# Patient Record
Sex: MALE | Race: WHITE | Employment: OTHER | URBAN - METROPOLITAN AREA
[De-identification: names, ages, dates, MRNs, and addresses within clinical notes are randomized per-mention and may not be internally consistent; named-entity substitution may affect disease eponyms.]

---

## 2017-01-10 ENCOUNTER — OFFICE VISIT (OUTPATIENT)
Dept: ENDOCRINOLOGY | Age: 42
End: 2017-01-10

## 2017-01-10 VITALS
DIASTOLIC BLOOD PRESSURE: 78 MMHG | RESPIRATION RATE: 14 BRPM | WEIGHT: 279 LBS | SYSTOLIC BLOOD PRESSURE: 127 MMHG | OXYGEN SATURATION: 100 % | BODY MASS INDEX: 35.81 KG/M2 | HEIGHT: 74 IN | HEART RATE: 72 BPM

## 2017-01-10 DIAGNOSIS — E11.9 DIABETES MELLITUS WITHOUT COMPLICATION (HCC): Primary | ICD-10-CM

## 2017-01-10 DIAGNOSIS — I10 ESSENTIAL HYPERTENSION: ICD-10-CM

## 2017-01-10 LAB — HBA1C MFR BLD: 7.5 %

## 2017-01-10 PROCEDURE — 99214 OFFICE O/P EST MOD 30 MIN: CPT | Performed by: INTERNAL MEDICINE

## 2017-01-10 PROCEDURE — 83036 HEMOGLOBIN GLYCOSYLATED A1C: CPT | Performed by: INTERNAL MEDICINE

## 2017-01-10 RX ORDER — INSULIN GLARGINE 100 [IU]/ML
INJECTION, SOLUTION SUBCUTANEOUS
Qty: 2 VIAL | Refills: 5 | Status: SHIPPED | OUTPATIENT
Start: 2017-01-10 | End: 2017-07-27 | Stop reason: SDUPTHER

## 2017-01-10 RX ORDER — BLOOD SUGAR DIAGNOSTIC
STRIP MISCELLANEOUS
Qty: 30 EACH | Refills: 10 | Status: SHIPPED | OUTPATIENT
Start: 2017-01-10 | End: 2017-10-02 | Stop reason: SDUPTHER

## 2017-01-10 RX ORDER — NAPROXEN SODIUM 220 MG
1 TABLET ORAL
Qty: 150 SYRINGE | Refills: 11 | Status: SHIPPED | OUTPATIENT
Start: 2017-01-10 | End: 2018-01-29 | Stop reason: SDUPTHER

## 2017-01-11 ENCOUNTER — TELEPHONE (OUTPATIENT)
Dept: ENDOCRINOLOGY | Age: 42
End: 2017-01-11

## 2017-01-11 DIAGNOSIS — M79.2 NEUROPATHIC PAIN: ICD-10-CM

## 2017-01-11 DIAGNOSIS — M86.669: ICD-10-CM

## 2017-01-11 DIAGNOSIS — G63 POLYNEUROPATHY ASSOCIATED WITH UNDERLYING DISEASE (HCC): ICD-10-CM

## 2017-01-11 DIAGNOSIS — G89.4 CHRONIC PAIN SYNDROME: ICD-10-CM

## 2017-01-11 DIAGNOSIS — M86.9 OSTEOMYELITIS, OSTEOMYELITIS OF UNSPECIFIED SITE, UNSPECIFIED CHRONICITY: ICD-10-CM

## 2017-01-11 RX ORDER — TRAMADOL HYDROCHLORIDE 50 MG/1
TABLET ORAL
Qty: 50 TABLET | Refills: 0 | OUTPATIENT
Start: 2017-01-11

## 2017-01-12 ENCOUNTER — OFFICE VISIT (OUTPATIENT)
Dept: PAIN MANAGEMENT | Age: 42
End: 2017-01-12

## 2017-01-12 VITALS
SYSTOLIC BLOOD PRESSURE: 105 MMHG | DIASTOLIC BLOOD PRESSURE: 60 MMHG | WEIGHT: 277 LBS | BODY MASS INDEX: 35.56 KG/M2 | HEART RATE: 70 BPM

## 2017-01-12 DIAGNOSIS — G63 POLYNEUROPATHY ASSOCIATED WITH UNDERLYING DISEASE (HCC): ICD-10-CM

## 2017-01-12 DIAGNOSIS — K21.9 GASTROESOPHAGEAL REFLUX DISEASE WITHOUT ESOPHAGITIS: ICD-10-CM

## 2017-01-12 DIAGNOSIS — G43.711 CHRONIC MIGRAINE WITHOUT AURA, WITH INTRACTABLE MIGRAINE, SO STATED, WITH STATUS MIGRAINOSUS: ICD-10-CM

## 2017-01-12 DIAGNOSIS — M79.2 NEUROPATHIC PAIN: ICD-10-CM

## 2017-01-12 DIAGNOSIS — M86.669: ICD-10-CM

## 2017-01-12 DIAGNOSIS — G89.29 CHRONIC NONINTRACTABLE HEADACHE, UNSPECIFIED HEADACHE TYPE: ICD-10-CM

## 2017-01-12 DIAGNOSIS — F51.01 PRIMARY INSOMNIA: ICD-10-CM

## 2017-01-12 DIAGNOSIS — G89.4 CHRONIC PAIN SYNDROME: ICD-10-CM

## 2017-01-12 DIAGNOSIS — M86.9 OSTEOMYELITIS, OSTEOMYELITIS OF UNSPECIFIED SITE, UNSPECIFIED CHRONICITY: ICD-10-CM

## 2017-01-12 DIAGNOSIS — R51.9 CHRONIC NONINTRACTABLE HEADACHE, UNSPECIFIED HEADACHE TYPE: ICD-10-CM

## 2017-01-12 DIAGNOSIS — F33.9 RECURRENT MAJOR DEPRESSIVE DISORDER, REMISSION STATUS UNSPECIFIED (HCC): ICD-10-CM

## 2017-01-12 PROCEDURE — 99214 OFFICE O/P EST MOD 30 MIN: CPT | Performed by: INTERNAL MEDICINE

## 2017-01-12 RX ORDER — CALCIUM CARBONATE/VITAMIN D3 500-10/5ML
400 LIQUID (ML) ORAL 2 TIMES DAILY
Qty: 60 CAPSULE | Refills: 1 | Status: SHIPPED | OUTPATIENT
Start: 2017-01-12 | End: 2017-02-16 | Stop reason: SDUPTHER

## 2017-01-12 RX ORDER — TOPIRAMATE 50 MG/1
50 TABLET, FILM COATED ORAL 2 TIMES DAILY
Qty: 60 TABLET | Refills: 1 | Status: SHIPPED | OUTPATIENT
Start: 2017-01-12 | End: 2017-02-16 | Stop reason: SDUPTHER

## 2017-01-12 RX ORDER — TRAZODONE HYDROCHLORIDE 50 MG/1
50-100 TABLET ORAL NIGHTLY
Qty: 60 TABLET | Refills: 0 | Status: SHIPPED | OUTPATIENT
Start: 2017-01-12 | End: 2017-02-11 | Stop reason: SDUPTHER

## 2017-01-12 RX ORDER — TRAMADOL HYDROCHLORIDE 50 MG/1
50 TABLET ORAL EVERY 6 HOURS PRN
Qty: 30 TABLET | Refills: 0 | Status: SHIPPED | OUTPATIENT
Start: 2017-01-12 | End: 2017-02-16 | Stop reason: SDUPTHER

## 2017-01-12 RX ORDER — ESOMEPRAZOLE MAGNESIUM 40 MG/1
CAPSULE, DELAYED RELEASE ORAL
Qty: 30 CAPSULE | Refills: 0 | Status: SHIPPED | OUTPATIENT
Start: 2017-01-12 | End: 2017-02-11 | Stop reason: SDUPTHER

## 2017-01-12 RX ORDER — DULOXETIN HYDROCHLORIDE 60 MG/1
60 CAPSULE, DELAYED RELEASE ORAL DAILY
Qty: 30 CAPSULE | Refills: 1 | Status: SHIPPED | OUTPATIENT
Start: 2017-01-12 | End: 2017-02-16 | Stop reason: SDUPTHER

## 2017-01-12 RX ORDER — GABAPENTIN 400 MG/1
CAPSULE ORAL
Qty: 60 CAPSULE | Refills: 1 | Status: SHIPPED | OUTPATIENT
Start: 2017-01-12 | End: 2017-02-16 | Stop reason: SDUPTHER

## 2017-01-12 RX ORDER — SUMATRIPTAN 50 MG/1
TABLET, FILM COATED ORAL
Qty: 9 TABLET | Refills: 1 | Status: SHIPPED | OUTPATIENT
Start: 2017-01-12 | End: 2017-02-10 | Stop reason: SDUPTHER

## 2017-01-12 RX ORDER — BUPRENORPHINE 5 UG/H
1 PATCH TRANSDERMAL
Qty: 4 PATCH | Refills: 0 | Status: SHIPPED | OUTPATIENT
Start: 2017-01-12 | End: 2017-02-16 | Stop reason: SDUPTHER

## 2017-01-16 DIAGNOSIS — K21.9 GASTROESOPHAGEAL REFLUX DISEASE WITHOUT ESOPHAGITIS: ICD-10-CM

## 2017-01-16 DIAGNOSIS — F51.01 PRIMARY INSOMNIA: ICD-10-CM

## 2017-01-16 DIAGNOSIS — G89.4 CHRONIC PAIN SYNDROME: ICD-10-CM

## 2017-01-16 RX ORDER — ESOMEPRAZOLE MAGNESIUM 40 MG/1
CAPSULE, DELAYED RELEASE ORAL
Qty: 30 CAPSULE | Refills: 0 | OUTPATIENT
Start: 2017-01-16

## 2017-01-16 RX ORDER — QUETIAPINE FUMARATE 25 MG/1
TABLET, FILM COATED ORAL
Qty: 30 TABLET | Refills: 0 | OUTPATIENT
Start: 2017-01-16

## 2017-01-27 DIAGNOSIS — G89.4 CHRONIC PAIN SYNDROME: ICD-10-CM

## 2017-01-27 DIAGNOSIS — M79.2 NEUROPATHIC PAIN: ICD-10-CM

## 2017-01-27 DIAGNOSIS — G63 POLYNEUROPATHY ASSOCIATED WITH UNDERLYING DISEASE (HCC): ICD-10-CM

## 2017-01-27 DIAGNOSIS — M86.669: ICD-10-CM

## 2017-01-27 DIAGNOSIS — M86.9 OSTEOMYELITIS, OSTEOMYELITIS OF UNSPECIFIED SITE, UNSPECIFIED CHRONICITY: ICD-10-CM

## 2017-01-27 RX ORDER — TRAMADOL HYDROCHLORIDE 50 MG/1
TABLET ORAL
Qty: 30 TABLET | Refills: 0 | OUTPATIENT
Start: 2017-01-27

## 2017-02-02 RX ORDER — IBUPROFEN 600 MG/1
TABLET ORAL
Qty: 1 KIT | Refills: 4 | Status: SHIPPED | OUTPATIENT
Start: 2017-02-02 | End: 2017-12-18 | Stop reason: SDUPTHER

## 2017-02-09 ENCOUNTER — OFFICE VISIT (OUTPATIENT)
Dept: INTERNAL MEDICINE CLINIC | Age: 42
End: 2017-02-09

## 2017-02-09 VITALS
HEART RATE: 68 BPM | HEIGHT: 74 IN | WEIGHT: 274.1 LBS | OXYGEN SATURATION: 99 % | DIASTOLIC BLOOD PRESSURE: 86 MMHG | SYSTOLIC BLOOD PRESSURE: 132 MMHG | BODY MASS INDEX: 35.18 KG/M2

## 2017-02-09 DIAGNOSIS — E10.8 TYPE 1 DIABETES MELLITUS WITH COMPLICATION (HCC): Primary | ICD-10-CM

## 2017-02-09 LAB
GLUCOSE BLD-MCNC: 298 MG/DL
HBA1C MFR BLD: 7.6 %

## 2017-02-09 PROCEDURE — 82962 GLUCOSE BLOOD TEST: CPT | Performed by: INTERNAL MEDICINE

## 2017-02-09 PROCEDURE — 83036 HEMOGLOBIN GLYCOSYLATED A1C: CPT | Performed by: INTERNAL MEDICINE

## 2017-02-09 PROCEDURE — 99214 OFFICE O/P EST MOD 30 MIN: CPT | Performed by: INTERNAL MEDICINE

## 2017-02-10 DIAGNOSIS — G89.4 CHRONIC PAIN SYNDROME: ICD-10-CM

## 2017-02-10 DIAGNOSIS — R51.9 CHRONIC NONINTRACTABLE HEADACHE, UNSPECIFIED HEADACHE TYPE: ICD-10-CM

## 2017-02-10 DIAGNOSIS — G89.29 CHRONIC NONINTRACTABLE HEADACHE, UNSPECIFIED HEADACHE TYPE: ICD-10-CM

## 2017-02-10 RX ORDER — SUMATRIPTAN 50 MG/1
TABLET, FILM COATED ORAL
Qty: 9 TABLET | Refills: 1 | Status: SHIPPED | OUTPATIENT
Start: 2017-02-10 | End: 2017-02-16 | Stop reason: SDUPTHER

## 2017-02-11 DIAGNOSIS — K21.9 GASTROESOPHAGEAL REFLUX DISEASE WITHOUT ESOPHAGITIS: ICD-10-CM

## 2017-02-11 DIAGNOSIS — G89.4 CHRONIC PAIN SYNDROME: ICD-10-CM

## 2017-02-11 DIAGNOSIS — F51.01 PRIMARY INSOMNIA: ICD-10-CM

## 2017-02-13 RX ORDER — TRAZODONE HYDROCHLORIDE 50 MG/1
TABLET ORAL
Qty: 60 TABLET | Refills: 0 | Status: SHIPPED | OUTPATIENT
Start: 2017-02-13 | End: 2017-02-16 | Stop reason: SDUPTHER

## 2017-02-13 RX ORDER — ESOMEPRAZOLE MAGNESIUM 40 MG/1
CAPSULE, DELAYED RELEASE ORAL
Qty: 30 CAPSULE | Refills: 0 | Status: SHIPPED | OUTPATIENT
Start: 2017-02-13 | End: 2017-02-16 | Stop reason: SDUPTHER

## 2017-02-15 RX ORDER — CETIRIZINE HYDROCHLORIDE 10 MG/1
TABLET ORAL
Qty: 28 TABLET | Refills: 1 | Status: SHIPPED | OUTPATIENT
Start: 2017-02-15 | End: 2017-05-07 | Stop reason: SDUPTHER

## 2017-02-15 RX ORDER — MULTIVITAMIN
TABLET ORAL
Qty: 30 TABLET | Refills: 5 | Status: SHIPPED | OUTPATIENT
Start: 2017-02-15 | End: 2017-09-26 | Stop reason: SDUPTHER

## 2017-02-16 ENCOUNTER — OFFICE VISIT (OUTPATIENT)
Dept: PAIN MANAGEMENT | Age: 42
End: 2017-02-16

## 2017-02-16 VITALS
WEIGHT: 275 LBS | HEART RATE: 67 BPM | SYSTOLIC BLOOD PRESSURE: 110 MMHG | DIASTOLIC BLOOD PRESSURE: 51 MMHG | BODY MASS INDEX: 35.31 KG/M2

## 2017-02-16 DIAGNOSIS — F51.01 PRIMARY INSOMNIA: ICD-10-CM

## 2017-02-16 DIAGNOSIS — G89.4 CHRONIC PAIN SYNDROME: ICD-10-CM

## 2017-02-16 DIAGNOSIS — G89.29 CHRONIC NONINTRACTABLE HEADACHE, UNSPECIFIED HEADACHE TYPE: ICD-10-CM

## 2017-02-16 DIAGNOSIS — G63 POLYNEUROPATHY ASSOCIATED WITH UNDERLYING DISEASE (HCC): ICD-10-CM

## 2017-02-16 DIAGNOSIS — F33.9 RECURRENT MAJOR DEPRESSIVE DISORDER, REMISSION STATUS UNSPECIFIED (HCC): ICD-10-CM

## 2017-02-16 DIAGNOSIS — M79.2 NEUROPATHIC PAIN: ICD-10-CM

## 2017-02-16 DIAGNOSIS — R51.9 CHRONIC NONINTRACTABLE HEADACHE, UNSPECIFIED HEADACHE TYPE: ICD-10-CM

## 2017-02-16 DIAGNOSIS — K21.9 GASTROESOPHAGEAL REFLUX DISEASE WITHOUT ESOPHAGITIS: ICD-10-CM

## 2017-02-16 PROCEDURE — 99214 OFFICE O/P EST MOD 30 MIN: CPT | Performed by: INTERNAL MEDICINE

## 2017-02-16 RX ORDER — ESOMEPRAZOLE MAGNESIUM 40 MG/1
CAPSULE, DELAYED RELEASE ORAL
Qty: 30 CAPSULE | Refills: 0 | Status: SHIPPED | OUTPATIENT
Start: 2017-02-16 | End: 2017-03-16

## 2017-02-16 RX ORDER — GABAPENTIN 400 MG/1
CAPSULE ORAL
Qty: 60 CAPSULE | Refills: 0 | Status: SHIPPED | OUTPATIENT
Start: 2017-02-16 | End: 2017-03-16 | Stop reason: SDUPTHER

## 2017-02-16 RX ORDER — TRAMADOL HYDROCHLORIDE 50 MG/1
50 TABLET ORAL EVERY 6 HOURS PRN
Qty: 25 TABLET | Refills: 0 | Status: SHIPPED | OUTPATIENT
Start: 2017-02-16 | End: 2017-03-16 | Stop reason: SDUPTHER

## 2017-02-16 RX ORDER — DULOXETIN HYDROCHLORIDE 60 MG/1
60 CAPSULE, DELAYED RELEASE ORAL DAILY
Qty: 30 CAPSULE | Refills: 0 | Status: SHIPPED | OUTPATIENT
Start: 2017-02-16 | End: 2017-03-16 | Stop reason: SDUPTHER

## 2017-02-16 RX ORDER — BUPRENORPHINE 5 UG/H
1 PATCH TRANSDERMAL
Qty: 4 PATCH | Refills: 0 | Status: SHIPPED | OUTPATIENT
Start: 2017-02-16 | End: 2017-03-16 | Stop reason: SDUPTHER

## 2017-02-16 RX ORDER — CALCIUM CARBONATE/VITAMIN D3 500-10/5ML
400 LIQUID (ML) ORAL 2 TIMES DAILY
Qty: 60 CAPSULE | Refills: 0 | Status: SHIPPED | OUTPATIENT
Start: 2017-02-16 | End: 2017-03-16 | Stop reason: SDUPTHER

## 2017-02-16 RX ORDER — TRAZODONE HYDROCHLORIDE 50 MG/1
TABLET ORAL
Qty: 60 TABLET | Refills: 0 | Status: SHIPPED | OUTPATIENT
Start: 2017-02-16 | End: 2017-03-16 | Stop reason: SDUPTHER

## 2017-02-16 RX ORDER — SUMATRIPTAN 50 MG/1
TABLET, FILM COATED ORAL
Qty: 9 TABLET | Refills: 0 | Status: SHIPPED | OUTPATIENT
Start: 2017-02-16 | End: 2017-03-16 | Stop reason: SDUPTHER

## 2017-02-16 RX ORDER — TOPIRAMATE 50 MG/1
50 TABLET, FILM COATED ORAL 2 TIMES DAILY
Qty: 60 TABLET | Refills: 0 | Status: SHIPPED | OUTPATIENT
Start: 2017-02-16 | End: 2017-03-16 | Stop reason: SDUPTHER

## 2017-02-17 ENCOUNTER — TELEPHONE (OUTPATIENT)
Dept: PAIN MANAGEMENT | Age: 42
End: 2017-02-17

## 2017-02-18 DIAGNOSIS — G89.4 CHRONIC PAIN SYNDROME: ICD-10-CM

## 2017-02-18 DIAGNOSIS — M79.2 NEUROPATHIC PAIN: ICD-10-CM

## 2017-02-18 DIAGNOSIS — G63 POLYNEUROPATHY ASSOCIATED WITH UNDERLYING DISEASE (HCC): ICD-10-CM

## 2017-02-20 ENCOUNTER — TELEPHONE (OUTPATIENT)
Dept: PAIN MANAGEMENT | Age: 42
End: 2017-02-20

## 2017-02-20 DIAGNOSIS — G89.4 CHRONIC PAIN SYNDROME: ICD-10-CM

## 2017-02-20 ASSESSMENT — ENCOUNTER SYMPTOMS
GASTROINTESTINAL NEGATIVE: 1
RESPIRATORY NEGATIVE: 1

## 2017-02-21 RX ORDER — TRAMADOL HYDROCHLORIDE 50 MG/1
TABLET ORAL
Qty: 25 TABLET | Refills: 0 | OUTPATIENT
Start: 2017-02-21

## 2017-03-14 DIAGNOSIS — G89.4 CHRONIC PAIN SYNDROME: ICD-10-CM

## 2017-03-14 DIAGNOSIS — K21.9 GASTROESOPHAGEAL REFLUX DISEASE WITHOUT ESOPHAGITIS: ICD-10-CM

## 2017-03-14 RX ORDER — ESOMEPRAZOLE MAGNESIUM 40 MG/1
CAPSULE, DELAYED RELEASE ORAL
Qty: 30 CAPSULE | Refills: 0 | OUTPATIENT
Start: 2017-03-14

## 2017-03-14 RX ORDER — OMEGA-3-ACID ETHYL ESTERS 1 G/1
CAPSULE, LIQUID FILLED ORAL
Qty: 120 CAPSULE | Refills: 2 | Status: SHIPPED | OUTPATIENT
Start: 2017-03-14 | End: 2017-06-10 | Stop reason: SDUPTHER

## 2017-03-16 ENCOUNTER — OFFICE VISIT (OUTPATIENT)
Dept: PAIN MANAGEMENT | Age: 42
End: 2017-03-16

## 2017-03-16 VITALS
BODY MASS INDEX: 35.56 KG/M2 | WEIGHT: 277 LBS | DIASTOLIC BLOOD PRESSURE: 65 MMHG | SYSTOLIC BLOOD PRESSURE: 97 MMHG | HEART RATE: 81 BPM

## 2017-03-16 DIAGNOSIS — G63 POLYNEUROPATHY ASSOCIATED WITH UNDERLYING DISEASE (HCC): ICD-10-CM

## 2017-03-16 DIAGNOSIS — F33.9 RECURRENT MAJOR DEPRESSIVE DISORDER, REMISSION STATUS UNSPECIFIED (HCC): ICD-10-CM

## 2017-03-16 DIAGNOSIS — F51.01 PRIMARY INSOMNIA: ICD-10-CM

## 2017-03-16 DIAGNOSIS — K21.9 GASTRO-ESOPHAGEAL REFLUX DISEASE WITHOUT ESOPHAGITIS: ICD-10-CM

## 2017-03-16 DIAGNOSIS — G89.29 CHRONIC NONINTRACTABLE HEADACHE, UNSPECIFIED HEADACHE TYPE: ICD-10-CM

## 2017-03-16 DIAGNOSIS — G43.711 CHRONIC MIGRAINE WITHOUT AURA, WITH INTRACTABLE MIGRAINE, SO STATED, WITH STATUS MIGRAINOSUS: ICD-10-CM

## 2017-03-16 DIAGNOSIS — R51.9 CHRONIC NONINTRACTABLE HEADACHE, UNSPECIFIED HEADACHE TYPE: ICD-10-CM

## 2017-03-16 DIAGNOSIS — G89.4 CHRONIC PAIN SYNDROME: ICD-10-CM

## 2017-03-16 DIAGNOSIS — M79.2 NEUROPATHIC PAIN: ICD-10-CM

## 2017-03-16 PROCEDURE — 99214 OFFICE O/P EST MOD 30 MIN: CPT | Performed by: INTERNAL MEDICINE

## 2017-03-16 RX ORDER — BUPRENORPHINE 5 UG/H
1 PATCH TRANSDERMAL
Qty: 4 PATCH | Refills: 0 | Status: SHIPPED | OUTPATIENT
Start: 2017-03-16 | End: 2017-04-13 | Stop reason: SDUPTHER

## 2017-03-16 RX ORDER — SUMATRIPTAN 50 MG/1
TABLET, FILM COATED ORAL
Qty: 9 TABLET | Refills: 0 | Status: SHIPPED | OUTPATIENT
Start: 2017-03-16 | End: 2017-04-13 | Stop reason: SDUPTHER

## 2017-03-16 RX ORDER — TRAMADOL HYDROCHLORIDE 50 MG/1
50 TABLET ORAL EVERY 6 HOURS PRN
Qty: 25 TABLET | Refills: 0 | Status: SHIPPED | OUTPATIENT
Start: 2017-03-16 | End: 2017-04-13 | Stop reason: SDUPTHER

## 2017-03-16 RX ORDER — TOPIRAMATE 50 MG/1
50 TABLET, FILM COATED ORAL 2 TIMES DAILY
Qty: 60 TABLET | Refills: 0 | Status: SHIPPED | OUTPATIENT
Start: 2017-03-16 | End: 2017-04-13 | Stop reason: SDUPTHER

## 2017-03-16 RX ORDER — GABAPENTIN 400 MG/1
CAPSULE ORAL
Qty: 60 CAPSULE | Refills: 0 | Status: SHIPPED | OUTPATIENT
Start: 2017-03-16 | End: 2017-04-13 | Stop reason: SDUPTHER

## 2017-03-16 RX ORDER — FAMOTIDINE 20 MG/1
20 TABLET, FILM COATED ORAL DAILY
Qty: 30 TABLET | Refills: 0 | Status: SHIPPED | OUTPATIENT
Start: 2017-03-16 | End: 2017-04-13 | Stop reason: SDUPTHER

## 2017-03-16 RX ORDER — CALCIUM CARBONATE/VITAMIN D3 500-10/5ML
400 LIQUID (ML) ORAL 2 TIMES DAILY
Qty: 60 CAPSULE | Refills: 0 | Status: SHIPPED | OUTPATIENT
Start: 2017-03-16 | End: 2017-04-03 | Stop reason: CLARIF

## 2017-03-16 RX ORDER — TRAZODONE HYDROCHLORIDE 50 MG/1
TABLET ORAL
Qty: 60 TABLET | Refills: 0 | Status: SHIPPED | OUTPATIENT
Start: 2017-03-16 | End: 2017-04-13 | Stop reason: SDUPTHER

## 2017-03-16 RX ORDER — DULOXETIN HYDROCHLORIDE 60 MG/1
60 CAPSULE, DELAYED RELEASE ORAL DAILY
Qty: 30 CAPSULE | Refills: 0 | Status: SHIPPED | OUTPATIENT
Start: 2017-03-16 | End: 2017-04-13 | Stop reason: SDUPTHER

## 2017-03-17 RX ORDER — BLOOD-GLUCOSE METER
1 KIT MISCELLANEOUS SEE ADMIN INSTRUCTIONS
Qty: 1 DEVICE | Refills: 0 | Status: SHIPPED | OUTPATIENT
Start: 2017-03-17 | End: 2017-07-27 | Stop reason: CLARIF

## 2017-03-17 RX ORDER — LANCETS 28 GAUGE
1 EACH MISCELLANEOUS SEE ADMIN INSTRUCTIONS
Qty: 300 EACH | Refills: 11 | Status: SHIPPED | OUTPATIENT
Start: 2017-03-17 | End: 2017-07-27 | Stop reason: CLARIF

## 2017-03-20 DIAGNOSIS — K21.9 GASTROESOPHAGEAL REFLUX DISEASE WITHOUT ESOPHAGITIS: ICD-10-CM

## 2017-03-20 DIAGNOSIS — G89.4 CHRONIC PAIN SYNDROME: ICD-10-CM

## 2017-03-23 ENCOUNTER — TELEPHONE (OUTPATIENT)
Dept: ENDOCRINOLOGY | Age: 42
End: 2017-03-23

## 2017-03-23 RX ORDER — FAMOTIDINE 20 MG/1
TABLET, FILM COATED ORAL
Qty: 30 TABLET | Refills: 0 | Status: SHIPPED | OUTPATIENT
Start: 2017-03-23 | End: 2017-04-13 | Stop reason: SDUPTHER

## 2017-03-25 DIAGNOSIS — G63 POLYNEUROPATHY ASSOCIATED WITH UNDERLYING DISEASE (HCC): ICD-10-CM

## 2017-03-25 DIAGNOSIS — G89.4 CHRONIC PAIN SYNDROME: ICD-10-CM

## 2017-03-25 DIAGNOSIS — M79.2 NEUROPATHIC PAIN: ICD-10-CM

## 2017-03-28 RX ORDER — TRAMADOL HYDROCHLORIDE 50 MG/1
TABLET ORAL
Qty: 25 TABLET | Refills: 0 | OUTPATIENT
Start: 2017-03-28

## 2017-04-03 ENCOUNTER — TELEPHONE (OUTPATIENT)
Dept: PAIN MANAGEMENT | Age: 42
End: 2017-04-03

## 2017-04-03 DIAGNOSIS — G89.4 CHRONIC PAIN SYNDROME: ICD-10-CM

## 2017-04-03 DIAGNOSIS — G63 POLYNEUROPATHY ASSOCIATED WITH UNDERLYING DISEASE (HCC): ICD-10-CM

## 2017-04-03 DIAGNOSIS — M79.2 NEUROPATHIC PAIN: ICD-10-CM

## 2017-04-03 RX ORDER — MAGNESIUM OXIDE 400 MG/1
400 TABLET ORAL 2 TIMES DAILY
Qty: 60 TABLET | Refills: 0 | Status: SHIPPED | OUTPATIENT
Start: 2017-04-03 | End: 2017-05-09 | Stop reason: SDUPTHER

## 2017-04-13 ENCOUNTER — OFFICE VISIT (OUTPATIENT)
Dept: PAIN MANAGEMENT | Age: 42
End: 2017-04-13

## 2017-04-13 VITALS
HEART RATE: 69 BPM | SYSTOLIC BLOOD PRESSURE: 110 MMHG | DIASTOLIC BLOOD PRESSURE: 65 MMHG | WEIGHT: 273 LBS | BODY MASS INDEX: 35.05 KG/M2

## 2017-04-13 DIAGNOSIS — G63 POLYNEUROPATHY ASSOCIATED WITH UNDERLYING DISEASE (HCC): ICD-10-CM

## 2017-04-13 DIAGNOSIS — M86.662 CHRONIC OSTEOMYELITIS OF LEFT TIBIA (HCC): ICD-10-CM

## 2017-04-13 DIAGNOSIS — F51.01 PRIMARY INSOMNIA: ICD-10-CM

## 2017-04-13 DIAGNOSIS — M79.2 NEUROPATHIC PAIN: ICD-10-CM

## 2017-04-13 DIAGNOSIS — F33.9 RECURRENT MAJOR DEPRESSIVE DISORDER, REMISSION STATUS UNSPECIFIED (HCC): ICD-10-CM

## 2017-04-13 DIAGNOSIS — K21.9 GASTROESOPHAGEAL REFLUX DISEASE WITHOUT ESOPHAGITIS: ICD-10-CM

## 2017-04-13 DIAGNOSIS — G89.4 CHRONIC PAIN SYNDROME: ICD-10-CM

## 2017-04-13 DIAGNOSIS — G43.711 CHRONIC MIGRAINE WITHOUT AURA, WITH INTRACTABLE MIGRAINE, SO STATED, WITH STATUS MIGRAINOSUS: ICD-10-CM

## 2017-04-13 PROCEDURE — 99214 OFFICE O/P EST MOD 30 MIN: CPT | Performed by: INTERNAL MEDICINE

## 2017-04-13 RX ORDER — TRAMADOL HYDROCHLORIDE 50 MG/1
50 TABLET ORAL EVERY 6 HOURS PRN
Qty: 50 TABLET | Refills: 0 | Status: SHIPPED | OUTPATIENT
Start: 2017-04-13 | End: 2017-05-09 | Stop reason: SDUPTHER

## 2017-04-13 RX ORDER — TRAZODONE HYDROCHLORIDE 50 MG/1
TABLET ORAL
Qty: 60 TABLET | Refills: 1 | Status: SHIPPED | OUTPATIENT
Start: 2017-04-13 | End: 2017-06-13 | Stop reason: SDUPTHER

## 2017-04-13 RX ORDER — SUMATRIPTAN 50 MG/1
TABLET, FILM COATED ORAL
Qty: 9 TABLET | Refills: 1 | Status: SHIPPED | OUTPATIENT
Start: 2017-04-13 | End: 2017-06-13 | Stop reason: SDUPTHER

## 2017-04-13 RX ORDER — TOPIRAMATE 50 MG/1
TABLET, FILM COATED ORAL
Qty: 90 TABLET | Refills: 1 | Status: SHIPPED | OUTPATIENT
Start: 2017-04-13 | End: 2017-06-13 | Stop reason: ALTCHOICE

## 2017-04-13 RX ORDER — BUPRENORPHINE 5 UG/H
1 PATCH TRANSDERMAL
Qty: 4 PATCH | Refills: 0 | Status: SHIPPED | OUTPATIENT
Start: 2017-04-13 | End: 2017-05-09 | Stop reason: SDUPTHER

## 2017-04-13 RX ORDER — GABAPENTIN 400 MG/1
CAPSULE ORAL
Qty: 60 CAPSULE | Refills: 1 | Status: SHIPPED | OUTPATIENT
Start: 2017-04-13 | End: 2017-06-13 | Stop reason: SDUPTHER

## 2017-04-13 RX ORDER — FAMOTIDINE 20 MG/1
TABLET, FILM COATED ORAL
Qty: 30 TABLET | Refills: 1 | Status: SHIPPED | OUTPATIENT
Start: 2017-04-13 | End: 2017-06-13 | Stop reason: SDUPTHER

## 2017-04-13 RX ORDER — DULOXETIN HYDROCHLORIDE 60 MG/1
60 CAPSULE, DELAYED RELEASE ORAL DAILY
Qty: 30 CAPSULE | Refills: 1 | Status: SHIPPED | OUTPATIENT
Start: 2017-04-13 | End: 2017-06-13 | Stop reason: ALTCHOICE

## 2017-04-18 DIAGNOSIS — R09.81 NASAL SINUS CONGESTION: ICD-10-CM

## 2017-04-18 RX ORDER — FLUTICASONE PROPIONATE 50 MCG
SPRAY, SUSPENSION (ML) NASAL
Qty: 1 BOTTLE | Refills: 2 | Status: SHIPPED | OUTPATIENT
Start: 2017-04-18 | End: 2017-07-15 | Stop reason: SDUPTHER

## 2017-04-21 DIAGNOSIS — G63 POLYNEUROPATHY ASSOCIATED WITH UNDERLYING DISEASE (HCC): ICD-10-CM

## 2017-04-21 DIAGNOSIS — M79.2 NEUROPATHIC PAIN: ICD-10-CM

## 2017-04-21 DIAGNOSIS — G89.4 CHRONIC PAIN SYNDROME: ICD-10-CM

## 2017-04-21 RX ORDER — TRAMADOL HYDROCHLORIDE 50 MG/1
TABLET ORAL
Qty: 50 TABLET | Refills: 0 | OUTPATIENT
Start: 2017-04-21

## 2017-05-09 ENCOUNTER — OFFICE VISIT (OUTPATIENT)
Dept: PAIN MANAGEMENT | Age: 42
End: 2017-05-09

## 2017-05-09 VITALS
HEART RATE: 61 BPM | BODY MASS INDEX: 35.56 KG/M2 | WEIGHT: 277 LBS | DIASTOLIC BLOOD PRESSURE: 60 MMHG | SYSTOLIC BLOOD PRESSURE: 105 MMHG

## 2017-05-09 DIAGNOSIS — G89.4 CHRONIC PAIN SYNDROME: ICD-10-CM

## 2017-05-09 DIAGNOSIS — F33.9 RECURRENT MAJOR DEPRESSIVE DISORDER, REMISSION STATUS UNSPECIFIED (HCC): ICD-10-CM

## 2017-05-09 DIAGNOSIS — M79.2 NEUROPATHIC PAIN: ICD-10-CM

## 2017-05-09 DIAGNOSIS — G63 POLYNEUROPATHY ASSOCIATED WITH UNDERLYING DISEASE (HCC): ICD-10-CM

## 2017-05-09 DIAGNOSIS — F51.01 PRIMARY INSOMNIA: ICD-10-CM

## 2017-05-09 DIAGNOSIS — G43.711 CHRONIC MIGRAINE WITHOUT AURA, WITH INTRACTABLE MIGRAINE, SO STATED, WITH STATUS MIGRAINOSUS: ICD-10-CM

## 2017-05-09 DIAGNOSIS — G89.29 CHRONIC NONINTRACTABLE HEADACHE, UNSPECIFIED HEADACHE TYPE: ICD-10-CM

## 2017-05-09 DIAGNOSIS — R51.9 CHRONIC NONINTRACTABLE HEADACHE, UNSPECIFIED HEADACHE TYPE: ICD-10-CM

## 2017-05-09 PROCEDURE — 99214 OFFICE O/P EST MOD 30 MIN: CPT | Performed by: INTERNAL MEDICINE

## 2017-05-09 RX ORDER — BUPRENORPHINE 5 UG/H
1 PATCH TRANSDERMAL
Qty: 4 PATCH | Refills: 0 | Status: SHIPPED | OUTPATIENT
Start: 2017-05-09 | End: 2017-06-13 | Stop reason: SDUPTHER

## 2017-05-09 RX ORDER — MAGNESIUM OXIDE 400 MG/1
400 TABLET ORAL 2 TIMES DAILY
Qty: 60 TABLET | Refills: 0 | Status: SHIPPED | OUTPATIENT
Start: 2017-05-09 | End: 2017-06-13 | Stop reason: SDUPTHER

## 2017-05-09 RX ORDER — TRAMADOL HYDROCHLORIDE 50 MG/1
50 TABLET ORAL EVERY 6 HOURS PRN
Qty: 70 TABLET | Refills: 0 | Status: SHIPPED | OUTPATIENT
Start: 2017-05-09 | End: 2017-06-13 | Stop reason: SDUPTHER

## 2017-05-12 RX ORDER — CEPHALEXIN 500 MG/1
CAPSULE ORAL
Qty: 28 CAPSULE | Refills: 7 | Status: SHIPPED | OUTPATIENT
Start: 2017-05-12 | End: 2018-02-08 | Stop reason: SDUPTHER

## 2017-05-17 RX ORDER — INSULIN GLARGINE 100 [IU]/ML
30 INJECTION, SOLUTION SUBCUTANEOUS NIGHTLY
Qty: 15 ML | Refills: 1 | Status: SHIPPED | OUTPATIENT
Start: 2017-05-17 | End: 2017-07-21 | Stop reason: SDUPTHER

## 2017-05-25 DIAGNOSIS — G89.4 CHRONIC PAIN SYNDROME: ICD-10-CM

## 2017-05-25 DIAGNOSIS — G63 POLYNEUROPATHY ASSOCIATED WITH UNDERLYING DISEASE (HCC): ICD-10-CM

## 2017-05-25 DIAGNOSIS — M79.2 NEUROPATHIC PAIN: ICD-10-CM

## 2017-05-26 RX ORDER — TRAMADOL HYDROCHLORIDE 50 MG/1
TABLET ORAL
Qty: 70 TABLET | Refills: 0 | OUTPATIENT
Start: 2017-05-26

## 2017-06-10 RX ORDER — OMEGA-3-ACID ETHYL ESTERS 1 G/1
CAPSULE, LIQUID FILLED ORAL
Qty: 120 CAPSULE | Refills: 5 | Status: SHIPPED | OUTPATIENT
Start: 2017-06-10

## 2017-06-13 ENCOUNTER — OFFICE VISIT (OUTPATIENT)
Dept: PAIN MANAGEMENT | Age: 42
End: 2017-06-13

## 2017-06-13 VITALS
WEIGHT: 277 LBS | HEART RATE: 67 BPM | SYSTOLIC BLOOD PRESSURE: 121 MMHG | BODY MASS INDEX: 35.56 KG/M2 | DIASTOLIC BLOOD PRESSURE: 66 MMHG

## 2017-06-13 DIAGNOSIS — M79.2 NEUROPATHIC PAIN: ICD-10-CM

## 2017-06-13 DIAGNOSIS — G89.4 CHRONIC PAIN SYNDROME: ICD-10-CM

## 2017-06-13 DIAGNOSIS — F33.1 MODERATE EPISODE OF RECURRENT MAJOR DEPRESSIVE DISORDER (HCC): ICD-10-CM

## 2017-06-13 DIAGNOSIS — G43.711 CHRONIC MIGRAINE WITHOUT AURA, WITH INTRACTABLE MIGRAINE, SO STATED, WITH STATUS MIGRAINOSUS: ICD-10-CM

## 2017-06-13 DIAGNOSIS — F33.9 RECURRENT MAJOR DEPRESSIVE DISORDER, REMISSION STATUS UNSPECIFIED (HCC): ICD-10-CM

## 2017-06-13 DIAGNOSIS — M86.662 CHRONIC OSTEOMYELITIS OF LEFT TIBIA (HCC): ICD-10-CM

## 2017-06-13 DIAGNOSIS — G63 POLYNEUROPATHY ASSOCIATED WITH UNDERLYING DISEASE (HCC): ICD-10-CM

## 2017-06-13 DIAGNOSIS — F51.01 PRIMARY INSOMNIA: ICD-10-CM

## 2017-06-13 DIAGNOSIS — K21.9 GASTROESOPHAGEAL REFLUX DISEASE WITHOUT ESOPHAGITIS: ICD-10-CM

## 2017-06-13 PROCEDURE — 99214 OFFICE O/P EST MOD 30 MIN: CPT | Performed by: INTERNAL MEDICINE

## 2017-06-13 RX ORDER — SUMATRIPTAN 50 MG/1
TABLET, FILM COATED ORAL
Qty: 9 TABLET | Refills: 1 | Status: SHIPPED | OUTPATIENT
Start: 2017-06-13 | End: 2017-07-13 | Stop reason: SDUPTHER

## 2017-06-13 RX ORDER — TOPIRAMATE 100 MG/1
100 TABLET, FILM COATED ORAL 2 TIMES DAILY
Qty: 60 TABLET | Refills: 0 | Status: SHIPPED | OUTPATIENT
Start: 2017-06-13 | End: 2017-07-13 | Stop reason: SDUPTHER

## 2017-06-13 RX ORDER — MAGNESIUM OXIDE 400 MG/1
400 TABLET ORAL 2 TIMES DAILY
Qty: 60 TABLET | Refills: 0 | Status: SHIPPED | OUTPATIENT
Start: 2017-06-13 | End: 2017-07-13 | Stop reason: SDUPTHER

## 2017-06-13 RX ORDER — GABAPENTIN 400 MG/1
CAPSULE ORAL
Qty: 60 CAPSULE | Refills: 1 | Status: SHIPPED | OUTPATIENT
Start: 2017-06-13 | End: 2017-07-13 | Stop reason: SDUPTHER

## 2017-06-13 RX ORDER — TRAMADOL HYDROCHLORIDE 50 MG/1
50 TABLET ORAL EVERY 6 HOURS PRN
Qty: 70 TABLET | Refills: 0 | Status: SHIPPED | OUTPATIENT
Start: 2017-06-13 | End: 2017-08-11 | Stop reason: SDUPTHER

## 2017-06-13 RX ORDER — FAMOTIDINE 20 MG/1
TABLET, FILM COATED ORAL
Qty: 30 TABLET | Refills: 1 | Status: SHIPPED | OUTPATIENT
Start: 2017-06-13 | End: 2017-07-13 | Stop reason: SDUPTHER

## 2017-06-13 RX ORDER — BUPRENORPHINE 5 UG/H
1 PATCH TRANSDERMAL
Qty: 4 PATCH | Refills: 0 | Status: SHIPPED | OUTPATIENT
Start: 2017-06-13 | End: 2017-07-13 | Stop reason: SDUPTHER

## 2017-06-13 RX ORDER — DULOXETIN HYDROCHLORIDE 30 MG/1
CAPSULE, DELAYED RELEASE ORAL
Qty: 90 CAPSULE | Refills: 0 | Status: SHIPPED | OUTPATIENT
Start: 2017-06-13 | End: 2017-07-13 | Stop reason: ALTCHOICE

## 2017-06-13 RX ORDER — TRAZODONE HYDROCHLORIDE 50 MG/1
TABLET ORAL
Qty: 60 TABLET | Refills: 1 | Status: SHIPPED | OUTPATIENT
Start: 2017-06-13 | End: 2017-07-13 | Stop reason: SDUPTHER

## 2017-06-14 ENCOUNTER — TELEPHONE (OUTPATIENT)
Dept: PAIN MANAGEMENT | Age: 42
End: 2017-06-14

## 2017-06-15 ENCOUNTER — OFFICE VISIT (OUTPATIENT)
Dept: INFECTIOUS DISEASES | Age: 42
End: 2017-06-15

## 2017-06-15 VITALS
WEIGHT: 277 LBS | BODY MASS INDEX: 35.56 KG/M2 | TEMPERATURE: 98.2 F | DIASTOLIC BLOOD PRESSURE: 80 MMHG | SYSTOLIC BLOOD PRESSURE: 112 MMHG

## 2017-06-15 DIAGNOSIS — M86.662 CHRONIC OSTEOMYELITIS OF LEFT TIBIA (HCC): Primary | ICD-10-CM

## 2017-06-15 DIAGNOSIS — E10.8 TYPE 1 DIABETES MELLITUS WITH COMPLICATION (HCC): ICD-10-CM

## 2017-06-15 DIAGNOSIS — A49.01 STAPH AUREUS INFECTION: ICD-10-CM

## 2017-06-15 PROCEDURE — 99214 OFFICE O/P EST MOD 30 MIN: CPT | Performed by: INTERNAL MEDICINE

## 2017-06-16 ENCOUNTER — TELEPHONE (OUTPATIENT)
Dept: PAIN MANAGEMENT | Age: 42
End: 2017-06-16

## 2017-06-22 ENCOUNTER — OFFICE VISIT (OUTPATIENT)
Dept: INTERNAL MEDICINE CLINIC | Age: 42
End: 2017-06-22

## 2017-06-22 VITALS — OXYGEN SATURATION: 99 % | SYSTOLIC BLOOD PRESSURE: 106 MMHG | DIASTOLIC BLOOD PRESSURE: 70 MMHG | HEART RATE: 74 BPM

## 2017-06-22 DIAGNOSIS — N18.30 CKD STAGE 3 DUE TO TYPE 1 DIABETES MELLITUS (HCC): ICD-10-CM

## 2017-06-22 DIAGNOSIS — R05.9 COUGH: ICD-10-CM

## 2017-06-22 DIAGNOSIS — Z01.84 IMMUNITY STATUS TESTING: ICD-10-CM

## 2017-06-22 DIAGNOSIS — Z23 NEED FOR PNEUMOCOCCAL VACCINE: ICD-10-CM

## 2017-06-22 DIAGNOSIS — J30.89 OTHER ALLERGIC RHINITIS: ICD-10-CM

## 2017-06-22 DIAGNOSIS — Z23 NEED FOR HEPATITIS B VACCINATION: ICD-10-CM

## 2017-06-22 DIAGNOSIS — E10.8 TYPE 1 DIABETES MELLITUS WITH COMPLICATION (HCC): ICD-10-CM

## 2017-06-22 DIAGNOSIS — E10.22 CKD STAGE 3 DUE TO TYPE 1 DIABETES MELLITUS (HCC): ICD-10-CM

## 2017-06-22 LAB
GLUCOSE BLD-MCNC: 124 MG/DL
HBA1C MFR BLD: 7.7 %

## 2017-06-22 PROCEDURE — 99214 OFFICE O/P EST MOD 30 MIN: CPT | Performed by: INTERNAL MEDICINE

## 2017-06-22 PROCEDURE — 83036 HEMOGLOBIN GLYCOSYLATED A1C: CPT | Performed by: INTERNAL MEDICINE

## 2017-06-22 PROCEDURE — 90746 HEPB VACCINE 3 DOSE ADULT IM: CPT | Performed by: INTERNAL MEDICINE

## 2017-06-22 PROCEDURE — 90732 PPSV23 VACC 2 YRS+ SUBQ/IM: CPT | Performed by: INTERNAL MEDICINE

## 2017-06-22 PROCEDURE — 82962 GLUCOSE BLOOD TEST: CPT | Performed by: INTERNAL MEDICINE

## 2017-06-22 PROCEDURE — 90472 IMMUNIZATION ADMIN EACH ADD: CPT | Performed by: INTERNAL MEDICINE

## 2017-06-22 PROCEDURE — 90471 IMMUNIZATION ADMIN: CPT | Performed by: INTERNAL MEDICINE

## 2017-06-22 RX ORDER — GUAIFENESIN AND DEXTROMETHORPHAN HYDROBROMIDE 600; 30 MG/1; MG/1
TABLET, EXTENDED RELEASE ORAL
Qty: 60 TABLET | Refills: 1 | Status: SHIPPED | OUTPATIENT
Start: 2017-06-22

## 2017-06-26 ENCOUNTER — TELEPHONE (OUTPATIENT)
Dept: INTERNAL MEDICINE CLINIC | Age: 42
End: 2017-06-26

## 2017-07-05 ASSESSMENT — ENCOUNTER SYMPTOMS
EYES NEGATIVE: 1
GASTROINTESTINAL NEGATIVE: 1

## 2017-07-07 RX ORDER — CETIRIZINE HYDROCHLORIDE 10 MG/1
TABLET ORAL
Qty: 28 TABLET | Refills: 0 | Status: SHIPPED | OUTPATIENT
Start: 2017-07-07 | End: 2017-08-06 | Stop reason: SDUPTHER

## 2017-07-10 ENCOUNTER — TELEPHONE (OUTPATIENT)
Dept: ENDOCRINOLOGY | Age: 42
End: 2017-07-10

## 2017-07-11 DIAGNOSIS — G89.4 CHRONIC PAIN SYNDROME: ICD-10-CM

## 2017-07-12 RX ORDER — TOPIRAMATE 100 MG/1
TABLET, FILM COATED ORAL
Qty: 60 TABLET | Refills: 0 | OUTPATIENT
Start: 2017-07-12

## 2017-07-12 RX ORDER — DULOXETIN HYDROCHLORIDE 30 MG/1
CAPSULE, DELAYED RELEASE ORAL
Qty: 90 CAPSULE | Refills: 0 | OUTPATIENT
Start: 2017-07-12

## 2017-07-13 ENCOUNTER — TELEPHONE (OUTPATIENT)
Dept: ENDOCRINOLOGY | Age: 42
End: 2017-07-13

## 2017-07-13 ENCOUNTER — OFFICE VISIT (OUTPATIENT)
Dept: PAIN MANAGEMENT | Age: 42
End: 2017-07-13

## 2017-07-13 VITALS
WEIGHT: 277 LBS | SYSTOLIC BLOOD PRESSURE: 105 MMHG | HEART RATE: 75 BPM | BODY MASS INDEX: 35.56 KG/M2 | DIASTOLIC BLOOD PRESSURE: 63 MMHG

## 2017-07-13 DIAGNOSIS — F33.41 RECURRENT MAJOR DEPRESSIVE DISORDER, IN PARTIAL REMISSION (HCC): ICD-10-CM

## 2017-07-13 DIAGNOSIS — F51.01 PRIMARY INSOMNIA: ICD-10-CM

## 2017-07-13 DIAGNOSIS — K21.9 GASTROESOPHAGEAL REFLUX DISEASE WITHOUT ESOPHAGITIS: ICD-10-CM

## 2017-07-13 DIAGNOSIS — L97.522 ULCER OF LEFT FOOT, WITH FAT LAYER EXPOSED (HCC): ICD-10-CM

## 2017-07-13 DIAGNOSIS — G63 POLYNEUROPATHY ASSOCIATED WITH UNDERLYING DISEASE (HCC): ICD-10-CM

## 2017-07-13 DIAGNOSIS — M79.605 PAIN OF LEFT LOWER EXTREMITY: ICD-10-CM

## 2017-07-13 DIAGNOSIS — M79.2 NEUROPATHIC PAIN: ICD-10-CM

## 2017-07-13 DIAGNOSIS — G43.711 CHRONIC MIGRAINE WITHOUT AURA, WITH INTRACTABLE MIGRAINE, SO STATED, WITH STATUS MIGRAINOSUS: ICD-10-CM

## 2017-07-13 DIAGNOSIS — G89.4 CHRONIC PAIN SYNDROME: ICD-10-CM

## 2017-07-13 DIAGNOSIS — M79.18 MYOFASCIAL PAIN: ICD-10-CM

## 2017-07-13 PROCEDURE — 99214 OFFICE O/P EST MOD 30 MIN: CPT | Performed by: INTERNAL MEDICINE

## 2017-07-13 RX ORDER — GABAPENTIN 400 MG/1
CAPSULE ORAL
Qty: 60 CAPSULE | Refills: 1 | Status: SHIPPED | OUTPATIENT
Start: 2017-07-13 | End: 2017-10-10 | Stop reason: SDUPTHER

## 2017-07-13 RX ORDER — FAMOTIDINE 20 MG/1
TABLET, FILM COATED ORAL
Qty: 30 TABLET | Refills: 1 | Status: SHIPPED | OUTPATIENT
Start: 2017-07-13 | End: 2017-09-12 | Stop reason: SDUPTHER

## 2017-07-13 RX ORDER — DULOXETIN HYDROCHLORIDE 60 MG/1
60 CAPSULE, DELAYED RELEASE ORAL 2 TIMES DAILY
Qty: 60 CAPSULE | Refills: 0 | Status: SHIPPED | OUTPATIENT
Start: 2017-07-13 | End: 2017-08-11 | Stop reason: SDUPTHER

## 2017-07-13 RX ORDER — MAGNESIUM OXIDE 400 MG/1
400 TABLET ORAL 2 TIMES DAILY
Qty: 60 TABLET | Refills: 0 | Status: SHIPPED | OUTPATIENT
Start: 2017-07-13 | End: 2017-08-11 | Stop reason: SDUPTHER

## 2017-07-13 RX ORDER — BUPRENORPHINE 5 UG/H
1 PATCH TRANSDERMAL
Qty: 4 PATCH | Refills: 0 | Status: SHIPPED | OUTPATIENT
Start: 2017-07-13 | End: 2017-08-11 | Stop reason: SDUPTHER

## 2017-07-13 RX ORDER — TOPIRAMATE 100 MG/1
100 TABLET, FILM COATED ORAL 2 TIMES DAILY
Qty: 60 TABLET | Refills: 0 | Status: SHIPPED | OUTPATIENT
Start: 2017-07-13 | End: 2017-08-11 | Stop reason: SDUPTHER

## 2017-07-13 RX ORDER — TRAZODONE HYDROCHLORIDE 50 MG/1
TABLET ORAL
Qty: 60 TABLET | Refills: 0 | Status: SHIPPED | OUTPATIENT
Start: 2017-07-13 | End: 2017-08-11 | Stop reason: SDUPTHER

## 2017-07-13 RX ORDER — SUMATRIPTAN 50 MG/1
TABLET, FILM COATED ORAL
Qty: 9 TABLET | Refills: 1 | Status: SHIPPED | OUTPATIENT
Start: 2017-07-13 | End: 2017-09-12 | Stop reason: SDUPTHER

## 2017-07-21 RX ORDER — INSULIN GLARGINE 100 [IU]/ML
30 INJECTION, SOLUTION SUBCUTANEOUS NIGHTLY
Qty: 15 ML | Refills: 1 | Status: SHIPPED | OUTPATIENT
Start: 2017-07-21 | End: 2017-09-27 | Stop reason: SDUPTHER

## 2017-07-27 ENCOUNTER — OFFICE VISIT (OUTPATIENT)
Dept: ENDOCRINOLOGY | Age: 42
End: 2017-07-27

## 2017-07-27 VITALS
WEIGHT: 275 LBS | HEIGHT: 74 IN | DIASTOLIC BLOOD PRESSURE: 67 MMHG | SYSTOLIC BLOOD PRESSURE: 101 MMHG | OXYGEN SATURATION: 96 % | RESPIRATION RATE: 14 BRPM | HEART RATE: 77 BPM | BODY MASS INDEX: 35.29 KG/M2

## 2017-07-27 DIAGNOSIS — E55.9 VITAMIN D DEFICIENCY: ICD-10-CM

## 2017-07-27 DIAGNOSIS — E78.2 MIXED HYPERLIPIDEMIA: ICD-10-CM

## 2017-07-27 DIAGNOSIS — E10.8 TYPE 1 DIABETES MELLITUS WITH COMPLICATION (HCC): Primary | ICD-10-CM

## 2017-07-27 DIAGNOSIS — I10 ESSENTIAL HYPERTENSION: ICD-10-CM

## 2017-07-27 PROCEDURE — 99214 OFFICE O/P EST MOD 30 MIN: CPT | Performed by: INTERNAL MEDICINE

## 2017-08-07 RX ORDER — CETIRIZINE HYDROCHLORIDE 10 MG/1
TABLET ORAL
Qty: 28 TABLET | Refills: 0 | Status: SHIPPED | OUTPATIENT
Start: 2017-08-07 | End: 2017-09-25 | Stop reason: SDUPTHER

## 2017-08-08 DIAGNOSIS — F33.9 RECURRENT MAJOR DEPRESSIVE DISORDER, REMISSION STATUS UNSPECIFIED (HCC): ICD-10-CM

## 2017-08-08 DIAGNOSIS — G89.4 CHRONIC PAIN SYNDROME: ICD-10-CM

## 2017-08-10 RX ORDER — DULOXETIN HYDROCHLORIDE 60 MG/1
CAPSULE, DELAYED RELEASE ORAL
Qty: 30 CAPSULE | Refills: 0 | OUTPATIENT
Start: 2017-08-10

## 2017-08-10 RX ORDER — TOPIRAMATE 100 MG/1
TABLET, FILM COATED ORAL
Qty: 60 TABLET | Refills: 0 | OUTPATIENT
Start: 2017-08-10

## 2017-08-11 ENCOUNTER — OFFICE VISIT (OUTPATIENT)
Dept: PAIN MANAGEMENT | Age: 42
End: 2017-08-11

## 2017-08-11 VITALS
DIASTOLIC BLOOD PRESSURE: 73 MMHG | WEIGHT: 275 LBS | HEART RATE: 68 BPM | BODY MASS INDEX: 35.31 KG/M2 | SYSTOLIC BLOOD PRESSURE: 124 MMHG

## 2017-08-11 DIAGNOSIS — G89.4 CHRONIC PAIN SYNDROME: ICD-10-CM

## 2017-08-11 DIAGNOSIS — G63 POLYNEUROPATHY ASSOCIATED WITH UNDERLYING DISEASE (HCC): ICD-10-CM

## 2017-08-11 DIAGNOSIS — M79.2 NEUROPATHIC PAIN: ICD-10-CM

## 2017-08-11 DIAGNOSIS — L97.522 ULCER OF LEFT FOOT, WITH FAT LAYER EXPOSED (HCC): ICD-10-CM

## 2017-08-11 DIAGNOSIS — F51.01 PRIMARY INSOMNIA: ICD-10-CM

## 2017-08-11 DIAGNOSIS — M79.18 MYOFASCIAL PAIN: ICD-10-CM

## 2017-08-11 PROCEDURE — G8417 CALC BMI ABV UP PARAM F/U: HCPCS | Performed by: INTERNAL MEDICINE

## 2017-08-11 PROCEDURE — 99213 OFFICE O/P EST LOW 20 MIN: CPT | Performed by: INTERNAL MEDICINE

## 2017-08-11 PROCEDURE — G8427 DOCREV CUR MEDS BY ELIG CLIN: HCPCS | Performed by: INTERNAL MEDICINE

## 2017-08-11 PROCEDURE — 1036F TOBACCO NON-USER: CPT | Performed by: INTERNAL MEDICINE

## 2017-08-11 RX ORDER — TOPIRAMATE 100 MG/1
100 TABLET, FILM COATED ORAL 2 TIMES DAILY
Qty: 60 TABLET | Refills: 0 | Status: SHIPPED | OUTPATIENT
Start: 2017-08-11 | End: 2017-09-12 | Stop reason: SDUPTHER

## 2017-08-11 RX ORDER — MAGNESIUM OXIDE 400 MG/1
400 TABLET ORAL 2 TIMES DAILY
Qty: 60 TABLET | Refills: 0 | Status: SHIPPED | OUTPATIENT
Start: 2017-08-11 | End: 2017-09-12 | Stop reason: SDUPTHER

## 2017-08-11 RX ORDER — TRAMADOL HYDROCHLORIDE 50 MG/1
50 TABLET ORAL EVERY 6 HOURS PRN
Qty: 60 TABLET | Refills: 0 | Status: SHIPPED | OUTPATIENT
Start: 2017-08-11 | End: 2017-09-12 | Stop reason: SDUPTHER

## 2017-08-11 RX ORDER — DULOXETIN HYDROCHLORIDE 60 MG/1
60 CAPSULE, DELAYED RELEASE ORAL 2 TIMES DAILY
Qty: 60 CAPSULE | Refills: 0 | Status: SHIPPED | OUTPATIENT
Start: 2017-08-11 | End: 2017-09-12 | Stop reason: SDUPTHER

## 2017-08-11 RX ORDER — BUPRENORPHINE 5 UG/H
1 PATCH TRANSDERMAL
Qty: 4 PATCH | Refills: 0 | Status: SHIPPED | OUTPATIENT
Start: 2017-08-11 | End: 2017-09-12 | Stop reason: SDUPTHER

## 2017-08-11 RX ORDER — TRAZODONE HYDROCHLORIDE 50 MG/1
TABLET ORAL
Qty: 60 TABLET | Refills: 0 | Status: SHIPPED | OUTPATIENT
Start: 2017-08-11 | End: 2017-09-12 | Stop reason: SDUPTHER

## 2017-09-12 ENCOUNTER — OFFICE VISIT (OUTPATIENT)
Dept: PAIN MANAGEMENT | Age: 42
End: 2017-09-12

## 2017-09-12 VITALS
WEIGHT: 275 LBS | BODY MASS INDEX: 35.31 KG/M2 | HEART RATE: 79 BPM | DIASTOLIC BLOOD PRESSURE: 60 MMHG | SYSTOLIC BLOOD PRESSURE: 111 MMHG

## 2017-09-12 DIAGNOSIS — G63 POLYNEUROPATHY ASSOCIATED WITH UNDERLYING DISEASE (HCC): ICD-10-CM

## 2017-09-12 DIAGNOSIS — A49.01 STAPH AUREUS INFECTION: ICD-10-CM

## 2017-09-12 DIAGNOSIS — G89.4 CHRONIC PAIN SYNDROME: ICD-10-CM

## 2017-09-12 DIAGNOSIS — M79.18 MYOFASCIAL PAIN: ICD-10-CM

## 2017-09-12 DIAGNOSIS — M79.2 NEUROPATHIC PAIN: ICD-10-CM

## 2017-09-12 DIAGNOSIS — L97.522 ULCER OF LEFT FOOT, WITH FAT LAYER EXPOSED (HCC): ICD-10-CM

## 2017-09-12 PROCEDURE — 1036F TOBACCO NON-USER: CPT | Performed by: INTERNAL MEDICINE

## 2017-09-12 PROCEDURE — G8417 CALC BMI ABV UP PARAM F/U: HCPCS | Performed by: INTERNAL MEDICINE

## 2017-09-12 PROCEDURE — 99213 OFFICE O/P EST LOW 20 MIN: CPT | Performed by: INTERNAL MEDICINE

## 2017-09-12 PROCEDURE — G8427 DOCREV CUR MEDS BY ELIG CLIN: HCPCS | Performed by: INTERNAL MEDICINE

## 2017-09-12 RX ORDER — TRAMADOL HYDROCHLORIDE 50 MG/1
50 TABLET ORAL EVERY 6 HOURS PRN
Qty: 56 TABLET | Refills: 0 | Status: SHIPPED | OUTPATIENT
Start: 2017-09-12 | End: 2017-10-10 | Stop reason: SDUPTHER

## 2017-09-12 RX ORDER — TRAZODONE HYDROCHLORIDE 50 MG/1
TABLET ORAL
Qty: 60 TABLET | Refills: 0 | Status: SHIPPED | OUTPATIENT
Start: 2017-09-12 | End: 2017-10-10

## 2017-09-12 RX ORDER — MAGNESIUM OXIDE 400 MG/1
400 TABLET ORAL 2 TIMES DAILY
Qty: 60 TABLET | Refills: 0 | Status: SHIPPED | OUTPATIENT
Start: 2017-09-12 | End: 2018-06-22 | Stop reason: SDUPTHER

## 2017-09-12 RX ORDER — TOPIRAMATE 100 MG/1
100 TABLET, FILM COATED ORAL 2 TIMES DAILY
Qty: 60 TABLET | Refills: 0 | Status: SHIPPED | OUTPATIENT
Start: 2017-09-12 | End: 2017-10-10 | Stop reason: SDUPTHER

## 2017-09-12 RX ORDER — BUPRENORPHINE 5 UG/H
1 PATCH TRANSDERMAL
Qty: 4 PATCH | Refills: 0 | Status: SHIPPED | OUTPATIENT
Start: 2017-09-12 | End: 2017-10-10 | Stop reason: SDUPTHER

## 2017-09-12 RX ORDER — FAMOTIDINE 20 MG/1
TABLET, FILM COATED ORAL
Qty: 30 TABLET | Refills: 0 | Status: SHIPPED | OUTPATIENT
Start: 2017-09-12 | End: 2017-10-10 | Stop reason: SDUPTHER

## 2017-09-12 RX ORDER — SUMATRIPTAN 50 MG/1
TABLET, FILM COATED ORAL
Qty: 9 TABLET | Refills: 0 | Status: SHIPPED | OUTPATIENT
Start: 2017-09-12 | End: 2017-10-10 | Stop reason: SDUPTHER

## 2017-09-12 RX ORDER — DULOXETIN HYDROCHLORIDE 60 MG/1
60 CAPSULE, DELAYED RELEASE ORAL 2 TIMES DAILY
Qty: 60 CAPSULE | Refills: 0 | Status: SHIPPED | OUTPATIENT
Start: 2017-09-12 | End: 2017-10-10 | Stop reason: SDUPTHER

## 2017-09-26 ENCOUNTER — OFFICE VISIT (OUTPATIENT)
Dept: INTERNAL MEDICINE CLINIC | Age: 42
End: 2017-09-26

## 2017-09-26 VITALS
HEIGHT: 74 IN | WEIGHT: 273.2 LBS | SYSTOLIC BLOOD PRESSURE: 112 MMHG | OXYGEN SATURATION: 9 % | HEART RATE: 69 BPM | TEMPERATURE: 97.5 F | RESPIRATION RATE: 16 BRPM | BODY MASS INDEX: 35.06 KG/M2 | DIASTOLIC BLOOD PRESSURE: 80 MMHG

## 2017-09-26 DIAGNOSIS — Z23 FLU VACCINE NEED: ICD-10-CM

## 2017-09-26 DIAGNOSIS — F43.21 ADJUSTMENT DISORDER WITH DEPRESSED MOOD: ICD-10-CM

## 2017-09-26 DIAGNOSIS — E10.8 TYPE 1 DIABETES MELLITUS WITH COMPLICATION (HCC): ICD-10-CM

## 2017-09-26 DIAGNOSIS — J30.89 ALLERGIC RHINITIS DUE TO OTHER ALLERGEN: ICD-10-CM

## 2017-09-26 DIAGNOSIS — E55.9 VITAMIN D DEFICIENCY: ICD-10-CM

## 2017-09-26 PROCEDURE — G8427 DOCREV CUR MEDS BY ELIG CLIN: HCPCS | Performed by: INTERNAL MEDICINE

## 2017-09-26 PROCEDURE — 90688 IIV4 VACCINE SPLT 0.5 ML IM: CPT | Performed by: INTERNAL MEDICINE

## 2017-09-26 PROCEDURE — 3046F HEMOGLOBIN A1C LEVEL >9.0%: CPT | Performed by: INTERNAL MEDICINE

## 2017-09-26 PROCEDURE — 99214 OFFICE O/P EST MOD 30 MIN: CPT | Performed by: INTERNAL MEDICINE

## 2017-09-26 PROCEDURE — G0008 ADMIN INFLUENZA VIRUS VAC: HCPCS | Performed by: INTERNAL MEDICINE

## 2017-09-26 PROCEDURE — G8417 CALC BMI ABV UP PARAM F/U: HCPCS | Performed by: INTERNAL MEDICINE

## 2017-09-26 PROCEDURE — 82962 GLUCOSE BLOOD TEST: CPT | Performed by: INTERNAL MEDICINE

## 2017-09-26 PROCEDURE — 1036F TOBACCO NON-USER: CPT | Performed by: INTERNAL MEDICINE

## 2017-09-26 RX ORDER — MULTIVITAMIN
TABLET ORAL
Qty: 30 TABLET | Refills: 5 | Status: SHIPPED | OUTPATIENT
Start: 2017-09-26

## 2017-09-26 ASSESSMENT — PATIENT HEALTH QUESTIONNAIRE - PHQ9
SUM OF ALL RESPONSES TO PHQ QUESTIONS 1-9: 0
1. LITTLE INTEREST OR PLEASURE IN DOING THINGS: 0
SUM OF ALL RESPONSES TO PHQ9 QUESTIONS 1 & 2: 0
2. FEELING DOWN, DEPRESSED OR HOPELESS: 0

## 2017-09-26 NOTE — MR AVS SNAPSHOT
After Visit Summary             Jose Carson   2017 1:45 PM   Office Visit    Description:  Male : 1975   Provider:  Rashmi Rodriguez MD   Department:  [facility]              Your Follow-Up and Future Appointments         Below is a list of your follow-up and future appointments. This may not be a complete list as you may have made appointments directly with providers that we are not aware of or your providers may have made some for you. Please call your providers to confirm appointments. It is important to keep your appointments. Please bring your current insurance card, photo ID, co-pay, and all medication bottles to your appointment. If self-pay, payment is expected at the time of service. Your To-Do List     Future Appointments Provider Department Dept Phone    10/10/2017 11:00 AM Skyler Tavares MD [facility] [phone]    Please arrive 15 minutes prior to appointment time, bring insurance card and photo ID.     2017 2:40 PM Maricel Titus MD [facility] [phone]    Please arrive 15 minutes prior to appointment time, bring insurance card and photo ID. Follow-Up    Return in about 3 months (around 2017).          Information from Your Visit        Department     Name Address Phone Fax    [facility] [address]  [address] [address]  [address] [phone]      You Were Seen for:         Comments    Vitamin D deficiency   [165440]         Vital Signs     Blood Pressure Pulse Temperature Respirations Height Weight    112/80 (Site: Left Arm, Position: Sitting, Cuff Size: Large Adult) 69 97.5 °F (36.4 °C) (Oral) 16 6' 2\" (1.88 m) 273 lb 3.2 oz (123.9 kg)    Oxygen Saturation Body Mass Index Smoking Status             9% 35.08 kg/m2 Never Smoker         Additional Information about your Body Mass Index (BMI) Your BMI as listed above is considered obese (30 or more). BMI is an estimate of body fat, calculated from your height and weight. The higher your BMI, the greater your risk of heart disease, high blood pressure, type 2 diabetes, stroke, gallstones, arthritis, sleep apnea, and certain cancers. BMI is not perfect. It may overestimate body fat in athletes and people who are more muscular. Even a small weight loss (between 5 and 10 percent of your current weight) by decreasing your calorie intake and becoming more physically active will help lower your risk of developing or worsening diseases associated with obesity. Learn more at: [employer]             Today's Medication Changes          These changes are accurate as of: 9/26/17  2:27 PM.  If you have any questions, ask your nurse or doctor. CHANGE how you take these medications           vitamin D3 5000 units Caps   Instructions: Take 1 capsule by mouth Daily   Quantity:  30 capsule   Refills:  3   What changed:  See the new instructions.    Changed by:  Joselin Chaney MD            Where to Get Your Medications      These medications were sent to [facility] [address] [facility] [address]     Phone:  [phone]     DAILY KITTY Tabs    vitamin D3 5000 units Caps               Your Current Medications Are              Multiple Vitamin (DAILY KITTY) TABS TAKE ONE TABLET BY MOUTH DAILY    Cholecalciferol (VITAMIN D3) 5000 units CAPS Take 1 capsule by mouth Daily    buprenorphine (BUTRANS) 5 MCG/HR PTWK Place 1 patch onto the skin every 7 days    traMADol (ULTRAM) 50 MG tablet Take 1 tablet by mouth every 6 hours as needed for Pain (max 1-2 per day)    magnesium oxide (MAG-OX) 400 MG tablet Take 1 tablet by mouth 2 times daily    traZODone (DESYREL) 50 MG tablet TAKE ONE TO TWO TABLETS BY MOUTH ONCE NIGHTLY topiramate (TOPAMAX) 100 MG tablet Take 1 tablet by mouth 2 times daily    DULoxetine (CYMBALTA) 60 MG extended release capsule Take 1 capsule by mouth 2 times daily    SUMAtriptan (IMITREX) 50 MG tablet Take one tab po at the start of migraine PRN max 1 every 24 hours    famotidine (PEPCID) 20 MG tablet TAKE ONE TABLET BY MOUTH DAILY    cetirizine (ZYRTEC) 10 MG tablet TAKE ONE TABLET BY MOUTH DAILY AS NEEDED    insulin glargine (LANTUS) 100 UNIT/ML injection vial Inject 30 Units into the skin nightly    fluticasone (FLONASE) 50 MCG/ACT nasal spray INSTILL TWO SPRAYS IN EACH NOSTRIL DAILY    gabapentin (NEURONTIN) 400 MG capsule Take one tablet Po BID    mupirocin (BACTROBAN NASAL) 2 % nasal ointment Take by Nasal route 2 times daily. Dextromethorphan-Guaifenesin (MUCINEX DM)  MG TB12 Cough and congestion. omega-3 acid ethyl esters (LOVAZA) 1 G capsule TAKE TWO CAPSULES BY MOUTH TWICE DAILY    cephALEXin (KEFLEX) 500 MG capsule TAKE 1 CAPSULE BY MOUTH TWO TIMES A DAY     ONE TOUCH ULTRA TEST strip USE TO TEST BLOOD SUGAR 8-10 TIMES DAILY    GLUCAGON EMERGENCY 1 MG injection INJECT 1MG INTO THE MUSCLE AS NEEDED    Insulin Syringe-Needle U-100 (B-D INS SYR ULTRAFINE 1CC/31G) 31G X 5/16\" 1 ML MISC INJECT USING INSULIN ONCE DAILY    Insulin Syringe-Needle U-100 (INSULIN SYRINGE .5CC/31GX5/16\") 31G X 5/16\" 0.5 ML MISC 1 each by Does not apply route 4 times daily (before meals and nightly) Dx Code E11.9    insulin glulisine (APIDRA) 100 UNIT/ML injection INJECT 8-12 UNITS UNDER THE SKIN THREE TIMES A DAY WITH MEALS    MUCUS RELIEF DM  MG TABS TAKE ONE BY MOUTH DAILY AS NEEDED    ONETOUCH DELICA LANCETS 01K MISC USE TO TEST BLOOD SUGAR 8-10 TIMES DAILY    Alcohol Swabs PADS Use as needed for insulin injection.     Probiotic Product (ACIDOPHILUS PROBIOTIC) CAPS capsule TAKE ONE CAPSULE BY MOUTH DAILY    polyvinyl alcohol (LIQUIFILM TEARS) 1.4 % ophthalmic solution 1 drop as needed Hepatitis B (Engerix-B) 10/12/2016    Hepatitis B Ped/Adol (Recombivax HB) 6/22/2017    Influenza Virus Vaccine 11/23/2015    Influenza, Intradermal, Quadrivalent, Preservative Free 10/12/2016    Pneumococcal Polysaccharide ([id number]) 6/22/2017      Preventive Care        Date Due    Tetanus Combination Vaccine (1 - Tdap) 9/7/1996    Eye Exam By An Eye Doctor 8/20/2015    Yearly Flu Vaccine (1) 9/1/2017    Urine Check For Kidney Problems 10/12/2017    Cholesterol Screening 10/12/2017    Hemoglobin A1C (Test For Long-Term Glucose Control) 6/22/2018    Diabetic Foot Exam 7/27/2018            [employer] Signup           Our records indicate that you have an active [employer] account. You can view your After Visit Summary by going to [email or web address] and logging in with your [employer] username and password. If you don't have a [employer] username and password but a parent or guardian has access to your record, the parent or guardian should login with their own [employer] username and password and access your record to view the After Visit Summary. Additional Information  If you have questions, please contact the physician practice where you receive care. Remember, [employer] is NOT to be used for urgent needs. For medical emergencies, dial 911. For questions regarding your [employer] account call [phone]. If you have a clinical question, please call your doctor's office.

## 2017-09-27 RX ORDER — INSULIN GLARGINE 100 [IU]/ML
30 INJECTION, SOLUTION SUBCUTANEOUS NIGHTLY
Qty: 15 ML | Refills: 1 | Status: SHIPPED | OUTPATIENT
Start: 2017-09-27 | End: 2017-12-02 | Stop reason: SDUPTHER

## 2017-10-02 RX ORDER — BLOOD SUGAR DIAGNOSTIC
STRIP MISCELLANEOUS
Qty: 30 EACH | Refills: 10 | Status: SHIPPED | OUTPATIENT
Start: 2017-10-02 | End: 2022-04-12 | Stop reason: SDUPTHER

## 2017-10-08 ASSESSMENT — ENCOUNTER SYMPTOMS
RESPIRATORY NEGATIVE: 1
GASTROINTESTINAL NEGATIVE: 1

## 2017-10-10 ENCOUNTER — OFFICE VISIT (OUTPATIENT)
Dept: PAIN MANAGEMENT | Age: 42
End: 2017-10-10

## 2017-10-10 VITALS
SYSTOLIC BLOOD PRESSURE: 124 MMHG | WEIGHT: 273 LBS | BODY MASS INDEX: 35.05 KG/M2 | DIASTOLIC BLOOD PRESSURE: 76 MMHG | HEART RATE: 69 BPM

## 2017-10-10 DIAGNOSIS — M79.2 NEUROPATHIC PAIN: ICD-10-CM

## 2017-10-10 DIAGNOSIS — G89.4 CHRONIC PAIN SYNDROME: ICD-10-CM

## 2017-10-10 DIAGNOSIS — M79.18 MYOFASCIAL PAIN: ICD-10-CM

## 2017-10-10 DIAGNOSIS — G63 POLYNEUROPATHY ASSOCIATED WITH UNDERLYING DISEASE (HCC): ICD-10-CM

## 2017-10-10 DIAGNOSIS — F51.01 PRIMARY INSOMNIA: ICD-10-CM

## 2017-10-10 DIAGNOSIS — L97.522 ULCER OF LEFT FOOT, WITH FAT LAYER EXPOSED (HCC): ICD-10-CM

## 2017-10-10 DIAGNOSIS — G43.711 CHRONIC MIGRAINE WITHOUT AURA, WITH INTRACTABLE MIGRAINE, SO STATED, WITH STATUS MIGRAINOSUS: ICD-10-CM

## 2017-10-10 DIAGNOSIS — F33.41 RECURRENT MAJOR DEPRESSIVE DISORDER, IN PARTIAL REMISSION (HCC): ICD-10-CM

## 2017-10-10 PROCEDURE — G8484 FLU IMMUNIZE NO ADMIN: HCPCS | Performed by: INTERNAL MEDICINE

## 2017-10-10 PROCEDURE — 1036F TOBACCO NON-USER: CPT | Performed by: INTERNAL MEDICINE

## 2017-10-10 PROCEDURE — G8417 CALC BMI ABV UP PARAM F/U: HCPCS | Performed by: INTERNAL MEDICINE

## 2017-10-10 PROCEDURE — 99214 OFFICE O/P EST MOD 30 MIN: CPT | Performed by: INTERNAL MEDICINE

## 2017-10-10 PROCEDURE — G8427 DOCREV CUR MEDS BY ELIG CLIN: HCPCS | Performed by: INTERNAL MEDICINE

## 2017-10-10 RX ORDER — FAMOTIDINE 20 MG/1
TABLET, FILM COATED ORAL
Qty: 30 TABLET | Refills: 0 | Status: SHIPPED | OUTPATIENT
Start: 2017-10-10 | End: 2017-11-09 | Stop reason: SDUPTHER

## 2017-10-10 RX ORDER — TIZANIDINE 4 MG/1
TABLET ORAL
Qty: 60 TABLET | Refills: 0 | Status: SHIPPED | OUTPATIENT
Start: 2017-10-10 | End: 2017-11-09 | Stop reason: SDUPTHER

## 2017-10-10 RX ORDER — DULOXETIN HYDROCHLORIDE 60 MG/1
60 CAPSULE, DELAYED RELEASE ORAL 2 TIMES DAILY
Qty: 60 CAPSULE | Refills: 0 | Status: SHIPPED | OUTPATIENT
Start: 2017-10-10 | End: 2017-11-09 | Stop reason: SDUPTHER

## 2017-10-10 RX ORDER — SUMATRIPTAN 50 MG/1
TABLET, FILM COATED ORAL
Qty: 9 TABLET | Refills: 0 | Status: SHIPPED | OUTPATIENT
Start: 2017-10-10 | End: 2017-11-09 | Stop reason: SDUPTHER

## 2017-10-10 RX ORDER — BUPRENORPHINE 5 UG/H
1 PATCH TRANSDERMAL
Qty: 4 PATCH | Refills: 0 | Status: SHIPPED | OUTPATIENT
Start: 2017-10-10 | End: 2017-11-09 | Stop reason: SDUPTHER

## 2017-10-10 RX ORDER — TRAMADOL HYDROCHLORIDE 50 MG/1
50 TABLET ORAL EVERY 6 HOURS PRN
Qty: 30 TABLET | Refills: 0 | Status: SHIPPED | OUTPATIENT
Start: 2017-10-10 | End: 2017-12-07 | Stop reason: SDUPTHER

## 2017-10-10 RX ORDER — TOPIRAMATE 100 MG/1
100 TABLET, FILM COATED ORAL 2 TIMES DAILY
Qty: 60 TABLET | Refills: 0 | Status: SHIPPED | OUTPATIENT
Start: 2017-10-10 | End: 2017-11-09 | Stop reason: SDUPTHER

## 2017-10-10 RX ORDER — AMITRIPTYLINE HYDROCHLORIDE 25 MG/1
25-50 TABLET, FILM COATED ORAL NIGHTLY
Qty: 60 TABLET | Refills: 0 | Status: SHIPPED | OUTPATIENT
Start: 2017-10-10 | End: 2017-11-09 | Stop reason: ALTCHOICE

## 2017-10-10 RX ORDER — GABAPENTIN 400 MG/1
CAPSULE ORAL
Qty: 60 CAPSULE | Refills: 1 | Status: SHIPPED | OUTPATIENT
Start: 2017-10-10 | End: 2017-11-09 | Stop reason: SDUPTHER

## 2017-10-10 NOTE — PROGRESS NOTES
his sleep is fair. Has fairly normal sleep latency. Averages about 4-6 hours of sleep a night. Denies any signs of sleep apnea. Feels somewhat rested in the morning. Patient's  subjective report of his mood is fair. he describes occasional symptoms of depression, occasional  irritability and some mood swings. Describes his mood as being neutral and reports some pleasure in his daily activities. Reports  fair  appetite, energy and concentration. Able to function well in different aspects of his daily activities. Denies suicidal or homicidal ideation. Denies any complaints of increased tension, does   Worry sometimes and occasional  irritability  he denies any c/o increased anxiety, No c/o panic attacks or symptoms of PTSD. Patient reports using Trazodone 50 mg 1-2 nightly, which has not been working as well. He reports his weight has been stable, may have lost some. He states his blood sugar has been less than 150 range. ALLERGIES/PAST MED/FAM/SOC HISTORY: Mr. Duane Moura allergies, past medical, family and social history were reviewed in the chart and pertinent information also listed below.   Social History     Social History    Marital status: Single     Spouse name: N/A    Number of children: N/A    Years of education: N/A     Occupational History    n/a      Social History Main Topics    Smoking status: Never Smoker    Smokeless tobacco: Never Used    Alcohol use No    Drug use: No    Sexual activity: Not on file     Other Topics Concern    Not on file     Social History Narrative    No narrative on file      Mr. Duane Moura current medications are   Outpatient Medications Prior to Visit   Medication Sig Dispense Refill    Insulin Syringe-Needle U-100 (B-D INS SYR ULTRAFINE 1CC/31G) 31G X 5/16\" 1 ML MISC INJECT USING INSULIN ONCE DAILY 30 each 10    insulin glargine (LANTUS) 100 UNIT/ML injection vial Inject 30 Units into the skin nightly 15 mL 1    cetirizine (ZYRTEC) 10 MG tablet TAKE ONE TABLET BY mL 4    MUCUS RELIEF DM  MG TABS TAKE ONE BY MOUTH DAILY AS NEEDED 30 tablet 1    ONETOUCH DELICA LANCETS 68D MISC USE TO TEST BLOOD SUGAR 8-10 TIMES DAILY 300 each 8    Alcohol Swabs PADS Use as needed for insulin injection. 100 each 5    Probiotic Product (ACIDOPHILUS PROBIOTIC) CAPS capsule TAKE ONE CAPSULE BY MOUTH DAILY 28 capsule 4    polyvinyl alcohol (LIQUIFILM TEARS) 1.4 % ophthalmic solution 1 drop as needed      NEXIUM 24HR 20 MG capsule       Blood Glucose Monitoring Suppl (ONE TOUCH ULTRA MINI) W/DEVICE KIT 1 kit by Does not apply route daily as needed. 1 kit 0    propranolol (INDERAL) 80 MG tablet Take 80 mg by mouth 2 times daily. For migraines      Flaxseed, Linseed, (FLAX PO) Take 14 g by mouth daily.  traZODone (DESYREL) 50 MG tablet TAKE ONE TO TWO TABLETS BY MOUTH ONCE NIGHTLY 60 tablet 0     No facility-administered medications prior to visit. REVIEW OF SYSTEMS:   Constitutional: Negative for fatigue and unexpected weight change. Eyes: Negative for visual disturbance. Respiratory: Negative for shortness of breath. Cardiovascular: Negative for chest pain, palpitations  Gastrointestinal: Negative for blood in stool, abdominal distention, nausea, vomiting, abdominal pain, diarrhea,constipation. Skin: Negative for color change or any abnormal bruising. Neurological: Negative for speech difficulty, weakness and light-headedness, dizziness, tremors, sleepiness  Psychiatric/Behavioral: Negative for suicidal ideas, hallucinations, behavioral problems, self-injury, decreased concentration/cognition, agitation, confusion. PHYSICAL EXAM:   Nursing note and vitals reviewed. /76 (Site: Right Arm, Position: Sitting)   Pulse 69   Wt 273 lb (123.8 kg)   BMI 35.05 kg/m²   General Appearance: Patient is well nourished, well developed, well groomed and in no acute distress. Skin: Skin is warm and dry, good turgor . No rash or lesions noted.  He is not diaphoretic.  +healing scabs UE,LE  Pulmonary/Chest: Effort normal. No respiratory distress or use of accessory muscles. Auscultation revealing normal air entry. He does not have wheezes in the lung fields. He has no rales. Cardiovascular: Normal rate, regular rhythm, normal heart sounds, and does not have murmur. Exam reveals no gallop and no friction rub. Abdominal: Soft, bowel sounds are normal.  On inspection of abdomen is obese no distension and no mass. Musculoskeletal / Extremities: Range of motion is normal. Gait is +limp, assistive devices use: cane. He exhibits edema: none, and no tenderness. Neurological/Psychiatric:He is alert and oriented to person, place, and time. Coordination is  normal.   Judgement and Insight is normal  His mood is Appropriate and affect is Flat/blunted and Anxious . His behavior is normal.   Thought content normal.        IMPRESSION:     1. Chronic migraine without aura, with intractable migraine, so stated, with status migrainosus  - WORSENING: treatment plan changed as below   2. Primary insomnia  - WORSENING: treatment plan changed as below   3. Chronic pain syndrome - INADEQUATELY CONTROLLED: treatment plan adjusted as below   4. Neuropathic pain - STABLE: Continue current treatment plan   5. Polyneuropathy associated with underlying disease ([address]  - STABLE: Continue current treatment plan   6. Myofascial pain  - STABLE: Continue current treatment plan   7. Recurrent major depressive disorder, in partial remission ([address]  - STABLE: Continue current treatment plan   8. Ulcer of left foot, with fat layer exposed  - STABLE: Continue current treatment plan       PLAN:   Informed verbal consent regarding treatment plan was obtained from Mr. Zepeda Batavia  he was advised  to avoid using too many OTC analgesics to control the headaches, avoid chocolates, increased caffeine, cheeses and MSG nitrite containing foods, cigarette smoking.  To avoid bright lights, strong smells and skipping meals.  -Start Zanaflex 4 mg 1/2 in the AM, 1 in the PM to help with headaches   -he was advised proper sleep hygiene-told to avoid:use of caffeine or other stimulants after noon, alcohol use near bedtime, long or frequent naps during the day, erratic sleep schedule, heavy meals near bedtime, vigorous exercise near bedtime and use of electronic devices near bedtime  -Monitor blood sugar regularly, diabetic control- adv diabetic diet. Goal for fasting blood sugars around 120. Follow up with Endocrinologist/PCP also for on going management   -Continue with Butrans and Ultram PRN   -Continue with Topamax and Imitrex PRN for flare up  -Patient's urine drug screen results with GC/MS confirmation were obtained and reviewed and were negative for any illicit drugs. Prescribed medications were within acceptable range.  -Butrans not detected, on low dose     Royce Pump was seen today for follow-up. Diagnoses and all orders for this visit:    Chronic migraine without aura, with intractable migraine, so stated, with status migrainosus  -     topiramate (TOPAMAX) 100 MG tablet; Take 1 tablet by mouth 2 times daily  -     SUMAtriptan (IMITREX) 50 MG tablet; Take one tab po at the start of migraine PRN max 1 every 24 hours    Primary insomnia  -     amitriptyline (ELAVIL) 25 MG tablet; Take 1-2 tablets by mouth nightly    Chronic pain syndrome  -     traMADol (ULTRAM) 50 MG tablet; Take 1 tablet by mouth every 6 hours as needed for Pain (max 1-2 per day)  -     buprenorphine (BUTRANS) 5 MCG/HR PTWK; Place 1 patch onto the skin every 7 days  -     topiramate (TOPAMAX) 100 MG tablet; Take 1 tablet by mouth 2 times daily  -     DULoxetine (CYMBALTA) 60 MG extended release capsule; Take 1 capsule by mouth 2 times daily  -     SUMAtriptan (IMITREX) 50 MG tablet; Take one tab po at the start of migraine PRN max 1 every 24 hours  -     amitriptyline (ELAVIL) 25 MG tablet;  Take 1-2 tablets by mouth nightly  -     tiZANidine or if the symptoms worsen, he was told to call the office. Thank you for allowing me to participate in the care of this patient. Bekah Abraham MD.    Cc: Lanie Christopher MD    I, Eleni Wilder, am scribing for and in the presence of Dr. Bekah Abraham.    10/10/17  12:45 PM  SLAVA Davis, Dr. Bekah Abraham, personally performed the services described in this documentation as scribed by   Eleni Wilder MA in my presence and it is both accurate and complete

## 2017-10-12 ENCOUNTER — TELEPHONE (OUTPATIENT)
Dept: PAIN MANAGEMENT | Age: 42
End: 2017-10-12

## 2017-10-12 NOTE — TELEPHONE ENCOUNTER
Yohana Ruiz calling from [facility] because of the drug interactions between Zanaflex and Cipro. She states she called the prescriber of Cipro and ANIL Mckenna states the Cipro is needed and declined to prescribe an alternative, she is with Dr. Rosita Mcfadden states the only muscle relaxer that doesn't interact with Cipro is Robaxin. Please advise.

## 2017-10-24 ENCOUNTER — TELEPHONE (OUTPATIENT)
Dept: INTERNAL MEDICINE CLINIC | Age: 42
End: 2017-10-24

## 2017-10-25 DIAGNOSIS — R09.81 NASAL SINUS CONGESTION: ICD-10-CM

## 2017-10-25 DIAGNOSIS — G89.4 CHRONIC PAIN SYNDROME: ICD-10-CM

## 2017-10-25 RX ORDER — FLUTICASONE PROPIONATE 50 MCG
SPRAY, SUSPENSION (ML) NASAL
Qty: 1 BOTTLE | Refills: 1 | Status: SHIPPED | OUTPATIENT
Start: 2017-10-25 | End: 2017-12-27 | Stop reason: SDUPTHER

## 2017-10-26 ENCOUNTER — TELEPHONE (OUTPATIENT)
Dept: ENDOCRINOLOGY | Age: 42
End: 2017-10-26

## 2017-10-26 RX ORDER — TOPIRAMATE 100 MG/1
TABLET, FILM COATED ORAL
Qty: 60 TABLET | Refills: 0 | Status: SHIPPED | OUTPATIENT
Start: 2017-10-26 | End: 2017-11-07 | Stop reason: SDUPTHER

## 2017-11-07 ENCOUNTER — OFFICE VISIT (OUTPATIENT)
Dept: ENDOCRINOLOGY | Age: 42
End: 2017-11-07

## 2017-11-07 VITALS
WEIGHT: 272 LBS | HEIGHT: 74 IN | DIASTOLIC BLOOD PRESSURE: 70 MMHG | BODY MASS INDEX: 34.91 KG/M2 | RESPIRATION RATE: 14 BRPM | SYSTOLIC BLOOD PRESSURE: 112 MMHG | OXYGEN SATURATION: 100 % | HEART RATE: 71 BPM

## 2017-11-07 DIAGNOSIS — E10.9 TYPE 1 DIABETES MELLITUS WITHOUT COMPLICATION (HCC): Primary | ICD-10-CM

## 2017-11-07 PROCEDURE — 3045F PR MOST RECENT HEMOGLOBIN A1C LEVEL 7.0-9.0%: CPT | Performed by: INTERNAL MEDICINE

## 2017-11-07 PROCEDURE — G8427 DOCREV CUR MEDS BY ELIG CLIN: HCPCS | Performed by: INTERNAL MEDICINE

## 2017-11-07 PROCEDURE — G8417 CALC BMI ABV UP PARAM F/U: HCPCS | Performed by: INTERNAL MEDICINE

## 2017-11-07 PROCEDURE — 1036F TOBACCO NON-USER: CPT | Performed by: INTERNAL MEDICINE

## 2017-11-07 PROCEDURE — G8484 FLU IMMUNIZE NO ADMIN: HCPCS | Performed by: INTERNAL MEDICINE

## 2017-11-07 PROCEDURE — 99214 OFFICE O/P EST MOD 30 MIN: CPT | Performed by: INTERNAL MEDICINE

## 2017-11-07 NOTE — PROGRESS NOTES
[facility]  Yohana Schrader M.D. Phone: [phone]   FAX: Isaac   YOB: 1975    Date of Visit:  11/7/2017    Allergies   Allergen Reactions    Tegaderm Ag Mesh 2\"X2\" [Wound Dressings]      \"Holes in skin from Tegaderm Adhesive\"    Codeine Other (See Comments)     hallucinates    Other Other (See Comments)     Holes in skin  From Tegaderm Adhesive    Tape [Adhesive Tape]      Blister       Outpatient Prescriptions Marked as Taking for the 11/7/17 encounter (Office Visit) with Kathlee Saint, MD   Medication Sig Dispense Refill    ONE TOUCH ULTRA TEST strip USE TO TEST BLOOD SUGAR 10 TIMES DAILY DX CODE E 10.8 300 strip 11    fluticasone (FLONASE) 50 MCG/ACT nasal spray INSTILL TWO SPRAYS IN EACH NOSTRIL DAILY 1 Bottle 1    traMADol (ULTRAM) 50 MG tablet Take 1 tablet by mouth every 6 hours as needed for Pain (max 1-2 per day) 30 tablet 0    buprenorphine (BUTRANS) 5 MCG/HR PTWK Place 1 patch onto the skin every 7 days 4 patch 0    topiramate (TOPAMAX) 100 MG tablet Take 1 tablet by mouth 2 times daily 60 tablet 0    DULoxetine (CYMBALTA) 60 MG extended release capsule Take 1 capsule by mouth 2 times daily 60 capsule 0    SUMAtriptan (IMITREX) 50 MG tablet Take one tab po at the start of migraine PRN max 1 every 24 hours 9 tablet 0    amitriptyline (ELAVIL) 25 MG tablet Take 1-2 tablets by mouth nightly 60 tablet 0    famotidine (PEPCID) 20 MG tablet TAKE ONE TABLET BY MOUTH DAILY 30 tablet 0    gabapentin (NEURONTIN) 400 MG capsule Take one tablet Po BID 60 capsule 1    mupirocin (BACTROBAN NASAL) 2 % nasal ointment Take by Nasal route 2 times daily.  1 Tube 1    Insulin Syringe-Needle U-100 (B-D INS SYR ULTRAFINE 1CC/31G) 31G X 5/16\" 1 ML MISC INJECT USING INSULIN ONCE DAILY 30 each 10    insulin glargine (LANTUS) 100 UNIT/ML injection vial Inject 30 Units into the skin nightly 15 mL 1    cetirizine (ZYRTEC) 10 MG tablet TAKE ONE TABLET BY pressure instability     Chronic pain syndrome     Detached retina, bilateral     laser tx right eye    Diabetes mellitus ([address]     uncontrolled     Insomnia     Meningitis August 2014    admission [facility]    Neuropathic pain     Osteomyelitis ([address]     Osteomyelitis of tibia ([address]     left    Pain of left lower extremity     Peripheral neuropathy [facility])      Past Surgical History:   Procedure Laterality Date    ANKLE FRACTURE SURGERY Left 1/28/13    Dr. Andrei Ruiz  August 2013    left tibia with external fixation device    EYE SURGERY      retina reattachment left eye x 3 holes repairs    EYE SURGERY Right 9-27-13    retal detachment    LEG SURGERY Left 3/31/14    ORIF left fibula and tibia    TIBIA FRACTURE SURGERY      with external device inplace     Family History   Problem Relation Age of Onset    Asthma      Diabetes      High Blood Pressure       History   Smoking Status    Never Smoker   Smokeless Tobacco    Never Used      History   Alcohol Use No       HPI      Conrado Lock is a 38 y.o. male here for a follow-up for management of DM    He was diagnosed with Diabetes Mellitus at the age of 12 yrs. Never had DKA. Was never on oral meds. Always on insulin therapy. Microvascular complications: has  Retinopathy and also had retinal detachement (Follows with eye doctor at [address]),   Has microlabuminruia . No Peripheral neuropathy. CGMS was done in 05/13 and insulin was adjusted. Home regimen:  Currently on lantus insulin 30 units at night. Apidra 6 units TID. Also on SSI. Cf 1:50 target 150. Takes coverage 3-4 times a day. Previous medications: Was on humulin insulin in the past.    BS     Hypoglycemia . Occasional.        Diet: Eats 3 meals/day at regular times and 3 snacks. Had nutrition education.     Exercise: No  planned physical activity  Limitations to physical activity Fracture of the left results within 3 Month(s) from this visit. Latest known visit with results is:   Office Visit on 06/22/2017   Component Date Value Ref Range Status    Glucose 06/22/2017 124  mg/dL Final    Hemoglobin A1C 06/22/2017 7.7  % Final                Assessment/Plan    1. DM    This 38 yrs old male has DM. Complicated by retinopathy, microalbuminuria. Most likely it is Type 1 DM. HARIS antibodies and islet cell antibodies negative.  c-peptide  Undetectable. A1c 9.6 %----> 8.4 --->8.9 %--->8.2 %--->7.1 %--->7.1 %---> 6.8 % --> 6.8 %--> 6.5 %---> 7.1 %---> 7.1 %---> 7.5 %---> 7.7 % ---> 7.4 %    Reviewed meter download. ( scanned)    No pattern noted. BS variable . -Continue Lantus insulin 30 units QHS   -Continue Apidra    He is not testing BS regularly as he does not eat  Explained need to test BS even if he does not eat. Advised to test BS 3 times a day  Takes insulin for correction even if he is not eating and sugars are high. He does not want to use insulin pump. Updated on eye exam. continue follow-up with the ophthalmologist.  Has microalbuminuria. On Ace-inhibitor. Will repeat. Has peripheral neuropathy on exam. Discussed foot care    Non-smoker. 2. Hypertension. BP at goal.    3. Left tibia/fibula fracture. Healed. Advised to take 1200 mg of calcium a day ( diet +supplement )    4. CKD Follows with nephrologist. Dr. Lexus Dale      5. Hyperlipidemia. LDL is high. Had a detailed discussion explaining the high risk of heart disease, stroke and microvascular complications with high LDL. Recommended statins but he refused as his mother and sister had bad reaction. He understands the high risk of heart disease and stroke with untreated hyperlipidemia. On fish oil. 6. Foot ulcers. Managed by podiatry. 7. Vit D def. On Vit D 5000 IU daily caps. Levels were normal in 05/15.

## 2017-11-09 ENCOUNTER — OFFICE VISIT (OUTPATIENT)
Dept: PAIN MANAGEMENT | Age: 42
End: 2017-11-09

## 2017-11-09 VITALS
BODY MASS INDEX: 34.79 KG/M2 | WEIGHT: 271 LBS | DIASTOLIC BLOOD PRESSURE: 71 MMHG | HEART RATE: 93 BPM | SYSTOLIC BLOOD PRESSURE: 127 MMHG

## 2017-11-09 DIAGNOSIS — M79.18 MYOFASCIAL PAIN: ICD-10-CM

## 2017-11-09 DIAGNOSIS — L97.522 ULCER OF LEFT FOOT, WITH FAT LAYER EXPOSED (HCC): ICD-10-CM

## 2017-11-09 DIAGNOSIS — F33.41 RECURRENT MAJOR DEPRESSIVE DISORDER, IN PARTIAL REMISSION (HCC): ICD-10-CM

## 2017-11-09 DIAGNOSIS — M79.2 NEUROPATHIC PAIN: ICD-10-CM

## 2017-11-09 DIAGNOSIS — G43.711 CHRONIC MIGRAINE WITHOUT AURA, WITH INTRACTABLE MIGRAINE, SO STATED, WITH STATUS MIGRAINOSUS: ICD-10-CM

## 2017-11-09 DIAGNOSIS — G89.4 CHRONIC PAIN SYNDROME: ICD-10-CM

## 2017-11-09 DIAGNOSIS — F51.01 PRIMARY INSOMNIA: ICD-10-CM

## 2017-11-09 DIAGNOSIS — G63 POLYNEUROPATHY ASSOCIATED WITH UNDERLYING DISEASE (HCC): ICD-10-CM

## 2017-11-09 PROCEDURE — G8484 FLU IMMUNIZE NO ADMIN: HCPCS | Performed by: INTERNAL MEDICINE

## 2017-11-09 PROCEDURE — 1036F TOBACCO NON-USER: CPT | Performed by: INTERNAL MEDICINE

## 2017-11-09 PROCEDURE — G8417 CALC BMI ABV UP PARAM F/U: HCPCS | Performed by: INTERNAL MEDICINE

## 2017-11-09 PROCEDURE — G8427 DOCREV CUR MEDS BY ELIG CLIN: HCPCS | Performed by: INTERNAL MEDICINE

## 2017-11-09 PROCEDURE — 99214 OFFICE O/P EST MOD 30 MIN: CPT | Performed by: INTERNAL MEDICINE

## 2017-11-09 RX ORDER — BUPRENORPHINE 5 UG/H
1 PATCH TRANSDERMAL
Qty: 4 PATCH | Refills: 0 | Status: SHIPPED | OUTPATIENT
Start: 2017-11-09 | End: 2017-12-07 | Stop reason: SDUPTHER

## 2017-11-09 RX ORDER — FAMOTIDINE 20 MG/1
TABLET, FILM COATED ORAL
Qty: 30 TABLET | Refills: 0 | Status: SHIPPED | OUTPATIENT
Start: 2017-11-09 | End: 2017-12-07 | Stop reason: SDUPTHER

## 2017-11-09 RX ORDER — TIZANIDINE 4 MG/1
TABLET ORAL
Qty: 60 TABLET | Refills: 0 | Status: SHIPPED | OUTPATIENT
Start: 2017-11-09 | End: 2017-12-07 | Stop reason: SDUPTHER

## 2017-11-09 RX ORDER — GABAPENTIN 400 MG/1
CAPSULE ORAL
Qty: 60 CAPSULE | Refills: 1 | Status: SHIPPED | OUTPATIENT
Start: 2017-11-09 | End: 2017-12-07 | Stop reason: SDUPTHER

## 2017-11-09 RX ORDER — DULOXETIN HYDROCHLORIDE 60 MG/1
60 CAPSULE, DELAYED RELEASE ORAL 2 TIMES DAILY
Qty: 60 CAPSULE | Refills: 0 | Status: SHIPPED | OUTPATIENT
Start: 2017-11-09 | End: 2017-12-07 | Stop reason: SDUPTHER

## 2017-11-09 RX ORDER — SUMATRIPTAN 50 MG/1
TABLET, FILM COATED ORAL
Qty: 9 TABLET | Refills: 0 | Status: SHIPPED | OUTPATIENT
Start: 2017-11-09 | End: 2017-12-07 | Stop reason: SDUPTHER

## 2017-11-09 RX ORDER — QUETIAPINE FUMARATE 25 MG/1
25-50 TABLET, FILM COATED ORAL NIGHTLY
Qty: 60 TABLET | Refills: 0 | Status: SHIPPED | OUTPATIENT
Start: 2017-11-09 | End: 2017-12-07 | Stop reason: SDUPTHER

## 2017-11-09 RX ORDER — TOPIRAMATE 100 MG/1
100 TABLET, FILM COATED ORAL 2 TIMES DAILY
Qty: 60 TABLET | Refills: 0 | Status: SHIPPED | OUTPATIENT
Start: 2017-11-09 | End: 2017-12-07 | Stop reason: SDUPTHER

## 2017-11-09 NOTE — PROGRESS NOTES
Pérez Villalobos  1975  [id number]    HISTORY OF PRESENT ILLNESS:  Mr. Kashif Bautista is a 38 y.o. male returns for a follow up visit for multiple medical problems. His current presenting problems are   1. Primary insomnia    2. Chronic pain syndrome    3. Neuropathic pain    4. Polyneuropathy associated with underlying disease ([facility] [address]    5. Myofascial pain    6. Ulcer of left foot, with fat layer exposed ([facility] [address]    7. Chronic migraine without aura, with intractable migraine, so stated, with status migrainosus    8. Recurrent major depressive disorder, in partial remission ([facility] [address]    . As per information/history obtained from the PADT(patient assessment and documentation tool) - He complains of pain in the neck and lower back with radiation to the shoulders Bilateral, arms Bilateral, elbows Bilateral, hands Bilateral, buttocks, hips Bilateral, upper leg Bilateral, knees Bilateral, lower leg Bilateral, ankles Bilateral and feet Bilateral He rates the pain 9/10 and describes it as sharp, aching. Pain is made worse by: movement, walking, standing, sitting, bending, lifting. Current treatment regimen has helped relieve about 90% of the pain. He denies side effects from the current pain regimen. Patient reports that since the last follow up visit the physical functioning is unchanged, family/social relationships are unchanged, mood is unchanged and sleep patterns are unchanged, and that the overall functioning is unchanged. Patient denies neurological bowel or bladder. Patient denies misusing/abusing his narcotic pain medications or using any illegal drugs. There are No indicators for possible drug abuse, addiction or diversion problems. Upon obtaining the medical history from Mr. Kashif Bautista regarding today's office visit for his presenting problems,Patient states the insurance is not cover some of his medications. Sleep is poor,  poor sleep latency, averages 2-3 hours of sleep at night. Intermittent, non restorative.  Does not feel rested in AM. Complains of feeling sleepy  during the day. He says the Elavil is not helping well. Patient's  subjective report of his mood is good. he denies any symptoms of depression or irritability or mood swings. Describes his mood as being good and reports pleasure in their daily activities. Reports  normal appetite, energy and concentration. Able to function well in different aspects of their daily activities. Denies suicidal or homicidal ideation. Denies any complaints of increased tension  worries or irritability  he denies any c/o increased anxiety, No c/o panic attacks or symptoms of PTSD He mentions the pain has been tolerable with the medication. He reports he is using Butrans with Ultram as needed. He states he still has some pills left from before. He denies any constipation symptoms. ALLERGIES/PAST MED/FAM/SOC HISTORY: Mr. David Gupta allergies, past medical, family and social history were reviewed in the chart and also listed below.   Social History     Social History    Marital status: Single     Spouse name: N/A    Number of children: N/A    Years of education: N/A     Occupational History    n/a      Social History Main Topics    Smoking status: Never Smoker    Smokeless tobacco: Never Used    Alcohol use No    Drug use: No    Sexual activity: Not on file     Other Topics Concern    Not on file     Social History Narrative    No narrative on file       Mr. David Gupta current medications are   Outpatient Medications Prior to Visit   Medication Sig Dispense Refill    ONE TOUCH ULTRA TEST strip USE TO TEST BLOOD SUGAR 10 TIMES DAILY DX CODE E 10.8 300 strip 11    fluticasone (FLONASE) 50 MCG/ACT nasal spray INSTILL TWO SPRAYS IN EACH NOSTRIL DAILY 1 Bottle 1    traMADol (ULTRAM) 50 MG tablet Take 1 tablet by mouth every 6 hours as needed for Pain (max 1-2 per day) 30 tablet 0    buprenorphine (BUTRANS) 5 MCG/HR PTWK Place 1 patch onto the skin every 7 days 4 patch 0    topiramate (TOPAMAX) 100 MG tablet Take 1 tablet by mouth 2 times daily 60 tablet 0    DULoxetine (CYMBALTA) 60 MG extended release capsule Take 1 capsule by mouth 2 times daily 60 capsule 0    SUMAtriptan (IMITREX) 50 MG tablet Take one tab po at the start of migraine PRN max 1 every 24 hours 9 tablet 0    amitriptyline (ELAVIL) 25 MG tablet Take 1-2 tablets by mouth nightly 60 tablet 0    tiZANidine (ZANAFLEX) 4 MG tablet Take 1/2 tablet by mouth in the morning, take 1 table by mouth in the evening 60 tablet 0    famotidine (PEPCID) 20 MG tablet TAKE ONE TABLET BY MOUTH DAILY 30 tablet 0    gabapentin (NEURONTIN) 400 MG capsule Take one tablet Po BID 60 capsule 1    mupirocin (BACTROBAN NASAL) 2 % nasal ointment Take by Nasal route 2 times daily. 1 Tube 1    Insulin Syringe-Needle U-100 (B-D INS SYR ULTRAFINE 1CC/31G) 31G X 5/16\" 1 ML MISC INJECT USING INSULIN ONCE DAILY 30 each 10    insulin glargine (LANTUS) 100 UNIT/ML injection vial Inject 30 Units into the skin nightly 15 mL 1    cetirizine (ZYRTEC) 10 MG tablet TAKE ONE TABLET BY MOUTH DAILY AS NEEDED 28 tablet 2    Multiple Vitamin (DAILY KITTY) TABS TAKE ONE TABLET BY MOUTH DAILY 30 tablet 5    Cholecalciferol (VITAMIN D3) 5000 units CAPS Take 1 capsule by mouth Daily 30 capsule 3    magnesium oxide (MAG-OX) 400 MG tablet Take 1 tablet by mouth 2 times daily 60 tablet 0    Dextromethorphan-Guaifenesin (MUCINEX DM)  MG TB12 Cough and congestion.  60 tablet 1    omega-3 acid ethyl esters (LOVAZA) 1 G capsule TAKE TWO CAPSULES BY MOUTH TWICE DAILY 120 capsule 5    cephALEXin (KEFLEX) 500 MG capsule TAKE 1 CAPSULE BY MOUTH TWO TIMES A DAY  28 capsule 7    GLUCAGON EMERGENCY 1 MG injection INJECT 1MG INTO THE MUSCLE AS NEEDED 1 kit 4    Insulin Syringe-Needle U-100 (INSULIN SYRINGE .5CC/31GX5/16\") 31G X 5/16\" 0.5 ML MISC 1 each by Does not apply route 4 times daily (before meals and nightly) Dx Code E11.9 150 Syringe 11    insulin glulisine (APIDRA) person hygiene   -he was advised  to avoid using too many OTC analgesics to control the headaches, avoid chocolates, increased caffeine, cheeses and MSG nitrite containing foods, cigarette smoking. To avoid bright lights, strong smells and skipping meals.   -Continue with Butrans and Ultram as needed   -Advised caffeine reduction, dietary changes, elevate head end of bed, NPO after supper, if using alcohol advised reduction of alcohol intake, tobacco cessation if smoking, weight loss   -He was advised weight reduction, diet changes- 800-1200 [place] diet, diet diary, exercising, nutritional  consult increased physical activity as tolerated   Mr. Duane Moura will be prescribed  the medications  listed below which are for treatment of his presenting  medical problems which for this visit include:   Diagnoses of Primary insomnia, Chronic pain syndrome, Neuropathic pain, Polyneuropathy associated with underlying disease ([address] Myofascial pain, Ulcer of left foot, with fat layer exposed ([facility] [address] Chronic migraine without aura, with intractable migraine, so stated, with status migrainosus, and Recurrent major depressive disorder, in partial remission ([facility] [address] were pertinent to this visit.   Medications/orders associated with this visit:    Current Outpatient Prescriptions   Medication Sig Dispense Refill    ONE TOUCH ULTRA TEST strip USE TO TEST BLOOD SUGAR 10 TIMES DAILY DX CODE E 10.8 300 strip 11    fluticasone (FLONASE) 50 MCG/ACT nasal spray INSTILL TWO SPRAYS IN EACH NOSTRIL DAILY 1 Bottle 1    traMADol (ULTRAM) 50 MG tablet Take 1 tablet by mouth every 6 hours as needed for Pain (max 1-2 per day) 30 tablet 0    buprenorphine (BUTRANS) 5 MCG/HR PTWK Place 1 patch onto the skin every 7 days 4 patch 0    topiramate (TOPAMAX) 100 MG tablet Take 1 tablet by mouth 2 times daily 60 tablet 0    DULoxetine (CYMBALTA) 60 MG extended release capsule Take 1 capsule by mouth 2 times daily 60 capsule 0    SUMAtriptan (IMITREX) 50 MG tablet Take one tab po at the start of migraine PRN max 1 every 24 hours 9 tablet 0    amitriptyline (ELAVIL) 25 MG tablet Take 1-2 tablets by mouth nightly 60 tablet 0    tiZANidine (ZANAFLEX) 4 MG tablet Take 1/2 tablet by mouth in the morning, take 1 table by mouth in the evening 60 tablet 0    famotidine (PEPCID) 20 MG tablet TAKE ONE TABLET BY MOUTH DAILY 30 tablet 0    gabapentin (NEURONTIN) 400 MG capsule Take one tablet Po BID 60 capsule 1    mupirocin (BACTROBAN NASAL) 2 % nasal ointment Take by Nasal route 2 times daily. 1 Tube 1    Insulin Syringe-Needle U-100 (B-D INS SYR ULTRAFINE 1CC/31G) 31G X 5/16\" 1 ML MISC INJECT USING INSULIN ONCE DAILY 30 each 10    insulin glargine (LANTUS) 100 UNIT/ML injection vial Inject 30 Units into the skin nightly 15 mL 1    cetirizine (ZYRTEC) 10 MG tablet TAKE ONE TABLET BY MOUTH DAILY AS NEEDED 28 tablet 2    Multiple Vitamin (DAILY KITTY) TABS TAKE ONE TABLET BY MOUTH DAILY 30 tablet 5    Cholecalciferol (VITAMIN D3) 5000 units CAPS Take 1 capsule by mouth Daily 30 capsule 3    magnesium oxide (MAG-OX) 400 MG tablet Take 1 tablet by mouth 2 times daily 60 tablet 0    Dextromethorphan-Guaifenesin (MUCINEX DM)  MG TB12 Cough and congestion.  60 tablet 1    omega-3 acid ethyl esters (LOVAZA) 1 G capsule TAKE TWO CAPSULES BY MOUTH TWICE DAILY 120 capsule 5    cephALEXin (KEFLEX) 500 MG capsule TAKE 1 CAPSULE BY MOUTH TWO TIMES A DAY  28 capsule 7    GLUCAGON EMERGENCY 1 MG injection INJECT 1MG INTO THE MUSCLE AS NEEDED 1 kit 4    Insulin Syringe-Needle U-100 (INSULIN SYRINGE .5CC/31GX5/16\") 31G X 5/16\" 0.5 ML MISC 1 each by Does not apply route 4 times daily (before meals and nightly) Dx Code E11.9 150 Syringe 11    insulin glulisine (APIDRA) 100 UNIT/ML injection INJECT 8-12 UNITS UNDER THE SKIN THREE TIMES A DAY WITH MEALS 20 mL 4    MUCUS RELIEF DM  MG TABS TAKE ONE BY MOUTH DAILY AS NEEDED 30 tablet 1    ONETOUCH DELICA LANCETS 41Z MISC alternative treatments have been/were  discussed with the patient. Any questions on the  common side effects of these medications were also answered. He was advised against drinking alcohol with the narcotic pain medicines, advised against driving or handling machinery when  starting or adjusting the dose of medicines, feeling groggy or drowsy, or if having any cognitive issues related to the current medications. Heis fully aware of the risk of overdose and death, if medicines are misused and not taken as prescribed. If he develops new symptoms or if the symptoms worsen, he was told to call the office. .  Thank you for allowing me to participate in the care of this patient. Milton Benson MD.    Cc: Mary Quintero MD   I, Anjum Howard, am scribing for and in the presence of Dr. Milton Benson.    11/09/17  12:20 PM  SLAVA Severino, Dr. Milton Benson, personally performed the services described in this documentation as scribed by   Anjum Howard MA in my presence and it is both accurate and complete

## 2017-11-14 DIAGNOSIS — G89.4 CHRONIC PAIN SYNDROME: ICD-10-CM

## 2017-11-14 DIAGNOSIS — F51.01 PRIMARY INSOMNIA: ICD-10-CM

## 2017-11-14 RX ORDER — AMITRIPTYLINE HYDROCHLORIDE 25 MG/1
TABLET, FILM COATED ORAL
Qty: 60 TABLET | Refills: 0 | OUTPATIENT
Start: 2017-11-14

## 2017-12-04 RX ORDER — INSULIN GLARGINE 100 [IU]/ML
INJECTION, SOLUTION SUBCUTANEOUS
Qty: 1 VIAL | Refills: 5 | Status: SHIPPED | OUTPATIENT
Start: 2017-12-04 | End: 2018-02-13 | Stop reason: SDUPTHER

## 2017-12-07 ENCOUNTER — OFFICE VISIT (OUTPATIENT)
Dept: PAIN MANAGEMENT | Age: 42
End: 2017-12-07

## 2017-12-07 VITALS
SYSTOLIC BLOOD PRESSURE: 149 MMHG | WEIGHT: 271 LBS | HEART RATE: 83 BPM | BODY MASS INDEX: 34.79 KG/M2 | DIASTOLIC BLOOD PRESSURE: 77 MMHG

## 2017-12-07 DIAGNOSIS — G89.4 CHRONIC PAIN SYNDROME: ICD-10-CM

## 2017-12-07 DIAGNOSIS — M79.18 MYOFASCIAL PAIN: ICD-10-CM

## 2017-12-07 DIAGNOSIS — G63 POLYNEUROPATHY ASSOCIATED WITH UNDERLYING DISEASE (HCC): ICD-10-CM

## 2017-12-07 DIAGNOSIS — F51.01 PRIMARY INSOMNIA: ICD-10-CM

## 2017-12-07 DIAGNOSIS — F33.41 RECURRENT MAJOR DEPRESSIVE DISORDER, IN PARTIAL REMISSION (HCC): ICD-10-CM

## 2017-12-07 DIAGNOSIS — L97.522 ULCER OF LEFT FOOT, WITH FAT LAYER EXPOSED (HCC): ICD-10-CM

## 2017-12-07 DIAGNOSIS — G43.711 CHRONIC MIGRAINE WITHOUT AURA, WITH INTRACTABLE MIGRAINE, SO STATED, WITH STATUS MIGRAINOSUS: ICD-10-CM

## 2017-12-07 DIAGNOSIS — M79.2 NEUROPATHIC PAIN: ICD-10-CM

## 2017-12-07 PROCEDURE — G8427 DOCREV CUR MEDS BY ELIG CLIN: HCPCS | Performed by: INTERNAL MEDICINE

## 2017-12-07 PROCEDURE — G8484 FLU IMMUNIZE NO ADMIN: HCPCS | Performed by: INTERNAL MEDICINE

## 2017-12-07 PROCEDURE — 1036F TOBACCO NON-USER: CPT | Performed by: INTERNAL MEDICINE

## 2017-12-07 PROCEDURE — G8417 CALC BMI ABV UP PARAM F/U: HCPCS | Performed by: INTERNAL MEDICINE

## 2017-12-07 PROCEDURE — 99213 OFFICE O/P EST LOW 20 MIN: CPT | Performed by: INTERNAL MEDICINE

## 2017-12-07 RX ORDER — TIZANIDINE 4 MG/1
TABLET ORAL
Qty: 60 TABLET | Refills: 0 | Status: SHIPPED | OUTPATIENT
Start: 2017-12-07 | End: 2018-01-04 | Stop reason: SDUPTHER

## 2017-12-07 RX ORDER — TRAMADOL HYDROCHLORIDE 50 MG/1
50 TABLET ORAL EVERY 6 HOURS PRN
Qty: 50 TABLET | Refills: 0 | Status: SHIPPED | OUTPATIENT
Start: 2017-12-07 | End: 2018-02-01 | Stop reason: SDUPTHER

## 2017-12-07 RX ORDER — FAMOTIDINE 20 MG/1
TABLET, FILM COATED ORAL
Qty: 30 TABLET | Refills: 0 | Status: SHIPPED | OUTPATIENT
Start: 2017-12-07 | End: 2018-01-04 | Stop reason: SDUPTHER

## 2017-12-07 RX ORDER — QUETIAPINE FUMARATE 25 MG/1
25-50 TABLET, FILM COATED ORAL NIGHTLY
Qty: 60 TABLET | Refills: 0 | Status: SHIPPED | OUTPATIENT
Start: 2017-12-07 | End: 2018-01-04 | Stop reason: SDUPTHER

## 2017-12-07 RX ORDER — GABAPENTIN 400 MG/1
CAPSULE ORAL
Qty: 60 CAPSULE | Refills: 1 | Status: SHIPPED | OUTPATIENT
Start: 2017-12-07 | End: 2018-01-04 | Stop reason: SDUPTHER

## 2017-12-07 RX ORDER — BUPRENORPHINE 5 UG/H
1 PATCH TRANSDERMAL
Qty: 4 PATCH | Refills: 0 | Status: SHIPPED | OUTPATIENT
Start: 2017-12-07 | End: 2018-01-04 | Stop reason: SDUPTHER

## 2017-12-07 RX ORDER — SUMATRIPTAN 50 MG/1
TABLET, FILM COATED ORAL
Qty: 9 TABLET | Refills: 0 | Status: SHIPPED | OUTPATIENT
Start: 2017-12-07 | End: 2018-01-04 | Stop reason: SDUPTHER

## 2017-12-07 RX ORDER — DULOXETIN HYDROCHLORIDE 60 MG/1
60 CAPSULE, DELAYED RELEASE ORAL 2 TIMES DAILY
Qty: 60 CAPSULE | Refills: 0 | Status: SHIPPED | OUTPATIENT
Start: 2017-12-07 | End: 2018-01-04 | Stop reason: SDUPTHER

## 2017-12-07 RX ORDER — TOPIRAMATE 100 MG/1
100 TABLET, FILM COATED ORAL 2 TIMES DAILY
Qty: 60 TABLET | Refills: 0 | Status: SHIPPED | OUTPATIENT
Start: 2017-12-07 | End: 2018-01-04 | Stop reason: SDUPTHER

## 2017-12-07 NOTE — PROGRESS NOTES
Мария Leela  1975  [id number]    HISTORY OF PRESENT ILLNESS:  Mr. Larry Valenzuela is a 38 y.o. male returns for a follow up visit for multiple medical problems. His current presenting problems are   1. Chronic pain syndrome    2. Polyneuropathy associated with underlying disease ([address]    3. Myofascial pain    4. Chronic migraine without aura, with intractable migraine, so stated, with status migrainosus    . As per information/history obtained from the PADT(patient assessment and documentation tool) - He complains of pain in the head and neck with radiation to the shoulders Bilateral, hands Bilateral, lower leg Bilateral, ankles Bilateral and feet Bilateral He rates the pain 9/10 and describes it as sharp, aching. Pain is made worse by: movement, walking, standing. Current treatment regimen has helped relieve about 30% of the pain. He denies side effects from the current pain regimen. Patient reports that since the last follow up visit the physical functioning is unchanged, family/social relationships are better, mood is better and sleep patterns are unchanged, and that the overall functioning is unchanged. Patient denies neurological bowel or bladder. Patient denies misusing/abusing his narcotic pain medications or using any illegal drugs. There are No indicators for possible drug abuse, addiction or diversion problems. Upon obtaining the medical history from Mr. Larry Valenzuela regarding today's office visit for his presenting problems, Patient states he has been off of the Kiersten for about 7-10 days due to insurance problems. He states he has been having increased pain. Mr. Larry Valenzuela mentions using Ultram, almost out of them. He mentions his blood sugar has been doing ok. Patient reports the ulcers are healed on the feet. ALLERGIES: Patients list of allergies were reviewed     MEDICATIONS: Mr. Larry Valenzuela list of medications were reviewed. His current medications are   Outpatient Medications Prior to Visit   Medication Sig Dispense Refill    LANTUS 100 UNIT/ML injection vial INJECT 30 UNITS UNDER THE SKIN ONCE NIGHTLY 1 vial 5    magnesium oxide (MAG-OX) 400 (241.3 Mg) MG TABS tablet TAKE ONE TABLET BY MOUTH TWICE A DAY 60 tablet 0    buprenorphine (BUTRANS) 5 MCG/HR PTWK Place 1 patch onto the skin every 7 days . 4 patch 0    QUEtiapine (SEROQUEL) 25 MG tablet Take 1-2 tablets by mouth nightly 60 tablet 0    topiramate (TOPAMAX) 100 MG tablet Take 1 tablet by mouth 2 times daily 60 tablet 0    DULoxetine (CYMBALTA) 60 MG extended release capsule Take 1 capsule by mouth 2 times daily 60 capsule 0    SUMAtriptan (IMITREX) 50 MG tablet Take one tab po at the start of migraine PRN max 1 every 24 hours 9 tablet 0    tiZANidine (ZANAFLEX) 4 MG tablet Take 1/2 tablet by mouth in the morning, take 1 table by mouth in the evening 60 tablet 0    famotidine (PEPCID) 20 MG tablet TAKE ONE TABLET BY MOUTH DAILY 30 tablet 0    gabapentin (NEURONTIN) 400 MG capsule Take one tablet Po BID 60 capsule 1    mupirocin (BACTROBAN NASAL) 2 % nasal ointment Take by Nasal route 2 times daily.  1 Tube 1    ONE TOUCH ULTRA TEST strip USE TO TEST BLOOD SUGAR 10 TIMES DAILY DX CODE E 10.8 300 strip 11    fluticasone (FLONASE) 50 MCG/ACT nasal spray INSTILL TWO SPRAYS IN EACH NOSTRIL DAILY 1 Bottle 1    traMADol (ULTRAM) 50 MG tablet Take 1 tablet by mouth every 6 hours as needed for Pain (max 1-2 per day) 30 tablet 0    Insulin Syringe-Needle U-100 (B-D INS SYR ULTRAFINE 1CC/31G) 31G X 5/16\" 1 ML MISC INJECT USING INSULIN ONCE DAILY 30 each 10    cetirizine (ZYRTEC) 10 MG tablet TAKE ONE TABLET BY MOUTH DAILY AS NEEDED 28 tablet 2    Multiple Vitamin (DAILY KITTY) TABS TAKE ONE TABLET BY MOUTH DAILY 30 tablet 5    Cholecalciferol (VITAMIN D3) 5000 units CAPS Take 1 capsule by mouth Daily 30 capsule 3    magnesium oxide (MAG-OX) 400 MG tablet Take 1 tablet by mouth 2 times daily 60 tablet 0    Dextromethorphan-Guaifenesin ([place] DM) Constitutional: He appears well-developed and well-nourished. No acute distress. Poor hygiene   Skin: Skin is warm and dry, good turgor. No rash noted. He is not diaphoretic. + folliculitis lesions on the skin   Cardiovascular: Normal rate, regular rhythm, normal heart sounds, and does not have murmur. Pulmonary/Chest: Effort normal. No respiratory distress. He does not have wheezes in the lung fields. He has no rales. Neurological/Psychiatric:He is alert and oriented to person, place, and time. Coordination is  normal. His mood isAppropriate and affect is Flat/blunted and Anxious . IMPRESSION:   1. Chronic pain syndrome    2. Polyneuropathy associated with underlying disease ([address]    3. Myofascial pain    4. Chronic migraine without aura, with intractable migraine, so stated, with status migrainosus        PLAN:  Informed verbal consent was obtained  -Continue with current regimen   -ROM/stretching exercises as advised   -Continue with Butrans, get PA  -Continue with Ultram 1-2 per day   -Monitor blood sugar regularly, diabetic control- adv diabetic diet. Goal for fasting blood sugars around 120. Follow up with Endocrinologist/PCP also for on going management   -he was advised proper sleep hygiene-told to avoid:use of caffeine or other stimulants after noon, alcohol use near bedtime, long or frequent naps during the day, erratic sleep schedule, heavy meals near bedtime, vigorous exercise near bedtime and use of electronic devices near bedtime     Current Outpatient Prescriptions   Medication Sig Dispense Refill    LANTUS 100 UNIT/ML injection vial INJECT 30 UNITS UNDER THE SKIN ONCE NIGHTLY 1 vial 5    magnesium oxide (MAG-OX) 400 (241.3 Mg) MG TABS tablet TAKE ONE TABLET BY MOUTH TWICE A DAY 60 tablet 0    buprenorphine (BUTRANS) 5 MCG/HR PTWK Place 1 patch onto the skin every 7 days .  4 patch 0    QUEtiapine (SEROQUEL) 25 MG tablet Take 1-2 tablets by mouth nightly 60 tablet 0    topiramate (TOPAMAX) 100 MG tablet Take 1 tablet by mouth 2 times daily 60 tablet 0    DULoxetine (CYMBALTA) 60 MG extended release capsule Take 1 capsule by mouth 2 times daily 60 capsule 0    SUMAtriptan (IMITREX) 50 MG tablet Take one tab po at the start of migraine PRN max 1 every 24 hours 9 tablet 0    tiZANidine (ZANAFLEX) 4 MG tablet Take 1/2 tablet by mouth in the morning, take 1 table by mouth in the evening 60 tablet 0    famotidine (PEPCID) 20 MG tablet TAKE ONE TABLET BY MOUTH DAILY 30 tablet 0    gabapentin (NEURONTIN) 400 MG capsule Take one tablet Po BID 60 capsule 1    mupirocin (BACTROBAN NASAL) 2 % nasal ointment Take by Nasal route 2 times daily. 1 Tube 1    ONE TOUCH ULTRA TEST strip USE TO TEST BLOOD SUGAR 10 TIMES DAILY DX CODE E 10.8 300 strip 11    fluticasone (FLONASE) 50 MCG/ACT nasal spray INSTILL TWO SPRAYS IN EACH NOSTRIL DAILY 1 Bottle 1    traMADol (ULTRAM) 50 MG tablet Take 1 tablet by mouth every 6 hours as needed for Pain (max 1-2 per day) 30 tablet 0    Insulin Syringe-Needle U-100 (B-D INS SYR ULTRAFINE 1CC/31G) 31G X 5/16\" 1 ML MISC INJECT USING INSULIN ONCE DAILY 30 each 10    cetirizine (ZYRTEC) 10 MG tablet TAKE ONE TABLET BY MOUTH DAILY AS NEEDED 28 tablet 2    Multiple Vitamin (DAILY KITTY) TABS TAKE ONE TABLET BY MOUTH DAILY 30 tablet 5    Cholecalciferol (VITAMIN D3) 5000 units CAPS Take 1 capsule by mouth Daily 30 capsule 3    magnesium oxide (MAG-OX) 400 MG tablet Take 1 tablet by mouth 2 times daily 60 tablet 0    Dextromethorphan-Guaifenesin (MUCINEX DM)  MG TB12 Cough and congestion.  60 tablet 1    omega-3 acid ethyl esters (LOVAZA) 1 G capsule TAKE TWO CAPSULES BY MOUTH TWICE DAILY 120 capsule 5    cephALEXin (KEFLEX) 500 MG capsule TAKE 1 CAPSULE BY MOUTH TWO TIMES A DAY  28 capsule 7    GLUCAGON EMERGENCY 1 MG injection INJECT 1MG INTO THE MUSCLE AS NEEDED 1 kit 4    Insulin Syringe-Needle U-100 (INSULIN SYRINGE .5CC/31GX5/16\") 31G X 5/16\" 0.5 ML MISC

## 2017-12-12 ENCOUNTER — TELEPHONE (OUTPATIENT)
Dept: ENDOCRINOLOGY | Age: 42
End: 2017-12-12

## 2017-12-12 RX ORDER — BLOOD-GLUCOSE METER
1 EACH MISCELLANEOUS
COMMUNITY
End: 2018-03-02 | Stop reason: SDUPTHER

## 2017-12-16 DIAGNOSIS — G89.4 CHRONIC PAIN SYNDROME: ICD-10-CM

## 2017-12-16 DIAGNOSIS — F51.01 PRIMARY INSOMNIA: ICD-10-CM

## 2017-12-18 RX ORDER — QUETIAPINE FUMARATE 25 MG/1
TABLET, FILM COATED ORAL
Qty: 60 TABLET | Refills: 0 | Status: SHIPPED | OUTPATIENT
Start: 2017-12-18 | End: 2018-11-08 | Stop reason: SDUPTHER

## 2017-12-18 RX ORDER — IBUPROFEN 600 MG/1
TABLET ORAL
Qty: 1 KIT | Refills: 3 | Status: SHIPPED | OUTPATIENT
Start: 2017-12-18

## 2017-12-26 RX ORDER — CETIRIZINE HYDROCHLORIDE 10 MG/1
TABLET ORAL
Qty: 28 TABLET | Refills: 1 | Status: SHIPPED | OUTPATIENT
Start: 2017-12-26 | End: 2018-02-25 | Stop reason: SDUPTHER

## 2017-12-27 DIAGNOSIS — R09.81 NASAL SINUS CONGESTION: ICD-10-CM

## 2017-12-27 RX ORDER — FLUTICASONE PROPIONATE 50 MCG
SPRAY, SUSPENSION (ML) NASAL
Qty: 1 BOTTLE | Refills: 0 | Status: SHIPPED | OUTPATIENT
Start: 2017-12-27 | End: 2018-01-29 | Stop reason: SDUPTHER

## 2017-12-28 DIAGNOSIS — L97.522 ULCER OF LEFT FOOT, WITH FAT LAYER EXPOSED (HCC): ICD-10-CM

## 2017-12-28 DIAGNOSIS — M79.18 MYOFASCIAL PAIN: ICD-10-CM

## 2017-12-28 DIAGNOSIS — M79.2 NEUROPATHIC PAIN: ICD-10-CM

## 2017-12-28 DIAGNOSIS — G63 POLYNEUROPATHY ASSOCIATED WITH UNDERLYING DISEASE (HCC): ICD-10-CM

## 2017-12-28 DIAGNOSIS — G89.4 CHRONIC PAIN SYNDROME: ICD-10-CM

## 2017-12-29 RX ORDER — TRAMADOL HYDROCHLORIDE 50 MG/1
TABLET ORAL
Qty: 50 TABLET | Refills: 0 | OUTPATIENT
Start: 2017-12-29

## 2018-01-04 ENCOUNTER — OFFICE VISIT (OUTPATIENT)
Dept: PAIN MANAGEMENT | Age: 43
End: 2018-01-04

## 2018-01-04 VITALS
WEIGHT: 260 LBS | BODY MASS INDEX: 33.38 KG/M2 | SYSTOLIC BLOOD PRESSURE: 106 MMHG | DIASTOLIC BLOOD PRESSURE: 58 MMHG | HEART RATE: 69 BPM

## 2018-01-04 DIAGNOSIS — G43.711 CHRONIC MIGRAINE WITHOUT AURA, WITH INTRACTABLE MIGRAINE, SO STATED, WITH STATUS MIGRAINOSUS: ICD-10-CM

## 2018-01-04 DIAGNOSIS — M79.2 NEUROPATHIC PAIN: ICD-10-CM

## 2018-01-04 DIAGNOSIS — L97.522 ULCER OF LEFT FOOT, WITH FAT LAYER EXPOSED (HCC): ICD-10-CM

## 2018-01-04 DIAGNOSIS — G89.4 CHRONIC PAIN SYNDROME: ICD-10-CM

## 2018-01-04 DIAGNOSIS — G63 POLYNEUROPATHY ASSOCIATED WITH UNDERLYING DISEASE (HCC): ICD-10-CM

## 2018-01-04 DIAGNOSIS — M79.18 MYOFASCIAL PAIN: ICD-10-CM

## 2018-01-04 DIAGNOSIS — F33.41 RECURRENT MAJOR DEPRESSIVE DISORDER, IN PARTIAL REMISSION (HCC): ICD-10-CM

## 2018-01-04 DIAGNOSIS — F51.01 PRIMARY INSOMNIA: ICD-10-CM

## 2018-01-04 PROCEDURE — G8417 CALC BMI ABV UP PARAM F/U: HCPCS | Performed by: INTERNAL MEDICINE

## 2018-01-04 PROCEDURE — G8484 FLU IMMUNIZE NO ADMIN: HCPCS | Performed by: INTERNAL MEDICINE

## 2018-01-04 PROCEDURE — G8427 DOCREV CUR MEDS BY ELIG CLIN: HCPCS | Performed by: INTERNAL MEDICINE

## 2018-01-04 PROCEDURE — 1036F TOBACCO NON-USER: CPT | Performed by: INTERNAL MEDICINE

## 2018-01-04 PROCEDURE — 99213 OFFICE O/P EST LOW 20 MIN: CPT | Performed by: INTERNAL MEDICINE

## 2018-01-04 RX ORDER — DULOXETIN HYDROCHLORIDE 60 MG/1
60 CAPSULE, DELAYED RELEASE ORAL 2 TIMES DAILY
Qty: 60 CAPSULE | Refills: 0 | Status: SHIPPED | OUTPATIENT
Start: 2018-01-04 | End: 2018-02-01 | Stop reason: SDUPTHER

## 2018-01-04 RX ORDER — BUPRENORPHINE 5 UG/H
1 PATCH TRANSDERMAL
Qty: 4 PATCH | Refills: 0 | Status: SHIPPED | OUTPATIENT
Start: 2018-01-04 | End: 2018-02-01 | Stop reason: SDUPTHER

## 2018-01-04 RX ORDER — QUETIAPINE FUMARATE 25 MG/1
25-50 TABLET, FILM COATED ORAL NIGHTLY
Qty: 60 TABLET | Refills: 0 | Status: SHIPPED | OUTPATIENT
Start: 2018-01-04 | End: 2018-02-01 | Stop reason: SDUPTHER

## 2018-01-04 RX ORDER — TOPIRAMATE 100 MG/1
100 TABLET, FILM COATED ORAL 2 TIMES DAILY
Qty: 60 TABLET | Refills: 0 | Status: SHIPPED | OUTPATIENT
Start: 2018-01-04 | End: 2018-02-01 | Stop reason: SDUPTHER

## 2018-01-04 RX ORDER — TIZANIDINE 4 MG/1
TABLET ORAL
Qty: 60 TABLET | Refills: 0 | Status: SHIPPED | OUTPATIENT
Start: 2018-01-04 | End: 2018-02-01 | Stop reason: SDUPTHER

## 2018-01-04 RX ORDER — FAMOTIDINE 20 MG/1
TABLET, FILM COATED ORAL
Qty: 30 TABLET | Refills: 0 | Status: SHIPPED | OUTPATIENT
Start: 2018-01-04 | End: 2018-02-01 | Stop reason: SDUPTHER

## 2018-01-04 RX ORDER — SUMATRIPTAN 50 MG/1
TABLET, FILM COATED ORAL
Qty: 9 TABLET | Refills: 0 | Status: SHIPPED | OUTPATIENT
Start: 2018-01-04 | End: 2018-02-01 | Stop reason: SDUPTHER

## 2018-01-04 RX ORDER — GABAPENTIN 400 MG/1
CAPSULE ORAL
Qty: 60 CAPSULE | Refills: 1 | Status: SHIPPED | OUTPATIENT
Start: 2018-01-04 | End: 2018-02-01 | Stop reason: SDUPTHER

## 2018-01-04 NOTE — PROGRESS NOTES
Herbert Knight  1975  [id number]    HISTORY OF PRESENT ILLNESS:  Mr. Liang Henderson is a 38 y.o. male returns for a follow up visit for multiple medical problems. His current presenting problems are   1. Chronic pain syndrome    2. Polyneuropathy associated with underlying disease ([address]    3. Chronic migraine without aura, with intractable migraine, so stated, with status migrainosus    4. Myofascial pain    . As per information/history obtained from the PADT(patient assessment and documentation tool) - He complains of pain in the head, elbows Bilateral, upper back, mid back, lower back and knees Bilateral with radiation to the hands Bilateral, buttocks, hips Bilateral, lower leg Left, ankles Left and feet Left He rates the pain 10/10 and describes it as aching, burning, throbbing, pins and needles. Pain is made worse by: movement, walking, standing. Current treatment regimen has helped relieve about 10% of the pain. He denies side effects from the current pain regimen. Patient reports that since the last follow up visit the physical functioning is unchanged, family/social relationships are unchanged, mood is better and sleep patterns are unchanged, and that the overall functioning is unchanged. Patient denies neurological bowel or bladder. Patient denies misusing/abusing his narcotic pain medications or using any illegal drugs. There are No indicators for possible drug abuse, addiction or diversion problems. Upon obtaining the medical history from Mr. Liang Henderson regarding today's office visit for his presenting problems, patient states having problems getting his medications. Mr. Liang Henderson states he needs to pay a deductible before insurance will cover anything. He says he is using Butrans along with Ultram PRN, has enough pills left over. He says his foot ulcers are doing ok. He says his blood sugars are less than 200.          ALLERGIES: Patients list of allergies were reviewed     MEDICATIONS: Mr. Liang Henderson list of cigarette smoking. To avoid bright lights, strong smells and skipping meals.  -continue with Zanaflex  -he was advised proper sleep hygiene-told to avoid:use of caffeine or other stimulants after noon, alcohol use near bedtime, long or frequent naps during the day, erratic sleep schedule, heavy meals near bedtime, vigorous exercise near bedtime and use of electronic devices near bedtime  -Patient was advised to bring in all their medication bottles on the next follow up visit for medication review. Current Outpatient Prescriptions   Medication Sig Dispense Refill    fluticasone (FLONASE) 50 MCG/ACT nasal spray INSTILL TWO SPRAYS IN EACH NOSTRIL DAILY 1 Bottle 0    cetirizine (ZYRTEC) 10 MG tablet TAKE ONE TABLET BY MOUTH DAILY AS NEEDED 28 tablet 1    magnesium oxide (MAG-OX) 400 (241.3 Mg) MG TABS tablet TAKE ONE TABLET BY MOUTH TWICE A DAY 60 tablet 0    QUEtiapine (SEROQUEL) 25 MG tablet TAKE ONE TO TWO TABLETS BY MOUTH ONCE NIGHTLY 60 tablet 0    GLUCAGON EMERGENCY 1 MG injection INJECT 1MG INTO THE MUSCLE AS NEEDED 1 kit 3    Blood Glucose Monitoring Suppl (ACCU-CHEK CAPO PLUS) w/Device KIT 1 each by Does not apply route      glucose blood VI test strips (ACCU-CHEK CAPO PLUS) strip 1 each by In Vitro route 8 times daily As needed.  magnesium oxide (MAG-OX) 400 (241.3 Mg) MG TABS tablet TAKE ONE TABLET BY MOUTH TWICE A DAY 60 tablet 0    buprenorphine (BUTRANS) 5 MCG/HR PTWK Place 1 patch onto the skin every 7 days .  4 patch 0    QUEtiapine (SEROQUEL) 25 MG tablet Take 1-2 tablets by mouth nightly 60 tablet 0    topiramate (TOPAMAX) 100 MG tablet Take 1 tablet by mouth 2 times daily 60 tablet 0    DULoxetine (CYMBALTA) 60 MG extended release capsule Take 1 capsule by mouth 2 times daily 60 capsule 0    SUMAtriptan (IMITREX) 50 MG tablet Take one tab po at the start of migraine PRN max 1 every 24 hours 9 tablet 0    tiZANidine (ZANAFLEX) 4 MG tablet Take 1/2 tablet by mouth in the morning, take 1 table by mouth in the evening 60 tablet 0    famotidine (PEPCID) 20 MG tablet TAKE ONE TABLET BY MOUTH DAILY 30 tablet 0    gabapentin (NEURONTIN) 400 MG capsule Take one tablet Po BID 60 capsule 1    mupirocin (BACTROBAN NASAL) 2 % nasal ointment Take by Nasal route 2 times daily. 1 Tube 1    traMADol (ULTRAM) 50 MG tablet Take 1 tablet by mouth every 6 hours as needed for Pain (max 1-2 per day) . 50 tablet 0    LANTUS 100 UNIT/ML injection vial INJECT 30 UNITS UNDER THE SKIN ONCE NIGHTLY 1 vial 5    ONE TOUCH ULTRA TEST strip USE TO TEST BLOOD SUGAR 10 TIMES DAILY DX CODE E 10.8 300 strip 11    Insulin Syringe-Needle U-100 (B-D INS SYR ULTRAFINE 1CC/31G) 31G X 5/16\" 1 ML MISC INJECT USING INSULIN ONCE DAILY 30 each 10    Multiple Vitamin (DAILY KITTY) TABS TAKE ONE TABLET BY MOUTH DAILY 30 tablet 5    Cholecalciferol (VITAMIN D3) 5000 units CAPS Take 1 capsule by mouth Daily 30 capsule 3    magnesium oxide (MAG-OX) 400 MG tablet Take 1 tablet by mouth 2 times daily 60 tablet 0    Dextromethorphan-Guaifenesin (MUCINEX DM)  MG TB12 Cough and congestion. 60 tablet 1    omega-3 acid ethyl esters (LOVAZA) 1 G capsule TAKE TWO CAPSULES BY MOUTH TWICE DAILY 120 capsule 5    cephALEXin (KEFLEX) 500 MG capsule TAKE 1 CAPSULE BY MOUTH TWO TIMES A DAY  28 capsule 7    Insulin Syringe-Needle U-100 (INSULIN SYRINGE .5CC/31GX5/16\") 31G X 5/16\" 0.5 ML MISC 1 each by Does not apply route 4 times daily (before meals and nightly) Dx Code E11.9 150 Syringe 11    insulin glulisine (APIDRA) 100 UNIT/ML injection INJECT 8-12 UNITS UNDER THE SKIN THREE TIMES A DAY WITH MEALS 20 mL 4    MUCUS RELIEF DM  MG TABS TAKE ONE BY MOUTH DAILY AS NEEDED 30 tablet 1    Alcohol Swabs PADS Use as needed for insulin injection.  100 each 5    Probiotic Product (ACIDOPHILUS PROBIOTIC) CAPS capsule TAKE ONE CAPSULE BY MOUTH DAILY 28 capsule 4    polyvinyl alcohol (LIQUIFILM TEARS) 1.4 % ophthalmic solution 1 while adjusting the dose of medicines or if having cognitive  issues related to the current medications. Risk of overdose and death, if medicines not taken as prescribed, were also discussed. If the patient develops new symptoms or if the symptoms worsen, the patient should call the office. While transcribing every attempt was made to maintain the accuracy of the note in terms of it's contents,there may have been some errors made inadvertently. Thank you for allowing me to participate in the care of this patient. Karyle Prosper, MD.    Cc: MD MARY Hernandez Fermin Clock, scribing for in the presence  of Dr. Karyle Prosper. 01/04/18  4:08 PM  Danette Valencia Assistant  I, Dr. Karyle Prosper, personally performed the services described in this documentation as scribed by  Yonis Gary MA in my presence and it is both accurate and complete

## 2018-01-10 DIAGNOSIS — G89.4 CHRONIC PAIN SYNDROME: ICD-10-CM

## 2018-01-11 ENCOUNTER — OFFICE VISIT (OUTPATIENT)
Dept: INTERNAL MEDICINE CLINIC | Age: 43
End: 2018-01-11

## 2018-01-11 VITALS
RESPIRATION RATE: 16 BRPM | SYSTOLIC BLOOD PRESSURE: 104 MMHG | TEMPERATURE: 97.5 F | DIASTOLIC BLOOD PRESSURE: 60 MMHG | BODY MASS INDEX: 35.29 KG/M2 | OXYGEN SATURATION: 99 % | HEART RATE: 74 BPM | WEIGHT: 275 LBS | HEIGHT: 74 IN

## 2018-01-11 DIAGNOSIS — E10.8 DIABETES MELLITUS TYPE 1 WITH COMPLICATIONS (HCC): ICD-10-CM

## 2018-01-11 DIAGNOSIS — E55.9 VITAMIN D DEFICIENCY: ICD-10-CM

## 2018-01-11 DIAGNOSIS — E78.2 MIXED HYPERLIPIDEMIA: ICD-10-CM

## 2018-01-11 DIAGNOSIS — I95.1 ORTHOSTATIC HYPOTENSION: ICD-10-CM

## 2018-01-11 LAB
A/G RATIO: 0.9 (ref 1.1–2.2)
ALBUMIN SERPL-MCNC: 3.8 G/DL (ref 3.4–5)
ALP BLD-CCNC: 163 U/L (ref 40–129)
ALT SERPL-CCNC: 11 U/L (ref 10–40)
ANION GAP SERPL CALCULATED.3IONS-SCNC: 14 MMOL/L (ref 3–16)
AST SERPL-CCNC: 16 U/L (ref 15–37)
BILIRUB SERPL-MCNC: <0.2 MG/DL (ref 0–1)
BUN BLDV-MCNC: 14 MG/DL (ref 7–20)
CALCIUM SERPL-MCNC: 9 MG/DL (ref 8.3–10.6)
CHLORIDE BLD-SCNC: 101 MMOL/L (ref 99–110)
CHOLESTEROL, TOTAL: 240 MG/DL (ref 0–199)
CO2: 20 MMOL/L (ref 21–32)
CREAT SERPL-MCNC: 1.8 MG/DL (ref 0.9–1.3)
CREATININE URINE: 97.7 MG/DL (ref 39–259)
GFR AFRICAN AMERICAN: 51
GFR NON-AFRICAN AMERICAN: 42
GLOBULIN: 4.1 G/DL
GLUCOSE BLD-MCNC: 178 MG/DL (ref 70–99)
HCT VFR BLD CALC: 37 % (ref 40.5–52.5)
HDLC SERPL-MCNC: 27 MG/DL (ref 40–60)
HEMOGLOBIN: 12.2 G/DL (ref 13.5–17.5)
LDL CHOLESTEROL CALCULATED: ABNORMAL MG/DL
LDL CHOLESTEROL DIRECT: 159 MG/DL
MCH RBC QN AUTO: 28.3 PG (ref 26–34)
MCHC RBC AUTO-ENTMCNC: 33 G/DL (ref 31–36)
MCV RBC AUTO: 85.6 FL (ref 80–100)
MICROALBUMIN UR-MCNC: 12.2 MG/DL
MICROALBUMIN/CREAT UR-RTO: 124.9 MG/G (ref 0–30)
PDW BLD-RTO: 14.8 % (ref 12.4–15.4)
PLATELET # BLD: 371 K/UL (ref 135–450)
PMV BLD AUTO: 7.4 FL (ref 5–10.5)
POTASSIUM SERPL-SCNC: 3.8 MMOL/L (ref 3.5–5.1)
RBC # BLD: 4.32 M/UL (ref 4.2–5.9)
SODIUM BLD-SCNC: 135 MMOL/L (ref 136–145)
TOTAL PROTEIN: 7.9 G/DL (ref 6.4–8.2)
TRIGL SERPL-MCNC: 343 MG/DL (ref 0–150)
TSH REFLEX: 4.03 UIU/ML (ref 0.27–4.2)
VITAMIN D 25-HYDROXY: 43.6 NG/ML
VLDLC SERPL CALC-MCNC: ABNORMAL MG/DL
WBC # BLD: 7.6 K/UL (ref 4–11)

## 2018-01-11 PROCEDURE — 1036F TOBACCO NON-USER: CPT | Performed by: INTERNAL MEDICINE

## 2018-01-11 PROCEDURE — G8484 FLU IMMUNIZE NO ADMIN: HCPCS | Performed by: INTERNAL MEDICINE

## 2018-01-11 PROCEDURE — G8427 DOCREV CUR MEDS BY ELIG CLIN: HCPCS | Performed by: INTERNAL MEDICINE

## 2018-01-11 PROCEDURE — 3046F HEMOGLOBIN A1C LEVEL >9.0%: CPT | Performed by: INTERNAL MEDICINE

## 2018-01-11 PROCEDURE — G8417 CALC BMI ABV UP PARAM F/U: HCPCS | Performed by: INTERNAL MEDICINE

## 2018-01-11 PROCEDURE — 99214 OFFICE O/P EST MOD 30 MIN: CPT | Performed by: INTERNAL MEDICINE

## 2018-01-11 NOTE — PROGRESS NOTES
Subjective:      Patient ID: Wu León is a 38 y.o. male. HPI He complains of dizziness with standing, not with sitting and laying down. Denies CP, SOB or palpitations. He has type 1 diabetes with multiple complications including neuropathy. Treated with B-B insulin. Review of Systems   Constitutional: Negative. HENT: Negative. Eyes:        Impaired vision. Uses vision modifiers. Has proliferative retinopathy in both eyes. Followed closely by ophthalmology. Respiratory: Negative. Gastrointestinal: Negative. Endocrine:        Diabetes, type 1 with previous A1c of 7.7. B-B insulin. Followed by endocrinology. Hyperlipidemia with LDL of 160. Vitamin D Def. Genitourinary:        Stage 3 CKD, Cr 1.8, GFR 42. F/U nephrology. Musculoskeletal: Negative. Skin: Negative. Neurological: Positive for dizziness. Muscle relaxer helps with migraines. Psychiatric/Behavioral: Negative. Objective:   Physical Exam   Constitutional: He is oriented to person, place, and time. He appears well-developed and well-nourished. No distress. HENT:   Head: Normocephalic and atraumatic. Right Ear: External ear normal.   Left Ear: External ear normal.   Nose: Nose normal.   Mouth/Throat: Oropharynx is clear and moist.   Eyes: Conjunctivae and EOM are normal. Pupils are equal, round, and reactive to light. No scleral icterus. Neck: Normal range of motion. Neck supple. No thyromegaly present. Cardiovascular: Normal rate, regular rhythm, normal heart sounds and intact distal pulses. B/P 82/56 standing with symptoms. Pulmonary/Chest: Effort normal and breath sounds normal.   Abdominal: Soft. Bowel sounds are normal. He exhibits no mass. Lymphadenopathy:     He has no cervical adenopathy. Neurological: He is alert and oriented to person, place, and time. He has normal reflexes. Skin: Skin is warm and dry. Psychiatric: He has a normal mood and affect.  His behavior is

## 2018-01-28 ASSESSMENT — ENCOUNTER SYMPTOMS
GASTROINTESTINAL NEGATIVE: 1
RESPIRATORY NEGATIVE: 1

## 2018-01-29 DIAGNOSIS — R09.81 NASAL SINUS CONGESTION: ICD-10-CM

## 2018-01-29 RX ORDER — FLUTICASONE PROPIONATE 50 MCG
SPRAY, SUSPENSION (ML) NASAL
Qty: 1 BOTTLE | Refills: 1 | Status: SHIPPED | OUTPATIENT
Start: 2018-01-29 | End: 2018-03-29 | Stop reason: SDUPTHER

## 2018-01-29 RX ORDER — SYRINGE-NEEDLE,INSULIN,0.5 ML 31 GX5/16"
SYRINGE, EMPTY DISPOSABLE MISCELLANEOUS
Qty: 120 EACH | Refills: 10 | Status: SHIPPED | OUTPATIENT
Start: 2018-01-29 | End: 2018-02-13 | Stop reason: SDUPTHER

## 2018-02-01 ENCOUNTER — OFFICE VISIT (OUTPATIENT)
Dept: PAIN MANAGEMENT | Age: 43
End: 2018-02-01

## 2018-02-01 VITALS
DIASTOLIC BLOOD PRESSURE: 75 MMHG | SYSTOLIC BLOOD PRESSURE: 116 MMHG | HEART RATE: 67 BPM | BODY MASS INDEX: 35.56 KG/M2 | WEIGHT: 277 LBS

## 2018-02-01 DIAGNOSIS — G63 POLYNEUROPATHY ASSOCIATED WITH UNDERLYING DISEASE (HCC): ICD-10-CM

## 2018-02-01 DIAGNOSIS — G43.711 CHRONIC MIGRAINE WITHOUT AURA, WITH INTRACTABLE MIGRAINE, SO STATED, WITH STATUS MIGRAINOSUS: ICD-10-CM

## 2018-02-01 DIAGNOSIS — M79.18 MYOFASCIAL PAIN: ICD-10-CM

## 2018-02-01 DIAGNOSIS — G89.4 CHRONIC PAIN SYNDROME: ICD-10-CM

## 2018-02-01 DIAGNOSIS — L97.522 SKIN ULCER OF LEFT FOOT WITH FAT LAYER EXPOSED (HCC): ICD-10-CM

## 2018-02-01 DIAGNOSIS — L97.522 ULCER OF LEFT FOOT, WITH FAT LAYER EXPOSED (HCC): ICD-10-CM

## 2018-02-01 DIAGNOSIS — F51.01 PRIMARY INSOMNIA: ICD-10-CM

## 2018-02-01 DIAGNOSIS — F33.41 RECURRENT MAJOR DEPRESSIVE DISORDER, IN PARTIAL REMISSION (HCC): ICD-10-CM

## 2018-02-01 DIAGNOSIS — M79.2 NEUROPATHIC PAIN: ICD-10-CM

## 2018-02-01 PROCEDURE — G8427 DOCREV CUR MEDS BY ELIG CLIN: HCPCS | Performed by: INTERNAL MEDICINE

## 2018-02-01 PROCEDURE — 99213 OFFICE O/P EST LOW 20 MIN: CPT | Performed by: INTERNAL MEDICINE

## 2018-02-01 PROCEDURE — G8417 CALC BMI ABV UP PARAM F/U: HCPCS | Performed by: INTERNAL MEDICINE

## 2018-02-01 PROCEDURE — 1036F TOBACCO NON-USER: CPT | Performed by: INTERNAL MEDICINE

## 2018-02-01 PROCEDURE — G8484 FLU IMMUNIZE NO ADMIN: HCPCS | Performed by: INTERNAL MEDICINE

## 2018-02-01 RX ORDER — TIZANIDINE 4 MG/1
TABLET ORAL
Qty: 60 TABLET | Refills: 0 | Status: SHIPPED | OUTPATIENT
Start: 2018-02-01 | End: 2018-03-01 | Stop reason: SDUPTHER

## 2018-02-01 RX ORDER — SUMATRIPTAN 50 MG/1
TABLET, FILM COATED ORAL
Qty: 9 TABLET | Refills: 0 | Status: SHIPPED | OUTPATIENT
Start: 2018-02-01 | End: 2018-03-01 | Stop reason: SDUPTHER

## 2018-02-01 RX ORDER — TOPIRAMATE 100 MG/1
100 TABLET, FILM COATED ORAL 2 TIMES DAILY
Qty: 60 TABLET | Refills: 0 | Status: SHIPPED | OUTPATIENT
Start: 2018-02-01 | End: 2018-03-01 | Stop reason: SDUPTHER

## 2018-02-01 RX ORDER — FAMOTIDINE 20 MG/1
TABLET, FILM COATED ORAL
Qty: 30 TABLET | Refills: 0 | Status: SHIPPED | OUTPATIENT
Start: 2018-02-01 | End: 2018-03-01 | Stop reason: SDUPTHER

## 2018-02-01 RX ORDER — TRAMADOL HYDROCHLORIDE 50 MG/1
50 TABLET ORAL EVERY 6 HOURS PRN
Qty: 50 TABLET | Refills: 0 | Status: SHIPPED | OUTPATIENT
Start: 2018-02-01 | End: 2018-03-01 | Stop reason: SDUPTHER

## 2018-02-01 RX ORDER — BUPRENORPHINE 5 UG/H
1 PATCH TRANSDERMAL
Qty: 4 PATCH | Refills: 0 | Status: SHIPPED | OUTPATIENT
Start: 2018-02-01 | End: 2018-03-01 | Stop reason: SDUPTHER

## 2018-02-01 RX ORDER — GABAPENTIN 400 MG/1
CAPSULE ORAL
Qty: 60 CAPSULE | Refills: 0 | Status: SHIPPED | OUTPATIENT
Start: 2018-02-01 | End: 2018-03-01 | Stop reason: SDUPTHER

## 2018-02-01 RX ORDER — DULOXETIN HYDROCHLORIDE 60 MG/1
60 CAPSULE, DELAYED RELEASE ORAL 2 TIMES DAILY
Qty: 60 CAPSULE | Refills: 0 | Status: SHIPPED | OUTPATIENT
Start: 2018-02-01 | End: 2018-03-01 | Stop reason: SDUPTHER

## 2018-02-01 RX ORDER — QUETIAPINE FUMARATE 25 MG/1
25-50 TABLET, FILM COATED ORAL NIGHTLY
Qty: 60 TABLET | Refills: 0 | Status: SHIPPED | OUTPATIENT
Start: 2018-02-01 | End: 2018-03-01 | Stop reason: SDUPTHER

## 2018-02-01 NOTE — PROGRESS NOTES
Corbin Meuse  1975  [id number]    HISTORY OF PRESENT ILLNESS:  Mr. Mel Puentes is a 38 y.o. male returns for a follow up visit for multiple medical problems. His current presenting problems are   1. Chronic pain syndrome    2. Neuropathic pain    3. Polyneuropathy associated with underlying disease ([facility] [address]    4. Myofascial pain    5. Skin ulcer of left foot with fat layer exposed ([facility] [address]    . As per information/history obtained from the PADT(patient assessment and documentation tool) - He complains of pain in the head, neck, shoulders Bilateral, upper back, mid back, lower back and buttocks with radiation to the hands Bilateral, hips Bilateral, lower leg Left and ankles Left He rates the pain 9/10 and describes it as sharp, aching, numbness, pins and needles. Pain is made worse by: movement, walking, standing. Current treatment regimen has helped relieve about 10% of the pain. He denies side effects from the current pain regimen. Patient reports that since the last follow up visit the physical functioning is unchanged, family/social relationships are unchanged, mood is better and sleep patterns are better, and that the overall functioning is unchanged. Patient denies neurological bowel or bladder. Patient denies misusing/abusing his narcotic pain medications or using any illegal drugs. There are No indicators for possible drug abuse, addiction or diversion problems. Upon obtaining the medical history from Mr. Mel Puentes regarding today's office visit for his presenting problems, patient states he has been using Seroquel. Patient states he sleeps well. Has normal sleep latency. Averages about 5-7 hours of sleep a night. Denies any signs of sleep apnea. Feels rested in the AM. Denies any sleep attacks during the day. Medication regimen helps with maintaining/regulating sleep. he states that the medications are helping with the headaches  and they are tolerable.   Denies nausea, vomiting, sonophobia, photophobia, photopsia, diplopia, vertigo, neck stiffness. He states he has been using Topamax for headaches but he does get the occasional migraine. He states he has been walking some. ALLERGIES: Patients list of allergies were reviewed     MEDICATIONS: Mr. Chey Carter list of medications were reviewed. His current medications are   Outpatient Medications Prior to Visit   Medication Sig Dispense Refill    B-D INS SYRINGE 0.5CC/31GX5/16 31G X 5/16\" 0.5 ML MISC USE ONE SYRINGE FOUR TIMES A DAY BEFORE MEALS AND NIGHTLY 120 each 10    fluticasone (FLONASE) 50 MCG/ACT nasal spray SPRAY TWO SPRAYS IN EACH NOSTRIL ONCE DAILY 1 Bottle 1    buprenorphine (BUTRANS) 5 MCG/HR PTWK Place 1 patch onto the skin every 7 days for 31 days. 4 patch 0    QUEtiapine (SEROQUEL) 25 MG tablet Take 1-2 tablets by mouth nightly 60 tablet 0    topiramate (TOPAMAX) 100 MG tablet Take 1 tablet by mouth 2 times daily 60 tablet 0    DULoxetine (CYMBALTA) 60 MG extended release capsule Take 1 capsule by mouth 2 times daily 60 capsule 0    SUMAtriptan (IMITREX) 50 MG tablet Take one tab po at the start of migraine PRN max 1 every 24 hours 9 tablet 0    tiZANidine (ZANAFLEX) 4 MG tablet Take 1/2 tablet by mouth in the morning, take 1 table by mouth in the evening 60 tablet 0    famotidine (PEPCID) 20 MG tablet TAKE ONE TABLET BY MOUTH DAILY 30 tablet 0    gabapentin (NEURONTIN) 400 MG capsule Take one tablet Po BID. 60 capsule 1    mupirocin (BACTROBAN NASAL) 2 % nasal ointment Take by Nasal route 2 times daily.  1 Tube 1    cetirizine (ZYRTEC) 10 MG tablet TAKE ONE TABLET BY MOUTH DAILY AS NEEDED 28 tablet 1    QUEtiapine (SEROQUEL) 25 MG tablet TAKE ONE TO TWO TABLETS BY MOUTH ONCE NIGHTLY 60 tablet 0    GLUCAGON EMERGENCY 1 MG injection INJECT 1MG INTO THE MUSCLE AS NEEDED 1 kit 3    Blood Glucose Monitoring Suppl (ACCU-CHEK CAPO PLUS) w/Device KIT 1 each by Does not apply route      glucose blood VI test strips (ACCU-CHEK CAPO PLUS) strip 1 each by In Vitro route 8 times daily As needed.  traMADol (ULTRAM) 50 MG tablet Take 1 tablet by mouth every 6 hours as needed for Pain (max 1-2 per day) . 50 tablet 0    LANTUS 100 UNIT/ML injection vial INJECT 30 UNITS UNDER THE SKIN ONCE NIGHTLY 1 vial 5    ONE TOUCH ULTRA TEST strip USE TO TEST BLOOD SUGAR 10 TIMES DAILY DX CODE E 10.8 300 strip 11    Insulin Syringe-Needle U-100 (B-D INS SYR ULTRAFINE 1CC/31G) 31G X 5/16\" 1 ML MISC INJECT USING INSULIN ONCE DAILY 30 each 10    Multiple Vitamin (DAILY KITTY) TABS TAKE ONE TABLET BY MOUTH DAILY 30 tablet 5    Cholecalciferol (VITAMIN D3) 5000 units CAPS Take 1 capsule by mouth Daily 30 capsule 3    magnesium oxide (MAG-OX) 400 MG tablet Take 1 tablet by mouth 2 times daily 60 tablet 0    Dextromethorphan-Guaifenesin (MUCINEX DM)  MG TB12 Cough and congestion. 60 tablet 1    omega-3 acid ethyl esters (LOVAZA) 1 G capsule TAKE TWO CAPSULES BY MOUTH TWICE DAILY 120 capsule 5    cephALEXin (KEFLEX) 500 MG capsule TAKE 1 CAPSULE BY MOUTH TWO TIMES A DAY  28 capsule 7    insulin glulisine (APIDRA) 100 UNIT/ML injection INJECT 8-12 UNITS UNDER THE SKIN THREE TIMES A DAY WITH MEALS 20 mL 4    MUCUS RELIEF DM  MG TABS TAKE ONE BY MOUTH DAILY AS NEEDED 30 tablet 1    Alcohol Swabs PADS Use as needed for insulin injection. 100 each 5    Probiotic Product (ACIDOPHILUS PROBIOTIC) CAPS capsule TAKE ONE CAPSULE BY MOUTH DAILY 28 capsule 4    polyvinyl alcohol (LIQUIFILM TEARS) 1.4 % ophthalmic solution 1 drop as needed      Blood Glucose Monitoring Suppl (ONE TOUCH ULTRA MINI) W/DEVICE KIT 1 kit by Does not apply route daily as needed. (Patient taking differently: 1 kit by Does not apply route daily DX CODE E 10.8) 1 kit 0    propranolol (INDERAL) 80 MG tablet Take 40 mg by mouth 2 times daily For migraines       Flaxseed, Linseed, (FLAX PO) Take 14 g by mouth daily. No facility-administered medications prior to visit. reviewed and are within normal limits. Will repeat them within next 9-12 months if there is no status change      Current Outpatient Prescriptions   Medication Sig Dispense Refill    B-D INS SYRINGE 0.5CC/31GX5/16 31G X 5/16\" 0.5 ML MISC USE ONE SYRINGE FOUR TIMES A DAY BEFORE MEALS AND NIGHTLY 120 each 10    fluticasone (FLONASE) 50 MCG/ACT nasal spray SPRAY TWO SPRAYS IN EACH NOSTRIL ONCE DAILY 1 Bottle 1    buprenorphine (BUTRANS) 5 MCG/HR PTWK Place 1 patch onto the skin every 7 days for 31 days. 4 patch 0    QUEtiapine (SEROQUEL) 25 MG tablet Take 1-2 tablets by mouth nightly 60 tablet 0    topiramate (TOPAMAX) 100 MG tablet Take 1 tablet by mouth 2 times daily 60 tablet 0    DULoxetine (CYMBALTA) 60 MG extended release capsule Take 1 capsule by mouth 2 times daily 60 capsule 0    SUMAtriptan (IMITREX) 50 MG tablet Take one tab po at the start of migraine PRN max 1 every 24 hours 9 tablet 0    tiZANidine (ZANAFLEX) 4 MG tablet Take 1/2 tablet by mouth in the morning, take 1 table by mouth in the evening 60 tablet 0    famotidine (PEPCID) 20 MG tablet TAKE ONE TABLET BY MOUTH DAILY 30 tablet 0    gabapentin (NEURONTIN) 400 MG capsule Take one tablet Po BID. 60 capsule 1    mupirocin (BACTROBAN NASAL) 2 % nasal ointment Take by Nasal route 2 times daily. 1 Tube 1    cetirizine (ZYRTEC) 10 MG tablet TAKE ONE TABLET BY MOUTH DAILY AS NEEDED 28 tablet 1    QUEtiapine (SEROQUEL) 25 MG tablet TAKE ONE TO TWO TABLETS BY MOUTH ONCE NIGHTLY 60 tablet 0    GLUCAGON EMERGENCY 1 MG injection INJECT 1MG INTO THE MUSCLE AS NEEDED 1 kit 3    Blood Glucose Monitoring Suppl (ACCU-CHEK CAPO PLUS) w/Device KIT 1 each by Does not apply route      glucose blood VI test strips (ACCU-CHEK CAPO PLUS) strip 1 each by In Vitro route 8 times daily As needed.  traMADol (ULTRAM) 50 MG tablet Take 1 tablet by mouth every 6 hours as needed for Pain (max 1-2 per day) .  50 tablet 0    LANTUS 100 UNIT/ML injection vial syndrome, Neuropathic pain, Polyneuropathy associated with underlying disease ([address] Myofascial pain, and Skin ulcer of left foot with fat layer exposed ([address] were pertinent to this visit. Risks and benefits of the medications and other alternative treatments  including no treatment were discussed with the patient. The common side effects of these medications were also explained to the patient. Informed verbal consent was obtained. Goals of current treatment regimen include improvement in pain, restoration of functioning- with focus on improvement in physical performance, general activity, work or disability,emotional distress, health care utilization and  decreased medication consumption. Will continue to monitor progress towards achieving/maintaining therapeutic goals with special emphasis on  1. Improvement in perceived interfernce  of pain with ADL's. Ability to do home exercises independently. Ability to do household chores indoor and/or outdoor work and social and leisure activities. Improve psychosocial and physical functioning. - he is showing progression towards this treatment goal with the current regimen. He was advised against drinking alcohol with the narcotic pain medicines, advised against driving or handling machinery while adjusting the dose of medicines or if having cognitive  issues related to the current medications. Risk of overdose and death, if medicines not taken as prescribed, were also discussed. If the patient develops new symptoms or if the symptoms worsen, the patient should call the office. While transcribing every attempt was made to maintain the accuracy of the note in terms of it's contents,there may have been some errors made inadvertently. Thank you for allowing me to participate in the care of this patient. Aaron German MD.    Cc: Tammy Vinson MD    I, Javier Oakes, am scribing for and in the presence of Dr. Aaron German.    02/01/18  2:44 PM  [facility] Annie Huerta, Dr. Aaron German, personally performed the services described in this documentation as scribed by   Javier Oakes MA in my presence and it is both accurate and complete

## 2018-02-08 ENCOUNTER — OFFICE VISIT (OUTPATIENT)
Dept: INFECTIOUS DISEASES | Age: 43
End: 2018-02-08

## 2018-02-08 VITALS
HEIGHT: 74 IN | TEMPERATURE: 98 F | BODY MASS INDEX: 35.55 KG/M2 | WEIGHT: 277 LBS | SYSTOLIC BLOOD PRESSURE: 112 MMHG | DIASTOLIC BLOOD PRESSURE: 66 MMHG

## 2018-02-08 DIAGNOSIS — E10.8 TYPE 1 DIABETES MELLITUS WITH COMPLICATION (HCC): ICD-10-CM

## 2018-02-08 DIAGNOSIS — A49.01 STAPH AUREUS INFECTION: ICD-10-CM

## 2018-02-08 DIAGNOSIS — M86.662 CHRONIC OSTEOMYELITIS OF LEFT TIBIA (HCC): Primary | ICD-10-CM

## 2018-02-08 PROCEDURE — 3046F HEMOGLOBIN A1C LEVEL >9.0%: CPT | Performed by: INTERNAL MEDICINE

## 2018-02-08 PROCEDURE — 99214 OFFICE O/P EST MOD 30 MIN: CPT | Performed by: INTERNAL MEDICINE

## 2018-02-08 PROCEDURE — 1036F TOBACCO NON-USER: CPT | Performed by: INTERNAL MEDICINE

## 2018-02-08 PROCEDURE — G8417 CALC BMI ABV UP PARAM F/U: HCPCS | Performed by: INTERNAL MEDICINE

## 2018-02-08 PROCEDURE — G8427 DOCREV CUR MEDS BY ELIG CLIN: HCPCS | Performed by: INTERNAL MEDICINE

## 2018-02-08 PROCEDURE — G8484 FLU IMMUNIZE NO ADMIN: HCPCS | Performed by: INTERNAL MEDICINE

## 2018-02-08 RX ORDER — CEPHALEXIN 500 MG/1
CAPSULE ORAL
Qty: 180 CAPSULE | Refills: 5 | Status: SHIPPED | OUTPATIENT
Start: 2018-02-08 | End: 2019-02-07 | Stop reason: SDUPTHER

## 2018-02-08 NOTE — PROGRESS NOTES
Infectious Diseases Follow-up Note    Reason for Consult:   L ankle / lower leg osteomyelitis  Requesting Physician:   Dane Martin  Primary Care Physician:  Jenny Bear MD  History Obtained From:   Patient, EPIC    CHIEF COMPLAINT:    Chief Complaint   Patient presents with    Follow-up     ostemyelitis       HISTORY OF PRESENT ILLNESS:      Pt sustained fall 1/28 and had ORIF. Pt developed wound dehiscence, drainage. On 8/28, hardware removed and external fixator placed (Denisse Hayden). Culture + MSSA. Pt had PICC placed and started on iv ceftriaxone at [facility] which he received until approximately 10/17 (7 weeks). He has been on po keflex 500 tid since this time. He reports occasional GI upset. Ortho visit 1/23, had x-ray, given clearance for full weight bearing. Last visit 2/17/14, pt reported L leg pain with ambulating. He attributes pain to muscle weakness. He has not had any new or worse redness nor drainage. BS control improved (sees Endocrinologist). Seen 4/16/14, pt presents with sister. He had L fibula fracture (proximal to ankle implant) and had operative ORIF 3/31/14 with removal of ankle implant and hardware placed (see report). Seen 7/21/14, pt presents with sister, in wheelchair (non-ambulatory), has bone stimulator (useds for 20 min once a day). He has chronic pain that varies, worse at night. Taking po keflex with occasional GI upset. Hosp at [facility] 8/17 - 25/14  with CNS infection - CSF , no definitely diagnosis (Enterovirus PCR neg, HSV PCR neg, cult neg). Seen 11/3, pt presents with sister, feeling well. He continues to be non-weight bearing, on keflex 500 tid. Last Ortho 10/7 - cont with bone stimulator. He had LE CT (tibial fx with \"moderate bridging bone\". + chronic pain. Seen 2/2/15, pt presented with sister. Had bone stimulator, ambulating with walking stick, taking keflex 500 tid. Seen 5/4/15, pt c/o worse LLE pain, feels \"like glass\".   L tibia Other; and Tape [adhesive tape]    Social History:    TOBACCO:   None    ETOH:    None    DRUGS:    None    MARITAL STATUS:    Single   OCCUPATION:         Unemployed        Patient currently lives mother and sister Denece Dancer)    Family History:   No immunodeficiency     REVIEW OF SYSTEMS:    No fever / chills / sweats. No weight loss. No visual change, eye pain, eye discharge. No oral lesion, sore throat, dysphagia. Denies cough / sputum. Denies chest pain, palpitations. Denies n / v / abd pain. No diarrhea. Denies dysuria or change in urinary function. Denies joint swelling or pain. No myalgia, arthralgia. Denies focal weakness, sensory change or other neurologic symptom  No lymph node swelling or tenderness. No symptoms endocrinopathy. No symptoms hematologic disease. PHYSICAL EXAM:    Vitals:  See intake vitals including weight    GENERAL: No apparent distress. HEENT: Membranes moist, no conjunctival changes, no oral lesion  NECK:  Supple, no masses  LYMPH: No adenopathy   LUNGS: Clear b/l, no rales, no dullness  CARDIAC: RRR, no murmur appreciated  ABD:  + BS, soft / NT, no masses  EXT:  LLE with healed scars, mild pedal edema. No erythema;   NEURO: No focal neurologic findings  PSYCH: Orientation, sensorium, mood normal  Wound:  R foot with dressing and surgical shoe    DATA:    7/2/14   ESR 36, CRP 6.8  10/9/14 ESR 42, CRP 10.4  1/22/15 ESR 61, CRP 9.5  5/2/16  ESR 33, CRP 16.8  10/21/16 ESR 67, CRP 37.3    10/7/2014 CT scan: fracture line in the tibial shaft visible, moderate bridging bone [per Dr Rudd Gram note]. IMPRESSION    # DM, neuropathy, CKD, LLE trauma - 1/2013 bimalleolar fracture. # L LE hardware-assoc infection, s/p removal hardware 8/28, cult + MSSA. Pt had iv ceftriaxone to mid October. He has been on Cephalexin 500 po tid since that time. Course complicated by keanu-prosthetic fracture 3/2014.   Pt has chronic tibia osteomyelitis and should remain on antibiotic, especial given limb threaten problem with non-union. Pt had fibula fracture requiring ORIF 3/31. He now has ext fixator off, has bone stimulator. # R DM foot infection, managed by [facility] surg / [address] [place] [address],[address] / ID -     RECOMMENDATIONS:      1. Continue keflex 500 mg bid chronically  2. Pain (Doc), podiatry Sendyuma Potter), PCP Ray Negrete), DM (Dr Jaclyn Hearn) - reviewed upcoming appointments (will see Hannah Sloan as needed)  3.  Return in 12 months    Spent >40 min with pt, reviewing records, ordering test / med and formulating plan  Bonifacio Mckeon MD

## 2018-02-13 ENCOUNTER — OFFICE VISIT (OUTPATIENT)
Dept: ENDOCRINOLOGY | Age: 43
End: 2018-02-13

## 2018-02-13 VITALS
DIASTOLIC BLOOD PRESSURE: 63 MMHG | RESPIRATION RATE: 12 BRPM | HEART RATE: 67 BPM | SYSTOLIC BLOOD PRESSURE: 100 MMHG | WEIGHT: 282 LBS | BODY MASS INDEX: 36.19 KG/M2 | OXYGEN SATURATION: 99 % | HEIGHT: 74 IN

## 2018-02-13 DIAGNOSIS — I10 ESSENTIAL HYPERTENSION: ICD-10-CM

## 2018-02-13 DIAGNOSIS — E10.9 TYPE 1 DIABETES MELLITUS WITHOUT COMPLICATION (HCC): Primary | ICD-10-CM

## 2018-02-13 LAB — HBA1C MFR BLD: 8.6 %

## 2018-02-13 PROCEDURE — G8484 FLU IMMUNIZE NO ADMIN: HCPCS | Performed by: INTERNAL MEDICINE

## 2018-02-13 PROCEDURE — G8427 DOCREV CUR MEDS BY ELIG CLIN: HCPCS | Performed by: INTERNAL MEDICINE

## 2018-02-13 PROCEDURE — 1036F TOBACCO NON-USER: CPT | Performed by: INTERNAL MEDICINE

## 2018-02-13 PROCEDURE — 99214 OFFICE O/P EST MOD 30 MIN: CPT | Performed by: INTERNAL MEDICINE

## 2018-02-13 PROCEDURE — G8417 CALC BMI ABV UP PARAM F/U: HCPCS | Performed by: INTERNAL MEDICINE

## 2018-02-13 PROCEDURE — 3045F PR MOST RECENT HEMOGLOBIN A1C LEVEL 7.0-9.0%: CPT | Performed by: INTERNAL MEDICINE

## 2018-02-13 PROCEDURE — 83036 HEMOGLOBIN GLYCOSYLATED A1C: CPT | Performed by: INTERNAL MEDICINE

## 2018-02-13 RX ORDER — BLOOD SUGAR DIAGNOSTIC
STRIP MISCELLANEOUS
Qty: 120 EACH | Refills: 10 | Status: SHIPPED | OUTPATIENT
Start: 2018-02-13 | End: 2019-02-06 | Stop reason: SDUPTHER

## 2018-02-13 RX ORDER — INSULIN GLARGINE 100 [IU]/ML
INJECTION, SOLUTION SUBCUTANEOUS
Qty: 1 VIAL | Refills: 5 | Status: SHIPPED | OUTPATIENT
Start: 2018-02-13 | End: 2018-09-06 | Stop reason: DRUGHIGH

## 2018-02-13 NOTE — PROGRESS NOTES
Take 14 g by mouth daily. Vitals:    02/13/18 1440   BP: 100/63   Site: Right Arm   Position: Sitting   Cuff Size: Large Adult   Pulse: 67   Resp: 12   SpO2: 99%   Weight: 282 lb (127.9 kg)   Height: 6' 2\" (1.88 m)     Body mass index is 36.21 kg/m². Wt Readings from Last 3 Encounters:   02/13/18 282 lb (127.9 kg)   02/08/18 277 lb (125.6 kg)   02/01/18 277 lb (125.6 kg)     BP Readings from Last 3 Encounters:   02/13/18 100/63   02/08/18 112/66   02/01/18 116/75        Past Medical History:   Diagnosis Date    Asthma     Blood pressure instability     Chronic pain syndrome     Detached retina, bilateral     laser tx right eye    Diabetes mellitus ([address]     uncontrolled     Insomnia     Meningitis August 2014    admission Samia Christensen    Neuropathic pain     Osteomyelitis ([address]     Osteomyelitis of tibia ([address]     left    Pain of left lower extremity     Peripheral neuropathy [facility])      Past Surgical History:   Procedure Laterality Date    ANKLE FRACTURE SURGERY Left 1/28/13    Dr. Adrian Lara  August 2013    left tibia with external fixation device    EYE SURGERY      retina reattachment left eye x 3 holes repairs    EYE SURGERY Right 9-27-13    retal detachment    LEG SURGERY Left 3/31/14    ORIF left fibula and tibia    TIBIA FRACTURE SURGERY      with external device inplace     Family History   Problem Relation Age of Onset    Asthma      Diabetes      High Blood Pressure       History   Smoking Status    Never Smoker   Smokeless Tobacco    Never Used      History   Alcohol Use No       HPI      Kendall Cole is a 38 y.o. male here for a follow-up for management of DM    He was diagnosed with Diabetes Mellitus at the age of 12 yrs. Never had DKA. Was never on oral meds. Always on insulin therapy.     Microvascular complications: has  Retinopathy and also had retinal detachement (Follows with eye doctor at [address]),   Has microlabuminruia . No Peripheral neuropathy. CGMS was done in 05/13 and insulin was adjusted. Home regimen:  Currently on lantus insulin 30 units at night. Apidra 10 units TID. Also on SSI. Cf 1:50 target 150. Takes coverage 3-4 times a day. Previous medications: Was on humulin insulin in the past.    -250    Hypoglycemia . No       Diet: Eats 3 meals/day at regular times and 3 snacks. Had nutrition education. Exercise: No  planned physical activity  Limitations to physical activity Fracture of the left leg. Hyperlipidemia: Currently is on Flexseed oil. Hypertension: Currently on Lisinopril 20 mg daily. Patient says he feels dizzy and light headed when he stands up for the last 2-3 months. This is most likely due to autonomic neuropathy. No weight loss. Has gained weight. He had a fall and fractured his left distal tibia and fibula in 2013. Had ORIF and has developed osteomyelitis. Review of Systems   Constitutional: Positive for malaise/fatigue. Negative for weight loss. HENT: Negative for sore throat. Eyes: Negative for blurred vision. Respiratory: Negative for cough and shortness of breath. Cardiovascular: Negative for chest pain and palpitations. Gastrointestinal: Negative for heartburn, nausea, vomiting and abdominal pain. Genitourinary: Negative for urgency and frequency. Musculoskeletal: Positive for joint pain. Negative for myalgias and back pain. Skin: Negative for rash. Neurological: Positive for tingling and sensory change. Negative for headaches. Endo/Heme/Allergies: Negative for polydipsia. Psychiatric/Behavioral: Negative for depression. The patient is not nervous/anxious. Physical Exam   Constitutional: He is oriented to person, place, and time. He appears well-developed and well-nourished. HENT:   Head: Normocephalic. Mouth/Throat: Oropharynx is clear and moist.   Eyes: EOM are normal. Right eye exhibits no discharge. Neck: No thyromegaly present. Cardiovascular: Normal rate and normal heart sounds. Pulmonary/Chest: Effort normal and breath sounds normal.   Abdominal: Soft. He exhibits no distension. There is no tenderness. Musculoskeletal: He exhibits no tenderness. Neurological: He is alert and oriented to person, place, and time. Skin: Skin is warm. No rash noted. He is not diaphoretic. Psychiatric: His behavior is normal.     Unable to do foot exam as feet covered in dressing. Seeing podiatrist every week. Orders Only on 01/11/2018   Component Date Value Ref Range Status    LDL Direct 01/11/2018 159* <100 mg/dL Final   Office Visit on 01/11/2018   Component Date Value Ref Range Status    WBC 01/11/2018 7.6  4.0 - 11.0 K/uL Final    RBC 01/11/2018 4.32  4.20 - 5.90 M/uL Final    Hemoglobin 01/11/2018 12.2* 13.5 - 17.5 g/dL Final    Hematocrit 01/11/2018 37.0* 40.5 - 52.5 % Final    MCV 01/11/2018 85.6  80.0 - 100.0 fL Final    MCH 01/11/2018 28.3  26.0 - 34.0 pg Final    MCHC 01/11/2018 33.0  31.0 - 36.0 g/dL Final    RDW 01/11/2018 14.8  12.4 - 15.4 % Final    Platelets 81/83/6317 371  135 - 450 K/uL Final    MPV 01/11/2018 7.4  5.0 - 10.5 fL Final    Sodium 01/11/2018 135* 136 - 145 mmol/L Final    Potassium 01/11/2018 3.8  3.5 - 5.1 mmol/L Final    Chloride 01/11/2018 101  99 - 110 mmol/L Final    CO2 01/11/2018 20* 21 - 32 mmol/L Final    Anion Gap 01/11/2018 14  3 - 16 Final    Glucose 01/11/2018 178* 70 - 99 mg/dL Final    BUN 01/11/2018 14  7 - 20 mg/dL Final    CREATININE 01/11/2018 1.8* 0.9 - 1.3 mg/dL Final    GFR Non- 01/11/2018 42* >60 Final    Comment: >60 mL/min/1.73m2 EGFR, calc. for ages 25 and older using the  MDRD formula (not corrected for weight), is valid for stable  renal function.  GFR  01/11/2018 51* >60 Final    Comment: Chronic Kidney Disease: less than 60 ml/min/1.73 sq.m.           Kidney Failure: less than 15 ml/min/1.73 sq.m.  Results valid for patients 18 years and older.  Calcium 01/11/2018 9.0  8.3 - 10.6 mg/dL Final    Total Protein 01/11/2018 7.9  6.4 - 8.2 g/dL Final    Alb 01/11/2018 3.8  3.4 - 5.0 g/dL Final    Albumin/Globulin Ratio 01/11/2018 0.9* 1.1 - 2.2 Final    Total Bilirubin 01/11/2018 <0.2  0.0 - 1.0 mg/dL Final    Alkaline Phosphatase 01/11/2018 163* 40 - 129 U/L Final    ALT 01/11/2018 11  10 - 40 U/L Final    AST 01/11/2018 16  15 - 37 U/L Final    Globulin 01/11/2018 4.1  g/dL Final    TSH 01/11/2018 4.03  0.27 - 4.20 uIU/mL Final    Vit D, 25-Hydroxy 01/11/2018 43.6  >=30 ng/mL Final    Comment: <=20 ng/mL. ........... Budd Chemult Deficient  21-29 ng/mL. ......... Budd Chemult Insufficient  >=30 ng/mL. ........ Budd Zain Sufficient      Microalbumin, Random Urine 01/11/2018 12.20* <2.0 mg/dL Final    Creatinine, Ur 01/11/2018 97.7  39.0 - 259.0 mg/dL Final    Microalbumin Creatinine Ratio 01/11/2018 124.9* 0.0 - 30.0 mg/g Final    Cholesterol, Total 01/11/2018 240* 0 - 199 mg/dL Final    Triglycerides 01/11/2018 343* 0 - 150 mg/dL Final    HDL 01/11/2018 27* 40 - 60 mg/dL Final    LDL Calculated 01/11/2018 see below  <100 mg/dL Final    Comment: When the triglyceride is <30 mg/dL or >300 mg/dL the  calculated LDL and  VLDL are not valid and a measured  LDL is performed.  VLDL Cholesterol Calculated 01/11/2018 see below  Not Established mg/dL Final    Comment: When the triglyceride is <30 mg/dL or >300 mg/dL the  calculated LDL and  VLDL are not valid and a measured  LDL is performed. Assessment/Plan    1. DM    This 38 yrs old male has DM. Complicated by retinopathy, microalbuminuria. Most likely it is Type 1 DM. HARIS antibodies and islet cell antibodies negative.  c-peptide  Undetectable. A1c 9.6 %----> 8.4 --->8.9 %--->8.2 %--->7.1 %--->7.1 %---> 6.8 % --> 6.8 %--> 6.5 %---> 7.1 %---> 7.1 %---> 7.5 %---> 7.7 % ---> 7.4 %---> 8.6 %     Reviewed meter download. ( scanned)  Sugars has been

## 2018-02-20 ENCOUNTER — TELEPHONE (OUTPATIENT)
Dept: PAIN MANAGEMENT | Age: 43
End: 2018-02-20

## 2018-02-26 RX ORDER — CETIRIZINE HYDROCHLORIDE 10 MG/1
TABLET ORAL
Qty: 28 TABLET | Refills: 0 | Status: SHIPPED | OUTPATIENT
Start: 2018-02-26 | End: 2018-03-25 | Stop reason: SDUPTHER

## 2018-03-01 ENCOUNTER — OFFICE VISIT (OUTPATIENT)
Dept: PAIN MANAGEMENT | Age: 43
End: 2018-03-01

## 2018-03-01 VITALS
HEIGHT: 74 IN | OXYGEN SATURATION: 98 % | WEIGHT: 282 LBS | HEART RATE: 79 BPM | SYSTOLIC BLOOD PRESSURE: 147 MMHG | BODY MASS INDEX: 36.19 KG/M2 | DIASTOLIC BLOOD PRESSURE: 73 MMHG | RESPIRATION RATE: 16 BRPM

## 2018-03-01 DIAGNOSIS — F33.41 RECURRENT MAJOR DEPRESSIVE DISORDER, IN PARTIAL REMISSION (HCC): ICD-10-CM

## 2018-03-01 DIAGNOSIS — F51.01 PRIMARY INSOMNIA: ICD-10-CM

## 2018-03-01 DIAGNOSIS — G63 POLYNEUROPATHY ASSOCIATED WITH UNDERLYING DISEASE (HCC): ICD-10-CM

## 2018-03-01 DIAGNOSIS — G43.711 CHRONIC MIGRAINE WITHOUT AURA, WITH INTRACTABLE MIGRAINE, SO STATED, WITH STATUS MIGRAINOSUS: ICD-10-CM

## 2018-03-01 DIAGNOSIS — M79.2 NEUROPATHIC PAIN: ICD-10-CM

## 2018-03-01 DIAGNOSIS — G89.4 CHRONIC PAIN SYNDROME: ICD-10-CM

## 2018-03-01 DIAGNOSIS — M79.18 MYOFASCIAL PAIN: ICD-10-CM

## 2018-03-01 DIAGNOSIS — L97.522 SKIN ULCER OF LEFT FOOT WITH FAT LAYER EXPOSED (HCC): ICD-10-CM

## 2018-03-01 DIAGNOSIS — L97.522 ULCER OF LEFT FOOT, WITH FAT LAYER EXPOSED (HCC): ICD-10-CM

## 2018-03-01 PROCEDURE — G8417 CALC BMI ABV UP PARAM F/U: HCPCS | Performed by: INTERNAL MEDICINE

## 2018-03-01 PROCEDURE — 99213 OFFICE O/P EST LOW 20 MIN: CPT | Performed by: INTERNAL MEDICINE

## 2018-03-01 PROCEDURE — G8484 FLU IMMUNIZE NO ADMIN: HCPCS | Performed by: INTERNAL MEDICINE

## 2018-03-01 PROCEDURE — G8427 DOCREV CUR MEDS BY ELIG CLIN: HCPCS | Performed by: INTERNAL MEDICINE

## 2018-03-01 PROCEDURE — 1036F TOBACCO NON-USER: CPT | Performed by: INTERNAL MEDICINE

## 2018-03-01 RX ORDER — GABAPENTIN 400 MG/1
CAPSULE ORAL
Qty: 60 CAPSULE | Refills: 0 | Status: SHIPPED | OUTPATIENT
Start: 2018-03-01 | End: 2018-03-30 | Stop reason: SDUPTHER

## 2018-03-01 RX ORDER — QUETIAPINE FUMARATE 25 MG/1
25-50 TABLET, FILM COATED ORAL NIGHTLY
Qty: 60 TABLET | Refills: 0 | Status: SHIPPED | OUTPATIENT
Start: 2018-03-01 | End: 2018-03-30 | Stop reason: SDUPTHER

## 2018-03-01 RX ORDER — TOPIRAMATE 100 MG/1
100 TABLET, FILM COATED ORAL 2 TIMES DAILY
Qty: 60 TABLET | Refills: 0 | Status: SHIPPED | OUTPATIENT
Start: 2018-03-01 | End: 2018-03-30 | Stop reason: SDUPTHER

## 2018-03-01 RX ORDER — SUMATRIPTAN 50 MG/1
TABLET, FILM COATED ORAL
Qty: 9 TABLET | Refills: 0 | Status: SHIPPED | OUTPATIENT
Start: 2018-03-01 | End: 2018-03-30 | Stop reason: SDUPTHER

## 2018-03-01 RX ORDER — TIZANIDINE 4 MG/1
TABLET ORAL
Qty: 60 TABLET | Refills: 0 | Status: SHIPPED | OUTPATIENT
Start: 2018-03-01 | End: 2018-03-30 | Stop reason: SDUPTHER

## 2018-03-01 RX ORDER — BUPRENORPHINE 5 UG/H
1 PATCH TRANSDERMAL
Qty: 4 PATCH | Refills: 0 | Status: SHIPPED | OUTPATIENT
Start: 2018-03-01 | End: 2018-03-29

## 2018-03-01 RX ORDER — DULOXETIN HYDROCHLORIDE 60 MG/1
60 CAPSULE, DELAYED RELEASE ORAL 2 TIMES DAILY
Qty: 60 CAPSULE | Refills: 0 | Status: SHIPPED | OUTPATIENT
Start: 2018-03-01 | End: 2018-03-30 | Stop reason: SDUPTHER

## 2018-03-01 RX ORDER — TRAMADOL HYDROCHLORIDE 50 MG/1
50 TABLET ORAL EVERY 6 HOURS PRN
Qty: 50 TABLET | Refills: 0 | Status: SHIPPED | OUTPATIENT
Start: 2018-03-01 | End: 2018-03-30 | Stop reason: SDUPTHER

## 2018-03-01 RX ORDER — FAMOTIDINE 20 MG/1
TABLET, FILM COATED ORAL
Qty: 30 TABLET | Refills: 0 | Status: SHIPPED | OUTPATIENT
Start: 2018-03-01 | End: 2018-03-30 | Stop reason: SDUPTHER

## 2018-03-01 NOTE — PROGRESS NOTES
breath or change in baseline breathing. Gastrointestinal: Negative for nausea, vomiting, abdominal pain, diarrhea, constipation, blood in stool and abdominal distention. PHYSICAL EXAM:   Nursing note and vitals reviewed. BP (!) 147/73 (Site: Right Arm, Position: Sitting, Cuff Size: Large Adult)   Pulse 79   Resp 16   Ht 6' 2\" (1.88 m)   Wt 282 lb (127.9 kg)   SpO2 98%   BMI 36.21 kg/m²   Constitutional: He appears well-developed and well-nourished. No acute distress. Skin: Skin is warm and dry, good turgor. No rash noted. He is not diaphoretic.+impetigo lesions UE  Cardiovascular: Normal rate, regular rhythm, normal heart sounds, and does not have murmur. Pulmonary/Chest: Effort normal. No respiratory distress. He does not have wheezes in the lung fields. He has no rales. Neurological/Psychiatric:He is alert and oriented to person, place, and time. Coordination is  normal. His mood isAppropriate and affect is Flat/blunted and Anxious . IMPRESSION:   1. Chronic pain syndrome    2. Polyneuropathy associated with underlying disease ([address]    3. Neuropathic pain    4. Skin ulcer of left foot with fat layer exposed ([address]    5. Myofascial pain        PLAN:  Informed verbal consent was obtained  -continue with current regimen  -he was advised proper sleep hygiene-told to avoid:use of caffeine or other stimulants after noon, alcohol use near bedtime, long or frequent naps during the day, erratic sleep schedule, heavy meals near bedtime, vigorous exercise near bedtime and use of electronic devices near bedtime  -advised to improve personal health  -continue with Butrans along with Ultram PRN  -he was advised  to avoid using too many OTC analgesics to control the headaches, avoid chocolates, increased caffeine, cheeses and MSG nitrite containing foods, cigarette smoking. To avoid bright lights, strong smells and skipping meals.     Current Outpatient Prescriptions   Medication Sig Dispense Refill

## 2018-03-02 ENCOUNTER — TELEPHONE (OUTPATIENT)
Dept: PAIN MANAGEMENT | Age: 43
End: 2018-03-02

## 2018-03-02 ENCOUNTER — TELEPHONE (OUTPATIENT)
Dept: ENDOCRINOLOGY | Age: 43
End: 2018-03-02

## 2018-03-02 RX ORDER — BLOOD-GLUCOSE METER
1 EACH MISCELLANEOUS DAILY
Qty: 1 KIT | Refills: 10 | Status: SHIPPED | OUTPATIENT
Start: 2018-03-02

## 2018-03-02 RX ORDER — BLOOD-GLUCOSE METER
EACH MISCELLANEOUS
COMMUNITY
End: 2018-03-02 | Stop reason: SDUPTHER

## 2018-03-02 RX ORDER — LANCETS
1 EACH MISCELLANEOUS DAILY
Qty: 300 EACH | Refills: 10 | Status: SHIPPED | OUTPATIENT
Start: 2018-03-02 | End: 2018-10-15 | Stop reason: SDUPTHER

## 2018-03-02 RX ORDER — LANCETS
1 EACH MISCELLANEOUS
COMMUNITY
End: 2018-03-02 | Stop reason: SDUPTHER

## 2018-03-02 NOTE — TELEPHONE ENCOUNTER
[facility] call and would like an order for accu check meter, lancets and test strips.  Please advise [phone]

## 2018-03-10 DIAGNOSIS — G89.4 CHRONIC PAIN SYNDROME: ICD-10-CM

## 2018-03-22 DIAGNOSIS — M79.2 NEUROPATHIC PAIN: ICD-10-CM

## 2018-03-22 DIAGNOSIS — M79.18 MYOFASCIAL PAIN: ICD-10-CM

## 2018-03-22 DIAGNOSIS — G89.4 CHRONIC PAIN SYNDROME: ICD-10-CM

## 2018-03-22 DIAGNOSIS — G63 POLYNEUROPATHY ASSOCIATED WITH UNDERLYING DISEASE (HCC): ICD-10-CM

## 2018-03-22 DIAGNOSIS — L97.522 ULCER OF LEFT FOOT, WITH FAT LAYER EXPOSED (HCC): ICD-10-CM

## 2018-03-22 RX ORDER — TRAMADOL HYDROCHLORIDE 50 MG/1
TABLET ORAL
Qty: 50 TABLET | Refills: 0 | OUTPATIENT
Start: 2018-03-22

## 2018-03-29 DIAGNOSIS — R09.81 NASAL SINUS CONGESTION: ICD-10-CM

## 2018-03-29 RX ORDER — FLUTICASONE PROPIONATE 50 MCG
SPRAY, SUSPENSION (ML) NASAL
Qty: 1 BOTTLE | Refills: 2 | Status: SHIPPED | OUTPATIENT
Start: 2018-03-29 | End: 2018-07-02 | Stop reason: SDUPTHER

## 2018-03-30 ENCOUNTER — OFFICE VISIT (OUTPATIENT)
Dept: PAIN MANAGEMENT | Age: 43
End: 2018-03-30

## 2018-03-30 VITALS
BODY MASS INDEX: 35.63 KG/M2 | DIASTOLIC BLOOD PRESSURE: 80 MMHG | WEIGHT: 277.5 LBS | SYSTOLIC BLOOD PRESSURE: 135 MMHG | HEART RATE: 76 BPM

## 2018-03-30 DIAGNOSIS — G43.711 CHRONIC MIGRAINE WITHOUT AURA, WITH INTRACTABLE MIGRAINE, SO STATED, WITH STATUS MIGRAINOSUS: ICD-10-CM

## 2018-03-30 DIAGNOSIS — F33.41 RECURRENT MAJOR DEPRESSIVE DISORDER, IN PARTIAL REMISSION (HCC): ICD-10-CM

## 2018-03-30 DIAGNOSIS — M79.2 NEUROPATHIC PAIN: ICD-10-CM

## 2018-03-30 DIAGNOSIS — M79.18 MYOFASCIAL PAIN: ICD-10-CM

## 2018-03-30 DIAGNOSIS — G89.4 CHRONIC PAIN SYNDROME: ICD-10-CM

## 2018-03-30 DIAGNOSIS — L97.522 SKIN ULCER OF LEFT FOOT WITH FAT LAYER EXPOSED (HCC): ICD-10-CM

## 2018-03-30 DIAGNOSIS — F51.01 PRIMARY INSOMNIA: ICD-10-CM

## 2018-03-30 DIAGNOSIS — L97.522 ULCER OF LEFT FOOT, WITH FAT LAYER EXPOSED (HCC): ICD-10-CM

## 2018-03-30 DIAGNOSIS — G63 POLYNEUROPATHY ASSOCIATED WITH UNDERLYING DISEASE (HCC): ICD-10-CM

## 2018-03-30 PROCEDURE — G8482 FLU IMMUNIZE ORDER/ADMIN: HCPCS | Performed by: INTERNAL MEDICINE

## 2018-03-30 PROCEDURE — 1036F TOBACCO NON-USER: CPT | Performed by: INTERNAL MEDICINE

## 2018-03-30 PROCEDURE — G8417 CALC BMI ABV UP PARAM F/U: HCPCS | Performed by: INTERNAL MEDICINE

## 2018-03-30 PROCEDURE — 99213 OFFICE O/P EST LOW 20 MIN: CPT | Performed by: INTERNAL MEDICINE

## 2018-03-30 PROCEDURE — G8427 DOCREV CUR MEDS BY ELIG CLIN: HCPCS | Performed by: INTERNAL MEDICINE

## 2018-03-30 RX ORDER — SUMATRIPTAN 50 MG/1
TABLET, FILM COATED ORAL
Qty: 9 TABLET | Refills: 0 | Status: SHIPPED | OUTPATIENT
Start: 2018-03-30 | End: 2018-04-21 | Stop reason: SDUPTHER

## 2018-03-30 RX ORDER — GABAPENTIN 400 MG/1
CAPSULE ORAL
Qty: 60 CAPSULE | Refills: 0 | Status: SHIPPED | OUTPATIENT
Start: 2018-03-30 | End: 2018-04-21 | Stop reason: SDUPTHER

## 2018-03-30 RX ORDER — TOPIRAMATE 100 MG/1
100 TABLET, FILM COATED ORAL 2 TIMES DAILY
Qty: 60 TABLET | Refills: 0 | Status: SHIPPED | OUTPATIENT
Start: 2018-03-30 | End: 2018-04-21 | Stop reason: SDUPTHER

## 2018-03-30 RX ORDER — DULOXETIN HYDROCHLORIDE 60 MG/1
60 CAPSULE, DELAYED RELEASE ORAL 2 TIMES DAILY
Qty: 60 CAPSULE | Refills: 0 | Status: SHIPPED | OUTPATIENT
Start: 2018-03-30 | End: 2018-04-21 | Stop reason: SDUPTHER

## 2018-03-30 RX ORDER — BUPRENORPHINE 5 UG/H
1 PATCH TRANSDERMAL
Qty: 4 PATCH | Refills: 0 | Status: SHIPPED | OUTPATIENT
Start: 2018-03-30 | End: 2018-04-21 | Stop reason: SDUPTHER

## 2018-03-30 RX ORDER — QUETIAPINE FUMARATE 25 MG/1
25-50 TABLET, FILM COATED ORAL NIGHTLY
Qty: 60 TABLET | Refills: 0 | Status: SHIPPED | OUTPATIENT
Start: 2018-03-30 | End: 2018-04-21 | Stop reason: SDUPTHER

## 2018-03-30 RX ORDER — TRAMADOL HYDROCHLORIDE 50 MG/1
50 TABLET ORAL EVERY 6 HOURS PRN
Qty: 56 TABLET | Refills: 0 | Status: SHIPPED | OUTPATIENT
Start: 2018-03-30 | End: 2018-04-21 | Stop reason: SDUPTHER

## 2018-03-30 RX ORDER — FAMOTIDINE 20 MG/1
TABLET, FILM COATED ORAL
Qty: 30 TABLET | Refills: 0 | Status: SHIPPED | OUTPATIENT
Start: 2018-03-30 | End: 2018-04-21 | Stop reason: SDUPTHER

## 2018-03-30 RX ORDER — TIZANIDINE 4 MG/1
TABLET ORAL
Qty: 60 TABLET | Refills: 0 | Status: SHIPPED | OUTPATIENT
Start: 2018-03-30 | End: 2018-04-21 | Stop reason: SDUPTHER

## 2018-03-30 NOTE — PROGRESS NOTES
0.5CC/31GX5/16) 31G X 5/16\" 0.5 ML MISC USE ONE SYRINGE FOUR TIMES A DAY BEFORE MEALS AND NIGHTLY 120 each 10    insulin glulisine (APIDRA) 100 UNIT/ML injection INJECT 8-12 UNITS UNDER THE SKIN THREE TIMES A DAY WITH MEALS 20 mL 4    cephALEXin (KEFLEX) 500 MG capsule TAKE 1 CAPSULE BY MOUTH TWO TIMES A  capsule 5    QUEtiapine (SEROQUEL) 25 MG tablet TAKE ONE TO TWO TABLETS BY MOUTH ONCE NIGHTLY 60 tablet 0    GLUCAGON EMERGENCY 1 MG injection INJECT 1MG INTO THE MUSCLE AS NEEDED 1 kit 3    ONE TOUCH ULTRA TEST strip USE TO TEST BLOOD SUGAR 10 TIMES DAILY DX CODE E 10.8 300 strip 11    Insulin Syringe-Needle U-100 (B-D INS SYR ULTRAFINE 1CC/31G) 31G X 5/16\" 1 ML MISC INJECT USING INSULIN ONCE DAILY 30 each 10    Multiple Vitamin (DAILY KITTY) TABS TAKE ONE TABLET BY MOUTH DAILY 30 tablet 5    Cholecalciferol (VITAMIN D3) 5000 units CAPS Take 1 capsule by mouth Daily 30 capsule 3    magnesium oxide (MAG-OX) 400 MG tablet Take 1 tablet by mouth 2 times daily 60 tablet 0    Dextromethorphan-Guaifenesin (MUCINEX DM)  MG TB12 Cough and congestion. 60 tablet 1    omega-3 acid ethyl esters (LOVAZA) 1 G capsule TAKE TWO CAPSULES BY MOUTH TWICE DAILY 120 capsule 5    MUCUS RELIEF DM  MG TABS TAKE ONE BY MOUTH DAILY AS NEEDED 30 tablet 1    Alcohol Swabs PADS Use as needed for insulin injection. 100 each 5    Probiotic Product (ACIDOPHILUS PROBIOTIC) CAPS capsule TAKE ONE CAPSULE BY MOUTH DAILY 28 capsule 4    polyvinyl alcohol (LIQUIFILM TEARS) 1.4 % ophthalmic solution 1 drop as needed      Blood Glucose Monitoring Suppl (ONE TOUCH ULTRA MINI) W/DEVICE KIT 1 kit by Does not apply route daily as needed. (Patient taking differently: 1 kit by Does not apply route daily DX CODE E 10.8) 1 kit 0    propranolol (INDERAL) 80 MG tablet Take 40 mg by mouth 2 times daily For migraines       Flaxseed, Linseed, (FLAX PO) Take 14 g by mouth daily.         traMADol (ULTRAM) 50 MG tablet Take 1 tablet

## 2018-04-12 ENCOUNTER — OFFICE VISIT (OUTPATIENT)
Dept: INTERNAL MEDICINE CLINIC | Age: 43
End: 2018-04-12

## 2018-04-12 ENCOUNTER — TELEPHONE (OUTPATIENT)
Dept: INTERNAL MEDICINE CLINIC | Age: 43
End: 2018-04-12

## 2018-04-12 VITALS
SYSTOLIC BLOOD PRESSURE: 132 MMHG | HEIGHT: 72 IN | OXYGEN SATURATION: 99 % | DIASTOLIC BLOOD PRESSURE: 86 MMHG | BODY MASS INDEX: 35.76 KG/M2 | WEIGHT: 264 LBS | HEART RATE: 68 BPM

## 2018-04-12 DIAGNOSIS — R21 RASH: ICD-10-CM

## 2018-04-12 DIAGNOSIS — I10 ESSENTIAL HYPERTENSION: Primary | ICD-10-CM

## 2018-04-12 DIAGNOSIS — E10.8 TYPE 1 DIABETES MELLITUS WITH COMPLICATION (HCC): ICD-10-CM

## 2018-04-12 DIAGNOSIS — E78.00 PURE HYPERCHOLESTEROLEMIA: ICD-10-CM

## 2018-04-12 LAB — GLUCOSE BLD-MCNC: 104 MG/DL

## 2018-04-12 PROCEDURE — G8417 CALC BMI ABV UP PARAM F/U: HCPCS | Performed by: INTERNAL MEDICINE

## 2018-04-12 PROCEDURE — 99214 OFFICE O/P EST MOD 30 MIN: CPT | Performed by: INTERNAL MEDICINE

## 2018-04-12 PROCEDURE — 82962 GLUCOSE BLOOD TEST: CPT | Performed by: INTERNAL MEDICINE

## 2018-04-12 PROCEDURE — 3045F PR MOST RECENT HEMOGLOBIN A1C LEVEL 7.0-9.0%: CPT | Performed by: INTERNAL MEDICINE

## 2018-04-12 PROCEDURE — G8427 DOCREV CUR MEDS BY ELIG CLIN: HCPCS | Performed by: INTERNAL MEDICINE

## 2018-04-12 PROCEDURE — 2022F DILAT RTA XM EVC RTNOPTHY: CPT | Performed by: INTERNAL MEDICINE

## 2018-04-12 PROCEDURE — 1036F TOBACCO NON-USER: CPT | Performed by: INTERNAL MEDICINE

## 2018-04-21 ENCOUNTER — OFFICE VISIT (OUTPATIENT)
Dept: PAIN MANAGEMENT | Age: 43
End: 2018-04-21

## 2018-04-21 VITALS — BODY MASS INDEX: 38.92 KG/M2 | WEIGHT: 287 LBS

## 2018-04-21 DIAGNOSIS — G89.4 CHRONIC PAIN SYNDROME: ICD-10-CM

## 2018-04-21 DIAGNOSIS — M79.2 NEUROPATHIC PAIN: ICD-10-CM

## 2018-04-21 DIAGNOSIS — F33.41 RECURRENT MAJOR DEPRESSIVE DISORDER, IN PARTIAL REMISSION (HCC): ICD-10-CM

## 2018-04-21 DIAGNOSIS — F51.01 PRIMARY INSOMNIA: ICD-10-CM

## 2018-04-21 DIAGNOSIS — L97.522 ULCER OF LEFT FOOT, WITH FAT LAYER EXPOSED (HCC): ICD-10-CM

## 2018-04-21 DIAGNOSIS — G43.711 CHRONIC MIGRAINE WITHOUT AURA, WITH INTRACTABLE MIGRAINE, SO STATED, WITH STATUS MIGRAINOSUS: ICD-10-CM

## 2018-04-21 DIAGNOSIS — G63 POLYNEUROPATHY ASSOCIATED WITH UNDERLYING DISEASE (HCC): ICD-10-CM

## 2018-04-21 DIAGNOSIS — M79.18 MYOFASCIAL PAIN: ICD-10-CM

## 2018-04-21 DIAGNOSIS — L97.522 SKIN ULCER OF LEFT FOOT WITH FAT LAYER EXPOSED (HCC): ICD-10-CM

## 2018-04-21 PROCEDURE — G8417 CALC BMI ABV UP PARAM F/U: HCPCS | Performed by: INTERNAL MEDICINE

## 2018-04-21 PROCEDURE — 1036F TOBACCO NON-USER: CPT | Performed by: INTERNAL MEDICINE

## 2018-04-21 PROCEDURE — 99213 OFFICE O/P EST LOW 20 MIN: CPT | Performed by: INTERNAL MEDICINE

## 2018-04-21 PROCEDURE — G8427 DOCREV CUR MEDS BY ELIG CLIN: HCPCS | Performed by: INTERNAL MEDICINE

## 2018-04-21 RX ORDER — FAMOTIDINE 20 MG/1
TABLET, FILM COATED ORAL
Qty: 30 TABLET | Refills: 1 | Status: SHIPPED | OUTPATIENT
Start: 2018-04-21 | End: 2018-05-25 | Stop reason: SDUPTHER

## 2018-04-21 RX ORDER — SUMATRIPTAN 50 MG/1
TABLET, FILM COATED ORAL
Qty: 9 TABLET | Refills: 0 | Status: SHIPPED | OUTPATIENT
Start: 2018-04-21 | End: 2018-05-25 | Stop reason: SDUPTHER

## 2018-04-21 RX ORDER — GABAPENTIN 400 MG/1
CAPSULE ORAL
Qty: 60 CAPSULE | Refills: 1 | Status: SHIPPED | OUTPATIENT
Start: 2018-04-21 | End: 2018-05-25 | Stop reason: SDUPTHER

## 2018-04-21 RX ORDER — QUETIAPINE FUMARATE 25 MG/1
25-50 TABLET, FILM COATED ORAL NIGHTLY
Qty: 60 TABLET | Refills: 1 | Status: SHIPPED | OUTPATIENT
Start: 2018-04-21 | End: 2018-05-25 | Stop reason: SDUPTHER

## 2018-04-21 RX ORDER — BUPRENORPHINE 5 UG/H
1 PATCH TRANSDERMAL
Qty: 4 PATCH | Refills: 0 | Status: SHIPPED | OUTPATIENT
Start: 2018-04-21 | End: 2018-05-25 | Stop reason: SDUPTHER

## 2018-04-21 RX ORDER — DULOXETIN HYDROCHLORIDE 60 MG/1
60 CAPSULE, DELAYED RELEASE ORAL 2 TIMES DAILY
Qty: 60 CAPSULE | Refills: 1 | Status: SHIPPED | OUTPATIENT
Start: 2018-04-21 | End: 2018-05-25 | Stop reason: SDUPTHER

## 2018-04-21 RX ORDER — TOPIRAMATE 100 MG/1
100 TABLET, FILM COATED ORAL 2 TIMES DAILY
Qty: 60 TABLET | Refills: 1 | Status: SHIPPED | OUTPATIENT
Start: 2018-04-21 | End: 2018-05-25 | Stop reason: SDUPTHER

## 2018-04-21 RX ORDER — TIZANIDINE 4 MG/1
TABLET ORAL
Qty: 60 TABLET | Refills: 1 | Status: SHIPPED | OUTPATIENT
Start: 2018-04-21 | End: 2018-05-25 | Stop reason: SDUPTHER

## 2018-04-21 RX ORDER — TRAMADOL HYDROCHLORIDE 50 MG/1
50 TABLET ORAL EVERY 6 HOURS PRN
Qty: 60 TABLET | Refills: 0 | Status: SHIPPED | OUTPATIENT
Start: 2018-04-21 | End: 2018-06-22 | Stop reason: SDUPTHER

## 2018-04-23 DIAGNOSIS — L97.522 ULCER OF LEFT FOOT, WITH FAT LAYER EXPOSED (HCC): ICD-10-CM

## 2018-04-23 DIAGNOSIS — M79.18 MYOFASCIAL PAIN: ICD-10-CM

## 2018-04-23 DIAGNOSIS — M79.2 NEUROPATHIC PAIN: ICD-10-CM

## 2018-04-23 DIAGNOSIS — G63 POLYNEUROPATHY ASSOCIATED WITH UNDERLYING DISEASE (HCC): ICD-10-CM

## 2018-04-23 DIAGNOSIS — G89.4 CHRONIC PAIN SYNDROME: ICD-10-CM

## 2018-04-25 RX ORDER — TRAMADOL HYDROCHLORIDE 50 MG/1
TABLET ORAL
Qty: 56 TABLET | Refills: 0 | OUTPATIENT
Start: 2018-04-25

## 2018-05-03 DIAGNOSIS — G89.4 CHRONIC PAIN SYNDROME: ICD-10-CM

## 2018-05-14 ASSESSMENT — ENCOUNTER SYMPTOMS
GASTROINTESTINAL NEGATIVE: 1
RESPIRATORY NEGATIVE: 1

## 2018-05-21 DIAGNOSIS — M79.2 NEUROPATHIC PAIN: ICD-10-CM

## 2018-05-21 DIAGNOSIS — G89.4 CHRONIC PAIN SYNDROME: ICD-10-CM

## 2018-05-21 DIAGNOSIS — L97.522 ULCER OF LEFT FOOT, WITH FAT LAYER EXPOSED (HCC): ICD-10-CM

## 2018-05-21 DIAGNOSIS — M79.18 MYOFASCIAL PAIN: ICD-10-CM

## 2018-05-21 DIAGNOSIS — G63 POLYNEUROPATHY ASSOCIATED WITH UNDERLYING DISEASE (HCC): ICD-10-CM

## 2018-05-21 RX ORDER — TRAMADOL HYDROCHLORIDE 50 MG/1
TABLET ORAL
Qty: 60 TABLET | Refills: 0 | OUTPATIENT
Start: 2018-05-21

## 2018-05-25 ENCOUNTER — OFFICE VISIT (OUTPATIENT)
Dept: PAIN MANAGEMENT | Age: 43
End: 2018-05-25

## 2018-05-25 VITALS — HEART RATE: 69 BPM | SYSTOLIC BLOOD PRESSURE: 129 MMHG | DIASTOLIC BLOOD PRESSURE: 78 MMHG

## 2018-05-25 DIAGNOSIS — F51.01 PRIMARY INSOMNIA: ICD-10-CM

## 2018-05-25 DIAGNOSIS — L97.522 ULCER OF LEFT FOOT, WITH FAT LAYER EXPOSED (HCC): ICD-10-CM

## 2018-05-25 DIAGNOSIS — F33.41 RECURRENT MAJOR DEPRESSIVE DISORDER, IN PARTIAL REMISSION (HCC): ICD-10-CM

## 2018-05-25 DIAGNOSIS — G43.711 CHRONIC MIGRAINE WITHOUT AURA, WITH INTRACTABLE MIGRAINE, SO STATED, WITH STATUS MIGRAINOSUS: ICD-10-CM

## 2018-05-25 DIAGNOSIS — M79.2 NEUROPATHIC PAIN: ICD-10-CM

## 2018-05-25 DIAGNOSIS — G63 POLYNEUROPATHY ASSOCIATED WITH UNDERLYING DISEASE (HCC): ICD-10-CM

## 2018-05-25 DIAGNOSIS — G89.4 CHRONIC PAIN SYNDROME: ICD-10-CM

## 2018-05-25 DIAGNOSIS — M79.18 MYOFASCIAL PAIN: ICD-10-CM

## 2018-05-25 PROCEDURE — 1036F TOBACCO NON-USER: CPT | Performed by: INTERNAL MEDICINE

## 2018-05-25 PROCEDURE — G8427 DOCREV CUR MEDS BY ELIG CLIN: HCPCS | Performed by: INTERNAL MEDICINE

## 2018-05-25 PROCEDURE — 99213 OFFICE O/P EST LOW 20 MIN: CPT | Performed by: INTERNAL MEDICINE

## 2018-05-25 PROCEDURE — G8417 CALC BMI ABV UP PARAM F/U: HCPCS | Performed by: INTERNAL MEDICINE

## 2018-05-25 RX ORDER — DULOXETIN HYDROCHLORIDE 60 MG/1
60 CAPSULE, DELAYED RELEASE ORAL 2 TIMES DAILY
Qty: 60 CAPSULE | Refills: 1 | Status: SHIPPED | OUTPATIENT
Start: 2018-05-25 | End: 2018-06-22 | Stop reason: SDUPTHER

## 2018-05-25 RX ORDER — BUPRENORPHINE 5 UG/H
1 PATCH TRANSDERMAL
Qty: 4 PATCH | Refills: 0 | Status: SHIPPED | OUTPATIENT
Start: 2018-05-25 | End: 2018-06-22 | Stop reason: SDUPTHER

## 2018-05-25 RX ORDER — TIZANIDINE 4 MG/1
TABLET ORAL
Qty: 60 TABLET | Refills: 1 | Status: SHIPPED | OUTPATIENT
Start: 2018-05-25 | End: 2018-06-22

## 2018-05-25 RX ORDER — FAMOTIDINE 20 MG/1
TABLET, FILM COATED ORAL
Qty: 30 TABLET | Refills: 1 | Status: SHIPPED | OUTPATIENT
Start: 2018-05-25 | End: 2018-06-22 | Stop reason: SDUPTHER

## 2018-05-25 RX ORDER — QUETIAPINE FUMARATE 25 MG/1
25-50 TABLET, FILM COATED ORAL NIGHTLY
Qty: 60 TABLET | Refills: 1 | Status: SHIPPED | OUTPATIENT
Start: 2018-05-25 | End: 2018-06-22 | Stop reason: SDUPTHER

## 2018-05-25 RX ORDER — TOPIRAMATE 100 MG/1
100 TABLET, FILM COATED ORAL 2 TIMES DAILY
Qty: 60 TABLET | Refills: 1 | Status: SHIPPED | OUTPATIENT
Start: 2018-05-25 | End: 2018-06-22 | Stop reason: SDUPTHER

## 2018-05-25 RX ORDER — SUMATRIPTAN 50 MG/1
TABLET, FILM COATED ORAL
Qty: 9 TABLET | Refills: 0 | Status: SHIPPED | OUTPATIENT
Start: 2018-05-25 | End: 2018-06-22 | Stop reason: SDUPTHER

## 2018-05-25 RX ORDER — GABAPENTIN 400 MG/1
CAPSULE ORAL
Qty: 60 CAPSULE | Refills: 1 | Status: SHIPPED | OUTPATIENT
Start: 2018-05-25 | End: 2018-06-22 | Stop reason: SDUPTHER

## 2018-06-22 ENCOUNTER — OFFICE VISIT (OUTPATIENT)
Dept: PAIN MANAGEMENT | Age: 43
End: 2018-06-22

## 2018-06-22 VITALS — DIASTOLIC BLOOD PRESSURE: 63 MMHG | SYSTOLIC BLOOD PRESSURE: 126 MMHG | HEART RATE: 86 BPM

## 2018-06-22 DIAGNOSIS — G43.711 CHRONIC MIGRAINE WITHOUT AURA, WITH INTRACTABLE MIGRAINE, SO STATED, WITH STATUS MIGRAINOSUS: ICD-10-CM

## 2018-06-22 DIAGNOSIS — F51.01 PRIMARY INSOMNIA: ICD-10-CM

## 2018-06-22 DIAGNOSIS — L97.522 ULCER OF LEFT FOOT, WITH FAT LAYER EXPOSED (HCC): ICD-10-CM

## 2018-06-22 DIAGNOSIS — G89.4 CHRONIC PAIN SYNDROME: ICD-10-CM

## 2018-06-22 DIAGNOSIS — M79.2 NEUROPATHIC PAIN: ICD-10-CM

## 2018-06-22 DIAGNOSIS — G63 POLYNEUROPATHY ASSOCIATED WITH UNDERLYING DISEASE (HCC): ICD-10-CM

## 2018-06-22 DIAGNOSIS — F33.41 RECURRENT MAJOR DEPRESSIVE DISORDER, IN PARTIAL REMISSION (HCC): ICD-10-CM

## 2018-06-22 DIAGNOSIS — M79.18 MYOFASCIAL PAIN: ICD-10-CM

## 2018-06-22 PROCEDURE — G8417 CALC BMI ABV UP PARAM F/U: HCPCS | Performed by: INTERNAL MEDICINE

## 2018-06-22 PROCEDURE — G8427 DOCREV CUR MEDS BY ELIG CLIN: HCPCS | Performed by: INTERNAL MEDICINE

## 2018-06-22 PROCEDURE — 99213 OFFICE O/P EST LOW 20 MIN: CPT | Performed by: INTERNAL MEDICINE

## 2018-06-22 PROCEDURE — 1036F TOBACCO NON-USER: CPT | Performed by: INTERNAL MEDICINE

## 2018-06-22 RX ORDER — GABAPENTIN 400 MG/1
CAPSULE ORAL
Qty: 60 CAPSULE | Refills: 1 | Status: SHIPPED | OUTPATIENT
Start: 2018-06-22 | End: 2018-08-24 | Stop reason: SDUPTHER

## 2018-06-22 RX ORDER — MAGNESIUM OXIDE 400 MG/1
400 TABLET ORAL 2 TIMES DAILY
Qty: 60 TABLET | Refills: 0 | Status: SHIPPED | OUTPATIENT
Start: 2018-06-22 | End: 2018-07-18 | Stop reason: SDUPTHER

## 2018-06-22 RX ORDER — SUMATRIPTAN 50 MG/1
TABLET, FILM COATED ORAL
Qty: 9 TABLET | Refills: 0 | Status: SHIPPED | OUTPATIENT
Start: 2018-06-22 | End: 2018-08-24 | Stop reason: SDUPTHER

## 2018-06-22 RX ORDER — FAMOTIDINE 20 MG/1
TABLET, FILM COATED ORAL
Qty: 30 TABLET | Refills: 1 | Status: SHIPPED | OUTPATIENT
Start: 2018-06-22 | End: 2018-08-24 | Stop reason: SDUPTHER

## 2018-06-22 RX ORDER — TRAMADOL HYDROCHLORIDE 50 MG/1
50 TABLET ORAL EVERY 6 HOURS PRN
Qty: 60 TABLET | Refills: 0 | Status: SHIPPED | OUTPATIENT
Start: 2018-06-22 | End: 2018-08-24 | Stop reason: SDUPTHER

## 2018-06-22 RX ORDER — TOPIRAMATE 100 MG/1
100 TABLET, FILM COATED ORAL 2 TIMES DAILY
Qty: 60 TABLET | Refills: 1 | Status: SHIPPED | OUTPATIENT
Start: 2018-06-22 | End: 2018-08-24 | Stop reason: SDUPTHER

## 2018-06-22 RX ORDER — QUETIAPINE FUMARATE 25 MG/1
25-50 TABLET, FILM COATED ORAL NIGHTLY
Qty: 60 TABLET | Refills: 1 | Status: SHIPPED | OUTPATIENT
Start: 2018-06-22 | End: 2018-08-02 | Stop reason: SDUPTHER

## 2018-06-22 RX ORDER — DULOXETIN HYDROCHLORIDE 60 MG/1
60 CAPSULE, DELAYED RELEASE ORAL 2 TIMES DAILY
Qty: 60 CAPSULE | Refills: 1 | Status: SHIPPED | OUTPATIENT
Start: 2018-06-22 | End: 2018-08-24 | Stop reason: SDUPTHER

## 2018-06-22 RX ORDER — BUPRENORPHINE 5 UG/H
1 PATCH TRANSDERMAL
Qty: 4 PATCH | Refills: 0 | Status: SHIPPED | OUTPATIENT
Start: 2018-06-22 | End: 2018-08-24 | Stop reason: SDUPTHER

## 2018-07-02 DIAGNOSIS — R09.81 NASAL SINUS CONGESTION: ICD-10-CM

## 2018-07-02 RX ORDER — FLUTICASONE PROPIONATE 50 MCG
SPRAY, SUSPENSION (ML) NASAL
Qty: 1 BOTTLE | Refills: 5 | Status: SHIPPED | OUTPATIENT
Start: 2018-07-02 | End: 2019-01-06 | Stop reason: SDUPTHER

## 2018-07-18 DIAGNOSIS — M79.2 NEUROPATHIC PAIN: ICD-10-CM

## 2018-07-18 DIAGNOSIS — M79.18 MYOFASCIAL PAIN: ICD-10-CM

## 2018-07-18 DIAGNOSIS — G89.4 CHRONIC PAIN SYNDROME: ICD-10-CM

## 2018-07-18 DIAGNOSIS — G63 POLYNEUROPATHY ASSOCIATED WITH UNDERLYING DISEASE (HCC): ICD-10-CM

## 2018-07-18 DIAGNOSIS — L97.522 ULCER OF LEFT FOOT, WITH FAT LAYER EXPOSED (HCC): ICD-10-CM

## 2018-07-18 RX ORDER — TRAMADOL HYDROCHLORIDE 50 MG/1
TABLET ORAL
Qty: 60 TABLET | Refills: 0 | OUTPATIENT
Start: 2018-07-18

## 2018-07-19 ENCOUNTER — OFFICE VISIT (OUTPATIENT)
Dept: INTERNAL MEDICINE CLINIC | Age: 43
End: 2018-07-19

## 2018-07-19 DIAGNOSIS — E10.8 TYPE 1 DIABETES MELLITUS WITH COMPLICATION (HCC): Primary | ICD-10-CM

## 2018-07-19 LAB — GLUCOSE BLD-MCNC: 109 MG/DL

## 2018-07-19 PROCEDURE — 82962 GLUCOSE BLOOD TEST: CPT | Performed by: INTERNAL MEDICINE

## 2018-07-19 ASSESSMENT — PATIENT HEALTH QUESTIONNAIRE - PHQ9
SUM OF ALL RESPONSES TO PHQ QUESTIONS 1-9: 0
2. FEELING DOWN, DEPRESSED OR HOPELESS: 0
1. LITTLE INTEREST OR PLEASURE IN DOING THINGS: 0
SUM OF ALL RESPONSES TO PHQ9 QUESTIONS 1 & 2: 0

## 2018-08-02 ENCOUNTER — OFFICE VISIT (OUTPATIENT)
Dept: ENDOCRINOLOGY | Age: 43
End: 2018-08-02

## 2018-08-02 VITALS
HEIGHT: 74 IN | HEART RATE: 71 BPM | WEIGHT: 261.4 LBS | RESPIRATION RATE: 14 BRPM | OXYGEN SATURATION: 99 % | DIASTOLIC BLOOD PRESSURE: 76 MMHG | BODY MASS INDEX: 33.55 KG/M2 | SYSTOLIC BLOOD PRESSURE: 114 MMHG

## 2018-08-02 DIAGNOSIS — I10 ESSENTIAL HYPERTENSION: ICD-10-CM

## 2018-08-02 DIAGNOSIS — E78.00 PURE HYPERCHOLESTEROLEMIA: ICD-10-CM

## 2018-08-02 DIAGNOSIS — E10.9 TYPE 1 DIABETES MELLITUS WITHOUT COMPLICATION (HCC): Primary | ICD-10-CM

## 2018-08-02 LAB — HBA1C MFR BLD: 11 %

## 2018-08-02 PROCEDURE — G8417 CALC BMI ABV UP PARAM F/U: HCPCS | Performed by: INTERNAL MEDICINE

## 2018-08-02 PROCEDURE — 99214 OFFICE O/P EST MOD 30 MIN: CPT | Performed by: INTERNAL MEDICINE

## 2018-08-02 PROCEDURE — 2022F DILAT RTA XM EVC RTNOPTHY: CPT | Performed by: INTERNAL MEDICINE

## 2018-08-02 PROCEDURE — G8427 DOCREV CUR MEDS BY ELIG CLIN: HCPCS | Performed by: INTERNAL MEDICINE

## 2018-08-02 PROCEDURE — 83036 HEMOGLOBIN GLYCOSYLATED A1C: CPT | Performed by: INTERNAL MEDICINE

## 2018-08-02 PROCEDURE — 1036F TOBACCO NON-USER: CPT | Performed by: INTERNAL MEDICINE

## 2018-08-02 PROCEDURE — 3046F HEMOGLOBIN A1C LEVEL >9.0%: CPT | Performed by: INTERNAL MEDICINE

## 2018-08-02 NOTE — PROGRESS NOTES
neuropathy [facility])      Past Surgical History:   Procedure Laterality Date    ANKLE FRACTURE SURGERY Left 1/28/13    Dr. Tavera Art  August 2013    left tibia with external fixation device    EYE SURGERY      retina reattachment left eye x 3 holes repairs    EYE SURGERY Right 9-27-13    retal detachment    LEG SURGERY Left 3/31/14    ORIF left fibula and tibia    TIBIA FRACTURE SURGERY      with external device inplace     Family History   Problem Relation Age of Onset    Asthma Unknown     Diabetes Unknown     High Blood Pressure Unknown      History   Smoking Status    Never Smoker   Smokeless Tobacco    Never Used      History   Alcohol Use No       HPI      Leigh Antoine is a 38 y.o. male here for a follow-up for management of DM    He has a PMH of DM, CKD, HTN, hyperlipidemia,  Left tibia/fibula fracture, foot ulcer. He was diagnosed with Diabetes Mellitus at the age of 12 yrs. Never had DKA. Was never on oral meds. Always on insulin therapy. Microvascular complications: has  Retinopathy and also had retinal detachement (Follows with eye doctor at [address]),   Has microlabuminruia . No Peripheral neuropathy. Home regimen:  Currently on lantus insulin 30 units at night. Apidra 6 units TID. Also on SSI. Cf 1:50 target 150. Takes coverage 3-4 times a day. Previous medications: Was on humulin insulin in the past.    Not testing sugars. Hypoglycemia . Occasional.     No polyuria, polydipsia, weight loss. Diet: Eats 3 meals/day at regular times and 3 snacks. Had nutrition education. Exercise: No  planned physical activity  Limitations to physical activity Fracture of the left leg. Hyperlipidemia: Currently is on Flexseed oil. Hypertension: Currently on Lisinopril 20 mg daily. Patient says he feels dizzy and light headed when he stands up for the last 2-3 months. This is most likely due to autonomic neuropathy.   No weight loss. Has gained weight. He had a fall and fractured his left distal tibia and fibula in 2013. Had ORIF and has developed osteomyelitis. Review of Systems   Constitutional: Positive for malaise/fatigue. Negative for weight loss. HENT: Negative for sore throat. Eyes: Negative for blurred vision. Respiratory: Negative for cough and shortness of breath. Cardiovascular: Negative for chest pain and palpitations. Gastrointestinal: Negative for heartburn, nausea, vomiting and abdominal pain. Genitourinary: Negative for urgency and frequency. Musculoskeletal: Positive for joint pain. Negative for myalgias and back pain. Skin: Negative for rash. Neurological: Positive for tingling and sensory change. Negative for headaches. Endo/Heme/Allergies: Negative for polydipsia. Psychiatric/Behavioral: Negative for depression. The patient is not nervous/anxious. Physical Exam   Constitutional: He is oriented to person, place, and time. He appears well-developed and well-nourished. HENT:   Head: Normocephalic. Mouth/Throat: Oropharynx is clear and moist.   Eyes: EOM are normal. Right eye exhibits no discharge. Neck: No thyromegaly present. Cardiovascular: Normal rate and normal heart sounds. Pulmonary/Chest: Effort normal and breath sounds normal.   Abdominal: Soft. He exhibits no distension. There is no tenderness. Musculoskeletal: He exhibits no tenderness. Neurological: He is alert and oriented to person, place, and time. Skin: Skin is warm. No rash noted. He is not diaphoretic. Psychiatric: His behavior is normal.       Office Visit on 07/19/2018   Component Date Value Ref Range Status    Glucose 07/19/2018 109  mg/dL Final                Assessment/Plan    1. DM    This 38 yrs old male has DM. Complicated by retinopathy, microalbuminuria. Most likely it is Type 1 DM. HARIS antibodies and islet cell antibodies negative.  c-peptide  Undetectable.       A1c 9.6 %----> 8.4 --->8.9 %--->8.2 %--->7.1 %--->7.1 %---> 6.8 % --> 6.8 %--> 6.5 %---> 7.1 %---> 7.1 %---> 7.5 %---> 7.7 % ---> 7.4 %---> 11 %       He is not testing his sugars as he says insurance is not covering the meter. Advised to call insurance . No sugars available to adjust insulin dose. It is very important that he starts testing his sugars.     -Continue Lantus insulin 30 units QHS   -Continue Apidra        He does not want to use insulin pump. Updated on eye exam. continue follow-up with the ophthalmologist.  Has microalbuminuria. On Ace-inhibitor. Will repeat. Has peripheral neuropathy on exam. Discussed foot care    Non-smoker. 2. Hypertension. BP at goal.      3. CKD Follows with nephrologist. Dr. Kusum Tam      4. Hyperlipidemia. LDL is high. Had a detailed discussion explaining the high risk of heart disease, stroke and microvascular complications with high LDL. Recommended statins but he refused as his mother and sister had bad reaction. He understands the high risk of heart disease and stroke with untreated hyperlipidemia. On fish oil. 5. Foot ulcers. Managed by podiatry.

## 2018-08-21 DIAGNOSIS — G63 POLYNEUROPATHY ASSOCIATED WITH UNDERLYING DISEASE (HCC): ICD-10-CM

## 2018-08-21 DIAGNOSIS — L97.522 ULCER OF LEFT FOOT, WITH FAT LAYER EXPOSED (HCC): ICD-10-CM

## 2018-08-21 DIAGNOSIS — M79.18 MYOFASCIAL PAIN: ICD-10-CM

## 2018-08-21 DIAGNOSIS — M79.2 NEUROPATHIC PAIN: ICD-10-CM

## 2018-08-21 DIAGNOSIS — G89.4 CHRONIC PAIN SYNDROME: ICD-10-CM

## 2018-08-24 ENCOUNTER — OFFICE VISIT (OUTPATIENT)
Dept: PAIN MANAGEMENT | Age: 43
End: 2018-08-24

## 2018-08-24 ENCOUNTER — TELEPHONE (OUTPATIENT)
Dept: PAIN MANAGEMENT | Age: 43
End: 2018-08-24

## 2018-08-24 VITALS
BODY MASS INDEX: 33.56 KG/M2 | HEIGHT: 74 IN | RESPIRATION RATE: 18 BRPM | HEART RATE: 80 BPM | DIASTOLIC BLOOD PRESSURE: 76 MMHG | SYSTOLIC BLOOD PRESSURE: 104 MMHG

## 2018-08-24 DIAGNOSIS — L97.522 ULCER OF LEFT FOOT, WITH FAT LAYER EXPOSED (HCC): ICD-10-CM

## 2018-08-24 DIAGNOSIS — M79.18 MYOFASCIAL PAIN: ICD-10-CM

## 2018-08-24 DIAGNOSIS — G43.711 CHRONIC MIGRAINE WITHOUT AURA, WITH INTRACTABLE MIGRAINE, SO STATED, WITH STATUS MIGRAINOSUS: ICD-10-CM

## 2018-08-24 DIAGNOSIS — M79.2 NEUROPATHIC PAIN: ICD-10-CM

## 2018-08-24 DIAGNOSIS — G89.4 CHRONIC PAIN SYNDROME: ICD-10-CM

## 2018-08-24 DIAGNOSIS — L97.529 ULCER OF LEFT FOOT, UNSPECIFIED ULCER STAGE (HCC): ICD-10-CM

## 2018-08-24 DIAGNOSIS — G63 POLYNEUROPATHY ASSOCIATED WITH UNDERLYING DISEASE (HCC): ICD-10-CM

## 2018-08-24 DIAGNOSIS — F51.01 PRIMARY INSOMNIA: ICD-10-CM

## 2018-08-24 DIAGNOSIS — F33.41 RECURRENT MAJOR DEPRESSIVE DISORDER, IN PARTIAL REMISSION (HCC): ICD-10-CM

## 2018-08-24 PROCEDURE — G8417 CALC BMI ABV UP PARAM F/U: HCPCS | Performed by: INTERNAL MEDICINE

## 2018-08-24 PROCEDURE — G8427 DOCREV CUR MEDS BY ELIG CLIN: HCPCS | Performed by: INTERNAL MEDICINE

## 2018-08-24 PROCEDURE — 99213 OFFICE O/P EST LOW 20 MIN: CPT | Performed by: INTERNAL MEDICINE

## 2018-08-24 PROCEDURE — 1036F TOBACCO NON-USER: CPT | Performed by: INTERNAL MEDICINE

## 2018-08-24 RX ORDER — SUMATRIPTAN 50 MG/1
TABLET, FILM COATED ORAL
Qty: 9 TABLET | Refills: 0 | Status: SHIPPED | OUTPATIENT
Start: 2018-08-24 | End: 2018-09-21 | Stop reason: SDUPTHER

## 2018-08-24 RX ORDER — QUETIAPINE FUMARATE 25 MG/1
25-50 TABLET, FILM COATED ORAL NIGHTLY
Qty: 60 TABLET | Refills: 1 | Status: SHIPPED | OUTPATIENT
Start: 2018-08-24 | End: 2018-09-21 | Stop reason: SDUPTHER

## 2018-08-24 RX ORDER — TRAMADOL HYDROCHLORIDE 50 MG/1
50 TABLET ORAL EVERY 6 HOURS PRN
Qty: 50 TABLET | Refills: 0 | Status: SHIPPED | OUTPATIENT
Start: 2018-08-24 | End: 2018-11-02 | Stop reason: SDUPTHER

## 2018-08-24 RX ORDER — BUPRENORPHINE 5 UG/H
1 PATCH TRANSDERMAL
Qty: 4 PATCH | Refills: 0 | OUTPATIENT
Start: 2018-08-24 | End: 2018-09-21 | Stop reason: SDUPTHER

## 2018-08-24 RX ORDER — GABAPENTIN 400 MG/1
CAPSULE ORAL
Qty: 60 CAPSULE | Refills: 1 | Status: SHIPPED | OUTPATIENT
Start: 2018-08-24 | End: 2018-09-21 | Stop reason: SDUPTHER

## 2018-08-24 RX ORDER — BUPRENORPHINE 5 UG/H
PATCH TRANSDERMAL
Qty: 4 PATCH | Refills: 0 | OUTPATIENT
Start: 2018-08-24

## 2018-08-24 RX ORDER — DULOXETIN HYDROCHLORIDE 60 MG/1
60 CAPSULE, DELAYED RELEASE ORAL 2 TIMES DAILY
Qty: 60 CAPSULE | Refills: 1 | Status: SHIPPED | OUTPATIENT
Start: 2018-08-24 | End: 2018-09-21 | Stop reason: SDUPTHER

## 2018-08-24 RX ORDER — TOPIRAMATE 100 MG/1
100 TABLET, FILM COATED ORAL 2 TIMES DAILY
Qty: 60 TABLET | Refills: 1 | Status: SHIPPED | OUTPATIENT
Start: 2018-08-24 | End: 2018-09-21 | Stop reason: SDUPTHER

## 2018-08-24 RX ORDER — FAMOTIDINE 20 MG/1
TABLET, FILM COATED ORAL
Qty: 30 TABLET | Refills: 1 | Status: SHIPPED | OUTPATIENT
Start: 2018-08-24 | End: 2018-09-21 | Stop reason: SDUPTHER

## 2018-08-24 NOTE — PROGRESS NOTES
Melvi Jaquelin  1975  [id number]    HISTORY OF PRESENT ILLNESS:  Mr. Talib Schaeffer is a 38 y.o. male returns for a follow up visit for multiple medical problems. His current presenting problems are   1. Chronic pain syndrome    2. Polyneuropathy associated with underlying disease ([address]    3. Myofascial pain    . As per information/history obtained from the PADT(patient assessment and documentation tool) - He complains of pain in the head, neck and lower back with radiation to the shoulders Bilateral, hands Bilateral, buttocks, knees Left, lower leg Left, ankles Left and feet Left He rates the pain 9/10 and describes it as sharp, aching. Pain is made worse by: movement, walking, standing. Current treatment regimen has helped relieve about 30% of the pain. He denies side effects from the current pain regimen. Patient reports that since the last follow up visit the physical functioning is unchanged, family/social relationships are unchanged, mood is better and sleep patterns are better, and that the overall functioning is unchanged. Patient denies neurological bowel or bladder. Patient denies misusing/abusing his narcotic pain medications or using any illegal drugs. There are No indicators for possible drug abuse, addiction or diversion problems. Upon obtaining the medical history from Mr. Talib Schaeffer regarding today's office visit for his presenting problems, Patient states he had to miss his appointment, had a GI bug so he had to reschedule. He states he was able to stretch some of his pills, has been out of the patch for 2-3 weeks. He says he has been having increased pain since being off of them. Mr. Talib Schaeffer mentions he has been complaint with the adjuvant medications. ALLERGIES: Patients list of allergies were reviewed     MEDICATIONS: Mr. Talib Schaeffer list of medications were reviewed. His current medications are   Outpatient Medications Prior to Visit   Medication Sig Dispense Refill    magnesium oxide (MAG-OX) 400 sugar regularly, diabetic control- adv diabetic diet. Goal for fasting blood sugars around 120. Follow up with Endocrinologist/PCP also for on going management   -He was advised weight reduction, diet changes- 800-1200 [place] diet, diet diary, exercising, nutritional  consult increased physical activity as tolerated     Current Outpatient Prescriptions   Medication Sig Dispense Refill    magnesium oxide (MAG-OX) 400 (241.3 Mg) MG TABS tablet TAKE ONE TABLET BY MOUTH TWICE A DAY 60 tablet 0    blood glucose test strips (ONE TOUCH ULTRA TEST) strip Test BS 3 times a day. 100 strip 6    fluticasone (FLONASE) 50 MCG/ACT nasal spray SPRAY TWO SPRAYS IN EACH NOSTRIL ONCE DAILY 1 Bottle 5    topiramate (TOPAMAX) 100 MG tablet Take 1 tablet by mouth 2 times daily 60 tablet 1    DULoxetine (CYMBALTA) 60 MG extended release capsule Take 1 capsule by mouth 2 times daily 60 capsule 1    SUMAtriptan (IMITREX) 50 MG tablet Take one tab po at the start of migraine PRN max 1 every 24 hours 9 tablet 0    famotidine (PEPCID) 20 MG tablet TAKE ONE TABLET BY MOUTH DAILY 30 tablet 1    mupirocin (BACTROBAN NASAL) 2 % nasal ointment Take by Nasal route 2 times daily. 1 Tube 1    hydrocortisone 2.5 % cream Apply topically 2 times daily.  30 g 0    cetirizine (ZYRTEC) 10 MG tablet TAKE ONE TABLET BY MOUTH DAILY AS NEEDED 30 tablet 1    Blood Glucose Monitoring Suppl (ACCU-CHEK CAPO PLUS) w/Device KIT 1 each by Does not apply route daily Test blood sugar 10 times daily Dx code E 10.8 1 kit 10    ACCU-CHEK FASTCLIX LANCETS MISC 1 each by Does not apply route daily Test blood glucose 10 times daily Dx Code E 10.8 300 each 10    glucose blood VI test strips (ACCU-CHEK CAPO PLUS) strip 1 each by In Vitro route daily Test blood glucose 10 times daily Dx Code E10.8 300 each 10    insulin glargine (LANTUS) 100 UNIT/ML injection vial INJECT 32 UNITS UNDER THE SKIN ONCE NIGHTLY 1 vial 5    Insulin Syringe-Needle U-100 (B-D INS SYRINGE every 6 hours as needed for Pain (max 1-2 per day) for up to 30 days. . 60 tablet 0    glucagon, rDNA, (GLUCAGEN HYPOKIT) 1 MG SOLR injection Inject 1 mg into the skin once for 1 dose 1 each 0     No current facility-administered medications for this visit. I will continue his current medication regimen  which is part of the above treatment schedule. It has been helping with Mr. Argueta Ranks chronic  medical problems which for this visit include:   Diagnoses of Chronic pain syndrome, Polyneuropathy associated with underlying disease ([address] Myofascial pain, and Ulcer of left foot, unspecified ulcer stage ([facility] were pertinent to this visit. Risks and benefits of the medications and other alternative treatments  including no treatment were discussed with the patient. The common side effects of these medications were also explained to the patient. Informed verbal consent was obtained. Goals of current treatment regimen include improvement in pain, restoration of functioning- with focus on improvement in physical performance, general activity, work or disability,emotional distress, health care utilization and  decreased medication consumption. Will continue to monitor progress towards achieving/maintaining therapeutic goals with special emphasis on  1. Improvement in perceived interfernce  of pain with ADL's. Ability to do home exercises independently. Ability to do household chores indoor and/or outdoor work and social and leisure activities. Improve psychosocial and physical functioning. - he is showing progression towards this treatment goal with the current regimen. He was advised against drinking alcohol with the narcotic pain medicines, advised against driving or handling machinery while adjusting the dose of medicines or if having cognitive  issues related to the current medications. Risk of overdose and death, if medicines not taken as prescribed, were also discussed.  If the patient develops new symptoms or if the symptoms worsen, the patient should call the office. While transcribing every attempt was made to maintain the accuracy of the note in terms of it's contents,there may have been some errors made inadvertently. Thank you for allowing me to participate in the care of this patient.     Vanesa Marley MD.    Cc: MD MARY Lozano, Braulio Wynn am scribing for and in the presence of Dr. Vanesa Marley.   08/24/18  11:36 AM  SLAVA Lamb, Dr. Vanesa Marley, personally performed the services described in this documentation as scribed by   Braulio Wynn MA in my presence and it is both accurate and complete

## 2018-09-01 DIAGNOSIS — M79.2 NEUROPATHIC PAIN: ICD-10-CM

## 2018-09-01 DIAGNOSIS — M79.18 MYOFASCIAL PAIN: ICD-10-CM

## 2018-09-01 DIAGNOSIS — L97.522 ULCER OF LEFT FOOT, WITH FAT LAYER EXPOSED (HCC): ICD-10-CM

## 2018-09-01 DIAGNOSIS — G63 POLYNEUROPATHY ASSOCIATED WITH UNDERLYING DISEASE (HCC): ICD-10-CM

## 2018-09-01 DIAGNOSIS — G89.4 CHRONIC PAIN SYNDROME: ICD-10-CM

## 2018-09-04 RX ORDER — TRAMADOL HYDROCHLORIDE 50 MG/1
TABLET ORAL
Qty: 50 TABLET | Refills: 0 | OUTPATIENT
Start: 2018-09-04

## 2018-09-06 RX ORDER — INSULIN GLARGINE 100 [IU]/ML
30 INJECTION, SOLUTION SUBCUTANEOUS NIGHTLY
COMMUNITY
End: 2018-09-06 | Stop reason: SDUPTHER

## 2018-09-06 RX ORDER — INSULIN GLARGINE 100 [IU]/ML
30 INJECTION, SOLUTION SUBCUTANEOUS NIGHTLY
Qty: 15 ML | Refills: 2 | Status: SHIPPED | OUTPATIENT
Start: 2018-09-06 | End: 2018-11-08 | Stop reason: SDUPTHER

## 2018-09-06 RX ORDER — INSULIN GLARGINE 100 [IU]/ML
30 INJECTION, SOLUTION SUBCUTANEOUS NIGHTLY
Qty: 1 VIAL | Refills: 2 | Status: SHIPPED | OUTPATIENT
Start: 2018-09-06 | End: 2018-12-11 | Stop reason: SDUPTHER

## 2018-09-19 DIAGNOSIS — G63 POLYNEUROPATHY ASSOCIATED WITH UNDERLYING DISEASE (HCC): ICD-10-CM

## 2018-09-19 DIAGNOSIS — G89.4 CHRONIC PAIN SYNDROME: ICD-10-CM

## 2018-09-19 DIAGNOSIS — L97.522 ULCER OF LEFT FOOT, WITH FAT LAYER EXPOSED (HCC): ICD-10-CM

## 2018-09-19 DIAGNOSIS — M79.18 MYOFASCIAL PAIN: ICD-10-CM

## 2018-09-19 DIAGNOSIS — M79.2 NEUROPATHIC PAIN: ICD-10-CM

## 2018-09-20 ENCOUNTER — OFFICE VISIT (OUTPATIENT)
Dept: DERMATOLOGY | Age: 43
End: 2018-09-20

## 2018-09-20 DIAGNOSIS — L85.3 XEROSIS CUTIS: ICD-10-CM

## 2018-09-20 DIAGNOSIS — L98.1 NEUROTIC EXCORIATIONS: Primary | ICD-10-CM

## 2018-09-20 DIAGNOSIS — L29.9 PRURITUS: ICD-10-CM

## 2018-09-20 DIAGNOSIS — Z78.9 NON-TOBACCO USER: ICD-10-CM

## 2018-09-20 PROCEDURE — 99203 OFFICE O/P NEW LOW 30 MIN: CPT | Performed by: DERMATOLOGY

## 2018-09-20 PROCEDURE — 1036F TOBACCO NON-USER: CPT | Performed by: DERMATOLOGY

## 2018-09-20 PROCEDURE — G8417 CALC BMI ABV UP PARAM F/U: HCPCS | Performed by: DERMATOLOGY

## 2018-09-20 PROCEDURE — G8427 DOCREV CUR MEDS BY ELIG CLIN: HCPCS | Performed by: DERMATOLOGY

## 2018-09-20 NOTE — PATIENT INSTRUCTIONS
· Apply triamcinolone twice daily for 2 weeks to itchy spots    · Wrap affected areas at night    · Wash body with Cetaphil -OTC    · Moisturize with CeraVe in the jar  twice per day -OTC    · Take antihistamine  1 in morning 1 in afternoon Benadryl before bed -OTC    · Sarna Sensitive lotion - apply 3 times per day for 3 weeks to any itchy areas - OTC

## 2018-09-20 NOTE — Clinical Note
Dear Dr. Evelio Rolon,  I had the pleasure of seeing your patient, Concepción Avalos, in my office recently. Thanks so much for involving me in his care. Please see my note and call me if you have any questions.   Best regards, Violeta Yoo, DO

## 2018-09-20 NOTE — PROGRESS NOTES
Date    Acute respiratory failure ([facility] [address] 8/18/2014    Asthma     Blood pressure instability     Chronic pain syndrome     Detached retina, bilateral     laser tx right eye    Diabetes mellitus ([address]     uncontrolled     Insomnia     Meningitis August 2014    admission [facility]    Neuropathic pain     Osteomyelitis ([facility] [address]     Osteomyelitis of tibia ([address]     left    Pain of left lower extremity     Peripheral neuropathy      Past Surgical History:   Procedure Laterality Date    ANKLE FRACTURE SURGERY Left 1/28/13    Dr. Valente Umana  August 2013    left tibia with external fixation device    EYE SURGERY      retina reattachment left eye x 3 holes repairs    EYE SURGERY Right 9-27-13    retal detachment    LEG SURGERY Left 3/31/14    ORIF left fibula and tibia    FL EGD FLEXIBLE FOREIGN BODY REMOVAL N/A 9/21/2018    EGD FOREIGN BODY REMOVAL performed by Deedee Mclain MD at [facility]      with external device inplace    UPPER GASTROINTESTINAL ENDOSCOPY N/A 9/21/2018    EGD BIOPSY performed by Deedee Mclain MD at [address]   Allergen Reactions    Tegaderm Ag Mesh 2\"X2\" [Wound Dressings]      \"Holes in skin from Tegaderm Adhesive\"    Codeine Other (See Comments)     hallucinates    Other Other (See Comments)     Holes in skin  From Tegaderm Adhesive    Tape [Adhesive Tape]      Blister       Outpatient Prescriptions Marked as Taking for the 9/20/18 encounter (Office Visit) with Oleradha Wooten, DO   Medication Sig Dispense Refill    triamcinolone (KENALOG) 0.1 % ointment Apply to thickened affected areas twice daily for up to 2 weeks then once daily for one week, then PRN sparingly for flares 80 g 1    insulin glargine (LANTUS) 100 UNIT/ML injection vial Inject 30 Units into the skin nightly DX CODE E 10.9 15 mL 2    LANTUS 100 UNIT/ML injection vial Inject 30 Units into the skin nightly DX CODE E 10.9 1 vial 2    traMADol (ULTRAM) 50 MG tablet Take 1 tablet by mouth every 6 hours as needed for Pain (max 1-2 per day) for up to 28 days. . 50 tablet 0    [DISCONTINUED] magnesium oxide (MAG-OX) 400 (241.3 Mg) MG TABS tablet TAKE ONE TABLET BY MOUTH TWICE A DAY 60 tablet 1    [DISCONTINUED] topiramate (TOPAMAX) 100 MG tablet Take 1 tablet by mouth 2 times daily 60 tablet 1    [DISCONTINUED] DULoxetine (CYMBALTA) 60 MG extended release capsule Take 1 capsule by mouth 2 times daily 60 capsule 1    [DISCONTINUED] SUMAtriptan (IMITREX) 50 MG tablet Take one tab po at the start of migraine PRN max 1 every 24 hours 9 tablet 0    [DISCONTINUED] famotidine (PEPCID) 20 MG tablet TAKE ONE TABLET BY MOUTH DAILY 30 tablet 1    [DISCONTINUED] gabapentin (NEURONTIN) 400 MG capsule Take one tablet Po BID. 60 capsule 1    [DISCONTINUED] QUEtiapine (SEROQUEL) 25 MG tablet Take 1-2 tablets by mouth nightly 60 tablet 1    [DISCONTINUED] buprenorphine (BUTRANS) 5 MCG/HR PTWK Place 1 patch onto the skin every 7 days for 28 days. . 4 patch 0    blood glucose test strips (ONE TOUCH ULTRA TEST) strip Test BS 3 times a day. 100 strip 6    fluticasone (FLONASE) 50 MCG/ACT nasal spray SPRAY TWO SPRAYS IN EACH NOSTRIL ONCE DAILY 1 Bottle 5    mupirocin (BACTROBAN NASAL) 2 % nasal ointment Take by Nasal route 2 times daily. 1 Tube 1    hydrocortisone 2.5 % cream Apply topically 2 times daily.  30 g 0    Blood Glucose Monitoring Suppl (ACCU-CHEK CAPO PLUS) w/Device KIT 1 each by Does not apply route daily Test blood sugar 10 times daily Dx code E 10.8 1 kit 10    ACCU-CHEK FASTCLIX LANCETS MISC 1 each by Does not apply route daily Test blood glucose 10 times daily Dx Code E 10.8 300 each 10    Insulin Syringe-Needle U-100 (B-D INS SYRINGE 0.5CC/31GX5/16) 31G X 5/16\" 0.5 ML MISC USE ONE SYRINGE FOUR TIMES A DAY BEFORE MEALS AND NIGHTLY 120 each 10    insulin glulisine (APIDRA) 100 UNIT/ML injection INJECT 8-12 UNITS UNDER THE SKIN History Narrative    No narrative on file       Physical Examination     The following were examined and determined to be normal: Psych/Neuro, Head/face, Neck, Breast/axilla/chest and Abdomen, nails/digits    The following were examined and determined to be abnormal: Back, RUE, LUE, RLE and LLE. -General: NAD, well-nourished, well-developed. Areas of skin examined as listed above:   1. Bilateral upper and lower extremities and superior back (reachable areas) Multiple linear and geometric erosions with hemorrhagic crusts, no s/sx of infection to excoriations, several hypopigmented linear scars  2. Patient actively picking and scratching upper ext during exam  3. Diffuse dry, dull, rough skin located on trunk and bilateral extremities    Assessment and Plan     1. Neurotic excoriations    2. Pruritus    3. Xerosis cutis    4. Non-tobacco user        1. Neurotic excoriations  Self inflicted skin picking disorder  Avoid scratching/picking skin, wear ACE wraps to arms and legs nightly, keep naisl trimmed short. Peripheral neuropathy and xerosis cutis could play an underlying role in disturbance of cutaneous sensations. F/u with PCP for management of compulsive urges that leads to skin picking habit    - triamcinolone (KENALOG) 0.1 % ointment; Apply to thickened affected areas twice daily for up to 2 weeks then once daily for one week, then PRN sparingly for flares    -Edu re: sparing use, atrophy, striae, hypopigmentation, telangiectasias. 2. Pruritus  Peripheral neuropathy could play an underlying role in disturbance of cutaneous sensations.    Hx of anemia in January 2018, recommend PCP f/u for iron deficiency anemia as can cause chronic pruritis, will send chart to PCP  Hx of renal failure-can lead to xerosis cutis and pruritis    Take one non-drowsy antihistamine  Tablet QAM, one non-drowsy antihistamine tablet QPM and one OTC benadryl tablet QHS for at least 3 weeks to assess improvement of pruritis      Sarna Sensitive lotion three times per day for at least 3 weeks to affected areas on body to assess improvement in pruritis    3. Xerosis cutis  -Patient counseled to use a gentle cleanser such as Cetaphil daily, do not take more than 1 shower daily with warm water and spend less than 10 minutes in shower/bath, pat dry and then apply thick moisturizer like OTC CeraVe cream in jar plus apply CeraVetwice daily. Avoid fragrances and dyes and use only fragrance free detergents. Hx of renal failure-can lead to xerosis cutis and pruritis    4. Non-tobacco user  -continue with tobacco cessation        Note is transcribed using voice recognition software. Inadvertent computerized transcription errors may be present. Return if symptoms worsen or fail to improve.

## 2018-09-21 ENCOUNTER — APPOINTMENT (OUTPATIENT)
Dept: GENERAL RADIOLOGY | Age: 43
End: 2018-09-21
Payer: MEDICARE

## 2018-09-21 ENCOUNTER — HOSPITAL ENCOUNTER (EMERGENCY)
Age: 43
Discharge: HOME | End: 2018-09-21
Attending: EMERGENCY MEDICINE | Admitting: INTERNAL MEDICINE
Payer: MEDICARE

## 2018-09-21 ENCOUNTER — OFFICE VISIT (OUTPATIENT)
Dept: PAIN MANAGEMENT | Age: 43
End: 2018-09-21

## 2018-09-21 VITALS
BODY MASS INDEX: 33.51 KG/M2 | DIASTOLIC BLOOD PRESSURE: 88 MMHG | SYSTOLIC BLOOD PRESSURE: 134 MMHG | TEMPERATURE: 98 F | HEART RATE: 78 BPM | OXYGEN SATURATION: 98 % | WEIGHT: 261 LBS | RESPIRATION RATE: 18 BRPM

## 2018-09-21 VITALS — DIASTOLIC BLOOD PRESSURE: 72 MMHG | HEART RATE: 122 BPM | SYSTOLIC BLOOD PRESSURE: 118 MMHG

## 2018-09-21 DIAGNOSIS — T18.128A FOOD IMPACTION OF ESOPHAGUS, INITIAL ENCOUNTER: Primary | ICD-10-CM

## 2018-09-21 DIAGNOSIS — M79.2 NEUROPATHIC PAIN: ICD-10-CM

## 2018-09-21 DIAGNOSIS — M79.18 MYOFASCIAL PAIN: ICD-10-CM

## 2018-09-21 DIAGNOSIS — L97.522 ULCER OF LEFT FOOT, WITH FAT LAYER EXPOSED (HCC): ICD-10-CM

## 2018-09-21 DIAGNOSIS — G62.9 PERIPHERAL POLYNEUROPATHY: ICD-10-CM

## 2018-09-21 DIAGNOSIS — G63 POLYNEUROPATHY ASSOCIATED WITH UNDERLYING DISEASE (HCC): ICD-10-CM

## 2018-09-21 DIAGNOSIS — K20.0 EOSINOPHILIC ESOPHAGITIS: ICD-10-CM

## 2018-09-21 DIAGNOSIS — K31.7 GASTRIC POLYP: ICD-10-CM

## 2018-09-21 DIAGNOSIS — G89.4 CHRONIC PAIN SYNDROME: ICD-10-CM

## 2018-09-21 LAB
ANION GAP SERPL CALCULATED.3IONS-SCNC: 16 MMOL/L (ref 3–16)
BUN BLDV-MCNC: 15 MG/DL (ref 7–20)
CALCIUM SERPL-MCNC: 9.1 MG/DL (ref 8.3–10.6)
CHLORIDE BLD-SCNC: 100 MMOL/L (ref 99–110)
CO2: 21 MMOL/L (ref 21–32)
CREAT SERPL-MCNC: 1.3 MG/DL (ref 0.9–1.3)
GFR AFRICAN AMERICAN: >60
GFR NON-AFRICAN AMERICAN: >60
GLUCOSE BLD-MCNC: 393 MG/DL (ref 70–99)
POTASSIUM SERPL-SCNC: 3.7 MMOL/L (ref 3.5–5.1)
SODIUM BLD-SCNC: 137 MMOL/L (ref 136–145)

## 2018-09-21 PROCEDURE — 71045 X-RAY EXAM CHEST 1 VIEW: CPT

## 2018-09-21 PROCEDURE — 88305 TISSUE EXAM BY PATHOLOGIST: CPT

## 2018-09-21 PROCEDURE — 6360000002 HC RX W HCPCS: Performed by: STUDENT IN AN ORGANIZED HEALTH CARE EDUCATION/TRAINING PROGRAM

## 2018-09-21 PROCEDURE — 6360000002 HC RX W HCPCS: Performed by: INTERNAL MEDICINE

## 2018-09-21 PROCEDURE — G8427 DOCREV CUR MEDS BY ELIG CLIN: HCPCS | Performed by: INTERNAL MEDICINE

## 2018-09-21 PROCEDURE — 7100000010 HC PHASE II RECOVERY - FIRST 15 MIN: Performed by: INTERNAL MEDICINE

## 2018-09-21 PROCEDURE — 99213 OFFICE O/P EST LOW 20 MIN: CPT | Performed by: INTERNAL MEDICINE

## 2018-09-21 PROCEDURE — 99152 MOD SED SAME PHYS/QHP 5/>YRS: CPT | Performed by: INTERNAL MEDICINE

## 2018-09-21 PROCEDURE — 3609012400 HC EGD TRANSORAL BIOPSY SINGLE/MULTIPLE: Performed by: INTERNAL MEDICINE

## 2018-09-21 PROCEDURE — 96375 TX/PRO/DX INJ NEW DRUG ADDON: CPT

## 2018-09-21 PROCEDURE — 80048 BASIC METABOLIC PNL TOTAL CA: CPT

## 2018-09-21 PROCEDURE — 2580000003 HC RX 258: Performed by: STUDENT IN AN ORGANIZED HEALTH CARE EDUCATION/TRAINING PROGRAM

## 2018-09-21 PROCEDURE — 3609012900 HC EGD FOREIGN BODY REMOVAL: Performed by: INTERNAL MEDICINE

## 2018-09-21 PROCEDURE — 2500000003 HC RX 250 WO HCPCS: Performed by: STUDENT IN AN ORGANIZED HEALTH CARE EDUCATION/TRAINING PROGRAM

## 2018-09-21 PROCEDURE — 96374 THER/PROPH/DIAG INJ IV PUSH: CPT

## 2018-09-21 PROCEDURE — 99284 EMERGENCY DEPT VISIT MOD MDM: CPT

## 2018-09-21 PROCEDURE — 1036F TOBACCO NON-USER: CPT | Performed by: INTERNAL MEDICINE

## 2018-09-21 PROCEDURE — 2720000010 HC SURG SUPPLY STERILE: Performed by: INTERNAL MEDICINE

## 2018-09-21 PROCEDURE — G8417 CALC BMI ABV UP PARAM F/U: HCPCS | Performed by: INTERNAL MEDICINE

## 2018-09-21 RX ORDER — MIDAZOLAM HYDROCHLORIDE 1 MG/ML
INJECTION INTRAMUSCULAR; INTRAVENOUS PRN
Status: DISCONTINUED | OUTPATIENT
Start: 2018-09-21 | End: 2018-09-21 | Stop reason: HOSPADM

## 2018-09-21 RX ORDER — QUETIAPINE FUMARATE 25 MG/1
25-50 TABLET, FILM COATED ORAL NIGHTLY
Qty: 60 TABLET | Refills: 1 | Status: SHIPPED | OUTPATIENT
Start: 2018-09-21 | End: 2018-11-02 | Stop reason: SDUPTHER

## 2018-09-21 RX ORDER — MEPERIDINE HYDROCHLORIDE 50 MG/ML
INJECTION INTRAMUSCULAR; INTRAVENOUS; SUBCUTANEOUS PRN
Status: DISCONTINUED | OUTPATIENT
Start: 2018-09-21 | End: 2018-09-21 | Stop reason: HOSPADM

## 2018-09-21 RX ORDER — ONDANSETRON 2 MG/ML
4 INJECTION INTRAMUSCULAR; INTRAVENOUS ONCE
Status: COMPLETED | OUTPATIENT
Start: 2018-09-21 | End: 2018-09-21

## 2018-09-21 RX ORDER — FAMOTIDINE 20 MG/1
TABLET, FILM COATED ORAL
Qty: 30 TABLET | Refills: 1 | Status: SHIPPED | OUTPATIENT
Start: 2018-09-21 | End: 2018-09-21 | Stop reason: ALTCHOICE

## 2018-09-21 RX ORDER — GABAPENTIN 400 MG/1
CAPSULE ORAL
Qty: 60 CAPSULE | Refills: 1 | Status: SHIPPED | OUTPATIENT
Start: 2018-09-21 | End: 2018-11-02 | Stop reason: SDUPTHER

## 2018-09-21 RX ORDER — TOPIRAMATE 100 MG/1
100 TABLET, FILM COATED ORAL 2 TIMES DAILY
Qty: 60 TABLET | Refills: 1 | Status: SHIPPED | OUTPATIENT
Start: 2018-09-21 | End: 2018-11-02 | Stop reason: SDUPTHER

## 2018-09-21 RX ORDER — DULOXETIN HYDROCHLORIDE 60 MG/1
60 CAPSULE, DELAYED RELEASE ORAL 2 TIMES DAILY
Qty: 60 CAPSULE | Refills: 1 | Status: SHIPPED | OUTPATIENT
Start: 2018-09-21 | End: 2018-11-02 | Stop reason: SDUPTHER

## 2018-09-21 RX ORDER — SUMATRIPTAN 50 MG/1
TABLET, FILM COATED ORAL
Qty: 9 TABLET | Refills: 0 | Status: SHIPPED | OUTPATIENT
Start: 2018-09-21 | End: 2018-11-02 | Stop reason: SDUPTHER

## 2018-09-21 RX ORDER — PANTOPRAZOLE SODIUM 40 MG/1
40 TABLET, DELAYED RELEASE ORAL 2 TIMES DAILY
Qty: 60 TABLET | Refills: 0 | Status: ON HOLD | OUTPATIENT
Start: 2018-09-21 | End: 2021-09-27 | Stop reason: SDUPTHER

## 2018-09-21 RX ORDER — PANTOPRAZOLE SODIUM 40 MG/1
40 TABLET, DELAYED RELEASE ORAL
Status: DISCONTINUED | OUTPATIENT
Start: 2018-09-22 | End: 2018-09-21 | Stop reason: HOSPADM

## 2018-09-21 RX ORDER — 0.9 % SODIUM CHLORIDE 0.9 %
1000 INTRAVENOUS SOLUTION INTRAVENOUS ONCE
Status: COMPLETED | OUTPATIENT
Start: 2018-09-21 | End: 2018-09-21

## 2018-09-21 RX ORDER — BUPRENORPHINE 5 UG/H
1 PATCH TRANSDERMAL
Qty: 4 PATCH | Refills: 0 | Status: SHIPPED | OUTPATIENT
Start: 2018-09-21 | End: 2018-11-02 | Stop reason: SDUPTHER

## 2018-09-21 RX ADMIN — ONDANSETRON 4 MG: 2 INJECTION INTRAMUSCULAR; INTRAVENOUS at 14:53

## 2018-09-21 RX ADMIN — GLUCAGON HYDROCHLORIDE 0.5 MG: KIT at 14:53

## 2018-09-21 RX ADMIN — SODIUM CHLORIDE 1000 ML: 0.9 INJECTION, SOLUTION INTRAVENOUS at 16:20

## 2018-09-21 ASSESSMENT — ENCOUNTER SYMPTOMS
TROUBLE SWALLOWING: 1
WHEEZING: 0
CONSTIPATION: 0
STRIDOR: 0
ANAL BLEEDING: 0
COLOR CHANGE: 0
PHOTOPHOBIA: 0
CHEST TIGHTNESS: 0
VOMITING: 1
NAUSEA: 1
SHORTNESS OF BREATH: 0
DIARRHEA: 0
ABDOMINAL PAIN: 0
BLOOD IN STOOL: 0
RHINORRHEA: 0

## 2018-09-21 NOTE — H&P
History and Physical / Pre-Sedation Assessment        Nurses notes reviewed and agreed. Medications reviewed  Allergies: Allergies   Allergen Reactions    Tegaderm Ag Mesh 2\"X2\" [Wound Dressings]      \"Holes in skin from Tegaderm Adhesive\"    Codeine Other (See Comments)     hallucinates    Other Other (See Comments)     Holes in skin  From Tegaderm Adhesive    Tape [Adhesive Tape]      Blister         Physical Exam:  Vital Signs: BP (!) 176/89   Pulse 78   Temp 98 °F (36.7 °C) (Oral)   Resp 18   Wt 261 lb (118.4 kg)   SpO2 98%   BMI 33.51 kg/m²    Airway: Mallampati: II (soft palate, uvula, fauces visible)  Pulmonary:Normal  Cardiac:Normal  Abdomen:Normal    Pre-Procedure Assessment / Plan:  ASA: Class 2 - A normal healthy patient with mild systemic disease  Level of Sedation Plan:Mild sedation  Post Procedure plan: Return to same level of care    I assessed the patient and find that the patient is in satisfactory condition to proceed with the planned procedure and sedation plan. I have explained the risk, benefits, and alternatives to the procedure; the patient understands and agrees to proceed.        Naldo Vilchis  9/21/2018

## 2018-09-21 NOTE — PROGRESS NOTES
Patient reports his weight has been stable. He reports his blood sugar has been high. ALLERGIES: Patients list of allergies were reviewed     MEDICATIONS: Mr. Irlanda Chiu list of medications were reviewed. His current medications are   Outpatient Medications Prior to Visit   Medication Sig Dispense Refill    triamcinolone (KENALOG) 0.1 % ointment Apply to thickened affected areas twice daily for up to 2 weeks then once daily for one week, then PRN sparingly for flares 80 g 1    insulin glargine (LANTUS) 100 UNIT/ML injection vial Inject 30 Units into the skin nightly DX CODE E 10.9 15 mL 2    LANTUS 100 UNIT/ML injection vial Inject 30 Units into the skin nightly DX CODE E 10.9 1 vial 2    magnesium oxide (MAG-OX) 400 (241.3 Mg) MG TABS tablet TAKE ONE TABLET BY MOUTH TWICE A DAY 60 tablet 1    topiramate (TOPAMAX) 100 MG tablet Take 1 tablet by mouth 2 times daily 60 tablet 1    DULoxetine (CYMBALTA) 60 MG extended release capsule Take 1 capsule by mouth 2 times daily 60 capsule 1    SUMAtriptan (IMITREX) 50 MG tablet Take one tab po at the start of migraine PRN max 1 every 24 hours 9 tablet 0    famotidine (PEPCID) 20 MG tablet TAKE ONE TABLET BY MOUTH DAILY 30 tablet 1    gabapentin (NEURONTIN) 400 MG capsule Take one tablet Po BID. 60 capsule 1    traMADol (ULTRAM) 50 MG tablet Take 1 tablet by mouth every 6 hours as needed for Pain (max 1-2 per day) for up to 28 days. . 50 tablet 0    QUEtiapine (SEROQUEL) 25 MG tablet Take 1-2 tablets by mouth nightly 60 tablet 1    buprenorphine (BUTRANS) 5 MCG/HR PTWK Place 1 patch onto the skin every 7 days for 28 days. . 4 patch 0    blood glucose test strips (ONE TOUCH ULTRA TEST) strip Test BS 3 times a day. 100 strip 6    fluticasone (FLONASE) 50 MCG/ACT nasal spray SPRAY TWO SPRAYS IN EACH NOSTRIL ONCE DAILY 1 Bottle 5    mupirocin (BACTROBAN NASAL) 2 % nasal ointment Take by Nasal route 2 times daily.  1 Tube 1    hydrocortisone 2.5 % cream Apply Butrans and using Ultram PRN only  -He was advised weight reduction, diet changes- 800-1200 [place] diet, diet diary, exercising, nutritional  consult increased physical activity as tolerated  -Monitor blood sugar regularly, diabetic control- adv diabetic diet. Goal for fasting blood sugars around 120. Follow up with Endocrinologist/PCP also for on going management   -Discussed use, benefit, and side effects of prescribed medications. Barriers to medication compliance addressed. All patient questions answered. Pt voiced understanding. -f/u with ER/urgent care for GI issues     Current Outpatient Prescriptions   Medication Sig Dispense Refill    triamcinolone (KENALOG) 0.1 % ointment Apply to thickened affected areas twice daily for up to 2 weeks then once daily for one week, then PRN sparingly for flares 80 g 1    insulin glargine (LANTUS) 100 UNIT/ML injection vial Inject 30 Units into the skin nightly DX CODE E 10.9 15 mL 2    LANTUS 100 UNIT/ML injection vial Inject 30 Units into the skin nightly DX CODE E 10.9 1 vial 2    magnesium oxide (MAG-OX) 400 (241.3 Mg) MG TABS tablet TAKE ONE TABLET BY MOUTH TWICE A DAY 60 tablet 1    topiramate (TOPAMAX) 100 MG tablet Take 1 tablet by mouth 2 times daily 60 tablet 1    DULoxetine (CYMBALTA) 60 MG extended release capsule Take 1 capsule by mouth 2 times daily 60 capsule 1    SUMAtriptan (IMITREX) 50 MG tablet Take one tab po at the start of migraine PRN max 1 every 24 hours 9 tablet 0    famotidine (PEPCID) 20 MG tablet TAKE ONE TABLET BY MOUTH DAILY 30 tablet 1    gabapentin (NEURONTIN) 400 MG capsule Take one tablet Po BID. 60 capsule 1    traMADol (ULTRAM) 50 MG tablet Take 1 tablet by mouth every 6 hours as needed for Pain (max 1-2 per day) for up to 28 days. . 50 tablet 0    QUEtiapine (SEROQUEL) 25 MG tablet Take 1-2 tablets by mouth nightly 60 tablet 1    buprenorphine (BUTRANS) 5 MCG/HR PTWK Place 1 patch onto the skin every 7 days for 28 days. . 4 patch performance, general activity, work or disability,emotional distress, health care utilization and  decreased medication consumption. Will continue to monitor progress towards achieving/maintaining therapeutic goals with special emphasis on  1. Improvement in perceived interfernce  of pain with ADL's. Ability to do home exercises independently. Ability to do household chores indoor and/or outdoor work and social and leisure activities. Improve psychosocial and physical functioning. - he is showing progression towards this treatment goal with the current regimen. He was advised against drinking alcohol with the narcotic pain medicines, advised against driving or handling machinery while adjusting the dose of medicines or if having cognitive  issues related to the current medications. Risk of overdose and death, if medicines not taken as prescribed, were also discussed. If the patient develops new symptoms or if the symptoms worsen, the patient should call the office. While transcribing every attempt was made to maintain the accuracy of the note in terms of it's contents,there may have been some errors made inadvertently. Thank you for allowing me to participate in the care of this patient. Karen Gonzalez MD.    Cc: MD MARY Palmer, Rosmery Monet, scribing for in the presence  of Dr. Karen Gonzalez.   09/21/18  1:33 PM  Elodia Damon.  Adrian Rhodes Assistant  I, Dr. Karen Gonzalez, personally performed the services described in this documentation as scribed by  Rosmery Monet MA in my presence and it is both accurate and complete

## 2018-10-03 ENCOUNTER — TELEPHONE (OUTPATIENT)
Dept: INTERNAL MEDICINE CLINIC | Age: 43
End: 2018-10-03

## 2018-10-15 RX ORDER — LANCETS
1 EACH MISCELLANEOUS DAILY
Qty: 300 EACH | Refills: 2 | Status: SHIPPED | OUTPATIENT
Start: 2018-10-15 | End: 2020-06-15 | Stop reason: SDUPTHER

## 2018-10-22 ENCOUNTER — TELEPHONE (OUTPATIENT)
Dept: ENDOCRINOLOGY | Age: 43
End: 2018-10-22

## 2018-11-01 PROBLEM — E10.3599 PROLIFERATIVE DIABETIC RETINOPATHY ASSOCIATED WITH TYPE 1 DIABETES MELLITUS (HCC): Status: ACTIVE | Noted: 2018-11-01

## 2018-11-01 PROBLEM — E10.42 POLYNEUROPATHY DUE TO TYPE 1 DIABETES MELLITUS (HCC): Status: ACTIVE | Noted: 2018-11-01

## 2018-11-02 ENCOUNTER — OFFICE VISIT (OUTPATIENT)
Dept: PAIN MANAGEMENT | Age: 43
End: 2018-11-02
Payer: MEDICARE

## 2018-11-02 VITALS
HEART RATE: 75 BPM | BODY MASS INDEX: 33.51 KG/M2 | WEIGHT: 261 LBS | DIASTOLIC BLOOD PRESSURE: 68 MMHG | SYSTOLIC BLOOD PRESSURE: 115 MMHG

## 2018-11-02 DIAGNOSIS — G63 POLYNEUROPATHY ASSOCIATED WITH UNDERLYING DISEASE (HCC): ICD-10-CM

## 2018-11-02 DIAGNOSIS — G89.4 CHRONIC PAIN SYNDROME: ICD-10-CM

## 2018-11-02 DIAGNOSIS — M79.2 NEUROPATHIC PAIN: ICD-10-CM

## 2018-11-02 DIAGNOSIS — M79.18 MYOFASCIAL PAIN: ICD-10-CM

## 2018-11-02 DIAGNOSIS — L97.522 ULCER OF LEFT FOOT, WITH FAT LAYER EXPOSED (HCC): ICD-10-CM

## 2018-11-02 PROCEDURE — 99213 OFFICE O/P EST LOW 20 MIN: CPT | Performed by: INTERNAL MEDICINE

## 2018-11-02 PROCEDURE — 1036F TOBACCO NON-USER: CPT | Performed by: INTERNAL MEDICINE

## 2018-11-02 PROCEDURE — G8427 DOCREV CUR MEDS BY ELIG CLIN: HCPCS | Performed by: INTERNAL MEDICINE

## 2018-11-02 PROCEDURE — G8417 CALC BMI ABV UP PARAM F/U: HCPCS | Performed by: INTERNAL MEDICINE

## 2018-11-02 PROCEDURE — G8484 FLU IMMUNIZE NO ADMIN: HCPCS | Performed by: INTERNAL MEDICINE

## 2018-11-02 RX ORDER — SUMATRIPTAN 50 MG/1
TABLET, FILM COATED ORAL
Qty: 9 TABLET | Refills: 0 | Status: SHIPPED | OUTPATIENT
Start: 2018-11-02 | End: 2018-11-30 | Stop reason: SDUPTHER

## 2018-11-02 RX ORDER — DULOXETIN HYDROCHLORIDE 60 MG/1
60 CAPSULE, DELAYED RELEASE ORAL 2 TIMES DAILY
Qty: 60 CAPSULE | Refills: 1 | Status: SHIPPED | OUTPATIENT
Start: 2018-11-02 | End: 2018-11-30 | Stop reason: SDUPTHER

## 2018-11-02 RX ORDER — TRAMADOL HYDROCHLORIDE 50 MG/1
50 TABLET ORAL EVERY 6 HOURS PRN
Qty: 30 TABLET | Refills: 0 | Status: SHIPPED | OUTPATIENT
Start: 2018-11-02 | End: 2018-11-30 | Stop reason: SDUPTHER

## 2018-11-02 RX ORDER — QUETIAPINE FUMARATE 25 MG/1
25-50 TABLET, FILM COATED ORAL NIGHTLY
Qty: 60 TABLET | Refills: 1 | Status: SHIPPED | OUTPATIENT
Start: 2018-11-02 | End: 2018-11-30 | Stop reason: SDUPTHER

## 2018-11-02 RX ORDER — TOPIRAMATE 100 MG/1
100 TABLET, FILM COATED ORAL 2 TIMES DAILY
Qty: 60 TABLET | Refills: 1 | Status: SHIPPED | OUTPATIENT
Start: 2018-11-02 | End: 2018-11-08 | Stop reason: DRUGHIGH

## 2018-11-02 RX ORDER — BUPRENORPHINE 5 UG/H
1 PATCH TRANSDERMAL
Qty: 4 PATCH | Refills: 0 | Status: SHIPPED | OUTPATIENT
Start: 2018-11-02 | End: 2018-11-30 | Stop reason: SDUPTHER

## 2018-11-02 RX ORDER — GABAPENTIN 400 MG/1
CAPSULE ORAL
Qty: 60 CAPSULE | Refills: 1 | Status: SHIPPED | OUTPATIENT
Start: 2018-11-02 | End: 2018-11-30 | Stop reason: SDUPTHER

## 2018-11-02 NOTE — PROGRESS NOTES
TABS tablet TAKE ONE TABLET BY MOUTH TWICE A DAY 60 tablet 1    topiramate (TOPAMAX) 100 MG tablet Take 1 tablet by mouth 2 times daily 60 tablet 1    DULoxetine (CYMBALTA) 60 MG extended release capsule Take 1 capsule by mouth 2 times daily 60 capsule 1    SUMAtriptan (IMITREX) 50 MG tablet Take one tab po at the start of migraine PRN max 1 every 24 hours 9 tablet 0    QUEtiapine (SEROQUEL) 25 MG tablet Take 1-2 tablets by mouth nightly 60 tablet 1    pantoprazole (PROTONIX) 40 MG tablet Take 1 tablet by mouth 2 times daily 60 tablet 0    triamcinolone (KENALOG) 0.1 % ointment Apply to thickened affected areas twice daily for up to 2 weeks then once daily for one week, then PRN sparingly for flares 80 g 1    insulin glargine (LANTUS) 100 UNIT/ML injection vial Inject 30 Units into the skin nightly DX CODE E 10.9 15 mL 2    LANTUS 100 UNIT/ML injection vial Inject 30 Units into the skin nightly DX CODE E 10.9 1 vial 2    blood glucose test strips (ONE TOUCH ULTRA TEST) strip Test BS 3 times a day. 100 strip 6    fluticasone (FLONASE) 50 MCG/ACT nasal spray SPRAY TWO SPRAYS IN EACH NOSTRIL ONCE DAILY 1 Bottle 5    mupirocin (BACTROBAN NASAL) 2 % nasal ointment Take by Nasal route 2 times daily. 1 Tube 1    hydrocortisone 2.5 % cream Apply topically 2 times daily.  30 g 0    Blood Glucose Monitoring Suppl (ACCU-CHEK CAPO PLUS) w/Device KIT 1 each by Does not apply route daily Test blood sugar 10 times daily Dx code E 10.8 1 kit 10    glucose blood VI test strips (ACCU-CHEK CAPO PLUS) strip 1 each by In Vitro route daily Test blood glucose 10 times daily Dx Code E10.8 300 each 10    Insulin Syringe-Needle U-100 (B-D INS SYRINGE 0.5CC/31GX5/16) 31G X 5/16\" 0.5 ML MISC USE ONE SYRINGE FOUR TIMES A DAY BEFORE MEALS AND NIGHTLY 120 each 10    insulin glulisine (APIDRA) 100 UNIT/ML injection INJECT 8-12 UNITS UNDER THE SKIN THREE TIMES A DAY WITH MEALS 20 mL 4    cephALEXin (KEFLEX) 500 MG capsule TAKE 1 CODE E 10.8) 1 kit 0    propranolol (INDERAL) 80 MG tablet Take 40 mg by mouth 2 times daily For migraines       Flaxseed, Linseed, (FLAX PO) Take 14 g by mouth daily.  gabapentin (NEURONTIN) 400 MG capsule Take one tablet Po BID. 60 capsule 1    traMADol (ULTRAM) 50 MG tablet Take 1 tablet by mouth every 6 hours as needed for Pain (max 1-2 per day) for up to 28 days. . 50 tablet 0    glucagon, rDNA, (GLUCAGEN HYPOKIT) 1 MG SOLR injection Inject 1 mg into the skin once for 1 dose 1 each 0     No current facility-administered medications for this visit. I will continue his current medication regimen  which is part of the above treatment schedule. It has been helping with Mr. Katerina Valentin chronic  medical problems which for this visit include:   Diagnoses of Chronic pain syndrome, Neuropathic pain, Primary insomnia, Recurrent major depressive disorder, in partial remission ([facility] [address] Chronic migraine without aura, with intractable migraine, so stated, with status migrainosus, and Myofascial pain were pertinent to this visit. Risks and benefits of the medications and other alternative treatments  including no treatment were discussed with the patient. The common side effects of these medications were also explained to the patient. Informed verbal consent was obtained. Goals of current treatment regimen include improvement in pain, restoration of functioning- with focus on improvement in physical performance, general activity, work or disability,emotional distress, health care utilization and  decreased medication consumption. Will continue to monitor progress towards achieving/maintaining therapeutic goals with special emphasis on  1. Improvement in perceived interfernce  of pain with ADL's. Ability to do home exercises independently. Ability to do household chores indoor and/or outdoor work and social and leisure activities. Improve psychosocial and physical functioning. - he is showing progression towards this treatment goal with the current regimen. He was advised against drinking alcohol with the narcotic pain medicines, advised against driving or handling machinery while adjusting the dose of medicines or if having cognitive  issues related to the current medications. Risk of overdose and death, if medicines not taken as prescribed, were also discussed. If the patient develops new symptoms or if the symptoms worsen, the patient should call the office. While transcribing every attempt was made to maintain the accuracy of the note in terms of it's contents,there may have been some errors made inadvertently. Thank you for allowing me to participate in the care of this patient.     Amy Terry MD.    Cc: Robert Pryor MD

## 2018-11-08 ENCOUNTER — OFFICE VISIT (OUTPATIENT)
Dept: ENDOCRINOLOGY | Age: 43
End: 2018-11-08
Payer: MEDICARE

## 2018-11-08 VITALS
OXYGEN SATURATION: 98 % | DIASTOLIC BLOOD PRESSURE: 73 MMHG | HEART RATE: 72 BPM | HEIGHT: 74 IN | BODY MASS INDEX: 33.86 KG/M2 | RESPIRATION RATE: 14 BRPM | WEIGHT: 263.8 LBS | SYSTOLIC BLOOD PRESSURE: 123 MMHG

## 2018-11-08 DIAGNOSIS — N18.30 TYPE 1 DIABETES MELLITUS WITH STAGE 3 CHRONIC KIDNEY DISEASE (HCC): Primary | ICD-10-CM

## 2018-11-08 DIAGNOSIS — I10 ESSENTIAL HYPERTENSION: ICD-10-CM

## 2018-11-08 DIAGNOSIS — E78.2 MIXED HYPERLIPIDEMIA: ICD-10-CM

## 2018-11-08 DIAGNOSIS — E10.22 TYPE 1 DIABETES MELLITUS WITH STAGE 3 CHRONIC KIDNEY DISEASE (HCC): Primary | ICD-10-CM

## 2018-11-08 LAB — HBA1C MFR BLD: 9 %

## 2018-11-08 PROCEDURE — 83036 HEMOGLOBIN GLYCOSYLATED A1C: CPT | Performed by: INTERNAL MEDICINE

## 2018-11-08 PROCEDURE — 99214 OFFICE O/P EST MOD 30 MIN: CPT | Performed by: INTERNAL MEDICINE

## 2018-11-08 PROCEDURE — 2022F DILAT RTA XM EVC RTNOPTHY: CPT | Performed by: INTERNAL MEDICINE

## 2018-11-08 PROCEDURE — G8428 CUR MEDS NOT DOCUMENT: HCPCS | Performed by: INTERNAL MEDICINE

## 2018-11-08 PROCEDURE — 1036F TOBACCO NON-USER: CPT | Performed by: INTERNAL MEDICINE

## 2018-11-08 PROCEDURE — G8484 FLU IMMUNIZE NO ADMIN: HCPCS | Performed by: INTERNAL MEDICINE

## 2018-11-08 PROCEDURE — 3045F PR MOST RECENT HEMOGLOBIN A1C LEVEL 7.0-9.0%: CPT | Performed by: INTERNAL MEDICINE

## 2018-11-08 PROCEDURE — G8417 CALC BMI ABV UP PARAM F/U: HCPCS | Performed by: INTERNAL MEDICINE

## 2018-11-08 NOTE — PROGRESS NOTES
negative.  c-peptide  Undetectable. A1c 9.6 %----> 8.4 --->8.9 %--->8.2 %--->7.1 %--->7.1 %---> 6.8 % --> 6.8 %--> 6.5 %---> 7.1 %---> 7.1 %---> 7.5 %---> 7.7 % ---> 7.4 %---> 11 % ---> 9 %       Having fasting hypoglycemia. -Decrease Lantus insulin 28 units QHS   -Increase Apidra to 7 units TID with meals + SSI      He does not want to use insulin pump. Updated on eye exam. continue follow-up with the ophthalmologist.  Has microalbuminuria. On Ace-inhibitor. Will repeat. Has peripheral neuropathy on exam. Discussed foot care    Non-smoker. 2. Hypertension. BP at goal.      3. CKD Follows with nephrologist. Dr. Jacquelyn Bennett      4. Hyperlipidemia. LDL is high. Had a detailed discussion explaining the high risk of heart disease, stroke and microvascular complications with high LDL. Recommended statins but he refused as his mother and sister had bad reaction. He understands the high risk of heart disease and stroke with untreated hyperlipidemia. On fish oil. 5. Foot ulcers. Managed by podiatry.  Dr. Quevedo Finger [facility])

## 2018-11-29 RX ORDER — GLUCAGON HYDROCHLORIDE 1 MG
KIT INJECTION
Qty: 1 EACH | Refills: 2 | Status: SHIPPED | OUTPATIENT
Start: 2018-11-29 | End: 2020-10-28

## 2018-11-30 ENCOUNTER — OFFICE VISIT (OUTPATIENT)
Dept: PAIN MANAGEMENT | Age: 43
End: 2018-11-30
Payer: MEDICARE

## 2018-11-30 VITALS
WEIGHT: 285 LBS | SYSTOLIC BLOOD PRESSURE: 114 MMHG | HEART RATE: 70 BPM | BODY MASS INDEX: 36.59 KG/M2 | DIASTOLIC BLOOD PRESSURE: 75 MMHG

## 2018-11-30 DIAGNOSIS — G62.9 PERIPHERAL POLYNEUROPATHY: ICD-10-CM

## 2018-11-30 DIAGNOSIS — F51.01 PRIMARY INSOMNIA: ICD-10-CM

## 2018-11-30 DIAGNOSIS — G89.4 CHRONIC PAIN SYNDROME: ICD-10-CM

## 2018-11-30 DIAGNOSIS — G63 POLYNEUROPATHY ASSOCIATED WITH UNDERLYING DISEASE (HCC): ICD-10-CM

## 2018-11-30 DIAGNOSIS — L97.522 ULCER OF LEFT FOOT, WITH FAT LAYER EXPOSED (HCC): ICD-10-CM

## 2018-11-30 DIAGNOSIS — G43.711 CHRONIC MIGRAINE WITHOUT AURA, WITH INTRACTABLE MIGRAINE, SO STATED, WITH STATUS MIGRAINOSUS: ICD-10-CM

## 2018-11-30 DIAGNOSIS — M79.18 MYOFASCIAL PAIN: ICD-10-CM

## 2018-11-30 DIAGNOSIS — F33.41 RECURRENT MAJOR DEPRESSIVE DISORDER, IN PARTIAL REMISSION (HCC): ICD-10-CM

## 2018-11-30 DIAGNOSIS — M79.2 NEUROPATHIC PAIN: ICD-10-CM

## 2018-11-30 PROCEDURE — 99214 OFFICE O/P EST MOD 30 MIN: CPT | Performed by: INTERNAL MEDICINE

## 2018-11-30 PROCEDURE — G8427 DOCREV CUR MEDS BY ELIG CLIN: HCPCS | Performed by: INTERNAL MEDICINE

## 2018-11-30 PROCEDURE — G8484 FLU IMMUNIZE NO ADMIN: HCPCS | Performed by: INTERNAL MEDICINE

## 2018-11-30 PROCEDURE — G8417 CALC BMI ABV UP PARAM F/U: HCPCS | Performed by: INTERNAL MEDICINE

## 2018-11-30 PROCEDURE — 1036F TOBACCO NON-USER: CPT | Performed by: INTERNAL MEDICINE

## 2018-11-30 RX ORDER — SUMATRIPTAN 50 MG/1
TABLET, FILM COATED ORAL
Qty: 9 TABLET | Refills: 0 | Status: SHIPPED | OUTPATIENT
Start: 2018-11-30 | End: 2018-12-27 | Stop reason: SDUPTHER

## 2018-11-30 RX ORDER — TRAMADOL HYDROCHLORIDE 50 MG/1
50 TABLET ORAL EVERY 6 HOURS PRN
Qty: 45 TABLET | Refills: 0 | Status: SHIPPED | OUTPATIENT
Start: 2018-11-30 | End: 2018-12-27 | Stop reason: SDUPTHER

## 2018-11-30 RX ORDER — GABAPENTIN 400 MG/1
CAPSULE ORAL
Qty: 60 CAPSULE | Refills: 1 | Status: SHIPPED | OUTPATIENT
Start: 2018-11-30 | End: 2018-12-27 | Stop reason: SDUPTHER

## 2018-11-30 RX ORDER — TIZANIDINE 4 MG/1
TABLET ORAL
Qty: 60 TABLET | Refills: 0 | Status: SHIPPED | OUTPATIENT
Start: 2018-11-30 | End: 2018-12-27 | Stop reason: SDUPTHER

## 2018-11-30 RX ORDER — DULOXETIN HYDROCHLORIDE 60 MG/1
60 CAPSULE, DELAYED RELEASE ORAL 2 TIMES DAILY
Qty: 60 CAPSULE | Refills: 1 | Status: SHIPPED | OUTPATIENT
Start: 2018-11-30 | End: 2018-12-27 | Stop reason: SDUPTHER

## 2018-11-30 RX ORDER — BUPRENORPHINE 5 UG/H
1 PATCH TRANSDERMAL
Qty: 4 PATCH | Refills: 0 | Status: SHIPPED | OUTPATIENT
Start: 2018-11-30 | End: 2018-12-27 | Stop reason: SDUPTHER

## 2018-11-30 RX ORDER — QUETIAPINE FUMARATE 25 MG/1
25-50 TABLET, FILM COATED ORAL NIGHTLY
Qty: 60 TABLET | Refills: 1 | Status: SHIPPED | OUTPATIENT
Start: 2018-11-30 | End: 2018-12-27 | Stop reason: SDUPTHER

## 2018-11-30 NOTE — PROGRESS NOTES
Rosanne Holbrook  1975  [id number]    HISTORY OF PRESENT ILLNESS:  Mr. Keanu Mayorga is a 41 y.o. male returns for a follow up visit for multiple medical problems. His  presenting problems are   1. Chronic pain syndrome    2. Peripheral polyneuropathy    3. Neuropathic pain    4. Primary insomnia    5. Recurrent major depressive disorder, in partial remission ([facility] [address]    6. Chronic migraine without aura, with intractable migraine, so stated, with status migrainosus    7. Myofascial pain    . As per information/history obtained from the PADT(patient assessment and documentation tool) -  He complains of pain in the head and knees Left with radiation to the hands Left, lower leg Left, ankles Left and feet Left He rates the pain 9/10 and describes it as aching, throbbing. Pain is made worse by: movement, walking, standing. Current treatment regimen has helped relieve about 10% of the pain. He denies side effects from the current pain regimen. Patient reports that since the last follow up visit the physical functioning is unchanged, family/social relationships are unchanged, mood is better and sleep patterns are better, and that the overall functioning is unchanged. Patient denies neurological bowel or bladder. Patient denies misusing/abusing his narcotic pain medications or using any illegal drugs. There are No indicators for possible drug abuse, addiction or diversion problems. Upon obtaining the medical history from Mr. Keanu Mayorga regarding today's office visit for his presenting problems, patient states he is having increase headaches/migraines. He complains of increased pain with changes in weather. Extreme temperatures- cold and damp weather causes increased pain. Mr. Keanu Mayorga states he has been using Imitrex still along with Neurontin, he says it was helping with the headaches before. He mentions his leg pain is hurting more. Patient states he sleeps well. Has normal sleep latency.  Averages about 5-7 hours of sleep a

## 2018-12-12 RX ORDER — INSULIN GLARGINE 100 [IU]/ML
28 INJECTION, SOLUTION SUBCUTANEOUS NIGHTLY
Qty: 1 VIAL | Refills: 2 | Status: SHIPPED | OUTPATIENT
Start: 2018-12-12 | End: 2019-04-06 | Stop reason: SDUPTHER

## 2018-12-12 RX ORDER — INSULIN GLULISINE 100 [IU]/ML
INJECTION, SOLUTION SUBCUTANEOUS
Qty: 20 ML | Refills: 3 | Status: SHIPPED | OUTPATIENT
Start: 2018-12-12 | End: 2019-06-20 | Stop reason: SDUPTHER

## 2018-12-27 ENCOUNTER — OFFICE VISIT (OUTPATIENT)
Dept: PAIN MANAGEMENT | Age: 43
End: 2018-12-27
Payer: MEDICARE

## 2018-12-27 VITALS
WEIGHT: 277 LBS | HEART RATE: 76 BPM | BODY MASS INDEX: 35.56 KG/M2 | DIASTOLIC BLOOD PRESSURE: 64 MMHG | SYSTOLIC BLOOD PRESSURE: 110 MMHG

## 2018-12-27 DIAGNOSIS — L97.522 ULCER OF LEFT FOOT, WITH FAT LAYER EXPOSED (HCC): ICD-10-CM

## 2018-12-27 DIAGNOSIS — M79.2 NEUROPATHIC PAIN: ICD-10-CM

## 2018-12-27 DIAGNOSIS — G89.4 CHRONIC PAIN SYNDROME: ICD-10-CM

## 2018-12-27 DIAGNOSIS — G63 POLYNEUROPATHY ASSOCIATED WITH UNDERLYING DISEASE (HCC): ICD-10-CM

## 2018-12-27 DIAGNOSIS — G43.711 CHRONIC MIGRAINE WITHOUT AURA, WITH INTRACTABLE MIGRAINE, SO STATED, WITH STATUS MIGRAINOSUS: ICD-10-CM

## 2018-12-27 DIAGNOSIS — F51.01 PRIMARY INSOMNIA: ICD-10-CM

## 2018-12-27 DIAGNOSIS — M79.18 MYOFASCIAL PAIN: ICD-10-CM

## 2018-12-27 DIAGNOSIS — E10.42 POLYNEUROPATHY DUE TO TYPE 1 DIABETES MELLITUS (HCC): ICD-10-CM

## 2018-12-27 DIAGNOSIS — F33.41 RECURRENT MAJOR DEPRESSIVE DISORDER, IN PARTIAL REMISSION (HCC): ICD-10-CM

## 2018-12-27 PROCEDURE — G8417 CALC BMI ABV UP PARAM F/U: HCPCS | Performed by: INTERNAL MEDICINE

## 2018-12-27 PROCEDURE — 1036F TOBACCO NON-USER: CPT | Performed by: INTERNAL MEDICINE

## 2018-12-27 PROCEDURE — G8427 DOCREV CUR MEDS BY ELIG CLIN: HCPCS | Performed by: INTERNAL MEDICINE

## 2018-12-27 PROCEDURE — 2022F DILAT RTA XM EVC RTNOPTHY: CPT | Performed by: INTERNAL MEDICINE

## 2018-12-27 PROCEDURE — G8484 FLU IMMUNIZE NO ADMIN: HCPCS | Performed by: INTERNAL MEDICINE

## 2018-12-27 PROCEDURE — 99214 OFFICE O/P EST MOD 30 MIN: CPT | Performed by: INTERNAL MEDICINE

## 2018-12-27 PROCEDURE — 3045F PR MOST RECENT HEMOGLOBIN A1C LEVEL 7.0-9.0%: CPT | Performed by: INTERNAL MEDICINE

## 2018-12-27 RX ORDER — QUETIAPINE FUMARATE 25 MG/1
25-50 TABLET, FILM COATED ORAL NIGHTLY
Qty: 60 TABLET | Refills: 1 | Status: SHIPPED | OUTPATIENT
Start: 2018-12-27 | End: 2019-01-24 | Stop reason: SDUPTHER

## 2018-12-27 RX ORDER — DULOXETIN HYDROCHLORIDE 60 MG/1
60 CAPSULE, DELAYED RELEASE ORAL 2 TIMES DAILY
Qty: 60 CAPSULE | Refills: 1 | Status: SHIPPED | OUTPATIENT
Start: 2018-12-27 | End: 2019-01-24 | Stop reason: SDUPTHER

## 2018-12-27 RX ORDER — TIZANIDINE 4 MG/1
TABLET ORAL
Qty: 60 TABLET | Refills: 0 | Status: SHIPPED | OUTPATIENT
Start: 2018-12-27 | End: 2019-01-24 | Stop reason: SDUPTHER

## 2018-12-27 RX ORDER — TRAMADOL HYDROCHLORIDE 50 MG/1
50 TABLET ORAL EVERY 6 HOURS PRN
Qty: 56 TABLET | Refills: 0 | Status: SHIPPED | OUTPATIENT
Start: 2018-12-27 | End: 2019-01-24 | Stop reason: SDUPTHER

## 2018-12-27 RX ORDER — GABAPENTIN 400 MG/1
CAPSULE ORAL
Qty: 60 CAPSULE | Refills: 1 | Status: SHIPPED | OUTPATIENT
Start: 2018-12-27 | End: 2019-01-24 | Stop reason: SDUPTHER

## 2018-12-27 RX ORDER — SUMATRIPTAN 50 MG/1
TABLET, FILM COATED ORAL
Qty: 9 TABLET | Refills: 0 | Status: SHIPPED | OUTPATIENT
Start: 2018-12-27 | End: 2019-01-24 | Stop reason: SDUPTHER

## 2018-12-27 RX ORDER — TOPIRAMATE 25 MG/1
25 TABLET ORAL 2 TIMES DAILY
Qty: 60 TABLET | Refills: 0 | Status: SHIPPED | OUTPATIENT
Start: 2018-12-27 | End: 2019-01-24 | Stop reason: SDUPTHER

## 2018-12-27 RX ORDER — BUPRENORPHINE 5 UG/H
1 PATCH TRANSDERMAL
Qty: 4 PATCH | Refills: 0 | Status: SHIPPED | OUTPATIENT
Start: 2018-12-27 | End: 2019-01-24 | Stop reason: SDUPTHER

## 2018-12-27 NOTE — PROGRESS NOTES
Jean Carlos Mcbride  1975  [id number]    HISTORY OF PRESENT ILLNESS:  Mr. Brant Pham is a 41 y.o. male returns for a follow up visit for multiple medical problems. His  presenting problems are   1. Chronic migraine without aura, with intractable migraine, so stated, with status migrainosus    2. Chronic pain syndrome    3. Neuropathic pain    4. Primary insomnia    5. Recurrent major depressive disorder, in partial remission ([address]    6. Myofascial pain    7. Polyneuropathy due to type 1 diabetes mellitus ([address]    8. Polyneuropathy associated with underlying disease ([address]    9. Ulcer of left foot, with fat layer exposed ([facility] [address]    . As per information/history obtained from the PADT(patient assessment and documentation tool) -  He complains of pain in the head, neck, shoulders Bilateral, elbows Bilateral, upper back, mid back, lower back and knees Left with radiation to the arms Bilateral, hands Bilateral, buttocks, hips Bilateral, lower leg Bilateral, ankles Left and feet Left He rates the pain 9/10 and describes it as aching, throbbing, numbness. Pain is made worse by: movement, walking, standing. Current treatment regimen has helped relieve about 10% of the pain. He denies side effects from the current pain regimen. Patient reports that since the last follow up visit the physical functioning is unchanged, family/social relationships are unchanged, mood is better and sleep patterns are better, and that the overall functioning is unchanged. Patient denies neurological bowel or bladder. Patient denies misusing/abusing his narcotic pain medications or using any illegal drugs. There are No indicators for possible drug abuse, addiction or diversion problems. Upon obtaining the medical history from Mr. Brant Pham regarding today's office visit for his presenting problems, patient states he is having increase migraine headaches, almost 4-5 per week. Mr. Brant Pham says he is using Zanaflex and Imitrex which is not helping as much. INJECT 1 MG INTO THE SKIN ONCE FOR ONE DOSE 1 each 2    ACCU-CHEK FASTCLIX LANCETS MISC 1 each by Does not apply route daily Test blood glucose 10 times daily Dx Code E 10.8 300 each 2    pantoprazole (PROTONIX) 40 MG tablet Take 1 tablet by mouth 2 times daily 60 tablet 0    triamcinolone (KENALOG) 0.1 % ointment Apply to thickened affected areas twice daily for up to 2 weeks then once daily for one week, then PRN sparingly for flares 80 g 1    fluticasone (FLONASE) 50 MCG/ACT nasal spray SPRAY TWO SPRAYS IN EACH NOSTRIL ONCE DAILY 1 Bottle 5    hydrocortisone 2.5 % cream Apply topically 2 times daily. 30 g 0    Blood Glucose Monitoring Suppl (ACCU-CHEK CAPO PLUS) w/Device KIT 1 each by Does not apply route daily Test blood sugar 10 times daily Dx code E 10.8 1 kit 10    glucose blood VI test strips (ACCU-CHEK CAPO PLUS) strip 1 each by In Vitro route daily Test blood glucose 10 times daily Dx Code E10.8 300 each 10    Insulin Syringe-Needle U-100 (B-D INS SYRINGE 0.5CC/31GX5/16) 31G X 5/16\" 0.5 ML MISC USE ONE SYRINGE FOUR TIMES A DAY BEFORE MEALS AND NIGHTLY 120 each 10    cephALEXin (KEFLEX) 500 MG capsule TAKE 1 CAPSULE BY MOUTH TWO TIMES A  capsule 5    GLUCAGON EMERGENCY 1 MG injection INJECT 1MG INTO THE MUSCLE AS NEEDED 1 kit 3    Insulin Syringe-Needle U-100 (B-D INS SYR ULTRAFINE 1CC/31G) 31G X 5/16\" 1 ML MISC INJECT USING INSULIN ONCE DAILY 30 each 10    Multiple Vitamin (DAILY KITTY) TABS TAKE ONE TABLET BY MOUTH DAILY 30 tablet 5    Cholecalciferol (VITAMIN D3) 5000 units CAPS Take 1 capsule by mouth Daily 30 capsule 3    Dextromethorphan-Guaifenesin (MUCINEX DM)  MG TB12 Cough and congestion. 60 tablet 1    omega-3 acid ethyl esters (LOVAZA) 1 G capsule TAKE TWO CAPSULES BY MOUTH TWICE DAILY 120 capsule 5    MUCUS RELIEF DM  MG TABS TAKE ONE BY MOUTH DAILY AS NEEDED 30 tablet 1    Alcohol Swabs PADS Use as needed for insulin injection.  100 each 5    Probiotic Product (ACIDOPHILUS PROBIOTIC) CAPS capsule TAKE ONE CAPSULE BY MOUTH DAILY 28 capsule 4    polyvinyl alcohol (LIQUIFILM TEARS) 1.4 % ophthalmic solution 1 drop as needed      propranolol (INDERAL) 80 MG tablet Take 40 mg by mouth 2 times daily For migraines       Flaxseed, Linseed, (FLAX PO) Take 14 g by mouth daily.  magnesium oxide (MAG-OX) 400 (241.3 Mg) MG TABS tablet TAKE ONE TABLET BY MOUTH TWICE A DAY 60 tablet 1    DULoxetine (CYMBALTA) 60 MG extended release capsule Take 1 capsule by mouth 2 times daily 60 capsule 1    SUMAtriptan (IMITREX) 50 MG tablet Take one tab po at the start of migraine PRN max 1 every 24 hours 9 tablet 0    gabapentin (NEURONTIN) 400 MG capsule Take one tablet Po BID. 60 capsule 1    QUEtiapine (SEROQUEL) 25 MG tablet Take 1-2 tablets by mouth nightly 60 tablet 1    traMADol (ULTRAM) 50 MG tablet Take 1 tablet by mouth every 6 hours as needed for Pain (max 1-2 per day) for up to 28 days. . 45 tablet 0    buprenorphine (BUTRANS) 5 MCG/HR PTWK Place 1 patch onto the skin every 7 days for 28 days. . 4 patch 0    mupirocin (BACTROBAN NASAL) 2 % nasal ointment Take by Nasal route 2 times daily. 1 Tube 1    tiZANidine (ZANAFLEX) 4 MG tablet Take 1/2 PO AM and 1 PO PM 60 tablet 0     No facility-administered medications prior to visit. REVIEW OF SYSTEMS:   Constitutional: Negative for fatigue and unexpected weight change. Eyes: Negative for visual disturbance. Respiratory: Negative for shortness of breath. Cardiovascular: Negative for chest pain, palpitations  Gastrointestinal: Negative for blood in stool, abdominal distention, nausea, vomiting, abdominal pain, diarrhea,constipation. Skin: Negative for color change or any abnormal bruising.    Neurological: Negative for speech difficulty, weakness and light-headedness, dizziness, tremors, sleepiness  Psychiatric/Behavioral: Negative for suicidal ideas, hallucinations, behavioral problems, self-injury, STABLE: Continue current treatment plan   9. Ulcer of left foot, with fat layer exposed ([address] STABLE: Continue current treatment plan       PLAN:  Informed verbal consent was obtained.  -Continue with current regimen  -he was advised  to avoid using too many OTC analgesics to control the headaches, avoid chocolates, increased caffeine, cheeses and MSG nitrite containing foods, cigarette smoking. To avoid bright lights, strong smells and skipping meals. -start Topamax 25 BID to help with headaches, if no help may consider CGRP #  -CBT techniques- relaxation therapies such as biofeedback, mindfulness based stress reduction, imagery, cognitive restructuring, problem solving discussed with patient  -he was advised proper sleep hygiene-told to avoid:use of caffeine or other stimulants after noon, alcohol use near bedtime, long or frequent naps during the day, erratic sleep schedule, heavy meals near bedtime, vigorous exercise near bedtime and use of electronic devices near bedtime, continue with Seroquel  -He was advised weight reduction, diet changes- 800-1200 [place] diet, diet diary, exercising, nutritional  consult increased physical activity as tolerated  -Monitor blood sugar regularly, diabetic control- adv diabetic diet. Goal for fasting blood sugars around 120.  Follow up with Endocrinologist/PCP also for on going management    Mr. Jerome Amin will be prescribed  the medications  listed below which are for treatment of his presenting  medical problems which for this visit include:   Diagnoses of Chronic migraine without aura, with intractable migraine, so stated, with status migrainosus, Chronic pain syndrome, Neuropathic pain, Primary insomnia, Recurrent major depressive disorder, in partial remission ([address] Myofascial pain, Polyneuropathy due to type 1 diabetes mellitus ([address] Polyneuropathy associated with underlying disease ([address] and Ulcer of left foot, with fat layer exposed ([address] were pertinent to this % cream Apply topically 2 times daily. 30 g 0    Blood Glucose Monitoring Suppl (ACCU-CHEK CAPO PLUS) w/Device KIT 1 each by Does not apply route daily Test blood sugar 10 times daily Dx code E 10.8 1 kit 10    glucose blood VI test strips (ACCU-CHEK CAPO PLUS) strip 1 each by In Vitro route daily Test blood glucose 10 times daily Dx Code E10.8 300 each 10    Insulin Syringe-Needle U-100 (B-D INS SYRINGE 0.5CC/31GX5/16) 31G X 5/16\" 0.5 ML MISC USE ONE SYRINGE FOUR TIMES A DAY BEFORE MEALS AND NIGHTLY 120 each 10    cephALEXin (KEFLEX) 500 MG capsule TAKE 1 CAPSULE BY MOUTH TWO TIMES A  capsule 5    GLUCAGON EMERGENCY 1 MG injection INJECT 1MG INTO THE MUSCLE AS NEEDED 1 kit 3    Insulin Syringe-Needle U-100 (B-D INS SYR ULTRAFINE 1CC/31G) 31G X 5/16\" 1 ML MISC INJECT USING INSULIN ONCE DAILY 30 each 10    Multiple Vitamin (DAILY KITTY) TABS TAKE ONE TABLET BY MOUTH DAILY 30 tablet 5    Cholecalciferol (VITAMIN D3) 5000 units CAPS Take 1 capsule by mouth Daily 30 capsule 3    Dextromethorphan-Guaifenesin (MUCINEX DM)  MG TB12 Cough and congestion. 60 tablet 1    omega-3 acid ethyl esters (LOVAZA) 1 G capsule TAKE TWO CAPSULES BY MOUTH TWICE DAILY 120 capsule 5    MUCUS RELIEF DM  MG TABS TAKE ONE BY MOUTH DAILY AS NEEDED 30 tablet 1    Alcohol Swabs PADS Use as needed for insulin injection. 100 each 5    Probiotic Product (ACIDOPHILUS PROBIOTIC) CAPS capsule TAKE ONE CAPSULE BY MOUTH DAILY 28 capsule 4    polyvinyl alcohol (LIQUIFILM TEARS) 1.4 % ophthalmic solution 1 drop as needed      propranolol (INDERAL) 80 MG tablet Take 40 mg by mouth 2 times daily For migraines       Flaxseed, Linseed, (FLAX PO) Take 14 g by mouth daily. No current facility-administered medications for this visit.          Goals of current treatment regimen include improvement in pain, restoration of functioning- with focus on improvement in physical performance, general activity, work or

## 2019-01-06 DIAGNOSIS — R09.81 NASAL SINUS CONGESTION: ICD-10-CM

## 2019-01-08 RX ORDER — BLOOD SUGAR DIAGNOSTIC
1 STRIP MISCELLANEOUS
Qty: 120 EACH | Refills: 11 | Status: SHIPPED | OUTPATIENT
Start: 2019-01-08 | End: 2019-06-20 | Stop reason: SDUPTHER

## 2019-01-08 RX ORDER — FLUTICASONE PROPIONATE 50 MCG
SPRAY, SUSPENSION (ML) NASAL
Qty: 16 G | Refills: 4 | Status: SHIPPED | OUTPATIENT
Start: 2019-01-08

## 2019-01-24 ENCOUNTER — OFFICE VISIT (OUTPATIENT)
Dept: PAIN MANAGEMENT | Age: 44
End: 2019-01-24
Payer: MEDICARE

## 2019-01-24 VITALS
WEIGHT: 260 LBS | HEART RATE: 70 BPM | SYSTOLIC BLOOD PRESSURE: 129 MMHG | DIASTOLIC BLOOD PRESSURE: 72 MMHG | BODY MASS INDEX: 33.38 KG/M2

## 2019-01-24 DIAGNOSIS — M79.18 MYOFASCIAL PAIN: ICD-10-CM

## 2019-01-24 DIAGNOSIS — G63 POLYNEUROPATHY ASSOCIATED WITH UNDERLYING DISEASE (HCC): ICD-10-CM

## 2019-01-24 DIAGNOSIS — G89.4 CHRONIC PAIN SYNDROME: ICD-10-CM

## 2019-01-24 DIAGNOSIS — G62.9 PERIPHERAL POLYNEUROPATHY: ICD-10-CM

## 2019-01-24 DIAGNOSIS — L97.522 ULCER OF LEFT FOOT, WITH FAT LAYER EXPOSED (HCC): ICD-10-CM

## 2019-01-24 DIAGNOSIS — M79.2 NEUROPATHIC PAIN: ICD-10-CM

## 2019-01-24 PROCEDURE — 1036F TOBACCO NON-USER: CPT | Performed by: INTERNAL MEDICINE

## 2019-01-24 PROCEDURE — G8417 CALC BMI ABV UP PARAM F/U: HCPCS | Performed by: INTERNAL MEDICINE

## 2019-01-24 PROCEDURE — G8484 FLU IMMUNIZE NO ADMIN: HCPCS | Performed by: INTERNAL MEDICINE

## 2019-01-24 PROCEDURE — G8427 DOCREV CUR MEDS BY ELIG CLIN: HCPCS | Performed by: INTERNAL MEDICINE

## 2019-01-24 PROCEDURE — 99213 OFFICE O/P EST LOW 20 MIN: CPT | Performed by: INTERNAL MEDICINE

## 2019-01-24 RX ORDER — SUMATRIPTAN 50 MG/1
TABLET, FILM COATED ORAL
Qty: 9 TABLET | Refills: 0 | Status: SHIPPED | OUTPATIENT
Start: 2019-01-24 | End: 2019-02-21 | Stop reason: SDUPTHER

## 2019-01-24 RX ORDER — DULOXETIN HYDROCHLORIDE 60 MG/1
60 CAPSULE, DELAYED RELEASE ORAL 2 TIMES DAILY
Qty: 60 CAPSULE | Refills: 1 | Status: SHIPPED | OUTPATIENT
Start: 2019-01-24 | End: 2019-02-21 | Stop reason: SDUPTHER

## 2019-01-24 RX ORDER — QUETIAPINE FUMARATE 25 MG/1
25-50 TABLET, FILM COATED ORAL NIGHTLY
Qty: 60 TABLET | Refills: 1 | Status: SHIPPED | OUTPATIENT
Start: 2019-01-24 | End: 2019-02-21 | Stop reason: SDUPTHER

## 2019-01-24 RX ORDER — TRAMADOL HYDROCHLORIDE 50 MG/1
50 TABLET ORAL EVERY 6 HOURS PRN
Qty: 30 TABLET | Refills: 0 | Status: SHIPPED | OUTPATIENT
Start: 2019-01-24 | End: 2019-02-21 | Stop reason: SDUPTHER

## 2019-01-24 RX ORDER — GABAPENTIN 400 MG/1
CAPSULE ORAL
Qty: 60 CAPSULE | Refills: 1 | Status: SHIPPED | OUTPATIENT
Start: 2019-01-24 | End: 2019-02-21 | Stop reason: SDUPTHER

## 2019-01-24 RX ORDER — TOPIRAMATE 25 MG/1
25 TABLET ORAL 2 TIMES DAILY
Qty: 60 TABLET | Refills: 0 | Status: SHIPPED | OUTPATIENT
Start: 2019-01-24 | End: 2019-02-21 | Stop reason: SDUPTHER

## 2019-01-24 RX ORDER — TIZANIDINE 4 MG/1
TABLET ORAL
Qty: 60 TABLET | Refills: 0 | Status: SHIPPED | OUTPATIENT
Start: 2019-01-24 | End: 2019-02-21 | Stop reason: SDUPTHER

## 2019-01-24 RX ORDER — BUPRENORPHINE 5 UG/H
1 PATCH TRANSDERMAL
Qty: 4 PATCH | Refills: 0 | Status: SHIPPED | OUTPATIENT
Start: 2019-01-24 | End: 2019-02-21 | Stop reason: SDUPTHER

## 2019-02-06 RX ORDER — BLOOD SUGAR DIAGNOSTIC
1 STRIP MISCELLANEOUS 4 TIMES DAILY
Qty: 120 EACH | Refills: 9 | Status: SHIPPED | OUTPATIENT
Start: 2019-02-06

## 2019-02-07 ENCOUNTER — OFFICE VISIT (OUTPATIENT)
Dept: INFECTIOUS DISEASES | Age: 44
End: 2019-02-07
Payer: MEDICARE

## 2019-02-07 VITALS
HEIGHT: 74 IN | BODY MASS INDEX: 34.14 KG/M2 | TEMPERATURE: 98 F | DIASTOLIC BLOOD PRESSURE: 80 MMHG | SYSTOLIC BLOOD PRESSURE: 124 MMHG | WEIGHT: 266 LBS

## 2019-02-07 DIAGNOSIS — M86.662 CHRONIC OSTEOMYELITIS OF LEFT TIBIA (HCC): Primary | ICD-10-CM

## 2019-02-07 DIAGNOSIS — E10.8 TYPE 1 DIABETES MELLITUS WITH COMPLICATION (HCC): ICD-10-CM

## 2019-02-07 DIAGNOSIS — E10.42 POLYNEUROPATHY DUE TO TYPE 1 DIABETES MELLITUS (HCC): ICD-10-CM

## 2019-02-07 PROCEDURE — 99214 OFFICE O/P EST MOD 30 MIN: CPT | Performed by: INTERNAL MEDICINE

## 2019-02-07 PROCEDURE — 1036F TOBACCO NON-USER: CPT | Performed by: INTERNAL MEDICINE

## 2019-02-07 PROCEDURE — G8484 FLU IMMUNIZE NO ADMIN: HCPCS | Performed by: INTERNAL MEDICINE

## 2019-02-07 PROCEDURE — G8427 DOCREV CUR MEDS BY ELIG CLIN: HCPCS | Performed by: INTERNAL MEDICINE

## 2019-02-07 PROCEDURE — G8417 CALC BMI ABV UP PARAM F/U: HCPCS | Performed by: INTERNAL MEDICINE

## 2019-02-07 PROCEDURE — 3046F HEMOGLOBIN A1C LEVEL >9.0%: CPT | Performed by: INTERNAL MEDICINE

## 2019-02-07 PROCEDURE — 2022F DILAT RTA XM EVC RTNOPTHY: CPT | Performed by: INTERNAL MEDICINE

## 2019-02-07 RX ORDER — CEPHALEXIN 500 MG/1
CAPSULE ORAL
Qty: 180 CAPSULE | Refills: 5 | Status: SHIPPED | OUTPATIENT
Start: 2019-02-07 | End: 2020-06-04 | Stop reason: ALTCHOICE

## 2019-02-21 ENCOUNTER — OFFICE VISIT (OUTPATIENT)
Dept: PAIN MANAGEMENT | Age: 44
End: 2019-02-21
Payer: MEDICARE

## 2019-02-21 VITALS
WEIGHT: 256 LBS | HEART RATE: 78 BPM | BODY MASS INDEX: 32.87 KG/M2 | DIASTOLIC BLOOD PRESSURE: 61 MMHG | SYSTOLIC BLOOD PRESSURE: 93 MMHG

## 2019-02-21 DIAGNOSIS — G63 POLYNEUROPATHY ASSOCIATED WITH UNDERLYING DISEASE (HCC): ICD-10-CM

## 2019-02-21 DIAGNOSIS — L97.522 ULCER OF LEFT FOOT, WITH FAT LAYER EXPOSED (HCC): ICD-10-CM

## 2019-02-21 DIAGNOSIS — G89.4 CHRONIC PAIN SYNDROME: ICD-10-CM

## 2019-02-21 DIAGNOSIS — G62.9 PERIPHERAL POLYNEUROPATHY: ICD-10-CM

## 2019-02-21 DIAGNOSIS — M79.18 MYOFASCIAL PAIN: ICD-10-CM

## 2019-02-21 DIAGNOSIS — M79.2 NEUROPATHIC PAIN: ICD-10-CM

## 2019-02-21 PROCEDURE — G8427 DOCREV CUR MEDS BY ELIG CLIN: HCPCS | Performed by: INTERNAL MEDICINE

## 2019-02-21 PROCEDURE — 1036F TOBACCO NON-USER: CPT | Performed by: INTERNAL MEDICINE

## 2019-02-21 PROCEDURE — 99213 OFFICE O/P EST LOW 20 MIN: CPT | Performed by: INTERNAL MEDICINE

## 2019-02-21 PROCEDURE — G8484 FLU IMMUNIZE NO ADMIN: HCPCS | Performed by: INTERNAL MEDICINE

## 2019-02-21 PROCEDURE — G8417 CALC BMI ABV UP PARAM F/U: HCPCS | Performed by: INTERNAL MEDICINE

## 2019-02-21 RX ORDER — BUPRENORPHINE 5 UG/H
1 PATCH TRANSDERMAL
Qty: 4 PATCH | Refills: 0 | Status: SHIPPED | OUTPATIENT
Start: 2019-02-21 | End: 2019-03-21 | Stop reason: SDUPTHER

## 2019-02-21 RX ORDER — TIZANIDINE 4 MG/1
TABLET ORAL
Qty: 60 TABLET | Refills: 0 | Status: SHIPPED | OUTPATIENT
Start: 2019-02-21 | End: 2019-03-21 | Stop reason: SDUPTHER

## 2019-02-21 RX ORDER — SUMATRIPTAN 50 MG/1
TABLET, FILM COATED ORAL
Qty: 9 TABLET | Refills: 0 | Status: SHIPPED | OUTPATIENT
Start: 2019-02-21 | End: 2019-03-21 | Stop reason: SDUPTHER

## 2019-02-21 RX ORDER — QUETIAPINE FUMARATE 25 MG/1
25-50 TABLET, FILM COATED ORAL NIGHTLY
Qty: 60 TABLET | Refills: 1 | Status: SHIPPED | OUTPATIENT
Start: 2019-02-21 | End: 2019-03-21 | Stop reason: SDUPTHER

## 2019-02-21 RX ORDER — TRAMADOL HYDROCHLORIDE 50 MG/1
50 TABLET ORAL EVERY 6 HOURS PRN
Qty: 60 TABLET | Refills: 0 | Status: SHIPPED | OUTPATIENT
Start: 2019-02-21 | End: 2019-04-18 | Stop reason: SDUPTHER

## 2019-02-21 RX ORDER — GABAPENTIN 400 MG/1
CAPSULE ORAL
Qty: 60 CAPSULE | Refills: 1 | Status: SHIPPED | OUTPATIENT
Start: 2019-02-21 | End: 2019-03-21 | Stop reason: SDUPTHER

## 2019-02-21 RX ORDER — DULOXETIN HYDROCHLORIDE 60 MG/1
60 CAPSULE, DELAYED RELEASE ORAL 2 TIMES DAILY
Qty: 60 CAPSULE | Refills: 1 | Status: SHIPPED | OUTPATIENT
Start: 2019-02-21 | End: 2019-03-21 | Stop reason: SDUPTHER

## 2019-02-21 RX ORDER — TOPIRAMATE 25 MG/1
25 TABLET ORAL 2 TIMES DAILY
Qty: 60 TABLET | Refills: 0 | Status: SHIPPED | OUTPATIENT
Start: 2019-02-21 | End: 2019-04-18

## 2019-03-12 DIAGNOSIS — G89.4 CHRONIC PAIN SYNDROME: ICD-10-CM

## 2019-03-12 DIAGNOSIS — G43.711 CHRONIC MIGRAINE WITHOUT AURA, WITH INTRACTABLE MIGRAINE, SO STATED, WITH STATUS MIGRAINOSUS: ICD-10-CM

## 2019-03-14 ENCOUNTER — OFFICE VISIT (OUTPATIENT)
Dept: ENDOCRINOLOGY | Age: 44
End: 2019-03-14
Payer: MEDICARE

## 2019-03-14 VITALS
SYSTOLIC BLOOD PRESSURE: 107 MMHG | RESPIRATION RATE: 16 BRPM | DIASTOLIC BLOOD PRESSURE: 68 MMHG | WEIGHT: 256 LBS | HEART RATE: 85 BPM | HEIGHT: 74 IN | BODY MASS INDEX: 32.85 KG/M2

## 2019-03-14 DIAGNOSIS — E78.2 MIXED HYPERLIPIDEMIA: ICD-10-CM

## 2019-03-14 DIAGNOSIS — I10 ESSENTIAL HYPERTENSION: ICD-10-CM

## 2019-03-14 DIAGNOSIS — E10.22 TYPE 1 DIABETES MELLITUS WITH STAGE 3 CHRONIC KIDNEY DISEASE (HCC): Primary | ICD-10-CM

## 2019-03-14 DIAGNOSIS — N18.30 TYPE 1 DIABETES MELLITUS WITH STAGE 3 CHRONIC KIDNEY DISEASE (HCC): Primary | ICD-10-CM

## 2019-03-14 PROCEDURE — G8417 CALC BMI ABV UP PARAM F/U: HCPCS | Performed by: INTERNAL MEDICINE

## 2019-03-14 PROCEDURE — 99214 OFFICE O/P EST MOD 30 MIN: CPT | Performed by: INTERNAL MEDICINE

## 2019-03-14 PROCEDURE — G8427 DOCREV CUR MEDS BY ELIG CLIN: HCPCS | Performed by: INTERNAL MEDICINE

## 2019-03-14 PROCEDURE — 3046F HEMOGLOBIN A1C LEVEL >9.0%: CPT | Performed by: INTERNAL MEDICINE

## 2019-03-14 PROCEDURE — G8484 FLU IMMUNIZE NO ADMIN: HCPCS | Performed by: INTERNAL MEDICINE

## 2019-03-14 PROCEDURE — 2022F DILAT RTA XM EVC RTNOPTHY: CPT | Performed by: INTERNAL MEDICINE

## 2019-03-14 PROCEDURE — 1036F TOBACCO NON-USER: CPT | Performed by: INTERNAL MEDICINE

## 2019-03-14 RX ORDER — TOPIRAMATE 100 MG/1
TABLET, FILM COATED ORAL
Qty: 60 TABLET | Refills: 0 | Status: SHIPPED | OUTPATIENT
Start: 2019-03-14 | End: 2019-03-21 | Stop reason: SDUPTHER

## 2019-03-21 ENCOUNTER — OFFICE VISIT (OUTPATIENT)
Dept: PAIN MANAGEMENT | Age: 44
End: 2019-03-21
Payer: MEDICARE

## 2019-03-21 VITALS
SYSTOLIC BLOOD PRESSURE: 118 MMHG | DIASTOLIC BLOOD PRESSURE: 74 MMHG | WEIGHT: 255 LBS | BODY MASS INDEX: 32.74 KG/M2 | HEART RATE: 83 BPM

## 2019-03-21 DIAGNOSIS — M79.2 NEUROPATHIC PAIN: ICD-10-CM

## 2019-03-21 DIAGNOSIS — G63 POLYNEUROPATHY ASSOCIATED WITH UNDERLYING DISEASE (HCC): ICD-10-CM

## 2019-03-21 DIAGNOSIS — M79.18 MYOFASCIAL PAIN: ICD-10-CM

## 2019-03-21 DIAGNOSIS — G89.4 CHRONIC PAIN SYNDROME: ICD-10-CM

## 2019-03-21 DIAGNOSIS — L97.522 ULCER OF LEFT FOOT, WITH FAT LAYER EXPOSED (HCC): ICD-10-CM

## 2019-03-21 DIAGNOSIS — G62.9 PERIPHERAL POLYNEUROPATHY: ICD-10-CM

## 2019-03-21 DIAGNOSIS — G43.711 CHRONIC MIGRAINE WITHOUT AURA, WITH INTRACTABLE MIGRAINE, SO STATED, WITH STATUS MIGRAINOSUS: ICD-10-CM

## 2019-03-21 PROCEDURE — 99213 OFFICE O/P EST LOW 20 MIN: CPT | Performed by: INTERNAL MEDICINE

## 2019-03-21 PROCEDURE — G8484 FLU IMMUNIZE NO ADMIN: HCPCS | Performed by: INTERNAL MEDICINE

## 2019-03-21 PROCEDURE — G8427 DOCREV CUR MEDS BY ELIG CLIN: HCPCS | Performed by: INTERNAL MEDICINE

## 2019-03-21 PROCEDURE — G8417 CALC BMI ABV UP PARAM F/U: HCPCS | Performed by: INTERNAL MEDICINE

## 2019-03-21 PROCEDURE — 1036F TOBACCO NON-USER: CPT | Performed by: INTERNAL MEDICINE

## 2019-03-21 RX ORDER — BUPRENORPHINE 5 UG/H
1 PATCH TRANSDERMAL
Qty: 4 PATCH | Refills: 0 | Status: SHIPPED | OUTPATIENT
Start: 2019-03-21 | End: 2019-04-18 | Stop reason: SDUPTHER

## 2019-03-21 RX ORDER — QUETIAPINE FUMARATE 25 MG/1
25-50 TABLET, FILM COATED ORAL NIGHTLY
Qty: 60 TABLET | Refills: 1 | Status: SHIPPED | OUTPATIENT
Start: 2019-03-21 | End: 2019-04-18 | Stop reason: SDUPTHER

## 2019-03-21 RX ORDER — TOPIRAMATE 100 MG/1
TABLET, FILM COATED ORAL
Qty: 60 TABLET | Refills: 0 | Status: SHIPPED | OUTPATIENT
Start: 2019-03-21 | End: 2019-04-18 | Stop reason: SDUPTHER

## 2019-03-21 RX ORDER — GABAPENTIN 400 MG/1
CAPSULE ORAL
Qty: 60 CAPSULE | Refills: 1 | Status: SHIPPED | OUTPATIENT
Start: 2019-03-21 | End: 2019-04-18 | Stop reason: SDUPTHER

## 2019-03-21 RX ORDER — DULOXETIN HYDROCHLORIDE 60 MG/1
60 CAPSULE, DELAYED RELEASE ORAL 2 TIMES DAILY
Qty: 60 CAPSULE | Refills: 1 | Status: SHIPPED | OUTPATIENT
Start: 2019-03-21 | End: 2019-04-18 | Stop reason: SDUPTHER

## 2019-03-21 RX ORDER — TIZANIDINE 4 MG/1
TABLET ORAL
Qty: 60 TABLET | Refills: 0 | Status: SHIPPED | OUTPATIENT
Start: 2019-03-21 | End: 2019-04-18 | Stop reason: SDUPTHER

## 2019-03-21 RX ORDER — SUMATRIPTAN 50 MG/1
TABLET, FILM COATED ORAL
Qty: 9 TABLET | Refills: 0 | Status: SHIPPED | OUTPATIENT
Start: 2019-03-21 | End: 2019-04-18 | Stop reason: SDUPTHER

## 2019-03-22 ENCOUNTER — TELEPHONE (OUTPATIENT)
Dept: INTERNAL MEDICINE CLINIC | Age: 44
End: 2019-03-22

## 2019-03-22 ENCOUNTER — TELEPHONE (OUTPATIENT)
Dept: PAIN MANAGEMENT | Age: 44
End: 2019-03-22

## 2019-04-06 DIAGNOSIS — N18.30 TYPE 1 DIABETES MELLITUS WITH STAGE 3 CHRONIC KIDNEY DISEASE (HCC): Primary | ICD-10-CM

## 2019-04-06 DIAGNOSIS — E10.22 TYPE 1 DIABETES MELLITUS WITH STAGE 3 CHRONIC KIDNEY DISEASE (HCC): Primary | ICD-10-CM

## 2019-04-08 RX ORDER — INSULIN GLARGINE 100 [IU]/ML
INJECTION, SOLUTION SUBCUTANEOUS
Qty: 1 VIAL | Refills: 3 | Status: SHIPPED | OUTPATIENT
Start: 2019-04-08 | End: 2019-06-20 | Stop reason: SDUPTHER

## 2019-04-08 NOTE — TELEPHONE ENCOUNTER
Last seen : 3/14/2019   Follow Up: 6/20/2019    Per last office note: Decrease Lantus insulin 28 units QHS .

## 2019-04-09 DIAGNOSIS — G89.4 CHRONIC PAIN SYNDROME: ICD-10-CM

## 2019-04-09 RX ORDER — TOPIRAMATE 25 MG/1
TABLET ORAL
Qty: 60 TABLET | Refills: 0 | OUTPATIENT
Start: 2019-04-09

## 2019-04-17 ENCOUNTER — CARE COORDINATION (OUTPATIENT)
Dept: CARE COORDINATION | Age: 44
End: 2019-04-17

## 2019-04-18 ENCOUNTER — OFFICE VISIT (OUTPATIENT)
Dept: PAIN MANAGEMENT | Age: 44
End: 2019-04-18
Payer: MEDICARE

## 2019-04-18 VITALS
HEART RATE: 86 BPM | WEIGHT: 255 LBS | SYSTOLIC BLOOD PRESSURE: 101 MMHG | BODY MASS INDEX: 32.74 KG/M2 | DIASTOLIC BLOOD PRESSURE: 70 MMHG

## 2019-04-18 DIAGNOSIS — G62.9 PERIPHERAL POLYNEUROPATHY: ICD-10-CM

## 2019-04-18 DIAGNOSIS — M79.18 MYOFASCIAL PAIN: ICD-10-CM

## 2019-04-18 DIAGNOSIS — G89.4 CHRONIC PAIN SYNDROME: ICD-10-CM

## 2019-04-18 DIAGNOSIS — G63 POLYNEUROPATHY ASSOCIATED WITH UNDERLYING DISEASE (HCC): ICD-10-CM

## 2019-04-18 DIAGNOSIS — G43.711 CHRONIC MIGRAINE WITHOUT AURA, WITH INTRACTABLE MIGRAINE, SO STATED, WITH STATUS MIGRAINOSUS: ICD-10-CM

## 2019-04-18 DIAGNOSIS — M79.2 NEUROPATHIC PAIN: ICD-10-CM

## 2019-04-18 DIAGNOSIS — A49.01 STAPH AUREUS INFECTION: ICD-10-CM

## 2019-04-18 DIAGNOSIS — L97.522 ULCER OF LEFT FOOT, WITH FAT LAYER EXPOSED (HCC): ICD-10-CM

## 2019-04-18 PROCEDURE — 1036F TOBACCO NON-USER: CPT | Performed by: INTERNAL MEDICINE

## 2019-04-18 PROCEDURE — G8427 DOCREV CUR MEDS BY ELIG CLIN: HCPCS | Performed by: INTERNAL MEDICINE

## 2019-04-18 PROCEDURE — 99213 OFFICE O/P EST LOW 20 MIN: CPT | Performed by: INTERNAL MEDICINE

## 2019-04-18 PROCEDURE — G8417 CALC BMI ABV UP PARAM F/U: HCPCS | Performed by: INTERNAL MEDICINE

## 2019-04-18 RX ORDER — DULOXETIN HYDROCHLORIDE 60 MG/1
60 CAPSULE, DELAYED RELEASE ORAL 2 TIMES DAILY
Qty: 60 CAPSULE | Refills: 1 | Status: SHIPPED | OUTPATIENT
Start: 2019-04-18 | End: 2019-05-16 | Stop reason: SDUPTHER

## 2019-04-18 RX ORDER — TIZANIDINE 4 MG/1
TABLET ORAL
Qty: 60 TABLET | Refills: 0 | Status: SHIPPED | OUTPATIENT
Start: 2019-04-18 | End: 2019-05-16 | Stop reason: SDUPTHER

## 2019-04-18 RX ORDER — GABAPENTIN 400 MG/1
CAPSULE ORAL
Qty: 60 CAPSULE | Refills: 1 | Status: SHIPPED | OUTPATIENT
Start: 2019-04-18 | End: 2019-05-16 | Stop reason: SDUPTHER

## 2019-04-18 RX ORDER — SUMATRIPTAN 50 MG/1
TABLET, FILM COATED ORAL
Qty: 9 TABLET | Refills: 0 | Status: SHIPPED | OUTPATIENT
Start: 2019-04-18 | End: 2019-05-16 | Stop reason: SDUPTHER

## 2019-04-18 RX ORDER — BUPRENORPHINE 5 UG/H
1 PATCH TRANSDERMAL
Qty: 4 PATCH | Refills: 0 | Status: SHIPPED | OUTPATIENT
Start: 2019-04-18 | End: 2019-04-24 | Stop reason: ALTCHOICE

## 2019-04-18 RX ORDER — TOPIRAMATE 100 MG/1
TABLET, FILM COATED ORAL
Qty: 60 TABLET | Refills: 0 | Status: SHIPPED | OUTPATIENT
Start: 2019-04-18 | End: 2019-05-16 | Stop reason: SDUPTHER

## 2019-04-18 RX ORDER — TRAMADOL HYDROCHLORIDE 50 MG/1
50 TABLET ORAL EVERY 6 HOURS PRN
Qty: 60 TABLET | Refills: 0 | Status: SHIPPED | OUTPATIENT
Start: 2019-04-18 | End: 2019-05-16 | Stop reason: SDUPTHER

## 2019-04-18 RX ORDER — QUETIAPINE FUMARATE 25 MG/1
25-50 TABLET, FILM COATED ORAL NIGHTLY
Qty: 60 TABLET | Refills: 1 | Status: SHIPPED | OUTPATIENT
Start: 2019-04-18 | End: 2019-06-13 | Stop reason: SDUPTHER

## 2019-04-18 NOTE — PROGRESS NOTES
Reggie Moscoso  1975  [id number]    HISTORY OF PRESENT ILLNESS:  Mr. Donny Rae is a 41 y.o. male returns for a follow up visit for multiple medical problems. His current presenting problems are   1. Chronic pain syndrome    2. Chronic migraine without aura, with intractable migraine, so stated, with status migrainosus    3. Neuropathic pain    4. Myofascial pain    5. Primary insomnia    6. Recurrent major depressive disorder, in partial remission ([facility] [address]    7. Gastroesophageal reflux disease without esophagitis    . As per information/history obtained from the PADT(patient assessment and documentation tool) - He complains of pain in the neck, upper back, mid back and lower back with radiation to the hands Bilateral, buttocks, hips Bilateral, ankles Bilateral and feet Bilateral He rates the pain 9/10 and describes it as sharp, aching, burning, numbness, pins and needles. Pain is made worse by: movement, walking, standing, sitting, bending, lifting. Current treatment regimen has helped relieve about 10% of the pain. He denies side effects from the current pain regimen. Patient reports that since the last follow up visit the physical functioning is unchanged, family/social relationships are unchanged, mood is unchanged and sleep patterns are unchanged, and that the overall functioning is unchanged. Patient denies neurological bowel or bladder. Patient denies misusing/abusing his narcotic pain medications or using any illegal drugs. There are No indicators for possible drug abuse, addiction or diversion problems. Upon obtaining the medical history from Mr. Donny Rae regarding today's office visit for his presenting problems, Shobha Wood states he has been doing fair. He says he is managing okay with his regimen. He mentions he is using Butrans along with Ultram and all his other adjuvants. He reports his headache symptoms are manageable, says he is on Topamax 125 BID.  He mentions his blood sugar has been less than 200 range.       ALLERGIES: Patients list of allergies were reviewed     MEDICATIONS: Mr. Robert Salazar list of medications were reviewed. His current medications are   Outpatient Medications Prior to Visit   Medication Sig Dispense Refill    LANTUS 100 UNIT/ML injection vial INJECT 28 UNITS UNDER THE SKIN NIGHTLY 1 vial 3    topiramate (TOPAMAX) 100 MG tablet TAKE ONE TABLET BY MOUTH TWO TIMES A DAY 60 tablet 0    magnesium oxide (MAG-OX) 400 (241.3 Mg) MG TABS tablet TAKE ONE TABLET BY MOUTH TWICE A DAY 60 tablet 1    DULoxetine (CYMBALTA) 60 MG extended release capsule Take 1 capsule by mouth 2 times daily 60 capsule 1    SUMAtriptan (IMITREX) 50 MG tablet Take one tab po at the start of migraine PRN max 1 every 24 hours 9 tablet 0    gabapentin (NEURONTIN) 400 MG capsule Take one tablet Po BID 60 capsule 1    QUEtiapine (SEROQUEL) 25 MG tablet Take 1-2 tablets by mouth nightly 60 tablet 1    buprenorphine (BUTRANS) 5 MCG/HR PTWK Place 1 patch onto the skin every 7 days for 30 days. 4 patch 0    tiZANidine (ZANAFLEX) 4 MG tablet Take 1/2 PO AM and 1 PO PM 60 tablet 0    mupirocin (BACTROBAN NASAL) 2 % nasal ointment Take by Nasal route 2 times daily.  1 Tube 1    cephALEXin (KEFLEX) 500 MG capsule TAKE 1 CAPSULE BY MOUTH TWO TIMES A  capsule 5    Insulin Syringe-Needle U-100 (BD INSULIN SYRINGE U/F) 31G X 5/16\" 0.5 ML MISC Inject 1 each into the skin 4 times daily DX CODE E 10.22 120 each 9    Insulin Syringe-Needle U-100 (BD INSULIN SYRINGE U/F) 31G X 5/16\" 0.3 ML MISC Inject 1 each into the skin 4 times daily (before meals and nightly) 120 each 11    fluticasone (FLONASE) 50 MCG/ACT nasal spray SPRAY TWO SPRAYS IN EACH NOSTRIL DAILY 16 g 4    APIDRA 100 UNIT/ML injection INJECT 8-12 UNITS UNDER THE SKIN THREE TIMES DAILY WITH MEALS 20 mL 3    GLUCAGEN HYPOKIT 1 MG SOLR injection INJECT 1 MG INTO THE SKIN ONCE FOR ONE DOSE 1 each 2    ACCU-CHEK FASTCLIX LANCETS MISC 1 each by Does not apply route topiramate (TOPAMAX) 25 MG tablet Take 1 tablet by mouth 2 times daily 60 tablet 0     No facility-administered medications prior to visit. SOCIAL/FAMILY/PAST MEDICAL HISTORY: Mr. Rocco Lesches, family and past medical history was reviewed. REVIEW OF SYSTEMS:    Respiratory: Negative for apnea, chest tightness and shortness of breath or change in baseline breathing. Gastrointestinal: Negative for nausea, vomiting, abdominal pain, diarrhea, constipation, blood in stool and abdominal distention. PHYSICAL EXAM:   Nursing note and vitals reviewed. /70   Pulse 86   Wt 255 lb (115.7 kg)   BMI 32.74 kg/m²   Constitutional: He appears well-developed and well-nourished. No acute distress. Skin: Skin is warm and dry, good turgor. No rash noted. He is not diaphoretic. Cardiovascular: Normal rate, regular rhythm, normal heart sounds, and does not have murmur. Pulmonary/Chest: Effort normal. No respiratory distress. He does not have wheezes in the lung fields. He has no rales. Neurological/Psychiatric:He is alert and oriented to person, place, and time. Coordination is  normal.  His mood isAppropriate and affect is Flat/blunted and Anxious . Other: + 1 edema LE with erythema, + scabbed leasions on UE and LE   IMPRESSION:   1. Chronic pain syndrome    2. Neuropathic pain    3. Myofascial pain    4. Polyneuropathy associated with underlying disease ([address]    5. Staph aureus infection        PLAN:  Informed verbal consent was obtained  -Monitor blood sugar regularly, diabetic control- adv diabetic diet. Goal for fasting blood sugars around 120.  Follow up with Endocrinologist/PCP also for on going management    -Advise to improve personal hygiene   -Decrease Topamax to 100 mg BID   -He was advised to increase fluids ( 5-7  glasses of fluid daily), limit caffeine, avoid cheese products, increase dietary fiber, increase activity and exercise as tolerated and relax regularly and enjoy meals each 2    ACCU-CHEK FASTCLIX LANCETS MISC 1 each by Does not apply route daily Test blood glucose 10 times daily Dx Code E 10.8 300 each 2    pantoprazole (PROTONIX) 40 MG tablet Take 1 tablet by mouth 2 times daily 60 tablet 0    triamcinolone (KENALOG) 0.1 % ointment Apply to thickened affected areas twice daily for up to 2 weeks then once daily for one week, then PRN sparingly for flares 80 g 1    hydrocortisone 2.5 % cream Apply topically 2 times daily. 30 g 0    Blood Glucose Monitoring Suppl (ACCU-CHEK CAPO PLUS) w/Device KIT 1 each by Does not apply route daily Test blood sugar 10 times daily Dx code E 10.8 1 kit 10    glucose blood VI test strips (ACCU-CHEK CAPO PLUS) strip 1 each by In Vitro route daily Test blood glucose 10 times daily Dx Code E10.8 300 each 10    GLUCAGON EMERGENCY 1 MG injection INJECT 1MG INTO THE MUSCLE AS NEEDED 1 kit 3    Insulin Syringe-Needle U-100 (B-D INS SYR ULTRAFINE 1CC/31G) 31G X 5/16\" 1 ML MISC INJECT USING INSULIN ONCE DAILY 30 each 10    Multiple Vitamin (DAILY KITTY) TABS TAKE ONE TABLET BY MOUTH DAILY 30 tablet 5    Cholecalciferol (VITAMIN D3) 5000 units CAPS Take 1 capsule by mouth Daily 30 capsule 3    Dextromethorphan-Guaifenesin (MUCINEX DM)  MG TB12 Cough and congestion. 60 tablet 1    omega-3 acid ethyl esters (LOVAZA) 1 G capsule TAKE TWO CAPSULES BY MOUTH TWICE DAILY 120 capsule 5    MUCUS RELIEF DM  MG TABS TAKE ONE BY MOUTH DAILY AS NEEDED 30 tablet 1    Alcohol Swabs PADS Use as needed for insulin injection. 100 each 5    Probiotic Product (ACIDOPHILUS PROBIOTIC) CAPS capsule TAKE ONE CAPSULE BY MOUTH DAILY 28 capsule 4    polyvinyl alcohol (LIQUIFILM TEARS) 1.4 % ophthalmic solution 1 drop as needed      propranolol (INDERAL) 80 MG tablet Take 40 mg by mouth 2 times daily For migraines       Flaxseed, Linseed, (FLAX PO) Take 14 g by mouth daily.         traMADol (ULTRAM) 50 MG tablet Take 1 tablet by mouth every 6 hours as

## 2019-04-23 ENCOUNTER — TELEPHONE (OUTPATIENT)
Dept: PAIN MANAGEMENT | Age: 44
End: 2019-04-23

## 2019-04-23 DIAGNOSIS — G43.711 CHRONIC MIGRAINE WITHOUT AURA, WITH INTRACTABLE MIGRAINE, SO STATED, WITH STATUS MIGRAINOSUS: ICD-10-CM

## 2019-04-23 DIAGNOSIS — G89.4 CHRONIC PAIN SYNDROME: Primary | ICD-10-CM

## 2019-04-23 NOTE — TELEPHONE ENCOUNTER
Submitted PA for Buprenorphine 5MCG/HR TD PTWK, Sofia VAZQUEZ Case ID: [id number] - Rx #: [id number]  Via CMM STATUS: PENDING

## 2019-04-24 NOTE — TELEPHONE ENCOUNTER
OK per RSM, to change to [place] 75 mcg BID. Will call into local pharmacy. Neymar Rasmussen. HARESH/KRISTINA Landaverde.

## 2019-04-24 NOTE — TELEPHONE ENCOUNTER
Received PA for Buprenorphine 5MCG/HR TD PTWK . Medication is DENIED, [address] letter attached. Please advise. Thank you.

## 2019-05-01 ENCOUNTER — TELEPHONE (OUTPATIENT)
Dept: PAIN MANAGEMENT | Age: 44
End: 2019-05-01

## 2019-05-01 DIAGNOSIS — G63 POLYNEUROPATHY ASSOCIATED WITH UNDERLYING DISEASE (HCC): ICD-10-CM

## 2019-05-01 DIAGNOSIS — G62.9 PERIPHERAL POLYNEUROPATHY: ICD-10-CM

## 2019-05-01 DIAGNOSIS — G89.4 CHRONIC PAIN SYNDROME: ICD-10-CM

## 2019-05-01 DIAGNOSIS — L97.522 ULCER OF LEFT FOOT, WITH FAT LAYER EXPOSED (HCC): ICD-10-CM

## 2019-05-01 DIAGNOSIS — M79.18 MYOFASCIAL PAIN: ICD-10-CM

## 2019-05-01 DIAGNOSIS — M79.2 NEUROPATHIC PAIN: ICD-10-CM

## 2019-05-01 RX ORDER — BUPRENORPHINE 5 UG/H
1 PATCH TRANSDERMAL
Qty: 4 PATCH | Refills: 0 | Status: CANCELLED | OUTPATIENT
Start: 2019-05-01 | End: 2019-05-31

## 2019-05-01 NOTE — TELEPHONE ENCOUNTER
Per RSM, pt can stop taking the [place] and start Butrans which will need a PA, PA was sent. Called and spoke to the pt and let him know to bring back the [facility] and then he can get his Kael Mehama, he voiced his understanding and will come in on Thursday.  Rx is pending

## 2019-05-01 NOTE — TELEPHONE ENCOUNTER
[facility] He states he started this medication Saturday and has been getting side effects. He reports nausea, face swelling, tongue swelling, headaches. He is requesting we change to something different.

## 2019-05-07 RX ORDER — BUPRENORPHINE 5 UG/H
1 PATCH TRANSDERMAL
Qty: 4 PATCH | Refills: 0 | OUTPATIENT
Start: 2019-05-07 | End: 2019-05-16 | Stop reason: SDUPTHER

## 2019-05-13 NOTE — TELEPHONE ENCOUNTER
BUTRANS APPEAL APPROVED, APPROVAL DATES 05/10/19-12/31/19 APPROVAL LETTER ATTACHED. Please advise. Thank you.
Called [employer] APPEALS spoke with Vish Hoover Representative informed her that patient has already tried preferred drug Mookie Maser, patient had a reaction to it. Advised her that appeal needed to be marked URGENT, due to patient being a pain management patient and patient utilizes medication for the diagnosis of Chronic pain syndrome. [facility] stated medication was partially approved, she will process appeal and expedite informed her patient has no medication currently. RSM has to call and get appeal escalated, current turn around time is 72 hours informed her that unfortunately that is too long, and she advised me that RSM should call to get PA APPEAL moved up to 24 hours he is the only one that can call and get appeal escalated. [facility][id number]. Phone #[phone] ESCALATED APPEALS DEPARTMENT         Please advise. Thank you.
Called patient to advise him per RSM we can call in Butrans 5 mcg #4 into his pharmacy but im unsure which pharmacy he would like it to be called into. He has 2 on file. Patient did not answer and was unable to leave a voice message due to voice mail box is full. If patient calls back will need to know which pharmacy to call the medication back into.
Could you please send a PA for his Butrans, he was on it before, it is pending in [facility] because pt can't get it until tomorrow.  Thank you
Per RSM, Please submit PA again, as the last denial letter states patient needs to try the preferred drug [place], which he did but had a reaction to it.
Pt called back, wants to use [facility] in [facility].
Pt calling, states he needs an Rx for the [employer].  He does not currently have any
RX was called into pharmacy for patient. Tried to call patient to let him know, but voicemail box was full.
5

## 2019-05-16 ENCOUNTER — OFFICE VISIT (OUTPATIENT)
Dept: PAIN MANAGEMENT | Age: 44
End: 2019-05-16
Payer: MEDICARE

## 2019-05-16 VITALS
BODY MASS INDEX: 32.74 KG/M2 | DIASTOLIC BLOOD PRESSURE: 75 MMHG | WEIGHT: 255 LBS | HEART RATE: 77 BPM | SYSTOLIC BLOOD PRESSURE: 130 MMHG

## 2019-05-16 DIAGNOSIS — G62.9 PERIPHERAL POLYNEUROPATHY: ICD-10-CM

## 2019-05-16 DIAGNOSIS — G89.4 CHRONIC PAIN SYNDROME: ICD-10-CM

## 2019-05-16 DIAGNOSIS — G63 POLYNEUROPATHY ASSOCIATED WITH UNDERLYING DISEASE (HCC): ICD-10-CM

## 2019-05-16 DIAGNOSIS — M79.2 NEUROPATHIC PAIN: ICD-10-CM

## 2019-05-16 DIAGNOSIS — M79.18 MYOFASCIAL PAIN: ICD-10-CM

## 2019-05-16 DIAGNOSIS — L97.522 ULCER OF LEFT FOOT, WITH FAT LAYER EXPOSED (HCC): ICD-10-CM

## 2019-05-16 DIAGNOSIS — G43.711 CHRONIC MIGRAINE WITHOUT AURA, WITH INTRACTABLE MIGRAINE, SO STATED, WITH STATUS MIGRAINOSUS: ICD-10-CM

## 2019-05-16 PROCEDURE — 1036F TOBACCO NON-USER: CPT | Performed by: INTERNAL MEDICINE

## 2019-05-16 PROCEDURE — 99213 OFFICE O/P EST LOW 20 MIN: CPT | Performed by: INTERNAL MEDICINE

## 2019-05-16 PROCEDURE — G8417 CALC BMI ABV UP PARAM F/U: HCPCS | Performed by: INTERNAL MEDICINE

## 2019-05-16 PROCEDURE — G8427 DOCREV CUR MEDS BY ELIG CLIN: HCPCS | Performed by: INTERNAL MEDICINE

## 2019-05-16 RX ORDER — SUMATRIPTAN 50 MG/1
TABLET, FILM COATED ORAL
Qty: 9 TABLET | Refills: 0 | Status: SHIPPED | OUTPATIENT
Start: 2019-05-16 | End: 2019-06-13 | Stop reason: SDUPTHER

## 2019-05-16 RX ORDER — TOPIRAMATE 100 MG/1
TABLET, FILM COATED ORAL
Qty: 60 TABLET | Refills: 0 | Status: SHIPPED | OUTPATIENT
Start: 2019-05-16 | End: 2019-06-13 | Stop reason: SDUPTHER

## 2019-05-16 RX ORDER — DULOXETIN HYDROCHLORIDE 60 MG/1
60 CAPSULE, DELAYED RELEASE ORAL 2 TIMES DAILY
Qty: 60 CAPSULE | Refills: 1 | Status: SHIPPED | OUTPATIENT
Start: 2019-05-16 | End: 2019-06-13 | Stop reason: SDUPTHER

## 2019-05-16 RX ORDER — BUPRENORPHINE 5 UG/H
1 PATCH TRANSDERMAL
Qty: 4 PATCH | Refills: 0 | Status: SHIPPED | OUTPATIENT
Start: 2019-05-16 | End: 2019-06-13 | Stop reason: SDUPTHER

## 2019-05-16 RX ORDER — TIZANIDINE 4 MG/1
TABLET ORAL
Qty: 60 TABLET | Refills: 0 | Status: SHIPPED | OUTPATIENT
Start: 2019-05-16 | End: 2019-06-13 | Stop reason: SDUPTHER

## 2019-05-16 RX ORDER — TRAMADOL HYDROCHLORIDE 50 MG/1
50 TABLET ORAL EVERY 6 HOURS PRN
Qty: 50 TABLET | Refills: 0 | Status: SHIPPED | OUTPATIENT
Start: 2019-05-16 | End: 2019-06-13 | Stop reason: SDUPTHER

## 2019-05-16 RX ORDER — GABAPENTIN 400 MG/1
CAPSULE ORAL
Qty: 60 CAPSULE | Refills: 1 | Status: SHIPPED | OUTPATIENT
Start: 2019-05-16 | End: 2019-06-13 | Stop reason: SDUPTHER

## 2019-05-16 NOTE — PROGRESS NOTES
Shanta Healy  1975  [id number]    HISTORY OF PRESENT ILLNESS:  Mr. Delbert Gutierrez is a 41 y.o. male returns for a follow up visit for multiple medical problems. His current presenting problems are   1. Chronic pain syndrome    2. Neuropathic pain    3. Myofascial pain    4. Chronic migraine without aura, with intractable migraine, so stated, with status migrainosus    5. Primary insomnia    6. Recurrent major depressive disorder, in partial remission ([facility] [address]    7. Gastroesophageal reflux disease without esophagitis    . As per information/history obtained from the PADT(patient assessment and documentation tool) - He complains of pain in the head and lower back with radiation to the shoulders Bilateral, arms Bilateral, elbows Bilateral, hands Bilateral, buttocks, hips Bilateral, upper leg Bilateral, knees Bilateral, lower leg Bilateral, ankles Bilateral and feet Bilateral He rates the pain 9/10 and describes it as sharp, aching, burning, numbness, pins and needles. Pain is made worse by: movement, walking, standing, sitting, bending, lifting. Current treatment regimen has helped relieve about 10% of the pain. He denies side effects from the current pain regimen. Patient reports that since the last follow up visit the physical functioning is unchanged, family/social relationships are unchanged, mood is unchanged and sleep patterns are unchanged, and that the overall functioning is unchanged. Patient denies neurological bowel or bladder. Patient denies misusing/abusing his narcotic pain medications or using any illegal drugs. There are No indicators for possible drug abuse, addiction or diversion problems. Upon obtaining the medical history from Mr. Delbert Gutierrez regarding today's office visit for his presenting problems,  complains he has been in a lot of pain. He says he has been out of the patch for weeks and it was finally approved. He mentions he has been complaint with the other medications.  He reports he was use more of the Ultram. He mentions he has been walking some daily. He says his weight has been going down. ALLERGIES: Patients list of allergies were reviewed     MEDICATIONS: Mr. Bernice Jaquez list of medications were reviewed. His current medications are   Outpatient Medications Prior to Visit   Medication Sig Dispense Refill    buprenorphine (BUTRANS) 5 MCG/HR PTWK Place 1 patch onto the skin every 7 days for 28 days. 4 patch 0    topiramate (TOPAMAX) 100 MG tablet TAKE ONE TABLET BY MOUTH TWO TIMES A DAY 60 tablet 0    magnesium oxide (MAG-OX) 400 (241.3 Mg) MG TABS tablet TAKE ONE TABLET BY MOUTH TWICE A DAY 60 tablet 1    DULoxetine (CYMBALTA) 60 MG extended release capsule Take 1 capsule by mouth 2 times daily 60 capsule 1    SUMAtriptan (IMITREX) 50 MG tablet Take one tab po at the start of migraine PRN max 1 every 24 hours 9 tablet 0    gabapentin (NEURONTIN) 400 MG capsule Take one tablet Po BID 60 capsule 1    QUEtiapine (SEROQUEL) 25 MG tablet Take 1-2 tablets by mouth nightly 60 tablet 1    tiZANidine (ZANAFLEX) 4 MG tablet Take 1/2 PO AM and 1 PO PM 60 tablet 0    traMADol (ULTRAM) 50 MG tablet Take 1 tablet by mouth every 6 hours as needed for Pain (max 1-2 per day) for up to 30 days. 60 tablet 0    LANTUS 100 UNIT/ML injection vial INJECT 28 UNITS UNDER THE SKIN NIGHTLY 1 vial 3    mupirocin (BACTROBAN NASAL) 2 % nasal ointment Take by Nasal route 2 times daily.  1 Tube 1    cephALEXin (KEFLEX) 500 MG capsule TAKE 1 CAPSULE BY MOUTH TWO TIMES A  capsule 5    Insulin Syringe-Needle U-100 (BD INSULIN SYRINGE U/F) 31G X 5/16\" 0.5 ML MISC Inject 1 each into the skin 4 times daily DX CODE E 10.22 120 each 9    Insulin Syringe-Needle U-100 (BD INSULIN SYRINGE U/F) 31G X 5/16\" 0.3 ML MISC Inject 1 each into the skin 4 times daily (before meals and nightly) 120 each 11    fluticasone (FLONASE) 50 MCG/ACT nasal spray SPRAY TWO SPRAYS IN EACH NOSTRIL DAILY 16 g 4    APIDRA 100 UNIT/ML injection INJECT 8-12 UNITS UNDER THE SKIN THREE TIMES DAILY WITH MEALS 20 mL 3    GLUCAGEN HYPOKIT 1 MG SOLR injection INJECT 1 MG INTO THE SKIN ONCE FOR ONE DOSE 1 each 2    ACCU-CHEK FASTCLIX LANCETS MISC 1 each by Does not apply route daily Test blood glucose 10 times daily Dx Code E 10.8 300 each 2    pantoprazole (PROTONIX) 40 MG tablet Take 1 tablet by mouth 2 times daily 60 tablet 0    triamcinolone (KENALOG) 0.1 % ointment Apply to thickened affected areas twice daily for up to 2 weeks then once daily for one week, then PRN sparingly for flares 80 g 1    hydrocortisone 2.5 % cream Apply topically 2 times daily. 30 g 0    Blood Glucose Monitoring Suppl (ACCU-CHEK CAPO PLUS) w/Device KIT 1 each by Does not apply route daily Test blood sugar 10 times daily Dx code E 10.8 1 kit 10    glucose blood VI test strips (ACCU-CHEK CAPO PLUS) strip 1 each by In Vitro route daily Test blood glucose 10 times daily Dx Code E10.8 300 each 10    GLUCAGON EMERGENCY 1 MG injection INJECT 1MG INTO THE MUSCLE AS NEEDED 1 kit 3    Insulin Syringe-Needle U-100 (B-D INS SYR ULTRAFINE 1CC/31G) 31G X 5/16\" 1 ML MISC INJECT USING INSULIN ONCE DAILY 30 each 10    Multiple Vitamin (DAILY KITTY) TABS TAKE ONE TABLET BY MOUTH DAILY 30 tablet 5    Cholecalciferol (VITAMIN D3) 5000 units CAPS Take 1 capsule by mouth Daily 30 capsule 3    Dextromethorphan-Guaifenesin (MUCINEX DM)  MG TB12 Cough and congestion. 60 tablet 1    omega-3 acid ethyl esters (LOVAZA) 1 G capsule TAKE TWO CAPSULES BY MOUTH TWICE DAILY 120 capsule 5    MUCUS RELIEF DM  MG TABS TAKE ONE BY MOUTH DAILY AS NEEDED 30 tablet 1    Alcohol Swabs PADS Use as needed for insulin injection.  100 each 5    Probiotic Product (ACIDOPHILUS PROBIOTIC) CAPS capsule TAKE ONE CAPSULE BY MOUTH DAILY 28 capsule 4    polyvinyl alcohol (LIQUIFILM TEARS) 1.4 % ophthalmic solution 1 drop as needed      propranolol (INDERAL) 80 MG tablet Take 40 mg by mouth 2 dietary fiber, increase activity and exercise as tolerated and relax regularly and enjoy meals    Current Outpatient Medications   Medication Sig Dispense Refill    buprenorphine (BUTRANS) 5 MCG/HR PTWK Place 1 patch onto the skin every 7 days for 28 days. 4 patch 0    topiramate (TOPAMAX) 100 MG tablet TAKE ONE TABLET BY MOUTH TWO TIMES A DAY 60 tablet 0    magnesium oxide (MAG-OX) 400 (241.3 Mg) MG TABS tablet TAKE ONE TABLET BY MOUTH TWICE A DAY 60 tablet 1    DULoxetine (CYMBALTA) 60 MG extended release capsule Take 1 capsule by mouth 2 times daily 60 capsule 1    SUMAtriptan (IMITREX) 50 MG tablet Take one tab po at the start of migraine PRN max 1 every 24 hours 9 tablet 0    gabapentin (NEURONTIN) 400 MG capsule Take one tablet Po BID 60 capsule 1    QUEtiapine (SEROQUEL) 25 MG tablet Take 1-2 tablets by mouth nightly 60 tablet 1    tiZANidine (ZANAFLEX) 4 MG tablet Take 1/2 PO AM and 1 PO PM 60 tablet 0    traMADol (ULTRAM) 50 MG tablet Take 1 tablet by mouth every 6 hours as needed for Pain (max 1-2 per day) for up to 30 days. 60 tablet 0    LANTUS 100 UNIT/ML injection vial INJECT 28 UNITS UNDER THE SKIN NIGHTLY 1 vial 3    mupirocin (BACTROBAN NASAL) 2 % nasal ointment Take by Nasal route 2 times daily.  1 Tube 1    cephALEXin (KEFLEX) 500 MG capsule TAKE 1 CAPSULE BY MOUTH TWO TIMES A  capsule 5    Insulin Syringe-Needle U-100 (BD INSULIN SYRINGE U/F) 31G X 5/16\" 0.5 ML MISC Inject 1 each into the skin 4 times daily DX CODE E 10.22 120 each 9    Insulin Syringe-Needle U-100 (BD INSULIN SYRINGE U/F) 31G X 5/16\" 0.3 ML MISC Inject 1 each into the skin 4 times daily (before meals and nightly) 120 each 11    fluticasone (FLONASE) 50 MCG/ACT nasal spray SPRAY TWO SPRAYS IN EACH NOSTRIL DAILY 16 g 4    APIDRA 100 UNIT/ML injection INJECT 8-12 UNITS UNDER THE SKIN THREE TIMES DAILY WITH MEALS 20 mL 3    GLUCAGEN HYPOKIT 1 MG SOLR injection INJECT 1 MG INTO THE SKIN ONCE FOR ONE DOSE 1 each

## 2019-05-28 DIAGNOSIS — L98.1 NEUROTIC EXCORIATIONS: ICD-10-CM

## 2019-05-31 ENCOUNTER — TELEPHONE (OUTPATIENT)
Dept: DERMATOLOGY | Age: 44
End: 2019-05-31

## 2019-06-13 ENCOUNTER — OFFICE VISIT (OUTPATIENT)
Dept: PAIN MANAGEMENT | Age: 44
End: 2019-06-13
Payer: MEDICARE

## 2019-06-13 VITALS
DIASTOLIC BLOOD PRESSURE: 68 MMHG | BODY MASS INDEX: 32.87 KG/M2 | SYSTOLIC BLOOD PRESSURE: 116 MMHG | WEIGHT: 256 LBS | HEART RATE: 82 BPM

## 2019-06-13 DIAGNOSIS — G62.9 PERIPHERAL POLYNEUROPATHY: ICD-10-CM

## 2019-06-13 DIAGNOSIS — G63 POLYNEUROPATHY ASSOCIATED WITH UNDERLYING DISEASE (HCC): ICD-10-CM

## 2019-06-13 DIAGNOSIS — M79.18 MYOFASCIAL PAIN: ICD-10-CM

## 2019-06-13 DIAGNOSIS — L97.522 ULCER OF LEFT FOOT, WITH FAT LAYER EXPOSED (HCC): ICD-10-CM

## 2019-06-13 DIAGNOSIS — G89.4 CHRONIC PAIN SYNDROME: ICD-10-CM

## 2019-06-13 DIAGNOSIS — G43.711 CHRONIC MIGRAINE WITHOUT AURA, WITH INTRACTABLE MIGRAINE, SO STATED, WITH STATUS MIGRAINOSUS: ICD-10-CM

## 2019-06-13 DIAGNOSIS — M79.2 NEUROPATHIC PAIN: ICD-10-CM

## 2019-06-13 PROCEDURE — G8417 CALC BMI ABV UP PARAM F/U: HCPCS | Performed by: INTERNAL MEDICINE

## 2019-06-13 PROCEDURE — 1036F TOBACCO NON-USER: CPT | Performed by: INTERNAL MEDICINE

## 2019-06-13 PROCEDURE — 99213 OFFICE O/P EST LOW 20 MIN: CPT | Performed by: INTERNAL MEDICINE

## 2019-06-13 PROCEDURE — G8427 DOCREV CUR MEDS BY ELIG CLIN: HCPCS | Performed by: INTERNAL MEDICINE

## 2019-06-13 RX ORDER — TIZANIDINE 4 MG/1
TABLET ORAL
Qty: 60 TABLET | Refills: 0 | Status: SHIPPED | OUTPATIENT
Start: 2019-06-13 | End: 2019-07-25

## 2019-06-13 RX ORDER — DULOXETIN HYDROCHLORIDE 60 MG/1
60 CAPSULE, DELAYED RELEASE ORAL 2 TIMES DAILY
Qty: 60 CAPSULE | Refills: 1 | Status: SHIPPED | OUTPATIENT
Start: 2019-06-13 | End: 2019-07-25 | Stop reason: SDUPTHER

## 2019-06-13 RX ORDER — TOPIRAMATE 100 MG/1
TABLET, FILM COATED ORAL
Qty: 60 TABLET | Refills: 0 | Status: SHIPPED | OUTPATIENT
Start: 2019-06-13 | End: 2019-07-25 | Stop reason: SDUPTHER

## 2019-06-13 RX ORDER — TRAMADOL HYDROCHLORIDE 50 MG/1
50 TABLET ORAL EVERY 6 HOURS PRN
Qty: 30 TABLET | Refills: 0 | Status: SHIPPED | OUTPATIENT
Start: 2019-06-13 | End: 2019-07-25 | Stop reason: SDUPTHER

## 2019-06-13 RX ORDER — BUPRENORPHINE 5 UG/H
1 PATCH TRANSDERMAL
Qty: 6 PATCH | Refills: 0 | Status: SHIPPED | OUTPATIENT
Start: 2019-06-13 | End: 2019-06-13

## 2019-06-13 RX ORDER — BUPRENORPHINE 5 UG/H
1 PATCH TRANSDERMAL
Qty: 6 PATCH | Refills: 0 | Status: SHIPPED | OUTPATIENT
Start: 2019-06-13 | End: 2019-07-25 | Stop reason: SDUPTHER

## 2019-06-13 RX ORDER — SUMATRIPTAN 50 MG/1
TABLET, FILM COATED ORAL
Qty: 9 TABLET | Refills: 0 | Status: SHIPPED | OUTPATIENT
Start: 2019-06-13 | End: 2019-07-25 | Stop reason: SDUPTHER

## 2019-06-13 RX ORDER — QUETIAPINE FUMARATE 25 MG/1
25-50 TABLET, FILM COATED ORAL NIGHTLY
Qty: 60 TABLET | Refills: 1 | Status: SHIPPED | OUTPATIENT
Start: 2019-06-13 | End: 2019-07-25 | Stop reason: SDUPTHER

## 2019-06-13 RX ORDER — GABAPENTIN 400 MG/1
CAPSULE ORAL
Qty: 60 CAPSULE | Refills: 1 | Status: SHIPPED | OUTPATIENT
Start: 2019-06-13 | End: 2019-07-25 | Stop reason: SDUPTHER

## 2019-06-13 RX ORDER — TRAMADOL HYDROCHLORIDE 50 MG/1
50 TABLET ORAL EVERY 6 HOURS PRN
Qty: 30 TABLET | Refills: 0 | Status: SHIPPED | OUTPATIENT
Start: 2019-06-13 | End: 2019-06-13

## 2019-06-13 NOTE — PROGRESS NOTES
Lavell Oscar  1975  [id number]    HISTORY OF PRESENT ILLNESS:  Mr. Judith Wiley is a 41 y.o. male returns for a follow up visit for multiple medical problems. His current presenting problems are   1. Chronic pain syndrome    2. Neuropathic pain    3. Myofascial pain    4. Chronic migraine without aura, with intractable migraine, so stated, with status migrainosus    5. Primary insomnia    6. Recurrent major depressive disorder, in partial remission ([facility] [address]    . As per information/history obtained from the PADT(patient assessment and documentation tool) - He complains of pain in the head, neck, shoulders Bilateral, elbows Bilateral, upper back, mid back, lower back and knees Bilateral with radiation to the arms Bilateral, hands Bilateral, buttocks, hips Bilateral, lower leg Bilateral, ankles Bilateral and feet Bilateral He rates the pain 10/10 and describes it as aching, throbbing, pins and needles. Pain is made worse by: movement, walking, standing. Current treatment regimen has helped relieve about 10% of the pain. He denies side effects from the current pain regimen. Patient reports that since the last follow up visit the physical functioning is unchanged, family/social relationships are unchanged, mood is better and sleep patterns are better, and that the overall functioning is unchanged. Patient denies neurological bowel or bladder. Patient denies misusing/abusing his narcotic pain medications or using any illegal drugs. There are No indicators for possible drug abuse, addiction or diversion problems. Upon obtaining the medical history from Mr. Judith Wiley regarding today's office visit for his presenting problems, Mr. Judith Wiley states he has been doing fair. He states he is having some social problems. He says he lives with his sister and mother. He reports his blood sugar has been ok.  He says he is using Butrans along with Ultram.       ALLERGIES: Patients list of allergies were reviewed     MEDICATIONS:  [facility] list of medications were reviewed. His current medications are   Outpatient Medications Prior to Visit   Medication Sig Dispense Refill    triamcinolone (KENALOG) 0.1 % ointment Apply to thickened affected areas PRN sparingly for flares no longer than 2 weeks at one time, do not apply to cleared skin 80 g 0    buprenorphine (BUTRANS) 5 MCG/HR PTWK Place 1 patch onto the skin every 7 days for 28 days. 4 patch 0    topiramate (TOPAMAX) 100 MG tablet TAKE ONE TABLET BY MOUTH TWO TIMES A DAY 60 tablet 0    magnesium oxide (MAG-OX) 400 (241.3 Mg) MG TABS tablet TAKE ONE TABLET BY MOUTH TWICE A DAY 60 tablet 1    DULoxetine (CYMBALTA) 60 MG extended release capsule Take 1 capsule by mouth 2 times daily 60 capsule 1    SUMAtriptan (IMITREX) 50 MG tablet Take one tab po at the start of migraine PRN max 1 every 24 hours 9 tablet 0    gabapentin (NEURONTIN) 400 MG capsule Take one tablet Po BID 60 capsule 1    tiZANidine (ZANAFLEX) 4 MG tablet Take 1/2 PO AM and 1 PO PM 60 tablet 0    traMADol (ULTRAM) 50 MG tablet Take 1 tablet by mouth every 6 hours as needed for Pain (max 1-2 per day) for up to 28 days. 50 tablet 0    QUEtiapine (SEROQUEL) 25 MG tablet Take 1-2 tablets by mouth nightly 60 tablet 1    LANTUS 100 UNIT/ML injection vial INJECT 28 UNITS UNDER THE SKIN NIGHTLY 1 vial 3    mupirocin (BACTROBAN NASAL) 2 % nasal ointment Take by Nasal route 2 times daily.  1 Tube 1    cephALEXin (KEFLEX) 500 MG capsule TAKE 1 CAPSULE BY MOUTH TWO TIMES A  capsule 5    Insulin Syringe-Needle U-100 (BD INSULIN SYRINGE U/F) 31G X 5/16\" 0.5 ML MISC Inject 1 each into the skin 4 times daily DX CODE E 10.22 120 each 9    Insulin Syringe-Needle U-100 (BD INSULIN SYRINGE U/F) 31G X 5/16\" 0.3 ML MISC Inject 1 each into the skin 4 times daily (before meals and nightly) 120 each 11    fluticasone (FLONASE) 50 MCG/ACT nasal spray SPRAY TWO SPRAYS IN EACH NOSTRIL DAILY 16 g 4    APIDRA 100 UNIT/ML injection INJECT 8-12 UNITS UNDER THE SKIN THREE TIMES DAILY WITH MEALS 20 mL 3    GLUCAGEN HYPOKIT 1 MG SOLR injection INJECT 1 MG INTO THE SKIN ONCE FOR ONE DOSE 1 each 2    ACCU-CHEK FASTCLIX LANCETS MISC 1 each by Does not apply route daily Test blood glucose 10 times daily Dx Code E 10.8 300 each 2    pantoprazole (PROTONIX) 40 MG tablet Take 1 tablet by mouth 2 times daily 60 tablet 0    hydrocortisone 2.5 % cream Apply topically 2 times daily. 30 g 0    Blood Glucose Monitoring Suppl (ACCU-CHEK CAPO PLUS) w/Device KIT 1 each by Does not apply route daily Test blood sugar 10 times daily Dx code E 10.8 1 kit 10    glucose blood VI test strips (ACCU-CHEK CAPO PLUS) strip 1 each by In Vitro route daily Test blood glucose 10 times daily Dx Code E10.8 300 each 10    GLUCAGON EMERGENCY 1 MG injection INJECT 1MG INTO THE MUSCLE AS NEEDED 1 kit 3    Insulin Syringe-Needle U-100 (B-D INS SYR ULTRAFINE 1CC/31G) 31G X 5/16\" 1 ML MISC INJECT USING INSULIN ONCE DAILY 30 each 10    Multiple Vitamin (DAILY KITTY) TABS TAKE ONE TABLET BY MOUTH DAILY 30 tablet 5    Cholecalciferol (VITAMIN D3) 5000 units CAPS Take 1 capsule by mouth Daily 30 capsule 3    Dextromethorphan-Guaifenesin (MUCINEX DM)  MG TB12 Cough and congestion. 60 tablet 1    omega-3 acid ethyl esters (LOVAZA) 1 G capsule TAKE TWO CAPSULES BY MOUTH TWICE DAILY 120 capsule 5    MUCUS RELIEF DM  MG TABS TAKE ONE BY MOUTH DAILY AS NEEDED 30 tablet 1    Alcohol Swabs PADS Use as needed for insulin injection. 100 each 5    Probiotic Product (ACIDOPHILUS PROBIOTIC) CAPS capsule TAKE ONE CAPSULE BY MOUTH DAILY 28 capsule 4    polyvinyl alcohol (LIQUIFILM TEARS) 1.4 % ophthalmic solution 1 drop as needed      propranolol (INDERAL) 80 MG tablet Take 40 mg by mouth 2 times daily For migraines       Flaxseed, Linseed, (FLAX PO) Take 14 g by mouth daily. No facility-administered medications prior to visit.         SOCIAL/FAMILY/PAST MEDICAL HISTORY:  Alli social, family and past medical history was reviewed. REVIEW OF SYSTEMS:    Respiratory: Negative for apnea, chest tightness and shortness of breath or change in baseline breathing. Gastrointestinal: Negative for nausea, vomiting, abdominal pain, diarrhea, constipation, blood in stool and abdominal distention. PHYSICAL EXAM:   Nursing note and vitals reviewed. /68   Pulse 82   Wt 256 lb (116.1 kg)   BMI 32.87 kg/m²   Constitutional: He appears well-developed and well-nourished. No acute distress. Skin: Skin is warm and dry, good turgor. No rash noted. He is not diaphoretic. +hyperpigement lesions on skin  Cardiovascular: Normal rate, regular rhythm, normal heart sounds, and does not have murmur. Pulmonary/Chest: Effort normal. No respiratory distress. He does not have wheezes in the lung fields. He has no rales. Neurological/Psychiatric:He is alert and oriented to person, place, and time. Coordination is  normal.  His mood isAppropriate and affect is Flat/blunted . Other: using a cane  IMPRESSION:   1. Chronic pain syndrome    2. Neuropathic pain    3. Myofascial pain        PLAN:  Informed verbal consent was obtained  -continue with current opioid regimen   -restart Butrans   -Adv Biofeedback, relaxation and meditation techniques. Referral to psychologist for CBT was also discussed with patient   -continue with Ultram 1-2 per day PRN  -Monitor blood sugar regularly, diabetic control- adv diabetic diet. Goal for fasting blood sugars around 120. Follow up with Endocrinologist/PCP also for on going management    -[place] was obtained and reviewed  for the last one year and no indicators of drug misuse  were found. Any other controlled substance prescriptions  seen on the record have been accounted for, I am aware of the patient receiving these medications. Aiyana Moreno [place] record will be rechecked as part of office protocol.      Current Outpatient Medications   Medication Sig Dispense Refill    triamcinolone (KENALOG) 0.1 % ointment Apply to thickened affected areas PRN sparingly for flares no longer than 2 weeks at one time, do not apply to cleared skin 80 g 0    buprenorphine (BUTRANS) 5 MCG/HR PTWK Place 1 patch onto the skin every 7 days for 28 days. 4 patch 0    topiramate (TOPAMAX) 100 MG tablet TAKE ONE TABLET BY MOUTH TWO TIMES A DAY 60 tablet 0    magnesium oxide (MAG-OX) 400 (241.3 Mg) MG TABS tablet TAKE ONE TABLET BY MOUTH TWICE A DAY 60 tablet 1    DULoxetine (CYMBALTA) 60 MG extended release capsule Take 1 capsule by mouth 2 times daily 60 capsule 1    SUMAtriptan (IMITREX) 50 MG tablet Take one tab po at the start of migraine PRN max 1 every 24 hours 9 tablet 0    gabapentin (NEURONTIN) 400 MG capsule Take one tablet Po BID 60 capsule 1    tiZANidine (ZANAFLEX) 4 MG tablet Take 1/2 PO AM and 1 PO PM 60 tablet 0    traMADol (ULTRAM) 50 MG tablet Take 1 tablet by mouth every 6 hours as needed for Pain (max 1-2 per day) for up to 28 days. 50 tablet 0    QUEtiapine (SEROQUEL) 25 MG tablet Take 1-2 tablets by mouth nightly 60 tablet 1    LANTUS 100 UNIT/ML injection vial INJECT 28 UNITS UNDER THE SKIN NIGHTLY 1 vial 3    mupirocin (BACTROBAN NASAL) 2 % nasal ointment Take by Nasal route 2 times daily.  1 Tube 1    cephALEXin (KEFLEX) 500 MG capsule TAKE 1 CAPSULE BY MOUTH TWO TIMES A  capsule 5    Insulin Syringe-Needle U-100 (BD INSULIN SYRINGE U/F) 31G X 5/16\" 0.5 ML MISC Inject 1 each into the skin 4 times daily DX CODE E 10.22 120 each 9    Insulin Syringe-Needle U-100 (BD INSULIN SYRINGE U/F) 31G X 5/16\" 0.3 ML MISC Inject 1 each into the skin 4 times daily (before meals and nightly) 120 each 11    fluticasone (FLONASE) 50 MCG/ACT nasal spray SPRAY TWO SPRAYS IN EACH NOSTRIL DAILY 16 g 4    APIDRA 100 UNIT/ML injection INJECT 8-12 UNITS UNDER THE SKIN THREE TIMES DAILY WITH MEALS 20 mL 3    GLUCAGEN HYPOKIT 1 MG SOLR injection INJECT 1 MG INTO THE SKIN ONCE FOR ONE DOSE 1 each 2    ACCU-CHEK FASTCLIX LANCETS MISC 1 each by Does not apply route daily Test blood glucose 10 times daily Dx Code E 10.8 300 each 2    pantoprazole (PROTONIX) 40 MG tablet Take 1 tablet by mouth 2 times daily 60 tablet 0    hydrocortisone 2.5 % cream Apply topically 2 times daily. 30 g 0    Blood Glucose Monitoring Suppl (ACCU-CHEK CAPO PLUS) w/Device KIT 1 each by Does not apply route daily Test blood sugar 10 times daily Dx code E 10.8 1 kit 10    glucose blood VI test strips (ACCU-CHEK CAPO PLUS) strip 1 each by In Vitro route daily Test blood glucose 10 times daily Dx Code E10.8 300 each 10    GLUCAGON EMERGENCY 1 MG injection INJECT 1MG INTO THE MUSCLE AS NEEDED 1 kit 3    Insulin Syringe-Needle U-100 (B-D INS SYR ULTRAFINE 1CC/31G) 31G X 5/16\" 1 ML MISC INJECT USING INSULIN ONCE DAILY 30 each 10    Multiple Vitamin (DAILY KITTY) TABS TAKE ONE TABLET BY MOUTH DAILY 30 tablet 5    Cholecalciferol (VITAMIN D3) 5000 units CAPS Take 1 capsule by mouth Daily 30 capsule 3    Dextromethorphan-Guaifenesin (MUCINEX DM)  MG TB12 Cough and congestion. 60 tablet 1    omega-3 acid ethyl esters (LOVAZA) 1 G capsule TAKE TWO CAPSULES BY MOUTH TWICE DAILY 120 capsule 5    MUCUS RELIEF DM  MG TABS TAKE ONE BY MOUTH DAILY AS NEEDED 30 tablet 1    Alcohol Swabs PADS Use as needed for insulin injection. 100 each 5    Probiotic Product (ACIDOPHILUS PROBIOTIC) CAPS capsule TAKE ONE CAPSULE BY MOUTH DAILY 28 capsule 4    polyvinyl alcohol (LIQUIFILM TEARS) 1.4 % ophthalmic solution 1 drop as needed      propranolol (INDERAL) 80 MG tablet Take 40 mg by mouth 2 times daily For migraines       Flaxseed, Linseed, (FLAX PO) Take 14 g by mouth daily. No current facility-administered medications for this visit. I will continue his current medication regimen  which is part of the above treatment schedule.  It has been helping with Mr. Osiel Goncalves chronic  medical problems which for this visit include:   Diagnoses of Chronic pain syndrome, Neuropathic pain, Myofascial pain, Chronic migraine without aura, with intractable migraine, so stated, with status migrainosus, Primary insomnia, and Recurrent major depressive disorder, in partial remission ([address] were pertinent to this visit. Risks and benefits of the medications and other alternative treatments  including no treatment were discussed with the patient. The common side effects of these medications were also explained to the patient. Informed verbal consent was obtained. Goals of current treatment regimen include improvement in pain, restoration of functioning- with focus on improvement in physical performance, general activity, work or disability,emotional distress, health care utilization and  decreased medication consumption. Will continue to monitor progress towards achieving/maintaining therapeutic goals with special emphasis on  1. Improvement in perceived interfernce  of pain with ADL's. Ability to do home exercises independently. Ability to do household chores indoor and/or outdoor work and social and leisure activities. Improve psychosocial and physical functioning. - he is showing progression towards this treatment goal with the current regimen. He was advised against drinking alcohol with the narcotic pain medicines, advised against driving or handling machinery while adjusting the dose of medicines or if having cognitive  issues related to the current medications. Risk of overdose and death, if medicines not taken as prescribed, were also discussed. If the patient develops new symptoms or if the symptoms worsen, the patient should call the office. While transcribing every attempt was made to maintain the accuracy of the note in terms of it's contents,there may have been some errors made inadvertently. Thank you for allowing me to participate in the care of this patient.     Renuka Sims MD.    Cc: Kevin Atkinson MD Agustin Wilcox, scribing for in the presence  of Dr. Sophie Magallanes.   06/13/19  2:31 PM  Ophelia Calderon Assistant    I, Dr. Sophie Magallanes, personally performed the services described in this documentation as scribed by  Marily Olson MA in my presence and it is both accurate and complete

## 2019-06-20 ENCOUNTER — OFFICE VISIT (OUTPATIENT)
Dept: ENDOCRINOLOGY | Age: 44
End: 2019-06-20
Payer: MEDICARE

## 2019-06-20 VITALS
DIASTOLIC BLOOD PRESSURE: 83 MMHG | HEIGHT: 74 IN | HEART RATE: 71 BPM | BODY MASS INDEX: 34.73 KG/M2 | SYSTOLIC BLOOD PRESSURE: 132 MMHG | OXYGEN SATURATION: 100 % | WEIGHT: 270.6 LBS

## 2019-06-20 DIAGNOSIS — E10.22 TYPE 1 DIABETES MELLITUS WITH STAGE 3 CHRONIC KIDNEY DISEASE (HCC): Primary | ICD-10-CM

## 2019-06-20 DIAGNOSIS — E78.2 MIXED HYPERLIPIDEMIA: ICD-10-CM

## 2019-06-20 DIAGNOSIS — N18.30 TYPE 1 DIABETES MELLITUS WITH STAGE 3 CHRONIC KIDNEY DISEASE (HCC): Primary | ICD-10-CM

## 2019-06-20 LAB — HBA1C MFR BLD: 8.4 %

## 2019-06-20 PROCEDURE — 99213 OFFICE O/P EST LOW 20 MIN: CPT | Performed by: INTERNAL MEDICINE

## 2019-06-20 PROCEDURE — 1036F TOBACCO NON-USER: CPT | Performed by: INTERNAL MEDICINE

## 2019-06-20 PROCEDURE — 83036 HEMOGLOBIN GLYCOSYLATED A1C: CPT | Performed by: INTERNAL MEDICINE

## 2019-06-20 PROCEDURE — G8427 DOCREV CUR MEDS BY ELIG CLIN: HCPCS | Performed by: INTERNAL MEDICINE

## 2019-06-20 PROCEDURE — G8417 CALC BMI ABV UP PARAM F/U: HCPCS | Performed by: INTERNAL MEDICINE

## 2019-06-20 PROCEDURE — 3045F PR MOST RECENT HEMOGLOBIN A1C LEVEL 7.0-9.0%: CPT | Performed by: INTERNAL MEDICINE

## 2019-06-20 PROCEDURE — 2022F DILAT RTA XM EVC RTNOPTHY: CPT | Performed by: INTERNAL MEDICINE

## 2019-06-20 RX ORDER — INSULIN GLARGINE 100 [IU]/ML
INJECTION, SOLUTION SUBCUTANEOUS
Qty: 1 VIAL | Refills: 3 | Status: SHIPPED | OUTPATIENT
Start: 2019-06-20 | End: 2021-01-14 | Stop reason: SDUPTHER

## 2019-06-20 RX ORDER — BLOOD SUGAR DIAGNOSTIC
1 STRIP MISCELLANEOUS
Qty: 120 EACH | Refills: 11 | Status: SHIPPED | OUTPATIENT
Start: 2019-06-20 | End: 2020-01-08

## 2019-06-20 NOTE — PROGRESS NOTES
[facility]  Da Cherry M.D. Phone: [phone]   FAX: Isaac   YOB: 1975    Date of Visit:  6/20/2019    Allergies   Allergen Reactions    Tegaderm Ag Mesh 2\"X2\" [Wound Dressings]      \"Holes in skin from Tegaderm Adhesive\"    Codeine Other (See Comments)     hallucinates    Other Other (See Comments)     Holes in skin  From Tegaderm Adhesive    Tape [Adhesive Tape]      Blister       Outpatient Medications Marked as Taking for the 6/20/19 encounter (Office Visit) with Renato Garza MD   Medication Sig Dispense Refill    LANTUS 100 UNIT/ML injection vial INJECT 30 UNITS UNDER THE SKIN NIGHTLY 1 vial 3    insulin glulisine (APIDRA) 100 UNIT/ML injection INJECT 8-12 UNITS UNDER THE SKIN THREE TIMES DAILY WITH MEALS 20 mL 3    Insulin Syringe-Needle U-100 (BD INSULIN SYRINGE U/F) 31G X 5/16\" 0.3 ML MISC Inject 1 each into the skin 4 times daily (before meals and nightly) 120 each 11    blood glucose test strips (ACCU-CHEK CAPO PLUS) strip USE TO TEST THREE TIMES A  strip 5    topiramate (TOPAMAX) 100 MG tablet TAKE ONE TABLET BY MOUTH TWO TIMES A DAY 60 tablet 0    magnesium oxide (MAG-OX) 400 (241.3 Mg) MG TABS tablet TAKE ONE TABLET BY MOUTH TWICE A DAY 60 tablet 1    DULoxetine (CYMBALTA) 60 MG extended release capsule Take 1 capsule by mouth 2 times daily 60 capsule 1    SUMAtriptan (IMITREX) 50 MG tablet Take one tab po at the start of migraine PRN max 1 every 24 hours 9 tablet 0    gabapentin (NEURONTIN) 400 MG capsule Take one tablet Po BID 60 capsule 1    tiZANidine (ZANAFLEX) 4 MG tablet Take 1/2 PO AM and 1 PO PM 60 tablet 0    QUEtiapine (SEROQUEL) 25 MG tablet Take 1-2 tablets by mouth nightly 60 tablet 1    mupirocin (BACTROBAN NASAL) 2 % nasal ointment Take by Nasal route 2 times daily. 1 Tube 1    buprenorphine (BUTRANS) 5 MCG/HR PTWK Place 1 patch onto the skin every 7 days for 42 days.  6 patch 0    traMADol (ULTRAM) 50 MG tablet Take 1 tablet by mouth every 6 hours as needed for Pain (max 1-2 per day) for up to 30 days. 30 tablet 0    triamcinolone (KENALOG) 0.1 % ointment Apply to thickened affected areas PRN sparingly for flares no longer than 2 weeks at one time, do not apply to cleared skin 80 g 0    cephALEXin (KEFLEX) 500 MG capsule TAKE 1 CAPSULE BY MOUTH TWO TIMES A  capsule 5    Insulin Syringe-Needle U-100 (BD INSULIN SYRINGE U/F) 31G X 5/16\" 0.5 ML MISC Inject 1 each into the skin 4 times daily DX CODE E 10.22 120 each 9    fluticasone (FLONASE) 50 MCG/ACT nasal spray SPRAY TWO SPRAYS IN EACH NOSTRIL DAILY 16 g 4    GLUCAGEN HYPOKIT 1 MG SOLR injection INJECT 1 MG INTO THE SKIN ONCE FOR ONE DOSE 1 each 2    ACCU-CHEK FASTCLIX LANCETS MISC 1 each by Does not apply route daily Test blood glucose 10 times daily Dx Code E 10.8 300 each 2    pantoprazole (PROTONIX) 40 MG tablet Take 1 tablet by mouth 2 times daily 60 tablet 0    hydrocortisone 2.5 % cream Apply topically 2 times daily. 30 g 0    Blood Glucose Monitoring Suppl (ACCU-CHEK CAPO PLUS) w/Device KIT 1 each by Does not apply route daily Test blood sugar 10 times daily Dx code E 10.8 1 kit 10    glucose blood VI test strips (ACCU-CHEK CAPO PLUS) strip 1 each by In Vitro route daily Test blood glucose 10 times daily Dx Code E10.8 300 each 10    GLUCAGON EMERGENCY 1 MG injection INJECT 1MG INTO THE MUSCLE AS NEEDED 1 kit 3    Insulin Syringe-Needle U-100 (B-D INS SYR ULTRAFINE 1CC/31G) 31G X 5/16\" 1 ML MISC INJECT USING INSULIN ONCE DAILY 30 each 10    Multiple Vitamin (DAILY KITTY) TABS TAKE ONE TABLET BY MOUTH DAILY 30 tablet 5    Cholecalciferol (VITAMIN D3) 5000 units CAPS Take 1 capsule by mouth Daily 30 capsule 3    Dextromethorphan-Guaifenesin (MUCINEX DM)  MG TB12 Cough and congestion.  60 tablet 1    omega-3 acid ethyl esters (LOVAZA) 1 G capsule TAKE TWO CAPSULES BY MOUTH TWICE DAILY 120 capsule 5    MUCUS RELIEF DM  MG TABS TAKE ONE BY MOUTH DAILY AS NEEDED 30 tablet 1    Alcohol Swabs PADS Use as needed for insulin injection. 100 each 5    Probiotic Product (ACIDOPHILUS PROBIOTIC) CAPS capsule TAKE ONE CAPSULE BY MOUTH DAILY 28 capsule 4    polyvinyl alcohol (LIQUIFILM TEARS) 1.4 % ophthalmic solution 1 drop as needed      propranolol (INDERAL) 80 MG tablet Take 40 mg by mouth 2 times daily For migraines       Flaxseed, Linseed, (FLAX PO) Take 14 g by mouth daily. Vitals:    06/20/19 1455   BP: 132/83   Site: Right Upper Arm   Position: Sitting   Cuff Size: Large Adult   Pulse: 71   SpO2: 100%   Weight: 270 lb 9.6 oz (122.7 kg)   Height: 6' 2\" (1.88 m)     Body mass index is 34.74 kg/m².      Wt Readings from Last 3 Encounters:   06/20/19 270 lb 9.6 oz (122.7 kg)   06/13/19 256 lb (116.1 kg)   05/16/19 255 lb (115.7 kg)     BP Readings from Last 3 Encounters:   06/20/19 132/83   06/13/19 116/68   05/16/19 130/75        Past Medical History:   Diagnosis Date    Acute respiratory failure ([address] 8/18/2014    Asthma     Blood pressure instability     Chronic pain syndrome     Detached retina, bilateral     laser tx right eye    Diabetes mellitus ([address]     uncontrolled     Insomnia     Meningitis August 2014    admission [facility]    Neuropathic pain     Osteomyelitis ([address]     Osteomyelitis of tibia ([address]     left    Pain of left lower extremity     Peripheral neuropathy      Past Surgical History:   Procedure Laterality Date    ANKLE FRACTURE SURGERY Left 1/28/13    Dr. Toy Holder  August 2013    left tibia with external fixation device    EYE SURGERY      retina reattachment left eye x 3 holes repairs    EYE SURGERY Right 9-27-13    retal detachment    LEG SURGERY Left 3/31/14    ORIF left fibula and tibia    [place] EGD FLEXIBLE FOREIGN BODY REMOVAL N/A 9/21/2018    EGD FOREIGN BODY REMOVAL performed by Chente Julian MD at [address] [facility] Negative for blurred vision. Respiratory: Negative for cough and shortness of breath. Cardiovascular: Negative for chest pain and palpitations. Gastrointestinal: Negative for heartburn, nausea, vomiting and abdominal pain. Genitourinary: Negative for urgency and frequency. Musculoskeletal: Positive for joint pain. Negative for myalgias and back pain. Skin: Negative for rash. Neurological: Positive for tingling and sensory change. Negative for headaches. Endo/Heme/Allergies: Negative for polydipsia. Psychiatric/Behavioral: Negative for depression. The patient is not nervous/anxious. Physical Exam   Constitutional: He is oriented to person, place, and time. He appears well-developed and well-nourished. Psychiatric: His behavior is normal.       No visits with results within 3 Month(s) from this visit. Latest known visit with results is:   Orders Only on 02/21/2019   Component Date Value Ref Range Status    Protein, Ur 02/21/2019 25.00* <12 mg/dL Final    Creatinine, Ur 02/21/2019 69.2  39.0 - 259.0 mg/dL Final    Protein/Creat Ratio 02/21/2019 0.4  mg/dL Final    No established reference range. Assessment/Plan    1. DM    This 41 yrs old male has DM. Complicated by retinopathy, microalbuminuria. Most likely it is Type 1 DM. HARIS antibodies and islet cell antibodies negative.  c-peptide  Undetectable. Uncontrolled. A1c 9.6 %----> 8.4 --->8.9 %--->8.2 %--->7.1 %--->7.1 %---> 6.8 % --> 6.8 %--> 6.5 %---> 7.1 %---> 7.1 %---> 7.5 %---> 7.7 % ---> 7.4 %---> 11 % ---> 9 % --> 9.3 % ---> 8.4 %       BS are variable  No pattern noted. -Continue Lantus insulin 30 units QHS   -Continue same dose of novolog     Will order Freestyle laura sensor. He is testing sugars 4 times a day. Will give a sample to try to see if he has any allergy to the adhesives    He does not want to use insulin pump.     Updated on eye exam. continue follow-up with the ophthalmologist.  Has microalbuminuria. On Ace-inhibitor. Will repeat. Has peripheral neuropathy on exam. Discussed foot care    Non-smoker. BP at goal.      2. CKD Follows with nephrologist. Dr. Kath Barakat      3. Hyperlipidemia. LDL is high. He has refused to take statins  He understands the high risk of heart disease and stroke with untreated hyperlipidemia. On fish oil. 5. Foot ulcers. Healed. Managed by podiatry.  Dr. Elaine Books [facility])

## 2019-07-25 ENCOUNTER — OFFICE VISIT (OUTPATIENT)
Dept: PAIN MANAGEMENT | Age: 44
End: 2019-07-25
Payer: MEDICARE

## 2019-07-25 VITALS
SYSTOLIC BLOOD PRESSURE: 128 MMHG | WEIGHT: 268 LBS | BODY MASS INDEX: 34.41 KG/M2 | HEART RATE: 83 BPM | DIASTOLIC BLOOD PRESSURE: 84 MMHG

## 2019-07-25 DIAGNOSIS — M79.2 NEUROPATHIC PAIN: ICD-10-CM

## 2019-07-25 DIAGNOSIS — G62.9 PERIPHERAL POLYNEUROPATHY: ICD-10-CM

## 2019-07-25 DIAGNOSIS — G89.4 CHRONIC PAIN SYNDROME: ICD-10-CM

## 2019-07-25 DIAGNOSIS — G63 POLYNEUROPATHY ASSOCIATED WITH UNDERLYING DISEASE (HCC): ICD-10-CM

## 2019-07-25 DIAGNOSIS — M79.18 MYOFASCIAL PAIN: ICD-10-CM

## 2019-07-25 DIAGNOSIS — L97.522 ULCER OF LEFT FOOT, WITH FAT LAYER EXPOSED (HCC): ICD-10-CM

## 2019-07-25 DIAGNOSIS — G43.711 CHRONIC MIGRAINE WITHOUT AURA, WITH INTRACTABLE MIGRAINE, SO STATED, WITH STATUS MIGRAINOSUS: ICD-10-CM

## 2019-07-25 PROCEDURE — 99213 OFFICE O/P EST LOW 20 MIN: CPT | Performed by: INTERNAL MEDICINE

## 2019-07-25 PROCEDURE — G8417 CALC BMI ABV UP PARAM F/U: HCPCS | Performed by: INTERNAL MEDICINE

## 2019-07-25 PROCEDURE — 1036F TOBACCO NON-USER: CPT | Performed by: INTERNAL MEDICINE

## 2019-07-25 PROCEDURE — G8427 DOCREV CUR MEDS BY ELIG CLIN: HCPCS | Performed by: INTERNAL MEDICINE

## 2019-07-25 RX ORDER — BUPRENORPHINE 5 UG/H
1 PATCH TRANSDERMAL
Qty: 4 PATCH | Refills: 0 | Status: SHIPPED | OUTPATIENT
Start: 2019-07-25 | End: 2019-08-22 | Stop reason: SDUPTHER

## 2019-07-25 RX ORDER — SUMATRIPTAN 50 MG/1
TABLET, FILM COATED ORAL
Qty: 9 TABLET | Refills: 0 | Status: SHIPPED | OUTPATIENT
Start: 2019-07-25 | End: 2019-08-22 | Stop reason: SDUPTHER

## 2019-07-25 RX ORDER — QUETIAPINE FUMARATE 25 MG/1
25-50 TABLET, FILM COATED ORAL NIGHTLY
Qty: 60 TABLET | Refills: 1 | Status: SHIPPED | OUTPATIENT
Start: 2019-07-25 | End: 2019-09-19 | Stop reason: SDUPTHER

## 2019-07-25 RX ORDER — DULOXETIN HYDROCHLORIDE 60 MG/1
60 CAPSULE, DELAYED RELEASE ORAL 2 TIMES DAILY
Qty: 60 CAPSULE | Refills: 0 | Status: SHIPPED | OUTPATIENT
Start: 2019-07-25 | End: 2019-08-22 | Stop reason: SDUPTHER

## 2019-07-25 RX ORDER — GABAPENTIN 400 MG/1
CAPSULE ORAL
Qty: 60 CAPSULE | Refills: 1 | Status: SHIPPED | OUTPATIENT
Start: 2019-07-25 | End: 2019-08-22 | Stop reason: SDUPTHER

## 2019-07-25 RX ORDER — TRAMADOL HYDROCHLORIDE 50 MG/1
50 TABLET ORAL EVERY 6 HOURS PRN
Qty: 30 TABLET | Refills: 0 | Status: SHIPPED | OUTPATIENT
Start: 2019-07-25 | End: 2019-10-17 | Stop reason: SDUPTHER

## 2019-07-25 RX ORDER — TOPIRAMATE 100 MG/1
TABLET, FILM COATED ORAL
Qty: 60 TABLET | Refills: 0 | Status: SHIPPED | OUTPATIENT
Start: 2019-07-25 | End: 2019-08-22 | Stop reason: SDUPTHER

## 2019-07-25 NOTE — PROGRESS NOTES
Octavio Riedel  1975  [id number]    HISTORY OF PRESENT ILLNESS:  Mr. Jennifer Bajwa is a 41 y.o. male returns for a follow up visit for multiple medical problems. His current presenting problems are   1. Chronic pain syndrome    2. Neuropathic pain    3. Myofascial pain    4. Primary insomnia    5. Recurrent major depressive disorder, in partial remission ([address]    6. Chronic migraine without aura, with intractable migraine, so stated, with status migrainosus    . As per information/history obtained from the PADT(patient assessment and documentation tool) - He complains of pain in the head, neck, shoulders Bilateral, elbows Bilateral, upper back, mid back, lower back and knees Bilateral with radiation to the arms Bilateral, hands Bilateral, abdomen, buttocks, hips Bilateral, upper leg Bilateral, lower leg Bilateral, ankles Bilateral and feet Bilateral He rates the pain 10/10 and describes it as aching, burning, throbbing, numbness, pins and needles. Pain is made worse by: movement, walking, standing. Current treatment regimen has helped relieve about 10% of the pain. He denies side effects from the current pain regimen. Patient reports that since the last follow up visit the physical functioning is unchanged, family/social relationships are unchanged, mood is better and sleep patterns are better, and that the overall functioning is unchanged. Patient denies neurological bowel or bladder. Patient denies misusing/abusing his narcotic pain medications or using any illegal drugs. There are No indicators for possible drug abuse, addiction or diversion problems. Upon obtaining the medical history from Mr. Jennifer Bajwa regarding today's office visit for his presenting problems, Mr. Jennifer Bajwa states he is in more pain. He states he moved his residences and lost his pain patches, he has been out of them, he is having increase pain since. He states he has been complaint with his medications.  He mentions he is using Neurontin along tablet by mouth every 6 hours as needed for Pain (max 1-2 per day) for up to 30 days. 30 tablet 0     No current facility-administered medications for this visit. I will continue his current medication regimen  which is part of the above treatment schedule. It has been helping with Mr. Krysten Campbell chronic  medical problems which for this visit include:   Diagnoses of Chronic pain syndrome, Neuropathic pain, Myofascial pain, Primary insomnia, Recurrent major depressive disorder, in partial remission ([address] and Chronic migraine without aura, with intractable migraine, so stated, with status migrainosus were pertinent to this visit. Risks and benefits of the medications and other alternative treatments  including no treatment were discussed with the patient. The common side effects of these medications were also explained to the patient. Informed verbal consent was obtained. Goals of current treatment regimen include improvement in pain, restoration of functioning- with focus on improvement in physical performance, general activity, work or disability,emotional distress, health care utilization and  decreased medication consumption. Will continue to monitor progress towards achieving/maintaining therapeutic goals with special emphasis on  1. Improvement in perceived interfernce  of pain with ADL's. Ability to do home exercises independently. Ability to do household chores indoor and/or outdoor work and social and leisure activities. Improve psychosocial and physical functioning. - he is showing progression towards this treatment goal with the current regimen. He was advised against drinking alcohol with the narcotic pain medicines, advised against driving or handling machinery while adjusting the dose of medicines or if having cognitive  issues related to the current medications. Risk of overdose and death, if medicines not taken as prescribed, were also discussed.  If the patient develops new symptoms or if the symptoms worsen, the patient should call the office. While transcribing every attempt was made to maintain the accuracy of the note in terms of it's contents,there may have been some errors made inadvertently. Thank you for allowing me to participate in the care of this patient. Jeff Prado MD.    Cc: Steven Feng MD    I, Jossy Jaffe, scribing for in the presence  of Dr. Jeff Prado.   07/25/19  9:42 AM  Trevon Clark Assistant    I, Dr. Jeff Prado, personally performed the services described in this documentation as scribed by  Jossy Jaffe MA in my presence and it is both accurate and complete

## 2019-08-07 DIAGNOSIS — G89.4 CHRONIC PAIN SYNDROME: ICD-10-CM

## 2019-08-07 DIAGNOSIS — G43.711 CHRONIC MIGRAINE WITHOUT AURA, WITH INTRACTABLE MIGRAINE, SO STATED, WITH STATUS MIGRAINOSUS: ICD-10-CM

## 2019-08-07 RX ORDER — TOPIRAMATE 100 MG/1
TABLET, FILM COATED ORAL
Qty: 60 TABLET | Refills: 0 | OUTPATIENT
Start: 2019-08-07

## 2019-08-22 ENCOUNTER — OFFICE VISIT (OUTPATIENT)
Dept: PAIN MANAGEMENT | Age: 44
End: 2019-08-22
Payer: MEDICARE

## 2019-08-22 VITALS
WEIGHT: 265 LBS | SYSTOLIC BLOOD PRESSURE: 96 MMHG | BODY MASS INDEX: 34.02 KG/M2 | DIASTOLIC BLOOD PRESSURE: 63 MMHG | HEART RATE: 98 BPM

## 2019-08-22 DIAGNOSIS — G89.4 CHRONIC PAIN SYNDROME: ICD-10-CM

## 2019-08-22 DIAGNOSIS — G63 POLYNEUROPATHY ASSOCIATED WITH UNDERLYING DISEASE (HCC): ICD-10-CM

## 2019-08-22 DIAGNOSIS — K59.03 CONSTIPATION DUE TO OPIOID THERAPY: ICD-10-CM

## 2019-08-22 DIAGNOSIS — M79.18 MYOFASCIAL PAIN: ICD-10-CM

## 2019-08-22 DIAGNOSIS — G43.711 CHRONIC MIGRAINE WITHOUT AURA, WITH INTRACTABLE MIGRAINE, SO STATED, WITH STATUS MIGRAINOSUS: ICD-10-CM

## 2019-08-22 DIAGNOSIS — M79.2 NEUROPATHIC PAIN: ICD-10-CM

## 2019-08-22 DIAGNOSIS — F51.01 PRIMARY INSOMNIA: ICD-10-CM

## 2019-08-22 DIAGNOSIS — G62.9 PERIPHERAL POLYNEUROPATHY: ICD-10-CM

## 2019-08-22 DIAGNOSIS — F33.41 RECURRENT MAJOR DEPRESSIVE DISORDER, IN PARTIAL REMISSION (HCC): ICD-10-CM

## 2019-08-22 DIAGNOSIS — T40.2X5A CONSTIPATION DUE TO OPIOID THERAPY: ICD-10-CM

## 2019-08-22 DIAGNOSIS — L97.522 ULCER OF LEFT FOOT, WITH FAT LAYER EXPOSED (HCC): ICD-10-CM

## 2019-08-22 PROCEDURE — 99214 OFFICE O/P EST MOD 30 MIN: CPT | Performed by: INTERNAL MEDICINE

## 2019-08-22 PROCEDURE — G8427 DOCREV CUR MEDS BY ELIG CLIN: HCPCS | Performed by: INTERNAL MEDICINE

## 2019-08-22 PROCEDURE — 1036F TOBACCO NON-USER: CPT | Performed by: INTERNAL MEDICINE

## 2019-08-22 PROCEDURE — G8417 CALC BMI ABV UP PARAM F/U: HCPCS | Performed by: INTERNAL MEDICINE

## 2019-08-22 RX ORDER — GABAPENTIN 400 MG/1
CAPSULE ORAL
Qty: 60 CAPSULE | Refills: 1 | Status: SHIPPED | OUTPATIENT
Start: 2019-08-22 | End: 2019-09-19 | Stop reason: SDUPTHER

## 2019-08-22 RX ORDER — DULOXETIN HYDROCHLORIDE 60 MG/1
60 CAPSULE, DELAYED RELEASE ORAL 2 TIMES DAILY
Qty: 60 CAPSULE | Refills: 0 | Status: SHIPPED | OUTPATIENT
Start: 2019-08-22 | End: 2019-09-19 | Stop reason: SDUPTHER

## 2019-08-22 RX ORDER — TIZANIDINE 4 MG/1
4 TABLET ORAL 2 TIMES DAILY
Qty: 60 TABLET | Refills: 0 | Status: SHIPPED | OUTPATIENT
Start: 2019-08-22 | End: 2019-09-19 | Stop reason: SDUPTHER

## 2019-08-22 RX ORDER — TOPIRAMATE 100 MG/1
TABLET, FILM COATED ORAL
Qty: 60 TABLET | Refills: 0 | Status: SHIPPED | OUTPATIENT
Start: 2019-08-22 | End: 2019-09-19 | Stop reason: SDUPTHER

## 2019-08-22 RX ORDER — SUMATRIPTAN 50 MG/1
TABLET, FILM COATED ORAL
Qty: 9 TABLET | Refills: 0 | Status: SHIPPED | OUTPATIENT
Start: 2019-08-22 | End: 2019-09-19 | Stop reason: SDUPTHER

## 2019-08-22 RX ORDER — BUPRENORPHINE 5 UG/H
1 PATCH TRANSDERMAL
Qty: 4 PATCH | Refills: 0 | Status: SHIPPED | OUTPATIENT
Start: 2019-08-22 | End: 2019-09-19 | Stop reason: SDUPTHER

## 2019-08-22 NOTE — PROGRESS NOTES
Active member of club or organization: Not on file     Attends meetings of clubs or organizations: Not on file     Relationship status: Not on file    Intimate partner violence:     Fear of current or ex partner: Not on file     Emotionally abused: Not on file     Physically abused: Not on file     Forced sexual activity: Not on file   Other Topics Concern    Not on file   Social History Narrative    Not on file       Mr. Tara Smith current medications are   Outpatient Medications Prior to Visit   Medication Sig Dispense Refill    topiramate (TOPAMAX) 100 MG tablet TAKE ONE TABLET BY MOUTH TWO TIMES A DAY 60 tablet 0    DULoxetine (CYMBALTA) 60 MG extended release capsule Take 1 capsule by mouth 2 times daily 60 capsule 0    SUMAtriptan (IMITREX) 50 MG tablet Take one tab po at the start of migraine PRN max 1 every 24 hours 9 tablet 0    gabapentin (NEURONTIN) 400 MG capsule Take one tablet Po BID 60 capsule 1    QUEtiapine (SEROQUEL) 25 MG tablet Take 1-2 tablets by mouth nightly 60 tablet 1    mupirocin (BACTROBAN NASAL) 2 % nasal ointment Take by Nasal route 2 times daily. 1 Tube 1    buprenorphine (BUTRANS) 5 MCG/HR PTWK Place 1 patch onto the skin every 7 days for 28 days. 4 patch 0    traMADol (ULTRAM) 50 MG tablet Take 1 tablet by mouth every 6 hours as needed for Pain (max 1-2 per day) for up to 30 days.  30 tablet 0    LANTUS 100 UNIT/ML injection vial INJECT 30 UNITS UNDER THE SKIN NIGHTLY 1 vial 3    insulin glulisine (APIDRA) 100 UNIT/ML injection INJECT 8-12 UNITS UNDER THE SKIN THREE TIMES DAILY WITH MEALS 20 mL 3    Insulin Syringe-Needle U-100 (BD INSULIN SYRINGE U/F) 31G X 5/16\" 0.3 ML MISC Inject 1 each into the skin 4 times daily (before meals and nightly) 120 each 11    blood glucose test strips (ACCU-CHEK CAPO PLUS) strip USE TO TEST THREE TIMES A  strip 5    triamcinolone (KENALOG) 0.1 % ointment Apply to thickened affected areas PRN sparingly for flares no longer than 2 weeks at one time, do not apply to cleared skin 80 g 0    cephALEXin (KEFLEX) 500 MG capsule TAKE 1 CAPSULE BY MOUTH TWO TIMES A  capsule 5    Insulin Syringe-Needle U-100 (BD INSULIN SYRINGE U/F) 31G X 5/16\" 0.5 ML MISC Inject 1 each into the skin 4 times daily DX CODE E 10.22 120 each 9    fluticasone (FLONASE) 50 MCG/ACT nasal spray SPRAY TWO SPRAYS IN EACH NOSTRIL DAILY 16 g 4    GLUCAGEN HYPOKIT 1 MG SOLR injection INJECT 1 MG INTO THE SKIN ONCE FOR ONE DOSE 1 each 2    ACCU-CHEK FASTCLIX LANCETS MISC 1 each by Does not apply route daily Test blood glucose 10 times daily Dx Code E 10.8 300 each 2    pantoprazole (PROTONIX) 40 MG tablet Take 1 tablet by mouth 2 times daily 60 tablet 0    hydrocortisone 2.5 % cream Apply topically 2 times daily. 30 g 0    Blood Glucose Monitoring Suppl (ACCU-CHEK ACPO PLUS) w/Device KIT 1 each by Does not apply route daily Test blood sugar 10 times daily Dx code E 10.8 1 kit 10    glucose blood VI test strips (ACCU-CHEK CAPO PLUS) strip 1 each by In Vitro route daily Test blood glucose 10 times daily Dx Code E10.8 300 each 10    GLUCAGON EMERGENCY 1 MG injection INJECT 1MG INTO THE MUSCLE AS NEEDED 1 kit 3    Insulin Syringe-Needle U-100 (B-D INS SYR ULTRAFINE 1CC/31G) 31G X 5/16\" 1 ML MISC INJECT USING INSULIN ONCE DAILY 30 each 10    Multiple Vitamin (DAILY KITTY) TABS TAKE ONE TABLET BY MOUTH DAILY 30 tablet 5    Cholecalciferol (VITAMIN D3) 5000 units CAPS Take 1 capsule by mouth Daily 30 capsule 3    Dextromethorphan-Guaifenesin (MUCINEX DM)  MG TB12 Cough and congestion. 60 tablet 1    omega-3 acid ethyl esters (LOVAZA) 1 G capsule TAKE TWO CAPSULES BY MOUTH TWICE DAILY 120 capsule 5    MUCUS RELIEF DM  MG TABS TAKE ONE BY MOUTH DAILY AS NEEDED 30 tablet 1    Alcohol Swabs PADS Use as needed for insulin injection.  100 each 5    Probiotic Product (ACIDOPHILUS PROBIOTIC) CAPS capsule TAKE ONE CAPSULE BY MOUTH DAILY 28 capsule 4    polyvinyl alcohol (LIQUIFILM TEARS) 1.4 % ophthalmic solution 1 drop as needed      propranolol (INDERAL) 80 MG tablet Take 40 mg by mouth 2 times daily For migraines       Flaxseed, Linseed, (FLAX PO) Take 14 g by mouth daily. No facility-administered medications prior to visit. REVIEW OF SYSTEMS:   Constitutional: Negative for fatigue and unexpected weight change. Eyes: Negative for visual disturbance. Respiratory: Negative for shortness of breath. Cardiovascular: Negative for chest pain, palpitations  Gastrointestinal: Negative for blood in stool, abdominal distention, nausea, vomiting, abdominal pain, diarrhea,constipation. Skin: Negative for color change or any abnormal bruising. Neurological: Negative for speech difficulty, weakness and light-headedness, dizziness, tremors, sleepiness  Psychiatric/Behavioral: Negative for suicidal ideas, hallucinations, behavioral problems, self-injury, decreased concentration/cognition, agitation, confusion. PHYSICAL EXAM:   Nursing note and vitals reviewed. BP 96/63   Pulse 98   Wt 265 lb (120.2 kg)   BMI 34.02 kg/m²   General Appearance: Patient is well nourished, well developed, well groomed and in no acute distress. Skin: Skin is warm and dry, good turgor . No rash, +scabbed lesions UE/LE noted. He is not diaphoretic. Pulmonary/Chest: Effort normal. No respiratory distress or use of accessory muscles. Auscultation revealing normal air entry. He does not have wheezes in the lung fields. He has no rales. Cardiovascular: Normal rate, regular rhythm, normal heart sounds, and does not have murmur. Exam reveals no gallop and no friction rub. Abdominal: Soft. Bowel sounds are normal.  On inspection of abdomen is obese no distension and no mass. No tenderness. He has no rebound and no guarding. Musculoskeletal / Extremities: Range of motion is normal. Gait is normal, assistive devices use: post op boots, B/L feet.     He exhibits the skin every 7 days for 28 days. 4 patch 0    traMADol (ULTRAM) 50 MG tablet Take 1 tablet by mouth every 6 hours as needed for Pain (max 1-2 per day) for up to 30 days. 30 tablet 0    LANTUS 100 UNIT/ML injection vial INJECT 30 UNITS UNDER THE SKIN NIGHTLY 1 vial 3    insulin glulisine (APIDRA) 100 UNIT/ML injection INJECT 8-12 UNITS UNDER THE SKIN THREE TIMES DAILY WITH MEALS 20 mL 3    Insulin Syringe-Needle U-100 (BD INSULIN SYRINGE U/F) 31G X 5/16\" 0.3 ML MISC Inject 1 each into the skin 4 times daily (before meals and nightly) 120 each 11    blood glucose test strips (ACCU-CHEK CAPO PLUS) strip USE TO TEST THREE TIMES A  strip 5    triamcinolone (KENALOG) 0.1 % ointment Apply to thickened affected areas PRN sparingly for flares no longer than 2 weeks at one time, do not apply to cleared skin 80 g 0    cephALEXin (KEFLEX) 500 MG capsule TAKE 1 CAPSULE BY MOUTH TWO TIMES A  capsule 5    Insulin Syringe-Needle U-100 (BD INSULIN SYRINGE U/F) 31G X 5/16\" 0.5 ML MISC Inject 1 each into the skin 4 times daily DX CODE E 10.22 120 each 9    fluticasone (FLONASE) 50 MCG/ACT nasal spray SPRAY TWO SPRAYS IN EACH NOSTRIL DAILY 16 g 4    GLUCAGEN HYPOKIT 1 MG SOLR injection INJECT 1 MG INTO THE SKIN ONCE FOR ONE DOSE 1 each 2    ACCU-CHEK FASTCLIX LANCETS MISC 1 each by Does not apply route daily Test blood glucose 10 times daily Dx Code E 10.8 300 each 2    pantoprazole (PROTONIX) 40 MG tablet Take 1 tablet by mouth 2 times daily 60 tablet 0    hydrocortisone 2.5 % cream Apply topically 2 times daily.  30 g 0    Blood Glucose Monitoring Suppl (ACCU-CHEK CAPO PLUS) w/Device KIT 1 each by Does not apply route daily Test blood sugar 10 times daily Dx code E 10.8 1 kit 10    glucose blood VI test strips (ACCU-CHEK CAPO PLUS) strip 1 each by In Vitro route daily Test blood glucose 10 times daily Dx Code E10.8 300 each 10    GLUCAGON EMERGENCY 1 MG injection INJECT 1MG INTO THE MUSCLE AS NEEDED mood/ anxiety and depression symptoms such as crying spells, low energy, problems with concentration, motivation.- he is not showing any significant progress/or showing regression  towards this goal and reassessment and adjustment of goals/treatment have been made. 3. Reduction of reliance on opioid analgesia/more appropriate opioid use. - he is showing progression towards this treatment goal with the current regimen. Risks and benefits of the medications and other alternative treatments have been/were  discussed with the patient. Any questions on the  common side effects of these medications were also answered. He was advised against drinking alcohol with the narcotic pain medicines, advised against driving or handling machinery when  starting or adjusting the dose of medicines, feeling groggy or drowsy, or if having any cognitive issues related to the current medications. Heis fully aware of the risk of overdose and death, if medicines are misused and not taken as prescribed. If he develops new symptoms or if the symptoms worsen, he was told to call the office. .  Thank you for allowing me to participate in the care of this patient.     Roselia Tirado MD.    Cc: Jamar Lizarraga MD

## 2019-08-25 DIAGNOSIS — N18.30 TYPE 1 DIABETES MELLITUS WITH STAGE 3 CHRONIC KIDNEY DISEASE (HCC): ICD-10-CM

## 2019-08-25 DIAGNOSIS — E10.22 TYPE 1 DIABETES MELLITUS WITH STAGE 3 CHRONIC KIDNEY DISEASE (HCC): ICD-10-CM

## 2019-08-26 ENCOUNTER — TELEPHONE (OUTPATIENT)
Dept: ENDOCRINOLOGY | Age: 44
End: 2019-08-26

## 2019-08-26 RX ORDER — INSULIN GLARGINE 100 [IU]/ML
INJECTION, SOLUTION SUBCUTANEOUS
Qty: 1 VIAL | Refills: 3 | Status: SHIPPED | OUTPATIENT
Start: 2019-08-26 | End: 2020-01-07

## 2019-08-27 NOTE — TELEPHONE ENCOUNTER
Patients mom came in and picked up to pens per [facility] to use pens instead of vials.  Will call back to see if we have vials later this week

## 2019-09-19 ENCOUNTER — OFFICE VISIT (OUTPATIENT)
Dept: PAIN MANAGEMENT | Age: 44
End: 2019-09-19
Payer: MEDICARE

## 2019-09-19 VITALS
DIASTOLIC BLOOD PRESSURE: 70 MMHG | HEART RATE: 106 BPM | WEIGHT: 260 LBS | BODY MASS INDEX: 33.38 KG/M2 | SYSTOLIC BLOOD PRESSURE: 117 MMHG

## 2019-09-19 DIAGNOSIS — M79.2 NEUROPATHIC PAIN: ICD-10-CM

## 2019-09-19 DIAGNOSIS — M79.18 MYOFASCIAL PAIN: ICD-10-CM

## 2019-09-19 DIAGNOSIS — G62.9 PERIPHERAL POLYNEUROPATHY: ICD-10-CM

## 2019-09-19 DIAGNOSIS — G89.4 CHRONIC PAIN SYNDROME: ICD-10-CM

## 2019-09-19 DIAGNOSIS — G43.711 CHRONIC MIGRAINE WITHOUT AURA, WITH INTRACTABLE MIGRAINE, SO STATED, WITH STATUS MIGRAINOSUS: ICD-10-CM

## 2019-09-19 PROCEDURE — 99213 OFFICE O/P EST LOW 20 MIN: CPT | Performed by: INTERNAL MEDICINE

## 2019-09-19 PROCEDURE — G8427 DOCREV CUR MEDS BY ELIG CLIN: HCPCS | Performed by: INTERNAL MEDICINE

## 2019-09-19 PROCEDURE — 1036F TOBACCO NON-USER: CPT | Performed by: INTERNAL MEDICINE

## 2019-09-19 PROCEDURE — G8417 CALC BMI ABV UP PARAM F/U: HCPCS | Performed by: INTERNAL MEDICINE

## 2019-09-19 RX ORDER — TOPIRAMATE 100 MG/1
TABLET, FILM COATED ORAL
Qty: 60 TABLET | Refills: 0 | Status: SHIPPED | OUTPATIENT
Start: 2019-09-19 | End: 2019-10-17 | Stop reason: SDUPTHER

## 2019-09-19 RX ORDER — BUPRENORPHINE 5 UG/H
1 PATCH TRANSDERMAL
Qty: 4 PATCH | Refills: 0 | Status: SHIPPED | OUTPATIENT
Start: 2019-09-19 | End: 2019-10-17 | Stop reason: SDUPTHER

## 2019-09-19 RX ORDER — DULOXETIN HYDROCHLORIDE 60 MG/1
60 CAPSULE, DELAYED RELEASE ORAL 2 TIMES DAILY
Qty: 60 CAPSULE | Refills: 0 | Status: SHIPPED | OUTPATIENT
Start: 2019-09-19 | End: 2019-10-17 | Stop reason: SDUPTHER

## 2019-09-19 RX ORDER — QUETIAPINE FUMARATE 25 MG/1
25-50 TABLET, FILM COATED ORAL NIGHTLY
Qty: 60 TABLET | Refills: 1 | Status: SHIPPED | OUTPATIENT
Start: 2019-09-19 | End: 2019-10-17 | Stop reason: SDUPTHER

## 2019-09-19 RX ORDER — GABAPENTIN 400 MG/1
CAPSULE ORAL
Qty: 60 CAPSULE | Refills: 1 | Status: SHIPPED | OUTPATIENT
Start: 2019-09-19 | End: 2019-10-17 | Stop reason: SDUPTHER

## 2019-09-19 RX ORDER — TIZANIDINE 4 MG/1
4 TABLET ORAL 2 TIMES DAILY
Qty: 60 TABLET | Refills: 0 | Status: SHIPPED | OUTPATIENT
Start: 2019-09-19 | End: 2019-10-17 | Stop reason: SDUPTHER

## 2019-09-19 RX ORDER — SUMATRIPTAN 50 MG/1
TABLET, FILM COATED ORAL
Qty: 9 TABLET | Refills: 0 | Status: SHIPPED | OUTPATIENT
Start: 2019-09-19 | End: 2019-10-17 | Stop reason: SDUPTHER

## 2019-09-19 NOTE — PROGRESS NOTES
Patient has been complaint with his regimen. Patient denies any side effects with the medications. He mentions he is using Ultram PRN only along with the Butrans. He says his neck/back hurts the most. He states he get headaches still. He reports his blood sugar has been ok. ALLERGIES: Patients list of allergies were reviewed     MEDICATIONS: Mr. Diamond Kruse list of medications were reviewed. His current medications are   Outpatient Medications Prior to Visit   Medication Sig Dispense Refill    LANTUS 100 UNIT/ML injection vial INJECT 28 UNITS UNDER THE SKIN UNITS UNDER THE SKIN ONCE NIGHTLY 1 vial 3    topiramate (TOPAMAX) 100 MG tablet TAKE ONE TABLET BY MOUTH TWO TIMES A DAY 60 tablet 0    DULoxetine (CYMBALTA) 60 MG extended release capsule Take 1 capsule by mouth 2 times daily 60 capsule 0    SUMAtriptan (IMITREX) 50 MG tablet Take one tab po at the start of migraine PRN max 1 every 24 hours 9 tablet 0    gabapentin (NEURONTIN) 400 MG capsule Take one tablet Po BID 60 capsule 1    mupirocin (BACTROBAN NASAL) 2 % nasal ointment Take by Nasal route 2 times daily. 1 Tube 1    buprenorphine (BUTRANS) 5 MCG/HR PTWK Place 1 patch onto the skin every 7 days for 28 days.  4 patch 0    tiZANidine (ZANAFLEX) 4 MG tablet Take 1 tablet by mouth 2 times daily 60 tablet 0    QUEtiapine (SEROQUEL) 25 MG tablet Take 1-2 tablets by mouth nightly 60 tablet 1    LANTUS 100 UNIT/ML injection vial INJECT 30 UNITS UNDER THE SKIN NIGHTLY 1 vial 3    insulin glulisine (APIDRA) 100 UNIT/ML injection INJECT 8-12 UNITS UNDER THE SKIN THREE TIMES DAILY WITH MEALS 20 mL 3    Insulin Syringe-Needle U-100 (BD INSULIN SYRINGE U/F) 31G X 5/16\" 0.3 ML MISC Inject 1 each into the skin 4 times daily (before meals and nightly) 120 each 11    blood glucose test strips (ACCU-CHEK CAPO PLUS) strip USE TO TEST THREE TIMES A  strip 5    triamcinolone (KENALOG) 0.1 % ointment Apply to thickened affected areas PRN sparingly for flares

## 2019-10-15 ENCOUNTER — TELEPHONE (OUTPATIENT)
Dept: INTERNAL MEDICINE CLINIC | Age: 44
End: 2019-10-15

## 2019-10-17 ENCOUNTER — OFFICE VISIT (OUTPATIENT)
Dept: PAIN MANAGEMENT | Age: 44
End: 2019-10-17
Payer: MEDICARE

## 2019-10-17 VITALS
WEIGHT: 260 LBS | SYSTOLIC BLOOD PRESSURE: 96 MMHG | DIASTOLIC BLOOD PRESSURE: 61 MMHG | HEART RATE: 81 BPM | BODY MASS INDEX: 33.38 KG/M2

## 2019-10-17 DIAGNOSIS — M79.2 NEUROPATHIC PAIN: ICD-10-CM

## 2019-10-17 DIAGNOSIS — G63 POLYNEUROPATHY ASSOCIATED WITH UNDERLYING DISEASE (HCC): ICD-10-CM

## 2019-10-17 DIAGNOSIS — L97.522 ULCER OF LEFT FOOT, WITH FAT LAYER EXPOSED (HCC): ICD-10-CM

## 2019-10-17 DIAGNOSIS — G89.4 CHRONIC PAIN SYNDROME: ICD-10-CM

## 2019-10-17 DIAGNOSIS — G62.9 PERIPHERAL POLYNEUROPATHY: ICD-10-CM

## 2019-10-17 DIAGNOSIS — M79.18 MYOFASCIAL PAIN: ICD-10-CM

## 2019-10-17 PROCEDURE — G8427 DOCREV CUR MEDS BY ELIG CLIN: HCPCS | Performed by: INTERNAL MEDICINE

## 2019-10-17 PROCEDURE — 99213 OFFICE O/P EST LOW 20 MIN: CPT | Performed by: INTERNAL MEDICINE

## 2019-10-17 PROCEDURE — 1036F TOBACCO NON-USER: CPT | Performed by: INTERNAL MEDICINE

## 2019-10-17 PROCEDURE — G8484 FLU IMMUNIZE NO ADMIN: HCPCS | Performed by: INTERNAL MEDICINE

## 2019-10-17 PROCEDURE — G8417 CALC BMI ABV UP PARAM F/U: HCPCS | Performed by: INTERNAL MEDICINE

## 2019-10-17 RX ORDER — TRAMADOL HYDROCHLORIDE 50 MG/1
50 TABLET ORAL EVERY 6 HOURS PRN
Qty: 30 TABLET | Refills: 0 | Status: SHIPPED | OUTPATIENT
Start: 2019-10-17 | End: 2019-11-18 | Stop reason: SDUPTHER

## 2019-10-17 RX ORDER — TOPIRAMATE 100 MG/1
TABLET, FILM COATED ORAL
Qty: 60 TABLET | Refills: 0 | Status: SHIPPED | OUTPATIENT
Start: 2019-10-17 | End: 2019-11-18 | Stop reason: SDUPTHER

## 2019-10-17 RX ORDER — BUPRENORPHINE 5 UG/H
1 PATCH TRANSDERMAL
Qty: 4 PATCH | Refills: 0 | Status: SHIPPED | OUTPATIENT
Start: 2019-10-17 | End: 2019-11-18 | Stop reason: SDUPTHER

## 2019-10-17 RX ORDER — GABAPENTIN 400 MG/1
CAPSULE ORAL
Qty: 60 CAPSULE | Refills: 1 | Status: SHIPPED | OUTPATIENT
Start: 2019-10-17 | End: 2019-11-18 | Stop reason: SDUPTHER

## 2019-10-17 RX ORDER — TIZANIDINE 4 MG/1
4 TABLET ORAL 2 TIMES DAILY
Qty: 60 TABLET | Refills: 0 | Status: SHIPPED | OUTPATIENT
Start: 2019-10-17 | End: 2019-11-16

## 2019-10-17 RX ORDER — DULOXETIN HYDROCHLORIDE 60 MG/1
60 CAPSULE, DELAYED RELEASE ORAL 2 TIMES DAILY
Qty: 60 CAPSULE | Refills: 0 | Status: SHIPPED | OUTPATIENT
Start: 2019-10-17 | End: 2019-11-18 | Stop reason: SDUPTHER

## 2019-10-17 RX ORDER — QUETIAPINE FUMARATE 25 MG/1
25-50 TABLET, FILM COATED ORAL NIGHTLY
Qty: 60 TABLET | Refills: 1 | Status: SHIPPED | OUTPATIENT
Start: 2019-10-17 | End: 2019-10-24 | Stop reason: ALTCHOICE

## 2019-10-17 RX ORDER — SUMATRIPTAN 50 MG/1
TABLET, FILM COATED ORAL
Qty: 9 TABLET | Refills: 0 | Status: SHIPPED | OUTPATIENT
Start: 2019-10-17 | End: 2019-11-18 | Stop reason: SDUPTHER

## 2019-10-24 ENCOUNTER — OFFICE VISIT (OUTPATIENT)
Dept: ENDOCRINOLOGY | Age: 44
End: 2019-10-24
Payer: MEDICARE

## 2019-10-24 VITALS
WEIGHT: 258.6 LBS | RESPIRATION RATE: 18 BRPM | OXYGEN SATURATION: 98 % | DIASTOLIC BLOOD PRESSURE: 73 MMHG | SYSTOLIC BLOOD PRESSURE: 109 MMHG | BODY MASS INDEX: 33.19 KG/M2 | HEART RATE: 82 BPM | HEIGHT: 74 IN

## 2019-10-24 DIAGNOSIS — I10 ESSENTIAL HYPERTENSION: ICD-10-CM

## 2019-10-24 DIAGNOSIS — E78.2 MIXED HYPERLIPIDEMIA: ICD-10-CM

## 2019-10-24 DIAGNOSIS — E10.22 TYPE 1 DIABETES MELLITUS WITH STAGE 3 CHRONIC KIDNEY DISEASE (HCC): ICD-10-CM

## 2019-10-24 DIAGNOSIS — N18.30 TYPE 1 DIABETES MELLITUS WITH STAGE 3 CHRONIC KIDNEY DISEASE (HCC): ICD-10-CM

## 2019-10-24 LAB — HBA1C MFR BLD: 11.3 %

## 2019-10-24 PROCEDURE — 1036F TOBACCO NON-USER: CPT | Performed by: INTERNAL MEDICINE

## 2019-10-24 PROCEDURE — G8417 CALC BMI ABV UP PARAM F/U: HCPCS | Performed by: INTERNAL MEDICINE

## 2019-10-24 PROCEDURE — 3046F HEMOGLOBIN A1C LEVEL >9.0%: CPT | Performed by: INTERNAL MEDICINE

## 2019-10-24 PROCEDURE — 99214 OFFICE O/P EST MOD 30 MIN: CPT | Performed by: INTERNAL MEDICINE

## 2019-10-24 PROCEDURE — 2022F DILAT RTA XM EVC RTNOPTHY: CPT | Performed by: INTERNAL MEDICINE

## 2019-10-24 PROCEDURE — 83036 HEMOGLOBIN GLYCOSYLATED A1C: CPT | Performed by: INTERNAL MEDICINE

## 2019-10-24 PROCEDURE — G8484 FLU IMMUNIZE NO ADMIN: HCPCS | Performed by: INTERNAL MEDICINE

## 2019-10-24 PROCEDURE — G8427 DOCREV CUR MEDS BY ELIG CLIN: HCPCS | Performed by: INTERNAL MEDICINE

## 2019-11-18 ENCOUNTER — OFFICE VISIT (OUTPATIENT)
Dept: PAIN MANAGEMENT | Age: 44
End: 2019-11-18
Payer: MEDICARE

## 2019-11-18 VITALS
BODY MASS INDEX: 33.13 KG/M2 | DIASTOLIC BLOOD PRESSURE: 66 MMHG | WEIGHT: 258 LBS | SYSTOLIC BLOOD PRESSURE: 101 MMHG | HEART RATE: 69 BPM

## 2019-11-18 DIAGNOSIS — G63 POLYNEUROPATHY ASSOCIATED WITH UNDERLYING DISEASE (HCC): ICD-10-CM

## 2019-11-18 DIAGNOSIS — G89.4 CHRONIC PAIN SYNDROME: ICD-10-CM

## 2019-11-18 DIAGNOSIS — G62.9 PERIPHERAL POLYNEUROPATHY: ICD-10-CM

## 2019-11-18 DIAGNOSIS — G43.711 CHRONIC MIGRAINE WITHOUT AURA, WITH INTRACTABLE MIGRAINE, SO STATED, WITH STATUS MIGRAINOSUS: ICD-10-CM

## 2019-11-18 DIAGNOSIS — M79.18 MYOFASCIAL PAIN: ICD-10-CM

## 2019-11-18 DIAGNOSIS — L97.522 ULCER OF LEFT FOOT, WITH FAT LAYER EXPOSED (HCC): ICD-10-CM

## 2019-11-18 DIAGNOSIS — M79.2 NEUROPATHIC PAIN: ICD-10-CM

## 2019-11-18 PROCEDURE — G8427 DOCREV CUR MEDS BY ELIG CLIN: HCPCS | Performed by: INTERNAL MEDICINE

## 2019-11-18 PROCEDURE — G8417 CALC BMI ABV UP PARAM F/U: HCPCS | Performed by: INTERNAL MEDICINE

## 2019-11-18 PROCEDURE — G8484 FLU IMMUNIZE NO ADMIN: HCPCS | Performed by: INTERNAL MEDICINE

## 2019-11-18 PROCEDURE — 1036F TOBACCO NON-USER: CPT | Performed by: INTERNAL MEDICINE

## 2019-11-18 PROCEDURE — 99213 OFFICE O/P EST LOW 20 MIN: CPT | Performed by: INTERNAL MEDICINE

## 2019-11-18 RX ORDER — BUPRENORPHINE 5 UG/H
1 PATCH TRANSDERMAL
Qty: 4 PATCH | Refills: 0 | Status: SHIPPED | OUTPATIENT
Start: 2019-11-18 | End: 2019-12-12 | Stop reason: SDUPTHER

## 2019-11-18 RX ORDER — SUMATRIPTAN 50 MG/1
TABLET, FILM COATED ORAL
Qty: 9 TABLET | Refills: 0 | Status: SHIPPED | OUTPATIENT
Start: 2019-11-18 | End: 2019-12-12 | Stop reason: SDUPTHER

## 2019-11-18 RX ORDER — GABAPENTIN 400 MG/1
CAPSULE ORAL
Qty: 60 CAPSULE | Refills: 0 | Status: SHIPPED | OUTPATIENT
Start: 2019-11-18 | End: 2019-12-12 | Stop reason: SDUPTHER

## 2019-11-18 RX ORDER — TRAMADOL HYDROCHLORIDE 50 MG/1
50 TABLET ORAL EVERY 6 HOURS PRN
Qty: 30 TABLET | Refills: 0 | Status: SHIPPED | OUTPATIENT
Start: 2019-11-18 | End: 2020-01-13 | Stop reason: SDUPTHER

## 2019-11-18 RX ORDER — TOPIRAMATE 100 MG/1
TABLET, FILM COATED ORAL
Qty: 60 TABLET | Refills: 0 | Status: SHIPPED | OUTPATIENT
Start: 2019-11-18 | End: 2019-12-12 | Stop reason: SDUPTHER

## 2019-11-18 RX ORDER — DULOXETIN HYDROCHLORIDE 60 MG/1
60 CAPSULE, DELAYED RELEASE ORAL 2 TIMES DAILY
Qty: 60 CAPSULE | Refills: 0 | Status: SHIPPED | OUTPATIENT
Start: 2019-11-18 | End: 2019-12-12 | Stop reason: SDUPTHER

## 2019-12-12 ENCOUNTER — OFFICE VISIT (OUTPATIENT)
Dept: PAIN MANAGEMENT | Age: 44
End: 2019-12-12
Payer: MEDICARE

## 2019-12-12 VITALS
HEART RATE: 77 BPM | BODY MASS INDEX: 33.13 KG/M2 | DIASTOLIC BLOOD PRESSURE: 73 MMHG | SYSTOLIC BLOOD PRESSURE: 115 MMHG | WEIGHT: 258 LBS

## 2019-12-12 DIAGNOSIS — M79.18 MYOFASCIAL PAIN: ICD-10-CM

## 2019-12-12 DIAGNOSIS — G62.9 PERIPHERAL POLYNEUROPATHY: ICD-10-CM

## 2019-12-12 DIAGNOSIS — F51.01 PRIMARY INSOMNIA: ICD-10-CM

## 2019-12-12 DIAGNOSIS — G63 POLYNEUROPATHY ASSOCIATED WITH UNDERLYING DISEASE (HCC): ICD-10-CM

## 2019-12-12 DIAGNOSIS — G43.711 CHRONIC MIGRAINE WITHOUT AURA, WITH INTRACTABLE MIGRAINE, SO STATED, WITH STATUS MIGRAINOSUS: ICD-10-CM

## 2019-12-12 DIAGNOSIS — G89.4 CHRONIC PAIN SYNDROME: ICD-10-CM

## 2019-12-12 DIAGNOSIS — L97.522 ULCER OF LEFT FOOT, WITH FAT LAYER EXPOSED (HCC): ICD-10-CM

## 2019-12-12 DIAGNOSIS — K59.03 CONSTIPATION DUE TO OPIOID THERAPY: ICD-10-CM

## 2019-12-12 DIAGNOSIS — M79.2 NEUROPATHIC PAIN: ICD-10-CM

## 2019-12-12 DIAGNOSIS — T40.2X5A CONSTIPATION DUE TO OPIOID THERAPY: ICD-10-CM

## 2019-12-12 DIAGNOSIS — F33.41 RECURRENT MAJOR DEPRESSIVE DISORDER, IN PARTIAL REMISSION (HCC): ICD-10-CM

## 2019-12-12 PROCEDURE — G8427 DOCREV CUR MEDS BY ELIG CLIN: HCPCS | Performed by: INTERNAL MEDICINE

## 2019-12-12 PROCEDURE — G8484 FLU IMMUNIZE NO ADMIN: HCPCS | Performed by: INTERNAL MEDICINE

## 2019-12-12 PROCEDURE — 1036F TOBACCO NON-USER: CPT | Performed by: INTERNAL MEDICINE

## 2019-12-12 PROCEDURE — G8417 CALC BMI ABV UP PARAM F/U: HCPCS | Performed by: INTERNAL MEDICINE

## 2019-12-12 PROCEDURE — 99214 OFFICE O/P EST MOD 30 MIN: CPT | Performed by: INTERNAL MEDICINE

## 2019-12-12 RX ORDER — SUMATRIPTAN 50 MG/1
TABLET, FILM COATED ORAL
Qty: 9 TABLET | Refills: 0 | Status: SHIPPED | OUTPATIENT
Start: 2019-12-12 | End: 2020-01-13 | Stop reason: SDUPTHER

## 2019-12-12 RX ORDER — TIZANIDINE 4 MG/1
TABLET ORAL
Qty: 60 TABLET | Refills: 0 | Status: SHIPPED | OUTPATIENT
Start: 2019-12-12 | End: 2020-01-13 | Stop reason: SDUPTHER

## 2019-12-12 RX ORDER — GABAPENTIN 400 MG/1
CAPSULE ORAL
Qty: 60 CAPSULE | Refills: 0 | Status: SHIPPED | OUTPATIENT
Start: 2019-12-12 | End: 2020-01-13 | Stop reason: SDUPTHER

## 2019-12-12 RX ORDER — TOPIRAMATE 100 MG/1
TABLET, FILM COATED ORAL
Qty: 60 TABLET | Refills: 0 | Status: SHIPPED | OUTPATIENT
Start: 2019-12-12 | End: 2020-01-13 | Stop reason: SDUPTHER

## 2019-12-12 RX ORDER — DULOXETIN HYDROCHLORIDE 60 MG/1
60 CAPSULE, DELAYED RELEASE ORAL 2 TIMES DAILY
Qty: 60 CAPSULE | Refills: 0 | Status: SHIPPED | OUTPATIENT
Start: 2019-12-12 | End: 2020-01-13 | Stop reason: SDUPTHER

## 2019-12-12 RX ORDER — BUPRENORPHINE 5 UG/H
1 PATCH TRANSDERMAL
Qty: 4 PATCH | Refills: 0 | Status: SHIPPED | OUTPATIENT
Start: 2019-12-12 | End: 2020-01-13 | Stop reason: SDUPTHER

## 2020-01-07 RX ORDER — INSULIN GLARGINE 100 [IU]/ML
INJECTION, SOLUTION SUBCUTANEOUS
Qty: 10 VIAL | Refills: 2 | Status: SHIPPED | OUTPATIENT
Start: 2020-01-07 | End: 2020-05-07

## 2020-01-08 RX ORDER — BLOOD SUGAR DIAGNOSTIC
STRIP MISCELLANEOUS
Qty: 120 EACH | Refills: 10 | Status: SHIPPED | OUTPATIENT
Start: 2020-01-08 | End: 2021-01-11

## 2020-01-13 ENCOUNTER — OFFICE VISIT (OUTPATIENT)
Dept: PAIN MANAGEMENT | Age: 45
End: 2020-01-13
Payer: MEDICARE

## 2020-01-13 VITALS
BODY MASS INDEX: 33.13 KG/M2 | HEART RATE: 90 BPM | WEIGHT: 258 LBS | DIASTOLIC BLOOD PRESSURE: 72 MMHG | SYSTOLIC BLOOD PRESSURE: 97 MMHG

## 2020-01-13 PROCEDURE — G8417 CALC BMI ABV UP PARAM F/U: HCPCS | Performed by: INTERNAL MEDICINE

## 2020-01-13 PROCEDURE — G8427 DOCREV CUR MEDS BY ELIG CLIN: HCPCS | Performed by: INTERNAL MEDICINE

## 2020-01-13 PROCEDURE — G8484 FLU IMMUNIZE NO ADMIN: HCPCS | Performed by: INTERNAL MEDICINE

## 2020-01-13 PROCEDURE — 99213 OFFICE O/P EST LOW 20 MIN: CPT | Performed by: INTERNAL MEDICINE

## 2020-01-13 PROCEDURE — 1036F TOBACCO NON-USER: CPT | Performed by: INTERNAL MEDICINE

## 2020-01-13 RX ORDER — TOPIRAMATE 100 MG/1
TABLET, FILM COATED ORAL
Qty: 60 TABLET | Refills: 0 | Status: SHIPPED | OUTPATIENT
Start: 2020-01-13 | End: 2020-03-26 | Stop reason: SDUPTHER

## 2020-01-13 RX ORDER — BUPRENORPHINE 5 UG/H
1 PATCH TRANSDERMAL
Qty: 4 PATCH | Refills: 0 | Status: SHIPPED | OUTPATIENT
Start: 2020-01-13 | End: 2020-03-26 | Stop reason: SDUPTHER

## 2020-01-13 RX ORDER — TRAMADOL HYDROCHLORIDE 50 MG/1
50 TABLET ORAL EVERY 6 HOURS PRN
Qty: 30 TABLET | Refills: 0 | Status: SHIPPED | OUTPATIENT
Start: 2020-01-13 | End: 2020-03-26 | Stop reason: SDUPTHER

## 2020-01-13 RX ORDER — GABAPENTIN 400 MG/1
CAPSULE ORAL
Qty: 60 CAPSULE | Refills: 0 | Status: SHIPPED | OUTPATIENT
Start: 2020-01-13 | End: 2020-03-26 | Stop reason: SDUPTHER

## 2020-01-13 RX ORDER — SUMATRIPTAN 50 MG/1
TABLET, FILM COATED ORAL
Qty: 9 TABLET | Refills: 0 | Status: SHIPPED | OUTPATIENT
Start: 2020-01-13 | End: 2020-03-26 | Stop reason: SDUPTHER

## 2020-01-13 RX ORDER — TIZANIDINE 4 MG/1
TABLET ORAL
Qty: 60 TABLET | Refills: 0 | Status: SHIPPED | OUTPATIENT
Start: 2020-01-13 | End: 2020-03-26 | Stop reason: SDUPTHER

## 2020-01-13 RX ORDER — DULOXETIN HYDROCHLORIDE 60 MG/1
60 CAPSULE, DELAYED RELEASE ORAL 2 TIMES DAILY
Qty: 60 CAPSULE | Refills: 0 | Status: SHIPPED | OUTPATIENT
Start: 2020-01-13 | End: 2020-03-26 | Stop reason: SDUPTHER

## 2020-01-13 NOTE — PROGRESS NOTES
Brigitte Navarro  1975  [id number]      HISTORY OF PRESENT ILLNESS:  Mr. Ike Lovelace is a 45 y.o. male returns for a follow up visit for pain management  He has a diagnosis of   1. Chronic pain syndrome    2. Myofascial pain    3. Neuropathic pain    4. Chronic migraine without aura, with intractable migraine, so stated, with status migrainosus    5. Primary insomnia    6. Recurrent major depressive disorder, in partial remission ([facility] [address]    7. Constipation due to opioid therapy    . He complains of pain in the head, neck,bilateral hands, left lower leg, left ankle/foot He rates the pain 9/10 and describes it as aching, burning, throbbing, pins and needles. Current treatment regimen has helped relieve about 10% of the pain. He denies any side effects from the current pain regimen. Patient reports that since the last follow up visit the physical functioning is unchanged, family/social relationships are unchanged, mood is better sleep patterns are better, and that the overall functioning is unchanged. Patient denies misusing/abusing his narcotic pain medications or using any illegal drugs. There are No indicators for possible drug abuse, addiction or diversion problems. Mr. Ike Lovelace states she is having some increase headaches. He states he is feeling lightheaded but no dizzy spells. He says he has been throwing up and feeling sick along with having diarrhea also. He mentions he is using Butrans along with Ultram PRN. He says his leg hurts worse than his back, states trying to walk daily. He reports his blood sugar has been high, has been 200+ range. He says he has been sick also lately. ALLERGIES: Patients list of allergies were reviewed     MEDICATIONS: Mr. Ike Lovelace list of medications were reviewed. His current medications are   Outpatient Medications Prior to Visit   Medication Sig Dispense Refill    Insulin Syringe-Needle U-100 31G X 5/16\" 0.3 ML MISC USE ONE SYRINGE - FOUR TIMES A DAY BEFORE MEALS AND ONCE code E 10.8 1 kit 10    glucose blood VI test strips (ACCU-CHEK CAPO PLUS) strip 1 each by In Vitro route daily Test blood glucose 10 times daily Dx Code E10.8 300 each 10    GLUCAGON EMERGENCY 1 MG injection INJECT 1MG INTO THE MUSCLE AS NEEDED 1 kit 3    Insulin Syringe-Needle U-100 (B-D INS SYR ULTRAFINE 1CC/31G) 31G X 5/16\" 1 ML MISC INJECT USING INSULIN ONCE DAILY 30 each 10    Multiple Vitamin (DAILY KITTY) TABS TAKE ONE TABLET BY MOUTH DAILY 30 tablet 5    Cholecalciferol (VITAMIN D3) 5000 units CAPS Take 1 capsule by mouth Daily 30 capsule 3    Dextromethorphan-Guaifenesin (MUCINEX DM)  MG TB12 Cough and congestion. 60 tablet 1    omega-3 acid ethyl esters (LOVAZA) 1 G capsule TAKE TWO CAPSULES BY MOUTH TWICE DAILY 120 capsule 5    MUCUS RELIEF DM  MG TABS TAKE ONE BY MOUTH DAILY AS NEEDED 30 tablet 1    Alcohol Swabs PADS Use as needed for insulin injection. 100 each 5    Probiotic Product (ACIDOPHILUS PROBIOTIC) CAPS capsule TAKE ONE CAPSULE BY MOUTH DAILY 28 capsule 4    polyvinyl alcohol (LIQUIFILM TEARS) 1.4 % ophthalmic solution 1 drop as needed      propranolol (INDERAL) 80 MG tablet Take 40 mg by mouth 2 times daily For migraines       Flaxseed, Linseed, (FLAX PO) Take 14 g by mouth daily.  gabapentin (NEURONTIN) 400 MG capsule Take one tablet Po BID 60 capsule 0    traMADol (ULTRAM) 50 MG tablet Take 1 tablet by mouth every 6 hours as needed for Pain (max 1-2 per day) for up to 30 days. 30 tablet 0     No facility-administered medications prior to visit. SOCIAL/FAMILY/PAST MEDICAL HISTORY: Mr. Nic Hdz, family and past medical history was reviewed. REVIEW OF SYSTEMS:    Respiratory: Negative for apnea, chest tightness and shortness of breath or change in baseline breathing. Gastrointestinal: Negative for nausea, vomiting, abdominal pain, diarrhea, constipation, blood in stool and abdominal distention.        PHYSICAL EXAM:   Nursing note and vitals reviewed. BP 97/72   Pulse 90   Wt 258 lb (117 kg)   BMI 33.13 kg/m²   Constitutional: He appears well-developed and well-nourished. No acute distress. Skin: Skin is warm and dry, good turgor. No rash noted. He is not diaphoretic. Cardiovascular: Normal rate, regular rhythm, normal heart sounds, and does not have murmur. Pulmonary/Chest: Effort normal. No respiratory distress. He does not have wheezes in the lung fields. He has no rales. Neurological/Psychiatric:He is alert and oriented to person, place, and time. Coordination is  normal.  His mood isAppropriate and affect is Flat/blunted and Anxious . IMPRESSION:   1. Chronic pain syndrome    2. Myofascial pain    3. Neuropathic pain    4. Peripheral polyneuropathy    5. Ulcer of left foot, with fat layer exposed ([address]    6. Polyneuropathy associated with underlying disease ([facility] [address]        PLAN:  Informed verbal consent was obtained  -[place] record was obtained and reviewed  for the last one year and no indicators of drug misuse  were found. Any other controlled substance prescriptions  seen on the record have been accounted for, I am aware of the patient receiving these medications. [place] record will be rechecked as part of office protocol.    -continue with current opioid regimen   -advise to increase fluids and monitor BP  -walking/stretching exercises as advised    -he was advised  to avoid using too many OTC analgesics to control the headaches, avoid chocolates, increased caffeine, cheeses and MSG nitrite containing foods, cigarette smoking. To avoid bright lights, strong smells and skipping meals. -Monitor blood sugar regularly, diabetic control- adv diabetic diet. Goal for fasting blood sugars around 120.  Follow up with Endocrinologist/PCP also for on going management       Current Outpatient Medications   Medication Sig Dispense Refill    Insulin Syringe-Needle U-100 31G X 5/16\" 0.3 ML MISC USE ONE SYRINGE - FOUR TIMES A DAY BEFORE MEALS AND ONCE NIGHTLY 120 each 10    insulin glulisine (APIDRA) 100 UNIT/ML injection INJECT 8-12 UNITS UNDER THE SKIN THREE TIMES A DAY WITH MEALS 20 pen 3    LANTUS 100 UNIT/ML injection vial INJECT 28 UNITS UNDER THE SKIN ONCE NIGHTLY 10 vial 2    topiramate (TOPAMAX) 100 MG tablet TAKE ONE TABLET BY MOUTH TWO TIMES A DAY 60 tablet 0    DULoxetine (CYMBALTA) 60 MG extended release capsule Take 1 capsule by mouth 2 times daily 60 capsule 0    SUMAtriptan (IMITREX) 50 MG tablet Take one tab po at the start of migraine PRN max 1 every 24 hours 9 tablet 0    mupirocin (BACTROBAN NASAL) 2 % nasal ointment Take by Nasal route 2 times daily. 1 Tube 0    tiZANidine (ZANAFLEX) 4 MG tablet Take 1 tablet po BID 60 tablet 0    LANTUS 100 UNIT/ML injection vial INJECT 30 UNITS UNDER THE SKIN NIGHTLY 1 vial 3    blood glucose test strips (ACCU-CHEK CAPO PLUS) strip USE TO TEST THREE TIMES A  strip 5    triamcinolone (KENALOG) 0.1 % ointment Apply to thickened affected areas PRN sparingly for flares no longer than 2 weeks at one time, do not apply to cleared skin 80 g 0    cephALEXin (KEFLEX) 500 MG capsule TAKE 1 CAPSULE BY MOUTH TWO TIMES A  capsule 5    Insulin Syringe-Needle U-100 (BD INSULIN SYRINGE U/F) 31G X 5/16\" 0.5 ML MISC Inject 1 each into the skin 4 times daily DX CODE E 10.22 120 each 9    fluticasone (FLONASE) 50 MCG/ACT nasal spray SPRAY TWO SPRAYS IN EACH NOSTRIL DAILY 16 g 4    GLUCAGEN HYPOKIT 1 MG SOLR injection INJECT 1 MG INTO THE SKIN ONCE FOR ONE DOSE 1 each 2    ACCU-CHEK FASTCLIX LANCETS MISC 1 each by Does not apply route daily Test blood glucose 10 times daily Dx Code E 10.8 300 each 2    pantoprazole (PROTONIX) 40 MG tablet Take 1 tablet by mouth 2 times daily 60 tablet 0    hydrocortisone 2.5 % cream Apply topically 2 times daily.  30 g 0    Blood Glucose Monitoring Suppl (ACCU-CHEK CAPO PLUS) w/Device KIT 1 each by Does not apply route daily Test blood sugar 10 times daily Dx code E 10.8 1 kit 10    glucose blood VI test strips (ACCU-CHEK CAPO PLUS) strip 1 each by In Vitro route daily Test blood glucose 10 times daily Dx Code E10.8 300 each 10    GLUCAGON EMERGENCY 1 MG injection INJECT 1MG INTO THE MUSCLE AS NEEDED 1 kit 3    Insulin Syringe-Needle U-100 (B-D INS SYR ULTRAFINE 1CC/31G) 31G X 5/16\" 1 ML MISC INJECT USING INSULIN ONCE DAILY 30 each 10    Multiple Vitamin (DAILY KITTY) TABS TAKE ONE TABLET BY MOUTH DAILY 30 tablet 5    Cholecalciferol (VITAMIN D3) 5000 units CAPS Take 1 capsule by mouth Daily 30 capsule 3    Dextromethorphan-Guaifenesin (MUCINEX DM)  MG TB12 Cough and congestion. 60 tablet 1    omega-3 acid ethyl esters (LOVAZA) 1 G capsule TAKE TWO CAPSULES BY MOUTH TWICE DAILY 120 capsule 5    MUCUS RELIEF DM  MG TABS TAKE ONE BY MOUTH DAILY AS NEEDED 30 tablet 1    Alcohol Swabs PADS Use as needed for insulin injection. 100 each 5    Probiotic Product (ACIDOPHILUS PROBIOTIC) CAPS capsule TAKE ONE CAPSULE BY MOUTH DAILY 28 capsule 4    polyvinyl alcohol (LIQUIFILM TEARS) 1.4 % ophthalmic solution 1 drop as needed      propranolol (INDERAL) 80 MG tablet Take 40 mg by mouth 2 times daily For migraines       Flaxseed, Linseed, (FLAX PO) Take 14 g by mouth daily.  gabapentin (NEURONTIN) 400 MG capsule Take one tablet Po BID 60 capsule 0    traMADol (ULTRAM) 50 MG tablet Take 1 tablet by mouth every 6 hours as needed for Pain (max 1-2 per day) for up to 30 days. 30 tablet 0     No current facility-administered medications for this visit. I will continue his current medication regimen  which is part of the above treatment schedule.  It has been helping with Mr. Sherri Perrin chronic  medical problems which for this visit include:   Diagnoses of Chronic pain syndrome, Myofascial pain, Neuropathic pain, Chronic migraine without aura, with intractable migraine, so stated, with status migrainosus, Primary insomnia, Recurrent major

## 2020-02-05 ENCOUNTER — OFFICE VISIT (OUTPATIENT)
Dept: INTERNAL MEDICINE CLINIC | Age: 45
End: 2020-02-05
Payer: MEDICARE

## 2020-02-05 VITALS
BODY MASS INDEX: 32.23 KG/M2 | WEIGHT: 251 LBS | OXYGEN SATURATION: 98 % | DIASTOLIC BLOOD PRESSURE: 86 MMHG | HEART RATE: 92 BPM | SYSTOLIC BLOOD PRESSURE: 130 MMHG

## 2020-02-05 DIAGNOSIS — Z23 NEED FOR HEPATITIS B VACCINATION: ICD-10-CM

## 2020-02-05 DIAGNOSIS — E78.2 MIXED HYPERLIPIDEMIA: ICD-10-CM

## 2020-02-05 DIAGNOSIS — I10 ESSENTIAL HYPERTENSION: ICD-10-CM

## 2020-02-05 LAB
A/G RATIO: 1.1 (ref 1.1–2.2)
ALBUMIN SERPL-MCNC: 4.3 G/DL (ref 3.4–5)
ALP BLD-CCNC: 182 U/L (ref 40–129)
ALT SERPL-CCNC: 17 U/L (ref 10–40)
ANION GAP SERPL CALCULATED.3IONS-SCNC: 19 MMOL/L (ref 3–16)
AST SERPL-CCNC: 19 U/L (ref 15–37)
BILIRUB SERPL-MCNC: 0.3 MG/DL (ref 0–1)
BUN BLDV-MCNC: 11 MG/DL (ref 7–20)
CALCIUM SERPL-MCNC: 9.6 MG/DL (ref 8.3–10.6)
CHLORIDE BLD-SCNC: 99 MMOL/L (ref 99–110)
CHOLESTEROL, TOTAL: 293 MG/DL (ref 0–199)
CHP ED QC CHECK: ABNORMAL
CO2: 22 MMOL/L (ref 21–32)
CREAT SERPL-MCNC: 1.6 MG/DL (ref 0.9–1.3)
CREATININE URINE: 85 MG/DL (ref 39–259)
GFR AFRICAN AMERICAN: 58
GFR NON-AFRICAN AMERICAN: 48
GLOBULIN: 3.9 G/DL
GLUCOSE BLD-MCNC: 128 MG/DL (ref 70–99)
GLUCOSE BLD-MCNC: 323 MG/DL
HBA1C MFR BLD: 11 %
HBV SURFACE AB TITR SER: <3.5 MIU/ML
HCT VFR BLD CALC: 42.7 % (ref 40.5–52.5)
HDLC SERPL-MCNC: 37 MG/DL (ref 40–60)
HEMOGLOBIN: 14 G/DL (ref 13.5–17.5)
LDL CHOLESTEROL CALCULATED: 203 MG/DL
MCH RBC QN AUTO: 28.2 PG (ref 26–34)
MCHC RBC AUTO-ENTMCNC: 32.7 G/DL (ref 31–36)
MCV RBC AUTO: 86.3 FL (ref 80–100)
MICROALBUMIN UR-MCNC: 16.5 MG/DL
MICROALBUMIN/CREAT UR-RTO: 194.1 MG/G (ref 0–30)
PDW BLD-RTO: 15.4 % (ref 12.4–15.4)
PLATELET # BLD: 472 K/UL (ref 135–450)
PMV BLD AUTO: 6.9 FL (ref 5–10.5)
POTASSIUM SERPL-SCNC: 3.5 MMOL/L (ref 3.5–5.1)
RBC # BLD: 4.95 M/UL (ref 4.2–5.9)
SODIUM BLD-SCNC: 140 MMOL/L (ref 136–145)
TOTAL PROTEIN: 8.2 G/DL (ref 6.4–8.2)
TRIGL SERPL-MCNC: 267 MG/DL (ref 0–150)
TSH REFLEX: 2.21 UIU/ML (ref 0.27–4.2)
VLDLC SERPL CALC-MCNC: 53 MG/DL
WBC # BLD: 9.2 K/UL (ref 4–11)

## 2020-02-05 PROCEDURE — 1036F TOBACCO NON-USER: CPT | Performed by: INTERNAL MEDICINE

## 2020-02-05 PROCEDURE — 2022F DILAT RTA XM EVC RTNOPTHY: CPT | Performed by: INTERNAL MEDICINE

## 2020-02-05 PROCEDURE — 3046F HEMOGLOBIN A1C LEVEL >9.0%: CPT | Performed by: INTERNAL MEDICINE

## 2020-02-05 PROCEDURE — G8484 FLU IMMUNIZE NO ADMIN: HCPCS | Performed by: INTERNAL MEDICINE

## 2020-02-05 PROCEDURE — 83036 HEMOGLOBIN GLYCOSYLATED A1C: CPT | Performed by: INTERNAL MEDICINE

## 2020-02-05 PROCEDURE — G8427 DOCREV CUR MEDS BY ELIG CLIN: HCPCS | Performed by: INTERNAL MEDICINE

## 2020-02-05 PROCEDURE — 82962 GLUCOSE BLOOD TEST: CPT | Performed by: INTERNAL MEDICINE

## 2020-02-05 PROCEDURE — G8417 CALC BMI ABV UP PARAM F/U: HCPCS | Performed by: INTERNAL MEDICINE

## 2020-02-05 PROCEDURE — 99215 OFFICE O/P EST HI 40 MIN: CPT | Performed by: INTERNAL MEDICINE

## 2020-02-27 ENCOUNTER — OFFICE VISIT (OUTPATIENT)
Dept: ENDOCRINOLOGY | Age: 45
End: 2020-02-27
Payer: MEDICARE

## 2020-02-27 VITALS
HEART RATE: 83 BPM | WEIGHT: 250.8 LBS | OXYGEN SATURATION: 96 % | HEIGHT: 74 IN | SYSTOLIC BLOOD PRESSURE: 126 MMHG | DIASTOLIC BLOOD PRESSURE: 80 MMHG | BODY MASS INDEX: 32.19 KG/M2

## 2020-02-27 PROCEDURE — G8417 CALC BMI ABV UP PARAM F/U: HCPCS | Performed by: INTERNAL MEDICINE

## 2020-02-27 PROCEDURE — G8484 FLU IMMUNIZE NO ADMIN: HCPCS | Performed by: INTERNAL MEDICINE

## 2020-02-27 PROCEDURE — 99214 OFFICE O/P EST MOD 30 MIN: CPT | Performed by: INTERNAL MEDICINE

## 2020-02-27 PROCEDURE — 2022F DILAT RTA XM EVC RTNOPTHY: CPT | Performed by: INTERNAL MEDICINE

## 2020-02-27 PROCEDURE — 1036F TOBACCO NON-USER: CPT | Performed by: INTERNAL MEDICINE

## 2020-02-27 PROCEDURE — 3046F HEMOGLOBIN A1C LEVEL >9.0%: CPT | Performed by: INTERNAL MEDICINE

## 2020-02-27 PROCEDURE — G8427 DOCREV CUR MEDS BY ELIG CLIN: HCPCS | Performed by: INTERNAL MEDICINE

## 2020-02-27 NOTE — PROGRESS NOTES
[facility]  Dania Vee M.D. Phone: [phone]   FAX: Isaac   YOB: 1975    Date of Visit:  2/27/2020    Allergies   Allergen Reactions    Tegaderm Ag Mesh 2\"X2\" [Wound Dressings]      \"Holes in skin from Tegaderm Adhesive\"    Codeine Other (See Comments)     hallucinates    Other Other (See Comments)     Holes in skin  From Tegaderm Adhesive    Tape [Adhesive Tape]      Blister       Outpatient Medications Marked as Taking for the 2/27/20 encounter (Office Visit) with Jarvis Hernandez MD   Medication Sig Dispense Refill    topiramate (TOPAMAX) 100 MG tablet TAKE ONE TABLET BY MOUTH TWO TIMES A DAY 60 tablet 0    DULoxetine (CYMBALTA) 60 MG extended release capsule Take 1 capsule by mouth 2 times daily 60 capsule 0    SUMAtriptan (IMITREX) 50 MG tablet Take one tab po at the start of migraine PRN max 1 every 24 hours 9 tablet 0    gabapentin (NEURONTIN) 400 MG capsule Take one tablet Po BID 60 capsule 0    mupirocin (BACTROBAN NASAL) 2 % nasal ointment Take by Nasal route 2 times daily. 1 Tube 0    tiZANidine (ZANAFLEX) 4 MG tablet Take 1 tablet po BID 60 tablet 0    traMADol (ULTRAM) 50 MG tablet Take 1 tablet by mouth every 6 hours as needed for Pain (max 1-2 per day) for up to 30 days.  30 tablet 0    Insulin Syringe-Needle U-100 31G X 5/16\" 0.3 ML MISC USE ONE SYRINGE - FOUR TIMES A DAY BEFORE MEALS AND ONCE NIGHTLY 120 each 10    insulin glulisine (APIDRA) 100 UNIT/ML injection INJECT 8-12 UNITS UNDER THE SKIN THREE TIMES A DAY WITH MEALS 20 pen 3    LANTUS 100 UNIT/ML injection vial INJECT 30 UNITS UNDER THE SKIN NIGHTLY 1 vial 3    blood glucose test strips (ACCU-CHEK CAPO PLUS) strip USE TO TEST THREE TIMES A  strip 5    triamcinolone (KENALOG) 0.1 % ointment Apply to thickened affected areas PRN sparingly for flares no longer than 2 weeks at one time, do not apply to cleared skin 80 g 0    cephALEXin (KEFLEX) 500 MG Substance and Sexual Activity   Alcohol Use No    Alcohol/week: 0.0 standard drinks       HPI      Sybil Payne is a 45 y.o. male here for a follow-up for management of DM    He has a PMH of DM, CKD, HTN, hyperlipidemia,  Left tibia/fibula fracture, foot ulcer. He was diagnosed with Diabetes Mellitus at the age of 12 yrs. Never had DKA. Was never on oral meds. Always on insulin therapy. Microvascular complications: has  Retinopathy and also had retinal detachement (Follows with eye doctor at [address]),   Has microlabuminruia . No Peripheral neuropathy. Home regimen:  Currently on lantus insulin 30  units at night. Apidra 6-10 units TID. ( insulin to carb ratio of 1:10)    Previous medications: Was on humulin insulin in the past.    Check sugars 4 times a day    Hypoglycemia . Occasional. No pattern    No polyuria, polydipsia, weight loss. Diet: Eats 3 meals/day at regular times and 3 snacks. Had nutrition education. Exercise: No  planned physical activity    Hyperlipidemia: he has mixed hyperlipidemia    Currently is on Flexseed oil. He has refused statins    Patient says he feels dizzy and light headed when he stands up for the last 2-3 months. This is most likely due to autonomic neuropathy. No weight loss. Has gained weight. He had a fall and fractured his left distal tibia and fibula in 2013. Had ORIF and has developed osteomyelitis. Review of Systems   Constitutional: Positive for malaise/fatigue. Negative for weight loss. HENT: Negative for sore throat. Eyes: Negative for blurred vision. Respiratory: Negative for cough and shortness of breath. Cardiovascular: Negative for chest pain and palpitations. Gastrointestinal: Negative for heartburn, nausea, vomiting and abdominal pain. Genitourinary: Negative for urgency and frequency. Musculoskeletal: Positive for joint pain. Negative for myalgias and back pain. Skin: Negative for rash. 02/05/2020 86.3  80.0 - 100.0 fL Final    MCH 02/05/2020 28.2  26.0 - 34.0 pg Final    MCHC 02/05/2020 32.7  31.0 - 36.0 g/dL Final    RDW 02/05/2020 15.4  12.4 - 15.4 % Final    Platelets 27/17/2757 472* 135 - 450 K/uL Final    MPV 02/05/2020 6.9  5.0 - 10.5 fL Final    TSH 02/05/2020 2.21  0.27 - 4.20 uIU/mL Final    Sodium 02/05/2020 140  136 - 145 mmol/L Final    Potassium 02/05/2020 3.5  3.5 - 5.1 mmol/L Final    Chloride 02/05/2020 99  99 - 110 mmol/L Final    CO2 02/05/2020 22  21 - 32 mmol/L Final    Anion Gap 02/05/2020 19* 3 - 16 Final    Glucose 02/05/2020 128* 70 - 99 mg/dL Final    BUN 02/05/2020 11  7 - 20 mg/dL Final    CREATININE 02/05/2020 1.6* 0.9 - 1.3 mg/dL Final    GFR Non- 02/05/2020 48* >60 Final    Comment: >60 mL/min/1.73m2 EGFR, calc. for ages 25 and older using the  MDRD formula (not corrected for weight), is valid for stable  renal function.  GFR  02/05/2020 58* >60 Final    Comment: Chronic Kidney Disease: less than 60 ml/min/1.73 sq.m. Kidney Failure: less than 15 ml/min/1.73 sq.m. Results valid for patients 18 years and older.  Calcium 02/05/2020 9.6  8.3 - 10.6 mg/dL Final    Total Protein 02/05/2020 8.2  6.4 - 8.2 g/dL Final    Alb 02/05/2020 4.3  3.4 - 5.0 g/dL Final    Albumin/Globulin Ratio 02/05/2020 1.1  1.1 - 2.2 Final    Total Bilirubin 02/05/2020 0.3  0.0 - 1.0 mg/dL Final    Alkaline Phosphatase 02/05/2020 182* 40 - 129 U/L Final    ALT 02/05/2020 17  10 - 40 U/L Final    AST 02/05/2020 19  15 - 37 U/L Final    Globulin 02/05/2020 3.9  g/dL Final   Office Visit on 02/05/2020   Component Date Value Ref Range Status    Glucose 02/05/2020 323  mg/dL Final    Hemoglobin A1C 02/05/2020 11.0  % Final                Assessment/Plan    1. DM    This 41 yrs old male has DM. Complicated by retinopathy, microalbuminuria. Most likely it is Type 1 DM.   HARIS antibodies and islet cell antibodies

## 2020-03-02 RX ORDER — QUETIAPINE FUMARATE 25 MG/1
TABLET, FILM COATED ORAL
Qty: 60 TABLET | Refills: 0 | OUTPATIENT
Start: 2020-03-02

## 2020-03-26 ENCOUNTER — OFFICE VISIT (OUTPATIENT)
Dept: PAIN MANAGEMENT | Age: 45
End: 2020-03-26
Payer: MEDICARE

## 2020-03-26 ENCOUNTER — TELEPHONE (OUTPATIENT)
Dept: PAIN MANAGEMENT | Age: 45
End: 2020-03-26

## 2020-03-26 VITALS
SYSTOLIC BLOOD PRESSURE: 139 MMHG | WEIGHT: 250 LBS | HEART RATE: 109 BPM | DIASTOLIC BLOOD PRESSURE: 87 MMHG | BODY MASS INDEX: 32.1 KG/M2 | TEMPERATURE: 97.7 F

## 2020-03-26 PROCEDURE — 1036F TOBACCO NON-USER: CPT | Performed by: INTERNAL MEDICINE

## 2020-03-26 PROCEDURE — G8417 CALC BMI ABV UP PARAM F/U: HCPCS | Performed by: INTERNAL MEDICINE

## 2020-03-26 PROCEDURE — 99214 OFFICE O/P EST MOD 30 MIN: CPT | Performed by: INTERNAL MEDICINE

## 2020-03-26 PROCEDURE — G8427 DOCREV CUR MEDS BY ELIG CLIN: HCPCS | Performed by: INTERNAL MEDICINE

## 2020-03-26 PROCEDURE — G8484 FLU IMMUNIZE NO ADMIN: HCPCS | Performed by: INTERNAL MEDICINE

## 2020-03-26 RX ORDER — GABAPENTIN 400 MG/1
CAPSULE ORAL
Qty: 60 CAPSULE | Refills: 0 | Status: SHIPPED | OUTPATIENT
Start: 2020-03-26 | End: 2020-04-23 | Stop reason: SDUPTHER

## 2020-03-26 RX ORDER — BUPRENORPHINE 5 UG/H
1 PATCH TRANSDERMAL
Qty: 4 PATCH | Refills: 0 | Status: SHIPPED | OUTPATIENT
Start: 2020-03-26 | End: 2020-04-23 | Stop reason: SDUPTHER

## 2020-03-26 RX ORDER — SUMATRIPTAN 50 MG/1
TABLET, FILM COATED ORAL
Qty: 9 TABLET | Refills: 0 | Status: SHIPPED | OUTPATIENT
Start: 2020-03-26 | End: 2020-04-23 | Stop reason: SDUPTHER

## 2020-03-26 RX ORDER — TOPIRAMATE 100 MG/1
TABLET, FILM COATED ORAL
Qty: 60 TABLET | Refills: 0 | Status: SHIPPED | OUTPATIENT
Start: 2020-03-26 | End: 2020-04-23 | Stop reason: SDUPTHER

## 2020-03-26 RX ORDER — TRAMADOL HYDROCHLORIDE 50 MG/1
50 TABLET ORAL EVERY 6 HOURS PRN
Qty: 30 TABLET | Refills: 0 | Status: SHIPPED | OUTPATIENT
Start: 2020-03-26 | End: 2020-04-23 | Stop reason: SDUPTHER

## 2020-03-26 RX ORDER — TIZANIDINE 4 MG/1
2-4 TABLET ORAL EVERY EVENING
Qty: 30 TABLET | Refills: 0 | Status: SHIPPED | OUTPATIENT
Start: 2020-03-26 | End: 2020-04-23 | Stop reason: SDUPTHER

## 2020-03-26 RX ORDER — DULOXETIN HYDROCHLORIDE 60 MG/1
60 CAPSULE, DELAYED RELEASE ORAL 2 TIMES DAILY
Qty: 60 CAPSULE | Refills: 0 | Status: SHIPPED | OUTPATIENT
Start: 2020-03-26 | End: 2020-04-23 | Stop reason: SDUPTHER

## 2020-03-26 NOTE — PROGRESS NOTES
Jenny Dayron  1975  [id number]    HISTORY OF PRESENT ILLNESS:  Mr. Maritza Doe is a 45 y.o. male returns for a follow up visit for multiple medical problems. His  presenting problems are   1. Chronic pain syndrome    2. Myofascial pain    3. Neuropathic pain    4. Peripheral polyneuropathy    5. Chronic migraine without aura, with intractable migraine, so stated, with status migrainosus    6. Primary insomnia    7. Recurrent major depressive disorder, in partial remission ([facility]    8. Constipation due to opioid therapy    9. Ulcer of left foot, with fat layer exposed ([facility] [facility]    . As per information/history obtained from the PADT(patient assessment and documentation tool) -  He complains of pain in the head, neck, shoulders Bilateral, elbows Bilateral, upper back, lower back and knees Left with radiation to the hands Bilateral, buttocks, hips Bilateral, lower leg Left, ankles Left and feet Left He rates the pain 9/10 and describes it as aching, burning, throbbing, numbness, pins and needles. Pain is made worse by: movement, walking, standing, sitting, bending. Current treatment regimen has helped relieve about 10% of the pain. He denies side effects from the current pain regimen. Patient reports that since the last follow up visit the physical functioning is unchanged, family/social relationships are unchanged, mood is better and sleep patterns are better, and that the overall functioning is unchanged. Patient denies neurological bowel or bladder. Patient denies misusing/abusing his narcotic pain medications or using any illegal drugs. There are No indicators for possible drug abuse, addiction or diversion problems. Upon obtaining the medical history from Mr. Maritza Doe regarding today's office visit for his presenting problems, Mr. Maritza Doe states he has been doing worse, he is having increase pain. He says he was last seen 2-1/2 months ago, states had transportation issues.  He states he has been out of Tramadol for 2-3 days but had some patches left over, still using them. Patient states he has been out of some of his adjuvants also. Sleep is poor,  poor sleep latency, averages 2-3 hours of sleep at night. Intermittent, non restorative. Does not feel rested in AM. Complains of feeling sleepy  during the day. Patient's  subjective report of his mood is fair. he describes occasional symptoms of depression, occasional  irritability and some mood swings. Describes his mood as being neutral and reports some pleasure in his daily activities. Reports  fair  appetite, energy and concentration. Able to function well in different aspects of his daily activities. Denies suicidal or homicidal ideation. Denies any complaints of increased tension, does   Worry sometimes and occasional  irritability  he denies any c/o increased anxiety, No c/o panic attacks or symptoms of PTSD. Patient reports his weight has been stable. He states his blood sugar has been less than 150 range. He mentions he lives with his mother an sister. ALLERGIES/PAST MED/FAM/SOC HISTORY: Mr. Jose Carlos Cordoba allergies, past medical, family and social history were reviewed in the chart and also listed below.   Social History     Socioeconomic History    Marital status: Single     Spouse name: Not on file    Number of children: Not on file    Years of education: Not on file    Highest education level: Not on file   Occupational History    Occupation: n/a   Social Needs    Financial resource strain: Not on file    Food insecurity     Worry: Not on file     Inability: Not on file   [employer] needs     Medical: Not on file     Non-medical: Not on file   Tobacco Use    Smoking status: Never Smoker    Smokeless tobacco: Never Used   Substance and Sexual Activity    Alcohol use: No     Alcohol/week: 0.0 standard drinks    Drug use: No    Sexual activity: Not on file   Lifestyle    Physical activity     Days per week: Not on file     Minutes per session: Not on file    Stress: Not on file   Relationships    Social connections     Talks on phone: Not on file     Gets together: Not on file     Attends [employer] service: Not on file     Active member of club or organization: Not on file     Attends meetings of clubs or organizations: Not on file     Relationship status: Not on file    Intimate partner violence     Fear of current or ex partner: Not on file     Emotionally abused: Not on file     Physically abused: Not on file     Forced sexual activity: Not on file   Other Topics Concern    Not on file   Social History Narrative    Not on file       Mr. Cristine Monroe current medications are   Outpatient Medications Prior to Visit   Medication Sig Dispense Refill    topiramate (TOPAMAX) 100 MG tablet TAKE ONE TABLET BY MOUTH TWO TIMES A DAY 60 tablet 0    DULoxetine (CYMBALTA) 60 MG extended release capsule Take 1 capsule by mouth 2 times daily 60 capsule 0    SUMAtriptan (IMITREX) 50 MG tablet Take one tab po at the start of migraine PRN max 1 every 24 hours 9 tablet 0    mupirocin (BACTROBAN NASAL) 2 % nasal ointment Take by Nasal route 2 times daily.  1 Tube 0    tiZANidine (ZANAFLEX) 4 MG tablet Take 1 tablet po BID 60 tablet 0    Insulin Syringe-Needle U-100 31G X 5/16\" 0.3 ML MISC USE ONE SYRINGE - FOUR TIMES A DAY BEFORE MEALS AND ONCE NIGHTLY 120 each 10    insulin glulisine (APIDRA) 100 UNIT/ML injection INJECT 8-12 UNITS UNDER THE SKIN THREE TIMES A DAY WITH MEALS 20 pen 3    LANTUS 100 UNIT/ML injection vial INJECT 28 UNITS UNDER THE SKIN ONCE NIGHTLY 10 vial 2    LANTUS 100 UNIT/ML injection vial INJECT 30 UNITS UNDER THE SKIN NIGHTLY 1 vial 3    blood glucose test strips (ACCU-CHEK CAPO PLUS) strip USE TO TEST THREE TIMES A  strip 5    triamcinolone (KENALOG) 0.1 % ointment Apply to thickened affected areas PRN sparingly for flares no longer than 2 weeks at one time, do not apply to cleared skin 80 g 0    cephALEXin (KEFLEX) 500 MG capsule TAKE 1 28 UNITS UNDER THE SKIN ONCE NIGHTLY 10 vial 2    LANTUS 100 UNIT/ML injection vial INJECT 30 UNITS UNDER THE SKIN NIGHTLY 1 vial 3    blood glucose test strips (ACCU-CHEK CAPO PLUS) strip USE TO TEST THREE TIMES A  strip 5    triamcinolone (KENALOG) 0.1 % ointment Apply to thickened affected areas PRN sparingly for flares no longer than 2 weeks at one time, do not apply to cleared skin 80 g 0    cephALEXin (KEFLEX) 500 MG capsule TAKE 1 CAPSULE BY MOUTH TWO TIMES A  capsule 5    Insulin Syringe-Needle U-100 (BD INSULIN SYRINGE U/F) 31G X 5/16\" 0.5 ML MISC Inject 1 each into the skin 4 times daily DX CODE E 10.22 120 each 9    fluticasone (FLONASE) 50 MCG/ACT nasal spray SPRAY TWO SPRAYS IN EACH NOSTRIL DAILY 16 g 4    GLUCAGEN HYPOKIT 1 MG SOLR injection INJECT 1 MG INTO THE SKIN ONCE FOR ONE DOSE 1 each 2    ACCU-CHEK FASTCLIX LANCETS MISC 1 each by Does not apply route daily Test blood glucose 10 times daily Dx Code E 10.8 300 each 2    pantoprazole (PROTONIX) 40 MG tablet Take 1 tablet by mouth 2 times daily 60 tablet 0    hydrocortisone 2.5 % cream Apply topically 2 times daily. 30 g 0    Blood Glucose Monitoring Suppl (ACCU-CHEK CAPO PLUS) w/Device KIT 1 each by Does not apply route daily Test blood sugar 10 times daily Dx code E 10.8 1 kit 10    glucose blood VI test strips (ACCU-CHEK CAPO PLUS) strip 1 each by In Vitro route daily Test blood glucose 10 times daily Dx Code E10.8 300 each 10    GLUCAGON EMERGENCY 1 MG injection INJECT 1MG INTO THE MUSCLE AS NEEDED 1 kit 3    Insulin Syringe-Needle U-100 (B-D INS SYR ULTRAFINE 1CC/31G) 31G X 5/16\" 1 ML MISC INJECT USING INSULIN ONCE DAILY 30 each 10    Multiple Vitamin (DAILY KITTY) TABS TAKE ONE TABLET BY MOUTH DAILY 30 tablet 5    Cholecalciferol (VITAMIN D3) 5000 units CAPS Take 1 capsule by mouth Daily 30 capsule 3    Dextromethorphan-Guaifenesin (MUCINEX DM)  MG TB12 Cough and congestion.  60 tablet 1    omega-3 acid reassessment and adjustment of goals/treatment have been made. 3. Reduction of reliance on opioid analgesia/more appropriate opioid use. - he is showing progression towards this treatment goal with the current regimen. Risks and benefits of the medications and other alternative treatments have been/were  discussed with the patient. Any questions on the  common side effects of these medications were also answered. He was advised against drinking alcohol with the narcotic pain medicines, advised against driving or handling machinery when  starting or adjusting the dose of medicines, feeling groggy or drowsy, or if having any cognitive issues related to the current medications. Heis fully aware of the risk of overdose and death, if medicines are misused and not taken as prescribed. If he develops new symptoms or if the symptoms worsen, he was told to call the office. .  Thank you for allowing me to participate in the care of this patient.     Chapin Mcallister MD.    Cc: Anmol Medina MD

## 2020-03-26 NOTE — TELEPHONE ENCOUNTER
I received PA request for Butrans from patient. There is not a current RX on file to do PA. Please advise thank you.

## 2020-04-23 ENCOUNTER — VIRTUAL VISIT (OUTPATIENT)
Dept: PAIN MANAGEMENT | Age: 45
End: 2020-04-23
Payer: MEDICARE

## 2020-04-23 PROCEDURE — G8427 DOCREV CUR MEDS BY ELIG CLIN: HCPCS | Performed by: INTERNAL MEDICINE

## 2020-04-23 PROCEDURE — 99213 OFFICE O/P EST LOW 20 MIN: CPT | Performed by: INTERNAL MEDICINE

## 2020-04-23 RX ORDER — TIZANIDINE 4 MG/1
2-4 TABLET ORAL 2 TIMES DAILY
Qty: 60 TABLET | Refills: 0 | Status: SHIPPED | OUTPATIENT
Start: 2020-04-23 | End: 2020-08-17 | Stop reason: SDUPTHER

## 2020-04-23 RX ORDER — TRAMADOL HYDROCHLORIDE 50 MG/1
50 TABLET ORAL EVERY 6 HOURS PRN
Qty: 30 TABLET | Refills: 0 | Status: SHIPPED | OUTPATIENT
Start: 2020-04-23 | End: 2020-07-09 | Stop reason: SDUPTHER

## 2020-04-23 RX ORDER — SUMATRIPTAN 50 MG/1
TABLET, FILM COATED ORAL
Qty: 9 TABLET | Refills: 0 | Status: SHIPPED | OUTPATIENT
Start: 2020-04-23 | End: 2020-07-09 | Stop reason: SDUPTHER

## 2020-04-23 RX ORDER — DULOXETIN HYDROCHLORIDE 60 MG/1
60 CAPSULE, DELAYED RELEASE ORAL 2 TIMES DAILY
Qty: 60 CAPSULE | Refills: 0 | Status: SHIPPED | OUTPATIENT
Start: 2020-04-23 | End: 2020-07-09 | Stop reason: SDUPTHER

## 2020-04-23 RX ORDER — GABAPENTIN 400 MG/1
CAPSULE ORAL
Qty: 60 CAPSULE | Refills: 0 | Status: SHIPPED | OUTPATIENT
Start: 2020-04-23 | End: 2020-07-09 | Stop reason: SDUPTHER

## 2020-04-23 RX ORDER — BUPRENORPHINE 5 UG/H
1 PATCH TRANSDERMAL
Qty: 4 PATCH | Refills: 0 | Status: SHIPPED | OUTPATIENT
Start: 2020-04-23 | End: 2020-07-09 | Stop reason: SDUPTHER

## 2020-04-23 RX ORDER — TOPIRAMATE 100 MG/1
TABLET, FILM COATED ORAL
Qty: 60 TABLET | Refills: 0 | Status: SHIPPED | OUTPATIENT
Start: 2020-04-23 | End: 2020-07-09 | Stop reason: SDUPTHER

## 2020-04-23 NOTE — PROGRESS NOTES
sleep patterns are worse, and that the overall functioning is unchanged. Patient denies misusing/abusing his narcotic pain medications or using any illegal drugs. There are No indicators for possible drug abuse, addiction or diversion problems. Mr. Beti Jackson states he has been doing ok. He says he has been in the house. He reports he is keeping his legs elevated, they do swell up on standing or walking for any length of time, he is wearing his compression stockings sometimes. He mentions he is using Ultram PRN along with Butrans. Patient reports his headache symptoms are manageable somewhat, having increase headaches since going off the Zanaflex. ALLERGIES: Patients list of allergies were reviewed     MEDICATIONS: Mr. Beti Jackson list of medications were reviewed. His current medications are   Outpatient Medications Prior to Visit   Medication Sig Dispense Refill    topiramate (TOPAMAX) 100 MG tablet TAKE ONE TABLET BY MOUTH TWO TIMES A DAY 60 tablet 0    DULoxetine (CYMBALTA) 60 MG extended release capsule Take 1 capsule by mouth 2 times daily 60 capsule 0    SUMAtriptan (IMITREX) 50 MG tablet Take one tab po at the start of migraine PRN max 1 every 24 hours 9 tablet 0    gabapentin (NEURONTIN) 400 MG capsule Take one tablet Po BID 60 capsule 0    mupirocin (BACTROBAN NASAL) 2 % nasal ointment Take by Nasal route 2 times daily. 1 Tube 0    tiZANidine (ZANAFLEX) 4 MG tablet Take 0.5-1 tablets by mouth every evening 30 tablet 0    traMADol (ULTRAM) 50 MG tablet Take 1 tablet by mouth every 6 hours as needed for Pain (max 1-2 per day) for up to 30 days. 30 tablet 0    buprenorphine (BUTRANS) 5 MCG/HR PTWK Place 1 patch onto the skin every 7 days for 28 days.  4 patch 0    Insulin Syringe-Needle U-100 31G X 5/16\" 0.3 ML MISC USE ONE SYRINGE - FOUR TIMES A DAY BEFORE MEALS AND ONCE NIGHTLY 120 each 10    insulin glulisine (APIDRA) 100 UNIT/ML injection INJECT 8-12 UNITS UNDER THE SKIN THREE TIMES A DAY WITH MEALS TB12 Cough and congestion. 60 tablet 1    omega-3 acid ethyl esters (LOVAZA) 1 G capsule TAKE TWO CAPSULES BY MOUTH TWICE DAILY 120 capsule 5    MUCUS RELIEF DM  MG TABS TAKE ONE BY MOUTH DAILY AS NEEDED 30 tablet 1    Alcohol Swabs PADS Use as needed for insulin injection. 100 each 5    Probiotic Product (ACIDOPHILUS PROBIOTIC) CAPS capsule TAKE ONE CAPSULE BY MOUTH DAILY 28 capsule 4    polyvinyl alcohol (LIQUIFILM TEARS) 1.4 % ophthalmic solution 1 drop as needed      propranolol (INDERAL) 80 MG tablet Take 40 mg by mouth 2 times daily For migraines       Flaxseed, Linseed, (FLAX PO) Take 14 g by mouth daily. No facility-administered medications prior to visit. SOCIAL/FAMILY/PAST MEDICAL HISTORY: Mr. Sal Lopez, family and past medical history was reviewed. REVIEW OF SYSTEMS:    Respiratory: Negative for apnea, chest tightness and shortness of breath or change in baseline breathing. Gastrointestinal: Negative for nausea, vomiting, abdominal pain, diarrhea, constipation, blood in stool and abdominal distention. PHYSICAL EXAM:   Nursing note and vitals per patient reviewed. There were no vitals taken for this visit. Constitutional: He appears well-developed and well-nourished. No acute distress. No respiratory distress. Skin: Skin appears to be warm and dry. No rashes or any other marks noted. He is not diaphoretic. Neurological/Psychiatric:He is alert and oriented to person, place, and time. Coordination is  normal.  His mood isAppropriate and affect is Flat/blunted and Anxious. Musculoskeletal / Extremities: Range of motion is normal. Gait is normal, assistive devices use: cane. IMPRESSION:   1. Chronic pain syndrome    2. Neuropathic pain    3. Myofascial pain    4. Peripheral polyneuropathy    5.  Polyneuropathy associated with underlying disease ([facility] [address]        PLAN:  Informed verbal consent regarding treatment was obtained  -OARRS record was obtained and

## 2020-05-01 ENCOUNTER — TELEPHONE (OUTPATIENT)
Dept: PAIN MANAGEMENT | Age: 45
End: 2020-05-01

## 2020-05-01 NOTE — TELEPHONE ENCOUNTER
Submitted PA for BUPRENORPHINE  Via CMM Key: [id number] STATUS: APPROVED; APPROVAL letter attached. I tried to call the patient; mailbox was full.

## 2020-05-07 RX ORDER — INSULIN GLARGINE 100 [IU]/ML
INJECTION, SOLUTION SUBCUTANEOUS
Qty: 1 VIAL | Refills: 1 | Status: SHIPPED | OUTPATIENT
Start: 2020-05-07 | End: 2020-07-06

## 2020-05-28 ENCOUNTER — PATIENT MESSAGE (OUTPATIENT)
Dept: INFECTIOUS DISEASES | Age: 45
End: 2020-05-28

## 2020-06-01 RX ORDER — DULOXETIN HYDROCHLORIDE 60 MG/1
CAPSULE, DELAYED RELEASE ORAL
Qty: 60 CAPSULE | Refills: 0 | OUTPATIENT
Start: 2020-06-01

## 2020-06-03 RX ORDER — BLOOD SUGAR DIAGNOSTIC
STRIP MISCELLANEOUS
Qty: 100 STRIP | Refills: 4 | Status: SHIPPED | OUTPATIENT
Start: 2020-06-03 | End: 2020-06-15 | Stop reason: SDUPTHER

## 2020-06-04 ENCOUNTER — VIRTUAL VISIT (OUTPATIENT)
Dept: INFECTIOUS DISEASES | Age: 45
End: 2020-06-04
Payer: MEDICARE

## 2020-06-04 VITALS — BODY MASS INDEX: 29.52 KG/M2 | WEIGHT: 230 LBS | HEIGHT: 74 IN

## 2020-06-04 PROCEDURE — 99423 OL DIG E/M SVC 21+ MIN: CPT | Performed by: INTERNAL MEDICINE

## 2020-06-04 RX ORDER — MAGNESIUM 30 MG
30 TABLET ORAL 2 TIMES DAILY
Status: ON HOLD | COMMUNITY
End: 2021-09-26

## 2020-06-04 NOTE — PROGRESS NOTES
Infectious Diseases Follow-up Note    Reason for Consult:   L ankle / lower leg osteomyelitis  Requesting Physician:   Dr Christine Somers  Primary Care Physician:  Luis Samayoa MD  History Obtained From:   Patient, EPIC    CHIEF COMPLAINT:    Chief Complaint   Patient presents with    Follow-up       HISTORY OF PRESENT ILLNESS:      Pt sustained fall 1/28 and had ORIF. Pt developed wound dehiscence, drainage. On 8/28, hardware removed and external fixator placed (Dayanna Burroughs). Culture + MSSA. Pt had PICC placed and started on iv ceftriaxone at [facility] which he received until approximately 10/17 (7 weeks). He has been on po keflex 500 tid since this time. He reports occasional GI upset. Ortho visit 1/23, had x-ray, given clearance for full weight bearing. Last visit 2/17/14, pt reported L leg pain with ambulating. He attributes pain to muscle weakness. He has not had any new or worse redness nor drainage. BS control improved (sees Endocrinologist). Seen 4/16/14, pt presents with sister. He had L fibula fracture (proximal to ankle implant) and had operative ORIF 3/31/14 with removal of ankle implant and hardware placed (see report). Seen 7/21/14, pt presents with sister, in wheelchair (non-ambulatory), has bone stimulator (useds for 20 min once a day). He has chronic pain that varies, worse at night. Taking po keflex with occasional GI upset. Hosp at [facility] 8/17 - 25/14  with CNS infection - CSF , no definitely diagnosis (Enterovirus PCR neg, HSV PCR neg, cult neg). Seen 11/3, pt presents with sister, feeling well. He continues to be non-weight bearing, on keflex 500 tid. Last Ortho 10/7 - cont with bone stimulator. He had LE CT (tibial fx with \"moderate bridging bone\". + chronic pain. Seen 2/2/15, pt presented with sister. Had bone stimulator, ambulating with walking stick, taking keflex 500 tid. Seen 5/4/15, pt c/o worse LLE pain, feels \"like glass\".   L tibia healed though Supplemental Appropriations Act, this Virtual Visit was conducted with patient's (and/or legal guardian's) consent, to reduce the patient's risk of exposure to COVID-19 and provide necessary medical care. The patient (and/or legal guardian) has also been advised to contact this office for worsening conditions or problems, and seek emergency medical treatment and/or call 911 if deemed necessary. Patient identification was verified at the start of the visit: Yes    Total time spent for this encounter: > 21 min    Services were provided through a video synchronous discussion virtually to substitute for in-person clinic visit. Patient and provider were located at their individual homes. --Monica Brnik MD on 6/4/2020 at 1:42 PM    An electronic signature was used to authenticate this note.

## 2020-06-15 ENCOUNTER — TELEPHONE (OUTPATIENT)
Dept: ENDOCRINOLOGY | Age: 45
End: 2020-06-15

## 2020-06-15 ENCOUNTER — PATIENT MESSAGE (OUTPATIENT)
Dept: ENDOCRINOLOGY | Age: 45
End: 2020-06-15

## 2020-06-15 RX ORDER — TOPIRAMATE 100 MG/1
TABLET, FILM COATED ORAL
Qty: 60 TABLET | Refills: 0 | OUTPATIENT
Start: 2020-06-15

## 2020-06-15 RX ORDER — BLOOD SUGAR DIAGNOSTIC
STRIP MISCELLANEOUS
Qty: 300 STRIP | Refills: 4 | Status: SHIPPED | OUTPATIENT
Start: 2020-06-15

## 2020-06-15 RX ORDER — LANCETS
1 EACH MISCELLANEOUS DAILY
Qty: 900 EACH | Refills: 2 | Status: SHIPPED | OUTPATIENT
Start: 2020-06-15

## 2020-06-15 RX ORDER — GABAPENTIN 400 MG/1
CAPSULE ORAL
Qty: 60 CAPSULE | Refills: 0 | OUTPATIENT
Start: 2020-06-15

## 2020-06-16 NOTE — TELEPHONE ENCOUNTER
In pt message, he states \"this stuff does not work at [facility] [employer] [facility] and also in a voicemail that was left yesterday pt states the new medication isn't working at all. Do you happen to know what medication the pt is referring to so that I can put it on the PA? I don't see that a new medication was started by you.

## 2020-06-16 NOTE — TELEPHONE ENCOUNTER
Submitted PA for apidra to insurance and insurance states there is authorization already on file for this request.

## 2020-06-29 ENCOUNTER — OFFICE VISIT (OUTPATIENT)
Dept: ENDOCRINOLOGY | Age: 45
End: 2020-06-29
Payer: MEDICARE

## 2020-06-29 VITALS
HEART RATE: 105 BPM | HEIGHT: 74 IN | SYSTOLIC BLOOD PRESSURE: 135 MMHG | WEIGHT: 243 LBS | OXYGEN SATURATION: 98 % | RESPIRATION RATE: 16 BRPM | BODY MASS INDEX: 31.18 KG/M2 | DIASTOLIC BLOOD PRESSURE: 72 MMHG

## 2020-06-29 LAB — HBA1C MFR BLD: 10.1 %

## 2020-06-29 PROCEDURE — 1036F TOBACCO NON-USER: CPT | Performed by: INTERNAL MEDICINE

## 2020-06-29 PROCEDURE — 83036 HEMOGLOBIN GLYCOSYLATED A1C: CPT | Performed by: INTERNAL MEDICINE

## 2020-06-29 PROCEDURE — 2022F DILAT RTA XM EVC RTNOPTHY: CPT | Performed by: INTERNAL MEDICINE

## 2020-06-29 PROCEDURE — G8427 DOCREV CUR MEDS BY ELIG CLIN: HCPCS | Performed by: INTERNAL MEDICINE

## 2020-06-29 PROCEDURE — G8417 CALC BMI ABV UP PARAM F/U: HCPCS | Performed by: INTERNAL MEDICINE

## 2020-06-29 PROCEDURE — 3046F HEMOGLOBIN A1C LEVEL >9.0%: CPT | Performed by: INTERNAL MEDICINE

## 2020-06-29 PROCEDURE — 99214 OFFICE O/P EST MOD 30 MIN: CPT | Performed by: INTERNAL MEDICINE

## 2020-06-29 RX ORDER — INSULIN GLULISINE 100 [IU]/ML
INJECTION, SOLUTION SUBCUTANEOUS
Qty: 40 ML | Refills: 3 | Status: SHIPPED | OUTPATIENT
Start: 2020-06-29 | End: 2020-10-02 | Stop reason: SDUPTHER

## 2020-06-29 NOTE — PROGRESS NOTES
mouth daily. Vitals:    06/29/20 1402   BP: 135/72   Pulse: 105   Resp: 16   SpO2: 98%   Weight: 243 lb (110.2 kg)   Height: 6' 2\" (1.88 m)     Body mass index is 31.2 kg/m². Wt Readings from Last 3 Encounters:   06/29/20 243 lb (110.2 kg)   06/04/20 230 lb (104.3 kg)   03/26/20 250 lb (113.4 kg)     BP Readings from Last 3 Encounters:   06/29/20 135/72   03/26/20 139/87   02/27/20 126/80        Past Medical History:   Diagnosis Date    Acute respiratory failure ([address] 8/18/2014    Asthma     Blood pressure instability     Chronic pain syndrome     Detached retina, bilateral     laser tx right eye    Diabetes mellitus ([address]     uncontrolled     Insomnia     Meningitis August 2014    admission [facility]    Neuropathic pain     Osteomyelitis ([address]     Osteomyelitis of tibia ([address]     left    Pain of left lower extremity     Peripheral neuropathy      Past Surgical History:   Procedure Laterality Date    ANKLE FRACTURE SURGERY Left 1/28/13    Dr. Karlo Abbott  August 2013    left tibia with external fixation device    EYE SURGERY      retina reattachment left eye x 3 holes repairs    EYE SURGERY Right 9-27-13    retal detachment    LEG SURGERY Left 3/31/14    ORIF left fibula and tibia    MN EGD FLEXIBLE FOREIGN BODY REMOVAL N/A 9/21/2018    EGD FOREIGN BODY REMOVAL performed by Moe Cespedes MD at [address]      with external device inplace    UPPER GASTROINTESTINAL ENDOSCOPY N/A 9/21/2018    EGD BIOPSY performed by Moe Cespedes MD at [address] History   Problem Relation Age of Onset    Asthma Other     Diabetes Other     High Blood Pressure Other      Social History     Tobacco Use   Smoking Status Never Smoker   Smokeless Tobacco Never Used      Social History     Substance and Sexual Activity   Alcohol Use No    Alcohol/week: 0.0 standard drinks       HPI      David Royal is a 45 y.o. Psychiatric/Behavioral: Negative for depression. The patient is not nervous/anxious. No visits with results within 3 Month(s) from this visit.    Latest known visit with results is:   Orders Only on 02/05/2020   Component Date Value Ref Range Status    Hep B S Ab 02/05/2020 <3.50  mIU/mL Final    Comment: <8.5 = Negative  >=8.5 and <11.5 = Indeterminate  >=11.5 = Positive (Immune)      Cholesterol, Total 02/05/2020 293* 0 - 199 mg/dL Final    Triglycerides 02/05/2020 267* 0 - 150 mg/dL Final    HDL 02/05/2020 37* 40 - 60 mg/dL Final    LDL Calculated 02/05/2020 203* <100 mg/dL Final    VLDL Cholesterol Calculated 02/05/2020 53  Not Established mg/dL Final    Microalbumin, Random Urine 02/05/2020 16.50* <2.0 mg/dL Final    Creatinine, Ur 02/05/2020 85.0  39.0 - 259.0 mg/dL Final    Microalbumin Creatinine Ratio 02/05/2020 194.1* 0.0 - 30.0 mg/g Final    WBC 02/05/2020 9.2  4.0 - 11.0 K/uL Final    RBC 02/05/2020 4.95  4.20 - 5.90 M/uL Final    Hemoglobin 02/05/2020 14.0  13.5 - 17.5 g/dL Final    Hematocrit 02/05/2020 42.7  40.5 - 52.5 % Final    MCV 02/05/2020 86.3  80.0 - 100.0 fL Final    MCH 02/05/2020 28.2  26.0 - 34.0 pg Final    MCHC 02/05/2020 32.7  31.0 - 36.0 g/dL Final    RDW 02/05/2020 15.4  12.4 - 15.4 % Final    Platelets 07/35/2144 472* 135 - 450 K/uL Final    MPV 02/05/2020 6.9  5.0 - 10.5 fL Final    TSH 02/05/2020 2.21  0.27 - 4.20 uIU/mL Final    Sodium 02/05/2020 140  136 - 145 mmol/L Final    Potassium 02/05/2020 3.5  3.5 - 5.1 mmol/L Final    Chloride 02/05/2020 99  99 - 110 mmol/L Final    CO2 02/05/2020 22  21 - 32 mmol/L Final    Anion Gap 02/05/2020 19* 3 - 16 Final    Glucose 02/05/2020 128* 70 - 99 mg/dL Final    BUN 02/05/2020 11  7 - 20 mg/dL Final    CREATININE 02/05/2020 1.6* 0.9 - 1.3 mg/dL Final    GFR Non- 02/05/2020 48* >60 Final    Comment: >60 mL/min/1.73m2 EGFR, calc. for ages 25 and older using the  MDRD formula (not

## 2020-07-06 RX ORDER — INSULIN GLARGINE 100 [IU]/ML
INJECTION, SOLUTION SUBCUTANEOUS
Qty: 1 VIAL | Refills: 0 | Status: SHIPPED | OUTPATIENT
Start: 2020-07-06 | End: 2020-08-03

## 2020-07-09 ENCOUNTER — OFFICE VISIT (OUTPATIENT)
Dept: PAIN MANAGEMENT | Age: 45
End: 2020-07-09
Payer: MEDICARE

## 2020-07-09 VITALS
WEIGHT: 243 LBS | TEMPERATURE: 98.6 F | HEART RATE: 81 BPM | SYSTOLIC BLOOD PRESSURE: 133 MMHG | DIASTOLIC BLOOD PRESSURE: 75 MMHG | BODY MASS INDEX: 31.2 KG/M2

## 2020-07-09 PROCEDURE — G8417 CALC BMI ABV UP PARAM F/U: HCPCS | Performed by: INTERNAL MEDICINE

## 2020-07-09 PROCEDURE — 99213 OFFICE O/P EST LOW 20 MIN: CPT | Performed by: INTERNAL MEDICINE

## 2020-07-09 PROCEDURE — G8427 DOCREV CUR MEDS BY ELIG CLIN: HCPCS | Performed by: INTERNAL MEDICINE

## 2020-07-09 PROCEDURE — 1036F TOBACCO NON-USER: CPT | Performed by: INTERNAL MEDICINE

## 2020-07-09 RX ORDER — BUPRENORPHINE 5 UG/H
1 PATCH TRANSDERMAL
Qty: 4 PATCH | Refills: 0 | Status: SHIPPED | OUTPATIENT
Start: 2020-07-09 | End: 2020-08-06 | Stop reason: SDUPTHER

## 2020-07-09 RX ORDER — SUMATRIPTAN 50 MG/1
TABLET, FILM COATED ORAL
Qty: 9 TABLET | Refills: 0 | Status: SHIPPED | OUTPATIENT
Start: 2020-07-09 | End: 2020-08-06 | Stop reason: SDUPTHER

## 2020-07-09 RX ORDER — GABAPENTIN 400 MG/1
CAPSULE ORAL
Qty: 60 CAPSULE | Refills: 0 | Status: SHIPPED | OUTPATIENT
Start: 2020-07-09 | End: 2020-08-06 | Stop reason: SDUPTHER

## 2020-07-09 RX ORDER — DULOXETIN HYDROCHLORIDE 60 MG/1
60 CAPSULE, DELAYED RELEASE ORAL 2 TIMES DAILY
Qty: 60 CAPSULE | Refills: 0 | Status: SHIPPED | OUTPATIENT
Start: 2020-07-09 | End: 2020-08-06 | Stop reason: SDUPTHER

## 2020-07-09 RX ORDER — TRAMADOL HYDROCHLORIDE 50 MG/1
50 TABLET ORAL EVERY 6 HOURS PRN
Qty: 30 TABLET | Refills: 0 | Status: SHIPPED | OUTPATIENT
Start: 2020-07-09 | End: 2020-08-06 | Stop reason: SDUPTHER

## 2020-07-09 RX ORDER — TOPIRAMATE 100 MG/1
TABLET, FILM COATED ORAL
Qty: 60 TABLET | Refills: 0 | Status: SHIPPED | OUTPATIENT
Start: 2020-07-09 | End: 2020-08-06 | Stop reason: SDUPTHER

## 2020-07-09 NOTE — PROGRESS NOTES
Silvina Reyesmani  1975  [id number]      HISTORY OF PRESENT ILLNESS:  Mr. Melissa Goldstein is a 45 y.o. male returns for a follow up visit for pain management  He has a diagnosis of   1. Chronic pain syndrome    2. Neuropathic pain    3. Myofascial pain    4. Chronic migraine without aura, with intractable migraine, so stated, with status migrainosus    5. Polyneuropathy associated with underlying disease ([facility] [address]    6. Primary insomnia    7. Recurrent major depressive disorder, in partial remission ([facility]    8. Constipation due to opioid therapy    . He complains of pain in the head, neck, lower back, bilateral hands, left lower leg, left ankle/foot He rates the pain 9/10 and describes it as sharp, aching. Current treatment regimen has helped relieve about 10% of the pain. He denies any side effects from the current pain regimen. Patient reports that since the last follow up visit the physical functioning is unchanged, family/social relationships are unchanged, mood is better sleep patterns are better, and that the overall functioning is unchanged. Patient denies misusing/abusing his narcotic pain medications or using any illegal drugs. There are No indicators for possible drug abuse, addiction or diversion problems. Mr. Melissa Goldstein states he has not been able to come for appointment due too transportation issues. He states he still is using  Butrans, (last patch) and almost out of Ultram, using it 1-2 per day PRN. He reports his headache symptoms are manageable. Patient denies any constipation symptoms. ALLERGIES: Patients list of allergies were reviewed     MEDICATIONS: Mr. Melissa Goldstein list of medications were reviewed. His current medications are   Outpatient Medications Prior to Visit   Medication Sig Dispense Refill    LANTUS 100 UNIT/ML injection vial INJECT 28 UNITS UNDER THE SKIN ONCE NIGHTLY 1 vial 0    insulin glulisine (APIDRA) 100 UNIT/ML injection INJECT 8-12 UNITS UNDER THE SKIN THREE TIMES A DAY WITH MEALS 40 mL 3    blood glucose test strips (ACCU-CHEK CAPO PLUS) strip USE ONE STRIP TO TEST THREE TIMES A  strip 4    Accu-Chek FastClix Lancets MISC 1 each by Does not apply route daily Test blood glucose 10 times daily Dx Code E 10.8 900 each 2    magnesium 30 MG tablet Take 30 mg by mouth 2 times daily      topiramate (TOPAMAX) 100 MG tablet TAKE ONE TABLET BY MOUTH TWO TIMES A DAY 60 tablet 0    DULoxetine (CYMBALTA) 60 MG extended release capsule Take 1 capsule by mouth 2 times daily 60 capsule 0    SUMAtriptan (IMITREX) 50 MG tablet Take one tab po at the start of migraine PRN max 1 every 24 hours 9 tablet 0    mupirocin (BACTROBAN NASAL) 2 % nasal ointment Take by Nasal route 2 times daily. 1 Tube 0    Insulin Syringe-Needle U-100 31G X 5/16\" 0.3 ML MISC USE ONE SYRINGE - FOUR TIMES A DAY BEFORE MEALS AND ONCE NIGHTLY 120 each 10    LANTUS 100 UNIT/ML injection vial INJECT 30 UNITS UNDER THE SKIN NIGHTLY 1 vial 3    triamcinolone (KENALOG) 0.1 % ointment Apply to thickened affected areas PRN sparingly for flares no longer than 2 weeks at one time, do not apply to cleared skin 80 g 0    Insulin Syringe-Needle U-100 (BD INSULIN SYRINGE U/F) 31G X 5/16\" 0.5 ML MISC Inject 1 each into the skin 4 times daily DX CODE E 10.22 120 each 9    fluticasone (FLONASE) 50 MCG/ACT nasal spray SPRAY TWO SPRAYS IN EACH NOSTRIL DAILY 16 g 4    GLUCAGEN HYPOKIT 1 MG SOLR injection INJECT 1 MG INTO THE SKIN ONCE FOR ONE DOSE 1 each 2    pantoprazole (PROTONIX) 40 MG tablet Take 1 tablet by mouth 2 times daily 60 tablet 0    hydrocortisone 2.5 % cream Apply topically 2 times daily.  30 g 0    Blood Glucose Monitoring Suppl (ACCU-CHEK CAPO PLUS) w/Device KIT 1 each by Does not apply route daily Test blood sugar 10 times daily Dx code E 10.8 1 kit 10    glucose blood VI test strips (ACCU-CHEK CAPO PLUS) strip 1 each by In Vitro route daily Test blood glucose 10 times daily Dx Code E10.8 300 each 10    GLUCAGON EMERGENCY 1 MG injection INJECT 1MG INTO THE MUSCLE AS NEEDED 1 kit 3    Insulin Syringe-Needle U-100 (B-D INS SYR ULTRAFINE 1CC/31G) 31G X 5/16\" 1 ML MISC INJECT USING INSULIN ONCE DAILY 30 each 10    Multiple Vitamin (DAILY KITTY) TABS TAKE ONE TABLET BY MOUTH DAILY 30 tablet 5    Cholecalciferol (VITAMIN D3) 5000 units CAPS Take 1 capsule by mouth Daily 30 capsule 3    Dextromethorphan-Guaifenesin (MUCINEX DM)  MG TB12 Cough and congestion. 60 tablet 1    omega-3 acid ethyl esters (LOVAZA) 1 G capsule TAKE TWO CAPSULES BY MOUTH TWICE DAILY 120 capsule 5    MUCUS RELIEF DM  MG TABS TAKE ONE BY MOUTH DAILY AS NEEDED 30 tablet 1    Alcohol Swabs PADS Use as needed for insulin injection. 100 each 5    Probiotic Product (ACIDOPHILUS PROBIOTIC) CAPS capsule TAKE ONE CAPSULE BY MOUTH DAILY 28 capsule 4    polyvinyl alcohol (LIQUIFILM TEARS) 1.4 % ophthalmic solution 1 drop as needed      propranolol (INDERAL) 80 MG tablet Take 40 mg by mouth 2 times daily For migraines       Flaxseed, Linseed, (FLAX PO) Take 14 g by mouth daily.  gabapentin (NEURONTIN) 400 MG capsule Take one tablet Po BID 60 capsule 0    traMADol (ULTRAM) 50 MG tablet Take 1 tablet by mouth every 6 hours as needed for Pain (max 1-2 per day) for up to 30 days. 30 tablet 0     No facility-administered medications prior to visit. SOCIAL/FAMILY/PAST MEDICAL HISTORY: Mr. Roberto Carlos Frakn, family and past medical history was reviewed. REVIEW OF SYSTEMS:    Respiratory: Negative for apnea, chest tightness and shortness of breath or change in baseline breathing. Gastrointestinal: Negative for nausea, vomiting, abdominal pain, diarrhea, constipation, blood in stool and abdominal distention. PHYSICAL EXAM:   Nursing note and vitals reviewed. /75   Pulse 81   Temp 98.6 °F (37 °C) (Temporal)   Wt 243 lb (110.2 kg)   BMI 31.20 kg/m²   Constitutional: He appears well-developed and well-nourished.  No acute glulisine (APIDRA) 100 UNIT/ML injection INJECT 8-12 UNITS UNDER THE SKIN THREE TIMES A DAY WITH MEALS 40 mL 3    blood glucose test strips (ACCU-CHEK CAPO PLUS) strip USE ONE STRIP TO TEST THREE TIMES A  strip 4    Accu-Chek FastClix Lancets MISC 1 each by Does not apply route daily Test blood glucose 10 times daily Dx Code E 10.8 900 each 2    magnesium 30 MG tablet Take 30 mg by mouth 2 times daily      topiramate (TOPAMAX) 100 MG tablet TAKE ONE TABLET BY MOUTH TWO TIMES A DAY 60 tablet 0    DULoxetine (CYMBALTA) 60 MG extended release capsule Take 1 capsule by mouth 2 times daily 60 capsule 0    SUMAtriptan (IMITREX) 50 MG tablet Take one tab po at the start of migraine PRN max 1 every 24 hours 9 tablet 0    mupirocin (BACTROBAN NASAL) 2 % nasal ointment Take by Nasal route 2 times daily. 1 Tube 0    Insulin Syringe-Needle U-100 31G X 5/16\" 0.3 ML MISC USE ONE SYRINGE - FOUR TIMES A DAY BEFORE MEALS AND ONCE NIGHTLY 120 each 10    LANTUS 100 UNIT/ML injection vial INJECT 30 UNITS UNDER THE SKIN NIGHTLY 1 vial 3    triamcinolone (KENALOG) 0.1 % ointment Apply to thickened affected areas PRN sparingly for flares no longer than 2 weeks at one time, do not apply to cleared skin 80 g 0    Insulin Syringe-Needle U-100 (BD INSULIN SYRINGE U/F) 31G X 5/16\" 0.5 ML MISC Inject 1 each into the skin 4 times daily DX CODE E 10.22 120 each 9    fluticasone (FLONASE) 50 MCG/ACT nasal spray SPRAY TWO SPRAYS IN EACH NOSTRIL DAILY 16 g 4    GLUCAGEN HYPOKIT 1 MG SOLR injection INJECT 1 MG INTO THE SKIN ONCE FOR ONE DOSE 1 each 2    pantoprazole (PROTONIX) 40 MG tablet Take 1 tablet by mouth 2 times daily 60 tablet 0    hydrocortisone 2.5 % cream Apply topically 2 times daily.  30 g 0    Blood Glucose Monitoring Suppl (ACCU-CHEK CAPO PLUS) w/Device KIT 1 each by Does not apply route daily Test blood sugar 10 times daily Dx code E 10.8 1 kit 10    glucose blood VI test strips (ACCU-CHEK CAPO PLUS) strip 1 each by In Vitro route daily Test blood glucose 10 times daily Dx Code E10.8 300 each 10    GLUCAGON EMERGENCY 1 MG injection INJECT 1MG INTO THE MUSCLE AS NEEDED 1 kit 3    Insulin Syringe-Needle U-100 (B-D INS SYR ULTRAFINE 1CC/31G) 31G X 5/16\" 1 ML MISC INJECT USING INSULIN ONCE DAILY 30 each 10    Multiple Vitamin (DAILY KITTY) TABS TAKE ONE TABLET BY MOUTH DAILY 30 tablet 5    Cholecalciferol (VITAMIN D3) 5000 units CAPS Take 1 capsule by mouth Daily 30 capsule 3    Dextromethorphan-Guaifenesin (MUCINEX DM)  MG TB12 Cough and congestion. 60 tablet 1    omega-3 acid ethyl esters (LOVAZA) 1 G capsule TAKE TWO CAPSULES BY MOUTH TWICE DAILY 120 capsule 5    MUCUS RELIEF DM  MG TABS TAKE ONE BY MOUTH DAILY AS NEEDED 30 tablet 1    Alcohol Swabs PADS Use as needed for insulin injection. 100 each 5    Probiotic Product (ACIDOPHILUS PROBIOTIC) CAPS capsule TAKE ONE CAPSULE BY MOUTH DAILY 28 capsule 4    polyvinyl alcohol (LIQUIFILM TEARS) 1.4 % ophthalmic solution 1 drop as needed      propranolol (INDERAL) 80 MG tablet Take 40 mg by mouth 2 times daily For migraines       Flaxseed, Linseed, (FLAX PO) Take 14 g by mouth daily.  gabapentin (NEURONTIN) 400 MG capsule Take one tablet Po BID 60 capsule 0    traMADol (ULTRAM) 50 MG tablet Take 1 tablet by mouth every 6 hours as needed for Pain (max 1-2 per day) for up to 30 days. 30 tablet 0     No current facility-administered medications for this visit. I will continue his current medication regimen  which is part of the above treatment schedule.  It has been helping with Mr. Tk Rodríguez chronic  medical problems which for this visit include:   Diagnoses of Chronic pain syndrome, Neuropathic pain, Myofascial pain, Chronic migraine without aura, with intractable migraine, so stated, with status migrainosus, Polyneuropathy associated with underlying disease ([address] Primary insomnia, Recurrent major depressive disorder, in partial remission

## 2020-07-23 RX ORDER — TIZANIDINE 4 MG/1
TABLET ORAL
Qty: 60 TABLET | Refills: 0 | OUTPATIENT
Start: 2020-07-23

## 2020-08-03 RX ORDER — INSULIN GLARGINE 100 [IU]/ML
INJECTION, SOLUTION SUBCUTANEOUS
Qty: 1 VIAL | Refills: 0 | Status: SHIPPED | OUTPATIENT
Start: 2020-08-03 | End: 2020-09-03

## 2020-08-06 ENCOUNTER — VIRTUAL VISIT (OUTPATIENT)
Dept: PAIN MANAGEMENT | Age: 45
End: 2020-08-06
Payer: MEDICARE

## 2020-08-06 PROCEDURE — 99213 OFFICE O/P EST LOW 20 MIN: CPT | Performed by: INTERNAL MEDICINE

## 2020-08-06 PROCEDURE — G8428 CUR MEDS NOT DOCUMENT: HCPCS | Performed by: INTERNAL MEDICINE

## 2020-08-06 RX ORDER — TRAMADOL HYDROCHLORIDE 50 MG/1
50 TABLET ORAL EVERY 6 HOURS PRN
Qty: 40 TABLET | Refills: 0 | Status: SHIPPED | OUTPATIENT
Start: 2020-08-06 | End: 2020-09-03 | Stop reason: SDUPTHER

## 2020-08-06 RX ORDER — SUMATRIPTAN 50 MG/1
TABLET, FILM COATED ORAL
Qty: 9 TABLET | Refills: 0 | Status: SHIPPED | OUTPATIENT
Start: 2020-08-06 | End: 2020-09-03 | Stop reason: SDUPTHER

## 2020-08-06 RX ORDER — BUPRENORPHINE 5 UG/H
1 PATCH TRANSDERMAL
Qty: 4 PATCH | Refills: 0 | Status: SHIPPED | OUTPATIENT
Start: 2020-08-06 | End: 2020-09-03 | Stop reason: SDUPTHER

## 2020-08-06 RX ORDER — DULOXETIN HYDROCHLORIDE 60 MG/1
60 CAPSULE, DELAYED RELEASE ORAL 2 TIMES DAILY
Qty: 60 CAPSULE | Refills: 1 | Status: SHIPPED | OUTPATIENT
Start: 2020-08-06 | End: 2020-09-03 | Stop reason: SDUPTHER

## 2020-08-06 RX ORDER — GABAPENTIN 400 MG/1
CAPSULE ORAL
Qty: 60 CAPSULE | Refills: 1 | Status: SHIPPED | OUTPATIENT
Start: 2020-08-06 | End: 2020-09-03 | Stop reason: SDUPTHER

## 2020-08-06 RX ORDER — TOPIRAMATE 100 MG/1
TABLET, FILM COATED ORAL
Qty: 60 TABLET | Refills: 1 | Status: SHIPPED | OUTPATIENT
Start: 2020-08-06 | End: 2020-09-03 | Stop reason: SDUPTHER

## 2020-08-06 NOTE — PROGRESS NOTES
TELE HEALTH VISIT (AUDIO-VISUAL)    Pursuant to the emergency declaration under the [facility], [facility] waiver authority and the [employer] and [employer] Act, this Virtual  Visit was conducted, with patient's/legal guardian's consent, to reduce the patient's risk of exposure to COVID-19 and provide continuity of care for an established patient. Service is  provided through a video synchronous discussion virtually to substitute for in-person clinic visit. Due to this being a TeleHealth encounter (During QQVXS-45 public health emergency), evaluation of the following organ systems was limited: Vitals/Constitutional/EENT/Resp/CV/GI//MS/Neuro/Skin/Eiot-Hvlx-Owj. Bettye Lights  1975  [id number]    Mr. Carson is being seen virtually for a follow up visit using one of the following techniques  [employer] Du[employer]  Informed verbal consent to the virtual visit was obtained from Mr. Jules Montelongo. Risks associated with HIPPA compliance with the virtual visit was explained to the patient. Mr. Jules Montelongo is at his residence and Dr. Ibis Ramirez is in his office. HISTORY OF PRESENT ILLNESS:  Mr. Jules Montelongo is a 45 y.o. male  being assessed for a follow up visit for pain management for evaluation of ongoing care regarding his symptoms and monitoring of compliance with long term use high risk medications. He has a diagnosis of   1. Chronic pain syndrome    2. Neuropathic pain    3. Myofascial pain    4. Peripheral polyneuropathy    . He complains of pain in the head, lower back  with radiation to the buttocks, hips Bilateral, upper leg Left, knees Left, lower leg Left and ankles Left . He rates the pain 10/10 and describes it as sharp, aching, burning. Current treatment regimen has helped relieve about 0% of the pain. He denies any side effects from the current pain regimen.  Patient reports that since the last follow up visit the physical functioning is worse, family/social relationships are unchanged, mood is unchanged sleep patterns are unchanged, and that the overall functioning is worse. Patient denies misusing/abusing his narcotic pain medications or using any illegal drugs. There are No indicators for possible drug abuse, addiction or diversion problems. Mr. Kai Waters states he has been doing fair, he is in a lot of pain. He states he is having increase migraine headaches, he is using Topamax and Imitrex along with the other medications which is helping with pain also. Patient has been compliant with all the other adjuvants. ALLERGIES: Patients list of allergies were reviewed     MEDICATIONS: Mr. Kai Waters list of medications were reviewed. His current medications are   Outpatient Medications Prior to Visit   Medication Sig Dispense Refill    LANTUS 100 UNIT/ML injection vial INJECT 28 UNITS UNDER THE SKIN ONCE NIGHTLY 1 vial 0    topiramate (TOPAMAX) 100 MG tablet TAKE ONE TABLET BY MOUTH TWO TIMES A DAY 60 tablet 0    DULoxetine (CYMBALTA) 60 MG extended release capsule Take 1 capsule by mouth 2 times daily 60 capsule 0    SUMAtriptan (IMITREX) 50 MG tablet Take one tab po at the start of migraine PRN max 1 every 24 hours 9 tablet 0    gabapentin (NEURONTIN) 400 MG capsule Take one tablet Po BID 60 capsule 0    traMADol (ULTRAM) 50 MG tablet Take 1 tablet by mouth every 6 hours as needed for Pain (max 1-2 per day) for up to 30 days. 30 tablet 0    mupirocin (BACTROBAN NASAL) 2 % nasal ointment Take by Nasal route 2 times daily. 1 Tube 0    buprenorphine (BUTRANS) 5 MCG/HR PTWK Place 1 patch onto the skin every 7 days for 28 days.  4 patch 0    insulin glulisine (APIDRA) 100 UNIT/ML injection INJECT 8-12 UNITS UNDER THE SKIN THREE TIMES A DAY WITH MEALS 40 mL 3    blood glucose test strips (ACCU-CHEK CAPO PLUS) strip USE ONE STRIP TO TEST THREE TIMES A  strip 4    Accu-Chek FastClix Lancets MISC 1 each by Does not apply route daily Test blood glucose 10 times daily Dx Code E 10.8 900 each 2    magnesium 30 MG tablet Take 30 mg by mouth 2 times daily      Insulin Syringe-Needle U-100 31G X 5/16\" 0.3 ML MISC USE ONE SYRINGE - FOUR TIMES A DAY BEFORE MEALS AND ONCE NIGHTLY 120 each 10    LANTUS 100 UNIT/ML injection vial INJECT 30 UNITS UNDER THE SKIN NIGHTLY 1 vial 3    triamcinolone (KENALOG) 0.1 % ointment Apply to thickened affected areas PRN sparingly for flares no longer than 2 weeks at one time, do not apply to cleared skin 80 g 0    Insulin Syringe-Needle U-100 (BD INSULIN SYRINGE U/F) 31G X 5/16\" 0.5 ML MISC Inject 1 each into the skin 4 times daily DX CODE E 10.22 120 each 9    fluticasone (FLONASE) 50 MCG/ACT nasal spray SPRAY TWO SPRAYS IN EACH NOSTRIL DAILY 16 g 4    GLUCAGEN HYPOKIT 1 MG SOLR injection INJECT 1 MG INTO THE SKIN ONCE FOR ONE DOSE 1 each 2    pantoprazole (PROTONIX) 40 MG tablet Take 1 tablet by mouth 2 times daily 60 tablet 0    hydrocortisone 2.5 % cream Apply topically 2 times daily. 30 g 0    Blood Glucose Monitoring Suppl (ACCU-CHEK CAPO PLUS) w/Device KIT 1 each by Does not apply route daily Test blood sugar 10 times daily Dx code E 10.8 1 kit 10    glucose blood VI test strips (ACCU-CHEK CAPO PLUS) strip 1 each by In Vitro route daily Test blood glucose 10 times daily Dx Code E10.8 300 each 10    GLUCAGON EMERGENCY 1 MG injection INJECT 1MG INTO THE MUSCLE AS NEEDED 1 kit 3    Insulin Syringe-Needle U-100 (B-D INS SYR ULTRAFINE 1CC/31G) 31G X 5/16\" 1 ML MISC INJECT USING INSULIN ONCE DAILY 30 each 10    Multiple Vitamin (DAILY KITTY) TABS TAKE ONE TABLET BY MOUTH DAILY 30 tablet 5    Cholecalciferol (VITAMIN D3) 5000 units CAPS Take 1 capsule by mouth Daily 30 capsule 3    Dextromethorphan-Guaifenesin (MUCINEX DM)  MG TB12 Cough and congestion.  60 tablet 1    omega-3 acid ethyl esters (LOVAZA) 1 G capsule TAKE TWO CAPSULES BY MOUTH TWICE DAILY 120 capsule 5    MUCUS RELIEF DM  MG TABS TAKE ONE BY MOUTH DAILY AS NEEDED 30 tablet 1    Alcohol Swabs PADS Use as needed for insulin injection. 100 each 5    Probiotic Product (ACIDOPHILUS PROBIOTIC) CAPS capsule TAKE ONE CAPSULE BY MOUTH DAILY 28 capsule 4    polyvinyl alcohol (LIQUIFILM TEARS) 1.4 % ophthalmic solution 1 drop as needed      propranolol (INDERAL) 80 MG tablet Take 40 mg by mouth 2 times daily For migraines       Flaxseed, Linseed, (FLAX PO) Take 14 g by mouth daily. No facility-administered medications prior to visit. SOCIAL/FAMILY/PAST MEDICAL HISTORY: Mr. Stanton Loretta, family and past medical history was reviewed. REVIEW OF SYSTEMS:    Respiratory: Negative for apnea, chest tightness and shortness of breath or change in baseline breathing. Gastrointestinal: Negative for nausea, vomiting, abdominal pain, diarrhea, constipation, blood in stool and abdominal distention. PHYSICAL EXAM:   Nursing note and vitals per patient reviewed. There were no vitals taken for this visit. Constitutional: He appears well-developed and well-nourished. No acute distress. No respiratory distress. Skin: Skin appears to be warm and dry. No rashes or any other marks noted. He is not diaphoretic. Respiratory/Pulmonary: NO conversational dyspnea, no accessory muscle use, no coughing during exam. He does not appear to be in labored breathing. Neurological/Psychiatric:He is alert and oriented to person, place, and time. Coordination is  normal.  His mood isAppropriate and affect is Neutral/Euthymic(normal). Musculoskeletal / Extremities: Range of motion is normal. Gait is normal, assistive devices use: none. IMPRESSION:   1. Chronic pain syndrome    2. Neuropathic pain    3. Myofascial pain    4. Peripheral polyneuropathy    5.  Polyneuropathy associated with underlying disease ([facility] [address]        PLAN:  Informed verbal consent regarding treatment was obtained  -continue with current opioid regimen Butrans along with Ultram 1-2 per triamcinolone (KENALOG) 0.1 % ointment Apply to thickened affected areas PRN sparingly for flares no longer than 2 weeks at one time, do not apply to cleared skin 80 g 0    Insulin Syringe-Needle U-100 (BD INSULIN SYRINGE U/F) 31G X 5/16\" 0.5 ML MISC Inject 1 each into the skin 4 times daily DX CODE E 10.22 120 each 9    fluticasone (FLONASE) 50 MCG/ACT nasal spray SPRAY TWO SPRAYS IN EACH NOSTRIL DAILY 16 g 4    GLUCAGEN HYPOKIT 1 MG SOLR injection INJECT 1 MG INTO THE SKIN ONCE FOR ONE DOSE 1 each 2    pantoprazole (PROTONIX) 40 MG tablet Take 1 tablet by mouth 2 times daily 60 tablet 0    hydrocortisone 2.5 % cream Apply topically 2 times daily. 30 g 0    Blood Glucose Monitoring Suppl (ACCU-CHEK CAPO PLUS) w/Device KIT 1 each by Does not apply route daily Test blood sugar 10 times daily Dx code E 10.8 1 kit 10    glucose blood VI test strips (ACCU-CHEK CAPO PLUS) strip 1 each by In Vitro route daily Test blood glucose 10 times daily Dx Code E10.8 300 each 10    GLUCAGON EMERGENCY 1 MG injection INJECT 1MG INTO THE MUSCLE AS NEEDED 1 kit 3    Insulin Syringe-Needle U-100 (B-D INS SYR ULTRAFINE 1CC/31G) 31G X 5/16\" 1 ML MISC INJECT USING INSULIN ONCE DAILY 30 each 10    Multiple Vitamin (DAILY KITTY) TABS TAKE ONE TABLET BY MOUTH DAILY 30 tablet 5    Cholecalciferol (VITAMIN D3) 5000 units CAPS Take 1 capsule by mouth Daily 30 capsule 3    Dextromethorphan-Guaifenesin (MUCINEX DM)  MG TB12 Cough and congestion. 60 tablet 1    omega-3 acid ethyl esters (LOVAZA) 1 G capsule TAKE TWO CAPSULES BY MOUTH TWICE DAILY 120 capsule 5    MUCUS RELIEF DM  MG TABS TAKE ONE BY MOUTH DAILY AS NEEDED 30 tablet 1    Alcohol Swabs PADS Use as needed for insulin injection.  100 each 5    Probiotic Product (ACIDOPHILUS PROBIOTIC) CAPS capsule TAKE ONE CAPSULE BY MOUTH DAILY 28 capsule 4    polyvinyl alcohol (LIQUIFILM TEARS) 1.4 % ophthalmic solution 1 drop as needed      propranolol (INDERAL) 80 MG tablet Take 40 mg by mouth 2 times daily For migraines       Flaxseed, Linseed, (FLAX PO) Take 14 g by mouth daily. No current facility-administered medications for this visit. I will continue his current medication regimen  which is part of the above treatment schedule. It has been helping with Mr. Serafin Orta chronic  medical problems which for this visit include:   Diagnoses of Chronic pain syndrome, Neuropathic pain, Myofascial pain, and Peripheral polyneuropathy were pertinent to this visit. Risks and benefits of the medications and other alternative treatments  including no treatment were discussed with the patient. The common side effects of these medications were also explained to the patient. Informed verbal consent was obtained. Goals of current treatment regimen include improvement in pain, restoration of functioning- with focus on improvement in physical performance, general activity, work or disability,emotional distress, health care utilization and  decreased medication consumption. Will continue to monitor progress towards achieving/maintaining therapeutic goals with special emphasis on  1. Improvement in perceived interfernce  of pain with ADL's. Ability to do home exercises independently. Ability to do household chores indoor and/or outdoor work and social and leisure activities. Improve psychosocial and physical functioning. - he is showing progression towards this treatment goal with the current regimen. He was advised against drinking alcohol with the narcotic pain medicines, advised against driving or handling machinery while adjusting the dose of medicines or if having cognitive  issues related to the current medications. Risk of overdose and death, if medicines not taken as prescribed, were also discussed. If the patient develops new symptoms or if the symptoms worsen, the patient should call the office.     While transcribing every attempt was made to maintain the accuracy of

## 2020-08-13 ENCOUNTER — PATIENT MESSAGE (OUTPATIENT)
Dept: PAIN MANAGEMENT | Age: 45
End: 2020-08-13

## 2020-08-17 RX ORDER — TIZANIDINE 4 MG/1
2-4 TABLET ORAL 2 TIMES DAILY
Qty: 60 TABLET | Refills: 0 | Status: SHIPPED | OUTPATIENT
Start: 2020-08-17 | End: 2020-09-03 | Stop reason: SDUPTHER

## 2020-09-03 ENCOUNTER — VIRTUAL VISIT (OUTPATIENT)
Dept: PAIN MANAGEMENT | Age: 45
End: 2020-09-03
Payer: MEDICARE

## 2020-09-03 PROCEDURE — 99213 OFFICE O/P EST LOW 20 MIN: CPT | Performed by: INTERNAL MEDICINE

## 2020-09-03 PROCEDURE — G8427 DOCREV CUR MEDS BY ELIG CLIN: HCPCS | Performed by: INTERNAL MEDICINE

## 2020-09-03 RX ORDER — BUPRENORPHINE 5 UG/H
1 PATCH TRANSDERMAL
Qty: 4 PATCH | Refills: 0 | Status: SHIPPED | OUTPATIENT
Start: 2020-09-03 | End: 2020-10-01 | Stop reason: SDUPTHER

## 2020-09-03 RX ORDER — GABAPENTIN 400 MG/1
CAPSULE ORAL
Qty: 60 CAPSULE | Refills: 1 | Status: SHIPPED | OUTPATIENT
Start: 2020-09-03 | End: 2020-10-01 | Stop reason: SDUPTHER

## 2020-09-03 RX ORDER — TIZANIDINE 4 MG/1
2-4 TABLET ORAL 2 TIMES DAILY
Qty: 60 TABLET | Refills: 1 | Status: SHIPPED | OUTPATIENT
Start: 2020-09-03 | End: 2020-10-01 | Stop reason: SDUPTHER

## 2020-09-03 RX ORDER — TOPIRAMATE 100 MG/1
TABLET, FILM COATED ORAL
Qty: 60 TABLET | Refills: 1 | Status: SHIPPED | OUTPATIENT
Start: 2020-09-03 | End: 2020-10-01 | Stop reason: SDUPTHER

## 2020-09-03 RX ORDER — INSULIN GLARGINE 100 [IU]/ML
INJECTION, SOLUTION SUBCUTANEOUS
Qty: 1 VIAL | Refills: 0 | Status: SHIPPED | OUTPATIENT
Start: 2020-09-03 | End: 2020-10-12

## 2020-09-03 RX ORDER — TRAMADOL HYDROCHLORIDE 50 MG/1
50 TABLET ORAL EVERY 6 HOURS PRN
Qty: 40 TABLET | Refills: 0 | Status: SHIPPED | OUTPATIENT
Start: 2020-09-03 | End: 2020-10-01 | Stop reason: SDUPTHER

## 2020-09-03 RX ORDER — DULOXETIN HYDROCHLORIDE 60 MG/1
60 CAPSULE, DELAYED RELEASE ORAL 2 TIMES DAILY
Qty: 60 CAPSULE | Refills: 1 | Status: SHIPPED | OUTPATIENT
Start: 2020-09-03 | End: 2020-10-01 | Stop reason: SDUPTHER

## 2020-09-03 RX ORDER — SUMATRIPTAN 50 MG/1
TABLET, FILM COATED ORAL
Qty: 9 TABLET | Refills: 0 | Status: SHIPPED | OUTPATIENT
Start: 2020-09-03 | End: 2020-10-01 | Stop reason: SDUPTHER

## 2020-09-03 NOTE — TELEPHONE ENCOUNTER
Medication:   Requested Prescriptions     Pending Prescriptions Disp Refills    LANTUS 100 UNIT/ML injection vial [Pharmacy Med Name: LANTUS 100 UNIT/ML VIAL] 1 vial 0     Sig: INJECT 32 UNITS UNDER THE SKIN ONCE NIGHTLY       Last appt: 6/29/2020   Next appt: 10/29/2020    Last OARRS:   RX Monitoring 3/21/2019   Attestation The Prescription Monitoring Report for this patient was reviewed today.

## 2020-09-03 NOTE — PROGRESS NOTES
TELE HEALTH VISIT (AUDIO-VISUAL)    Pursuant to the emergency declaration under the [facility], [facility] waiver authority and the [employer] Act, this Virtual  Visit was conducted, with patient's/legal guardian's consent, to reduce the patient's risk of exposure to COVID-19 and provide continuity of care for an established patient. Service is  provided through a video synchronous discussion virtually to substitute for in-person clinic visit. Due to this being a TeleHealth encounter (During IQXFC-39 public health emergency), evaluation of the following organ systems was limited: Vitals/Constitutional/EENT/Resp/CV/GI//MS/Neuro/Skin/Veaa-Pcns-Nfi. Renata Whitmore  1975  [phone]    Mr. Carson is being seen virtually for a follow up visit using one of the following techniques  Google Duo  Informed verbal consent to the virtual visit was obtained from Mr. Milan Wright. Risks associated with HIPPA compliance with the virtual visit was explained to the patient. Mr. Milan Wright is at his residence and Dr. Ishan Carvalho is in his office. HISTORY OF PRESENT ILLNESS:  Mr. Milan Wright is a 45 y.o. male  being assessed for a follow up visit for pain management for evaluation of ongoing care regarding his symptoms and monitoring of compliance with long term use high risk medications. He has a diagnosis of   1. Chronic pain syndrome    2. Myofascial pain    3. Peripheral polyneuropathy    4. Chronic migraine without aura, with intractable migraine, so stated, with status migrainosus    . He complains of pain in the left leg  with radiation to the knees Left . He rates the pain 9/10 and describes it as sharp. Current treatment regimen has helped relieve about 10% of the pain. He denies any side effects from the current pain regimen.  Patient reports that since the last follow up visit the physical functioning is unchanged, family/social relationships are unchanged, mood is unchanged sleep patterns are unchanged, and that the overall functioning is unchanged. Patient denies misusing/abusing his narcotic pain medications or using any illegal drugs. There are No indicators for possible drug abuse, addiction or diversion problems. Mr. Dinora Hayward states she has been doing fair, pain has been tolerable somewhat. Patient denies any side effects with the medications. Patient mentions she is using Butrans along with Ultram. She reports her blood sugar has been ok. Patient says her headache symptoms are tolerable, Zanaflex is helping. ALLERGIES: Patients list of allergies were reviewed     MEDICATIONS: Mr. Dinora Hayward list of medications were reviewed. His current medications are   Outpatient Medications Prior to Visit   Medication Sig Dispense Refill    LANTUS 100 UNIT/ML injection vial INJECT 32 UNITS UNDER THE SKIN ONCE NIGHTLY 1 vial 0    tiZANidine (ZANAFLEX) 4 MG tablet Take 0.5-1 tablets by mouth 2 times daily 60 tablet 0    topiramate (TOPAMAX) 100 MG tablet TAKE ONE TABLET BY MOUTH TWO TIMES A DAY 60 tablet 1    DULoxetine (CYMBALTA) 60 MG extended release capsule Take 1 capsule by mouth 2 times daily 60 capsule 1    SUMAtriptan (IMITREX) 50 MG tablet Take one tab po at the start of migraine PRN max 1 every 24 hours 9 tablet 0    gabapentin (NEURONTIN) 400 MG capsule Take one tablet Po BID 60 capsule 1    traMADol (ULTRAM) 50 MG tablet Take 1 tablet by mouth every 6 hours as needed for Pain (max 1-2 per day) for up to 28 days. 40 tablet 0    buprenorphine (BUTRANS) 5 MCG/HR PTWK Place 1 patch onto the skin every 7 days for 28 days.  4 patch 0    insulin glulisine (APIDRA) 100 UNIT/ML injection INJECT 8-12 UNITS UNDER THE SKIN THREE TIMES A DAY WITH MEALS 40 mL 3    blood glucose test strips (ACCU-CHEK CAPO PLUS) strip USE ONE STRIP TO TEST THREE TIMES A  strip 4    Accu-Chek FastClix Lancets MISC 1 each by Does not apply route daily Test blood glucose 10 times daily Dx Code E 10.8 900 each 2    magnesium 30 MG tablet Take 30 mg by mouth 2 times daily      Insulin Syringe-Needle U-100 31G X 5/16\" 0.3 ML MISC USE ONE SYRINGE - FOUR TIMES A DAY BEFORE MEALS AND ONCE NIGHTLY 120 each 10    LANTUS 100 UNIT/ML injection vial INJECT 30 UNITS UNDER THE SKIN NIGHTLY 1 vial 3    triamcinolone (KENALOG) 0.1 % ointment Apply to thickened affected areas PRN sparingly for flares no longer than 2 weeks at one time, do not apply to cleared skin 80 g 0    Insulin Syringe-Needle U-100 (BD INSULIN SYRINGE U/F) 31G X 5/16\" 0.5 ML MISC Inject 1 each into the skin 4 times daily DX CODE E 10.22 120 each 9    fluticasone (FLONASE) 50 MCG/ACT nasal spray SPRAY TWO SPRAYS IN EACH NOSTRIL DAILY 16 g 4    GLUCAGEN HYPOKIT 1 MG SOLR injection INJECT 1 MG INTO THE SKIN ONCE FOR ONE DOSE 1 each 2    pantoprazole (PROTONIX) 40 MG tablet Take 1 tablet by mouth 2 times daily 60 tablet 0    hydrocortisone 2.5 % cream Apply topically 2 times daily. 30 g 0    Blood Glucose Monitoring Suppl (ACCU-CHEK CAPO PLUS) w/Device KIT 1 each by Does not apply route daily Test blood sugar 10 times daily Dx code E 10.8 1 kit 10    glucose blood VI test strips (ACCU-CHEK CAPO PLUS) strip 1 each by In Vitro route daily Test blood glucose 10 times daily Dx Code E10.8 300 each 10    GLUCAGON EMERGENCY 1 MG injection INJECT 1MG INTO THE MUSCLE AS NEEDED 1 kit 3    Insulin Syringe-Needle U-100 (B-D INS SYR ULTRAFINE 1CC/31G) 31G X 5/16\" 1 ML MISC INJECT USING INSULIN ONCE DAILY 30 each 10    Multiple Vitamin (DAILY KITTY) TABS TAKE ONE TABLET BY MOUTH DAILY 30 tablet 5    Cholecalciferol (VITAMIN D3) 5000 units CAPS Take 1 capsule by mouth Daily 30 capsule 3    Dextromethorphan-Guaifenesin (MUCINEX DM)  MG TB12 Cough and congestion.  60 tablet 1    omega-3 acid ethyl esters (LOVAZA) 1 G capsule TAKE TWO CAPSULES BY MOUTH TWICE DAILY 120 capsule 5    MUCUS RELIEF DM  MG TABS TAKE ONE BY MOUTH DAILY AS NEEDED 30 tablet 1    Alcohol Swabs PADS Use as needed for insulin injection. 100 each 5    Probiotic Product (ACIDOPHILUS PROBIOTIC) CAPS capsule TAKE ONE CAPSULE BY MOUTH DAILY 28 capsule 4    polyvinyl alcohol (LIQUIFILM TEARS) 1.4 % ophthalmic solution 1 drop as needed      propranolol (INDERAL) 80 MG tablet Take 40 mg by mouth 2 times daily For migraines       Flaxseed, Linseed, (FLAX PO) Take 14 g by mouth daily. No facility-administered medications prior to visit. SOCIAL/FAMILY/PAST MEDICAL HISTORY: Mr. Emily Hall, family and past medical history was reviewed. REVIEW OF SYSTEMS:    Respiratory: Negative for apnea, chest tightness and shortness of breath or change in baseline breathing. Gastrointestinal: Negative for nausea, vomiting, abdominal pain, diarrhea, constipation, blood in stool and abdominal distention. PHYSICAL EXAM:   Nursing note and vitals per patient reviewed. There were no vitals taken for this visit. Constitutional: He appears well-developed and well-nourished. No acute distress. No respiratory distress. Skin: Skin appears to be warm and dry. No rashes or any other marks noted. He is not diaphoretic. Respiratory/Pulmonary: NO conversational dyspnea, no accessory muscle use, no coughing during exam. He does not appear to be in labored breathing. Neurological/Psychiatric:He is alert and oriented to person, place, and time. Coordination is  normal.  His mood isAppropriate and affect is Flat/blunted and Anxious. Musculoskeletal / Extremities: Range of motion is normal. Gait is limp, assistive devices use: cane. IMPRESSION:   1. Chronic pain syndrome    2. Myofascial pain    3. Peripheral polyneuropathy    4. Neuropathic pain    5.  Polyneuropathy associated with underlying disease ([facility] [address]        PLAN:  Informed verbal consent regarding treatment was obtained  -continue with current opioid regimen Butrans along with Ultram 1-2 per day  -he was advised  to avoid using too many OTC analgesics to control the headaches, avoid chocolates, increased caffeine, cheeses and MSG nitrite containing foods, cigarette smoking. To avoid bright lights, strong smells and skipping meals.   -He was advised to increase fluids ( 5-7  glasses of fluid daily), limit caffeine, avoid cheese products, increase dietary fiber, increase activity and exercise as tolerated and relax regularly and enjoy meals   -walking as tolerated   -Monitor blood sugar regularly, diabetic control- adv diabetic diet. Goal for fasting blood sugars around 120. Follow up with Endocrinologist/PCP also for on going management     Current Outpatient Medications   Medication Sig Dispense Refill    LANTUS 100 UNIT/ML injection vial INJECT 32 UNITS UNDER THE SKIN ONCE NIGHTLY 1 vial 0    tiZANidine (ZANAFLEX) 4 MG tablet Take 0.5-1 tablets by mouth 2 times daily 60 tablet 0    topiramate (TOPAMAX) 100 MG tablet TAKE ONE TABLET BY MOUTH TWO TIMES A DAY 60 tablet 1    DULoxetine (CYMBALTA) 60 MG extended release capsule Take 1 capsule by mouth 2 times daily 60 capsule 1    SUMAtriptan (IMITREX) 50 MG tablet Take one tab po at the start of migraine PRN max 1 every 24 hours 9 tablet 0    gabapentin (NEURONTIN) 400 MG capsule Take one tablet Po BID 60 capsule 1    traMADol (ULTRAM) 50 MG tablet Take 1 tablet by mouth every 6 hours as needed for Pain (max 1-2 per day) for up to 28 days. 40 tablet 0    buprenorphine (BUTRANS) 5 MCG/HR PTWK Place 1 patch onto the skin every 7 days for 28 days.  4 patch 0    insulin glulisine (APIDRA) 100 UNIT/ML injection INJECT 8-12 UNITS UNDER THE SKIN THREE TIMES A DAY WITH MEALS 40 mL 3    blood glucose test strips (ACCU-CHEK CAPO PLUS) strip USE ONE STRIP TO TEST THREE TIMES A  strip 4    Accu-Chek FastClix Lancets MISC 1 each by Does not apply route daily Test blood glucose 10 times daily Dx Code E 10.8 900 each 2    magnesium 30 MG tablet for insulin injection. 100 each 5    Probiotic Product (ACIDOPHILUS PROBIOTIC) CAPS capsule TAKE ONE CAPSULE BY MOUTH DAILY 28 capsule 4    polyvinyl alcohol (LIQUIFILM TEARS) 1.4 % ophthalmic solution 1 drop as needed      propranolol (INDERAL) 80 MG tablet Take 40 mg by mouth 2 times daily For migraines       Flaxseed, Linseed, (FLAX PO) Take 14 g by mouth daily. No current facility-administered medications for this visit. I will continue his current medication regimen  which is part of the above treatment schedule. It has been helping with Mr. Christina Villalobos chronic  medical problems which for this visit include:   Diagnoses of Chronic pain syndrome, Myofascial pain, Peripheral polyneuropathy, and Chronic migraine without aura, with intractable migraine, so stated, with status migrainosus were pertinent to this visit. Risks and benefits of the medications and other alternative treatments  including no treatment were discussed with the patient. The common side effects of these medications were also explained to the patient. Informed verbal consent was obtained. Goals of current treatment regimen include improvement in pain, restoration of functioning- with focus on improvement in physical performance, general activity, work or disability,emotional distress, health care utilization and  decreased medication consumption. Will continue to monitor progress towards achieving/maintaining therapeutic goals with special emphasis on  1. Improvement in perceived interfernce  of pain with ADL's. Ability to do home exercises independently. Ability to do household chores indoor and/or outdoor work and social and leisure activities. Improve psychosocial and physical functioning. - he is showing progression towards this treatment goal with the current regimen.      He was advised against drinking alcohol with the narcotic pain medicines, advised against driving or handling machinery while adjusting the dose of medicines or if having cognitive  issues related to the current medications. Risk of overdose and death, if medicines not taken as prescribed, were also discussed. If the patient develops new symptoms or if the symptoms worsen, the patient should call the office. While transcribing every attempt was made to maintain the accuracy of the note in terms of it's contents,there may have been some errors made inadvertently. Thank you for allowing me to participate in the care of this patient.     Sarah Lewis MD.    Cc: Cee Gregory MD

## 2020-10-01 ENCOUNTER — VIRTUAL VISIT (OUTPATIENT)
Dept: PAIN MANAGEMENT | Age: 45
End: 2020-10-01
Payer: MEDICARE

## 2020-10-01 PROCEDURE — 99213 OFFICE O/P EST LOW 20 MIN: CPT | Performed by: INTERNAL MEDICINE

## 2020-10-01 PROCEDURE — G8427 DOCREV CUR MEDS BY ELIG CLIN: HCPCS | Performed by: INTERNAL MEDICINE

## 2020-10-01 RX ORDER — BUPRENORPHINE 5 UG/H
1 PATCH TRANSDERMAL
Qty: 4 PATCH | Refills: 0 | Status: SHIPPED | OUTPATIENT
Start: 2020-10-01 | End: 2020-11-12 | Stop reason: SDUPTHER

## 2020-10-01 RX ORDER — TRAMADOL HYDROCHLORIDE 50 MG/1
50 TABLET ORAL EVERY 6 HOURS PRN
Qty: 30 TABLET | Refills: 0 | Status: SHIPPED | OUTPATIENT
Start: 2020-10-01 | End: 2020-11-12 | Stop reason: SDUPTHER

## 2020-10-01 RX ORDER — GABAPENTIN 400 MG/1
CAPSULE ORAL
Qty: 60 CAPSULE | Refills: 1 | Status: SHIPPED | OUTPATIENT
Start: 2020-10-01 | End: 2020-11-12 | Stop reason: SDUPTHER

## 2020-10-01 RX ORDER — SUMATRIPTAN 50 MG/1
TABLET, FILM COATED ORAL
Qty: 9 TABLET | Refills: 0 | Status: SHIPPED | OUTPATIENT
Start: 2020-10-01 | End: 2020-11-12 | Stop reason: SDUPTHER

## 2020-10-01 RX ORDER — TOPIRAMATE 100 MG/1
TABLET, FILM COATED ORAL
Qty: 60 TABLET | Refills: 1 | Status: SHIPPED | OUTPATIENT
Start: 2020-10-01 | End: 2020-11-12 | Stop reason: SDUPTHER

## 2020-10-01 RX ORDER — DULOXETIN HYDROCHLORIDE 60 MG/1
60 CAPSULE, DELAYED RELEASE ORAL 2 TIMES DAILY
Qty: 60 CAPSULE | Refills: 1 | Status: SHIPPED | OUTPATIENT
Start: 2020-10-01 | End: 2020-11-12 | Stop reason: SDUPTHER

## 2020-10-01 RX ORDER — TIZANIDINE 4 MG/1
2-4 TABLET ORAL 2 TIMES DAILY
Qty: 60 TABLET | Refills: 1 | Status: SHIPPED | OUTPATIENT
Start: 2020-10-01 | End: 2021-01-20 | Stop reason: SDUPTHER

## 2020-10-01 NOTE — PROGRESS NOTES
esters (LOVAZA) 1 G capsule TAKE TWO CAPSULES BY MOUTH TWICE DAILY 120 capsule 5    MUCUS RELIEF DM  MG TABS TAKE ONE BY MOUTH DAILY AS NEEDED 30 tablet 1    Alcohol Swabs PADS Use as needed for insulin injection. 100 each 5    Probiotic Product (ACIDOPHILUS PROBIOTIC) CAPS capsule TAKE ONE CAPSULE BY MOUTH DAILY 28 capsule 4    polyvinyl alcohol (LIQUIFILM TEARS) 1.4 % ophthalmic solution 1 drop as needed      propranolol (INDERAL) 80 MG tablet Take 40 mg by mouth 2 times daily For migraines       Flaxseed, Linseed, (FLAX PO) Take 14 g by mouth daily. No facility-administered medications prior to visit. SOCIAL/FAMILY/PAST MEDICAL HISTORY: Mr. Addy Baca, family and past medical history was reviewed. REVIEW OF SYSTEMS:    Respiratory: Negative for apnea, chest tightness and shortness of breath or change in baseline breathing. Gastrointestinal: Negative for nausea, vomiting, abdominal pain, diarrhea, constipation, blood in stool and abdominal distention. PHYSICAL EXAM:   Nursing note and vitals per patient reviewed. There were no vitals taken for this visit. Constitutional: He appears well-developed and well-nourished. No acute distress. No respiratory distress. Skin: Skin appears to be warm and dry. No rashes or any other marks noted. He is not diaphoretic. Respiratory/Pulmonary: NO conversational dyspnea, no accessory muscle use, no coughing during exam. He does not appear to be in labored breathing. Neurological/Psychiatric:He is alert and oriented to person, place, and time. Coordination is  normal.  His mood isAppropriate and affect is Flat/blunted. Musculoskeletal / Extremities: Range of motion is normal. Gait is normal, assistive devices use: none. IMPRESSION:   1. Chronic pain syndrome    2. Myofascial pain    3. Peripheral polyneuropathy    4. Polyneuropathy associated with underlying disease ([address]    5.  Neuropathic pain        PLAN:  Informed verbal consent regarding treatment was obtained  -continue with current opioid regimen Ultram 1-2 per day along with Butrans  -He was advised to increase fluids ( 5-7  glasses of fluid daily), limit caffeine, avoid cheese products, increase dietary fiber, increase activity and exercise as tolerated and relax regularly and enjoy meals   -he was advised  to avoid using too many OTC analgesics to control the headaches, avoid chocolates, increased caffeine, cheeses and MSG nitrite containing foods, cigarette smoking. To avoid bright lights, strong smells and skipping meals. Continue with Topamax and Zanaflex  -Monitor blood sugar regularly, diabetic control- adv diabetic diet. Goal for fasting blood sugars around 120. Follow up with Endocrinologist/PCP also for on going management       Current Outpatient Medications   Medication Sig Dispense Refill    LANTUS 100 UNIT/ML injection vial INJECT 32 UNITS UNDER THE SKIN ONCE NIGHTLY 1 vial 0    tiZANidine (ZANAFLEX) 4 MG tablet Take 0.5-1 tablets by mouth 2 times daily 60 tablet 1    topiramate (TOPAMAX) 100 MG tablet TAKE ONE TABLET BY MOUTH TWO TIMES A DAY 60 tablet 1    DULoxetine (CYMBALTA) 60 MG extended release capsule Take 1 capsule by mouth 2 times daily 60 capsule 1    SUMAtriptan (IMITREX) 50 MG tablet Take one tab po at the start of migraine PRN max 1 every 24 hours 9 tablet 0    gabapentin (NEURONTIN) 400 MG capsule Take one tablet Po BID 60 capsule 1    traMADol (ULTRAM) 50 MG tablet Take 1 tablet by mouth every 6 hours as needed for Pain (max 1-2 per day) for up to 28 days. 40 tablet 0    buprenorphine (BUTRANS) 5 MCG/HR PTWK Place 1 patch onto the skin every 7 days for 28 days.  4 patch 0    insulin glulisine (APIDRA) 100 UNIT/ML injection INJECT 8-12 UNITS UNDER THE SKIN THREE TIMES A DAY WITH MEALS 40 mL 3    blood glucose test strips (ACCU-CHEK CAPO PLUS) strip USE ONE STRIP TO TEST THREE TIMES A  strip 4    Accu-Chek FastClix Lancets MISC 1 each by Does not apply route daily Test blood glucose 10 times daily Dx Code E 10.8 900 each 2    magnesium 30 MG tablet Take 30 mg by mouth 2 times daily      Insulin Syringe-Needle U-100 31G X 5/16\" 0.3 ML MISC USE ONE SYRINGE - FOUR TIMES A DAY BEFORE MEALS AND ONCE NIGHTLY 120 each 10    LANTUS 100 UNIT/ML injection vial INJECT 30 UNITS UNDER THE SKIN NIGHTLY 1 vial 3    triamcinolone (KENALOG) 0.1 % ointment Apply to thickened affected areas PRN sparingly for flares no longer than 2 weeks at one time, do not apply to cleared skin 80 g 0    Insulin Syringe-Needle U-100 (BD INSULIN SYRINGE U/F) 31G X 5/16\" 0.5 ML MISC Inject 1 each into the skin 4 times daily DX CODE E 10.22 120 each 9    fluticasone (FLONASE) 50 MCG/ACT nasal spray SPRAY TWO SPRAYS IN EACH NOSTRIL DAILY 16 g 4    GLUCAGEN HYPOKIT 1 MG SOLR injection INJECT 1 MG INTO THE SKIN ONCE FOR ONE DOSE 1 each 2    pantoprazole (PROTONIX) 40 MG tablet Take 1 tablet by mouth 2 times daily 60 tablet 0    hydrocortisone 2.5 % cream Apply topically 2 times daily. 30 g 0    Blood Glucose Monitoring Suppl (ACCU-CHEK CAPO PLUS) w/Device KIT 1 each by Does not apply route daily Test blood sugar 10 times daily Dx code E 10.8 1 kit 10    glucose blood VI test strips (ACCU-CHEK CAPO PLUS) strip 1 each by In Vitro route daily Test blood glucose 10 times daily Dx Code E10.8 300 each 10    GLUCAGON EMERGENCY 1 MG injection INJECT 1MG INTO THE MUSCLE AS NEEDED 1 kit 3    Insulin Syringe-Needle U-100 (B-D INS SYR ULTRAFINE 1CC/31G) 31G X 5/16\" 1 ML MISC INJECT USING INSULIN ONCE DAILY 30 each 10    Multiple Vitamin (DAILY KITTY) TABS TAKE ONE TABLET BY MOUTH DAILY 30 tablet 5    Cholecalciferol (VITAMIN D3) 5000 units CAPS Take 1 capsule by mouth Daily 30 capsule 3    Dextromethorphan-Guaifenesin (MUCINEX DM)  MG TB12 Cough and congestion.  60 tablet 1    omega-3 acid ethyl esters (LOVAZA) 1 G capsule TAKE TWO CAPSULES BY MOUTH TWICE DAILY 120 capsule 5    MUCUS RELIEF DM  MG TABS TAKE ONE BY MOUTH DAILY AS NEEDED 30 tablet 1    Alcohol Swabs PADS Use as needed for insulin injection. 100 each 5    Probiotic Product (ACIDOPHILUS PROBIOTIC) CAPS capsule TAKE ONE CAPSULE BY MOUTH DAILY 28 capsule 4    polyvinyl alcohol (LIQUIFILM TEARS) 1.4 % ophthalmic solution 1 drop as needed      propranolol (INDERAL) 80 MG tablet Take 40 mg by mouth 2 times daily For migraines       Flaxseed, Linseed, (FLAX PO) Take 14 g by mouth daily. No current facility-administered medications for this visit. I will continue his current medication regimen  which is part of the above treatment schedule. It has been helping with Mr. Brooklyn Montes chronic  medical problems which for this visit include:   Diagnoses of Chronic pain syndrome, Myofascial pain, Peripheral polyneuropathy, Chronic migraine without aura, with intractable migraine, so stated, with status migrainosus, Polyneuropathy associated with underlying disease ([address] and Neuropathic pain were pertinent to this visit. Risks and benefits of the medications and other alternative treatments  including no treatment were discussed with the patient. The common side effects of these medications were also explained to the patient. Informed verbal consent was obtained. Goals of current treatment regimen include improvement in pain, restoration of functioning- with focus on improvement in physical performance, general activity, work or disability,emotional distress, health care utilization and  decreased medication consumption. Will continue to monitor progress towards achieving/maintaining therapeutic goals with special emphasis on  1. Improvement in perceived interfernce  of pain with ADL's. Ability to do home exercises independently. Ability to do household chores indoor and/or outdoor work and social and leisure activities. Improve psychosocial and physical functioning. - he is showing progression towards this treatment goal with the current regimen. He was advised against drinking alcohol with the narcotic pain medicines, advised against driving or handling machinery while adjusting the dose of medicines or if having cognitive  issues related to the current medications. Risk of overdose and death, if medicines not taken as prescribed, were also discussed. If the patient develops new symptoms or if the symptoms worsen, the patient should call the office. While transcribing every attempt was made to maintain the accuracy of the note in terms of it's contents,there may have been some errors made inadvertently. Thank you for allowing me to participate in the care of this patient.     Anju Plascencia MD.    Cc: Janette Opitz, MD

## 2020-10-02 RX ORDER — INSULIN GLULISINE 100 [IU]/ML
INJECTION, SOLUTION SUBCUTANEOUS
Qty: 40 ML | Refills: 3 | Status: SHIPPED | OUTPATIENT
Start: 2020-10-02 | End: 2021-11-01

## 2020-10-02 NOTE — TELEPHONE ENCOUNTER
Medication:   Requested Prescriptions     Pending Prescriptions Disp Refills    insulin glulisine (APIDRA) 100 UNIT/ML injection 40 mL 3     Sig: INJECT 8-12 UNITS UNDER THE SKIN THREE TIMES A DAY WITH MEALS         Last appt: 6/29/2020   Next appt: 10/29/2020    Last Labs DM:   Lab Results   Component Value Date    LABA1C 10.1 06/29/2020

## 2020-10-12 RX ORDER — INSULIN GLARGINE 100 [IU]/ML
INJECTION, SOLUTION SUBCUTANEOUS
Qty: 1 VIAL | Refills: 0 | Status: SHIPPED | OUTPATIENT
Start: 2020-10-12 | End: 2020-11-09

## 2020-10-28 RX ORDER — IBUPROFEN 600 MG/1
TABLET ORAL
Qty: 1 KIT | Refills: 1 | Status: SHIPPED | OUTPATIENT
Start: 2020-10-28 | End: 2020-11-03

## 2020-11-03 RX ORDER — GLUCAGON 3 MG/1
POWDER NASAL
Qty: 1 EACH | Refills: 2 | Status: SHIPPED | OUTPATIENT
Start: 2020-11-03 | End: 2020-11-03

## 2020-11-03 RX ORDER — GLUCAGON INJECTION, SOLUTION 1 MG/.2ML
INJECTION, SOLUTION SUBCUTANEOUS
Qty: 1 SYRINGE | Refills: 2 | Status: SHIPPED | OUTPATIENT
Start: 2020-11-03 | End: 2021-05-03 | Stop reason: SDUPTHER

## 2020-11-04 ENCOUNTER — TELEPHONE (OUTPATIENT)
Dept: ENDOCRINOLOGY | Age: 45
End: 2020-11-04

## 2020-11-04 NOTE — TELEPHONE ENCOUNTER
Pharmacy wants to check they reg glucagon that wasn't covered then they  received 2 other scripts that they need to clarify all the scripts.

## 2020-11-09 RX ORDER — INSULIN GLARGINE 100 [IU]/ML
INJECTION, SOLUTION SUBCUTANEOUS
Qty: 1 VIAL | Refills: 0 | Status: SHIPPED | OUTPATIENT
Start: 2020-11-09 | End: 2020-12-07

## 2020-11-09 NOTE — TELEPHONE ENCOUNTER
LOV: 6/29/2020    NOV: NONE     DOSE: 32 units    Frequency: QHS    Pharmacy as Pended:     Per LOV Note: Continue  Lantus insulin to 32 units QHS     Per Last PHONE NOTE:     Lab Results   Component Value Date    LABA1C 10.1 06/29/2020

## 2020-11-12 ENCOUNTER — VIRTUAL VISIT (OUTPATIENT)
Dept: PAIN MANAGEMENT | Age: 45
End: 2020-11-12
Payer: MEDICARE

## 2020-11-12 PROCEDURE — 99213 OFFICE O/P EST LOW 20 MIN: CPT | Performed by: INTERNAL MEDICINE

## 2020-11-12 PROCEDURE — G8427 DOCREV CUR MEDS BY ELIG CLIN: HCPCS | Performed by: INTERNAL MEDICINE

## 2020-11-12 RX ORDER — DULOXETIN HYDROCHLORIDE 60 MG/1
60 CAPSULE, DELAYED RELEASE ORAL 2 TIMES DAILY
Qty: 60 CAPSULE | Refills: 1 | Status: SHIPPED | OUTPATIENT
Start: 2020-11-12 | End: 2020-12-15 | Stop reason: SDUPTHER

## 2020-11-12 RX ORDER — SUMATRIPTAN 50 MG/1
TABLET, FILM COATED ORAL
Qty: 9 TABLET | Refills: 0 | Status: SHIPPED | OUTPATIENT
Start: 2020-11-12 | End: 2020-12-15 | Stop reason: SDUPTHER

## 2020-11-12 RX ORDER — TOPIRAMATE 100 MG/1
TABLET, FILM COATED ORAL
Qty: 60 TABLET | Refills: 1 | Status: SHIPPED | OUTPATIENT
Start: 2020-11-12 | End: 2020-12-15 | Stop reason: SDUPTHER

## 2020-11-12 RX ORDER — TRAMADOL HYDROCHLORIDE 50 MG/1
50 TABLET ORAL EVERY 6 HOURS PRN
Qty: 30 TABLET | Refills: 0 | Status: SHIPPED | OUTPATIENT
Start: 2020-11-12 | End: 2020-12-15 | Stop reason: SDUPTHER

## 2020-11-12 RX ORDER — GABAPENTIN 400 MG/1
CAPSULE ORAL
Qty: 60 CAPSULE | Refills: 1 | Status: SHIPPED | OUTPATIENT
Start: 2020-11-12 | End: 2020-12-15 | Stop reason: SDUPTHER

## 2020-11-12 RX ORDER — BUPRENORPHINE 5 UG/H
1 PATCH TRANSDERMAL
Qty: 4 PATCH | Refills: 0 | Status: SHIPPED | OUTPATIENT
Start: 2020-11-12 | End: 2020-12-15 | Stop reason: SDUPTHER

## 2020-11-12 NOTE — PROGRESS NOTES
TELE HEALTH VISIT (AUDIO-VISUAL)    Pursuant to the emergency declaration under the [facility], [facility] waiver authority and the [employer] (?????) and [employer] Act, this Virtual  Visit was conducted, with patient's/legal guardian's consent, to reduce the patient's risk of exposure to COVID-19 and provide continuity of care for an established patient. Service is  provided through a video synchronous discussion virtually to substitute for in-person clinic visit. Due to this being a TeleHealth encounter (During formerly [facility]-05 public health emergency), evaluation of the following organ systems was limited: Vitals/Constitutional/EENT/Resp/CV/GI//MS/Neuro/Skin/Vsii-Qcuu-Oxt. Vanesa Cool  1975  [id number]    Mr. Carson is being seen virtually for a follow up visit using one of the following techniques  Google Duo  Informed verbal consent to the virtual visit was obtained from Mr. Jenny Rangel. Risks associated with HIPPA compliance with the virtual visit was explained to the patient. Mr. Jenny Rangel is at his residence and Dr. Courtney Bueno is in his office. HISTORY OF PRESENT ILLNESS:  Mr. Jenny Rangel is a 39 y.o. male  being assessed for a follow up visit for pain management for evaluation of ongoing care regarding his symptoms and monitoring of compliance with long term use high risk medications. He has a diagnosis of   1. Chronic pain syndrome    2. Myofascial pain    3. Peripheral polyneuropathy    4. Chronic migraine without aura, with intractable migraine, so stated, with status migrainosus    5. Neuropathic pain    6. Polyneuropathy associated with underlying disease ([facility] [address]    . He complains of pain in the head  with radiation to the hips Left, upper leg Left, knees Left, lower leg Left, ankles Left and feet Left . He rates the pain 9/10 and describes it as aching. Current treatment regimen has helped relieve about 10% of the pain.   He denies any side effects from the current pain regimen. Patient reports that since the last follow up visit the physical functioning is unchanged, family/social relationships are unchanged, mood is unchanged sleep patterns are better, and that the overall functioning is unchanged. Patient denies misusing/abusing his narcotic pain medications or using any illegal drugs. There are No indicators for possible drug abuse, addiction or diversion problems.  reports he has been doing fair. He mentions he has missed his appointment again. He says he is using Butrans, had ran out last week. He states he is using Ultram as needed, has a few left. He complains he is having increase headaches. He reports his legs and ankles are hurting more also. He complains of increased pain with changes in weather. Extreme temperatures- cold and damp weather causes increased pain. Annie Dick He says his blood sugar ha been up and down. ALLERGIES: Patients list of allergies were reviewed     MEDICATIONS: Mr. Hiral Shea list of medications were reviewed. His current medications are   Outpatient Medications Prior to Visit   Medication Sig Dispense Refill    LANTUS 100 UNIT/ML injection vial INJECT 32 UNITS UNDER THE SKIN ONCE NIGHTLY 1 vial 0    Glucagon (GVOKE HYPOPEN 1-PACK) 1 MG/0.2ML SOAJ Use as needed for low sugar 1 Syringe 2    insulin glulisine (APIDRA) 100 UNIT/ML injection INJECT 8-12 UNITS UNDER THE SKIN THREE TIMES A DAY WITH MEALS 40 mL 3    topiramate (TOPAMAX) 100 MG tablet TAKE ONE TABLET BY MOUTH TWO TIMES A DAY 60 tablet 1    DULoxetine (CYMBALTA) 60 MG extended release capsule Take 1 capsule by mouth 2 times daily 60 capsule 1    SUMAtriptan (IMITREX) 50 MG tablet Take one tab po at the start of migraine PRN max 1 every 24 hours 9 tablet 0    gabapentin (NEURONTIN) 400 MG capsule Take one tablet Po BID 60 capsule 1    blood glucose test strips (ACCU-CHEK CAPO PLUS) strip USE ONE STRIP TO TEST THREE TIMES A  strip 4    Accu-Chek FastClix Lancets MISC 1 each by Does not apply route daily Test blood glucose 10 times daily Dx Code E 10.8 900 each 2    magnesium 30 MG tablet Take 30 mg by mouth 2 times daily      Insulin Syringe-Needle U-100 31G X 5/16\" 0.3 ML MISC USE ONE SYRINGE - FOUR TIMES A DAY BEFORE MEALS AND ONCE NIGHTLY 120 each 10    LANTUS 100 UNIT/ML injection vial INJECT 30 UNITS UNDER THE SKIN NIGHTLY 1 vial 3    triamcinolone (KENALOG) 0.1 % ointment Apply to thickened affected areas PRN sparingly for flares no longer than 2 weeks at one time, do not apply to cleared skin 80 g 0    Insulin Syringe-Needle U-100 (BD INSULIN SYRINGE U/F) 31G X 5/16\" 0.5 ML MISC Inject 1 each into the skin 4 times daily DX CODE E 10.22 120 each 9    fluticasone (FLONASE) 50 MCG/ACT nasal spray SPRAY TWO SPRAYS IN EACH NOSTRIL DAILY 16 g 4    pantoprazole (PROTONIX) 40 MG tablet Take 1 tablet by mouth 2 times daily 60 tablet 0    hydrocortisone 2.5 % cream Apply topically 2 times daily. 30 g 0    Blood Glucose Monitoring Suppl (ACCU-CHEK CAPO PLUS) w/Device KIT 1 each by Does not apply route daily Test blood sugar 10 times daily Dx code E 10.8 1 kit 10    glucose blood VI test strips (ACCU-CHEK CAPO PLUS) strip 1 each by In Vitro route daily Test blood glucose 10 times daily Dx Code E10.8 300 each 10    GLUCAGON EMERGENCY 1 MG injection INJECT 1MG INTO THE MUSCLE AS NEEDED 1 kit 3    Insulin Syringe-Needle U-100 (B-D INS SYR ULTRAFINE 1CC/31G) 31G X 5/16\" 1 ML MISC INJECT USING INSULIN ONCE DAILY 30 each 10    Multiple Vitamin (DAILY KITTY) TABS TAKE ONE TABLET BY MOUTH DAILY 30 tablet 5    Cholecalciferol (VITAMIN D3) 5000 units CAPS Take 1 capsule by mouth Daily 30 capsule 3    Dextromethorphan-Guaifenesin (MUCINEX DM)  MG TB12 Cough and congestion.  60 tablet 1    omega-3 acid ethyl esters (LOVAZA) 1 G capsule TAKE TWO CAPSULES BY MOUTH TWICE DAILY 120 capsule 5    MUCUS RELIEF DM  MG TABS TAKE ONE BY MOUTH DAILY AS NEEDED 30 tablet 1    Alcohol Swabs PADS Use as needed for insulin injection. 100 each 5    Probiotic Product (ACIDOPHILUS PROBIOTIC) CAPS capsule TAKE ONE CAPSULE BY MOUTH DAILY 28 capsule 4    polyvinyl alcohol (LIQUIFILM TEARS) 1.4 % ophthalmic solution 1 drop as needed      propranolol (INDERAL) 80 MG tablet Take 40 mg by mouth 2 times daily For migraines       Flaxseed, Linseed, (FLAX PO) Take 14 g by mouth daily.  traMADol (ULTRAM) 50 MG tablet Take 1 tablet by mouth every 6 hours as needed for Pain (max 1-2 per day) for up to 28 days. 30 tablet 0     No facility-administered medications prior to visit. SOCIAL/FAMILY/PAST MEDICAL HISTORY: Mr. Maria Del Carmen Machuca, family and past medical history was reviewed. REVIEW OF SYSTEMS:    Respiratory: Negative for apnea, chest tightness and shortness of breath or change in baseline breathing. Gastrointestinal: Negative for nausea, vomiting, abdominal pain, diarrhea, constipation, blood in stool and abdominal distention. PHYSICAL EXAM:   Nursing note and vitals per patient reviewed. There were no vitals taken for this visit. Constitutional: He appears well-developed and well-nourished. No acute distress. No respiratory distress. Skin: Skin appears to be warm and dry. No rashes or any other marks noted. He is not diaphoretic. Respiratory/Pulmonary: NO conversational dyspnea, no accessory muscle use, no coughing during exam. He does not appear to be in labored breathing. Neurological/Psychiatric:He is alert and oriented to person, place, and time. Coordination is  normal.  His mood isAppropriate and affect is Flat/blunted. Musculoskeletal / Extremities: Range of motion is normal. Gait is normal, assistive devices use: cane. IMPRESSION:   1. Chronic pain syndrome    2. Myofascial pain    3. Peripheral polyneuropathy    4. Neuropathic pain    5.  Polyneuropathy associated with underlying disease ([facility] [address]        PLAN:  Informed verbal consent regarding treatment was obtained  -[place] was obtained and reviewed  for the last one year and no indicators of drug misuse  were found. Any other controlled substance prescriptions  seen on the record have been accounted for, I am aware of the patient receiving these medications. Marta Villasenor [place] record will be rechecked as part of office protocol.    -continue with current opioid regimen Butrans along with Ultram PRN  -Monitor blood sugar regularly, diabetic control- adv diabetic diet. Goal for fasting blood sugars around 120.  Follow up with Endocrinologist/PCP also for on going management    -He was advised to increase fluids ( 5-7  glasses of fluid daily), limit caffeine, avoid cheese products, increase dietary fiber, increase activity and exercise as tolerated and relax regularly and enjoy meals      Current Outpatient Medications   Medication Sig Dispense Refill    LANTUS 100 UNIT/ML injection vial INJECT 32 UNITS UNDER THE SKIN ONCE NIGHTLY 1 vial 0    Glucagon (GVOKE HYPOPEN 1-PACK) 1 MG/0.2ML SOAJ Use as needed for low sugar 1 Syringe 2    insulin glulisine (APIDRA) 100 UNIT/ML injection INJECT 8-12 UNITS UNDER THE SKIN THREE TIMES A DAY WITH MEALS 40 mL 3    topiramate (TOPAMAX) 100 MG tablet TAKE ONE TABLET BY MOUTH TWO TIMES A DAY 60 tablet 1    DULoxetine (CYMBALTA) 60 MG extended release capsule Take 1 capsule by mouth 2 times daily 60 capsule 1    SUMAtriptan (IMITREX) 50 MG tablet Take one tab po at the start of migraine PRN max 1 every 24 hours 9 tablet 0    gabapentin (NEURONTIN) 400 MG capsule Take one tablet Po BID 60 capsule 1    blood glucose test strips (ACCU-CHEK CAPO PLUS) strip USE ONE STRIP TO TEST THREE TIMES A  strip 4    Accu-Chek FastClix Lancets MISC 1 each by Does not apply route daily Test blood glucose 10 times daily Dx Code E 10.8 900 each 2    magnesium 30 MG tablet Take 30 mg by mouth 2 times daily      Insulin Syringe-Needle U-100 31G X 5/16\" 0.3 ML MISC USE ONE SYRINGE - FOUR TIMES A DAY BEFORE MEALS AND ONCE NIGHTLY 120 each 10    LANTUS 100 UNIT/ML injection vial INJECT 30 UNITS UNDER THE SKIN NIGHTLY 1 vial 3    triamcinolone (KENALOG) 0.1 % ointment Apply to thickened affected areas PRN sparingly for flares no longer than 2 weeks at one time, do not apply to cleared skin 80 g 0    Insulin Syringe-Needle U-100 (BD INSULIN SYRINGE U/F) 31G X 5/16\" 0.5 ML MISC Inject 1 each into the skin 4 times daily DX CODE E 10.22 120 each 9    fluticasone (FLONASE) 50 MCG/ACT nasal spray SPRAY TWO SPRAYS IN EACH NOSTRIL DAILY 16 g 4    pantoprazole (PROTONIX) 40 MG tablet Take 1 tablet by mouth 2 times daily 60 tablet 0    hydrocortisone 2.5 % cream Apply topically 2 times daily. 30 g 0    Blood Glucose Monitoring Suppl (ACCU-CHEK CAPO PLUS) w/Device KIT 1 each by Does not apply route daily Test blood sugar 10 times daily Dx code E 10.8 1 kit 10    glucose blood VI test strips (ACCU-CHEK CAPO PLUS) strip 1 each by In Vitro route daily Test blood glucose 10 times daily Dx Code E10.8 300 each 10    GLUCAGON EMERGENCY 1 MG injection INJECT 1MG INTO THE MUSCLE AS NEEDED 1 kit 3    Insulin Syringe-Needle U-100 (B-D INS SYR ULTRAFINE 1CC/31G) 31G X 5/16\" 1 ML MISC INJECT USING INSULIN ONCE DAILY 30 each 10    Multiple Vitamin (DAILY KITTY) TABS TAKE ONE TABLET BY MOUTH DAILY 30 tablet 5    Cholecalciferol (VITAMIN D3) 5000 units CAPS Take 1 capsule by mouth Daily 30 capsule 3    Dextromethorphan-Guaifenesin (MUCINEX DM)  MG TB12 Cough and congestion. 60 tablet 1    omega-3 acid ethyl esters (LOVAZA) 1 G capsule TAKE TWO CAPSULES BY MOUTH TWICE DAILY 120 capsule 5    MUCUS RELIEF DM  MG TABS TAKE ONE BY MOUTH DAILY AS NEEDED 30 tablet 1    Alcohol Swabs PADS Use as needed for insulin injection.  100 each 5    Probiotic Product (ACIDOPHILUS PROBIOTIC) CAPS capsule TAKE ONE CAPSULE BY MOUTH DAILY 28 capsule 4    polyvinyl alcohol (LIQUIFILM TEARS) 1.4 % ophthalmic solution 1 drop as needed      propranolol (INDERAL) 80 MG tablet Take 40 mg by mouth 2 times daily For migraines       Flaxseed, Linseed, (FLAX PO) Take 14 g by mouth daily.  traMADol (ULTRAM) 50 MG tablet Take 1 tablet by mouth every 6 hours as needed for Pain (max 1-2 per day) for up to 28 days. 30 tablet 0     No current facility-administered medications for this visit. I will continue his current medication regimen  which is part of the above treatment schedule. It has been helping with Mr. Orquidea Lazar chronic  medical problems which for this visit include:   Diagnoses of Chronic pain syndrome, Myofascial pain, Peripheral polyneuropathy, Chronic migraine without aura, with intractable migraine, so stated, with status migrainosus, Neuropathic pain, and Polyneuropathy associated with underlying disease ([facility] [address] were pertinent to this visit. Risks and benefits of the medications and other alternative treatments  including no treatment were discussed with the patient. The common side effects of these medications were also explained to the patient. Informed verbal consent was obtained. Goals of current treatment regimen include improvement in pain, restoration of functioning- with focus on improvement in physical performance, general activity, work or disability,emotional distress, health care utilization and  decreased medication consumption. Will continue to monitor progress towards achieving/maintaining therapeutic goals with special emphasis on  1. Improvement in perceived interfernce  of pain with ADL's. Ability to do home exercises independently. Ability to do household chores indoor and/or outdoor work and social and leisure activities. Improve psychosocial and physical functioning. - he is showing progression towards this treatment goal with the current regimen.      He was advised against drinking alcohol with the narcotic pain medicines, advised against driving or handling machinery while adjusting the dose of medicines or if having cognitive  issues related to the current medications. Risk of overdose and death, if medicines not taken as prescribed, were also discussed. If the patient develops new symptoms or if the symptoms worsen, the patient should call the office. While transcribing every attempt was made to maintain the accuracy of the note in terms of it's contents,there may have been some errors made inadvertently. Thank you for allowing me to participate in the care of this patient.     Ashwini Diaz MD.    Cc: Nory Keita MD

## 2020-11-17 ENCOUNTER — VIRTUAL VISIT (OUTPATIENT)
Dept: ENDOCRINOLOGY | Age: 45
End: 2020-11-17
Payer: MEDICARE

## 2020-11-17 ENCOUNTER — TELEPHONE (OUTPATIENT)
Dept: ENDOCRINOLOGY | Age: 45
End: 2020-11-17

## 2020-11-17 PROCEDURE — 99441 PR PHYS/QHP TELEPHONE EVALUATION 5-10 MIN: CPT | Performed by: INTERNAL MEDICINE

## 2020-11-17 RX ORDER — BLOOD SUGAR DIAGNOSTIC
1 STRIP MISCELLANEOUS DAILY
Qty: 300 EACH | Refills: 10 | Status: SHIPPED | OUTPATIENT
Start: 2020-11-17

## 2020-11-17 NOTE — TELEPHONE ENCOUNTER
Called pt and l/m to schedule a follow-up in 4 months with Jorge Andino in [place].  FM 11/17/2020 @ 151pm.

## 2020-11-17 NOTE — PROGRESS NOTES
[facility] Endocrinology  Carin Lowry M.D. Phone: [phone]   FAX: Isaac   YOB: 1975    Date of Visit:  11/17/2020    Allergies   Allergen Reactions    Tegaderm Ag Mesh 2\"X2\" [Wound Dressings]      \"Holes in skin from Tegaderm Adhesive\"    Codeine Other (See Comments)     hallucinates    Other Other (See Comments)     Holes in skin  From Tegaderm Adhesive    Tape [Adhesive Tape]      Blister       Outpatient Medications Marked as Taking for the 11/17/20 encounter (Virtual Visit) with Tolu Nathan MD   Medication Sig Dispense Refill    blood glucose test strips (ACCU-CHEK CAPO PLUS) strip 1 each by In Vitro route daily Test blood glucose 10 times daily Dx Code E10.8 300 each 10    topiramate (TOPAMAX) 100 MG tablet TAKE ONE TABLET BY MOUTH TWO TIMES A DAY 60 tablet 1    DULoxetine (CYMBALTA) 60 MG extended release capsule Take 1 capsule by mouth 2 times daily 60 capsule 1    SUMAtriptan (IMITREX) 50 MG tablet Take one tab po at the start of migraine PRN max 1 every 24 hours 9 tablet 0    gabapentin (NEURONTIN) 400 MG capsule Take one tablet Po BID 60 capsule 1    traMADol (ULTRAM) 50 MG tablet Take 1 tablet by mouth every 6 hours as needed for Pain (max 1-2 per day) for up to 28 days. 30 tablet 0    buprenorphine (BUTRANS) 5 MCG/HR PTWK Place 1 patch onto the skin every 7 days for 28 days.  4 patch 0    LANTUS 100 UNIT/ML injection vial INJECT 32 UNITS UNDER THE SKIN ONCE NIGHTLY 1 vial 0    Glucagon (GVOKE HYPOPEN 1-PACK) 1 MG/0.2ML SOAJ Use as needed for low sugar 1 Syringe 2    insulin glulisine (APIDRA) 100 UNIT/ML injection INJECT 8-12 UNITS UNDER THE SKIN THREE TIMES A DAY WITH MEALS 40 mL 3    blood glucose test strips (ACCU-CHEK CAPO PLUS) strip USE ONE STRIP TO TEST THREE TIMES A  strip 4    Accu-Chek FastClix Lancets MISC 1 each by Does not apply route daily Test blood glucose 10 times daily Dx Code E 10.8 900 each 2    magnesium 30 MG tablet Take 30 mg by mouth 2 times daily      Insulin Syringe-Needle U-100 31G X 5/16\" 0.3 ML MISC USE ONE SYRINGE - FOUR TIMES A DAY BEFORE MEALS AND ONCE NIGHTLY 120 each 10    LANTUS 100 UNIT/ML injection vial INJECT 30 UNITS UNDER THE SKIN NIGHTLY 1 vial 3    triamcinolone (KENALOG) 0.1 % ointment Apply to thickened affected areas PRN sparingly for flares no longer than 2 weeks at one time, do not apply to cleared skin 80 g 0    Insulin Syringe-Needle U-100 (BD INSULIN SYRINGE U/F) 31G X 5/16\" 0.5 ML MISC Inject 1 each into the skin 4 times daily DX CODE E 10.22 120 each 9    fluticasone (FLONASE) 50 MCG/ACT nasal spray SPRAY TWO SPRAYS IN EACH NOSTRIL DAILY 16 g 4    pantoprazole (PROTONIX) 40 MG tablet Take 1 tablet by mouth 2 times daily 60 tablet 0    hydrocortisone 2.5 % cream Apply topically 2 times daily. 30 g 0    Blood Glucose Monitoring Suppl (ACCU-CHEK CAPO PLUS) w/Device KIT 1 each by Does not apply route daily Test blood sugar 10 times daily Dx code E 10.8 1 kit 10    GLUCAGON EMERGENCY 1 MG injection INJECT 1MG INTO THE MUSCLE AS NEEDED 1 kit 3    Insulin Syringe-Needle U-100 (B-D INS SYR ULTRAFINE 1CC/31G) 31G X 5/16\" 1 ML MISC INJECT USING INSULIN ONCE DAILY 30 each 10    Multiple Vitamin (DAILY KITTY) TABS TAKE ONE TABLET BY MOUTH DAILY 30 tablet 5    Cholecalciferol (VITAMIN D3) 5000 units CAPS Take 1 capsule by mouth Daily 30 capsule 3    Dextromethorphan-Guaifenesin (MUCINEX DM)  MG TB12 Cough and congestion. 60 tablet 1    omega-3 acid ethyl esters (LOVAZA) 1 G capsule TAKE TWO CAPSULES BY MOUTH TWICE DAILY 120 capsule 5    MUCUS RELIEF DM  MG TABS TAKE ONE BY MOUTH DAILY AS NEEDED 30 tablet 1    Alcohol Swabs PADS Use as needed for insulin injection.  100 each 5    Probiotic Product (ACIDOPHILUS PROBIOTIC) CAPS capsule TAKE ONE CAPSULE BY MOUTH DAILY 28 capsule 4    polyvinyl alcohol (LIQUIFILM TEARS) 1.4 % ophthalmic solution 1 drop as needed      propranolol (INDERAL) 80 MG tablet Take 40 mg by mouth 2 times daily For migraines       Flaxseed, Linseed, (FLAX PO) Take 14 g by mouth daily. There were no vitals filed for this visit. There is no height or weight on file to calculate BMI.      Wt Readings from Last 3 Encounters:   07/09/20 243 lb (110.2 kg)   06/29/20 243 lb (110.2 kg)   06/04/20 230 lb (104.3 kg)     BP Readings from Last 3 Encounters:   07/09/20 133/75   06/29/20 135/72   03/26/20 139/87        Past Medical History:   Diagnosis Date    Acute respiratory failure ([address] 8/18/2014    Asthma     Blood pressure instability     Chronic pain syndrome     Detached retina, bilateral     laser tx right eye    Diabetes mellitus ([facility] [address]     uncontrolled     Insomnia     Meningitis August 2014    admission [facility]    Neuropathic pain     Osteomyelitis ([address]     Osteomyelitis of tibia ([address]     left    Pain of left lower extremity     Peripheral neuropathy      Past Surgical History:   Procedure Laterality Date    ANKLE FRACTURE SURGERY Left 1/28/13    Dr. Bernabe Dsouza  August 2013    left tibia with external fixation device    EYE SURGERY      retina reattachment left eye x 3 holes repairs    EYE SURGERY Right 9-27-13    retal detachment    LEG SURGERY Left 3/31/14    ORIF left fibula and tibia    PA EGD FLEXIBLE FOREIGN BODY REMOVAL N/A 9/21/2018    EGD FOREIGN BODY REMOVAL performed by Cachorro Hidalgo MD at [address] [place] [address]      with external device inplace    UPPER GASTROINTESTINAL ENDOSCOPY N/A 9/21/2018    EGD BIOPSY performed by Cachorro Hidalgo MD at [facility] END[facility]     Family History   Problem Relation Age of Onset    Asthma Other     Diabetes Other     High Blood Pressure Other      Social History     Tobacco Use   Smoking Status Never Smoker   Smokeless Tobacco Never Used      Social History     Substance and Sexual Activity   Alcohol Use No    are variable  From     Not enough sugars available to make changes in his insulin regimen. He says he sleeps all day and eats at different times of the day and not eating proper meals.     -Continue  Lantus insulin  32 units QHS   -Continue same dose of novolog    Had allergic reaction to sensor adhesive [employer])    He does not want to use insulin pump. Updated on eye exam. continue follow-up with the ophthalmologist.  Has microalbuminuria. On Ace-inhibitor. Will repeat. Has peripheral neuropathy on exam. Discussed foot care    Non-smoker. BP at goal.      2. CKD Follows with nephrologist.       3. Hyperlipidemia. LDL is high. He has refused to take statins  He understands the high risk of heart disease and stroke with untreated hyperlipidemia. On fish oil. 5. Foot ulcers. Healed. Managed by podiatry. Dr. Eufemia [facility])      Approximately 8  minutes spent with patient on phone. Will see Geri Richardson in [place] for follow-up.

## 2020-11-17 NOTE — TELEPHONE ENCOUNTER
----- Message from Stephanie Caban MD sent at 11/17/2020 10:11 AM EST -----  Please schedule a follow-up in 4 months with Lamar Baker in [place].

## 2020-12-07 RX ORDER — INSULIN GLARGINE 100 [IU]/ML
INJECTION, SOLUTION SUBCUTANEOUS
Qty: 1 VIAL | Refills: 0 | Status: SHIPPED | OUTPATIENT
Start: 2020-12-07 | End: 2022-01-10

## 2020-12-07 NOTE — TELEPHONE ENCOUNTER
LOV: 11/17/2020  NOV: NONE     DOSE: 32 units     Frequency: QHS    Pharmacy as Pended:     Per LOV Note: Continue  Lantus insulin  32 units QHS     Per Last PHONE NOTE:     Lab Results   Component Value Date    LABA1C 10.1 06/29/2020

## 2020-12-15 ENCOUNTER — TELEPHONE (OUTPATIENT)
Dept: PAIN MANAGEMENT | Age: 45
End: 2020-12-15

## 2020-12-15 ENCOUNTER — VIRTUAL VISIT (OUTPATIENT)
Dept: PAIN MANAGEMENT | Age: 45
End: 2020-12-15
Payer: MEDICARE

## 2020-12-15 PROCEDURE — 99213 OFFICE O/P EST LOW 20 MIN: CPT | Performed by: INTERNAL MEDICINE

## 2020-12-15 PROCEDURE — G8427 DOCREV CUR MEDS BY ELIG CLIN: HCPCS | Performed by: INTERNAL MEDICINE

## 2020-12-15 RX ORDER — SUMATRIPTAN 50 MG/1
TABLET, FILM COATED ORAL
Qty: 9 TABLET | Refills: 0 | Status: SHIPPED | OUTPATIENT
Start: 2020-12-15 | End: 2021-02-17 | Stop reason: SDUPTHER

## 2020-12-15 RX ORDER — DULOXETIN HYDROCHLORIDE 60 MG/1
60 CAPSULE, DELAYED RELEASE ORAL 2 TIMES DAILY
Qty: 60 CAPSULE | Refills: 1 | Status: SHIPPED | OUTPATIENT
Start: 2020-12-15 | End: 2021-02-17 | Stop reason: SDUPTHER

## 2020-12-15 RX ORDER — TOPIRAMATE 100 MG/1
TABLET, FILM COATED ORAL
Qty: 60 TABLET | Refills: 1 | Status: SHIPPED | OUTPATIENT
Start: 2020-12-15 | End: 2021-02-17 | Stop reason: SDUPTHER

## 2020-12-15 RX ORDER — BUPRENORPHINE 5 UG/H
1 PATCH TRANSDERMAL
Qty: 4 PATCH | Refills: 0 | Status: SHIPPED | OUTPATIENT
Start: 2020-12-15 | End: 2021-01-20 | Stop reason: SDUPTHER

## 2020-12-15 RX ORDER — TRAMADOL HYDROCHLORIDE 50 MG/1
50 TABLET ORAL EVERY 6 HOURS PRN
Qty: 30 TABLET | Refills: 0 | Status: SHIPPED | OUTPATIENT
Start: 2020-12-15 | End: 2021-04-28 | Stop reason: SDUPTHER

## 2020-12-15 RX ORDER — GABAPENTIN 400 MG/1
CAPSULE ORAL
Qty: 60 CAPSULE | Refills: 1 | Status: SHIPPED | OUTPATIENT
Start: 2020-12-15 | End: 2021-02-17 | Stop reason: SDUPTHER

## 2020-12-15 NOTE — TELEPHONE ENCOUNTER
Patient called stating he had a vv appt on 12/10/20 @ 4:20. He said his doxy. me wouldn't work. He tried to call the office, but it was after 5pm so the phones were turned off. He said he's never had a problem using DUO. He wants if there is any way he can been seen this week. 1st available appt is 12/30/20.     Please advise [phone]

## 2020-12-15 NOTE — PROGRESS NOTES
TELE HEALTH VISIT (AUDIO-VISUAL)    Pursuant to the emergency declaration under the [facility], [facility] waiver authority and the [employer] Act, this Virtual  Visit was conducted, with patient's/legal guardian's consent, to reduce the patient's risk of exposure to COVID-19 and provide continuity of care for an established patient. Service is  provided through a video synchronous discussion virtually to substitute for in-person clinic visit. Due to this being a TeleHealth encounter (During VWLTP-62 public health emergency), evaluation of the following organ systems was limited: Vitals/Constitutional/EENT/Resp/CV/GI//MS/Neuro/Skin/Wmcv-Dwnu-Nyg. Leandro Hernandez  1975  [id number]    Mr. Carson is being seen virtually for a follow up visit using one of the following techniques  Google Du[employer]  Informed verbal consent to the virtual visit was obtained from Mr. Alba Delgado. Risks associated with HIPPA compliance with the virtual visit was explained to the patient. Mr. Alba Delgado is at his residence and Dr. Sara Coulter is in his office. HISTORY OF PRESENT ILLNESS:  Mr. Alba Delgado is a 39 y.o. male  being assessed for a follow up visit for pain management for evaluation of ongoing care regarding his symptoms and monitoring of compliance with long term use high risk medications. He has a diagnosis of   1. Chronic pain syndrome    2. Myofascial pain    3. Peripheral polyneuropathy    4. Neuropathic pain    5. Chronic migraine without aura, with intractable migraine, so stated, with status migrainosus    6. Ulcer of left foot, with fat layer exposed ([address]    7. Polyneuropathy associated with underlying disease ([address]    8. Constipation due to opioid therapy    . He complains of pain in the head  with radiation to the upper leg Left, knees Left, lower leg Left, ankles Left and feet Left . He rates the pain 10/10 and describes it as throbbing. Current treatment regimen has helped relieve about 10% of the pain. He denies any side effects from the current pain regimen. Patient reports that since the last follow up visit the physical functioning is unchanged, family/social relationships are unchanged, mood is unchanged sleep patterns are better, and that the overall functioning is unchanged. Patient denies misusing/abusing his narcotic pain medications or using any illegal drugs. There are No indicators for possible drug abuse, addiction or diversion problems. Mr. Jenny Rangel states he has been doing fair. Patient states he is having increase headaches. He says he has been out of medications, he missed his appointment. Patient mentions he was using Butrans along with Ultram PRN. He says he is using Topamax, he has been out of it. ALLERGIES: Patients list of allergies were reviewed     MEDICATIONS: Mr. Jenny Rangel list of medications were reviewed. His current medications are   Outpatient Medications Prior to Visit   Medication Sig Dispense Refill    LANTUS 100 UNIT/ML injection vial INJECT 32 UNITS UNDER THE SKIN ONCE NIGHTLY 1 vial 0    blood glucose test strips (ACCU-CHEK CAPO PLUS) strip 1 each by In Vitro route daily Test blood glucose 10 times daily Dx Code E10.8 300 each 10    topiramate (TOPAMAX) 100 MG tablet TAKE ONE TABLET BY MOUTH TWO TIMES A DAY 60 tablet 1    DULoxetine (CYMBALTA) 60 MG extended release capsule Take 1 capsule by mouth 2 times daily 60 capsule 1    SUMAtriptan (IMITREX) 50 MG tablet Take one tab po at the start of migraine PRN max 1 every 24 hours 9 tablet 0    gabapentin (NEURONTIN) 400 MG capsule Take one tablet Po BID 60 capsule 1    Glucagon (GVOKE HYPOPEN 1-PACK) 1 MG/0.2ML SOAJ Use as needed for low sugar 1 Syringe 2  insulin glulisine (APIDRA) 100 UNIT/ML injection INJECT 8-12 UNITS UNDER THE SKIN THREE TIMES A DAY WITH MEALS 40 mL 3    blood glucose test strips (ACCU-CHEK CAPO PLUS) strip USE ONE STRIP TO TEST THREE TIMES A  strip 4    Accu-Chek FastClix Lancets MISC 1 each by Does not apply route daily Test blood glucose 10 times daily Dx Code E 10.8 900 each 2    magnesium 30 MG tablet Take 30 mg by mouth 2 times daily      Insulin Syringe-Needle U-100 31G X 5/16\" 0.3 ML MISC USE ONE SYRINGE - FOUR TIMES A DAY BEFORE MEALS AND ONCE NIGHTLY 120 each 10    LANTUS 100 UNIT/ML injection vial INJECT 30 UNITS UNDER THE SKIN NIGHTLY 1 vial 3    triamcinolone (KENALOG) 0.1 % ointment Apply to thickened affected areas PRN sparingly for flares no longer than 2 weeks at one time, do not apply to cleared skin 80 g 0    Insulin Syringe-Needle U-100 (BD INSULIN SYRINGE U/F) 31G X 5/16\" 0.5 ML MISC Inject 1 each into the skin 4 times daily DX CODE E 10.22 120 each 9    fluticasone (FLONASE) 50 MCG/ACT nasal spray SPRAY TWO SPRAYS IN EACH NOSTRIL DAILY 16 g 4    pantoprazole (PROTONIX) 40 MG tablet Take 1 tablet by mouth 2 times daily 60 tablet 0    hydrocortisone 2.5 % cream Apply topically 2 times daily. 30 g 0    Blood Glucose Monitoring Suppl (ACCU-CHEK CAPO PLUS) w/Device KIT 1 each by Does not apply route daily Test blood sugar 10 times daily Dx code E 10.8 1 kit 10    GLUCAGON EMERGENCY 1 MG injection INJECT 1MG INTO THE MUSCLE AS NEEDED 1 kit 3    Insulin Syringe-Needle U-100 (B-D INS SYR ULTRAFINE 1CC/31G) 31G X 5/16\" 1 ML MISC INJECT USING INSULIN ONCE DAILY 30 each 10    Multiple Vitamin (DAILY KITTY) TABS TAKE ONE TABLET BY MOUTH DAILY 30 tablet 5    Cholecalciferol (VITAMIN D3) 5000 units CAPS Take 1 capsule by mouth Daily 30 capsule 3    Dextromethorphan-Guaifenesin (MUCINEX DM)  MG TB12 Cough and congestion.  60 tablet 1  omega-3 acid ethyl esters (LOVAZA) 1 G capsule TAKE TWO CAPSULES BY MOUTH TWICE DAILY 120 capsule 5    MUCUS RELIEF DM  MG TABS TAKE ONE BY MOUTH DAILY AS NEEDED 30 tablet 1    Alcohol Swabs PADS Use as needed for insulin injection. 100 each 5    Probiotic Product (ACIDOPHILUS PROBIOTIC) CAPS capsule TAKE ONE CAPSULE BY MOUTH DAILY 28 capsule 4    polyvinyl alcohol (LIQUIFILM TEARS) 1.4 % ophthalmic solution 1 drop as needed      propranolol (INDERAL) 80 MG tablet Take 40 mg by mouth 2 times daily For migraines       Flaxseed, Linseed, (FLAX PO) Take 14 g by mouth daily.  traMADol (ULTRAM) 50 MG tablet Take 1 tablet by mouth every 6 hours as needed for Pain (max 1-2 per day) for up to 28 days. 30 tablet 0     No facility-administered medications prior to visit. SOCIAL/FAMILY/PAST MEDICAL HISTORY: Mr. Nayely Driscoll, family and past medical history was reviewed. REVIEW OF SYSTEMS:    Respiratory: Negative for apnea, chest tightness and shortness of breath or change in baseline breathing. Gastrointestinal: Negative for nausea, vomiting, abdominal pain, diarrhea, constipation, blood in stool and abdominal distention. PHYSICAL EXAM:   Nursing note and vitals per patient reviewed. There were no vitals taken for this visit. Constitutional: He appears well-developed and well-nourished. No acute distress. No respiratory distress. Skin: Skin appears to be warm and dry. No rashes or any other marks noted. He is not diaphoretic. Respiratory/Pulmonary: NO conversational dyspnea, no accessory muscle use, no coughing during exam. He does not appear to be in labored breathing. Neurological/Psychiatric:He is alert and oriented to person, place, and time. Coordination is  normal.  His mood isAppropriate and affect is Flat/blunted and Anxious. Musculoskeletal / Extremities: Range of motion is normal. Gait is normal, assistive devices use: none.      IMPRESSION: 1. Chronic pain syndrome    2. Myofascial pain    3. Peripheral polyneuropathy    4. Neuropathic pain    5. Ulcer of left foot, with fat layer exposed ([facility] [address]    6. Polyneuropathy associated with underlying disease ([facility] [address]        PLAN:  Informed verbal consent regarding treatment was obtained  -continue with current opioid regimen Butrans along with Ultram for BTP  -He was advised to increase fluids ( 5-7  glasses of fluid daily), limit caffeine, avoid cheese products, increase dietary fiber, increase activity and exercise as tolerated and relax regularly and enjoy meals   -walking as tolerated   -he was advised  to avoid using too many OTC analgesics to control the headaches, avoid chocolates, increased caffeine, cheeses and MSG nitrite containing foods, cigarette smoking. To avoid bright lights, strong smells and skipping meals.   -Discussed use, benefit, and side effects of prescribed medications. Barriers to medication compliance addressed. All patient questions answered. Pt voiced understanding.      Current Outpatient Medications   Medication Sig Dispense Refill    LANTUS 100 UNIT/ML injection vial INJECT 32 UNITS UNDER THE SKIN ONCE NIGHTLY 1 vial 0    blood glucose test strips (ACCU-CHEK CAPO PLUS) strip 1 each by In Vitro route daily Test blood glucose 10 times daily Dx Code E10.8 300 each 10    topiramate (TOPAMAX) 100 MG tablet TAKE ONE TABLET BY MOUTH TWO TIMES A DAY 60 tablet 1    DULoxetine (CYMBALTA) 60 MG extended release capsule Take 1 capsule by mouth 2 times daily 60 capsule 1    SUMAtriptan (IMITREX) 50 MG tablet Take one tab po at the start of migraine PRN max 1 every 24 hours 9 tablet 0    gabapentin (NEURONTIN) 400 MG capsule Take one tablet Po BID 60 capsule 1    Glucagon (GVOKE HYPOPEN 1-PACK) 1 MG/0.2ML SOAJ Use as needed for low sugar 1 Syringe 2    insulin glulisine (APIDRA) 100 UNIT/ML injection INJECT 8-12 UNITS UNDER THE SKIN THREE TIMES A DAY WITH MEALS 40 mL 3  blood glucose test strips (ACCU-CHEK CAPO PLUS) strip USE ONE STRIP TO TEST THREE TIMES A  strip 4    Accu-Chek FastClix Lancets MISC 1 each by Does not apply route daily Test blood glucose 10 times daily Dx Code E 10.8 900 each 2    magnesium 30 MG tablet Take 30 mg by mouth 2 times daily      Insulin Syringe-Needle U-100 31G X 5/16\" 0.3 ML MISC USE ONE SYRINGE - FOUR TIMES A DAY BEFORE MEALS AND ONCE NIGHTLY 120 each 10    LANTUS 100 UNIT/ML injection vial INJECT 30 UNITS UNDER THE SKIN NIGHTLY 1 vial 3    triamcinolone (KENALOG) 0.1 % ointment Apply to thickened affected areas PRN sparingly for flares no longer than 2 weeks at one time, do not apply to cleared skin 80 g 0    Insulin Syringe-Needle U-100 (BD INSULIN SYRINGE U/F) 31G X 5/16\" 0.5 ML MISC Inject 1 each into the skin 4 times daily DX CODE E 10.22 120 each 9    fluticasone (FLONASE) 50 MCG/ACT nasal spray SPRAY TWO SPRAYS IN EACH NOSTRIL DAILY 16 g 4    pantoprazole (PROTONIX) 40 MG tablet Take 1 tablet by mouth 2 times daily 60 tablet 0    hydrocortisone 2.5 % cream Apply topically 2 times daily. 30 g 0    Blood Glucose Monitoring Suppl (ACCU-CHEK CAPO PLUS) w/Device KIT 1 each by Does not apply route daily Test blood sugar 10 times daily Dx code E 10.8 1 kit 10    GLUCAGON EMERGENCY 1 MG injection INJECT 1MG INTO THE MUSCLE AS NEEDED 1 kit 3    Insulin Syringe-Needle U-100 (B-D INS SYR ULTRAFINE 1CC/31G) 31G X 5/16\" 1 ML MISC INJECT USING INSULIN ONCE DAILY 30 each 10    Multiple Vitamin (DAILY KITTY) TABS TAKE ONE TABLET BY MOUTH DAILY 30 tablet 5    Cholecalciferol (VITAMIN D3) 5000 units CAPS Take 1 capsule by mouth Daily 30 capsule 3    Dextromethorphan-Guaifenesin (MUCINEX DM)  MG TB12 Cough and congestion.  60 tablet 1    omega-3 acid ethyl esters (LOVAZA) 1 G capsule TAKE TWO CAPSULES BY MOUTH TWICE DAILY 120 capsule 5    MUCUS RELIEF DM  MG TABS TAKE ONE BY MOUTH DAILY AS NEEDED 30 tablet 1  Alcohol Swabs PADS Use as needed for insulin injection. 100 each 5    Probiotic Product (ACIDOPHILUS PROBIOTIC) CAPS capsule TAKE ONE CAPSULE BY MOUTH DAILY 28 capsule 4    polyvinyl alcohol (LIQUIFILM TEARS) 1.4 % ophthalmic solution 1 drop as needed      propranolol (INDERAL) 80 MG tablet Take 40 mg by mouth 2 times daily For migraines       Flaxseed, Linseed, (FLAX PO) Take 14 g by mouth daily.  traMADol (ULTRAM) 50 MG tablet Take 1 tablet by mouth every 6 hours as needed for Pain (max 1-2 per day) for up to 28 days. 30 tablet 0     No current facility-administered medications for this visit. I will continue his current medication regimen  which is part of the above treatment schedule. It has been helping with Mr. Brooklyn Monroe chronic  medical problems which for this visit include:   Diagnoses of Chronic pain syndrome, Myofascial pain, Peripheral polyneuropathy, Neuropathic pain, Chronic migraine without aura, with intractable migraine, so stated, with status migrainosus, Ulcer of left foot, with fat layer exposed ([address] Polyneuropathy associated with underlying disease ([address] and Constipation due to opioid therapy were pertinent to this visit. Risks and benefits of the medications and other alternative treatments  including no treatment were discussed with the patient. The common side effects of these medications were also explained to the patient. Informed verbal consent was obtained. Goals of current treatment regimen include improvement in pain, restoration of functioning- with focus on improvement in physical performance, general activity, work or disability,emotional distress, health care utilization and  decreased medication consumption.  Will continue to monitor progress towards achieving/maintaining therapeutic goals with special emphasis on 1. Improvement in perceived interfernce  of pain with ADL's. Ability to do home exercises independently. Ability to do household chores indoor and/or outdoor work and social and leisure activities. Improve psychosocial and physical functioning. - he is showing progression towards this treatment goal with the current regimen. He was advised against drinking alcohol with the narcotic pain medicines, advised against driving or handling machinery while adjusting the dose of medicines or if having cognitive  issues related to the current medications. Risk of overdose and death, if medicines not taken as prescribed, were also discussed. If the patient develops new symptoms or if the symptoms worsen, the patient should call the office. While transcribing every attempt was made to maintain the accuracy of the note in terms of it's contents,there may have been some errors made inadvertently. Thank you for allowing me to participate in the care of this patient.     Bernadine Quintana MD.    Cc: Romayne Smoke, MD

## 2021-01-07 ENCOUNTER — TELEPHONE (OUTPATIENT)
Dept: ENDOCRINOLOGY | Age: 46
End: 2021-01-07

## 2021-01-09 DIAGNOSIS — E10.22 TYPE 1 DIABETES MELLITUS WITH STAGE 3 CHRONIC KIDNEY DISEASE (HCC): ICD-10-CM

## 2021-01-09 DIAGNOSIS — N18.30 TYPE 1 DIABETES MELLITUS WITH STAGE 3 CHRONIC KIDNEY DISEASE (HCC): ICD-10-CM

## 2021-01-11 RX ORDER — PEN NEEDLE, DIABETIC 29 G X1/2"
NEEDLE, DISPOSABLE MISCELLANEOUS
Qty: 120 EACH | Refills: 9 | Status: SHIPPED | OUTPATIENT
Start: 2021-01-11 | End: 2022-05-05 | Stop reason: SDUPTHER

## 2021-01-11 NOTE — TELEPHONE ENCOUNTER
Medication:   Requested Prescriptions     Pending Prescriptions Disp Refills    BD INSULIN SYRINGE U/F 31G X 5/16\" 0.3 ML MISC [Pharmacy Med Name: BD INS SYRNG UF 0.3 ML [id number]] 120 each 9     Sig: USE ONE SYRINGE FOUR TIMES A DAY BEFORE MEALS AND ONCE NIGHTLY         Last appt: 11/17/2020   Next appt: Visit date not found    Last Labs DM:   Lab Results   Component Value Date    LABA1C 10.1 06/29/2020

## 2021-01-14 DIAGNOSIS — N18.30 TYPE 1 DIABETES MELLITUS WITH STAGE 3 CHRONIC KIDNEY DISEASE (HCC): ICD-10-CM

## 2021-01-14 DIAGNOSIS — E10.22 TYPE 1 DIABETES MELLITUS WITH STAGE 3 CHRONIC KIDNEY DISEASE (HCC): ICD-10-CM

## 2021-01-14 RX ORDER — INSULIN GLARGINE 100 [IU]/ML
INJECTION, SOLUTION SUBCUTANEOUS
Qty: 1 VIAL | Refills: 3 | Status: SHIPPED | OUTPATIENT
Start: 2021-01-14 | End: 2021-06-03

## 2021-01-14 NOTE — TELEPHONE ENCOUNTER
LOV  11/17/2020 ac        NOV 2/16/21                Continue  Lantus insulin  32 units QHS, per last office note

## 2021-01-20 ENCOUNTER — VIRTUAL VISIT (OUTPATIENT)
Dept: PAIN MANAGEMENT | Age: 46
End: 2021-01-20

## 2021-01-20 DIAGNOSIS — F51.01 PRIMARY INSOMNIA: ICD-10-CM

## 2021-01-20 DIAGNOSIS — L97.522 ULCER OF LEFT FOOT, WITH FAT LAYER EXPOSED (HCC): ICD-10-CM

## 2021-01-20 DIAGNOSIS — G62.9 PERIPHERAL POLYNEUROPATHY: ICD-10-CM

## 2021-01-20 DIAGNOSIS — M79.18 MYOFASCIAL PAIN: ICD-10-CM

## 2021-01-20 DIAGNOSIS — F33.41 RECURRENT MAJOR DEPRESSIVE DISORDER, IN PARTIAL REMISSION (HCC): ICD-10-CM

## 2021-01-20 DIAGNOSIS — G43.711 CHRONIC MIGRAINE WITHOUT AURA, WITH INTRACTABLE MIGRAINE, SO STATED, WITH STATUS MIGRAINOSUS: ICD-10-CM

## 2021-01-20 DIAGNOSIS — G89.4 CHRONIC PAIN SYNDROME: ICD-10-CM

## 2021-01-20 DIAGNOSIS — K59.03 CONSTIPATION DUE TO OPIOID THERAPY: ICD-10-CM

## 2021-01-20 DIAGNOSIS — T40.2X5A CONSTIPATION DUE TO OPIOID THERAPY: ICD-10-CM

## 2021-01-20 DIAGNOSIS — G63 POLYNEUROPATHY ASSOCIATED WITH UNDERLYING DISEASE (HCC): ICD-10-CM

## 2021-01-20 DIAGNOSIS — M79.2 NEUROPATHIC PAIN: ICD-10-CM

## 2021-01-20 PROCEDURE — 99214 OFFICE O/P EST MOD 30 MIN: CPT | Performed by: INTERNAL MEDICINE

## 2021-01-20 RX ORDER — ERENUMAB-AOOE 70 MG/ML
140 INJECTION SUBCUTANEOUS
Qty: 1 PEN | Refills: 0 | Status: SHIPPED | OUTPATIENT
Start: 2021-01-20 | End: 2021-02-17 | Stop reason: SDUPTHER

## 2021-01-20 RX ORDER — BUPRENORPHINE 5 UG/H
1 PATCH TRANSDERMAL
Qty: 4 PATCH | Refills: 0 | Status: SHIPPED | OUTPATIENT
Start: 2021-01-20 | End: 2021-02-17 | Stop reason: SDUPTHER

## 2021-01-20 RX ORDER — TIZANIDINE 4 MG/1
2-4 TABLET ORAL 2 TIMES DAILY
Qty: 60 TABLET | Refills: 1 | Status: SHIPPED | OUTPATIENT
Start: 2021-01-20 | End: 2021-02-17 | Stop reason: SDUPTHER

## 2021-01-20 NOTE — PROGRESS NOTES
TELE HEALTH VISIT (AUDIO-VISUAL)    Pursuant to the emergency declaration under the [facility], [facility] waiver authority and the [email or web address] Act, this Virtual  Visit was conducted, with patient's/legal guardian's consent, to reduce the patient's risk of exposure to COVID-19 and provide continuity of care for an established patient. Service is  provided through a video synchronous discussion virtually to substitute for in-person clinic visit. Due to this being a TeleHealth encounter (During ETC-62 public health emergency), evaluation of the following organ systems was limited: Vitals/Constitutional/EENT/Resp/CV/GI//MS/Neuro/Skin/Qkbd-Augk-Mhs. Scotty Servin  1975  [id number]    Mr. Carson is being seen virtually for a follow up visit using one of the following techniques  Google Duo     Informed verbal consent to the virtual visit was obtained from Mr. Papito Jackman. Risks associated with HIPPA compliance with the virtual visit was explained to the patient. Mr. Papito Jackman is at his residence and Dr. Fina Godinez is in his office. HISTORY OF PRESENT ILLNESS:  Mr. Papito Jackman is a 39 y.o. male returns for a follow up visit for multiple medical problems. His current presenting problems are   1. Chronic pain syndrome    2. Myofascial pain    3. Peripheral polyneuropathy    4. Neuropathic pain    5. Polyneuropathy associated with underlying disease ([address]    6. Chronic migraine without aura, with intractable migraine, so stated, with status migrainosus    7. Ulcer of left foot, with fat layer exposed ([address]    8. Constipation due to opioid therapy    . As per information/history obtained from the PADT(patient assessment and documentation tool) - He complains of pain in the ankles Left with radiation to the lower leg Left He rates the pain 9/10 and describes it as sharp, throbbing. Pain is made worse by: standing. Current treatment regimen has helped relieve about 10% of the pain. He has insomnia side effects from the current pain regimen. Patient reports that since the last follow up visit the physical functioning is unchanged, family/social relationships are unchanged, mood is better and sleep patterns are better, and that the overall functioning is unchanged. Patient denies neurological bowel or bladder. Patient denies misusing/abusing his narcotic pain medications or using any illegal drugs. There are No indicators for possible drug abuse, addiction or diversion problems. Upon obtaining the medical history from Mr. Lorenzo Newby regarding today's office visit for his presenting problems, Mr. Lorenzo Newby states he is having increase headaches, \"migraines,\" he states he is using Topamax and Imitrex which is helping some. He mentions he is using Zanaflex also. Patient states he is using Butrans along with Tramadol PRN. Patient states his sleep is fair. Has fairly normal sleep latency. Averages about 4-6 hours of sleep a night. Denies any signs of sleep apnea. Feels somewhat rested in the morning. Patient's  subjective report of his mood is fair. he describes occasional symptoms of depression, occasional  irritability and some mood swings. Describes his mood as being neutral and reports some pleasure in his daily activities. Reports  fair  appetite, energy and concentration. Able to function well in different aspects of his daily activities. Denies suicidal or homicidal ideation.  Denies any complaints of increased tension, does   Worry sometimes and occasional  irritability he denies any c/o increased anxiety, No c/o panic attacks or symptoms of PTSD, he is using Cymbalta 60 mg. He says he has been walking some daily. Patient reports her sugar has been ok. ALLERGIES/PAST MED/FAM/SOC HISTORY: Mr. Florinda Medina allergies, past medical, family and social history were reviewed in the chart.       Mr. Florinda Medina current medications are   Outpatient Medications Prior to Visit   Medication Sig Dispense Refill    LANTUS 100 UNIT/ML injection vial INJECT 30 UNITS UNDER THE SKIN NIGHTLY 1 vial 3    dicloxacillin (DYNAPEN) 500 MG capsule TAKE ONE CAPSULE BY MOUTH TWICE A DAY 60 capsule 4    BD INSULIN SYRINGE U/F 31G X 5/16\" 0.3 ML MISC USE ONE SYRINGE FOUR TIMES A DAY BEFORE MEALS AND ONCE NIGHTLY 120 each 9    topiramate (TOPAMAX) 100 MG tablet TAKE ONE TABLET BY MOUTH TWO TIMES A DAY 60 tablet 1    DULoxetine (CYMBALTA) 60 MG extended release capsule Take 1 capsule by mouth 2 times daily 60 capsule 1    SUMAtriptan (IMITREX) 50 MG tablet Take one tab po at the start of migraine PRN max 1 every 24 hours 9 tablet 0    gabapentin (NEURONTIN) 400 MG capsule Take one tablet Po BID 60 capsule 1    LANTUS 100 UNIT/ML injection vial INJECT 32 UNITS UNDER THE SKIN ONCE NIGHTLY 1 vial 0    blood glucose test strips (ACCU-CHEK CAPO PLUS) strip 1 each by In Vitro route daily Test blood glucose 10 times daily Dx Code E10.8 300 each 10    Glucagon (GVOKE HYPOPEN 1-PACK) 1 MG/0.2ML SOAJ Use as needed for low sugar 1 Syringe 2    insulin glulisine (APIDRA) 100 UNIT/ML injection INJECT 8-12 UNITS UNDER THE SKIN THREE TIMES A DAY WITH MEALS 40 mL 3    blood glucose test strips (ACCU-CHEK CAPO PLUS) strip USE ONE STRIP TO TEST THREE TIMES A  strip 4    Accu-Chek FastClix Lancets MISC 1 each by Does not apply route daily Test blood glucose 10 times daily Dx Code E 10.8 900 each 2    magnesium 30 MG tablet Take 30 mg by mouth 2 times daily  triamcinolone (KENALOG) 0.1 % ointment Apply to thickened affected areas PRN sparingly for flares no longer than 2 weeks at one time, do not apply to cleared skin 80 g 0    Insulin Syringe-Needle U-100 (BD INSULIN SYRINGE U/F) 31G X 5/16\" 0.5 ML MISC Inject 1 each into the skin 4 times daily DX CODE E 10.22 120 each 9    fluticasone (FLONASE) 50 MCG/ACT nasal spray SPRAY TWO SPRAYS IN EACH NOSTRIL DAILY 16 g 4    pantoprazole (PROTONIX) 40 MG tablet Take 1 tablet by mouth 2 times daily 60 tablet 0    hydrocortisone 2.5 % cream Apply topically 2 times daily. 30 g 0    Blood Glucose Monitoring Suppl (ACCU-CHEK CAPO PLUS) w/Device KIT 1 each by Does not apply route daily Test blood sugar 10 times daily Dx code E 10.8 1 kit 10    GLUCAGON EMERGENCY 1 MG injection INJECT 1MG INTO THE MUSCLE AS NEEDED 1 kit 3    Insulin Syringe-Needle U-100 (B-D INS SYR ULTRAFINE 1CC/31G) 31G X 5/16\" 1 ML MISC INJECT USING INSULIN ONCE DAILY 30 each 10    Multiple Vitamin (DAILY KITTY) TABS TAKE ONE TABLET BY MOUTH DAILY 30 tablet 5    Cholecalciferol (VITAMIN D3) 5000 units CAPS Take 1 capsule by mouth Daily 30 capsule 3    Dextromethorphan-Guaifenesin (MUCINEX DM)  MG TB12 Cough and congestion. 60 tablet 1    omega-3 acid ethyl esters (LOVAZA) 1 G capsule TAKE TWO CAPSULES BY MOUTH TWICE DAILY 120 capsule 5    MUCUS RELIEF DM  MG TABS TAKE ONE BY MOUTH DAILY AS NEEDED 30 tablet 1    Alcohol Swabs PADS Use as needed for insulin injection. 100 each 5    Probiotic Product (ACIDOPHILUS PROBIOTIC) CAPS capsule TAKE ONE CAPSULE BY MOUTH DAILY 28 capsule 4    polyvinyl alcohol (LIQUIFILM TEARS) 1.4 % ophthalmic solution 1 drop as needed      propranolol (INDERAL) 80 MG tablet Take 40 mg by mouth 2 times daily For migraines       Flaxseed, Linseed, (FLAX PO) Take 14 g by mouth daily. 2. Primary insomnia - INADEQUATELY CONTROLLED: treatment plan adjusted as below    3. Chronic pain syndrome - INADEQUATELY CONTROLLED: treatment plan adjusted as below    4. Myofascial pain - STABLE: Continue current treatment plan    5. Peripheral polyneuropathy - STABLE: Continue current treatment plan    6. Polyneuropathy associated with underlying disease ([facility] - STABLE: Continue current treatment plan    7. Ulcer of left foot, with fat layer exposed ([facility] - STABLE: Continue current treatment plan    8. Constipation due to opioid therapy - STABLE: Continue current treatment plan    9. Recurrent major depressive disorder, in partial remission ([facility] - STABLE: Continue current treatment plan    10. Neuropathic pain - STABLE: Continue current treatment plan        PLAN:  Informed verbal consent was obtained.  -continue with current opioid regimen, Butrans along with Ultram 1-2 PRN  -restart Zanaflex 1-2 per day PRN  -he was advised proper sleep hygiene-told to avoid:use of caffeine or other stimulants after noon, alcohol use near bedtime, long or frequent naps during the day, erratic sleep schedule, heavy meals near bedtime, vigorous exercise near bedtime and use of electronic devices near bedtime   -start Aimovig 140 S/C monthly for headaches  -he was advised  to avoid using too many OTC analgesics to control the headaches, avoid chocolates, increased caffeine, cheeses and MSG nitrite containing foods, cigarette smoking. To avoid bright lights, strong smells and skipping meals.   -continue with Topamax and Imitrex   -Adv Biofeedback, relaxation and meditation techniques. Referral to psychologist for CBT was also discussed with patient   -Monitor blood sugar regularly, diabetic control- adv diabetic diet. Goal for fasting blood sugars around 120.  Follow up with Endocrinologist/PCP also for on going management Mr. Willian Lyon will be prescribed  the medications  listed below which are for treatment of his presenting  medical problems which for this visit include:   Diagnoses of Chronic pain syndrome, Myofascial pain, Peripheral polyneuropathy, Neuropathic pain, Polyneuropathy associated with underlying disease ([address] Chronic migraine without aura, with intractable migraine, so stated, with status migrainosus, Ulcer of left foot, with fat layer exposed ([address] and Constipation due to opioid therapy were pertinent to this visit.   Medications/orders associated with this visit:    Current Outpatient Medications   Medication Sig Dispense Refill    LANTUS 100 UNIT/ML injection vial INJECT 30 UNITS UNDER THE SKIN NIGHTLY 1 vial 3    dicloxacillin (DYNAPEN) 500 MG capsule TAKE ONE CAPSULE BY MOUTH TWICE A DAY 60 capsule 4    BD INSULIN SYRINGE U/F 31G X 5/16\" 0.3 ML MISC USE ONE SYRINGE FOUR TIMES A DAY BEFORE MEALS AND ONCE NIGHTLY 120 each 9    topiramate (TOPAMAX) 100 MG tablet TAKE ONE TABLET BY MOUTH TWO TIMES A DAY 60 tablet 1    DULoxetine (CYMBALTA) 60 MG extended release capsule Take 1 capsule by mouth 2 times daily 60 capsule 1    SUMAtriptan (IMITREX) 50 MG tablet Take one tab po at the start of migraine PRN max 1 every 24 hours 9 tablet 0    gabapentin (NEURONTIN) 400 MG capsule Take one tablet Po BID 60 capsule 1    LANTUS 100 UNIT/ML injection vial INJECT 32 UNITS UNDER THE SKIN ONCE NIGHTLY 1 vial 0    blood glucose test strips (ACCU-CHEK CAPO PLUS) strip 1 each by In Vitro route daily Test blood glucose 10 times daily Dx Code E10.8 300 each 10    Glucagon (GVOKE HYPOPEN 1-PACK) 1 MG/0.2ML SOAJ Use as needed for low sugar 1 Syringe 2    insulin glulisine (APIDRA) 100 UNIT/ML injection INJECT 8-12 UNITS UNDER THE SKIN THREE TIMES A DAY WITH MEALS 40 mL 3    blood glucose test strips (ACCU-CHEK CAPO PLUS) strip USE ONE STRIP TO TEST THREE TIMES A  strip 4  Accu-Chek FastClix Lancets MISC 1 each by Does not apply route daily Test blood glucose 10 times daily Dx Code E 10.8 900 each 2    magnesium 30 MG tablet Take 30 mg by mouth 2 times daily      triamcinolone (KENALOG) 0.1 % ointment Apply to thickened affected areas PRN sparingly for flares no longer than 2 weeks at one time, do not apply to cleared skin 80 g 0    Insulin Syringe-Needle U-100 (BD INSULIN SYRINGE U/F) 31G X 5/16\" 0.5 ML MISC Inject 1 each into the skin 4 times daily DX CODE E 10.22 120 each 9    fluticasone (FLONASE) 50 MCG/ACT nasal spray SPRAY TWO SPRAYS IN EACH NOSTRIL DAILY 16 g 4    pantoprazole (PROTONIX) 40 MG tablet Take 1 tablet by mouth 2 times daily 60 tablet 0    hydrocortisone 2.5 % cream Apply topically 2 times daily. 30 g 0    Blood Glucose Monitoring Suppl (ACCU-CHEK CAPO PLUS) w/Device KIT 1 each by Does not apply route daily Test blood sugar 10 times daily Dx code E 10.8 1 kit 10    GLUCAGON EMERGENCY 1 MG injection INJECT 1MG INTO THE MUSCLE AS NEEDED 1 kit 3    Insulin Syringe-Needle U-100 (B-D INS SYR ULTRAFINE 1CC/31G) 31G X 5/16\" 1 ML MISC INJECT USING INSULIN ONCE DAILY 30 each 10    Multiple Vitamin (DAILY KITTY) TABS TAKE ONE TABLET BY MOUTH DAILY 30 tablet 5    Cholecalciferol (VITAMIN D3) 5000 units CAPS Take 1 capsule by mouth Daily 30 capsule 3    Dextromethorphan-Guaifenesin (MUCINEX DM)  MG TB12 Cough and congestion. 60 tablet 1    omega-3 acid ethyl esters (LOVAZA) 1 G capsule TAKE TWO CAPSULES BY MOUTH TWICE DAILY 120 capsule 5    MUCUS RELIEF DM  MG TABS TAKE ONE BY MOUTH DAILY AS NEEDED 30 tablet 1    Alcohol Swabs PADS Use as needed for insulin injection.  100 each 5    Probiotic Product (ACIDOPHILUS PROBIOTIC) CAPS capsule TAKE ONE CAPSULE BY MOUTH DAILY 28 capsule 4    polyvinyl alcohol (LIQUIFILM TEARS) 1.4 % ophthalmic solution 1 drop as needed  propranolol (INDERAL) 80 MG tablet Take 40 mg by mouth 2 times daily For migraines       Flaxseed, Linseed, (FLAX PO) Take 14 g by mouth daily.  traMADol (ULTRAM) 50 MG tablet Take 1 tablet by mouth every 6 hours as needed for Pain (max 1-2 per day) for up to 28 days. 30 tablet 0     No current facility-administered medications for this visit. Goals of current treatment regimen include improvement in pain, restoration of functioning- with focus on improvement in physical performance, general activity, work or disability,emotional distress, health care utilization and  decreased medication consumption. Will continue to monitor progress towards achieving/maintaining therapeutic goals with special emphasis on  1. Improvement in perceived interfernce  of pain with ADL's. Ability to do home exercises independently. Ability to do household chores indoor and/or outdoor work and social and leisure activities. To increase flexibility/ROM, strength and endurance. Improve psychosocial and physical functioning.- he is not showing any significant progress/or showing regression  towards this goal and reassessment and adjustment of goals/treatment have been made. 2. Improving sleep to 6-7 hours a night. Improve mood/ anxiety and depression symptoms such as crying spells, low energy, problems with concentration, motivation.- he is not showing any significant progress/or showing regression  towards this goal and reassessment and adjustment of goals/treatment have been made. 3. Reduction of reliance on opioid analgesia/more appropriate opioid use. - he is showing progression towards this treatment goal with the current regimen. Risks and benefits of the medications and other alternative treatments have been/were  discussed with the patient. Any questions on the  common side effects of these medications were also answered. He was advised against drinking alcohol with the narcotic pain medicines, advised against driving or handling machinery when  starting or adjusting the dose of medicines, feeling groggy or drowsy, or if having any cognitive issues related to the current medications. Heis fully aware of the risk of overdose and death, if medicines are misused and not taken as prescribed. If he develops new symptoms or if the symptoms worsen, he was told to call the office. .  Thank you for allowing me to participate in the care of this patient.     Lydia Mercedes MD.    Cc: Zakiya Meadows MD

## 2021-01-25 ENCOUNTER — TELEPHONE (OUTPATIENT)
Dept: PAIN MANAGEMENT | Age: 46
End: 2021-01-25

## 2021-01-25 NOTE — TELEPHONE ENCOUNTER
Tried to reach patient to get insurance info but phone does not have a Voicemail being full. Please advise.

## 2021-01-26 NOTE — TELEPHONE ENCOUNTER
Tried to reach patient again and was unable to reach patient do to VM box being full. Please advise.

## 2021-02-03 ENCOUNTER — PATIENT MESSAGE (OUTPATIENT)
Dept: CARE COORDINATION | Age: 46
End: 2021-02-03

## 2021-02-04 ENCOUNTER — TELEPHONE (OUTPATIENT)
Dept: PAIN MANAGEMENT | Age: 46
End: 2021-02-04

## 2021-02-04 NOTE — TELEPHONE ENCOUNTER
Submitted PA for [employer] PATCH  Via CMM Key: [id number]  STATUS: \"Approved, Coverage Starts on: 2/1/2021 12:00:00 AM, Coverage Ends on: 2/4/2022\"    The patient was notified by phone.

## 2021-02-17 ENCOUNTER — VIRTUAL VISIT (OUTPATIENT)
Dept: PAIN MANAGEMENT | Age: 46
End: 2021-02-17
Payer: MEDICARE

## 2021-02-17 DIAGNOSIS — L97.522 ULCER OF LEFT FOOT, WITH FAT LAYER EXPOSED (HCC): ICD-10-CM

## 2021-02-17 DIAGNOSIS — G63 POLYNEUROPATHY ASSOCIATED WITH UNDERLYING DISEASE (HCC): ICD-10-CM

## 2021-02-17 DIAGNOSIS — G62.9 PERIPHERAL POLYNEUROPATHY: ICD-10-CM

## 2021-02-17 DIAGNOSIS — M79.2 NEUROPATHIC PAIN: ICD-10-CM

## 2021-02-17 DIAGNOSIS — M80.862S PATHOLOGICAL FRACTURE OF LEFT TIBIA DUE TO OTHER OSTEOPOROSIS, SEQUELA: ICD-10-CM

## 2021-02-17 DIAGNOSIS — M79.18 MYOFASCIAL PAIN: ICD-10-CM

## 2021-02-17 DIAGNOSIS — G89.4 CHRONIC PAIN SYNDROME: ICD-10-CM

## 2021-02-17 PROCEDURE — 99213 OFFICE O/P EST LOW 20 MIN: CPT | Performed by: INTERNAL MEDICINE

## 2021-02-17 RX ORDER — TOPIRAMATE 100 MG/1
TABLET, FILM COATED ORAL
Qty: 60 TABLET | Refills: 1 | Status: SHIPPED | OUTPATIENT
Start: 2021-02-17 | End: 2021-04-28 | Stop reason: SDUPTHER

## 2021-02-17 RX ORDER — SUMATRIPTAN 50 MG/1
TABLET, FILM COATED ORAL
Qty: 9 TABLET | Refills: 0 | Status: SHIPPED | OUTPATIENT
Start: 2021-02-17 | End: 2021-04-28 | Stop reason: SDUPTHER

## 2021-02-17 RX ORDER — GABAPENTIN 400 MG/1
CAPSULE ORAL
Qty: 60 CAPSULE | Refills: 1 | Status: SHIPPED | OUTPATIENT
Start: 2021-02-17 | End: 2021-04-28 | Stop reason: SDUPTHER

## 2021-02-17 RX ORDER — DULOXETIN HYDROCHLORIDE 60 MG/1
60 CAPSULE, DELAYED RELEASE ORAL 2 TIMES DAILY
Qty: 60 CAPSULE | Refills: 1 | Status: SHIPPED | OUTPATIENT
Start: 2021-02-17 | End: 2021-04-28 | Stop reason: SDUPTHER

## 2021-02-17 RX ORDER — ERENUMAB-AOOE 70 MG/ML
140 INJECTION SUBCUTANEOUS
Qty: 1 PEN | Refills: 1 | Status: SHIPPED | OUTPATIENT
Start: 2021-02-17 | End: 2021-03-17 | Stop reason: SDUPTHER

## 2021-02-17 RX ORDER — TIZANIDINE 4 MG/1
2-4 TABLET ORAL 2 TIMES DAILY
Qty: 60 TABLET | Refills: 1 | Status: SHIPPED | OUTPATIENT
Start: 2021-02-17 | End: 2021-03-17 | Stop reason: SDUPTHER

## 2021-02-17 RX ORDER — BUPRENORPHINE 5 UG/H
1 PATCH TRANSDERMAL
Qty: 4 PATCH | Refills: 0 | Status: SHIPPED | OUTPATIENT
Start: 2021-02-17 | End: 2021-03-17 | Stop reason: SDUPTHER

## 2021-02-17 NOTE — PROGRESS NOTES
TELE HEALTH VISIT (AUDIO-VISUAL)    Pursuant to the emergency declaration under the [facility] waiver authority and the [employer] Act, this Virtual  Visit was conducted, with patient's/legal guardian's consent, to reduce the patient's risk of exposure to COVID-19 and provide continuity of care for an established patient. Service is  provided through a video synchronous discussion virtually to substitute for in-person clinic visit. Due to this being a TeleHealth encounter (During GNLQG-23 public health emergency), evaluation of the following organ systems was limited: Vitals/Constitutional/EENT/Resp/CV/GI//MS/Neuro/Skin/Qpro-Utez-Eei. Manjula No  1975  [id number]    Mr. Carson is being seen virtually for a follow up visit using one of the following techniques  Google Duo Face time Doxy. me  Informed verbal consent to the virtual visit was obtained from Mr. Margareth Mccartney. Risks associated with HIPPA compliance with the virtual visit was explained to the patient. Mr. Margareth Mccartney is at his residence and Dr. Ely Mederos is in his office. HISTORY OF PRESENT ILLNESS:  Mr. Margareth Mccartney is a 39 y.o. male returns for a follow up visit for multiple medical problems. His current presenting problems are   1. Chronic pain syndrome    2. Myofascial pain    3. Ulcer of left foot, with fat layer exposed ([address]    4. Neuropathic pain    5. Chronic migraine without aura, with intractable migraine, so stated, with status migrainosus    6. Pathological fracture of left tibia due to other osteoporosis, sequela    . As per information/history obtained from the PADT(patient assessment and documentation tool) - He complains of pain in the head and knees Left with radiation to the NA He rates the pain 9/10 and describes it as sharp, burning. Pain is made worse by: standing. Current treatment regimen has helped relieve about 10% of the pain. He denies side effects from the current pain regimen. Patient reports that since the last follow up visit the physical functioning is unchanged, family/social relationships are unchanged, mood is unchanged and sleep patterns are better, and that the overall functioning is unchanged. Patient denies neurological bowel or bladder. Patient denies misusing/abusing his narcotic pain medications or using any illegal drugs. There are No indicators for possible drug abuse, addiction or diversion problems. Upon obtaining the medical history from Mr. Roverto Dixon regarding today's office visit for his presenting problems.  reports he is back on all his medications, and they are helping with the pain. He mentions he is getting some headaches. He states he is unable to start [facility]. He says she is using Butrans along with Ultram as needed. He denies any constipation symptoms. ALLERGIES: Patients list of allergies were reviewed     MEDICATIONS: Mr. Roverto Dixon list of medications were reviewed. His current medications are   Outpatient Medications Prior to Visit   Medication Sig Dispense Refill    buprenorphine (BUTRANS) 5 MCG/HR PTWK Place 1 patch onto the skin every 7 days for 28 days.  4 patch 0    tiZANidine (ZANAFLEX) 4 MG tablet Take 0.5-1 tablets by mouth 2 times daily 60 tablet 1    Erenumab-aooe (AIMOVIG, 140 MG DOSE,) 70 MG/ML SOAJ Inject 140 mLs into the skin every 30 days 1 pen 0    LANTUS 100 UNIT/ML injection vial INJECT 30 UNITS UNDER THE SKIN NIGHTLY 1 vial 3    dicloxacillin (DYNAPEN) 500 MG capsule TAKE ONE CAPSULE BY MOUTH TWICE A DAY 60 capsule 4  BD INSULIN SYRINGE U/F 31G X 5/16\" 0.3 ML MISC USE ONE SYRINGE FOUR TIMES A DAY BEFORE MEALS AND ONCE NIGHTLY 120 each 9    topiramate (TOPAMAX) 100 MG tablet TAKE ONE TABLET BY MOUTH TWO TIMES A DAY 60 tablet 1    DULoxetine (CYMBALTA) 60 MG extended release capsule Take 1 capsule by mouth 2 times daily 60 capsule 1    SUMAtriptan (IMITREX) 50 MG tablet Take one tab po at the start of migraine PRN max 1 every 24 hours 9 tablet 0    LANTUS 100 UNIT/ML injection vial INJECT 32 UNITS UNDER THE SKIN ONCE NIGHTLY 1 vial 0    blood glucose test strips (ACCU-CHEK CAPO PLUS) strip 1 each by In Vitro route daily Test blood glucose 10 times daily Dx Code E10.8 300 each 10    Glucagon (GVOKE HYPOPEN 1-PACK) 1 MG/0.2ML SOAJ Use as needed for low sugar 1 Syringe 2    insulin glulisine (APIDRA) 100 UNIT/ML injection INJECT 8-12 UNITS UNDER THE SKIN THREE TIMES A DAY WITH MEALS 40 mL 3    blood glucose test strips (ACCU-CHEK CAPO PLUS) strip USE ONE STRIP TO TEST THREE TIMES A  strip 4    Accu-Chek FastClix Lancets MISC 1 each by Does not apply route daily Test blood glucose 10 times daily Dx Code E 10.8 900 each 2    magnesium 30 MG tablet Take 30 mg by mouth 2 times daily      triamcinolone (KENALOG) 0.1 % ointment Apply to thickened affected areas PRN sparingly for flares no longer than 2 weeks at one time, do not apply to cleared skin 80 g 0    Insulin Syringe-Needle U-100 (BD INSULIN SYRINGE U/F) 31G X 5/16\" 0.5 ML MISC Inject 1 each into the skin 4 times daily DX CODE E 10.22 120 each 9    fluticasone (FLONASE) 50 MCG/ACT nasal spray SPRAY TWO SPRAYS IN EACH NOSTRIL DAILY 16 g 4    pantoprazole (PROTONIX) 40 MG tablet Take 1 tablet by mouth 2 times daily 60 tablet 0    hydrocortisone 2.5 % cream Apply topically 2 times daily.  30 g 0    Blood Glucose Monitoring Suppl (ACCU-CHEK CAPO PLUS) w/Device KIT 1 each by Does not apply route daily Test blood sugar 10 times daily Dx code E 10.8 1 kit 10 Neurological/Psychiatric:He is alert and oriented to person, place, and time. Coordination is  normal.  His mood isAppropriate and affect is Flat/blunted and Anxious . His    IMPRESSION:   1. Chronic pain syndrome    2. Myofascial pain    3. Ulcer of left foot, with fat layer exposed ([facility] [address]    4. Neuropathic pain    5. Pathological fracture of left tibia due to other osteoporosis, sequela    6. Peripheral polyneuropathy    7. Polyneuropathy associated with underlying disease ([facility]        PLAN:  Informed verbal consent was obtained  -continue with current opioid regimen Butrans Q 7 days  -He was advised to increase fluids ( 5-7  glasses of fluid daily), limit caffeine, avoid cheese products, increase dietary fiber, increase activity and exercise as tolerated and relax regularly and enjoy meals   -restart Aimovig 140 mg S/C monthly  -Monitor blood sugar regularly, diabetic control- adv diabetic diet. Goal for fasting blood sugars around 120. Follow up with Endocrinologist/PCP also for on going management    -walking as tolerated     Current Outpatient Medications   Medication Sig Dispense Refill    buprenorphine (BUTRANS) 5 MCG/HR PTWK Place 1 patch onto the skin every 7 days for 28 days.  4 patch 0    tiZANidine (ZANAFLEX) 4 MG tablet Take 0.5-1 tablets by mouth 2 times daily 60 tablet 1    Erenumab-aooe (AIMOVIG, 140 MG DOSE,) 70 MG/ML SOAJ Inject 140 mLs into the skin every 30 days 1 pen 0    LANTUS 100 UNIT/ML injection vial INJECT 30 UNITS UNDER THE SKIN NIGHTLY 1 vial 3    dicloxacillin (DYNAPEN) 500 MG capsule TAKE ONE CAPSULE BY MOUTH TWICE A DAY 60 capsule 4    BD INSULIN SYRINGE U/F 31G X 5/16\" 0.3 ML MISC USE ONE SYRINGE FOUR TIMES A DAY BEFORE MEALS AND ONCE NIGHTLY 120 each 9    topiramate (TOPAMAX) 100 MG tablet TAKE ONE TABLET BY MOUTH TWO TIMES A DAY 60 tablet 1    DULoxetine (CYMBALTA) 60 MG extended release capsule Take 1 capsule by mouth 2 times daily 60 capsule 1  SUMAtriptan (IMITREX) 50 MG tablet Take one tab po at the start of migraine PRN max 1 every 24 hours 9 tablet 0    LANTUS 100 UNIT/ML injection vial INJECT 32 UNITS UNDER THE SKIN ONCE NIGHTLY 1 vial 0    blood glucose test strips (ACCU-CHEK CAPO PLUS) strip 1 each by In Vitro route daily Test blood glucose 10 times daily Dx Code E10.8 300 each 10    Glucagon (GVOKE HYPOPEN 1-PACK) 1 MG/0.2ML SOAJ Use as needed for low sugar 1 Syringe 2    insulin glulisine (APIDRA) 100 UNIT/ML injection INJECT 8-12 UNITS UNDER THE SKIN THREE TIMES A DAY WITH MEALS 40 mL 3    blood glucose test strips (ACCU-CHEK CAPO PLUS) strip USE ONE STRIP TO TEST THREE TIMES A  strip 4    Accu-Chek FastClix Lancets MISC 1 each by Does not apply route daily Test blood glucose 10 times daily Dx Code E 10.8 900 each 2    magnesium 30 MG tablet Take 30 mg by mouth 2 times daily      triamcinolone (KENALOG) 0.1 % ointment Apply to thickened affected areas PRN sparingly for flares no longer than 2 weeks at one time, do not apply to cleared skin 80 g 0    Insulin Syringe-Needle U-100 (BD INSULIN SYRINGE U/F) 31G X 5/16\" 0.5 ML MISC Inject 1 each into the skin 4 times daily DX CODE E 10.22 120 each 9    fluticasone (FLONASE) 50 MCG/ACT nasal spray SPRAY TWO SPRAYS IN EACH NOSTRIL DAILY 16 g 4    pantoprazole (PROTONIX) 40 MG tablet Take 1 tablet by mouth 2 times daily 60 tablet 0    hydrocortisone 2.5 % cream Apply topically 2 times daily.  30 g 0    Blood Glucose Monitoring Suppl (ACCU-CHEK CAPO PLUS) w/Device KIT 1 each by Does not apply route daily Test blood sugar 10 times daily Dx code E 10.8 1 kit 10    GLUCAGON EMERGENCY 1 MG injection INJECT 1MG INTO THE MUSCLE AS NEEDED 1 kit 3    Insulin Syringe-Needle U-100 (B-D INS SYR ULTRAFINE 1CC/31G) 31G X 5/16\" 1 ML MISC INJECT USING INSULIN ONCE DAILY 30 each 10    Multiple Vitamin (DAILY KITTY) TABS TAKE ONE TABLET BY MOUTH DAILY 30 tablet 5 Goals of current treatment regimen include improvement in pain, restoration of functioning- with focus on improvement in physical performance, general activity, work or disability,emotional distress, health care utilization and  decreased medication consumption. Will continue to monitor progress towards achieving/maintaining therapeutic goals with special emphasis on  1. Improvement in perceived interfernce  of pain with ADL's. Ability to do home exercises independently. Ability to do household chores indoor and/or outdoor work and social and leisure activities. Improve psychosocial and physical functioning. - he is showing progression towards this treatment goal with the current regimen. He was advised against drinking alcohol with the narcotic pain medicines, advised against driving or handling machinery while adjusting the dose of medicines or if having cognitive  issues related to the current medications. Risk of overdose and death, if medicines not taken as prescribed, were also discussed. If the patient develops new symptoms or if the symptoms worsen, the patient should call the office. While transcribing every attempt was made to maintain the accuracy of the note in terms of it's contents,there may have been some errors made inadvertently. Thank you for allowing me to participate in the care of this patient.     Lucila Blunt MD.    Cc: Tanan Mac MD

## 2021-02-24 ENCOUNTER — VIRTUAL VISIT (OUTPATIENT)
Dept: INTERNAL MEDICINE CLINIC | Age: 46
End: 2021-02-24
Payer: MEDICARE

## 2021-02-24 DIAGNOSIS — N18.30 STAGE 3 CHRONIC KIDNEY DISEASE, UNSPECIFIED WHETHER STAGE 3A OR 3B CKD (HCC): ICD-10-CM

## 2021-02-24 DIAGNOSIS — I10 ESSENTIAL HYPERTENSION: Primary | ICD-10-CM

## 2021-02-24 DIAGNOSIS — E55.9 VITAMIN D DEFICIENCY: ICD-10-CM

## 2021-02-24 DIAGNOSIS — E78.2 MIXED HYPERLIPIDEMIA: ICD-10-CM

## 2021-02-24 DIAGNOSIS — E10.8 DIABETES MELLITUS TYPE 1 WITH COMPLICATIONS (HCC): ICD-10-CM

## 2021-02-24 DIAGNOSIS — Z11.59 NEED FOR HEPATITIS B SCREENING TEST: ICD-10-CM

## 2021-02-24 DIAGNOSIS — Z11.59 ENCOUNTER FOR HEPATITIS C SCREENING TEST FOR LOW RISK PATIENT: ICD-10-CM

## 2021-02-24 PROCEDURE — 99214 OFFICE O/P EST MOD 30 MIN: CPT | Performed by: INTERNAL MEDICINE

## 2021-03-17 ENCOUNTER — VIRTUAL VISIT (OUTPATIENT)
Dept: PAIN MANAGEMENT | Age: 46
End: 2021-03-17
Payer: MEDICARE

## 2021-03-17 DIAGNOSIS — L97.522 ULCER OF LEFT FOOT, WITH FAT LAYER EXPOSED (HCC): ICD-10-CM

## 2021-03-17 DIAGNOSIS — G62.9 PERIPHERAL POLYNEUROPATHY: ICD-10-CM

## 2021-03-17 DIAGNOSIS — M79.2 NEUROPATHIC PAIN: ICD-10-CM

## 2021-03-17 DIAGNOSIS — G63 POLYNEUROPATHY ASSOCIATED WITH UNDERLYING DISEASE (HCC): ICD-10-CM

## 2021-03-17 DIAGNOSIS — G89.4 CHRONIC PAIN SYNDROME: ICD-10-CM

## 2021-03-17 DIAGNOSIS — M80.862S PATHOLOGICAL FRACTURE OF LEFT TIBIA DUE TO OTHER OSTEOPOROSIS, SEQUELA: ICD-10-CM

## 2021-03-17 DIAGNOSIS — M79.18 MYOFASCIAL PAIN: ICD-10-CM

## 2021-03-17 PROCEDURE — 99213 OFFICE O/P EST LOW 20 MIN: CPT | Performed by: INTERNAL MEDICINE

## 2021-03-17 RX ORDER — TIZANIDINE 4 MG/1
2-4 TABLET ORAL 2 TIMES DAILY
Qty: 60 TABLET | Refills: 1 | Status: SHIPPED | OUTPATIENT
Start: 2021-03-17 | End: 2021-06-21 | Stop reason: SDUPTHER

## 2021-03-17 RX ORDER — BUPRENORPHINE 5 UG/H
1 PATCH TRANSDERMAL
Qty: 4 PATCH | Refills: 0 | Status: SHIPPED | OUTPATIENT
Start: 2021-03-17 | End: 2021-04-28 | Stop reason: SDUPTHER

## 2021-03-17 RX ORDER — ERENUMAB-AOOE 70 MG/ML
140 INJECTION SUBCUTANEOUS
Qty: 1 PEN | Refills: 1 | Status: SHIPPED | OUTPATIENT
Start: 2021-03-17 | End: 2021-04-28 | Stop reason: SDUPTHER

## 2021-03-17 NOTE — PROGRESS NOTES
TELE HEALTH VISIT (AUDIO-VISUAL)    Pursuant to the emergency declaration under the [facility] waiver authority and the [employer] Act, this Virtual  Visit was conducted, with patient's/legal guardian's consent, to reduce the patient's risk of exposure to COVID-19 and provide continuity of care for an established patient. Service is  provided through a video synchronous discussion virtually to substitute for in-person clinic visit. Due to this being a TeleHealth encounter (During BELQX-39 public health emergency), evaluation of the following organ systems was limited: Vitals/Constitutional/EENT/Resp/CV/GI//MS/Neuro/Skin/Zgum-Qcdn-Uaa. Estle Seen  1975  [id number]    Mr. Carson is being seen virtually for a follow up visit using one of the following techniques  Google Duo Face time Doxy. me  Informed verbal consent to the virtual visit was obtained from Mr. Brett Overton. Risks associated with HIPPA compliance with the virtual visit was explained to the patient. Mr. Brett Overton is at his residence and Dr. Trae Centeno is in his office. HISTORY OF PRESENT ILLNESS:  Mr. Brett Overton is a 39 y.o. male returns for a follow up visit for multiple medical problems. His current presenting problems are   1. Chronic pain syndrome    2. Myofascial pain    3. Neuropathic pain    4. Pathological fracture of left tibia due to other osteoporosis, sequela    5. Peripheral polyneuropathy    6. Polyneuropathy associated with underlying disease ([facility] [address]    7. Chronic migraine without aura, with intractable migraine, so stated, with status migrainosus    . As per information/history obtained from the PADT(patient assessment and documentation tool) - He complains of pain in the ankles Left with radiation to the na He rates the pain 9/10 and describes it as throbbing. Pain is made worse by: walking, standing.   Current treatment regimen has helped relieve about 10% of the pain. He denies side effects from the current pain regimen. Patient reports that since the last follow up visit the physical functioning is unchanged, family/social relationships are unchanged, mood is unchanged and sleep patterns are better, and that the overall functioning is unchanged. Patient denies neurological bowel or bladder. Patient denies misusing/abusing his narcotic pain medications or using any illegal drugs. There are No indicators for possible drug abuse, addiction or diversion problems. Upon obtaining the medical history from Mr. Jorge King regarding today's office visit for his presenting problems, patient states he has been doing fair. Patient is complaining of some increase headaches but manageable. He says his back and legs hurts the most. Mr. Jorge King states he is using all his adjuvants along with Butrans and Ultram PRN. He says he has been walking some daily. ALLERGIES: Patients list of allergies were reviewed     MEDICATIONS: Mr. Jorge King list of medications were reviewed. His current medications are   Outpatient Medications Prior to Visit   Medication Sig Dispense Refill    buprenorphine (BUTRANS) 5 MCG/HR PTWK Place 1 patch onto the skin every 7 days for 28 days.  4 patch 0    tiZANidine (ZANAFLEX) 4 MG tablet Take 0.5-1 tablets by mouth 2 times daily 60 tablet 1    Erenumab-aooe (AIMOVIG, 140 MG DOSE,) 70 MG/ML SOAJ Inject 140 mLs into the skin every 30 days 1 pen 1    topiramate (TOPAMAX) 100 MG tablet TAKE ONE TABLET BY MOUTH TWO TIMES A DAY 60 tablet 1    DULoxetine (CYMBALTA) 60 MG extended release capsule Take 1 capsule by mouth 2 times daily 60 capsule 1    SUMAtriptan (IMITREX) 50 MG tablet Take one tab po at the start of migraine PRN max 1 every 24 hours 9 tablet 0    gabapentin (NEURONTIN) 400 MG capsule Take one tablet Po BID 60 capsule 1    LANTUS 100 UNIT/ML injection vial INJECT 30 UNITS UNDER THE SKIN NIGHTLY 1 vial 3    dicloxacillin (DYNAPEN) 500 MG capsule TAKE ONE CAPSULE BY MOUTH TWICE A DAY 60 capsule 4    BD INSULIN SYRINGE U/F 31G X 5/16\" 0.3 ML MISC USE ONE SYRINGE FOUR TIMES A DAY BEFORE MEALS AND ONCE NIGHTLY 120 each 9    LANTUS 100 UNIT/ML injection vial INJECT 32 UNITS UNDER THE SKIN ONCE NIGHTLY 1 vial 0    blood glucose test strips (ACCU-CHEK CAPO PLUS) strip 1 each by In Vitro route daily Test blood glucose 10 times daily Dx Code E10.8 300 each 10    Glucagon (GVOKE HYPOPEN 1-PACK) 1 MG/0.2ML SOAJ Use as needed for low sugar 1 Syringe 2    insulin glulisine (APIDRA) 100 UNIT/ML injection INJECT 8-12 UNITS UNDER THE SKIN THREE TIMES A DAY WITH MEALS 40 mL 3    blood glucose test strips (ACCU-CHEK CAPO PLUS) strip USE ONE STRIP TO TEST THREE TIMES A  strip 4    Accu-Chek FastClix Lancets MISC 1 each by Does not apply route daily Test blood glucose 10 times daily Dx Code E 10.8 900 each 2    magnesium 30 MG tablet Take 30 mg by mouth 2 times daily      triamcinolone (KENALOG) 0.1 % ointment Apply to thickened affected areas PRN sparingly for flares no longer than 2 weeks at one time, do not apply to cleared skin 80 g 0    Insulin Syringe-Needle U-100 (BD INSULIN SYRINGE U/F) 31G X 5/16\" 0.5 ML MISC Inject 1 each into the skin 4 times daily DX CODE E 10.22 120 each 9    fluticasone (FLONASE) 50 MCG/ACT nasal spray SPRAY TWO SPRAYS IN EACH NOSTRIL DAILY 16 g 4    pantoprazole (PROTONIX) 40 MG tablet Take 1 tablet by mouth 2 times daily 60 tablet 0    hydrocortisone 2.5 % cream Apply topically 2 times daily.  30 g 0    Blood Glucose Monitoring Suppl (ACCU-CHEK CAPO PLUS) w/Device KIT 1 each by Does not apply route daily Test blood sugar 10 times daily Dx code E 10.8 1 kit 10    GLUCAGON EMERGENCY 1 MG injection INJECT 1MG INTO THE MUSCLE AS NEEDED 1 kit 3    Insulin Syringe-Needle U-100 (B-D INS SYR ULTRAFINE 1CC/31G) 31G X 5/16\" 1 ML MISC INJECT USING INSULIN ONCE DAILY 30 each 10    Multiple Vitamin (DAILY KITTY) TABS TAKE ONE TABLET BY MOUTH DAILY 30 tablet 5    Cholecalciferol (VITAMIN D3) 5000 units CAPS Take 1 capsule by mouth Daily 30 capsule 3    Dextromethorphan-Guaifenesin (MUCINEX DM)  MG TB12 Cough and congestion. 60 tablet 1    omega-3 acid ethyl esters (LOVAZA) 1 G capsule TAKE TWO CAPSULES BY MOUTH TWICE DAILY 120 capsule 5    MUCUS RELIEF DM  MG TABS TAKE ONE BY MOUTH DAILY AS NEEDED 30 tablet 1    Alcohol Swabs PADS Use as needed for insulin injection. 100 each 5    Probiotic Product (ACIDOPHILUS PROBIOTIC) CAPS capsule TAKE ONE CAPSULE BY MOUTH DAILY 28 capsule 4    polyvinyl alcohol (LIQUIFILM TEARS) 1.4 % ophthalmic solution 1 drop as needed      propranolol (INDERAL) 80 MG tablet Take 40 mg by mouth 2 times daily For migraines       Flaxseed, Linseed, (FLAX PO) Take 14 g by mouth daily.  traMADol (ULTRAM) 50 MG tablet Take 1 tablet by mouth every 6 hours as needed for Pain (max 1-2 per day) for up to 28 days. 30 tablet 0     No facility-administered medications prior to visit. REVIEW OF SYSTEMS:    Respiratory: Negative for apnea, chest tightness and shortness of breath or change in baseline breathing. PHYSICAL EXAM:   Nursing note and vitals reviewed. There were no vitals taken for this visit. Constitutional: He appears well-developed and well-nourished. No acute distress. Cardiovascular: Normal rate, regular rhythm, normal heart sounds, and does not have murmur. Pulmonary/Chest: Effort normal. No respiratory distress. He does not have wheezes in the lung fields. He has no rales. Neurological/Psychiatric:He is alert and oriented to person, place, and time. Coordination is  normal.  His mood isAppropriate and affect is Neutral/Euthymic(normal) . His    IMPRESSION:   1. Chronic pain syndrome    2. Myofascial pain    3. Neuropathic pain    4. Pathological fracture of left tibia due to other osteoporosis, sequela    5.  Polyneuropathy associated with underlying disease ([facility] [address]    6. Ulcer of left foot, with fat layer exposed ([facility] [address]    7. Peripheral polyneuropathy        PLAN:  Informed verbal consent was obtained  -continue with current opioid regimen Butrans along with Ultram 1/2-1 PRN   -Most recent labs were reviewed and are within normal limits. -Monitor blood sugar regularly, diabetic control- adv diabetic diet. Goal for fasting blood sugars around 120. Follow up with Endocrinologist/PCP also for on going management    -he was advised  to avoid using too many OTC analgesics to control the headaches, avoid chocolates, increased caffeine, cheeses and MSG nitrite containing foods, cigarette smoking. To avoid bright lights, strong smells and skipping meals. Current Outpatient Medications   Medication Sig Dispense Refill    buprenorphine (BUTRANS) 5 MCG/HR PTWK Place 1 patch onto the skin every 7 days for 28 days.  4 patch 0    tiZANidine (ZANAFLEX) 4 MG tablet Take 0.5-1 tablets by mouth 2 times daily 60 tablet 1    Erenumab-aooe (AIMOVIG, 140 MG DOSE,) 70 MG/ML SOAJ Inject 140 mLs into the skin every 30 days 1 pen 1    topiramate (TOPAMAX) 100 MG tablet TAKE ONE TABLET BY MOUTH TWO TIMES A DAY 60 tablet 1    DULoxetine (CYMBALTA) 60 MG extended release capsule Take 1 capsule by mouth 2 times daily 60 capsule 1    SUMAtriptan (IMITREX) 50 MG tablet Take one tab po at the start of migraine PRN max 1 every 24 hours 9 tablet 0    gabapentin (NEURONTIN) 400 MG capsule Take one tablet Po BID 60 capsule 1    LANTUS 100 UNIT/ML injection vial INJECT 30 UNITS UNDER THE SKIN NIGHTLY 1 vial 3    dicloxacillin (DYNAPEN) 500 MG capsule TAKE ONE CAPSULE BY MOUTH TWICE A DAY 60 capsule 4    BD INSULIN SYRINGE U/F 31G X 5/16\" 0.3 ML MISC USE ONE SYRINGE FOUR TIMES A DAY BEFORE MEALS AND ONCE NIGHTLY 120 each 9    LANTUS 100 UNIT/ML injection vial INJECT 32 UNITS UNDER THE SKIN ONCE NIGHTLY 1 vial 0    blood glucose test strips (ACCU-CHEK CAPO PLUS) strip 1 each by In Vitro route daily Test blood glucose 10 times daily Dx Code E10.8 300 each 10    Glucagon (GVOKE HYPOPEN 1-PACK) 1 MG/0.2ML SOAJ Use as needed for low sugar 1 Syringe 2    insulin glulisine (APIDRA) 100 UNIT/ML injection INJECT 8-12 UNITS UNDER THE SKIN THREE TIMES A DAY WITH MEALS 40 mL 3    blood glucose test strips (ACCU-CHEK CAPO PLUS) strip USE ONE STRIP TO TEST THREE TIMES A  strip 4    Accu-Chek FastClix Lancets MISC 1 each by Does not apply route daily Test blood glucose 10 times daily Dx Code E 10.8 900 each 2    magnesium 30 MG tablet Take 30 mg by mouth 2 times daily      triamcinolone (KENALOG) 0.1 % ointment Apply to thickened affected areas PRN sparingly for flares no longer than 2 weeks at one time, do not apply to cleared skin 80 g 0    Insulin Syringe-Needle U-100 (BD INSULIN SYRINGE U/F) 31G X 5/16\" 0.5 ML MISC Inject 1 each into the skin 4 times daily DX CODE E 10.22 120 each 9    fluticasone (FLONASE) 50 MCG/ACT nasal spray SPRAY TWO SPRAYS IN EACH NOSTRIL DAILY 16 g 4    pantoprazole (PROTONIX) 40 MG tablet Take 1 tablet by mouth 2 times daily 60 tablet 0    hydrocortisone 2.5 % cream Apply topically 2 times daily. 30 g 0    Blood Glucose Monitoring Suppl (ACCU-CHEK CAPO PLUS) w/Device KIT 1 each by Does not apply route daily Test blood sugar 10 times daily Dx code E 10.8 1 kit 10    GLUCAGON EMERGENCY 1 MG injection INJECT 1MG INTO THE MUSCLE AS NEEDED 1 kit 3    Insulin Syringe-Needle U-100 (B-D INS SYR ULTRAFINE 1CC/31G) 31G X 5/16\" 1 ML MISC INJECT USING INSULIN ONCE DAILY 30 each 10    Multiple Vitamin (DAILY KITTY) TABS TAKE ONE TABLET BY MOUTH DAILY 30 tablet 5    Cholecalciferol (VITAMIN D3) 5000 units CAPS Take 1 capsule by mouth Daily 30 capsule 3    Dextromethorphan-Guaifenesin (MUCINEX DM)  MG TB12 Cough and congestion.  60 tablet 1    omega-3 acid ethyl esters (LOVAZA) 1 G capsule TAKE TWO CAPSULES BY MOUTH TWICE DAILY 120 capsule 5    MUCUS RELIEF DM  MG TABS TAKE ONE BY MOUTH DAILY AS NEEDED 30 tablet 1    Alcohol Swabs PADS Use as needed for insulin injection. 100 each 5    Probiotic Product (ACIDOPHILUS PROBIOTIC) CAPS capsule TAKE ONE CAPSULE BY MOUTH DAILY 28 capsule 4    polyvinyl alcohol (LIQUIFILM TEARS) 1.4 % ophthalmic solution 1 drop as needed      propranolol (INDERAL) 80 MG tablet Take 40 mg by mouth 2 times daily For migraines       Flaxseed, Linseed, (FLAX PO) Take 14 g by mouth daily.  traMADol (ULTRAM) 50 MG tablet Take 1 tablet by mouth every 6 hours as needed for Pain (max 1-2 per day) for up to 28 days. 30 tablet 0     No current facility-administered medications for this visit. I will continue his current medication regimen  which is part of the above treatment schedule. It has been helping with Mr. Silvester Essex chronic  medical problems which for this visit include:   Diagnoses of Chronic pain syndrome, Myofascial pain, Neuropathic pain, Pathological fracture of left tibia due to other osteoporosis, sequela, Peripheral polyneuropathy, Polyneuropathy associated with underlying disease ([address] and Chronic migraine without aura, with intractable migraine, so stated, with status migrainosus were pertinent to this visit. Risks and benefits of the medications and other alternative treatments  including no treatment were discussed with the patient. The common side effects of these medications were also explained to the patient. Informed verbal consent was obtained. Goals of current treatment regimen include improvement in pain, restoration of functioning- with focus on improvement in physical performance, general activity, work or disability,emotional distress, health care utilization and  decreased medication consumption. Will continue to monitor progress towards achieving/maintaining therapeutic goals with special emphasis on  1. Improvement in perceived interfernce  of pain with ADL's.  Ability to do home exercises independently. Ability to do household chores indoor and/or outdoor work and social and leisure activities. Improve psychosocial and physical functioning. - he is showing progression towards this treatment goal with the current regimen. He was advised against drinking alcohol with the narcotic pain medicines, advised against driving or handling machinery while adjusting the dose of medicines or if having cognitive  issues related to the current medications. Risk of overdose and death, if medicines not taken as prescribed, were also discussed. If the patient develops new symptoms or if the symptoms worsen, the patient should call the office. While transcribing every attempt was made to maintain the accuracy of the note in terms of it's contents,there may have been some errors made inadvertently. Thank you for allowing me to participate in the care of this patient.     Leonid Burgos MD.    Cc: Ledy Farrar MD

## 2021-03-19 NOTE — TELEPHONE ENCOUNTER
I am attempting to do PA again since I received another PA, but the Rx is confusing:    \"(AIMOVIG, 140 MG DOSE,) 70 MG/ML SOAJ [[id number]]    Do you want the 140 mg or 70 mg pen's?

## 2021-03-22 NOTE — TELEPHONE ENCOUNTER
Submitted PA for AIMOVIG  [facility] Key: [id number] STATUS: \"Approved, Coverage Starts on: 2/1/2021 12:00:00 AM, Coverage Ends on: 3/22/2022\"    I tried to call the patient and his mail box is full.

## 2021-03-25 ASSESSMENT — ENCOUNTER SYMPTOMS
RESPIRATORY NEGATIVE: 1
GASTROINTESTINAL NEGATIVE: 1

## 2021-03-25 NOTE — PROGRESS NOTES
2021    TELEHEALTH EVALUATION -- Audio/Visual (During RYLQW- public health emergency)    HPI:    April Manuel (:  1975) has requested an audio/video evaluation for the following concern(s):    Hypertension: Treated with B-BLKER. Compliant with medication. No home b/p readings. CKD: stage 3. Followed by nephrology. Cr improved after ACE I d/cd. Hyperlipidemia: Refused statin. Concerned about side effects. Diabetes, T1. Treated with basal bolus insulin. FBS range 100 to 150. Review of Systems   Constitutional: Negative. HENT: Negative. Eyes:        Diabetic retinopathy. Left eye blind. Right eye impaired vision. Respiratory: Negative. Cardiovascular:        Hypertension, see HPI. Gastrointestinal: Negative. Endocrine:        Hyperlipidemia, see HPI. Last . T 1 DM, see HPI. A1c 10.1. Genitourinary:        Stage 3a CKD. B/Cr .6. GFR 48 ( 2020 ). Musculoskeletal: Negative. Skin: Negative. Neurological: Negative. Psychiatric/Behavioral: Negative. Prior to Visit Medications    Medication Sig Taking? Authorizing Provider   buprenorphine (BUTRANS) 5 MCG/HR PTWK Place 1 patch onto the skin every 7 days for 28 days.   Avi Snyder MD   tiZANidine (ZANAFLEX) 4 MG tablet Take 0.5-1 tablets by mouth 2 times daily  Avi Snyder MD   Erenumab-aooe (AIMOVIG, 140 MG DOSE,) 70 MG/ML SOAJ Inject 140 mLs into the skin every 30 days  Avi Snyder MD   topiramate (TOPAMAX) 100 MG tablet TAKE ONE TABLET BY MOUTH TWO TIMES A DAY  Avi Snyder MD   DULoxetine (CYMBALTA) 60 MG extended release capsule Take 1 capsule by mouth 2 times daily  Avi Snyder MD   SUMAtriptan (IMITREX) 50 MG tablet Take one tab po at the start of migraine PRN max 1 every 24 hours  Avi Snyder MD   gabapentin (NEURONTIN) 400 MG capsule Take one tablet Po BID  Avi Snyder MD   LANTUS 100 UNIT/ML injection vial INJECT 30 UNITS UNDER THE SKIN NIGHTLY  Haylee Pastor MD dicloxacillin (DYNAPEN) 500 MG capsule TAKE ONE CAPSULE BY MOUTH TWICE A DAY  Jerad Neal MD   BD INSULIN SYRINGE U/F 31G X 5/16\" 0.3 ML MISC USE ONE SYRINGE FOUR TIMES A DAY BEFORE MEALS AND ONCE NIGHTLY  ARTURO Tse - CNP   traMADol (ULTRAM) 50 MG tablet Take 1 tablet by mouth every 6 hours as needed for Pain (max 1-2 per day) for up to 28 days. Jefferson Diane MD   LANTUS 100 UNIT/ML injection vial INJECT 32 UNITS UNDER THE SKIN ONCE NIGHTLY  Cuba Luu MD   blood glucose test strips (ACCU-CHEK CAPO PLUS) strip 1 each by In Vitro route daily Test blood glucose 10 times daily Dx Code E10.8  Cuba Luu MD   Glucagon ([place] 1-PACK) 1 MG/0.2ML SOAJ Use as needed for low sugar  Cuba Luu MD   insulin glulisine (APIDRA) 100 UNIT/ML injection INJECT 8-12 UNITS UNDER THE SKIN THREE TIMES A DAY WITH MEALS  Amparo Buenrostro MD   blood glucose test strips (ACCU-CHEK CAPO PLUS) strip USE ONE STRIP TO TEST THREE TIMES A DAY  Cuba Luu MD   Accu-Chek FastClix Lancets MISC 1 each by Does not apply route daily Test blood glucose 10 times daily Dx Code E 10.8  Cuba Luu MD   magnesium 30 MG tablet Take 30 mg by mouth 2 times daily  Historical Provider, MD   triamcinolone (KENALOG) 0.1 % ointment Apply to thickened affected areas PRN sparingly for flares no longer than 2 weeks at one time, do not apply to cleared skin  Sunita Jamarcus, DO   Insulin Syringe-Needle U-100 (BD INSULIN SYRINGE U/F) 31G X 5/16\" 0.5 ML MISC Inject 1 each into the skin 4 times daily DX CODE E 10.22  Cuba Luu MD   fluticasone (FLONASE) 50 MCG/ACT nasal spray SPRAY TWO SPRAYS IN EACH NOSTRIL DAILY  Ajay Nguyen MD   pantoprazole (PROTONIX) 40 MG tablet Take 1 tablet by mouth 2 times daily  Aravind Butcher MD   hydrocortisone 2.5 % cream Apply topically 2 times daily.   Ajay Nguyen MD   Blood Glucose Monitoring Suppl (ACCU-CHEK CAPO PLUS) w/Device KIT 1 each by Does not apply route daily Test blood sugar 10 times daily Dx code E 10.8  Burnie Kehr, MD   GLUCAGON EMERGENCY 1 MG injection INJECT 1MG INTO THE MUSCLE AS NEEDED  Juanjose Padilla MD   Insulin Syringe-Needle U-100 (B-D INS SYR ULTRAFINE 1CC/31G) 31G X 5/16\" 1 ML MISC INJECT USING INSULIN ONCE DAILY  [place] Serum, APRN - CNP   Multiple Vitamin (DAILY KITTY) TABS TAKE ONE TABLET BY MOUTH DAILY  Whitley Robert MD   Cholecalciferol (VITAMIN D3) 5000 units CAPS Take 1 capsule by mouth Daily  Whitley Robert MD   Dextromethorphan-Guaifenesin [facility] DM)  MG TB12 Cough and congestion. Whitley Robert MD   omega-3 acid ethyl esters (LOVAZA) 1 G capsule TAKE TWO CAPSULES BY MOUTH TWICE DAILY  Juanjose Padilla MD   MUCUS RELIEF DM  MG TABS TAKE ONE BY MOUTH DAILY AS NEEDED  Whitley Robert MD   Alcohol Swabs PADS Use as needed for insulin injection. Burnie Kehr, MD   Probiotic Product (ACIDOPHILUS PROBIOTIC) CAPS capsule TAKE ONE CAPSULE BY MOUTH DAILY  Whitley Robert MD   polyvinyl alcohol (LIQUIFILM TEARS) 1.4 % ophthalmic solution 1 drop as needed  Historical Provider, MD   propranolol (INDERAL) 80 MG tablet Take 40 mg by mouth 2 times daily For migraines   Historical Provider, MD   Flaxseed, Linseed, (FLAX PO) Take 14 g by mouth daily. Historical Provider, MD       Social History     Tobacco Use    Smoking status: Never Smoker    Smokeless tobacco: Never Used   Substance Use Topics    Alcohol use: No     Alcohol/week: 0.0 standard drinks    Drug use:  No            PHYSICAL EXAMINATION:  [ INSTRUCTIONS:  \"[x]\" Indicates a positive item  \"[]\" Indicates a negative item  -- DELETE ALL ITEMS NOT EXAMINED]  Vital Signs: (As obtained by patient/caregiver or practitioner observation)    Blood pressure-  Heart rate-    Respiratory rate-    Temperature-  Pulse oximetry-     Constitutional: [x] Appears well-developed and well-nourished [x] No apparent distress      [] Abnormal-   Mental status  [x] Alert and awake [x] Oriented to person/place/time []Able to follow commands      Eyes:  EOM    [x]  Normal  [] Abnormal-  Sclera  [x]  Normal  [] Abnormal -         Discharge [x]  None visible  [] Abnormal -left eye blindness. Right eye impaired vision. HENT:   [x] Normocephalic, atraumatic. [] Abnormal   [x] Mouth/Throat: Mucous membranes are moist.     External Ears [x] Normal  [] Abnormal-     Neck: [x] No visualized mass     Pulmonary/Chest: [x] Respiratory effort normal.  [x] No visualized signs of difficulty breathing or respiratory distress        [] Abnormal-      Musculoskeletal:   [x] Normal gait with no signs of ataxia         [x] Normal range of motion of neck        [] Abnormal-       Neurological:        [] No Facial Asymmetry (Cranial nerve 7 motor function) (limited exam to video visit)          [] No gaze palsy        [] Abnormal-         Skin:        [x] No significant exanthematous lesions or discoloration noted on facial skin         [] Abnormal-            Psychiatric:       [x] Normal Affect [] No Hallucinations        [] Abnormal-     Other pertinent observable physical exam findings-     ASSESSMENT/PLAN:  1. Essential hypertension      Continue B Blker. DASH and low sodium diet discussed. - COMPREHENSIVE METABOLIC PANEL; Future  - CBC WITH AUTO DIFFERENTIAL; Future  - TSH with Reflex; Future  - MICROALBUMIN / CREATININE URINE RATIO; Future    2. Mixed hyperlipidemia     Heart healthy diet discussed. Declines statin. Discussed Bempedoic acid. He will consider.  - Lipid Panel; Future    3. Vitamin D deficiency      Diet and supplement discussed. - VITAMIN D 25 HYDROXY; Future    4. Diabetes mellitus type 1 with complications (HCC)      Diet, monitoring, SS adjustments stressed. F/U ENDO. - HEMOGLOBIN A1C; Future  - External Referral To Ophthalmology  - HEPATITIS B SURFACE ANTIBODY; Future    5. Need for hepatitis B screening test       - HEPATITIS B SURFACE ANTIBODY; Future    6. Encounter for hepatitis C screening test for low risk patient    - HEPATITIS C ANTIBODY; Future    7. Stage 3 chronic kidney disease, unspecified whether stage 3a or 3b CKD      Risk factor control stressed. Avoidance of nephrotoxins discussed. - PTH, INTACT; Future      No follow-ups on file. Jalil Ba, was evaluated through a synchronous (real-time) audio-video encounter. The patient (or guardian if applicable) is aware that this is a billable service. Verbal consent to proceed has been obtained within the past 12 months. The visit was conducted pursuant to the emergency declaration under the [address] [place] [address], [address] [place] [address] authority and the [employer]. Patient identification was verified, and a caregiver was present when appropriate. The patient was located in a state where the provider was credentialed to provide care. Total time spent on this encounter: billing not based on time. --Tate Ramos MD on 3/25/2021 at 4:54 AM    An electronic signature was used to authenticate this note.

## 2021-04-06 ENCOUNTER — OFFICE VISIT (OUTPATIENT)
Dept: ENDOCRINOLOGY | Age: 46
End: 2021-04-06
Payer: MEDICARE

## 2021-04-06 VITALS
HEART RATE: 94 BPM | SYSTOLIC BLOOD PRESSURE: 124 MMHG | DIASTOLIC BLOOD PRESSURE: 79 MMHG | BODY MASS INDEX: 28.21 KG/M2 | WEIGHT: 219.8 LBS | RESPIRATION RATE: 18 BRPM | OXYGEN SATURATION: 98 % | HEIGHT: 74 IN

## 2021-04-06 DIAGNOSIS — N18.31 TYPE 1 DIABETES MELLITUS WITH STAGE 3A CHRONIC KIDNEY DISEASE (HCC): Primary | ICD-10-CM

## 2021-04-06 DIAGNOSIS — E10.22 TYPE 1 DIABETES MELLITUS WITH STAGE 3A CHRONIC KIDNEY DISEASE (HCC): Primary | ICD-10-CM

## 2021-04-06 LAB — HBA1C MFR BLD: 9.8 %

## 2021-04-06 PROCEDURE — 83036 HEMOGLOBIN GLYCOSYLATED A1C: CPT | Performed by: INTERNAL MEDICINE

## 2021-04-06 PROCEDURE — 99214 OFFICE O/P EST MOD 30 MIN: CPT | Performed by: INTERNAL MEDICINE

## 2021-04-06 NOTE — PROGRESS NOTES
MAHSA Hyatt is a 39 y.o. male here for a follow-up for management of DM    Seen as new patient    Has seen Dr. Christel Zheng    He has a PMH of DM, CKD, HTN, hyperlipidemia,  Left tibia/fibula fracture, foot ulcer. He was diagnosed with Diabetes Mellitus at the age of 12 yrs. Never had DKA. Was never on oral meds. Always on insulin therapy. Microvascular complications: has  Retinopathy and also had retinal detachement (Follows with eye doctor at [address]),   Has microlabuminruia . No Peripheral neuropathy. A1c 9.6 %----> 8.4 --->8.9 %--->8.2 %--->7.1 %--->7.1 %---> 6.8 % --> 6.8 %--> 6.5 %---> 7.1 %---> 7.1 %---> 7.5 %---> 7.7 % ---> 7.4 %---> 11 % ---> 9 % --> 9.3 % ---> 8.4 % --> 11.3 % ---> 11 % ---> 10.2 % ---> 10.2 % ---> 9.8%    Moderate, uncontrolled  Worsened by diet    Home regimen:  Currently on lantus insulin 32  units at night. Apidra 6-10 units TID. ( insulin to carb ratio of 1:10)    Previous medications: Was on humulin insulin in the past.    Check sugars occasionally        Hypoglycemia . Occasional. No pattern    No polyuria, polydipsia, weight loss. Diet: Eats 3 meals/day at regular times and 3 snacks. Had nutrition education. Exercise: No  planned physical activity    Hyperlipidemia: he has mixed hyperlipidemia    Currently is on Flexseed oil. He has refused statins   on 2/20    Patient says he feels dizzy and light headed when he stands up for the last 2-3 months. This is most likely due to autonomic neuropathy. No weight loss. Has gained weight. He had a fall and fractured his left distal tibia and fibula in 2013. Had ORIF and has developed osteomyelitis.         Review of Systems   Scanned, reviewed    Past Medical History:   Diagnosis Date    Acute respiratory failure ([address] 8/18/2014    Asthma     Blood pressure instability     Chronic pain syndrome     Detached retina, bilateral     laser tx right eye    Diabetes mellitus ([facility]     uncontrolled     Insomnia     Meningitis August 2014    admission [facility]    Neuropathic pain     Osteomyelitis ([facility]     Osteomyelitis of tibia ([facility]     left    Pain of left lower extremity     Peripheral neuropathy      Past Surgical History:   Procedure Laterality Date    ANKLE FRACTURE SURGERY Left 1/28/13    Dr. Stoner Jomar  August 2013    left tibia with external fixation device    EYE SURGERY      retina reattachment left eye x 3 holes repairs    EYE SURGERY Right 9-27-13    retal detachment    LEG SURGERY Left 3/31/14    ORIF left fibula and tibia    [place] EGD FLEXIBLE FOREIGN BODY REMOVAL N/A 9/21/2018    EGD FOREIGN BODY REMOVAL performed by Katie Thomas MD at [facility]      with external device inplace    UPPER GASTROINTESTINAL ENDOSCOPY N/A 9/21/2018    EGD BIOPSY performed by Katie Thomas MD at [place] [address]           No visits with results within 3 Month(s) from this visit. Latest known visit with results is:   Office Visit on 06/29/2020   Component Date Value Ref Range Status    Hemoglobin A1C 06/29/2020 10.1  % Final         ROS:   Scanned, reviewed    Vitals:    04/06/21 1027   BP: 124/79   Pulse: 94   Resp: 18   SpO2: 98%       Constitutional: Well-developed, appears stated age, cooperative, in no acute distress  H/E/N/M/T:atraumatic, normocephalic, external ears, nose, lips normal without lesions  Eyes: Lids, lashes, conjunctivae and sclerae normal, No proptosis, no redness  Neck: supple, symmetrical, no swelling  Skin: No obvious rashes or lesions present. Skin and hair texture normal  Psychiatric: Judgement and Insight:  judgement and insight appear normal  Neuro: Normal without focal findings, speech is normal normal, speech is spontaneous  Chest: No labored breathing, no chest deformity, no stridor  Musculoskeletal: No joint deformity, swelling         Assessment/Plan    1.  DM    This 43 yrs old male has DM. Complicated by retinopathy, microalbuminuria. Most likely it is Type 1 DM. HARIS antibodies and islet cell antibodies negative.  c-peptide  Undetectable. Uncontrolled. Patient resistant to changes in his diabetes regimen per note    Discussed the impact of poor glycemic control on his health. BS are variable  Needs to check more frequent    He says he sleeps all day and eats at different times of the day and not eating proper meals.     -Continue  Lantus insulin  32 units QHS   -Change I:C to 1:8    Had allergic reaction to sensor adhesive [employer] Vance)    He does not want to use insulin pump due to issue of adhesive    Updated on eye exam. continue follow-up with the ophthalmologist.  Has microalbuminuria. On Ace-inhibitor. Will repeat. Has peripheral neuropathy on exam. Discussed foot care    Non-smoker. BP at goal.      2. CKD Follows with nephrologist.       3. Hyperlipidemia. LDL is high. He has refused to take statins, reports side effects  He understands the high risk of heart disease and stroke with untreated hyperlipidemia. On fish oil. 4. Foot ulcers. Healed. Managed by podiatry.  Dr. Perry Babin [facility])

## 2021-04-15 ENCOUNTER — PATIENT MESSAGE (OUTPATIENT)
Dept: INTERNAL MEDICINE CLINIC | Age: 46
End: 2021-04-15

## 2021-04-16 DIAGNOSIS — E10.8 TYPE 1 DIABETES MELLITUS WITH COMPLICATION (HCC): Primary | ICD-10-CM

## 2021-04-16 NOTE — TELEPHONE ENCOUNTER
From: Sabrina Gray  To: Gracia Licea MD  Sent: 4/15/2021 11:18 PM EDT  Subject: Non-Urgent Medical Question    Need's prescription from doctor

## 2021-04-28 ENCOUNTER — VIRTUAL VISIT (OUTPATIENT)
Dept: PAIN MANAGEMENT | Age: 46
End: 2021-04-28
Payer: MEDICARE

## 2021-04-28 DIAGNOSIS — G89.4 CHRONIC PAIN SYNDROME: ICD-10-CM

## 2021-04-28 DIAGNOSIS — G43.711 CHRONIC MIGRAINE WITHOUT AURA, WITH INTRACTABLE MIGRAINE, SO STATED, WITH STATUS MIGRAINOSUS: ICD-10-CM

## 2021-04-28 DIAGNOSIS — M80.862S PATHOLOGICAL FRACTURE OF LEFT TIBIA DUE TO OTHER OSTEOPOROSIS, SEQUELA: ICD-10-CM

## 2021-04-28 DIAGNOSIS — L97.522 ULCER OF LEFT FOOT, WITH FAT LAYER EXPOSED (HCC): ICD-10-CM

## 2021-04-28 DIAGNOSIS — G63 POLYNEUROPATHY ASSOCIATED WITH UNDERLYING DISEASE (HCC): ICD-10-CM

## 2021-04-28 DIAGNOSIS — M79.2 NEUROPATHIC PAIN: ICD-10-CM

## 2021-04-28 DIAGNOSIS — K59.03 CONSTIPATION DUE TO OPIOID THERAPY: ICD-10-CM

## 2021-04-28 DIAGNOSIS — G62.9 PERIPHERAL POLYNEUROPATHY: ICD-10-CM

## 2021-04-28 DIAGNOSIS — T40.2X5A CONSTIPATION DUE TO OPIOID THERAPY: ICD-10-CM

## 2021-04-28 DIAGNOSIS — M79.18 MYOFASCIAL PAIN: ICD-10-CM

## 2021-04-28 DIAGNOSIS — F51.01 PRIMARY INSOMNIA: ICD-10-CM

## 2021-04-28 PROCEDURE — 99214 OFFICE O/P EST MOD 30 MIN: CPT | Performed by: INTERNAL MEDICINE

## 2021-04-28 RX ORDER — ERENUMAB-AOOE 70 MG/ML
140 INJECTION SUBCUTANEOUS
Qty: 1 PEN | Refills: 1 | Status: SHIPPED | OUTPATIENT
Start: 2021-04-28 | End: 2021-06-21 | Stop reason: SDUPTHER

## 2021-04-28 RX ORDER — BUPRENORPHINE 5 UG/H
1 PATCH TRANSDERMAL
Qty: 4 PATCH | Refills: 0 | Status: SHIPPED | OUTPATIENT
Start: 2021-04-28 | End: 2021-06-21 | Stop reason: SDUPTHER

## 2021-04-28 RX ORDER — TOPIRAMATE 100 MG/1
TABLET, FILM COATED ORAL
Qty: 60 TABLET | Refills: 1 | Status: SHIPPED | OUTPATIENT
Start: 2021-04-28 | End: 2021-06-21 | Stop reason: SDUPTHER

## 2021-04-28 RX ORDER — GABAPENTIN 400 MG/1
CAPSULE ORAL
Qty: 60 CAPSULE | Refills: 1 | Status: SHIPPED | OUTPATIENT
Start: 2021-04-28 | End: 2021-06-21 | Stop reason: SDUPTHER

## 2021-04-28 RX ORDER — TRAMADOL HYDROCHLORIDE 50 MG/1
50 TABLET ORAL EVERY 6 HOURS PRN
Qty: 30 TABLET | Refills: 0 | Status: SHIPPED | OUTPATIENT
Start: 2021-04-28 | End: 2021-05-26 | Stop reason: SDUPTHER

## 2021-04-28 RX ORDER — DULOXETIN HYDROCHLORIDE 60 MG/1
60 CAPSULE, DELAYED RELEASE ORAL 2 TIMES DAILY
Qty: 60 CAPSULE | Refills: 1 | Status: SHIPPED | OUTPATIENT
Start: 2021-04-28 | End: 2021-06-21 | Stop reason: SDUPTHER

## 2021-04-28 RX ORDER — SUMATRIPTAN 50 MG/1
TABLET, FILM COATED ORAL
Qty: 9 TABLET | Refills: 0 | Status: SHIPPED | OUTPATIENT
Start: 2021-04-28 | End: 2021-06-21 | Stop reason: SDUPTHER

## 2021-04-28 NOTE — PROGRESS NOTES
TELE HEALTH VISIT (AUDIO-VISUAL)    Pursuant to the emergency declaration under the [facility] waiver authority and the [email or web address] and [employer] Act, this Virtual  Visit was conducted, with patient's/legal guardian's consent, to reduce the patient's risk of exposure to COVID-19 and provide continuity of care for an established patient. Service is  provided through a video synchronous discussion virtually to substitute for in-person clinic visit. Due to this being a TeleHealth encounter (During TINUQ-09 public health emergency), evaluation of the following organ systems was limited: Vitals/Constitutional/EENT/Resp/CV/GI//MS/Neuro/Skin/Dddc-Pvlk-Qlz. Leena Kenny  1975  [id number]    Mr. Carson is being seen virtually for a follow up visit using one of the following techniques  Google Duo, Face time or Doxy. me  Informed verbal consent to the virtual visit was obtained from Mr. Reggie Zafar. Risks associated with HIPPA compliance with the virtual visit was explained to the patient. Mr. Reggie Zafar is at his residence and Dr. Leonidas Mendenhall is in his office. HISTORY OF PRESENT ILLNESS:  Mr. Reggie Zafar is a 39 y.o. male returns for a follow up visit for multiple medical problems. His current presenting problems are   1. Chronic pain syndrome    2. Myofascial pain    3. Neuropathic pain    4. Ulcer of left foot, with fat layer exposed ([address]    5. Polyneuropathy associated with underlying disease ([address]    6. Pathological fracture of left tibia due to other osteoporosis, sequela    7. Peripheral polyneuropathy    8. Chronic migraine without aura, with intractable migraine, so stated, with status migrainosus    . As per information/history obtained from the PADT(patient assessment and documentation tool) - He complains of pain in the ankles Left with radiation to the feet Left He rates the pain 8/10 and describes it as aching.   Pain is made worse by: walking. Current treatment regimen has helped relieve about 10% of the pain. He denies side effects from the current pain regimen. Patient reports that since the last follow up visit the physical functioning is unchanged, family/social relationships are unchanged, mood is unchanged and sleep patterns are unchanged, and that the overall functioning is unchanged. Patient denies neurological bowel or bladder. Patient denies misusing/abusing his narcotic pain medications or using any illegal drugs. There are No indicators for possible drug abuse, addiction or diversion problems. Upon obtaining the medical history from Mr. Mil Lennon regarding today's office visit for his presenting problems, patient states he has been having increase headaches and migraines. Patient says he has been compliant with the medications. He states he missed his appointment and is out of the Ultram, he is still using Butrans. Patient states his sleep is fair. Has fairly normal sleep latency. Averages about 4-6 hours of sleep a night. Denies any signs of sleep apnea. Feels somewhat rested in the morning, he is using Zanaflex. Patient's  subjective report of his mood is fair. he describes occasional symptoms of depression, occasional  irritability and some mood swings. Describes his mood as being neutral and reports some pleasure in his daily activities. Reports  fair  appetite, energy and concentration. Able to function well in different aspects of his daily activities. Denies suicidal or homicidal ideation. Denies any complaints of increased tension, does   Worry sometimes and occasional  irritability  he denies any c/o increased anxiety, No c/o panic attacks or symptoms of PTSD, he is on Cymbalta 60 mg. He states he is using Topamax along with Aimovig, he is out of Aimovig for a month. Patient denies any constipation symptoms.        ALLERGIES/PAST MED/FAM/SOC HISTORY: Mr. Mil Lennon allergies, past medical, family and social history (BD INSULIN SYRINGE U/F) 31G X 5/16\" 0.5 ML MISC Inject 1 each into the skin 4 times daily DX CODE E 10.22 120 each 9    fluticasone (FLONASE) 50 MCG/ACT nasal spray SPRAY TWO SPRAYS IN EACH NOSTRIL DAILY 16 g 4    pantoprazole (PROTONIX) 40 MG tablet Take 1 tablet by mouth 2 times daily 60 tablet 0    hydrocortisone 2.5 % cream Apply topically 2 times daily. 30 g 0    Blood Glucose Monitoring Suppl (ACCU-CHEK CAPO PLUS) w/Device KIT 1 each by Does not apply route daily Test blood sugar 10 times daily Dx code E 10.8 1 kit 10    GLUCAGON EMERGENCY 1 MG injection INJECT 1MG INTO THE MUSCLE AS NEEDED 1 kit 3    Insulin Syringe-Needle U-100 (B-D INS SYR ULTRAFINE 1CC/31G) 31G X 5/16\" 1 ML MISC INJECT USING INSULIN ONCE DAILY 30 each 10    Multiple Vitamin (DAILY KITTY) TABS TAKE ONE TABLET BY MOUTH DAILY 30 tablet 5    Cholecalciferol (VITAMIN D3) 5000 units CAPS Take 1 capsule by mouth Daily 30 capsule 3    Dextromethorphan-Guaifenesin (MUCINEX DM)  MG TB12 Cough and congestion. 60 tablet 1    omega-3 acid ethyl esters (LOVAZA) 1 G capsule TAKE TWO CAPSULES BY MOUTH TWICE DAILY 120 capsule 5    MUCUS RELIEF DM  MG TABS TAKE ONE BY MOUTH DAILY AS NEEDED 30 tablet 1    Alcohol Swabs PADS Use as needed for insulin injection. 100 each 5    Probiotic Product (ACIDOPHILUS PROBIOTIC) CAPS capsule TAKE ONE CAPSULE BY MOUTH DAILY 28 capsule 4    polyvinyl alcohol (LIQUIFILM TEARS) 1.4 % ophthalmic solution 1 drop as needed      propranolol (INDERAL) 80 MG tablet Take 40 mg by mouth 2 times daily For migraines       Flaxseed, Linseed, (FLAX PO) Take 14 g by mouth daily.  gabapentin (NEURONTIN) 400 MG capsule Take one tablet Po BID 60 capsule 1    traMADol (ULTRAM) 50 MG tablet Take 1 tablet by mouth every 6 hours as needed for Pain (max 1-2 per day) for up to 28 days. 30 tablet 0     No facility-administered medications prior to visit.         REVIEW OF SYSTEMS:     Respiratory: Negative for shortness of breath. Cardiovascular: Negative for chest pain, palpitations  Gastrointestinal: Negative for blood in stool, abdominal distention, nausea, vomiting, abdominal pain, diarrhea,constipation. Neurological: Negative for speech difficulty, weakness and light-headedness, dizziness, tremors, sleepiness  Psychiatric/Behavioral: Negative for suicidal ideas, hallucinations, behavioral problems, self-injury, decreased concentration/cognition, agitation, confusion. PHYSICAL EXAM:   Nursing note and vitals reviewed. There were no vitals taken for this visit. General Appearance: Patient is well nourished, well developed, well groomed and in no acute distress. Skin: Skin is warm and dry, good turgor . No rash or lesions noted. He is not diaphoretic. Pulmonary/Chest: Effort normal. No respiratory distress or use of accessory muscles. Auscultation revealing normal air entry. He does not have wheezes in the lung fields. He has no rales. Cardiovascular: Normal rate, regular rhythm, normal heart sounds, and does not have murmur. Exam reveals no gallop and no friction rub. Musculoskeletal / Extremities: Range of motion is normal. Gait is normal, assistive devices use: none. He exhibits edema: none, and no tenderness. Neurological/Psychiatric:He is alert and oriented to person, place, and time. Coordination is  normal.   Judgement and Insight is normal  His mood is Appropriate and affect is Flat/blunted and Anxious . His behavior is normal.   thought content normal.        IMPRESSION:     1. Chronic migraine without aura, with intractable migraine, so stated, with status migrainosus - INADEQUATELY CONTROLLED: treatment plan adjusted as below    2. Chronic pain syndrome - INADEQUATELY CONTROLLED: treatment plan adjusted as below    3. Myofascial pain - INADEQUATELY CONTROLLED: treatment plan adjusted as below    4. Neuropathic pain  - STABLE: Continue current treatment plan   5.  Ulcer of left foot, with fat layer exposed ([facility]  - STABLE: Continue current treatment plan   6. Polyneuropathy associated with underlying disease ([facility]  - STABLE: Continue current treatment plan   7. Pathological fracture of left tibia due to other osteoporosis, sequela  - STABLE: Continue current treatment plan   8. Primary insomnia  - STABLE: Continue current treatment plan   9. Constipation due to opioid therapy  - STABLE: Continue current treatment plan   10. Peripheral polyneuropathy  - STABLE: Continue current treatment plan       PLAN:  Informed verbal consent was obtained.  -Discussed use, benefit, and side effects of prescribed medications. Barriers to medication compliance addressed. All patient questions answered. Pt voiced understanding.    -He was advised to increase fluids ( 5-7  glasses of fluid daily), limit caffeine, avoid cheese products, increase dietary fiber, increase activity and exercise as tolerated and relax regularly and enjoy meals   -restart Butrans along with Ultram 1-2 per day  -he was advised  to avoid using too many OTC analgesics to control the headaches, avoid chocolates, increased caffeine, cheeses and MSG nitrite containing foods, cigarette smoking. To avoid bright lights, strong smells and skipping meals.   -continue with Topamax and restart Aimovig  -CBT techniques- relaxation therapies such as biofeedback, mindfulness based stress reduction, imagery, cognitive restructuring, problem solving discussed with patient. Continue with Cymbalta   -continue with Neurontin 400 mg BID  -Interm history reviewed    -Adv Biofeedback, relaxation and meditation techniques. Referral to psychologist for CBT was also discussed with patient. Continue with Cymbalta   -Labs ordered CBC, CMP, and TSH.      Mr. Emelia Krabbe will be prescribed  the medications  listed below which are for treatment of his presenting  medical problems which for this visit include:   Diagnoses of Chronic pain syndrome, Myofascial pain, Neuropathic pain, Ulcer of left foot, with fat layer exposed ([address] Polyneuropathy associated with underlying disease ([facility] Pathological fracture of left tibia due to other osteoporosis, sequela, Peripheral polyneuropathy, and Chronic migraine without aura, with intractable migraine, so stated, with status migrainosus were pertinent to this visit. Medications/orders associated with this visit:    Current Outpatient Medications   Medication Sig Dispense Refill    Handicap Placard MISC by Does not apply route Diagnosis: Type 1 diabetes.      Expires 4/13/23. 1 each 0    Erenumab-aooe (AIMOVIG, 140 MG DOSE,) 70 MG/ML SOAJ Inject 140 mLs into the skin every 30 days 1 pen 1    topiramate (TOPAMAX) 100 MG tablet TAKE ONE TABLET BY MOUTH TWO TIMES A DAY 60 tablet 1    DULoxetine (CYMBALTA) 60 MG extended release capsule Take 1 capsule by mouth 2 times daily 60 capsule 1    SUMAtriptan (IMITREX) 50 MG tablet Take one tab po at the start of migraine PRN max 1 every 24 hours 9 tablet 0    LANTUS 100 UNIT/ML injection vial INJECT 30 UNITS UNDER THE SKIN NIGHTLY 1 vial 3    dicloxacillin (DYNAPEN) 500 MG capsule TAKE ONE CAPSULE BY MOUTH TWICE A DAY 60 capsule 4    BD INSULIN SYRINGE U/F 31G X 5/16\" 0.3 ML MISC USE ONE SYRINGE FOUR TIMES A DAY BEFORE MEALS AND ONCE NIGHTLY 120 each 9    LANTUS 100 UNIT/ML injection vial INJECT 32 UNITS UNDER THE SKIN ONCE NIGHTLY 1 vial 0    blood glucose test strips (ACCU-CHEK CAPO PLUS) strip 1 each by In Vitro route daily Test blood glucose 10 times daily Dx Code E10.8 300 each 10    Glucagon (GVOKE HYPOPEN 1-PACK) 1 MG/0.2ML SOAJ Use as needed for low sugar 1 Syringe 2    insulin glulisine (APIDRA) 100 UNIT/ML injection INJECT 8-12 UNITS UNDER THE SKIN THREE TIMES A DAY WITH MEALS 40 mL 3    blood glucose test strips (ACCU-CHEK CAPO PLUS) strip USE ONE STRIP TO TEST THREE TIMES A  strip 4    Accu-Chek FastClix Lancets MISC 1 each by Does not apply route daily Test blood glucose 10 times daily Dx Code E 10.8 900 each 2    magnesium 30 MG tablet Take 30 mg by mouth 2 times daily      triamcinolone (KENALOG) 0.1 % ointment Apply to thickened affected areas PRN sparingly for flares no longer than 2 weeks at one time, do not apply to cleared skin 80 g 0    Insulin Syringe-Needle U-100 (BD INSULIN SYRINGE U/F) 31G X 5/16\" 0.5 ML MISC Inject 1 each into the skin 4 times daily DX CODE E 10.22 120 each 9    fluticasone (FLONASE) 50 MCG/ACT nasal spray SPRAY TWO SPRAYS IN EACH NOSTRIL DAILY 16 g 4    pantoprazole (PROTONIX) 40 MG tablet Take 1 tablet by mouth 2 times daily 60 tablet 0    hydrocortisone 2.5 % cream Apply topically 2 times daily. 30 g 0    Blood Glucose Monitoring Suppl (ACCU-CHEK CAPO PLUS) w/Device KIT 1 each by Does not apply route daily Test blood sugar 10 times daily Dx code E 10.8 1 kit 10    GLUCAGON EMERGENCY 1 MG injection INJECT 1MG INTO THE MUSCLE AS NEEDED 1 kit 3    Insulin Syringe-Needle U-100 (B-D INS SYR ULTRAFINE 1CC/31G) 31G X 5/16\" 1 ML MISC INJECT USING INSULIN ONCE DAILY 30 each 10    Multiple Vitamin (DAILY KITTY) TABS TAKE ONE TABLET BY MOUTH DAILY 30 tablet 5    Cholecalciferol (VITAMIN D3) 5000 units CAPS Take 1 capsule by mouth Daily 30 capsule 3    Dextromethorphan-Guaifenesin (MUCINEX DM)  MG TB12 Cough and congestion. 60 tablet 1    omega-3 acid ethyl esters (LOVAZA) 1 G capsule TAKE TWO CAPSULES BY MOUTH TWICE DAILY 120 capsule 5    MUCUS RELIEF DM  MG TABS TAKE ONE BY MOUTH DAILY AS NEEDED 30 tablet 1    Alcohol Swabs PADS Use as needed for insulin injection. 100 each 5    Probiotic Product (ACIDOPHILUS PROBIOTIC) CAPS capsule TAKE ONE CAPSULE BY MOUTH DAILY 28 capsule 4    polyvinyl alcohol (LIQUIFILM TEARS) 1.4 % ophthalmic solution 1 drop as needed      propranolol (INDERAL) 80 MG tablet Take 40 mg by mouth 2 times daily For migraines       Flaxseed, Linseed, (FLAX PO) Take 14 g by mouth daily.         gabapentin current medications. Heis fully aware of the risk of overdose and death, if medicines are misused and not taken as prescribed. If he develops new symptoms or if the symptoms worsen, he was told to call the office. .  Thank you for allowing me to participate in the care of this patient.     Kianna Krishna MD.    Cc: Eusebia Berry MD

## 2021-05-02 ENCOUNTER — PATIENT MESSAGE (OUTPATIENT)
Dept: ENDOCRINOLOGY | Age: 46
End: 2021-05-02

## 2021-05-03 RX ORDER — GLUCAGON INJECTION, SOLUTION 1 MG/.2ML
INJECTION, SOLUTION SUBCUTANEOUS
Qty: 1 SYRINGE | Refills: 2 | Status: SHIPPED | OUTPATIENT
Start: 2021-05-03 | End: 2021-07-22 | Stop reason: SDUPTHER

## 2021-05-03 NOTE — TELEPHONE ENCOUNTER
From: Low Pickering  To: Eder Benoit MD  Sent: 5/2/2021 2:01 PM EDT  Subject: Prescription Question    Need new prescription for Gvoke HypoPen (glucagon injection) I can get it for once a month.

## 2021-05-10 ENCOUNTER — TELEPHONE (OUTPATIENT)
Dept: PAIN MANAGEMENT | Age: 46
End: 2021-05-10

## 2021-05-26 ENCOUNTER — TELEPHONE (OUTPATIENT)
Dept: PAIN MANAGEMENT | Age: 46
End: 2021-05-26

## 2021-05-26 ENCOUNTER — VIRTUAL VISIT (OUTPATIENT)
Dept: PAIN MANAGEMENT | Age: 46
End: 2021-05-26
Payer: MEDICARE

## 2021-05-26 DIAGNOSIS — M79.18 MYOFASCIAL PAIN: ICD-10-CM

## 2021-05-26 DIAGNOSIS — L97.522 ULCER OF LEFT FOOT, WITH FAT LAYER EXPOSED (HCC): ICD-10-CM

## 2021-05-26 DIAGNOSIS — G89.4 CHRONIC PAIN SYNDROME: ICD-10-CM

## 2021-05-26 DIAGNOSIS — M79.2 NEUROPATHIC PAIN: ICD-10-CM

## 2021-05-26 DIAGNOSIS — M80.862S PATHOLOGICAL FRACTURE OF LEFT TIBIA DUE TO OTHER OSTEOPOROSIS, SEQUELA: ICD-10-CM

## 2021-05-26 DIAGNOSIS — G43.711 CHRONIC MIGRAINE WITHOUT AURA, WITH INTRACTABLE MIGRAINE, SO STATED, WITH STATUS MIGRAINOSUS: ICD-10-CM

## 2021-05-26 DIAGNOSIS — G63 POLYNEUROPATHY ASSOCIATED WITH UNDERLYING DISEASE (HCC): ICD-10-CM

## 2021-05-26 DIAGNOSIS — G62.9 PERIPHERAL POLYNEUROPATHY: ICD-10-CM

## 2021-05-26 PROCEDURE — 99213 OFFICE O/P EST LOW 20 MIN: CPT | Performed by: INTERNAL MEDICINE

## 2021-05-26 RX ORDER — TRAMADOL HYDROCHLORIDE 50 MG/1
50 TABLET ORAL EVERY 6 HOURS PRN
Qty: 30 TABLET | Refills: 0 | Status: SHIPPED | OUTPATIENT
Start: 2021-05-26 | End: 2021-06-21 | Stop reason: SDUPTHER

## 2021-05-26 NOTE — TELEPHONE ENCOUNTER
Patient states he had appt today at 2:30 and said his phone did not ring and hes not sure why? . Patient was informed that he will need to make f/u appt but wanted me to pass this information to the back office.  Pl's advise

## 2021-05-26 NOTE — PROGRESS NOTES
TELE HEALTH VISIT (AUDIO-VISUAL)    Pursuant to the emergency declaration under the [facility] waiver authority and the [employer] Act, this Virtual  Visit was conducted, with patient's/legal guardian's consent, to reduce the patient's risk of exposure to COVID-19 and provide continuity of care for an established patient. Service is  provided through a video synchronous discussion virtually to substitute for in-person clinic visit. Due to this being a TeleHealth encounter (During [facility]-03 public health emergency), evaluation of the following organ systems was limited: Vitals/Constitutional/EENT/Resp/CV/GI//MS/Neuro/Skin/Wfvi-Wlkb-Uix. Jesus Toscano  1975  [id number]    Mr. Carson is being seen virtually for a follow up visit using one of the following techniques  Google Duo Face time Doxy. me  Informed verbal consent to the virtual visit was obtained from Mr. Ronn Putnam. Risks associated with HIPPA compliance with the virtual visit was explained to the patient. Mr. Ronn Putnam is at his residence and Dr. Max Stanley is in his office. HISTORY OF PRESENT ILLNESS:  Mr. Ronn Putnam is a 39 y.o. male returns for a follow up visit for multiple medical problems. His current presenting problems are   1. Chronic pain syndrome    2. Chronic migraine without aura, with intractable migraine, so stated, with status migrainosus    3. Myofascial pain    4. Neuropathic pain    5. Ulcer of left foot, with fat layer exposed ([address]    6. Polyneuropathy associated with underlying disease ([address]    7. Pathological fracture of left tibia due to other osteoporosis, sequela    .     As per information/history obtained from the PADT(patient assessment and documentation tool) - He complains of pain in the lower back with radiation to the hips Left, upper leg Left, knees Left, lower leg Left, ankles Left and feet Left He rates the pain 8/10 and describes it as aching. Pain is made worse by: movement, walking, standing, bending, lifting. Current treatment regimen has helped relieve about 10% of the pain. He denies side effects from the current pain regimen. Patient reports that since the last follow up visit the physical functioning is unchanged, family/social relationships are unchanged, mood is unchanged and sleep patterns are unchanged, and that the overall functioning is unchanged. Patient denies neurological bowel or bladder. Patient denies misusing/abusing his narcotic pain medications or using any illegal drugs. There are No indicators for possible drug abuse, addiction or diversion problems. Upon obtaining the medical history from Mr. Dayanna Yip regarding today's office visit for his presenting problems, patient states he has been doing about the same. He says his left leg has been hurting along with the back. Patient denies any side effects with the medications. He mentions he is using Butrans along with Ultram 1 per day PRN. Patient denies any constipation symptoms or cognitive side effects. ALLERGIES: Patients list of allergies were reviewed     MEDICATIONS: Mr. Dayanna Yip list of medications were reviewed. His current medications are   Outpatient Medications Prior to Visit   Medication Sig Dispense Refill    Glucagon (Annabel Coats 1-PACK) 1 MG/0.2ML SOAJ Use as needed for low sugar 1 Syringe 2    traMADol (ULTRAM) 50 MG tablet Take 1 tablet by mouth every 6 hours as needed for Pain (max 1-2 per day) for up to 28 days.  30 tablet 0    gabapentin (NEURONTIN) 400 MG capsule Take one tablet Po BID 60 capsule 1    SUMAtriptan (IMITREX) 50 MG tablet Take one tab po at the start of migraine PRN max 1 every 24 hours 9 tablet 0    DULoxetine (CYMBALTA) 60 MG extended release capsule Take 1 capsule by mouth 2 times daily 60 capsule 1    topiramate (TOPAMAX) 100 MG tablet TAKE ONE TABLET BY MOUTH TWO TIMES A DAY 60 tablet 1    Erenumab-aooe (AIMOVIG, 140 MG DOSE,) 70 MG/ML SOAJ Inject 140 mLs into the skin every 30 days 1 pen 1    buprenorphine (BUTRANS) 5 MCG/HR PTWK Place 1 patch onto the skin every 7 days for 28 days. 4 patch 0    Handicap Placard MISC by Does not apply route Diagnosis: Type 1 diabetes. Expires 4/13/23. 1 each 0    LANTUS 100 UNIT/ML injection vial INJECT 30 UNITS UNDER THE SKIN NIGHTLY 1 vial 3    dicloxacillin (DYNAPEN) 500 MG capsule TAKE ONE CAPSULE BY MOUTH TWICE A DAY 60 capsule 4    BD INSULIN SYRINGE U/F 31G X 5/16\" 0.3 ML MISC USE ONE SYRINGE FOUR TIMES A DAY BEFORE MEALS AND ONCE NIGHTLY 120 each 9    LANTUS 100 UNIT/ML injection vial INJECT 32 UNITS UNDER THE SKIN ONCE NIGHTLY 1 vial 0    blood glucose test strips (ACCU-CHEK CAPO PLUS) strip 1 each by In Vitro route daily Test blood glucose 10 times daily Dx Code E10.8 300 each 10    insulin glulisine (APIDRA) 100 UNIT/ML injection INJECT 8-12 UNITS UNDER THE SKIN THREE TIMES A DAY WITH MEALS 40 mL 3    blood glucose test strips (ACCU-CHEK CAPO PLUS) strip USE ONE STRIP TO TEST THREE TIMES A  strip 4    Accu-Chek FastClix Lancets MISC 1 each by Does not apply route daily Test blood glucose 10 times daily Dx Code E 10.8 900 each 2    magnesium 30 MG tablet Take 30 mg by mouth 2 times daily      triamcinolone (KENALOG) 0.1 % ointment Apply to thickened affected areas PRN sparingly for flares no longer than 2 weeks at one time, do not apply to cleared skin 80 g 0    Insulin Syringe-Needle U-100 (BD INSULIN SYRINGE U/F) 31G X 5/16\" 0.5 ML MISC Inject 1 each into the skin 4 times daily DX CODE E 10.22 120 each 9    fluticasone (FLONASE) 50 MCG/ACT nasal spray SPRAY TWO SPRAYS IN EACH NOSTRIL DAILY 16 g 4    pantoprazole (PROTONIX) 40 MG tablet Take 1 tablet by mouth 2 times daily 60 tablet 0    hydrocortisone 2.5 % cream Apply topically 2 times daily.  30 g 0    Blood Glucose Monitoring Suppl (ACCU-CHEK CAPO PLUS) w/Device KIT 1 each by Does not apply route daily Test blood sugar 10 times daily Dx code E 10.8 1 kit 10    GLUCAGON EMERGENCY 1 MG injection INJECT 1MG INTO THE MUSCLE AS NEEDED 1 kit 3    Insulin Syringe-Needle U-100 (B-D INS SYR ULTRAFINE 1CC/31G) 31G X 5/16\" 1 ML MISC INJECT USING INSULIN ONCE DAILY 30 each 10    Multiple Vitamin (DAILY KITTY) TABS TAKE ONE TABLET BY MOUTH DAILY 30 tablet 5    Cholecalciferol (VITAMIN D3) 5000 units CAPS Take 1 capsule by mouth Daily 30 capsule 3    Dextromethorphan-Guaifenesin (MUCINEX DM)  MG TB12 Cough and congestion. 60 tablet 1    omega-3 acid ethyl esters (LOVAZA) 1 G capsule TAKE TWO CAPSULES BY MOUTH TWICE DAILY 120 capsule 5    MUCUS RELIEF DM  MG TABS TAKE ONE BY MOUTH DAILY AS NEEDED 30 tablet 1    Alcohol Swabs PADS Use as needed for insulin injection. 100 each 5    Probiotic Product (ACIDOPHILUS PROBIOTIC) CAPS capsule TAKE ONE CAPSULE BY MOUTH DAILY 28 capsule 4    polyvinyl alcohol (LIQUIFILM TEARS) 1.4 % ophthalmic solution 1 drop as needed      propranolol (INDERAL) 80 MG tablet Take 40 mg by mouth 2 times daily For migraines       Flaxseed, Linseed, (FLAX PO) Take 14 g by mouth daily. No facility-administered medications prior to visit. REVIEW OF SYSTEMS:    Respiratory: Negative for apnea, chest tightness and shortness of breath or change in baseline breathing. PHYSICAL EXAM:   Nursing note and vitals reviewed. There were no vitals taken for this visit. Constitutional: He appears well-developed and well-nourished. No acute distress. Cardiovascular: Normal rate, regular rhythm, normal heart sounds, and does not have murmur. Pulmonary/Chest: Effort normal. No respiratory distress. He does not have wheezes in the lung fields. He has no rales. Neurological/Psychiatric:He is alert and oriented to person, place, and time. Coordination is  normal.  His mood isAppropriate and affect is Flat/blunted . His    IMPRESSION:   1.  Chronic pain syndrome 2. Chronic migraine without aura, with intractable migraine, so stated, with status migrainosus    3. Myofascial pain    4. Neuropathic pain    5. Ulcer of left foot, with fat layer exposed ([address]    6. Polyneuropathy associated with underlying disease ([address]    7. Pathological fracture of left tibia due to other osteoporosis, sequela    8. Peripheral polyneuropathy        PLAN:  Informed verbal consent was obtained  -[place] record was obtained and reviewed  for the last one year and no indicators of drug misuse  were found. Any other controlled substance prescriptions  seen on the record have been accounted for, I am aware of the patient receiving these medications. Rah Diaz [place] record will be rechecked as part of office protocol. -ROM/Stretching exercises as advised   -continue with Butrans along with Ultram 1 per day  -He was advised to increase fluids ( 5-7  glasses of fluid daily), limit caffeine, avoid cheese products, increase dietary fiber, increase activity and exercise as tolerated and relax regularly and enjoy meals      Current Outpatient Medications   Medication Sig Dispense Refill    Glucagon (GVOKE HYPOPEN 1-PACK) 1 MG/0.2ML SOAJ Use as needed for low sugar 1 Syringe 2    traMADol (ULTRAM) 50 MG tablet Take 1 tablet by mouth every 6 hours as needed for Pain (max 1-2 per day) for up to 28 days. 30 tablet 0    gabapentin (NEURONTIN) 400 MG capsule Take one tablet Po BID 60 capsule 1    SUMAtriptan (IMITREX) 50 MG tablet Take one tab po at the start of migraine PRN max 1 every 24 hours 9 tablet 0    DULoxetine (CYMBALTA) 60 MG extended release capsule Take 1 capsule by mouth 2 times daily 60 capsule 1    topiramate (TOPAMAX) 100 MG tablet TAKE ONE TABLET BY MOUTH TWO TIMES A DAY 60 tablet 1    Erenumab-aooe (AIMOVIG, 140 MG DOSE,) 70 MG/ML SOAJ Inject 140 mLs into the skin every 30 days 1 pen 1    buprenorphine (BUTRANS) 5 MCG/HR PTWK Place 1 patch onto the skin every 7 days for 28 days.  4 patch 0 INS SYR ULTRAFINE 1CC/31G) 31G X 5/16\" 1 ML MISC INJECT USING INSULIN ONCE DAILY 30 each 10    Multiple Vitamin (DAILY KITTY) TABS TAKE ONE TABLET BY MOUTH DAILY 30 tablet 5    Cholecalciferol (VITAMIN D3) 5000 units CAPS Take 1 capsule by mouth Daily 30 capsule 3    Dextromethorphan-Guaifenesin (MUCINEX DM)  MG TB12 Cough and congestion. 60 tablet 1    omega-3 acid ethyl esters (LOVAZA) 1 G capsule TAKE TWO CAPSULES BY MOUTH TWICE DAILY 120 capsule 5    MUCUS RELIEF DM  MG TABS TAKE ONE BY MOUTH DAILY AS NEEDED 30 tablet 1    Alcohol Swabs PADS Use as needed for insulin injection. 100 each 5    Probiotic Product (ACIDOPHILUS PROBIOTIC) CAPS capsule TAKE ONE CAPSULE BY MOUTH DAILY 28 capsule 4    polyvinyl alcohol (LIQUIFILM TEARS) 1.4 % ophthalmic solution 1 drop as needed      propranolol (INDERAL) 80 MG tablet Take 40 mg by mouth 2 times daily For migraines       Flaxseed, Linseed, (FLAX PO) Take 14 g by mouth daily. No current facility-administered medications for this visit. I will continue his current medication regimen  which is part of the above treatment schedule. It has been helping with Mr. Karlie Hernandez chronic  medical problems which for this visit include:   Diagnoses of Chronic pain syndrome, Chronic migraine without aura, with intractable migraine, so stated, with status migrainosus, Myofascial pain, Neuropathic pain, Ulcer of left foot, with fat layer exposed ([address] Polyneuropathy associated with underlying disease ([address] and Pathological fracture of left tibia due to other osteoporosis, sequela were pertinent to this visit. Risks and benefits of the medications and other alternative treatments  including no treatment were discussed with the patient. The common side effects of these medications were also explained to the patient. Informed verbal consent was obtained.    Goals of current treatment regimen include improvement in pain, restoration of functioning- with focus on improvement in physical performance, general activity, work or disability,emotional distress, health care utilization and  decreased medication consumption. Will continue to monitor progress towards achieving/maintaining therapeutic goals with special emphasis on  1. Improvement in perceived interfernce  of pain with ADL's. Ability to do home exercises independently. Ability to do household chores indoor and/or outdoor work and social and leisure activities. Improve psychosocial and physical functioning. - he is showing progression towards this treatment goal with the current regimen. He was advised against drinking alcohol with the narcotic pain medicines, advised against driving or handling machinery while adjusting the dose of medicines or if having cognitive  issues related to the current medications. Risk of overdose and death, if medicines not taken as prescribed, were also discussed. If the patient develops new symptoms or if the symptoms worsen, the patient should call the office. While transcribing every attempt was made to maintain the accuracy of the note in terms of it's contents,there may have been some errors made inadvertently. Thank you for allowing me to participate in the care of this patient.     Dakota Braga MD.    Cc: Lila Spear MD

## 2021-06-03 DIAGNOSIS — N18.30 TYPE 1 DIABETES MELLITUS WITH STAGE 3 CHRONIC KIDNEY DISEASE (HCC): ICD-10-CM

## 2021-06-03 DIAGNOSIS — E10.22 TYPE 1 DIABETES MELLITUS WITH STAGE 3 CHRONIC KIDNEY DISEASE (HCC): ICD-10-CM

## 2021-06-03 RX ORDER — INSULIN GLARGINE 100 [IU]/ML
INJECTION, SOLUTION SUBCUTANEOUS
Qty: 1 VIAL | Refills: 2 | Status: SHIPPED | OUTPATIENT
Start: 2021-06-03 | End: 2021-08-31

## 2021-06-03 NOTE — TELEPHONE ENCOUNTER
Requested Prescriptions     Pending Prescriptions Disp Refills    LANTUS 100 UNIT/ML injection vial [Pharmacy Med Name: LANTUS 100 UNIT/ML VIAL] 1 vial 2     Sig: INJECT UNDER THE SKIN 30 UNITS NIGHTLY         FOV: 07/06/2021    LOV:  04/06/2021

## 2021-06-10 ENCOUNTER — TELEPHONE (OUTPATIENT)
Dept: INTERNAL MEDICINE CLINIC | Age: 46
End: 2021-06-10

## 2021-06-21 ENCOUNTER — OFFICE VISIT (OUTPATIENT)
Dept: PAIN MANAGEMENT | Age: 46
End: 2021-06-21

## 2021-06-21 VITALS
DIASTOLIC BLOOD PRESSURE: 69 MMHG | HEART RATE: 100 BPM | TEMPERATURE: 97.2 F | SYSTOLIC BLOOD PRESSURE: 102 MMHG | WEIGHT: 221 LBS | BODY MASS INDEX: 28.37 KG/M2 | OXYGEN SATURATION: 97 %

## 2021-06-21 DIAGNOSIS — M79.18 MYOFASCIAL PAIN: ICD-10-CM

## 2021-06-21 DIAGNOSIS — G89.4 CHRONIC PAIN SYNDROME: ICD-10-CM

## 2021-06-21 DIAGNOSIS — M79.2 NEUROPATHIC PAIN: ICD-10-CM

## 2021-06-21 DIAGNOSIS — G62.9 PERIPHERAL POLYNEUROPATHY: ICD-10-CM

## 2021-06-21 DIAGNOSIS — G63 POLYNEUROPATHY ASSOCIATED WITH UNDERLYING DISEASE (HCC): ICD-10-CM

## 2021-06-21 DIAGNOSIS — G43.711 CHRONIC MIGRAINE WITHOUT AURA, WITH INTRACTABLE MIGRAINE, SO STATED, WITH STATUS MIGRAINOSUS: ICD-10-CM

## 2021-06-21 DIAGNOSIS — M80.862S PATHOLOGICAL FRACTURE OF LEFT TIBIA DUE TO OTHER OSTEOPOROSIS, SEQUELA: ICD-10-CM

## 2021-06-21 DIAGNOSIS — L97.522 ULCER OF LEFT FOOT, WITH FAT LAYER EXPOSED (HCC): ICD-10-CM

## 2021-06-21 PROCEDURE — 99213 OFFICE O/P EST LOW 20 MIN: CPT | Performed by: INTERNAL MEDICINE

## 2021-06-21 RX ORDER — SUMATRIPTAN 50 MG/1
TABLET, FILM COATED ORAL
Qty: 9 TABLET | Refills: 0 | Status: SHIPPED | OUTPATIENT
Start: 2021-06-21 | End: 2021-09-16 | Stop reason: SDUPTHER

## 2021-06-21 RX ORDER — TOPIRAMATE 100 MG/1
TABLET, FILM COATED ORAL
Qty: 60 TABLET | Refills: 1 | Status: SHIPPED | OUTPATIENT
Start: 2021-06-21 | End: 2021-11-11 | Stop reason: SDUPTHER

## 2021-06-21 RX ORDER — TIZANIDINE 4 MG/1
2-4 TABLET ORAL 2 TIMES DAILY
Qty: 60 TABLET | Refills: 1 | Status: SHIPPED | OUTPATIENT
Start: 2021-06-21 | End: 2021-10-14 | Stop reason: SDUPTHER

## 2021-06-21 RX ORDER — ERENUMAB-AOOE 70 MG/ML
140 INJECTION SUBCUTANEOUS
Qty: 1 PEN | Refills: 1 | Status: SHIPPED | OUTPATIENT
Start: 2021-06-21 | End: 2021-09-16 | Stop reason: SDUPTHER

## 2021-06-21 RX ORDER — DULOXETIN HYDROCHLORIDE 60 MG/1
60 CAPSULE, DELAYED RELEASE ORAL DAILY
Qty: 30 CAPSULE | Refills: 1 | Status: SHIPPED | OUTPATIENT
Start: 2021-06-21 | End: 2021-10-14 | Stop reason: SDUPTHER

## 2021-06-21 RX ORDER — GABAPENTIN 400 MG/1
CAPSULE ORAL
Qty: 60 CAPSULE | Refills: 1 | Status: SHIPPED | OUTPATIENT
Start: 2021-06-21 | End: 2021-10-14 | Stop reason: SDUPTHER

## 2021-06-21 RX ORDER — BUPRENORPHINE 5 UG/H
1 PATCH TRANSDERMAL
Qty: 4 PATCH | Refills: 0 | Status: SHIPPED | OUTPATIENT
Start: 2021-06-21 | End: 2021-07-19

## 2021-06-21 RX ORDER — TRAMADOL HYDROCHLORIDE 50 MG/1
50 TABLET ORAL EVERY 6 HOURS PRN
Qty: 30 TABLET | Refills: 0 | Status: SHIPPED | OUTPATIENT
Start: 2021-06-21 | End: 2021-09-16 | Stop reason: SDUPTHER

## 2021-06-21 NOTE — PROGRESS NOTES
Chidi Umana  1975  [id number]      HISTORY OF PRESENT ILLNESS:  Mr. Fawad Kendall is a 39 y.o. male returns for a follow up visit for pain management  He has a diagnosis of   1. Chronic pain syndrome    2. Chronic migraine without aura, with intractable migraine, so stated, with status migrainosus    3. Neuropathic pain    4. Myofascial pain    5. Ulcer of left foot, with fat layer exposed ([facility]    6. Polyneuropathy associated with underlying disease ([facility]    7. Pathological fracture of left tibia due to other osteoporosis, sequela    8. Peripheral polyneuropathy    . He complains of pain in the Head, bilateral hands, left knee, left lower leg and foot  He rates the pain 10/10 and describes it as sharp, aching. Current treatment regimen has helped relieve about 10% of the pain. He denies any side effects from the current pain regimen. Patient reports that since the last follow up visit the physical functioning is unchanged, family/social relationships are unchanged, mood is unchanged sleep patterns are better, and that the overall functioning is better. Patient denies misusing/abusing his narcotic pain medications or using any illegal drugs. There are No indicators for possible drug abuse, addiction or diversion problems. Patient states he has been doing about the same. He mentions he had episodes of Hypoglycemia, but goes up to 300. He mentions he has been complaint with the medication. He complains of some GI issues and nausea sometimes. He reports he has been using Ultram along with Butrans. ALLERGIES: Patients list of allergies were reviewed     MEDICATIONS: Mr. Fawad Kendall list of medications were reviewed. His current medications are   Outpatient Medications Prior to Visit   Medication Sig Dispense Refill    LANTUS 100 UNIT/ML injection vial INJECT UNDER THE SKIN 30 UNITS NIGHTLY 1 vial 2    traMADol (ULTRAM) 50 MG tablet Take 1 tablet by mouth every 6 hours as needed for Pain (max 1-2 per day) for up to 28 days. 30 tablet 0    Glucagon (GVOKE HYPOPEN 1-PACK) 1 MG/0.2ML SOAJ Use as needed for low sugar 1 Syringe 2    gabapentin (NEURONTIN) 400 MG capsule Take one tablet Po BID 60 capsule 1    SUMAtriptan (IMITREX) 50 MG tablet Take one tab po at the start of migraine PRN max 1 every 24 hours 9 tablet 0    DULoxetine (CYMBALTA) 60 MG extended release capsule Take 1 capsule by mouth 2 times daily 60 capsule 1    topiramate (TOPAMAX) 100 MG tablet TAKE ONE TABLET BY MOUTH TWO TIMES A DAY 60 tablet 1    Erenumab-aooe (AIMOVIG, 140 MG DOSE,) 70 MG/ML SOAJ Inject 140 mLs into the skin every 30 days 1 pen 1    Handicap Placard MISC by Does not apply route Diagnosis: Type 1 diabetes.      Expires 4/13/23. 1 each 0    dicloxacillin (DYNAPEN) 500 MG capsule TAKE ONE CAPSULE BY MOUTH TWICE A DAY 60 capsule 4    BD INSULIN SYRINGE U/F 31G X 5/16\" 0.3 ML MISC USE ONE SYRINGE FOUR TIMES A DAY BEFORE MEALS AND ONCE NIGHTLY 120 each 9    LANTUS 100 UNIT/ML injection vial INJECT 32 UNITS UNDER THE SKIN ONCE NIGHTLY 1 vial 0    blood glucose test strips (ACCU-CHEK CAPO PLUS) strip 1 each by In Vitro route daily Test blood glucose 10 times daily Dx Code E10.8 300 each 10    insulin glulisine (APIDRA) 100 UNIT/ML injection INJECT 8-12 UNITS UNDER THE SKIN THREE TIMES A DAY WITH MEALS 40 mL 3    blood glucose test strips (ACCU-CHEK CAPO PLUS) strip USE ONE STRIP TO TEST THREE TIMES A  strip 4    Accu-Chek FastClix Lancets MISC 1 each by Does not apply route daily Test blood glucose 10 times daily Dx Code E 10.8 900 each 2    magnesium 30 MG tablet Take 30 mg by mouth 2 times daily      triamcinolone (KENALOG) 0.1 % ointment Apply to thickened affected areas PRN sparingly for flares no longer than 2 weeks at one time, do not apply to cleared skin 80 g 0    Insulin Syringe-Needle U-100 (BD INSULIN SYRINGE U/F) 31G X 5/16\" 0.5 ML MISC Inject 1 each into the skin 4 times daily DX CODE E 10.22 120 each 9    fluticasone (FLONASE) 50 MCG/ACT nasal spray SPRAY TWO SPRAYS IN EACH NOSTRIL DAILY 16 g 4    pantoprazole (PROTONIX) 40 MG tablet Take 1 tablet by mouth 2 times daily 60 tablet 0    hydrocortisone 2.5 % cream Apply topically 2 times daily. 30 g 0    Blood Glucose Monitoring Suppl (ACCU-CHEK CAPO PLUS) w/Device KIT 1 each by Does not apply route daily Test blood sugar 10 times daily Dx code E 10.8 1 kit 10    GLUCAGON EMERGENCY 1 MG injection INJECT 1MG INTO THE MUSCLE AS NEEDED 1 kit 3    Insulin Syringe-Needle U-100 (B-D INS SYR ULTRAFINE 1CC/31G) 31G X 5/16\" 1 ML MISC INJECT USING INSULIN ONCE DAILY 30 each 10    Multiple Vitamin (DAILY KITTY) TABS TAKE ONE TABLET BY MOUTH DAILY 30 tablet 5    Cholecalciferol (VITAMIN D3) 5000 units CAPS Take 1 capsule by mouth Daily 30 capsule 3    Dextromethorphan-Guaifenesin (MUCINEX DM)  MG TB12 Cough and congestion. 60 tablet 1    omega-3 acid ethyl esters (LOVAZA) 1 G capsule TAKE TWO CAPSULES BY MOUTH TWICE DAILY 120 capsule 5    MUCUS RELIEF DM  MG TABS TAKE ONE BY MOUTH DAILY AS NEEDED 30 tablet 1    Alcohol Swabs PADS Use as needed for insulin injection. 100 each 5    Probiotic Product (ACIDOPHILUS PROBIOTIC) CAPS capsule TAKE ONE CAPSULE BY MOUTH DAILY 28 capsule 4    polyvinyl alcohol (LIQUIFILM TEARS) 1.4 % ophthalmic solution 1 drop as needed      propranolol (INDERAL) 80 MG tablet Take 40 mg by mouth 2 times daily For migraines       Flaxseed, Linseed, (FLAX PO) Take 14 g by mouth daily. No facility-administered medications prior to visit. REVIEW OF SYSTEMS:    Respiratory: Negative for apnea, chest tightness and shortness of breath or change in baseline breathing. PHYSICAL EXAM:   Nursing note and vitals reviewed. /69   Pulse 100   Temp 97.2 °F (36.2 °C) (Infrared)   Wt 221 lb (100.2 kg)   SpO2 97%   BMI 28.37 kg/m²   Constitutional: He appears well-developed and well-nourished. No acute distress. SUMAtriptan (IMITREX) 50 MG tablet Take one tab po at the start of migraine PRN max 1 every 24 hours 9 tablet 0    DULoxetine (CYMBALTA) 60 MG extended release capsule Take 1 capsule by mouth 2 times daily 60 capsule 1    topiramate (TOPAMAX) 100 MG tablet TAKE ONE TABLET BY MOUTH TWO TIMES A DAY 60 tablet 1    Erenumab-aooe (AIMOVIG, 140 MG DOSE,) 70 MG/ML SOAJ Inject 140 mLs into the skin every 30 days 1 pen 1    Handicap Placard MISC by Does not apply route Diagnosis: Type 1 diabetes.      Expires 4/13/23. 1 each 0    dicloxacillin (DYNAPEN) 500 MG capsule TAKE ONE CAPSULE BY MOUTH TWICE A DAY 60 capsule 4    BD INSULIN SYRINGE U/F 31G X 5/16\" 0.3 ML MISC USE ONE SYRINGE FOUR TIMES A DAY BEFORE MEALS AND ONCE NIGHTLY 120 each 9    LANTUS 100 UNIT/ML injection vial INJECT 32 UNITS UNDER THE SKIN ONCE NIGHTLY 1 vial 0    blood glucose test strips (ACCU-CHEK CAPO PLUS) strip 1 each by In Vitro route daily Test blood glucose 10 times daily Dx Code E10.8 300 each 10    insulin glulisine (APIDRA) 100 UNIT/ML injection INJECT 8-12 UNITS UNDER THE SKIN THREE TIMES A DAY WITH MEALS 40 mL 3    blood glucose test strips (ACCU-CHEK CAPO PLUS) strip USE ONE STRIP TO TEST THREE TIMES A  strip 4    Accu-Chek FastClix Lancets MISC 1 each by Does not apply route daily Test blood glucose 10 times daily Dx Code E 10.8 900 each 2    magnesium 30 MG tablet Take 30 mg by mouth 2 times daily      triamcinolone (KENALOG) 0.1 % ointment Apply to thickened affected areas PRN sparingly for flares no longer than 2 weeks at one time, do not apply to cleared skin 80 g 0    Insulin Syringe-Needle U-100 (BD INSULIN SYRINGE U/F) 31G X 5/16\" 0.5 ML MISC Inject 1 each into the skin 4 times daily DX CODE E 10.22 120 each 9    fluticasone (FLONASE) 50 MCG/ACT nasal spray SPRAY TWO SPRAYS IN EACH NOSTRIL DAILY 16 g 4    pantoprazole (PROTONIX) 40 MG tablet Take 1 tablet by mouth 2 times daily 60 tablet 0    hydrocortisone 2.5 % cream Apply topically 2 times daily. 30 g 0    Blood Glucose Monitoring Suppl (ACCU-CHEK CAPO PLUS) w/Device KIT 1 each by Does not apply route daily Test blood sugar 10 times daily Dx code E 10.8 1 kit 10    GLUCAGON EMERGENCY 1 MG injection INJECT 1MG INTO THE MUSCLE AS NEEDED 1 kit 3    Insulin Syringe-Needle U-100 (B-D INS SYR ULTRAFINE 1CC/31G) 31G X 5/16\" 1 ML MISC INJECT USING INSULIN ONCE DAILY 30 each 10    Multiple Vitamin (DAILY KITTY) TABS TAKE ONE TABLET BY MOUTH DAILY 30 tablet 5    Cholecalciferol (VITAMIN D3) 5000 units CAPS Take 1 capsule by mouth Daily 30 capsule 3    Dextromethorphan-Guaifenesin (MUCINEX DM)  MG TB12 Cough and congestion. 60 tablet 1    omega-3 acid ethyl esters (LOVAZA) 1 G capsule TAKE TWO CAPSULES BY MOUTH TWICE DAILY 120 capsule 5    MUCUS RELIEF DM  MG TABS TAKE ONE BY MOUTH DAILY AS NEEDED 30 tablet 1    Alcohol Swabs PADS Use as needed for insulin injection. 100 each 5    Probiotic Product (ACIDOPHILUS PROBIOTIC) CAPS capsule TAKE ONE CAPSULE BY MOUTH DAILY 28 capsule 4    polyvinyl alcohol (LIQUIFILM TEARS) 1.4 % ophthalmic solution 1 drop as needed      propranolol (INDERAL) 80 MG tablet Take 40 mg by mouth 2 times daily For migraines       Flaxseed, Linseed, (FLAX PO) Take 14 g by mouth daily. No current facility-administered medications for this visit. I will continue his current medication regimen  which is part of the above treatment schedule.  It has been helping with Mr. Loida Finney chronic  medical problems which for this visit include:   Diagnoses of Chronic pain syndrome, Chronic migraine without aura, with intractable migraine, so stated, with status migrainosus, Neuropathic pain, Myofascial pain, Ulcer of left foot, with fat layer exposed ([address] Polyneuropathy associated with underlying disease ([address] Pathological fracture of left tibia due to other osteoporosis, sequela, and Peripheral polyneuropathy were pertinent to this

## 2021-06-23 ENCOUNTER — PATIENT MESSAGE (OUTPATIENT)
Dept: ENDOCRINOLOGY | Age: 46
End: 2021-06-23

## 2021-07-06 ENCOUNTER — OFFICE VISIT (OUTPATIENT)
Dept: ENDOCRINOLOGY | Age: 46
End: 2021-07-06

## 2021-07-06 VITALS
HEIGHT: 74 IN | DIASTOLIC BLOOD PRESSURE: 64 MMHG | HEART RATE: 92 BPM | OXYGEN SATURATION: 98 % | WEIGHT: 225.6 LBS | BODY MASS INDEX: 28.95 KG/M2 | SYSTOLIC BLOOD PRESSURE: 119 MMHG

## 2021-07-06 LAB — HBA1C MFR BLD: 9.5 %

## 2021-07-06 PROCEDURE — 99214 OFFICE O/P EST MOD 30 MIN: CPT | Performed by: INTERNAL MEDICINE

## 2021-07-06 PROCEDURE — 83036 HEMOGLOBIN GLYCOSYLATED A1C: CPT | Performed by: INTERNAL MEDICINE

## 2021-07-06 RX ORDER — ERENUMAB-AOOE 140 MG/ML
INJECTION, SOLUTION SUBCUTANEOUS
COMMUNITY
Start: 2021-05-27 | End: 2021-07-06 | Stop reason: ALTCHOICE

## 2021-07-06 RX ORDER — BLOOD SUGAR DIAGNOSTIC
STRIP MISCELLANEOUS
Qty: 150 EACH | Refills: 3 | Status: SHIPPED | OUTPATIENT
Start: 2021-07-06

## 2021-07-06 RX ORDER — DULAGLUTIDE 0.75 MG/.5ML
0.75 INJECTION, SOLUTION SUBCUTANEOUS WEEKLY
Qty: 4 PEN | Refills: 2 | Status: ON HOLD | OUTPATIENT
Start: 2021-07-06 | End: 2021-09-26

## 2021-07-06 NOTE — PROGRESS NOTES
MAHSA Austin is a 39 y.o. male here for a follow-up for management of DM    Interim:    Low 2-3 weeks ago  Sister gave Glucagon  He does not remember the time  Interested in pancreas transplant    Ask for nicolás meter  Will give script for a meter    Has seen Dr. Melody Ball    He has a PMH of DM, CKD, HTN, hyperlipidemia,  Left tibia/fibula fracture, foot ulcer. He was diagnosed with Diabetes Mellitus at the age of 12 yrs. Never had DKA. Was never on oral meds. Always on insulin therapy. Microvascular complications: has  Retinopathy and also had retinal detachement (Follows with eye doctor at [address]),   Has microlabuminruia . No Peripheral neuropathy. A1c 9.6 %----> 8.4 --->8.9 %--->8.2 %--->7.1 %--->7.1 %---> 6.8 % --> 6.8 %--> 6.5 %---> 7.1 %---> 7.1 %---> 7.5 %---> 7.7 % ---> 7.4 %---> 11 % ---> 9 % --> 9.3 % ---> 8.4 % --> 11.3 % ---> 11 % ---> 10.2 % ---> 10.2 % ---> 9.8%---> 9.5%    Moderate, uncontrolled  Worsened by diet    Home regimen:  Currently on lantus insulin 32  units at night. Apidra 6-12 units TID. ( insulin to carb ratio of 1:8)  Uses fixed unit    SSI    Previous medications: Was on humulin insulin in the past.    Check sugars occasionally        Hypoglycemia . Occasional. No pattern    No polyuria, polydipsia, weight loss. Diet: Eats 3 meals/day at regular times and 3 snacks. Had nutrition education. Exercise: No  planned physical activity    Hyperlipidemia: he has mixed hyperlipidemia    Currently is on Flexseed oil. He has refused statins   on 2/20    Patient says he feels dizzy and light headed when he stands up for the last 2-3 months. This is most likely due to autonomic neuropathy. No weight loss. Has gained weight. He had a fall and fractured his left distal tibia and fibula in 2013. Had ORIF and has developed osteomyelitis.         Review of Systems   Scanned, reviewed    Past Medical History:   Diagnosis Date    Acute is spontaneous  Chest: No labored breathing, no chest deformity, no stridor  Musculoskeletal: No joint deformity, swelling         Assessment/Plan    1. DM    This 41 yrs old male has DM. Complicated by retinopathy, microalbuminuria. Most likely it is Type 1 DM. HARIS antibodies and islet cell antibodies negative.  c-peptide  Undetectable. Uncontrolled. Patient resistant to changes in his diabetes regimen per note    Discussed the impact of poor glycemic control on his health. BS are variable  Needs to check more frequent    He says he sleeps all day and eats at different times of the day and not eating proper meals.     -Continue  Lantus insulin  32 units QHS    I:C to 1:8    Had allergic reaction to sensor adhesive Haroon Sane)    He does not want to use insulin pump due to issue of adhesive    Updated on eye exam. continue follow-up with the ophthalmologist.states decreased vision, is legally blind  Has microalbuminuria. On Ace-inhibitor. Will repeat. Has peripheral neuropathy on exam. Discussed foot care    Non-smoker. BP at goal.    Start on trulicity, discussed off label use     2. CKD Follows with nephrologist.       3. Hyperlipidemia. LDL is high. He has refused to take statins, reports side effects  He understands the high risk of heart disease and stroke with untreated hyperlipidemia. On fish oil. 4. Foot ulcers. Healed. Managed by podiatry.  Dr. Chani [facility])

## 2021-07-22 ENCOUNTER — TELEPHONE (OUTPATIENT)
Dept: ENDOCRINOLOGY | Age: 46
End: 2021-07-22

## 2021-07-22 RX ORDER — BLOOD-GLUCOSE SENSOR
EACH MISCELLANEOUS
Qty: 3 EACH | Refills: 2 | Status: SHIPPED | OUTPATIENT
Start: 2021-07-22 | End: 2021-11-03

## 2021-07-22 RX ORDER — BLOOD-GLUCOSE TRANSMITTER
EACH MISCELLANEOUS
Qty: 1 EACH | Refills: 0 | Status: SHIPPED | OUTPATIENT
Start: 2021-07-22 | End: 2021-08-31

## 2021-07-22 RX ORDER — BLOOD-GLUCOSE,RECEIVER,CONT
EACH MISCELLANEOUS
Qty: 1 DEVICE | Refills: 0 | Status: SHIPPED | OUTPATIENT
Start: 2021-07-22 | End: 2021-10-29

## 2021-07-22 RX ORDER — GLUCAGON INJECTION, SOLUTION 1 MG/.2ML
INJECTION, SOLUTION SUBCUTANEOUS
Qty: 1 SYRINGE | Refills: 2 | Status: SHIPPED | OUTPATIENT
Start: 2021-07-22 | End: 2021-10-07

## 2021-07-22 NOTE — TELEPHONE ENCOUNTER
Submitted PA for [facility] THE G6 TRANSMITTER  Key: [id number] - PA Case ID: [id number] - Rx #: [id number]      Via CMM STATUS: Approved 4/22/2021-7/22/22      Submitted PA for Dexcom G6  device   Key: [id number] - PA Case ID: [id number] - Rx #: [id number]: Approved  4/22/2021-7/22/22    Submitted PA for Dexcom G6 Sensor Key: [id number] - Rx #: [id number]   Via CMM STATUS: Approved 4/22/2021-7/22/22  . Please notify patient, thank you.

## 2021-07-22 NOTE — TELEPHONE ENCOUNTER
Spoke to Mathew Romero and his sister. Inquired if took apidra instead of lantus last night, he does not remember. Advised to monitor glucose every hour. Prescribed Glucagon as currently does not have. Advised to decrease lantus by 2 units at night. Advised would be better he try dexcom given his hypoglycemia. He does have a h/o allergy with adhesive so will have to assess if tolerates the Dexcom.

## 2021-07-22 NOTE — TELEPHONE ENCOUNTER
Pt called had to call EMT today due to BS being 28 this am when he went to bed last night it was 50 he ate to bring back up came up to 190 then he went to bed and mom and sister found him this am he was crashing his bs 48 after giving him honey she called 911 BS was 28 when they arrived they gave him glucose IV and BS came up to 129 pt would like to know what he should do.    [phone]

## 2021-07-29 ENCOUNTER — APPOINTMENT (OUTPATIENT)
Dept: CT IMAGING | Age: 46
End: 2021-07-29
Payer: MEDICARE

## 2021-07-29 ENCOUNTER — APPOINTMENT (OUTPATIENT)
Dept: GENERAL RADIOLOGY | Age: 46
End: 2021-07-29
Payer: MEDICARE

## 2021-07-29 ENCOUNTER — HOSPITAL ENCOUNTER (EMERGENCY)
Age: 46
Discharge: HOME | End: 2021-07-29
Attending: EMERGENCY MEDICINE
Payer: MEDICARE

## 2021-07-29 VITALS
TEMPERATURE: 97.7 F | DIASTOLIC BLOOD PRESSURE: 89 MMHG | BODY MASS INDEX: 28.88 KG/M2 | HEART RATE: 87 BPM | OXYGEN SATURATION: 97 % | HEIGHT: 74 IN | WEIGHT: 225 LBS | RESPIRATION RATE: 14 BRPM | SYSTOLIC BLOOD PRESSURE: 167 MMHG

## 2021-07-29 DIAGNOSIS — E87.6 HYPOKALEMIA: ICD-10-CM

## 2021-07-29 DIAGNOSIS — E16.2 HYPOGLYCEMIA: Primary | ICD-10-CM

## 2021-07-29 LAB
A/G RATIO: 1 (ref 1.1–2.2)
ALBUMIN SERPL-MCNC: 4.3 G/DL (ref 3.4–5)
ALP BLD-CCNC: 150 U/L (ref 40–129)
ALT SERPL-CCNC: 16 U/L (ref 10–40)
AMMONIA: 36 UMOL/L (ref 16–60)
ANION GAP SERPL CALCULATED.3IONS-SCNC: 16 MMOL/L (ref 3–16)
AST SERPL-CCNC: 17 U/L (ref 15–37)
BASE EXCESS VENOUS: -4.9 MMOL/L (ref -3–3)
BASOPHILS ABSOLUTE: 0.1 K/UL (ref 0–0.2)
BASOPHILS RELATIVE PERCENT: 1.4 %
BILIRUB SERPL-MCNC: 0.3 MG/DL (ref 0–1)
BUN BLDV-MCNC: 14 MG/DL (ref 7–20)
CALCIUM SERPL-MCNC: 9.1 MG/DL (ref 8.3–10.6)
CARBOXYHEMOGLOBIN: 0.9 % (ref 0–1.5)
CHLORIDE BLD-SCNC: 97 MMOL/L (ref 99–110)
CO2: 20 MMOL/L (ref 21–32)
CREAT SERPL-MCNC: 1.6 MG/DL (ref 0.9–1.3)
EKG ATRIAL RATE: 102 BPM
EKG DIAGNOSIS: NORMAL
EKG P AXIS: 51 DEGREES
EKG P-R INTERVAL: 156 MS
EKG Q-T INTERVAL: 360 MS
EKG QRS DURATION: 96 MS
EKG QTC CALCULATION (BAZETT): 469 MS
EKG R AXIS: 10 DEGREES
EKG T AXIS: 104 DEGREES
EKG VENTRICULAR RATE: 102 BPM
EOSINOPHILS ABSOLUTE: 0.6 K/UL (ref 0–0.6)
EOSINOPHILS RELATIVE PERCENT: 5.8 %
ETHANOL: NORMAL MG/DL (ref 0–0.08)
GFR AFRICAN AMERICAN: 57
GFR NON-AFRICAN AMERICAN: 47
GLOBULIN: 4.3 G/DL
GLUCOSE BLD-MCNC: 169 MG/DL (ref 70–99)
GLUCOSE BLD-MCNC: 196 MG/DL (ref 70–99)
GLUCOSE BLD-MCNC: 203 MG/DL (ref 70–99)
GLUCOSE BLD-MCNC: 76 MG/DL (ref 70–99)
HCO3 VENOUS: 21.4 MMOL/L (ref 23–29)
HCT VFR BLD CALC: 41.6 % (ref 40.5–52.5)
HEMOGLOBIN: 13.8 G/DL (ref 13.5–17.5)
LACTIC ACID, SEPSIS: 1.1 MMOL/L (ref 0.4–1.9)
LACTIC ACID, SEPSIS: 5.4 MMOL/L (ref 0.4–1.9)
LYMPHOCYTES ABSOLUTE: 2.1 K/UL (ref 1–5.1)
LYMPHOCYTES RELATIVE PERCENT: 21 %
MAGNESIUM: 1.8 MG/DL (ref 1.8–2.4)
MCH RBC QN AUTO: 28.9 PG (ref 26–34)
MCHC RBC AUTO-ENTMCNC: 33.2 G/DL (ref 31–36)
MCV RBC AUTO: 86.8 FL (ref 80–100)
METHEMOGLOBIN VENOUS: 0.3 %
MONOCYTES ABSOLUTE: 0.5 K/UL (ref 0–1.3)
MONOCYTES RELATIVE PERCENT: 5.1 %
NEUTROPHILS ABSOLUTE: 6.6 K/UL (ref 1.7–7.7)
NEUTROPHILS RELATIVE PERCENT: 66.7 %
O2 SAT, VEN: 93 %
O2 THERAPY: ABNORMAL
PCO2, VEN: 44.2 MMHG (ref 40–50)
PDW BLD-RTO: 14.1 % (ref 12.4–15.4)
PERFORMED ON: ABNORMAL
PH VENOUS: 7.3 (ref 7.35–7.45)
PLATELET # BLD: 403 K/UL (ref 135–450)
PMV BLD AUTO: 6.3 FL (ref 5–10.5)
PO2, VEN: 73.6 MMHG (ref 25–40)
POTASSIUM REFLEX MAGNESIUM: 2.9 MMOL/L (ref 3.5–5.1)
RBC # BLD: 4.78 M/UL (ref 4.2–5.9)
SODIUM BLD-SCNC: 133 MMOL/L (ref 136–145)
TCO2 CALC VENOUS: 23 MMOL/L
TOTAL PROTEIN: 8.6 G/DL (ref 6.4–8.2)
WBC # BLD: 9.9 K/UL (ref 4–11)

## 2021-07-29 PROCEDURE — 83605 ASSAY OF LACTIC ACID: CPT

## 2021-07-29 PROCEDURE — 80053 COMPREHEN METABOLIC PANEL: CPT

## 2021-07-29 PROCEDURE — 93005 ELECTROCARDIOGRAM TRACING: CPT | Performed by: EMERGENCY MEDICINE

## 2021-07-29 PROCEDURE — 96365 THER/PROPH/DIAG IV INF INIT: CPT

## 2021-07-29 PROCEDURE — 96366 THER/PROPH/DIAG IV INF ADDON: CPT

## 2021-07-29 PROCEDURE — 83735 ASSAY OF MAGNESIUM: CPT

## 2021-07-29 PROCEDURE — 82077 ASSAY SPEC XCP UR&BREATH IA: CPT

## 2021-07-29 PROCEDURE — 82803 BLOOD GASES ANY COMBINATION: CPT

## 2021-07-29 PROCEDURE — 99284 EMERGENCY DEPT VISIT MOD MDM: CPT

## 2021-07-29 PROCEDURE — 6370000000 HC RX 637 (ALT 250 FOR IP): Performed by: EMERGENCY MEDICINE

## 2021-07-29 PROCEDURE — 85025 COMPLETE CBC W/AUTO DIFF WBC: CPT

## 2021-07-29 PROCEDURE — 93010 ELECTROCARDIOGRAM REPORT: CPT | Performed by: INTERNAL MEDICINE

## 2021-07-29 PROCEDURE — 71045 X-RAY EXAM CHEST 1 VIEW: CPT

## 2021-07-29 PROCEDURE — 6360000002 HC RX W HCPCS: Performed by: EMERGENCY MEDICINE

## 2021-07-29 PROCEDURE — 2580000003 HC RX 258: Performed by: EMERGENCY MEDICINE

## 2021-07-29 PROCEDURE — 82140 ASSAY OF AMMONIA: CPT

## 2021-07-29 RX ORDER — POTASSIUM CHLORIDE 750 MG/1
10 TABLET, EXTENDED RELEASE ORAL 2 TIMES DAILY
Qty: 60 TABLET | Refills: 0 | Status: SHIPPED | OUTPATIENT
Start: 2021-07-29 | End: 2021-09-26

## 2021-07-29 RX ORDER — 0.9 % SODIUM CHLORIDE 0.9 %
2000 INTRAVENOUS SOLUTION INTRAVENOUS ONCE
Status: COMPLETED | OUTPATIENT
Start: 2021-07-29 | End: 2021-07-29

## 2021-07-29 RX ORDER — POTASSIUM CHLORIDE 7.45 MG/ML
20 INJECTION INTRAVENOUS ONCE
Status: COMPLETED | OUTPATIENT
Start: 2021-07-29 | End: 2021-07-29

## 2021-07-29 RX ORDER — MAGNESIUM SULFATE 1 G/100ML
1000 INJECTION INTRAVENOUS ONCE
Status: COMPLETED | OUTPATIENT
Start: 2021-07-29 | End: 2021-07-29

## 2021-07-29 RX ORDER — BUPRENORPHINE 5 UG/H
1 PATCH TRANSDERMAL WEEKLY
COMMUNITY
End: 2021-09-16 | Stop reason: ALTCHOICE

## 2021-07-29 RX ORDER — POTASSIUM CHLORIDE 20 MEQ/1
40 TABLET, EXTENDED RELEASE ORAL ONCE
Status: COMPLETED | OUTPATIENT
Start: 2021-07-29 | End: 2021-07-29

## 2021-07-29 RX ADMIN — SODIUM CHLORIDE 2000 ML: 9 INJECTION, SOLUTION INTRAVENOUS at 07:45

## 2021-07-29 RX ADMIN — MAGNESIUM SULFATE HEPTAHYDRATE 1000 MG: 1 INJECTION, SOLUTION INTRAVENOUS at 07:45

## 2021-07-29 RX ADMIN — POTASSIUM CHLORIDE 40 MEQ: 1500 TABLET, EXTENDED RELEASE ORAL at 07:44

## 2021-07-29 RX ADMIN — POTASSIUM CHLORIDE 20 MEQ: 7.46 INJECTION, SOLUTION INTRAVENOUS at 07:46

## 2021-07-29 NOTE — ED NOTES
Pt has Suboxone patch to Left shoulder. Placement verified during bedside report with Lisa Mcfadden RN. Also noted was green and yellow capsule in the bed. Capsule wasted with Tina Delong.       Antony Chapman RN  07/29/21 0653

## 2021-07-29 NOTE — ED PROVIDER NOTES
[address] ED  EMERGENCY DEPARTMENT ENCOUNTER      Pt Name: Leandro Hernandez  MRN: [id number]  [place] 1975  Date of evaluation: 7/29/2021  Provider: Vinayak Clark MD    CHIEF COMPLAINT       Chief Complaint   Patient presents with    Hyperglycemia     pt combative and altered. diabetic and known to EMS. BG 34 1 mg glucagon given IM. pt is responsive only to new agitation at this time. HISTORY OF PRESENT ILLNESS   (Location/Symptom, Timing/Onset, Context/Setting, Quality, Duration, Modifying Factors, Severity)  Note limiting factors. Leadnro Hernandez is a 39 y.o. male with past medical history of hypertension, diabetes, chronic kidney disease and chronic pain here today for hypoglycemia    The patient was found lying on the ground combative and altered by family this morning. EMS was called and found the patient lying on the ground disheveled and agitated. He had a blood glucose of 34 and was given glucagon and transported to the hospital.  He initially provided little history, but now states that he last ate a hamburger at 1 AM.  He states he recently began taking Trulicity injections 1 week ago. He otherwise has been using his insulin as prescribed and last took his Lantus last night. He notes he has had problems with hypoglycemia and he attributes this to a poor appetite. He notes he recently had his Lantus dosing changed \"just a little bit\" but cannot remember if the dose went up or down. Currently denies any other acute complaints. He has no headache no chest pain cough or shortness of breath. Denies abdominal pain, vomiting or diarrhea. States he is otherwise been in his usual state of health    HPI    Nursing Notes were reviewed. REVIEW OF SYSTEMS    (2-9 systems for level 4, 10 or more for level 5)     Review of Systems    Please see HPI for pertinent positive and negative review of system findings. A full 10 system ROS was performed and otherwise negative. PAST MEDICAL HISTORY     Past Medical History:   Diagnosis Date    Acute respiratory failure ([address] 8/18/2014    Asthma     Blood pressure instability     Chronic pain syndrome     Detached retina, bilateral     laser tx right eye    Diabetes mellitus ([address]     uncontrolled     Insomnia     Meningitis August 2014    admission [facility]    Neuropathic pain     [facility]     Osteomyelitis of tibia ([address]     left    Pain of left lower extremity     Peripheral neuropathy          SURGICAL HISTORY       Past Surgical History:   Procedure Laterality Date    ANKLE FRACTURE SURGERY Left 1/28/13    Dr. Kelly Carrillo  August 2013    left tibia with external fixation device    EYE SURGERY      retina reattachment left eye x 3 holes repairs    EYE SURGERY Right 9-27-13    retal detachment    LEG SURGERY Left 3/31/14    ORIF left fibula and tibia    WA EGD FLEXIBLE FOREIGN BODY REMOVAL N/A 9/21/2018    EGD FOREIGN BODY REMOVAL performed by eSlam Daniel MD at [address] [place] [address]      with external device inplace    UPPER GASTROINTESTINAL ENDOSCOPY N/A 9/21/2018    EGD BIOPSY performed by Selam Daniel MD at [facility]       Previous Medications    ACCU-CHEK FASTCLIX LANCETS MISC    1 each by Does not apply route daily Test blood glucose 10 times daily Dx Code E 10.8    ALCOHOL SWABS PADS    Use as needed for insulin injection. BD INSULIN SYRINGE U/F 31G X 5/16\" 0.3 ML MISC    USE ONE SYRINGE FOUR TIMES A DAY BEFORE MEALS AND ONCE NIGHTLY    BLOOD GLUCOSE MONITOR KIT AND SUPPLIES    Dispense sufficient amount for indicated testing frequency plus additional to accommodate PRN testing needs. Dispense all needed supplies to include: monitor, strips, lancing device, lancets, control solutions, alcohol swabs.     BLOOD GLUCOSE MONITORING SUPPL (ACCU-CHEK CAPO PLUS) W/DEVICE KIT    1 each by Does not apply route daily Test blood sugar 10 times daily Dx code E 10.8    BLOOD GLUCOSE TEST STRIPS (ACCU-CHEK CAPO PLUS) STRIP    USE ONE STRIP TO TEST THREE TIMES A DAY    BLOOD GLUCOSE TEST STRIPS (ACCU-CHEK CAPO PLUS) STRIP    1 each by In Vitro route daily Test blood glucose 10 times daily Dx Code E10.8    BLOOD GLUCOSE TEST STRIPS (ACCU-CHEK CAPO PLUS) STRIP    4 times a day As needed. BUPRENORPHINE (BUPRENEX) 5 MCG/HR PTWK    Place 1 patch onto the skin once a week. CHOLECALCIFEROL (VITAMIN D3) 5000 UNITS CAPS    Take 1 capsule by mouth Daily    CONTINUOUS BLOOD GLUC  (DEXCOM G6 ) LINDA    As needed    CONTINUOUS BLOOD GLUC SENSOR (DEXCOM G6 SENSOR) MISC    As needed    CONTINUOUS BLOOD GLUC TRANSMIT (DEXCOM G6 TRANSMITTER) MISC    As needed    DEXTROMETHORPHAN-GUAIFENESIN (MUCINEX DM)  MG TB12    Cough and congestion. DICLOXACILLIN (DYNAPEN) 500 MG CAPSULE    TAKE ONE CAPSULE BY MOUTH TWICE A DAY    DULAGLUTIDE (TRULICITY) 0.97 AY/2.9CF SOPN    Inject 0.75 mg into the skin once a week    DULOXETINE (CYMBALTA) 60 MG EXTENDED RELEASE CAPSULE    Take 1 capsule by mouth daily    ERENUMAB-AOOE (AIMOVIG, 140 MG DOSE,) 70 MG/ML SOAJ    Inject 140 mLs into the skin every 30 days    FLAXSEED, LINSEED, (FLAX PO)    Take 14 g by mouth daily. FLUTICASONE (FLONASE) 50 MCG/ACT NASAL SPRAY    SPRAY TWO SPRAYS IN EACH NOSTRIL DAILY    GABAPENTIN (NEURONTIN) 400 MG CAPSULE    Take one tablet Po BID    GLUCAGON (Lucendia Baas 1-PACK) 1 MG/0.2ML SOAJ    Use as needed for low sugar    GLUCAGON EMERGENCY 1 MG INJECTION    INJECT 1MG INTO THE MUSCLE AS NEEDED    HANDICAP PLACARD MISC    by Does not apply route Diagnosis: Type 1 diabetes. Expires 4/13/23. HYDROCORTISONE 2.5 % CREAM    Apply topically 2 times daily.     INSULIN GLULISINE (APIDRA) 100 UNIT/ML INJECTION    INJECT 8-12 UNITS UNDER THE SKIN THREE TIMES A DAY WITH MEALS    INSULIN SYRINGE-NEEDLE U-100 (B-D INS SYR ULTRAFINE 1CC/31G) 31G X 5/16\" 1 ML MISC    INJECT USING INSULIN ONCE DAILY    INSULIN SYRINGE-NEEDLE U-100 (BD INSULIN SYRINGE U/F) 31G X 5/16\" 0.5 ML MISC    Inject 1 each into the skin 4 times daily DX CODE E 10.22    LANTUS 100 UNIT/ML INJECTION VIAL    INJECT 32 UNITS UNDER THE SKIN ONCE NIGHTLY    LANTUS 100 UNIT/ML INJECTION VIAL    INJECT UNDER THE SKIN 30 UNITS NIGHTLY    MAGNESIUM 30 MG TABLET    Take 30 mg by mouth 2 times daily    MUCUS RELIEF DM  MG TABS    TAKE ONE BY MOUTH DAILY AS NEEDED    MULTIPLE VITAMIN (DAILY KITTY) TABS    TAKE ONE TABLET BY MOUTH DAILY    OMEGA-3 ACID ETHYL ESTERS (LOVAZA) 1 G CAPSULE    TAKE TWO CAPSULES BY MOUTH TWICE DAILY    PANTOPRAZOLE (PROTONIX) 40 MG TABLET    Take 1 tablet by mouth 2 times daily    POLYVINYL ALCOHOL (LIQUIFILM TEARS) 1.4 % OPHTHALMIC SOLUTION    1 drop as needed    PROBIOTIC PRODUCT (ACIDOPHILUS PROBIOTIC) CAPS CAPSULE    TAKE ONE CAPSULE BY MOUTH DAILY    PROPRANOLOL (INDERAL) 80 MG TABLET    Take 40 mg by mouth 2 times daily For migraines     SUMATRIPTAN (IMITREX) 50 MG TABLET    Take one tab po at the start of migraine PRN max 1 every 24 hours    TOPIRAMATE (TOPAMAX) 100 MG TABLET    TAKE ONE TABLET BY MOUTH TWO TIMES A DAY    TRAMADOL (ULTRAM) 50 MG TABLET    Take 1 tablet by mouth every 6 hours as needed for Pain (max 1-2 per day) for up to 28 days.     TRIAMCINOLONE (KENALOG) 0.1 % OINTMENT    Apply to thickened affected areas PRN sparingly for flares no longer than 2 weeks at one time, do not apply to cleared skin       ALLERGIES     Tegaderm ag mesh 2\"x2\" [wound dressings], Codeine, Other, and Tape [adhesive tape]    FAMILY HISTORY       Family History   Problem Relation Age of Onset    Asthma Other     Diabetes Other     High Blood Pressure Other           SOCIAL HISTORY       Social History     Socioeconomic History    Marital status: Single     Spouse name: None    Number of children: None    Years of education: None    Highest education level: None Nontender  Neurological:  Alert and oriented x 3.   Moves all extremities spontaneously  Musculoskeletal:   Normal ROM, no deformities          Psychiatric:  Normal mood      DIAGNOSTIC RESULTS       Labs Reviewed   COMPREHENSIVE METABOLIC PANEL W/ REFLEX TO MG FOR LOW K - Abnormal; Notable for the following components:       Result Value    Sodium 133 (*)     Potassium reflex Magnesium 2.9 (*)     Chloride 97 (*)     CO2 20 (*)     CREATININE 1.6 (*)     GFR Non- 47 (*)     GFR  57 (*)     Total Protein 8.6 (*)     Albumin/Globulin Ratio 1.0 (*)     Alkaline Phosphatase 150 (*)     All other components within normal limits    Narrative:     CALL  [facility]  [facility] tel. [phone],  Chemistry results called to and read back by Edilberto Kemp RN, 07/29/2021  07:21, by Alicia Robledo  Performed at:  [facility],  [employer] [facility]   Phone [phone]   BLOOD GAS, VENOUS - Abnormal; Notable for the following components:    pH, [place] 7.303 (*)     pO2, [place] 73.6 (*)     HCO3, Venous 21.4 (*)     Base Excess, [place] -4.9 (*)     All other components within normal limits    Narrative:     Performed at:  [facility] [place] [address],  [employer] [facility]   Phone [phone]   LACTATE, SEPSIS - Abnormal; Notable for the following components:    Lactic Acid, Sepsis 5.4 (*)     All other components within normal limits    Narrative:     [address] [phone],  Chemistry results called to and read back by Edilberto Kemp RN, 07/29/2021  07:21, by Alicia Robledo  Performed at:  [facility],  [employer], [facility] [place]   Phone [phone]   POCT GLUCOSE - Abnormal; Notable for the following components:    POC Glucose 169 (*)     All other components within normal limits    Narrative:     Performed at:  [facility],  [facility] Phone [phone]   POCT GLUCOSE - Abnormal; Notable for the following components:    POC Glucose 203 (*)     All other components within normal limits    Narrative:     Performed at:  [facility],  [place]   Phone [phone]   CBC WITH AUTO DIFFERENTIAL    Narrative:     Performed at:  [facility],  [place], [facility]   Phone [phone]   LACTATE, SEPSIS    Narrative:     Performed at:  [facility],  [place] [facility]   Phone [phone]   AMMONIA    Narrative:     Performed at:  [facility],  [place], [facility]   Phone [phone]   ETHANOL    Narrative:     Manuelita Prost tel. [phone],  Chemistry results called to and read back by Edel Soria RN, 07/29/2021  07:21, by Cheyenne Sanabria  Performed at:  [facility],  [place] [facility]   Phone [phone]   MAGNESIUM    Narrative:     Manuelita Prost tel. [phone],  Chemistry results called to and read back by Edel Soria RN, 07/29/2021  07:21, by Cheyenne Sanabria  Performed at:  [facility],  [place], [facility]   Phone [phone]   URINE RT REFLEX TO CULTURE   URINE DRUG SCREEN   POCT GLUCOSE       Interpretation per the Radiologist below, if obtained/available at the time of this note:    XR CHEST PORTABLE   Final Result   Mild bibasilar segmental atelectasis versus pneumonia. All other labs/imaging were within normal range or not returned as of this dictation.     EMERGENCY DEPARTMENT COURSE and DIFFERENTIAL DIAGNOSIS/MDM:   Vitals:    Vitals:    07/29/21 0731 07/29/21 0801 07/29/21 0901 07/29/21 0931   BP: (!) 146/74 123/82 (!) 146/74 (!) 153/78   Pulse: 96 100 89 89   Resp:       Temp:       TempSrc:       SpO2:  100% 98% 99%   Weight:       Height:           EKG: Sinus tachycardia rate of 102 bpm.  Nonspecific ST segment changes laterally and inferiorly. No ST elevation. Lateral changes are somewhat more pronounced when compared to prior. The patient presents to the emergency department today with an episode of hypoglycemia. Was altered and confused. Given glucagon. Blood sugar has improved now is awake alert and oriented. States he has had some difficulty with hypoglycemia at home. On short and long-acting insulin as well as Trulicity. Was able to eat here. Has maintained a stable blood glucose. He is awake alert oriented at his mental baseline. Found to have incidental hypokalemia likely some mild EKG changes. Was given oral and IV potassium will be discharged home with prescriptions for oral potassium as well. Patient will follow up with his primary care physician regarding his insulin dosing and check his blood sugars carefully prior to administering his insulin. Otherwise safe for discharge home    Should be noted the patient initially did have an elevated lactic acid. Feel this is likely due to his profound hypoglycemia. Was given IV fluids once his glucose was corrected his lactic acidosis cleared quite easily. Chest x-ray was questioning pneumonia versus atelectasis but has had no shortness of breath cough, or fevers. No suspicion for pneumonia at present. Oxygen saturations completely normal.  Breathing comfortably. MDM    CONSULTS     None    Critical Care:   None    REASSESSMENT          PROCEDURE     Unless otherwise noted below, none     Procedures      FINAL IMPRESSION      1. Hypoglycemia    2.  Hypokalemia            DISPOSITION/PLAN   DISPOSITION Decision To Discharge 07/29/2021 10:37:09 AM        PATIENT REFERRED TO:  Swetha Javier MD  [phone]  [address] [facility]     Schedule an appointment as soon as possible for a visit         DISCHARGE MEDICATIONS:  New Prescriptions    POTASSIUM CHLORIDE (KLOR-CON M) 10 MEQ EXTENDED RELEASE TABLET    Take 1 tablet by mouth 2 times daily     Controlled Substances Monitoring:     RX Monitoring 3/21/2019   Attestation The Prescription Monitoring Report for this patient was reviewed today.        (Please note that portions of this note were completed with a voice recognition program.  Efforts were made to edit the dictations but occasionally words are mis-transcribed.)    Darlin Barrios MD (electronically signed)  Attending Emergency Physician              Herbie Pearson MD  07/29/21 [address], [place]  07/29/21 7295

## 2021-08-03 ENCOUNTER — APPOINTMENT (OUTPATIENT)
Dept: GENERAL RADIOLOGY | Age: 46
End: 2021-08-03
Payer: MEDICARE

## 2021-08-03 ENCOUNTER — HOSPITAL ENCOUNTER (EMERGENCY)
Age: 46
Discharge: HOME | End: 2021-08-03
Attending: STUDENT IN AN ORGANIZED HEALTH CARE EDUCATION/TRAINING PROGRAM
Payer: MEDICARE

## 2021-08-03 VITALS
DIASTOLIC BLOOD PRESSURE: 82 MMHG | TEMPERATURE: 98.5 F | BODY MASS INDEX: 29.8 KG/M2 | HEIGHT: 72 IN | SYSTOLIC BLOOD PRESSURE: 132 MMHG | OXYGEN SATURATION: 100 % | RESPIRATION RATE: 20 BRPM | HEART RATE: 80 BPM | WEIGHT: 220 LBS

## 2021-08-03 DIAGNOSIS — S92.501A CLOSED FRACTURE OF PHALANX OF RIGHT FOURTH TOE, INITIAL ENCOUNTER: ICD-10-CM

## 2021-08-03 DIAGNOSIS — S91.311A FOOT LACERATION, RIGHT, INITIAL ENCOUNTER: ICD-10-CM

## 2021-08-03 DIAGNOSIS — E16.2 HYPOGLYCEMIA: Primary | ICD-10-CM

## 2021-08-03 LAB
A/G RATIO: 1.1 (ref 1.1–2.2)
ALBUMIN SERPL-MCNC: 4.1 G/DL (ref 3.4–5)
ALP BLD-CCNC: 127 U/L (ref 40–129)
ALT SERPL-CCNC: 14 U/L (ref 10–40)
ANION GAP SERPL CALCULATED.3IONS-SCNC: 18 MMOL/L (ref 3–16)
AST SERPL-CCNC: 16 U/L (ref 15–37)
BACTERIA: ABNORMAL /HPF
BASOPHILS ABSOLUTE: 0.1 K/UL (ref 0–0.2)
BASOPHILS RELATIVE PERCENT: 1.2 %
BILIRUB SERPL-MCNC: <0.2 MG/DL (ref 0–1)
BILIRUBIN URINE: NEGATIVE
BLOOD, URINE: NEGATIVE
BUN BLDV-MCNC: 15 MG/DL (ref 7–20)
CALCIUM SERPL-MCNC: 9.1 MG/DL (ref 8.3–10.6)
CHLORIDE BLD-SCNC: 102 MMOL/L (ref 99–110)
CLARITY: CLEAR
CO2: 20 MMOL/L (ref 21–32)
COLOR: YELLOW
CREAT SERPL-MCNC: 1.5 MG/DL (ref 0.9–1.3)
EOSINOPHILS ABSOLUTE: 0.5 K/UL (ref 0–0.6)
EOSINOPHILS RELATIVE PERCENT: 5 %
GFR AFRICAN AMERICAN: >60
GFR NON-AFRICAN AMERICAN: 51
GLOBULIN: 3.8 G/DL
GLUCOSE BLD-MCNC: 163 MG/DL
GLUCOSE BLD-MCNC: 163 MG/DL (ref 70–99)
GLUCOSE BLD-MCNC: 216 MG/DL (ref 70–99)
GLUCOSE BLD-MCNC: 93 MG/DL (ref 70–99)
GLUCOSE BLD-MCNC: 95 MG/DL (ref 70–99)
GLUCOSE URINE: NEGATIVE MG/DL
HCT VFR BLD CALC: 41 % (ref 40.5–52.5)
HEMOGLOBIN: 13.7 G/DL (ref 13.5–17.5)
KETONES, URINE: NEGATIVE MG/DL
LEUKOCYTE ESTERASE, URINE: NEGATIVE
LYMPHOCYTES ABSOLUTE: 2.1 K/UL (ref 1–5.1)
LYMPHOCYTES RELATIVE PERCENT: 23.1 %
MAGNESIUM: 1.8 MG/DL (ref 1.8–2.4)
MCH RBC QN AUTO: 29.2 PG (ref 26–34)
MCHC RBC AUTO-ENTMCNC: 33.4 G/DL (ref 31–36)
MCV RBC AUTO: 87.2 FL (ref 80–100)
MICROSCOPIC EXAMINATION: YES
MONOCYTES ABSOLUTE: 0.4 K/UL (ref 0–1.3)
MONOCYTES RELATIVE PERCENT: 4.8 %
NEUTROPHILS ABSOLUTE: 6.1 K/UL (ref 1.7–7.7)
NEUTROPHILS RELATIVE PERCENT: 65.9 %
NITRITE, URINE: NEGATIVE
PDW BLD-RTO: 14.3 % (ref 12.4–15.4)
PERFORMED ON: ABNORMAL
PERFORMED ON: ABNORMAL
PERFORMED ON: NORMAL
PH UA: 6 (ref 5–8)
PLATELET # BLD: 372 K/UL (ref 135–450)
PMV BLD AUTO: 6.4 FL (ref 5–10.5)
POTASSIUM REFLEX MAGNESIUM: 3 MMOL/L (ref 3.5–5.1)
PROTEIN UA: 100 MG/DL
RBC # BLD: 4.71 M/UL (ref 4.2–5.9)
RBC UA: ABNORMAL /HPF (ref 0–4)
SODIUM BLD-SCNC: 140 MMOL/L (ref 136–145)
SPECIFIC GRAVITY UA: 1.02 (ref 1–1.03)
TOTAL PROTEIN: 7.9 G/DL (ref 6.4–8.2)
URINE TYPE: ABNORMAL
UROBILINOGEN, URINE: 0.2 E.U./DL
WBC # BLD: 9.2 K/UL (ref 4–11)
WBC UA: ABNORMAL /HPF (ref 0–5)

## 2021-08-03 PROCEDURE — 99285 EMERGENCY DEPT VISIT HI MDM: CPT

## 2021-08-03 PROCEDURE — 80053 COMPREHEN METABOLIC PANEL: CPT

## 2021-08-03 PROCEDURE — 6370000000 HC RX 637 (ALT 250 FOR IP): Performed by: STUDENT IN AN ORGANIZED HEALTH CARE EDUCATION/TRAINING PROGRAM

## 2021-08-03 PROCEDURE — 6360000002 HC RX W HCPCS: Performed by: STUDENT IN AN ORGANIZED HEALTH CARE EDUCATION/TRAINING PROGRAM

## 2021-08-03 PROCEDURE — 2580000003 HC RX 258: Performed by: STUDENT IN AN ORGANIZED HEALTH CARE EDUCATION/TRAINING PROGRAM

## 2021-08-03 PROCEDURE — 71045 X-RAY EXAM CHEST 1 VIEW: CPT

## 2021-08-03 PROCEDURE — 90715 TDAP VACCINE 7 YRS/> IM: CPT | Performed by: STUDENT IN AN ORGANIZED HEALTH CARE EDUCATION/TRAINING PROGRAM

## 2021-08-03 PROCEDURE — 90471 IMMUNIZATION ADMIN: CPT | Performed by: STUDENT IN AN ORGANIZED HEALTH CARE EDUCATION/TRAINING PROGRAM

## 2021-08-03 PROCEDURE — 85025 COMPLETE CBC W/AUTO DIFF WBC: CPT

## 2021-08-03 PROCEDURE — 83735 ASSAY OF MAGNESIUM: CPT

## 2021-08-03 PROCEDURE — 93005 ELECTROCARDIOGRAM TRACING: CPT | Performed by: STUDENT IN AN ORGANIZED HEALTH CARE EDUCATION/TRAINING PROGRAM

## 2021-08-03 PROCEDURE — 12002 RPR S/N/AX/GEN/TRNK2.6-7.5CM: CPT

## 2021-08-03 PROCEDURE — 81001 URINALYSIS AUTO W/SCOPE: CPT

## 2021-08-03 PROCEDURE — 73630 X-RAY EXAM OF FOOT: CPT

## 2021-08-03 RX ORDER — 0.9 % SODIUM CHLORIDE 0.9 %
1000 INTRAVENOUS SOLUTION INTRAVENOUS ONCE
Status: COMPLETED | OUTPATIENT
Start: 2021-08-03 | End: 2021-08-03

## 2021-08-03 RX ORDER — POTASSIUM CHLORIDE 20 MEQ/1
40 TABLET, EXTENDED RELEASE ORAL ONCE
Status: COMPLETED | OUTPATIENT
Start: 2021-08-03 | End: 2021-08-03

## 2021-08-03 RX ADMIN — POTASSIUM CHLORIDE 40 MEQ: 1500 TABLET, EXTENDED RELEASE ORAL at 18:02

## 2021-08-03 RX ADMIN — SODIUM CHLORIDE 1000 ML: 9 INJECTION, SOLUTION INTRAVENOUS at 16:46

## 2021-08-03 RX ADMIN — TETANUS TOXOID, REDUCED DIPHTHERIA TOXOID AND ACELLULAR PERTUSSIS VACCINE, ADSORBED 0.5 ML: 5; 2.5; 8; 8; 2.5 SUSPENSION INTRAMUSCULAR at 16:46

## 2021-08-03 ASSESSMENT — ENCOUNTER SYMPTOMS
ABDOMINAL PAIN: 0
SORE THROAT: 0
PHOTOPHOBIA: 0
VOMITING: 0
NAUSEA: 0
BACK PAIN: 0
SHORTNESS OF BREATH: 0
COUGH: 0
RHINORRHEA: 0

## 2021-08-03 NOTE — ED NOTES
Spoke with sister, Cassi Brandon. Notified of pt being discharged. Sister state someone will arrive shortly to transport pt. Home.       Dustin Coleman RN  08/03/21 1780

## 2021-08-03 NOTE — ED PROVIDER NOTES
[address] ED  EMERGENCY DEPARTMENT ENCOUNTER      Pt Name: Kala Walters  MRN: [id number]  [place] 1975  Date of evaluation: 8/3/2021  Provider: Kelley Lau DO    CHIEF COMPLAINT       Chief Complaint   Patient presents with    Hypoglycemia     BS 33 for ems was given D10 went to 175. patient gave himself 10units for BS 88 this am. He was combative when ems arrived. currently 163, EMS [address] A BED BUG         HISTORY OF PRESENT ILLNESS   (Location/Symptom, Timing/Onset, Context/Setting, Quality, Duration, Modifying Factors, Severity)  Note limiting factors. Kala Walters is a 39 y.o. male with a past medical history of diabetes who presents to the emergency department complaining of presents by squad for hypoglycemia. Patient on insulin, Trulicity. patient was seen on 7/29 for similar complaint. Initial presenting blood glucose in the 30s. Was given glucagon, increased to 160s however on arrival had already down trended to 110s. Recently began taking Trulicity within the last several weeks. Patient presents today for recurrent episode of hypoglycemia. Remains on Trulicity and insulin. Possibly did not eat well enough today prior to taking his antidiabetic medications. On arrival he is awake and oriented. Denies specific complaints. Does have a laceration to the distal lateral aspect of right foot. Denies recent illness fevers chills cough shortness of breath chest pain abdominal pain nausea vomiting. Reported on EMS arrival patient was agitated. His sugars in arrival to ER into the 170s. Nursing Notes were reviewed.     PAST MEDICAL HISTORY     Past Medical History:   Diagnosis Date    Acute respiratory failure ([address] 8/18/2014    Asthma     Blood pressure instability     Chronic pain syndrome     Detached retina, bilateral     laser tx right eye    Diabetes mellitus ([address]     uncontrolled     Insomnia     Meningitis August 2014    admission [facility]    Neuropathic pain     [facility]     Osteomyelitis of tibia ([facility]     left    Pain of left lower extremity     Peripheral neuropathy          SURGICAL HISTORY       Past Surgical History:   Procedure Laterality Date    ANKLE FRACTURE SURGERY Left 1/28/13    Dr. Noemy Rogers  August 2013    left tibia with external fixation device    EYE SURGERY      retina reattachment left eye x 3 holes repairs    EYE SURGERY Right 9-27-13    retal detachment    LEG SURGERY Left 3/31/14    ORIF left fibula and tibia    NC EGD FLEXIBLE FOREIGN BODY REMOVAL N/A 9/21/2018    EGD FOREIGN BODY REMOVAL performed by Mart Strange MD at [facility]      with external device inplace    UPPER GASTROINTESTINAL ENDOSCOPY N/A 9/21/2018    EGD BIOPSY performed by Mart Strange MD at [address]       Discharge Medication List as of 8/3/2021  7:41 PM      CONTINUE these medications which have NOT CHANGED    Details   buprenorphine (BUPRENEX) 5 MCG/HR PTWK Place 1 patch onto the skin once a week. Historical Med      Continuous Blood Gluc Transmit (DEXCOM G6 TRANSMITTER) MISC As needed, Disp-1 each, R-0Normal      Continuous Blood Gluc Sensor (DEXCOM G6 SENSOR) MISC As needed, Disp-3 each, R-2Normal      Continuous Blood Gluc  (DEXCOM G6 ) LINDA As needed, Disp-1 Device, R-0Normal      blood glucose monitor kit and supplies Dispense sufficient amount for indicated testing frequency plus additional to accommodate PRN testing needs. Dispense all needed supplies to include: monitor, strips, lancing device, lancets, control solutions, alcohol swabs., Disp-1 kit, R-0, Print      !! blood glucose test strips (ACCU-CHEK CAPO PLUS) strip 4 times a day As needed. , Disp-150 each, R-3Normal      Dulaglutide (TRULICITY) 2.59 MN/0.1MO SOPN Inject 0.75 mg into the skin once a week, Disp-4 pen, R-2Normal      gabapentin (NEURONTIN) 400 MG capsule Take one tablet Po BID, Disp-60 capsule, R-1Normal      DULoxetine (CYMBALTA) 60 MG extended release capsule Take 1 capsule by mouth daily, Disp-30 capsule, R-1Normal      dicloxacillin (DYNAPEN) 500 MG capsule TAKE ONE CAPSULE BY MOUTH TWICE A DAY, Disp-60 capsule, R-4Normal      BD INSULIN SYRINGE U/F 31G X 5/16\" 0.3 ML MISC Disp-120 each, R-9, Normal      !! blood glucose test strips (ACCU-CHEK CAPO PLUS) strip 1 each by In Vitro route daily Test blood glucose 10 times daily Dx Code E10.8, Disp-300 each,R-10Normal      insulin glulisine (APIDRA) 100 UNIT/ML injection INJECT 8-12 UNITS UNDER THE SKIN THREE TIMES A DAY WITH MEALS, Disp-40 mL,R-3PA number [id number]. Normal      !! blood glucose test strips (ACCU-CHEK CAPO PLUS) strip USE ONE STRIP TO TEST THREE TIMES A DAY, Disp-300 strip, R-4Normal      Accu-Chek FastClix Lancets MISC DAILY Starting Mon 6/15/2020, Disp-900 each, R-2, NormalTest blood glucose 10 times daily Dx Code E 10.8       Insulin Syringe-Needle U-100 (BD INSULIN SYRINGE U/F) 31G X 5/16\" 0.5 ML MISC 4 TIMES DAILY Starting Wed 2/6/2019, Disp-120 each, R-9, NormalDX CODE E 10.22      fluticasone (FLONASE) 50 MCG/ACT nasal spray SPRAY TWO SPRAYS IN EACH NOSTRIL DAILY, Disp-16 g, R-4Normal      Blood Glucose Monitoring Suppl (ACCU-CHEK CAPO PLUS) w/Device KIT DAILY Starting Fri 3/2/2018, Disp-1 kit, R-10, NormalTest blood sugar 10 times daily Dx code E 10.8      Insulin Syringe-Needle U-100 (B-D INS SYR ULTRAFINE 1CC/31G) 31G X 5/16\" 1 ML MISC Disp-30 each, R-10, NormalINJECT USING INSULIN ONCE DAILY      Dextromethorphan-Guaifenesin (MUCINEX DM)  MG TB12 Cough and congestion. , Disp-60 tablet, R-1Normal      Alcohol Swabs PADS Disp-100 each, R-5, NormalUse as needed for insulin injection. Flaxseed, Linseed, (FLAX PO) Take 14 g by mouth daily.         potassium chloride (KLOR-CON M) 10 MEQ extended release tablet Take 1 tablet by mouth 2 times daily, Disp-60 tablet, R-0Print      Glucagon (GVOKE HYPOPEN 1-PACK) 1 MG/0.2ML SOAJ Use as needed for low sugar, Disp-1 Syringe, R-2Normal      traMADol (ULTRAM) 50 MG tablet Take 1 tablet by mouth every 6 hours as needed for Pain (max 1-2 per day) for up to 28 days. , Disp-30 tablet, R-0Print      SUMAtriptan (IMITREX) 50 MG tablet Take one tab po at the start of migraine PRN max 1 every 24 hours, Disp-9 tablet, R-0Normal      topiramate (TOPAMAX) 100 MG tablet TAKE ONE TABLET BY MOUTH TWO TIMES A DAY, Disp-60 tablet, R-1Normal      Erenumab-aooe (AIMOVIG, 140 MG DOSE,) 70 MG/ML SOAJ Inject 140 mLs into the skin every 30 days, Disp-1 pen, R-1Normal      !! LANTUS 100 UNIT/ML injection vial INJECT UNDER THE SKIN 30 UNITS NIGHTLY, Disp-1 vial, R-2, DAWNormal      Handicap Placard [facility] Starting Fri 4/16/2021, Disp-1 each, R-0, PrintDiagnosis: Type 1 diabetes. Expires 4/13/23.      !! LANTUS 100 UNIT/ML injection vial INJECT 32 UNITS UNDER THE SKIN ONCE NIGHTLY, Disp-1 vial,R-0,DAWNormal      magnesium 30 MG tablet Take 30 mg by mouth 2 times dailyHistorical Med      triamcinolone (KENALOG) 0.1 % ointment Apply to thickened affected areas PRN sparingly for flares no longer than 2 weeks at one time, do not apply to cleared skin, Disp-80 g, R-0, Normal      pantoprazole (PROTONIX) 40 MG tablet Take 1 tablet by mouth 2 times daily, Disp-60 tablet, R-0Print      hydrocortisone 2.5 % cream Apply topically 2 times daily. , Disp-30 g, R-0, Normal      GLUCAGON EMERGENCY 1 MG injection INJECT 1MG INTO THE MUSCLE AS NEEDED, Disp-1 kit, R-3Normal      Multiple Vitamin (DAILY KITTY) TABS TAKE ONE TABLET BY MOUTH DAILY, Disp-30 tablet, R-5Normal      Cholecalciferol (VITAMIN D3) 5000 units CAPS Take 1 capsule by mouth Daily, Disp-30 capsule, R-3Normal      omega-3 acid ethyl esters (LOVAZA) 1 G capsule TAKE TWO CAPSULES BY MOUTH TWICE DAILY, Disp-120 capsule, R-5Normal      MUCUS RELIEF DM  MG TABS TAKE ONE BY MOUTH DAILY AS NEEDED, Disp-30 tablet, R-1, LATASHA      Probiotic Product (ACIDOPHILUS PROBIOTIC) CAPS capsule TAKE ONE CAPSULE BY MOUTH DAILY, Disp-28 capsule, R-4      polyvinyl alcohol (LIQUIFILM TEARS) 1.4 % ophthalmic solution 1 drop as needed      propranolol (INDERAL) 80 MG tablet Take 40 mg by mouth 2 times daily For migraines Historical Med       !! - Potential duplicate medications found. Please discuss with provider. ALLERGIES     Tegaderm ag mesh 2\"x2\" [wound dressings], Codeine, Other, and Tape [adhesive tape]    FAMILY HISTORY       Family History   Problem Relation Age of Onset    Asthma Other     Diabetes Other     High Blood Pressure Other           SOCIAL HISTORY       Social History     Socioeconomic History    Marital status: Single     Spouse name: None    Number of children: None    Years of education: None    Highest education level: None   Occupational History    Occupation: n/a   Tobacco Use    Smoking status: Never Smoker    Smokeless tobacco: Never Used   Vaping Use    Vaping Use: Never used   Substance and Sexual Activity    Alcohol use: No     Alcohol/week: 0.0 standard drinks    Drug use: No    Sexual activity: None   Other Topics Concern    None   Social History Narrative    None     Social Determinants of Health     Financial Resource Strain:     Difficulty of Paying Living Expenses:    Food Insecurity:     Worried About Running Out of Food in the Last Year:     Ran Out of Food in the Last Year:    Transportation Needs:     Lack of Transportation (Medical):      Lack of Transportation (Non-Medical):    Physical Activity:     Days of Exercise per Week:     Minutes of Exercise per Session:    Stress:     Feeling of Stress :    Social Connections:     Frequency of Communication with Friends and Family:     Frequency of Social Gatherings with Friends and Family:     Attends [employer] Services:     Active Member of Clubs or Organizations:     Attends Club or Organization Meetings:     Marital Status:    Intimate Partner Violence:     Fear of Current or Ex-Partner:     Emotionally Abused:     Physically Abused:     Sexually Abused:        SCREENINGS        [place] Coma Scale  Eye Opening: Spontaneous  Best Verbal Response: Oriented  Best Motor Response: Obeys commands  [place] Coma Scale Score: 15                   REVIEW OF SYSTEMS    (2-9 systems for level 4, 10 or more for level 5)   Review of Systems   Constitutional: Negative for chills, fatigue and fever. HENT: Negative for congestion, rhinorrhea and sore throat. Eyes: Negative for photophobia and visual disturbance. Respiratory: Negative for cough and shortness of breath. Cardiovascular: Negative for chest pain and palpitations. Gastrointestinal: Negative for abdominal pain, nausea and vomiting. Genitourinary: Negative for decreased urine volume. Musculoskeletal: Negative for back pain, neck pain and neck stiffness. Skin: Positive for wound. Negative for rash. Allergic/Immunologic: Positive for immunocompromised state. Neurological: Negative for weakness, numbness and headaches. Psychiatric/Behavioral: Negative for confusion. PHYSICAL EXAM    (up to 7 for level 4, 8 or more for level 5)     ED Triage Vitals   BP Temp Temp src Pulse Resp SpO2 Height Weight   -- -- -- -- -- -- -- --       Physical Exam  Constitutional:       General: He is not in acute distress. Appearance: He is not diaphoretic. HENT:      Head: Normocephalic and atraumatic. Eyes:      Comments: Asymmetric pupils right greater than left both reactive. Chronic history according to patient. Neck:      Trachea: No tracheal deviation. Cardiovascular:      Rate and Rhythm: Normal rate and regular rhythm. Pulmonary:      Effort: Pulmonary effort is normal. No respiratory distress. Breath sounds: No stridor. No wheezing. Abdominal:      General: There is no distension. Palpations: Abdomen is soft. Tenderness: There is no abdominal tenderness. Musculoskeletal:         General: No deformity. Normal range of motion. Cervical back: Normal range of motion and neck supple. Skin:     General: Skin is warm and dry. Comments: Patient does have a superficial laceration to the lateral aspect of right foot   Neurological:      Mental Status: He is alert and oriented to person, place, and time. DIAGNOSTIC RESULTS     EKG: All EKG's are interpreted by the Emergency Department Physician who either signs or Co-signs this chart in the absence of a cardiologist.      The Ekg interpreted by me shows  normal sinus rhythm with a rate of 94  Axis is   Left axis deviation  QTc is  normal  Intervals and Durations are unremarkable. ST Segments: no acute change        RADIOLOGY:   Non-plain film images such as CT, Ultrasound and MRI are read by the radiologist. Plain radiographic images are visualized and preliminarily interpreted by the emergency physician. Interpretation per the Radiologist below, if available at the time of this note:    XR FOOT RIGHT (MIN 3 VIEWS)   Final Result   No airspace disease by radiograph. FOOT X-RAY FINDINGS:   Acute minimally displaced intra-articular fracture of the base of the 4th   middle phalanx. No dislocation. Laceration of the lateral 5th digit. Densities that could represent surgical clips versus foreign bodies in the   plantar 1st digit. Mild soft tissue edema. IMPRESSION:   Acute fracture as described. XR CHEST PORTABLE   Final Result   No airspace disease by radiograph. FOOT X-RAY FINDINGS:   Acute minimally displaced intra-articular fracture of the base of the 4th   middle phalanx. No dislocation. Laceration of the lateral 5th digit. Densities that could represent surgical clips versus foreign bodies in the   plantar 1st digit. Mild soft tissue edema. IMPRESSION:   Acute fracture as described.                LABS:  Labs Narrative:     Performed at:  [facility]  [address],  [place], [place]   Phone [phone]   POCT GLUCOSE       All other labs were within normal range or not returned as of this dictation. EMERGENCY DEPARTMENT COURSE and DIFFERENTIAL DIAGNOSIS/MDM:   Chanel Kiran is a 39 y.o. male who presents to the emergency department with the complaint of hypoglycemic event. Reported blood sugars in the 30s, he was altered at that time. Treated with glucagon, with improvement of blood sugars. Sugars in on the roughly 170s on arrival here he is awake he is oriented. No specific complaints. ?  Did not eat appropriately prior to taking regimen today. On Trulicity and insulin. On arrival slightly tachycardic at times however heart rate already downtrending from low 100s to 90s. Blood pressure slightly hypertensive otherwise stable vitals. Of note patient with asymmetric pupils chronic according to him. He is awake he is oriented. No chest pain shortness of breath fever chills headache neck pain. Does have a superficial laceration to right foot. He is unsure of how this happened likely due to altered mentation secondary to hypoglycemic event. Will obtain plain film imaging right foot to rule out foreign body will update tetanus, irrigate and closed with suture. Patient's x-ray showed fracture of the fourth digit right foot, cesar tape toe. Wound was repaired with 4-0 nylon suture. Patient was counseled on infection monitoring. Mildly hypokalemic, given oral replacement normal mag. Patient was kept for several hours in the emergency department he tolerated p.o., blood sugar had maintained. Did  patient to talk with PCP about withholding Trulicity as this is a new medication has had several episodes of hypoglycemia. Today's episode likely due to poor p.o. intake prior to taking insulin dose.             CRITICAL CARE TIME   Total Critical Care time [id number]  [phone]  In 1 week  If symptoms worsen, For suture removal    Karel Israel MD  [phone]  [address] [address]    Schedule an appointment as soon as possible for a visit in 2 days        DISCHARGE MEDICATIONS:  Discharge Medication List as of 8/3/2021  7:41 PM        Controlled Substances Monitoring:     RX Monitoring 3/21/2019   Attestation The Prescription Monitoring Report for this patient was reviewed today.        (Please note that portions of this note were completed with a voice recognition program.  Efforts were made to edit the dictations but occasionally words are mis-transcribed.)    Cecilia Rosario DO (electronically signed)  Attending Emergency Physician            Cecilia Rosario DO  08/03/21 [id number]

## 2021-08-03 NOTE — ED NOTES
Sitting upright in bed feeding self, will recheck blood sugar upon completion of dinner tray. JULIA Monet RN  08/03/21 [id number]

## 2021-08-03 NOTE — ED NOTES
Shift handoff given to [address] [place] [address]. Valerio Martinez, RN       Valerio Martinez, RN  08/03/21 Tyesha Graves

## 2021-08-03 NOTE — ED NOTES
Blood sugar taken upon arrivel.  163 Dr. Allison Noel at bedside      [facility]  08/03/21 1633

## 2021-08-04 LAB
EKG ATRIAL RATE: 94 BPM
EKG DIAGNOSIS: NORMAL
EKG P AXIS: 36 DEGREES
EKG P-R INTERVAL: 148 MS
EKG Q-T INTERVAL: 380 MS
EKG QRS DURATION: 100 MS
EKG QTC CALCULATION (BAZETT): 475 MS
EKG R AXIS: -11 DEGREES
EKG T AXIS: 87 DEGREES
EKG VENTRICULAR RATE: 94 BPM

## 2021-08-04 PROCEDURE — 93010 ELECTROCARDIOGRAM REPORT: CPT | Performed by: INTERNAL MEDICINE

## 2021-08-27 ENCOUNTER — CARE COORDINATION (OUTPATIENT)
Dept: CARE COORDINATION | Age: 46
End: 2021-08-27

## 2021-08-28 DIAGNOSIS — E10.22 TYPE 1 DIABETES MELLITUS WITH STAGE 3 CHRONIC KIDNEY DISEASE (HCC): ICD-10-CM

## 2021-08-28 DIAGNOSIS — N18.30 TYPE 1 DIABETES MELLITUS WITH STAGE 3 CHRONIC KIDNEY DISEASE (HCC): ICD-10-CM

## 2021-08-31 RX ORDER — INSULIN GLARGINE 100 [IU]/ML
INJECTION, SOLUTION SUBCUTANEOUS
Qty: 1 VIAL | Refills: 3 | Status: ON HOLD | OUTPATIENT
Start: 2021-08-31 | End: 2021-09-26

## 2021-08-31 RX ORDER — BLOOD-GLUCOSE TRANSMITTER
EACH MISCELLANEOUS
Qty: 2 EACH | Refills: 3 | Status: SHIPPED | OUTPATIENT
Start: 2021-08-31

## 2021-09-01 ENCOUNTER — VIRTUAL VISIT (OUTPATIENT)
Dept: INTERNAL MEDICINE CLINIC | Age: 46
End: 2021-09-01
Payer: MEDICARE

## 2021-09-01 DIAGNOSIS — N18.31 STAGE 3A CHRONIC KIDNEY DISEASE (HCC): ICD-10-CM

## 2021-09-01 DIAGNOSIS — E78.01 FAMILIAL HYPERCHOLESTEROLEMIA: Primary | ICD-10-CM

## 2021-09-01 DIAGNOSIS — E10.8 DIABETES MELLITUS TYPE 1 WITH COMPLICATIONS (HCC): ICD-10-CM

## 2021-09-01 PROCEDURE — 99214 OFFICE O/P EST MOD 30 MIN: CPT | Performed by: INTERNAL MEDICINE

## 2021-09-01 ASSESSMENT — PATIENT HEALTH QUESTIONNAIRE - PHQ9
SUM OF ALL RESPONSES TO PHQ QUESTIONS 1-9: 0
1. LITTLE INTEREST OR PLEASURE IN DOING THINGS: 0
SUM OF ALL RESPONSES TO PHQ QUESTIONS 1-9: 0
2. FEELING DOWN, DEPRESSED OR HOPELESS: 0
SUM OF ALL RESPONSES TO PHQ9 QUESTIONS 1 & 2: 0
SUM OF ALL RESPONSES TO PHQ QUESTIONS 1-9: 0

## 2021-09-01 NOTE — PROGRESS NOTES
2021    TELEHEALTH EVALUATION -- Audio/Visual (During [id number] public health emergency)    HPI:    Susy Patel (:  1975) has requested an audio/video evaluation for the following concern(s):    T1 DM: treated with B-B insulin. Not well controlled. Latest A1c 9.5. Followed and treated by endo. Trial of trulicity given. Says he bottomed out so stopped it. EOD includes CKD, stage 3a, retinopathy, neuropathy, autonomic dysregulation. H/O migraine HAs: treated with Aimovig. Med working for [employer]. Hyperlipidemia: refuses statin, concerned about side effects. Discussed Bempedoic acid. He wants to do research first.           Review of Systems   Constitutional: Negative. HENT: Negative. Eyes: Negative. Respiratory: Negative. Cardiovascular: Negative. Gastrointestinal: Negative. Endocrine:        T1 DM, see HPI. Hyperlipidemia, see HPI. Last . Genitourinary:        Stage 3a CKD, B/Cr. B/Cr 15/1.5. GFR 51. Musculoskeletal: Negative. Skin: Negative. Neurological: Negative. Psychiatric/Behavioral: Negative. Prior to Visit Medications    Medication Sig Taking? Authorizing Provider   LANTUS 100 UNIT/ML injection vial INJECT 30 UNITS UNDER THE SKIN ONCE NIGHTLY Yes Delicia Villalobos MD   Continuous Blood Gluc Transmit (DEXCOM G6 TRANSMITTER) MISC USE TO CHECK BLOOD SUGAR Yes Delicia Villalobos MD   AIMOVIG 140 MG/ML SOAJ  Yes Historical Provider, MD   buprenorphine (BUPRENEX) 5 MCG/HR  [facility] 1 patch onto the skin once a week.  Yes Historical Provider, MD   Glucagon ([employer] Claudia 1-PACK) 1 MG/0.2ML SOAJ Use as needed for low sugar Yes Delicia Villalobos MD   Continuous Blood Gluc Sensor (DEXCOM G6 SENSOR) MISC As needed Yes Delicia Villalobos MD   Continuous Blood Gluc  ([facility]) [address] As needed Yes Delicia Villalobos MD   blood glucose monitor kit and supplies Dispense sufficient amount for indicated testing frequency plus additional to mg by mouth 2 times daily Yes Historical Provider, MD   triamcinolone (KENALOG) 0.1 % ointment Apply to thickened affected areas PRN sparingly for flares no longer than 2 weeks at one time, do not apply to cleared skin Yes Cathy Certain, DO   Insulin Syringe-Needle U-100 (BD INSULIN SYRINGE U/F) 31G X 5/16\" 0.5 ML MISC Inject 1 each into the skin 4 times daily DX CODE E 10.22 Yes Graham Del Toro MD   fluticasone (FLONASE) 50 MCG/ACT nasal spray SPRAY TWO SPRAYS IN EACH NOSTRIL DAILY Yes Agustina Cárdenas MD   pantoprazole (PROTONIX) 40 MG tablet Take 1 tablet by mouth 2 times daily Yes Oly Horner MD   hydrocortisone 2.5 % cream Apply topically 2 times daily. Yes Agustina Cárdenas MD   Blood Glucose Monitoring Suppl (ACCU-CHEK CAPO PLUS) w/Device KIT 1 each by Does not apply route daily Test blood sugar 10 times daily Dx code E 10.8 Yes Graham Del Toro MD   GLUCAGON EMERGENCY 1 MG injection INJECT 1MG INTO THE MUSCLE AS NEEDED Yes Graham Del Toro MD   Insulin Syringe-Needle U-100 (B-D INS SYR ULTRAFINE 1CC/31G) 31G X 5/16\" 1 ML MISC INJECT USING INSULIN ONCE DAILY Yes Waqas Henderson, APRN - CNP   Multiple Vitamin (DAILY KITTY) TABS TAKE ONE TABLET BY MOUTH DAILY Yes Agustina Cárdenas MD   Cholecalciferol (VITAMIN D3) 5000 units CAPS Take 1 capsule by mouth Daily Yes Agustina Cárdenas MD   Dextromethorphan-Guaifenesin [facility] DM)  MG TB12 Cough and congestion. Yes Agustina Cárdenas MD   omega-3 acid ethyl esters (LOVAZA) 1 G capsule TAKE TWO CAPSULES BY MOUTH TWICE DAILY Yes Graham Del Toro MD   MUCUS RELIEF DM  MG TABS TAKE ONE BY MOUTH DAILY AS NEEDED Yes Agustina Cárdenas MD   Alcohol Swabs PADS Use as needed for insulin injection.  Yes Graham Del Toro MD   Probiotic Product (ACIDOPHILUS PROBIOTIC) CAPS capsule TAKE ONE CAPSULE BY MOUTH DAILY Yes Agustina Cárdenas MD   polyvinyl alcohol (LIQUIFILM TEARS) 1.4 % ophthalmic solution 1 drop as needed Yes Historical Provider, MD   propranolol (INDERAL) 80 MG tablet Take 40 mg by mouth 2 times daily For migraines  Yes Historical Provider, MD   Flaxseed, Linseed, (FLAX PO) Take 14 g by mouth daily. Yes Historical Provider, MD   potassium chloride (KLOR-CON M) 10 MEQ extended release tablet Take 1 tablet by mouth 2 times daily  Melissa Pink MD   traMADol (ULTRAM) 50 MG tablet Take 1 tablet by mouth every 6 hours as needed for Pain (max 1-2 per day) for up to 28 days. Payal Hernandez MD   gabapentin (NEURONTIN) 400 MG capsule Take one tablet Po BID  Payal Hernandez MD       Social History     Tobacco Use    Smoking status: Never Smoker    Smokeless tobacco: Never Used   Vaping Use    Vaping Use: Never used   Substance Use Topics    Alcohol use: No     Alcohol/week: 0.0 standard drinks    Drug use: No            PHYSICAL EXAMINATION:  [ INSTRUCTIONS:  \"[x]\" Indicates a positive item  \"[]\" Indicates a negative item  -- DELETE ALL ITEMS NOT EXAMINED]  Vital Signs: (As obtained by patient/caregiver or practitioner observation)    Blood pressure-  Heart rate-    Respiratory rate-    Temperature-  Pulse oximetry-     Constitutional: [x] Appears well-developed and well-nourished [x] No apparent distress      [] Abnormal-   Mental status  [x] Alert and awake  [x] Oriented to person/place/time [x]Able to follow commands      Eyes:  EOM    [x]  Normal  [] Abnormal-  Sclera  [x]  Normal  [] Abnormal -         Discharge [x]  None visible  [] Abnormal -    HENT:   [x] Normocephalic, atraumatic.   [] Abnormal   [x] Mouth/Throat: Mucous membranes are moist.     External Ears [x] Normal  [] Abnormal-     Neck: [x] No visualized mass     Pulmonary/Chest: [x] Respiratory effort normal.  [x] No visualized signs of difficulty breathing or respiratory distress        [] Abnormal-      Musculoskeletal:   [x] Normal gait with no signs of ataxia         [x] Normal range of motion of neck        [] Abnormal-       Neurological:        [x] No Facial Asymmetry (Cranial nerve 7 motor function) (limited exam to video visit)          [x] No gaze palsy        [] Abnormal-         Skin:        [x] No significant exanthematous lesions or discoloration noted on facial skin         [] Abnormal-            Psychiatric:       [] Normal Affect [] No Hallucinations        [] Abnormal-     Other pertinent observable physical exam findings-     ASSESSMENT/PLAN:   Diagnosis Orders   1. Familial hypercholesterolemia  Trial of bempedoic acid suggested with statin. He is to consider. 2. Stage 3a chronic kidney disease ([facility] [address]  Risk factor control stressed. Avoidance of nephrotoxins discussed. 3. Diabetes mellitus type 1 with complications (HCC)  Diet, SS insulin adjustments, monitoring and endo f/u.         Giovana Golden, was evaluated through a synchronous (real-time) audio-video encounter. The patient (or guardian if applicable) is aware that this is a billable service. Verbal consent to proceed has been obtained within the past 12 months. The visit was conducted pursuant to the emergency declaration under the [address] [place] [address] authority and the [employer] General Act. Patient identification was verified, and a caregiver was present when appropriate. The patient was located in a state where the provider was credentialed to provide care. Total time spent on this encounter: billing not based on time. --Sara Richard MD on 9/1/2021 at 3:26 PM    An electronic signature was used to authenticate this note.

## 2021-09-16 ENCOUNTER — OFFICE VISIT (OUTPATIENT)
Dept: PAIN MANAGEMENT | Age: 46
End: 2021-09-16
Payer: MEDICARE

## 2021-09-16 VITALS
OXYGEN SATURATION: 99 % | WEIGHT: 227.8 LBS | SYSTOLIC BLOOD PRESSURE: 132 MMHG | HEART RATE: 108 BPM | DIASTOLIC BLOOD PRESSURE: 80 MMHG | BODY MASS INDEX: 30.9 KG/M2 | TEMPERATURE: 97.7 F

## 2021-09-16 DIAGNOSIS — F51.01 PRIMARY INSOMNIA: ICD-10-CM

## 2021-09-16 DIAGNOSIS — M80.862S PATHOLOGICAL FRACTURE OF LEFT TIBIA DUE TO OTHER OSTEOPOROSIS, SEQUELA: ICD-10-CM

## 2021-09-16 DIAGNOSIS — L97.522 ULCER OF LEFT FOOT, WITH FAT LAYER EXPOSED (HCC): ICD-10-CM

## 2021-09-16 DIAGNOSIS — M79.2 NEUROPATHIC PAIN: ICD-10-CM

## 2021-09-16 DIAGNOSIS — G89.4 CHRONIC PAIN SYNDROME: ICD-10-CM

## 2021-09-16 DIAGNOSIS — K59.03 CONSTIPATION DUE TO OPIOID THERAPY: ICD-10-CM

## 2021-09-16 DIAGNOSIS — F33.41 RECURRENT MAJOR DEPRESSIVE DISORDER, IN PARTIAL REMISSION (HCC): ICD-10-CM

## 2021-09-16 DIAGNOSIS — T40.2X5A CONSTIPATION DUE TO OPIOID THERAPY: ICD-10-CM

## 2021-09-16 DIAGNOSIS — M79.18 MYOFASCIAL PAIN: ICD-10-CM

## 2021-09-16 DIAGNOSIS — G62.9 PERIPHERAL POLYNEUROPATHY: ICD-10-CM

## 2021-09-16 DIAGNOSIS — G43.711 CHRONIC MIGRAINE WITHOUT AURA, WITH INTRACTABLE MIGRAINE, SO STATED, WITH STATUS MIGRAINOSUS: ICD-10-CM

## 2021-09-16 PROCEDURE — 99214 OFFICE O/P EST MOD 30 MIN: CPT | Performed by: INTERNAL MEDICINE

## 2021-09-16 RX ORDER — SUMATRIPTAN 50 MG/1
TABLET, FILM COATED ORAL
Qty: 9 TABLET | Refills: 0 | Status: SHIPPED | OUTPATIENT
Start: 2021-09-16 | End: 2021-10-14 | Stop reason: SDUPTHER

## 2021-09-16 RX ORDER — ERENUMAB-AOOE 70 MG/ML
140 INJECTION SUBCUTANEOUS
Qty: 1 PEN | Refills: 1 | Status: SHIPPED | OUTPATIENT
Start: 2021-09-16 | End: 2021-10-14 | Stop reason: SDUPTHER

## 2021-09-16 RX ORDER — TRAMADOL HYDROCHLORIDE 50 MG/1
50 TABLET ORAL EVERY 6 HOURS PRN
Qty: 56 TABLET | Refills: 0 | Status: SHIPPED | OUTPATIENT
Start: 2021-09-16 | End: 2021-10-14 | Stop reason: SDUPTHER

## 2021-09-16 NOTE — PROGRESS NOTES
Marquis Cooley  1975  [id number]    HISTORY OF PRESENT ILLNESS:  Mr. Barb Ordonez is a 42 y.o. male returns for a follow up visit for multiple medical problems. His  presenting problems are   1. Chronic pain syndrome    2. Chronic migraine without aura, with intractable migraine, so stated, with status migrainosus    3. Neuropathic pain    4. Myofascial pain    5. Ulcer of left foot, with fat layer exposed ([facility] [address]    6. Pathological fracture of left tibia due to other osteoporosis, sequela    7. Peripheral polyneuropathy    8. Primary insomnia    9. Recurrent major depressive disorder, in partial remission ([facility] [address]    10. Constipation due to opioid therapy    . As per information/history obtained from the PADT(patient assessment and documentation tool) -  He complains of pain in the head, neck, shoulders Bilateral, elbows Bilateral, upper back, mid back, lower back and knees Bilateral with radiation to the arms Bilateral, hands Bilateral, lower leg Bilateral, ankles Bilateral and feet Bilateral He rates the pain 10/10 and describes it as sharp, aching, burning, numbness, pins and needles. Pain is made worse by: movement, walking, standing, sitting, bending, lifting. Current treatment regimen has helped relieve about 10% of the pain. He denies side effects from the current pain regimen. Patient reports that since the last follow up visit the physical functioning is unchanged, family/social relationships are unchanged, mood is better and sleep patterns are unchanged, and that the overall functioning is unchanged. Patient denies neurological bowel or bladder. Patient denies misusing/abusing his narcotic pain medications or using any illegal drugs. There are No indicators for possible drug abuse, addiction or diversion problems. Upon obtaining the medical history from Mr. Barb Ordonez regarding today's office visit for his presenting problems, patient states he was in the ER with complications with DM medication.  He mentions he had cataract surgery right eye. Mr. Pippa Pollack was last seen more than 3 months ago. He reports he had broke his toe on right foot and bruised his left ribs \"because I was jerking around when my sugar was low\". He mentions he has missed his appointment and has been off all the medication for more than 1-2 months. He says he is using Neurontin still, has some Ultram left. He states his blood sugar has been less than 150 range. Sleep is poor,  poor sleep latency, averages 2-3 hours of sleep at night. Intermittent, non restorative. Does not feel rested in AM. Complains of feeling sleepy  during the day. I'm not sure if he is complaint with his medications. Patient's  subjective report of his mood is fair. he describes occasional symptoms of depression, occasional  irritability and some mood swings. Describes his mood as being neutral and reports some pleasure in his daily activities. Reports  fair  appetite, energy and concentration. Able to function well in different aspects of his daily activities. Denies suicidal or homicidal ideation. Denies any complaints of increased tension, does   Worry sometimes and occasional  irritability  he denies any c/o increased anxiety, No c/o panic attacks or symptoms of PTSD. He denies any constipation symptoms. ALLERGIES/PAST MED/FAM/SOC HISTORY: Mr. Pippa Pollack allergies, past medical, family and social history were reviewed in the chart.     Mr. Pippa Pollack current medications are   Outpatient Medications Prior to Visit   Medication Sig Dispense Refill    LANTUS 100 UNIT/ML injection vial INJECT 30 UNITS UNDER THE SKIN ONCE NIGHTLY 1 vial 3    Continuous Blood Gluc Transmit (DEXCOM G6 TRANSMITTER) MISC USE TO CHECK BLOOD SUGAR 2 each 3    AIMOVIG 140 MG/ML SOAJ       Glucagon (GVOKE HYPOPEN 1-PACK) 1 MG/0.2ML SOAJ Use as needed for low sugar 1 Syringe 2    Continuous Blood Gluc Sensor (DEXCOM G6 SENSOR) MISC As needed 3 each 2    Continuous Blood Gluc  (DEXCOM G6 ) LINDA As needed 1 Device 0    blood glucose monitor kit and supplies Dispense sufficient amount for indicated testing frequency plus additional to accommodate PRN testing needs. Dispense all needed supplies to include: monitor, strips, lancing device, lancets, control solutions, alcohol swabs. 1 kit 0    blood glucose test strips (ACCU-CHEK CAPO PLUS) strip 4 times a day As needed. 150 each 3    Dulaglutide (TRULICITY) 1.24 BN/3.4NG SOPN Inject 0.75 mg into the skin once a week 4 pen 2    DULoxetine (CYMBALTA) 60 MG extended release capsule Take 1 capsule by mouth daily 30 capsule 1    topiramate (TOPAMAX) 100 MG tablet TAKE ONE TABLET BY MOUTH TWO TIMES A DAY 60 tablet 1    Handicap Placard MISC by Does not apply route Diagnosis: Type 1 diabetes.      Expires 4/13/23. 1 each 0    dicloxacillin (DYNAPEN) 500 MG capsule TAKE ONE CAPSULE BY MOUTH TWICE A DAY (Patient taking differently: daily ) 60 capsule 4    BD INSULIN SYRINGE U/F 31G X 5/16\" 0.3 ML MISC USE ONE SYRINGE FOUR TIMES A DAY BEFORE MEALS AND ONCE NIGHTLY 120 each 9    LANTUS 100 UNIT/ML injection vial INJECT 32 UNITS UNDER THE SKIN ONCE NIGHTLY 1 vial 0    blood glucose test strips (ACCU-CHEK CAPO PLUS) strip 1 each by In Vitro route daily Test blood glucose 10 times daily Dx Code E10.8 300 each 10    insulin glulisine (APIDRA) 100 UNIT/ML injection INJECT 8-12 UNITS UNDER THE SKIN THREE TIMES A DAY WITH MEALS 40 mL 3    blood glucose test strips (ACCU-CHEK CAPO PLUS) strip USE ONE STRIP TO TEST THREE TIMES A  strip 4    Accu-Chek FastClix Lancets MISC 1 each by Does not apply route daily Test blood glucose 10 times daily Dx Code E 10.8 900 each 2    magnesium 30 MG tablet Take 30 mg by mouth 2 times daily      triamcinolone (KENALOG) 0.1 % ointment Apply to thickened affected areas PRN sparingly for flares no longer than 2 weeks at one time, do not apply to cleared skin 80 g 0    Insulin Syringe-Needle U-100 (BD INSULIN SYRINGE U/F) 31G X 5/16\" 0.5 ML MISC Inject 1 each into the skin 4 times daily DX CODE E 10.22 120 each 9    fluticasone (FLONASE) 50 MCG/ACT nasal spray SPRAY TWO SPRAYS IN EACH NOSTRIL DAILY 16 g 4    pantoprazole (PROTONIX) 40 MG tablet Take 1 tablet by mouth 2 times daily 60 tablet 0    hydrocortisone 2.5 % cream Apply topically 2 times daily. 30 g 0    Blood Glucose Monitoring Suppl (ACCU-CHEK CAPO PLUS) w/Device KIT 1 each by Does not apply route daily Test blood sugar 10 times daily Dx code E 10.8 1 kit 10    GLUCAGON EMERGENCY 1 MG injection INJECT 1MG INTO THE MUSCLE AS NEEDED 1 kit 3    Insulin Syringe-Needle U-100 (B-D INS SYR ULTRAFINE 1CC/31G) 31G X 5/16\" 1 ML MISC INJECT USING INSULIN ONCE DAILY 30 each 10    Multiple Vitamin (DAILY KITTY) TABS TAKE ONE TABLET BY MOUTH DAILY 30 tablet 5    Cholecalciferol (VITAMIN D3) 5000 units CAPS Take 1 capsule by mouth Daily 30 capsule 3    Dextromethorphan-Guaifenesin (MUCINEX DM)  MG TB12 Cough and congestion. 60 tablet 1    omega-3 acid ethyl esters (LOVAZA) 1 G capsule TAKE TWO CAPSULES BY MOUTH TWICE DAILY 120 capsule 5    MUCUS RELIEF DM  MG TABS TAKE ONE BY MOUTH DAILY AS NEEDED 30 tablet 1    Alcohol Swabs PADS Use as needed for insulin injection. 100 each 5    Probiotic Product (ACIDOPHILUS PROBIOTIC) CAPS capsule TAKE ONE CAPSULE BY MOUTH DAILY 28 capsule 4    polyvinyl alcohol (LIQUIFILM TEARS) 1.4 % ophthalmic solution 1 drop as needed      propranolol (INDERAL) 80 MG tablet Take 40 mg by mouth 2 times daily For migraines       Flaxseed, Linseed, (FLAX PO) Take 14 g by mouth daily.  potassium chloride (KLOR-CON M) 10 MEQ extended release tablet Take 1 tablet by mouth 2 times daily 60 tablet 0    buprenorphine (BUPRENEX) 5 MCG/HR PTWK Place 1 patch onto the skin once a week.       gabapentin (NEURONTIN) 400 MG capsule Take one tablet Po BID 60 capsule 1    traMADol (ULTRAM) 50 MG tablet Take 1 tablet by mouth every 6 hours as needed for Pain (max 1-2 per day) for up to 28 days. 30 tablet 0    SUMAtriptan (IMITREX) 50 MG tablet Take one tab po at the start of migraine PRN max 1 every 24 hours 9 tablet 0    Erenumab-aooe (AIMOVIG, 140 MG DOSE,) 70 MG/ML SOAJ Inject 140 mLs into the skin every 30 days 1 pen 1     No facility-administered medications prior to visit. REVIEW OF SYSTEMS: .   Respiratory: Negative for shortness of breath. Cardiovascular: Negative for chest pain, palpitations  Gastrointestinal: Negative for blood in stool, abdominal distention, nausea, vomiting, abdominal pain, diarrhea,constipation. Neurological: Negative for speech difficulty, weakness and light-headedness, dizziness, tremors, sleepiness  Psychiatric/Behavioral: Negative for suicidal ideas, hallucinations, behavioral problems, self-injury, decreased concentration/cognition, agitation, confusion. PHYSICAL EXAM:   Nursing note and vitals reviewed. /80   Pulse 108   Temp 97.7 °F (36.5 °C) (Temporal)   Wt 227 lb 12.8 oz (103.3 kg)   SpO2 99%   BMI 30.90 kg/m²   General Appearance: Patient is well nourished, well developed, well groomed and in no acute distress. Skin: Skin is warm and dry, good turgor . No rash or lesions noted. He is not diaphoretic. Pulmonary/Chest: Effort normal. No respiratory distress or use of accessory muscles. Auscultation revealing normal air entry. He does not have wheezes in the lung fields. He has no rales. Cardiovascular: Normal rate, regular rhythm, normal heart sounds, and does not have murmur. Exam reveals no gallop and no friction rub. Musculoskeletal / Extremities: Range of motion is normal. Gait is normal, assistive devices use: cane. He exhibits edema: none, and no tenderness. Neurological/Psychiatric:He is alert and oriented to person, place, and time. Coordination is  normal.   Judgement and Insight is normal  His mood is Appropriate and affect is Flat/blunted .  His behavior 0    blood glucose test strips (ACCU-CHEK CAPO PLUS) strip 4 times a day As needed. 150 each 3    Dulaglutide (TRULICITY) 9.16 SC/4.7EC SOPN Inject 0.75 mg into the skin once a week 4 pen 2    DULoxetine (CYMBALTA) 60 MG extended release capsule Take 1 capsule by mouth daily 30 capsule 1    topiramate (TOPAMAX) 100 MG tablet TAKE ONE TABLET BY MOUTH TWO TIMES A DAY 60 tablet 1    Handicap Placard MISC by Does not apply route Diagnosis: Type 1 diabetes.      Expires 4/13/23. 1 each 0    dicloxacillin (DYNAPEN) 500 MG capsule TAKE ONE CAPSULE BY MOUTH TWICE A DAY (Patient taking differently: daily ) 60 capsule 4    BD INSULIN SYRINGE U/F 31G X 5/16\" 0.3 ML MISC USE ONE SYRINGE FOUR TIMES A DAY BEFORE MEALS AND ONCE NIGHTLY 120 each 9    LANTUS 100 UNIT/ML injection vial INJECT 32 UNITS UNDER THE SKIN ONCE NIGHTLY 1 vial 0    blood glucose test strips (ACCU-CHEK CAPO PLUS) strip 1 each by In Vitro route daily Test blood glucose 10 times daily Dx Code E10.8 300 each 10    insulin glulisine (APIDRA) 100 UNIT/ML injection INJECT 8-12 UNITS UNDER THE SKIN THREE TIMES A DAY WITH MEALS 40 mL 3    blood glucose test strips (ACCU-CHEK CAPO PLUS) strip USE ONE STRIP TO TEST THREE TIMES A  strip 4    Accu-Chek FastClix Lancets MISC 1 each by Does not apply route daily Test blood glucose 10 times daily Dx Code E 10.8 900 each 2    magnesium 30 MG tablet Take 30 mg by mouth 2 times daily      triamcinolone (KENALOG) 0.1 % ointment Apply to thickened affected areas PRN sparingly for flares no longer than 2 weeks at one time, do not apply to cleared skin 80 g 0    Insulin Syringe-Needle U-100 (BD INSULIN SYRINGE U/F) 31G X 5/16\" 0.5 ML MISC Inject 1 each into the skin 4 times daily DX CODE E 10.22 120 each 9    fluticasone (FLONASE) 50 MCG/ACT nasal spray SPRAY TWO SPRAYS IN EACH NOSTRIL DAILY 16 g 4    pantoprazole (PROTONIX) 40 MG tablet Take 1 tablet by mouth 2 times daily 60 tablet 0    hydrocortisone 2.5 % cream Apply topically 2 times daily. 30 g 0    Blood Glucose Monitoring Suppl (ACCU-CHEK CAPO PLUS) w/Device KIT 1 each by Does not apply route daily Test blood sugar 10 times daily Dx code E 10.8 1 kit 10    GLUCAGON EMERGENCY 1 MG injection INJECT 1MG INTO THE MUSCLE AS NEEDED 1 kit 3    Insulin Syringe-Needle U-100 (B-D INS SYR ULTRAFINE 1CC/31G) 31G X 5/16\" 1 ML MISC INJECT USING INSULIN ONCE DAILY 30 each 10    Multiple Vitamin (DAILY KITTY) TABS TAKE ONE TABLET BY MOUTH DAILY 30 tablet 5    Cholecalciferol (VITAMIN D3) 5000 units CAPS Take 1 capsule by mouth Daily 30 capsule 3    Dextromethorphan-Guaifenesin (MUCINEX DM)  MG TB12 Cough and congestion. 60 tablet 1    omega-3 acid ethyl esters (LOVAZA) 1 G capsule TAKE TWO CAPSULES BY MOUTH TWICE DAILY 120 capsule 5    MUCUS RELIEF DM  MG TABS TAKE ONE BY MOUTH DAILY AS NEEDED 30 tablet 1    Alcohol Swabs PADS Use as needed for insulin injection. 100 each 5    Probiotic Product (ACIDOPHILUS PROBIOTIC) CAPS capsule TAKE ONE CAPSULE BY MOUTH DAILY 28 capsule 4    polyvinyl alcohol (LIQUIFILM TEARS) 1.4 % ophthalmic solution 1 drop as needed      propranolol (INDERAL) 80 MG tablet Take 40 mg by mouth 2 times daily For migraines       Flaxseed, Linseed, (FLAX PO) Take 14 g by mouth daily.  potassium chloride (KLOR-CON M) 10 MEQ extended release tablet Take 1 tablet by mouth 2 times daily 60 tablet 0    gabapentin (NEURONTIN) 400 MG capsule Take one tablet Po BID 60 capsule 1     No current facility-administered medications for this visit. Goals of current treatment regimen include improvement in pain, restoration of functioning- with focus on improvement in physical performance, general activity, work or disability,emotional distress, health care utilization and  decreased medication consumption. Will continue to monitor progress towards achieving/maintaining therapeutic goals with special emphasis on  1.  Improvement in perceived interfernce  of pain with ADL's. Ability to do home exercises independently. Ability to do household chores indoor and/or outdoor work and social and leisure activities. To increase flexibility/ROM, strength and endurance. Improve psychosocial and physical functioning.- he is not showing any significant progress/or showing regression  towards this goal and reassessment and adjustment of goals/treatment have been made. 2. Improving sleep to 6-7 hours a night. Improve mood/ anxiety and depression symptoms such as crying spells, low energy, problems with concentration, motivation.- he is not showing any significant progress/or showing regression  towards this goal and reassessment and adjustment of goals/treatment have been made. 3. Reduction of reliance on opioid analgesia/more appropriate opioid use. - he is showing progression towards this treatment goal with the current regimen. Risks and benefits of the medications and other alternative treatments have been/were  discussed with the patient. Any questions on the  common side effects of these medications were also answered. He was advised against drinking alcohol with the narcotic pain medicines, advised against driving or handling machinery when  starting or adjusting the dose of medicines, feeling groggy or drowsy, or if having any cognitive issues related to the current medications. Heis fully aware of the risk of overdose and death, if medicines are misused and not taken as prescribed. If he develops new symptoms or if the symptoms worsen, he was told to call the office. .  Thank you for allowing me to participate in the care of this patient.     Emilie Yin MD    Cc: Sampson Gamble MD

## 2021-09-25 ENCOUNTER — APPOINTMENT (OUTPATIENT)
Dept: GENERAL RADIOLOGY | Age: 46
DRG: 384 | End: 2021-09-25
Payer: MEDICARE

## 2021-09-25 ENCOUNTER — ANESTHESIA (OUTPATIENT)
Dept: ENDOSCOPY | Age: 46
DRG: 384 | End: 2021-09-25
Payer: MEDICARE

## 2021-09-25 ENCOUNTER — APPOINTMENT (OUTPATIENT)
Dept: CT IMAGING | Age: 46
DRG: 384 | End: 2021-09-25
Payer: MEDICARE

## 2021-09-25 ENCOUNTER — HOSPITAL ENCOUNTER (INPATIENT)
Age: 46
LOS: 2 days | Discharge: HOME | DRG: 384 | End: 2021-09-27
Attending: EMERGENCY MEDICINE | Admitting: HOSPITALIST
Payer: MEDICARE

## 2021-09-25 ENCOUNTER — APPOINTMENT (OUTPATIENT)
Dept: ULTRASOUND IMAGING | Age: 46
DRG: 384 | End: 2021-09-25
Payer: MEDICARE

## 2021-09-25 ENCOUNTER — ANESTHESIA EVENT (OUTPATIENT)
Dept: ENDOSCOPY | Age: 46
DRG: 384 | End: 2021-09-25
Payer: MEDICARE

## 2021-09-25 VITALS — OXYGEN SATURATION: 85 % | SYSTOLIC BLOOD PRESSURE: 85 MMHG | DIASTOLIC BLOOD PRESSURE: 52 MMHG

## 2021-09-25 DIAGNOSIS — K20.90 ESOPHAGITIS: ICD-10-CM

## 2021-09-25 DIAGNOSIS — N17.9 AKI (ACUTE KIDNEY INJURY) (HCC): Primary | ICD-10-CM

## 2021-09-25 DIAGNOSIS — K80.21 CALCULUS OF GALLBLADDER WITH BILIARY OBSTRUCTION BUT WITHOUT CHOLECYSTITIS: ICD-10-CM

## 2021-09-25 DIAGNOSIS — R77.8 ELEVATED TROPONIN: ICD-10-CM

## 2021-09-25 DIAGNOSIS — E87.6 HYPOKALEMIA: ICD-10-CM

## 2021-09-25 LAB
A/G RATIO: 1 (ref 1.1–2.2)
ALBUMIN SERPL-MCNC: 4.7 G/DL (ref 3.4–5)
ALP BLD-CCNC: 178 U/L (ref 40–129)
ALT SERPL-CCNC: 18 U/L (ref 10–40)
ANION GAP SERPL CALCULATED.3IONS-SCNC: 26 MMOL/L (ref 3–16)
AST SERPL-CCNC: 20 U/L (ref 15–37)
BASE EXCESS VENOUS: -0.7 MMOL/L (ref -3–3)
BASOPHILS ABSOLUTE: 0.1 K/UL (ref 0–0.2)
BASOPHILS RELATIVE PERCENT: 0.6 %
BILIRUB SERPL-MCNC: 0.8 MG/DL (ref 0–1)
BILIRUBIN URINE: ABNORMAL
BLOOD, URINE: ABNORMAL
BUN BLDV-MCNC: 82 MG/DL (ref 7–20)
CALCIUM SERPL-MCNC: 10.7 MG/DL (ref 8.3–10.6)
CARBOXYHEMOGLOBIN: 1.5 % (ref 0–1.5)
CHLORIDE BLD-SCNC: 99 MMOL/L (ref 99–110)
CHP ED QC CHECK: YES
CLARITY: CLEAR
CO2: 19 MMOL/L (ref 21–32)
COLOR: YELLOW
CREAT SERPL-MCNC: 5.2 MG/DL (ref 0.9–1.3)
EOSINOPHILS ABSOLUTE: 0 K/UL (ref 0–0.6)
EOSINOPHILS RELATIVE PERCENT: 0 %
EPITHELIAL CELLS, UA: ABNORMAL /HPF (ref 0–5)
GFR AFRICAN AMERICAN: 15
GFR NON-AFRICAN AMERICAN: 12
GLOBULIN: 4.9 G/DL
GLUCOSE BLD-MCNC: 285 MG/DL
GLUCOSE BLD-MCNC: 285 MG/DL (ref 70–99)
GLUCOSE BLD-MCNC: 350 MG/DL (ref 70–99)
GLUCOSE URINE: NEGATIVE MG/DL
HCO3 VENOUS: 24.1 MMOL/L (ref 23–29)
HCT VFR BLD CALC: 48.4 % (ref 40.5–52.5)
HEMOGLOBIN: 16.1 G/DL (ref 13.5–17.5)
HYALINE CASTS: ABNORMAL /LPF (ref 0–2)
KETONES, URINE: 15 MG/DL
LACTIC ACID, SEPSIS: 1.7 MMOL/L (ref 0.4–1.9)
LACTIC ACID, SEPSIS: 2 MMOL/L (ref 0.4–1.9)
LEUKOCYTE ESTERASE, URINE: NEGATIVE
LYMPHOCYTES ABSOLUTE: 1.1 K/UL (ref 1–5.1)
LYMPHOCYTES RELATIVE PERCENT: 9.1 %
MAGNESIUM: 2.7 MG/DL (ref 1.8–2.4)
MCH RBC QN AUTO: 28.2 PG (ref 26–34)
MCHC RBC AUTO-ENTMCNC: 33.2 G/DL (ref 31–36)
MCV RBC AUTO: 84.9 FL (ref 80–100)
METHEMOGLOBIN VENOUS: 0.3 %
MICROSCOPIC EXAMINATION: YES
MONOCYTES ABSOLUTE: 0.9 K/UL (ref 0–1.3)
MONOCYTES RELATIVE PERCENT: 7.5 %
MUCUS: ABNORMAL /LPF
NEUTROPHILS ABSOLUTE: 10.3 K/UL (ref 1.7–7.7)
NEUTROPHILS RELATIVE PERCENT: 82.8 %
NITRITE, URINE: NEGATIVE
O2 SAT, VEN: 48 %
O2 THERAPY: NORMAL
PCO2, VEN: 40.6 MMHG (ref 40–50)
PDW BLD-RTO: 14.1 % (ref 12.4–15.4)
PERFORMED ON: ABNORMAL
PH UA: 5 (ref 5–8)
PH VENOUS: 7.39 (ref 7.35–7.45)
PLATELET # BLD: 451 K/UL (ref 135–450)
PMV BLD AUTO: 7.1 FL (ref 5–10.5)
PO2, VEN: 26.1 MMHG (ref 25–40)
POTASSIUM REFLEX MAGNESIUM: 3.1 MMOL/L (ref 3.5–5.1)
PROTEIN UA: 100 MG/DL
RBC # BLD: 5.71 M/UL (ref 4.2–5.9)
RBC UA: ABNORMAL /HPF (ref 0–4)
SODIUM BLD-SCNC: 144 MMOL/L (ref 136–145)
SPECIFIC GRAVITY UA: >=1.03 (ref 1–1.03)
TCO2 CALC VENOUS: 25 MMOL/L
TOTAL PROTEIN: 9.6 G/DL (ref 6.4–8.2)
TROPONIN: 0.24 NG/ML
TROPONIN: 0.3 NG/ML
URINE REFLEX TO CULTURE: ABNORMAL
URINE TYPE: ABNORMAL
UROBILINOGEN, URINE: 1 E.U./DL
WBC # BLD: 12.4 K/UL (ref 4–11)
WBC UA: ABNORMAL /HPF (ref 0–5)

## 2021-09-25 PROCEDURE — 83735 ASSAY OF MAGNESIUM: CPT

## 2021-09-25 PROCEDURE — 93005 ELECTROCARDIOGRAM TRACING: CPT | Performed by: PHYSICIAN ASSISTANT

## 2021-09-25 PROCEDURE — 2500000003 HC RX 250 WO HCPCS: Performed by: PHYSICIAN ASSISTANT

## 2021-09-25 PROCEDURE — 99221 1ST HOSP IP/OBS SF/LOW 40: CPT | Performed by: INTERNAL MEDICINE

## 2021-09-25 PROCEDURE — 7100000011 HC PHASE II RECOVERY - ADDTL 15 MIN: Performed by: INTERNAL MEDICINE

## 2021-09-25 PROCEDURE — 3700000000 HC ANESTHESIA ATTENDED CARE: Performed by: INTERNAL MEDICINE

## 2021-09-25 PROCEDURE — 1200000000 HC SEMI PRIVATE

## 2021-09-25 PROCEDURE — 3609012400 HC EGD TRANSORAL BIOPSY SINGLE/MULTIPLE: Performed by: INTERNAL MEDICINE

## 2021-09-25 PROCEDURE — 2580000003 HC RX 258: Performed by: INTERNAL MEDICINE

## 2021-09-25 PROCEDURE — 99285 EMERGENCY DEPT VISIT HI MDM: CPT

## 2021-09-25 PROCEDURE — 88342 IMHCHEM/IMCYTCHM 1ST ANTB: CPT

## 2021-09-25 PROCEDURE — 82803 BLOOD GASES ANY COMBINATION: CPT

## 2021-09-25 PROCEDURE — 87636 SARSCOV2 & INF A&B AMP PRB: CPT

## 2021-09-25 PROCEDURE — 88341 IMHCHEM/IMCYTCHM EA ADD ANTB: CPT

## 2021-09-25 PROCEDURE — 3700000001 HC ADD 15 MINUTES (ANESTHESIA): Performed by: INTERNAL MEDICINE

## 2021-09-25 PROCEDURE — 6360000002 HC RX W HCPCS: Performed by: ANESTHESIOLOGY

## 2021-09-25 PROCEDURE — 74176 CT ABD & PELVIS W/O CONTRAST: CPT

## 2021-09-25 PROCEDURE — 96360 HYDRATION IV INFUSION INIT: CPT

## 2021-09-25 PROCEDURE — 6370000000 HC RX 637 (ALT 250 FOR IP): Performed by: PHYSICIAN ASSISTANT

## 2021-09-25 PROCEDURE — 81001 URINALYSIS AUTO W/SCOPE: CPT

## 2021-09-25 PROCEDURE — 2709999900 HC NON-CHARGEABLE SUPPLY: Performed by: INTERNAL MEDICINE

## 2021-09-25 PROCEDURE — 0DB58ZX EXCISION OF ESOPHAGUS, VIA NATURAL OR ARTIFICIAL OPENING ENDOSCOPIC, DIAGNOSTIC: ICD-10-PCS | Performed by: INTERNAL MEDICINE

## 2021-09-25 PROCEDURE — 96361 HYDRATE IV INFUSION ADD-ON: CPT

## 2021-09-25 PROCEDURE — 88312 SPECIAL STAINS GROUP 1: CPT

## 2021-09-25 PROCEDURE — 84484 ASSAY OF TROPONIN QUANT: CPT

## 2021-09-25 PROCEDURE — 71045 X-RAY EXAM CHEST 1 VIEW: CPT

## 2021-09-25 PROCEDURE — G0378 HOSPITAL OBSERVATION PER HR: HCPCS

## 2021-09-25 PROCEDURE — 83605 ASSAY OF LACTIC ACID: CPT

## 2021-09-25 PROCEDURE — 80053 COMPREHEN METABOLIC PANEL: CPT

## 2021-09-25 PROCEDURE — 76705 ECHO EXAM OF ABDOMEN: CPT

## 2021-09-25 PROCEDURE — 96372 THER/PROPH/DIAG INJ SC/IM: CPT

## 2021-09-25 PROCEDURE — 85025 COMPLETE CBC W/AUTO DIFF WBC: CPT

## 2021-09-25 PROCEDURE — 7100000010 HC PHASE II RECOVERY - FIRST 15 MIN: Performed by: INTERNAL MEDICINE

## 2021-09-25 PROCEDURE — 88305 TISSUE EXAM BY PATHOLOGIST: CPT

## 2021-09-25 PROCEDURE — 2580000003 HC RX 258: Performed by: PHYSICIAN ASSISTANT

## 2021-09-25 RX ORDER — ONDANSETRON 2 MG/ML
INJECTION INTRAMUSCULAR; INTRAVENOUS PRN
Status: DISCONTINUED | OUTPATIENT
Start: 2021-09-25 | End: 2021-09-25 | Stop reason: SDUPTHER

## 2021-09-25 RX ORDER — PROPOFOL 10 MG/ML
INJECTION, EMULSION INTRAVENOUS PRN
Status: DISCONTINUED | OUTPATIENT
Start: 2021-09-25 | End: 2021-09-25 | Stop reason: SDUPTHER

## 2021-09-25 RX ORDER — SUCCINYLCHOLINE CHLORIDE 20 MG/ML
INJECTION INTRAMUSCULAR; INTRAVENOUS PRN
Status: DISCONTINUED | OUTPATIENT
Start: 2021-09-25 | End: 2021-09-25 | Stop reason: SDUPTHER

## 2021-09-25 RX ORDER — SODIUM CHLORIDE, SODIUM LACTATE, POTASSIUM CHLORIDE, CALCIUM CHLORIDE 600; 310; 30; 20 MG/100ML; MG/100ML; MG/100ML; MG/100ML
INJECTION, SOLUTION INTRAVENOUS CONTINUOUS
Status: DISCONTINUED | OUTPATIENT
Start: 2021-09-25 | End: 2021-09-26

## 2021-09-25 RX ORDER — POTASSIUM CHLORIDE 20 MEQ/1
40 TABLET, EXTENDED RELEASE ORAL ONCE
Status: COMPLETED | OUTPATIENT
Start: 2021-09-25 | End: 2021-09-25

## 2021-09-25 RX ORDER — ONDANSETRON 4 MG/1
4 TABLET, ORALLY DISINTEGRATING ORAL ONCE
Status: COMPLETED | OUTPATIENT
Start: 2021-09-25 | End: 2021-09-25

## 2021-09-25 RX ORDER — 0.9 % SODIUM CHLORIDE 0.9 %
1000 INTRAVENOUS SOLUTION INTRAVENOUS ONCE
Status: COMPLETED | OUTPATIENT
Start: 2021-09-25 | End: 2021-09-25

## 2021-09-25 RX ADMIN — GLUCAGON HYDROCHLORIDE 1 MG: 1 INJECTION, POWDER, FOR SOLUTION INTRAMUSCULAR; INTRAVENOUS; SUBCUTANEOUS at 14:41

## 2021-09-25 RX ADMIN — PROPOFOL 160 MG: 10 INJECTION, EMULSION INTRAVENOUS at 17:02

## 2021-09-25 RX ADMIN — SUCCINYLCHOLINE CHLORIDE 140 MG: 20 INJECTION, SOLUTION INTRAMUSCULAR; INTRAVENOUS at 17:02

## 2021-09-25 RX ADMIN — SODIUM CHLORIDE 1000 ML: 9 INJECTION, SOLUTION INTRAVENOUS at 16:04

## 2021-09-25 RX ADMIN — POTASSIUM CHLORIDE 40 MEQ: 1500 TABLET, EXTENDED RELEASE ORAL at 19:44

## 2021-09-25 RX ADMIN — SODIUM CHLORIDE, POTASSIUM CHLORIDE, SODIUM LACTATE AND CALCIUM CHLORIDE: 600; 310; 30; 20 INJECTION, SOLUTION INTRAVENOUS at 21:25

## 2021-09-25 RX ADMIN — ONDANSETRON 4 MG: 4 TABLET, ORALLY DISINTEGRATING ORAL at 14:38

## 2021-09-25 RX ADMIN — SODIUM CHLORIDE 1000 ML: 9 INJECTION, SOLUTION INTRAVENOUS at 18:43

## 2021-09-25 RX ADMIN — ONDANSETRON 4 MG: 2 INJECTION, SOLUTION INTRAMUSCULAR; INTRAVENOUS at 17:02

## 2021-09-25 ASSESSMENT — PULMONARY FUNCTION TESTS
PIF_VALUE: 1
PIF_VALUE: 2
PIF_VALUE: 1
PIF_VALUE: 7
PIF_VALUE: 7
PIF_VALUE: 14
PIF_VALUE: 12
PIF_VALUE: 15
PIF_VALUE: 16
PIF_VALUE: 11
PIF_VALUE: 15
PIF_VALUE: 14
PIF_VALUE: 3
PIF_VALUE: 4
PIF_VALUE: 13
PIF_VALUE: 3
PIF_VALUE: 9
PIF_VALUE: 15
PIF_VALUE: 2

## 2021-09-25 ASSESSMENT — ENCOUNTER SYMPTOMS
GASTROINTESTINAL NEGATIVE: 1
RESPIRATORY NEGATIVE: 1
TROUBLE SWALLOWING: 1

## 2021-09-25 ASSESSMENT — PAIN DESCRIPTION - PAIN TYPE: TYPE: ACUTE PAIN

## 2021-09-25 ASSESSMENT — PAIN DESCRIPTION - DESCRIPTORS
DESCRIPTORS: DISCOMFORT
DESCRIPTORS: ACHING

## 2021-09-25 ASSESSMENT — PAIN DESCRIPTION - LOCATION: LOCATION: THROAT

## 2021-09-25 NOTE — ED NOTES
Consult to [address] at [address]  09/25/21 150     Consult completed with call back from Dr. Ten Cruz at [address]  09/25/21 [id number]

## 2021-09-25 NOTE — ED NOTES
Wife is aggressive and unhappy with order for catheter. She is unable to understand why he was given a urinal but now he is getting a catheter. This RN at bedside medicating patient. This RN wearing earpiece and listening to conversation and unable to hear wife. Once conversation was over and I was able to listen to wife she stated \"well I guess we ain't never gonna get an answer\". Explained to wife that we wear ear pieces for staff communication and I was listening to someone speak to me. I addressed her question and informed her that the doctor and I had just had a conversation in from of her and the patient about the catheter where he explained to me the reason for order. I reeducated patient and wife. Informed her and the patient of the right to refuse. Wife tends to speak for patient. Addressed conversation to patient who is able to speak for himself. Informed patient that he has right to refuse but we need a urine sample. Patient refused catheter and stated he will attempt to urinate.       Lavonne German RN  09/25/21 [id number]

## 2021-09-25 NOTE — OP NOTE
Operative Note      Patient: Isiah Ward  YOB: 1975  MRN: [id number]    Date of Procedure: 2021    Pre-Op Diagnosis: Possible Food bolus stuck in the esophagus    Post-Op Diagnosis: Black esophagus. Hiatal hernia. Ischemic-looking ulcers in duodenum. Procedure(s):  EGD BIOPSY    Surgeon(s):  Tab Jerez MD    Assistant:   * No surgical staff found *    Anesthesia: Monitor Anesthesia Care    Estimated Blood Loss (mL): Minimal    Complications: None    Specimens:   ID Type Source Tests Collected by Time Destination   A :  Tissue Tissue SURGICAL PATHOLOGY Tab Jerez MD 2021 1700        Implants:  * No implants in log *      Drains: * No LDAs found *    [address],  [address] [facility]  Phone: [phone] [facility],   [address], [address] [facility]  Phone: [phone]    EGD Procedure Note    Patient: Isiah Ward  : 1975    Procedure: Esophagogastroduodenoscopy with Bx    Date:  2021     Endoscopist:  Tab Jerez MD, MD    Referring Physician:  Elvira Waters MD    Anesthesia: Anesthesia: MAC    Procedure Details  The patient was placed in the left lateral decubitus position. A bite block was placed. The patient was monitored with ECG tracing, pulse oximetry, blood pressure monitoring, and direct observation. An upper endoscope was inserted through the bite into the mouth and advanced under direct vision into the esophagus and gradually to the duodenum. A careful inspection was made during the procedure including a retroflexed views of the proximal stomach and cardia and of the incisura. The findings and interventions are described below. Findings:   No foreign body was seen. Grade B esophagitis with white cheesy exudate was seen extending from upper esophageal sphincter to about 30 cm from incisors. Very superficial biopsies were taken.   From 30 cm to 40 cm progressively worsening degree of 'black esophagus' was seen. The upper part of the segment had multiple black dots which progressively worse until the lower part showed circumferential black mucosa. This is generally associated with renal failure or ischemia or both. A sliding hiatal hernia was noted extending from the GE junction located at 40 cm to 42 cm where diaphragmatic impression was seen. Stomach: On retroverted view Hill  type I sliding hiatal hernia was noted. A few fundic gland type-looking <1m polyps (<20 in number) were noted in the body of the stomach. Duodenum: Irregularly-shaped large superficial and acute-looking ulcers measuring all the way up to 4 cm in greatest dimension. It was decided to not try to negotiate the time at the junction of the first and the second part. Complications/ adverse events:  None. Estimated Blood loss:  <10 ml    Disposition:   The patient should be in the hospital for full evaluation, management and appropriate consultations under the care of a hospitalist.    Recommendations:  Acid suppressive therapy and anti-reflux measures. CT chest and abdomen. Avoid irritants to the stomach and esophagus such as NSAIDs and aspirin. Nephrology consultation. Await pathology report. Once it is available, interpretation and further recommendations will be sent. A repeat EGD is recommended in 8-12 weeks. IMPORTANT: Please note that some portions of this note may have been created using Dragon voice recognition software. Some \"sound-alike\" and totally wrong word substitutions may have taken place due to known inherent limitations of any such software, including this voice recognition software. In spite of efforts to eliminate such errors, some may not have been corrected. So please read the note with this in mind and recognize such mistakes and understand the correct version using the  context.  If there are still uncertainties in the mind of the medical provider reading this note about any aspect of the note, the provider can feel free to contact me. Thanks.     Electronically signed by Sp Fong MD on 9/25/2021 at 5:27 PM

## 2021-09-25 NOTE — ED NOTES
Consult to Nephorology at [id number] [place] [address]  09/25/21 1837    Consult completed with Dr. Vielka Solomon in ED and spoke with TAYLOR Vazquez at [facility]  09/25/21 4525

## 2021-09-25 NOTE — ANESTHESIA POSTPROCEDURE EVALUATION
Department of Anesthesiology  Postprocedure Note    Patient: Kala Walters  MRN: [id number]  YOB: 1975  Date of evaluation: 9/25/2021  Time:  5:54 PM     Procedure Summary     Date: 09/25/21 Room / Location: [facility]    Anesthesia Start: 1700 Anesthesia Stop: 5317    Procedure: EGD BIOPSY (N/A ) Diagnosis: (food bolus)    Surgeons: Dave Olmstead MD Responsible Provider: Sharon Higginbotham MD    Anesthesia Type: general ASA Status: 3 - Emergent          Anesthesia Type: general    Grey Phase I: Grey Score: 9    Grey Phase II:      Last vitals: Reviewed and per EMR flowsheets.        Anesthesia Post Evaluation    Patient location during evaluation: bedside  Level of consciousness: awake  Airway patency: patent  Nausea & Vomiting: no nausea  Complications: no  Cardiovascular status: blood pressure returned to baseline  Respiratory status: acceptable  Hydration status: euvolemic

## 2021-09-25 NOTE — CONSULTS
[facility]    [address] [place] [address] [address],  [address], [address]  Phone: [phone] He is a Single [1] White (non-) [1] 42 y.o. . male      Main Problems/Chief Complaint for which GI service is seeing pt     Impacted food bolus    Clinical Summary      The patient feels it while eating pizza yesterday evening something got stuck in the throat. After that he has had trouble even with liquids. He thinks that some liquid might be going down but a lot comes up. He has had trouble swallowing even his saliva completely and has to spit it out. He wanted to see if the problem persisted before he came in. It did persist.  He has not had any overt GI bleeding, significant weight loss, persistent difficulty swallowing or GERD symptoms. There is documentation that he has had similar problem almost 3 years ago for which she had an EGD with foreign body removal by Dr. Yosef Brady. He also claims that about 6 months ago a piece of ham got stuck in his throat. The history was suggestive of eosinophilic esophagitis. Separately, the patient had some elevation of creatinine and BUN before this episode; but now he has creatinine of 5.2 and BUN of 82. He does not think his urinary output has decreased. He is already referred to a nephrologist for given lesser elevation of creatinine and BUN by his PCP.       PAST MEDICAL HISTORY     Past Medical History:   Diagnosis Date    Acute respiratory failure ([address] 8/18/2014    Asthma     Blood pressure instability     Chronic pain syndrome     Detached retina, bilateral     laser tx right eye    Diabetes mellitus ([address]     uncontrolled     Insomnia     Meningitis August 2014    admission [facility]    Neuropathic pain     Osteomyelitis ([address]     Osteomyelitis of tibia ([address]     left    Pain of left lower extremity     Peripheral neuropathy      FAMILY HISTORY     Family History   Problem Relation Age of Onset    Asthma Other     Diabetes Other     High Blood Pressure Other        SURGICAL HISTORY     Past Surgical History:   Procedure Laterality Date    ANKLE FRACTURE SURGERY Left 1/28/13    Dr. Martin Brooks  August 2013    left tibia with external fixation device    EYE SURGERY      retina reattachment left eye x 3 holes repairs    EYE SURGERY Right 9-27-13    retal detachment    LEG SURGERY Left 3/31/14    ORIF left fibula and tibia    [place] EGD FLEXIBLE FOREIGN BODY REMOVAL N/A 9/21/2018    EGD FOREIGN BODY REMOVAL performed by Mirian Joseph MD at [address] [place] [address]      with external device inplace    UPPER GASTROINTESTINAL ENDOSCOPY N/A 9/21/2018    EGD BIOPSY performed by Mirian Joseph MD at [place] [address]   (This list may include medications prescribed during this encounter as epic can not insert only the list prior to this encounter.)      ALLERGIES     Allergies   Allergen Reactions    Tegaderm Ag Mesh 2\"X2\" [Wound Dressings]      \"Holes in skin from Tegaderm Adhesive\"    Codeine Other (See Comments)     hallucinates    Other Other (See Comments)     Holes in skin  From Tegaderm Adhesive    Tape [Adhesive Tape]      Blister         REVIEW OF SYSTEMS   No chest pain suspicious for cardiac origin  No SOB    PHYSICAL EXAM   Vitals as per nursing record. Heart: WNL  Lungs: Clear to A&P  Abd: soft, non-tender, no masses are felt. Shortness of breath mass, without anesthesia stroke the  Neurologic: Alert & oriented x 3. Psych: Good mood and memory. Pt is able to make appropriate decisions. FINAL IMPRESSION AND RECOMMENDATIONS     Possible food bolus impaction in the esophagus. With anesthesia support and availability, an EGD with Bx and possible polypectomy, dilation, cauterization and other interventions during the procedure as needed is recommended.     The patient is explained the above procedure(s) in simple words along with its need, risks, benefits and alternatives. The risks explained to the patient included, but are not limited to, bleeding, perforation, infection, suppression of breathing, heart attack, stroke, need for hospitalization and/or surgery and remotely even death. There is some possibility of the patient experiencing discomfort or pain during and after the procedure remains. All the risks put together account for  0.03% of cases. The prognosis is explained if the procedure is not done. The medical condition(s) which increase the risks of the procedure as explained to the patient are: renal failure, DM    The patient has voiced the understanding of the above and has been given opportunity to ask questions. No question was left unanswered. The informed consent for the above procedure(s) is obtained. The total time spent face-to-face, review of the record and on the tapia during this visit was 30 minutes with more than 50% of the time spent in review of the electronic record and its independent analysis revealing marked worsening of renal function,  coordination of care and management of impacted food bolus in the esophagus and counseling the patient for the above procedure. En Loo MD 9/25/21 4:38 PM EDT    CC:  Ladonna Rivera MD      IMPORTANT: Please note that some portions of this note may have been created using Dragon voice recognition software. Some \"sound-alike\" and totally wrong word substitutions may have taken place due to known inherent limitations of any such software, including this voice recognition software. In spite of efforts to eliminate such errors, some may not have been corrected. So please read the note with this in mind and recognize such mistakes and understand the correct version using the  context. Thanks.

## 2021-09-25 NOTE — ED PROVIDER NOTES
[address] ED  EMERGENCY DEPARTMENT ENCOUNTER        Pt Name: Adiel Khan  MRN: [id number]  Armstrongfguero 1975  Date of evaluation: 9/25/2021  Provider: Minerva Child PA-C  PCP: Amadou Bauman MD  Note Started: 2:11 PM EDT       I have seen and evaluated this patient with my supervising physician Tamara Walker MD.    [address] [place] [address]       Chief Complaint   Patient presents with    Dysphagia     per ems, pt reports piece of cheese from pizza stuck in throught since yesterday. HISTORY OF PRESENT ILLNESS   (Location, Timing/Onset, Context/Setting, Quality, Duration, Modifying Factors, Severity, Associated Signs and Symptoms)  Note limiting factors. Chief Complaint: Dysphagia     Adiel Khan is a 42 y.o. male with a past medical history of asthma, type 1 diabetes, hypertension, chronic pain syndrome, depression brought in today by EMS evaluation of dysphagia. Patient states that he ate a piece of pizza and a piece of the cheese from the pizza stuck in the back of his throat. Onset of symptoms occurred yesterday. Duration of symptoms have been persistent since onset. Context includes dysphagia he states he will try and drink something but he vomits it back up. Denies chest pain or shortness of breath. Denies any known fevers or chills. States that this has happened one other time in which he did have a piece of roast beef stuck in his throat. He did have to get it removed via endoscopy. No aggravating symptoms. No alleviating symptoms. Otherwise denies any other complaints. Nothing seems to make symptoms better or worse. He has not tried anything at home. Nursing Notes were all reviewed and agreed with or any disagreements were addressed in the HPI. REVIEW OF SYSTEMS    (2-9 systems for level 4, 10 or more for level 5)     Review of Systems   Constitutional: Negative. HENT: Positive for trouble swallowing. Respiratory: Negative.     Cardiovascular: Negative. Gastrointestinal: Negative. Genitourinary: Negative. Musculoskeletal: Negative. Skin: Negative. Neurological: Negative. Positives and Pertinent negatives as per HPI. Except as noted above in the ROS, all other systems were reviewed and negative. PAST MEDICAL HISTORY     Past Medical History:   Diagnosis Date    Acute respiratory failure ([facility] 8/18/2014    Asthma     Blood pressure instability     Chronic pain syndrome     Detached retina, bilateral     laser tx right eye    Diabetes mellitus ([facility]     uncontrolled     Insomnia     Meningitis August 2014    admission [facility]    Neuropathic pain     Osteomyelitis ([facility]     Osteomyelitis of tibia ([facility]     left    Pain of left lower extremity     Peripheral neuropathy          SURGICAL HISTORY     Past Surgical History:   Procedure Laterality Date    ANKLE FRACTURE SURGERY Left 1/28/13    Dr. Maggie Guerrero  August 2013    left tibia with external fixation device    EYE SURGERY      retina reattachment left eye x 3 holes repairs    EYE SURGERY Right 9-27-13    retal detachment    LEG SURGERY Left 3/31/14    ORIF left fibula and tibia    [place] EGD FLEXIBLE FOREIGN BODY REMOVAL N/A 9/21/2018    EGD FOREIGN BODY REMOVAL performed by Oumou Wong MD at [facility]      with external device inplace    UPPER GASTROINTESTINAL ENDOSCOPY N/A 9/21/2018    EGD BIOPSY performed by Oumou Wong MD at [address] [facility] [address]    1 each by Does not apply route daily Test blood glucose 10 times daily Dx Code E 10.8    AIMOVIG 140 MG/ML SOAJ        ALCOHOL SWABS PADS    Use as needed for insulin injection.     BD INSULIN SYRINGE U/F 31G X 5/16\" 0.3 ML MISC    USE ONE SYRINGE FOUR TIMES A DAY BEFORE MEALS AND ONCE NIGHTLY    BLOOD GLUCOSE MONITOR KIT AND SUPPLIES    Dispense sufficient amount for indicated testing frequency plus additional to accommodate PRN testing needs. Dispense all needed supplies to include: monitor, strips, lancing device, lancets, control solutions, alcohol swabs. BLOOD GLUCOSE MONITORING SUPPL (ACCU-CHEK CAPO PLUS) W/DEVICE KIT    1 each by Does not apply route daily Test blood sugar 10 times daily Dx code E 10.8    BLOOD GLUCOSE TEST STRIPS (ACCU-CHEK CAPO PLUS) STRIP    USE ONE STRIP TO TEST THREE TIMES A DAY    BLOOD GLUCOSE TEST STRIPS (ACCU-CHEK CAPO PLUS) STRIP    1 each by In Vitro route daily Test blood glucose 10 times daily Dx Code E10.8    BLOOD GLUCOSE TEST STRIPS (ACCU-CHEK CAPO PLUS) STRIP    4 times a day As needed. CHOLECALCIFEROL (VITAMIN D3) 5000 UNITS CAPS    Take 1 capsule by mouth Daily    CONTINUOUS BLOOD GLUC  (DEXCOM G6 ) LINDA    As needed    CONTINUOUS BLOOD GLUC SENSOR (DEXCOM G6 SENSOR) MISC    As needed    CONTINUOUS BLOOD GLUC TRANSMIT (DEXCOM G6 TRANSMITTER) MISC    USE TO CHECK BLOOD SUGAR    DEXTROMETHORPHAN-GUAIFENESIN (MUCINEX DM)  MG TB12    Cough and congestion. DICLOXACILLIN (DYNAPEN) 500 MG CAPSULE    TAKE ONE CAPSULE BY MOUTH TWICE A DAY    DULAGLUTIDE (TRULICITY) 5.74 Croatian/6.0ZL SOPN    Inject 0.75 mg into the skin once a week    DULOXETINE (CYMBALTA) 60 MG EXTENDED RELEASE CAPSULE    Take 1 capsule by mouth daily    ERENUMAB-AOOE (AIMOVIG, 140 MG DOSE,) 70 MG/ML SOAJ    Inject 140 mLs into the skin every 30 days    FLAXSEED, LINSEED, (FLAX PO)    Take 14 g by mouth daily. FLUTICASONE (FLONASE) 50 MCG/ACT NASAL SPRAY    SPRAY TWO SPRAYS IN EACH NOSTRIL DAILY    GABAPENTIN (NEURONTIN) 400 MG CAPSULE    Take one tablet Po BID    GLUCAGON (Joann Spearing 1-PACK) 1 MG/0.2ML SOAJ    Use as needed for low sugar    GLUCAGON EMERGENCY 1 MG INJECTION    INJECT 1MG INTO THE MUSCLE AS NEEDED    HANDICAP PLACARD MISC    by Does not apply route Diagnosis: Type 1 diabetes. Expires 4/13/23.     HYDROCORTISONE 2.5 % CREAM    Apply topically 2 times daily. INSULIN GLULISINE (APIDRA) 100 UNIT/ML INJECTION    INJECT 8-12 UNITS UNDER THE SKIN THREE TIMES A DAY WITH MEALS    INSULIN SYRINGE-NEEDLE U-100 (B-D INS SYR ULTRAFINE 1CC/31G) 31G X 5/16\" 1 ML MISC    INJECT USING INSULIN ONCE DAILY    INSULIN SYRINGE-NEEDLE U-100 (BD INSULIN SYRINGE U/F) 31G X 5/16\" 0.5 ML MISC    Inject 1 each into the skin 4 times daily DX CODE E 10.22    LANTUS 100 UNIT/ML INJECTION VIAL    INJECT 32 UNITS UNDER THE SKIN ONCE NIGHTLY    LANTUS 100 UNIT/ML INJECTION VIAL    INJECT 30 UNITS UNDER THE SKIN ONCE NIGHTLY    MAGNESIUM 30 MG TABLET    Take 30 mg by mouth 2 times daily    MUCUS RELIEF DM  MG TABS    TAKE ONE BY MOUTH DAILY AS NEEDED    MULTIPLE VITAMIN (DAILY KITTY) TABS    TAKE ONE TABLET BY MOUTH DAILY    OMEGA-3 ACID ETHYL ESTERS (LOVAZA) 1 G CAPSULE    TAKE TWO CAPSULES BY MOUTH TWICE DAILY    PANTOPRAZOLE (PROTONIX) 40 MG TABLET    Take 1 tablet by mouth 2 times daily    POLYVINYL ALCOHOL (LIQUIFILM TEARS) 1.4 % OPHTHALMIC SOLUTION    1 drop as needed    POTASSIUM CHLORIDE (KLOR-CON M) 10 MEQ EXTENDED RELEASE TABLET    Take 1 tablet by mouth 2 times daily    PROBIOTIC PRODUCT (ACIDOPHILUS PROBIOTIC) CAPS CAPSULE    TAKE ONE CAPSULE BY MOUTH DAILY    PROPRANOLOL (INDERAL) 80 MG TABLET    Take 40 mg by mouth 2 times daily For migraines     SUMATRIPTAN (IMITREX) 50 MG TABLET    Take one tab po at the start of migraine PRN max 1 every 24 hours    TOPIRAMATE (TOPAMAX) 100 MG TABLET    TAKE ONE TABLET BY MOUTH TWO TIMES A DAY    TRAMADOL (ULTRAM) 50 MG TABLET    Take 1 tablet by mouth every 6 hours as needed for Pain (max 1-2 per day) for up to 28 days.     TRIAMCINOLONE (KENALOG) 0.1 % OINTMENT    Apply to thickened affected areas PRN sparingly for flares no longer than 2 weeks at one time, do not apply to cleared skin         ALLERGIES     Tegaderm ag mesh 2\"x2\" [wound dressings], Codeine, Other, and Tape Ayaka Holbrook tape]    FAMILYHISTORY       Family History   Problem Relation Age of Onset    Asthma Other     Diabetes Other     High Blood Pressure Other           SOCIAL HISTORY       Social History     Tobacco Use    Smoking status: Never Smoker    Smokeless tobacco: Never Used   Vaping Use    Vaping Use: Never used   Substance Use Topics    Alcohol use: No     Alcohol/week: 0.0 standard drinks    Drug use: No       SCREENINGS    [place] Coma Scale  Eye Opening: Spontaneous  Best Verbal Response: Oriented  Best Motor Response: Obeys commands  Williams Coma Scale Score: 15        PHYSICAL EXAM    (up to 7 for level 4, 8 or more for level 5)     ED Triage Vitals   BP Temp Temp src Pulse Resp SpO2 Height Weight   -- -- -- -- -- -- -- --       Physical Exam  Vitals and nursing note reviewed. Constitutional:       General: He is awake. He is not in acute distress. Appearance: Normal appearance. He is well-developed. He is not ill-appearing, toxic-appearing or diaphoretic. HENT:      Head: Normocephalic and atraumatic. Jaw: There is normal jaw occlusion. No trismus, tenderness, swelling, pain on movement or malocclusion. Nose: Nose normal.      Mouth/Throat:      Lips: Pink. No lesions. Mouth: Mucous membranes are moist. No injury, lacerations, oral lesions or angioedema. Dentition: Normal dentition. Does not have dentures. No dental tenderness, gingival swelling, dental caries, dental abscesses or gum lesions. Tongue: No lesions. Tongue does not deviate from midline. Palate: No mass and lesions. Pharynx: Oropharynx is clear. Uvula midline. No pharyngeal swelling, oropharyngeal exudate, posterior oropharyngeal erythema or uvula swelling. Tonsils: No tonsillar exudate or tonsillar abscesses. 0 on the right. 0 on the left. Comments: Tolerating secretions. Eyes:      General:         Right eye: No discharge. Left eye: No discharge.    Cardiovascular:      Rate and Rhythm: Normal rate and regular rhythm. Pulses:           Radial pulses are 2+ on the right side and 2+ on the left side. Heart sounds: Normal heart sounds. No murmur heard. No gallop. Pulmonary:      Effort: Pulmonary effort is normal. No respiratory distress. Breath sounds: Normal breath sounds. No decreased breath sounds, wheezing, rhonchi or rales. Chest:      Chest wall: No tenderness. Musculoskeletal:         General: No deformity. Normal range of motion. Cervical back: Normal range of motion and neck supple. Right lower leg: No edema. Left lower leg: No edema. Skin:     General: Skin is warm and dry. Neurological:      Mental Status: He is alert and oriented to person, place, and time. Psychiatric:         Behavior: Behavior normal. Behavior is cooperative.          DIAGNOSTIC RESULTS   LABS:    Labs Reviewed   CBC WITH AUTO DIFFERENTIAL - Abnormal; Notable for the following components:       Result Value    WBC 12.4 (*)     Platelets 099 (*)     Neutrophils Absolute 10.3 (*)     All other components within normal limits    Narrative:     Performed at:  [place] [address],  [employer], [facility]   Phone [phone]   COMPREHENSIVE METABOLIC PANEL W/ REFLEX TO MG FOR LOW K - Abnormal; Notable for the following components:    Potassium reflex Magnesium 3.1 (*)     CO2 19 (*)     Anion Gap 26 (*)     Glucose 350 (*)     BUN 82 (*)     CREATININE 5.2 (*)     GFR Non- 12 (*)     GFR  15 (*)     Calcium 10.7 (*)     Total Protein 9.6 (*)     Albumin/Globulin Ratio 1.0 (*)     Alkaline Phosphatase 178 (*)     All other components within normal limits    Narrative:     CALL  doctor Young.Adina tel. [phone],  Chemistry results called to and read back by Tawana Hay RN, 39/62/8111  16:20, by Edvin Pedro  Performed at:  [place] [address],  [employer], [facility] Phone [phone]   MAGNESIUM - Abnormal; Notable for the following components:    Magnesium 2.70 (*)     All other components within normal limits    Narrative:     CALL  doctor   tel. [phone],  Chemistry results called to and read back by Jon Lim RN, 51/09/7435  16:20, by Gilmer Rodas  Performed at:  [facility],  [employer]   Phone [phone]   TROPONIN - Abnormal; Notable for the following components:    Troponin 0.30 (*)     All other components within normal limits    Narrative:     Performed at:  [facility],  [employer]   Phone [phone]   URINE RT REFLEX TO CULTURE - Abnormal; Notable for the following components:    Bilirubin Urine MODERATE (*)     Ketones, Urine 15 (*)     Blood, Urine MODERATE (*)     Protein,  (*)     All other components within normal limits    Narrative:     Performed at:  [place] [address],  [employer]   Phone [phone]   LACTATE, SEPSIS - Abnormal; Notable for the following components:    Lactic Acid, Sepsis 2.0 (*)     All other components within normal limits    Narrative:     Performed at:  [facility],  [employer]   Phone [phone]   MICROSCOPIC URINALYSIS - Abnormal; Notable for the following components:    Mucus, UA Rare (*)     RBC, UA 5-10 (*)     All other components within normal limits    Narrative:     Performed at:  [facility],  [employer], [employer]   Phone [phone]   TROPONIN - Abnormal; Notable for the following components:    Troponin 0.24 (*)     All other components within normal limits    Narrative:     Performed at:  [facility],  [employer]   Phone [phone]   POCT GLUCOSE - Abnormal; Notable for the following components:    POC Glucose 285 (*)     All other components within normal limits    Narrative:     Performed at:  [facility],  [place], [facility]   Phone [phone]   POCT GLUCOSE - Normal   COVID-19 & INFLUENZA COMBO   LACTATE, SEPSIS    Narrative:     Performed at:  [facility],  [place], [facility]   Phone [phone]   BLOOD GAS, VENOUS    Narrative:     Performed at:  [facility],  [place], [facility]   Phone [phone]   SURGICAL PATHOLOGY   RENAL FUNCTION PANEL   URIC ACID       When ordered only abnormal lab results are displayed. All other labs were within normal range or not returned as of this dictation. EKG: When ordered, EKG's are interpreted by the Emergency Department Physician in the absence of a cardiologist.  Please see their note for interpretation of EKG. RADIOLOGY:   Non-plain film images such as CT, Ultrasound and MRI are read by the radiologist. Plain radiographic images are visualized and preliminarily interpreted by the ED Provider with the below findings:        Interpretation per the Radiologist below, if available at the time of this note:    [address]   Final Result   Cholelithiasis along with gallbladder sludge, but without other sonographic   evidence of cholecystitis. Circumscribed 3.2 cm hypoechoic lesion within the right hepatic lobe   laterally. That is probably a hemangioma. However, a nonemergent follow-up   dedicated MRI (preferred) or CT with contrast is recommended to better   evaluate that. CT ABDOMEN PELVIS WO CONTRAST Additional Contrast? None   Final Result   1. Wall thickening of the visualized esophagus. Correlate with presentation   for esophagitis. 2. Thickened appearance of bladder wall.   Correlate with presentation to   exclude acute cystitis. 3. Nonobstructive right renal calculus. 4. Cholelithiasis and nonspecific gallbladder distention. Ultrasound or HIDA   scan would better evaluate for acute cholecystitis. 5. Other findings as described. XR CHEST PORTABLE   Final Result   No cardiomegaly, pneumonia or interstitial edema. No significant change from 08/03/2021. No results found. PROCEDURES   Unless otherwise noted below, none     Procedures    CRITICAL CARE TIME   Total Critical Care time was 35 minutes, excluding separately reportable procedures. There was a high probability of clinically significant/life threatening deterioration in the patient's condition which required my urgent intervention. CONSULTS:  IP CONSULT TO GI  IP CONSULT TO NEPHROLOGY  IP CONSULT TO HOSPITALIST  IP CONSULT TO NEPHROLOGY      EMERGENCY DEPARTMENT COURSE and DIFFERENTIAL DIAGNOSIS/MDM:   Vitals:    Vitals:    09/25/21 1750 09/25/21 1832 09/25/21 1900 09/25/21 2100   BP: 126/63 119/69 139/68 (!) 158/90   Pulse: 109 107 102 100   Resp: 16  18 22   Temp:       TempSrc:       SpO2: 98% 99% 98% 96%   Weight:       Height:           Patient was given the following medications:  Medications   lactated ringers infusion ( IntraVENous New Bag 9/25/21 2125)   glucagon (rDNA) injection 1 mg (1 mg IntraMUSCular Given 9/25/21 1441)   ondansetron (ZOFRAN-ODT) disintegrating tablet 4 mg (4 mg Oral Given 9/25/21 1438)   0.9 % sodium chloride bolus (0 mLs IntraVENous Stopped 9/25/21 1704)   0.9 % sodium chloride bolus (0 mLs IntraVENous Stopped 9/25/21 1943)   potassium chloride (KLOR-CON M) extended release tablet 40 mEq (40 mEq Oral Given 9/25/21 1944)           Patient brought in today by EMS for evaluation of dysphagia. On exam he is alert oriented afebrile breathing on room air satting at 100%. Nontoxic in appearance. No acute respiratory distress. Old labs and records reviewed.   Patient seen and evaluated by myself as well as my attending. Patient is in no acute respiratory distress and tolerating secretions. Point-of-care glucose of 285. CBC shows slight leukocytosis of 12.4. Hemoglobin of 16.1. Patient received glucagon and Zofran while here in the ED. He was unable to tolerate p.o. Will consult GI endoscopy team. I spoke to GI, Dr Daxa Quintanilla in the ED. Please see GIs note for endoscopy. Patient has an acute kidney injury. Creatinine of 5.2 BUN of 82. GFR of 12. Blood glucose of 350. Potassium of 3.1. Anion gap of 26. VBG unremarkable. Lactic acid of 2. Troponin of 0.3 most likely secondary to acute kidney injury. Will consult nephrology. Spoke to nephrology, Dr. Katlin Quintanilla who will follow patient in the inpatient setting. Will obtain additional imaging and laboratory studies and then admit to the hospitalist team. X-ray shows no cardiomegaly pneumonia or interstitial edema. No change from prior chest x-ray. CT abdomen and pelvis shows wall thickening of the visualized esophagus correlate with presentation for esophagitis. Thickened appearance of bladder wall correlate with urine to exclude acute cystitis. Nonobstructive right renal calculus. Cholelithiasis and nonspecific gallbladder distention. Ultrasound or HIDA scan would be better to evaluate for acute cholecystitis. Other findings as described above. Urine is negative for infection. Will obtain ultrasound to further evaluate the gallbladder. Ultrasound of the gallbladder reveals cholelithiasis along with gallbladder sludge without sonographic evidence of cholecystitis. Circumscribed 3.2 cm hypoechoic lesion within the right hepatic lobe laterally. Probably a hemangioma however a nonemergent follow-up dedicated MRI or CT with contrast is recommended to better evaluate. Repeat lactic did improve to 1.7. Troponin did trend downward to 0.24. Patient denied chest pain. Did repeat an EKG please see my attendings note for documentation.   Plan at this time will be to admit to the hospitalist team.  I spoke to Dr. Dwain Stewart with the hospitalist team who did accept. Patient stable at this time. FINAL IMPRESSION      1. HERB (acute kidney injury) ([facility]    2. Elevated troponin    3. Hypokalemia    4. Calculus of gallbladder with biliary obstruction but without cholecystitis    5. Esophagitis          DISPOSITION/PLAN   DISPOSITION        PATIENT REFERRED TO:  No follow-up provider specified.     DISCHARGE MEDICATIONS:  New Prescriptions    No medications on file       DISCONTINUED MEDICATIONS:  Discontinued Medications    No medications on file              (Please note that portions of this note were completed with a voice recognition program.  Efforts were made to edit the dictations but occasionally words are mis-transcribed.)    Julian Cervantes PA-C (electronically signed)           Julian Cervantes PA-C  09/25/21 4090       Julian Cervantes PA-C  09/25/21 4178

## 2021-09-25 NOTE — ANESTHESIA PRE PROCEDURE
Department of Anesthesiology  Preprocedure Note       Name:  Domingo Massey   Age:  42 y.o.  :  1975                                          MRN:  [id number]         Date:  2021      Surgeon: Fe Herrera):  Mariah Cordoba MD    Procedure: Procedure(s):  EGD FOREIGN BODY REMOVAL    Medications prior to admission:   Prior to Admission medications    Medication Sig Start Date End Date Taking? Authorizing Provider   SUMAtriptan (IMITREX) 50 MG tablet Take one tab po at the start of migraine PRN max 1 every 24 hours 21  Yes Alena Knight MD   Erenumab-aooe (AIMOVIG, 140 MG DOSE,) 70 MG/ML SOAJ Inject 140 mLs into the skin every 30 days 21  Yes Alena Knight MD   traMADol (ULTRAM) 50 MG tablet Take 1 tablet by mouth every 6 hours as needed for Pain (max 1-2 per day) for up to 28 days.  9/16/21 10/14/21 Yes Alena Knight MD   Dulaglutide (TRULICITY) 6.30 BE/1.1PV SOPN Inject 0.75 mg into the skin once a week 21  Yes Tavo Mclain MD   DULoxetine (CYMBALTA) 60 MG extended release capsule Take 1 capsule by mouth daily 21  Yes Alena Knight MD   topiramate (TOPAMAX) 100 MG tablet TAKE ONE TABLET BY MOUTH TWO TIMES A DAY 21  Yes Alena Knight MD   dicloxacillin (DYNAPEN) 500 MG capsule TAKE ONE CAPSULE BY MOUTH TWICE A DAY  Patient taking differently: daily  21  Yes Nicolette Solano MD   LANTUS 100 UNIT/ML injection vial INJECT 32 UNITS UNDER THE SKIN ONCE NIGHTLY 20  Yes Jayne Rogers MD   insulin glulisine (APIDRA) 100 UNIT/ML injection INJECT 8-12 UNITS UNDER THE SKIN THREE TIMES A DAY WITH MEALS 10/2/20  Yes Tavo Mclain MD   magnesium 30 MG tablet Take 30 mg by mouth 2 times daily   Yes Historical Provider, MD   triamcinolone (KENALOG) 0.1 % ointment Apply to thickened affected areas PRN sparingly for flares no longer than 2 weeks at one time, do not apply to cleared skin 19  Yes Ry Veliz, DO   hydrocortisone 2.5 % cream Apply topically 2 times daily. 4/12/18  Yes Pamela Samayoa MD   Multiple Vitamin (DAILY KITTY) TABS TAKE ONE TABLET BY MOUTH DAILY 9/26/17  Yes Pamela Samayoa MD   Dextromethorphan-Guaifenesin [facility] DM)  MG TB12 Cough and congestion. 6/22/17  Yes Pamela Samayoa MD   omega-3 acid ethyl esters (LOVAZA) 1 G capsule TAKE TWO CAPSULES BY MOUTH TWICE DAILY 6/10/17  Yes Danish Jenkins MD   Probiotic Product (ACIDOPHILUS PROBIOTIC) CAPS capsule TAKE ONE CAPSULE BY MOUTH DAILY 5/31/16  Yes Pamela Samayoa MD   polyvinyl alcohol (LIQUIFILM TEARS) 1.4 % ophthalmic solution 1 drop as needed   Yes Historical Provider, MD   propranolol (INDERAL) 80 MG tablet Take 40 mg by mouth 2 times daily For migraines    Yes Historical Provider, MD   LANTUS 100 UNIT/ML injection vial INJECT 30 UNITS UNDER THE SKIN ONCE NIGHTLY 8/31/21   Constantino Soria MD   Continuous Blood Gluc Transmit (DEXCOM G6 TRANSMITTER) MISC USE TO CHECK BLOOD SUGAR 8/31/21   Constantino Soria MD   AIMOVIG 140 MG/ML SOAJ  8/8/21   Historical Provider, MD   potassium chloride (KLOR-CON M) 10 MEQ extended release tablet Take 1 tablet by mouth 2 times daily 7/29/21 8/28/21  Saira Gipson MD   Glucagon (Orlene Patch 1-PACK) 1 MG/0.2ML SOAJ Use as needed for low sugar 7/22/21   Constantino Soria MD   Continuous Blood Gluc Sensor ([address] [place] [address] Street) [facility] As needed 7/22/21   Constantino Soria MD   Continuous Blood Gluc  ([facility]) [address] As needed 7/22/21   Constantino Soria MD   blood glucose monitor kit and supplies Dispense sufficient amount for indicated testing frequency plus additional to accommodate PRN testing needs. Dispense all needed supplies to include: monitor, strips, lancing device, lancets, control solutions, alcohol swabs. 7/6/21   Constantino Soria MD   blood glucose test strips (ACCU-CHEK CAPO PLUS) strip 4 times a day As needed.  7/6/21   Constantino Soria MD   gabapentin (NEURONTIN) 400 MG capsule Take one tablet Po BID 6/21/21 8/14/21 medications:    Current Facility-Administered Medications   Medication Dose Route Frequency Provider Last Rate Last Admin    0.9 % sodium chloride bolus  1,000 mL IntraVENous Once Sergio Mcdonnell PA-C 1,000 mL/hr at 09/25/21 1604 1,000 mL at 09/25/21 1604       Allergies:     Allergies   Allergen Reactions    Tegaderm Ag Mesh 2\"X2\" [Wound Dressings]      \"Holes in skin from Tegaderm Adhesive\"    Codeine Other (See Comments)     hallucinates    Other Other (See Comments)     Holes in skin  From Tegaderm Adhesive    Tape [Adhesive Tape]      Blister         Problem List:    Patient Active Problem List   Diagnosis Code    Pilon fracture G53.123J    Type 1 diabetes mellitus with stage 3 chronic kidney disease (HCC) E10.22, N18.30    HTN (hypertension) I10    Leg wound, left F27.885K    Pathological fracture of tibia, left M84.469A    Staph aureus infection A49.01    Acute encephalopathy G93.40    Toxic metabolic encephalopathy [id number]    Hyponatremia E87.1    Meningitis G03.9    Encephalopathy G93.40    Chronic pain syndrome G89.4    Peripheral neuropathy G62.9    Neuropathic pain M79.2    Osteomyelitis (HCC) M86.9    Pain of left lower extremity M79.605    Insomnia G47.00    Depression F32.9    Chronic migraine without aura, with intractable migraine, so stated, with status migrainosus G43.711    Myofascial pain M79.18    Proliferative diabetic retinopathy associated with type 1 diabetes mellitus ([address] Z78.0696    Polyneuropathy due to type 1 diabetes mellitus ([address] E10.42       Past Medical History:        Diagnosis Date    Acute respiratory failure ([address] 8/18/2014    Asthma     Blood pressure instability     Chronic pain syndrome     Detached retina, bilateral     laser tx right eye    Diabetes mellitus ([address]     uncontrolled     Insomnia     Meningitis August 2014    admission [facility]    Neuropathic pain     Osteomyelitis ([address]     Osteomyelitis of tibia ([address]     left    Pain of left lower extremity     Peripheral neuropathy        Past Surgical History:        Procedure Laterality Date    ANKLE FRACTURE SURGERY Left 1/28/13    Dr. Mary Ann Brandon  August 2013    left tibia with external fixation device    EYE SURGERY      retina reattachment left eye x 3 holes repairs    EYE SURGERY Right 9-27-13    retal detachment    LEG SURGERY Left 3/31/14    ORIF left fibula and tibia    MA EGD FLEXIBLE FOREIGN BODY REMOVAL N/A 9/21/2018    EGD FOREIGN BODY REMOVAL performed by Rashida Villagomez MD at [facility]      with external device inplace    UPPER GASTROINTESTINAL ENDOSCOPY N/A 9/21/2018    EGD BIOPSY performed by Rashida Villagomez MD at [address] History:    Social History     Tobacco Use    Smoking status: Never Smoker    Smokeless tobacco: Never Used   Substance Use Topics    Alcohol use: No     Alcohol/week: 0.0 standard drinks                                Counseling given: Not Answered      Vital Signs (Current):   Vitals:    09/25/21 1414 09/25/21 1501 09/25/21 1530 09/25/21 1615   BP: 100/65 (!) 142/71 129/77 108/67   Pulse: 110  117 102   Resp: 16  16 16   Temp: 97.6 °F (36.4 °C)   97.1 °F (36.2 °C)   TempSrc:    Infrared   SpO2: 100% 100% 97% 95%   Weight: 150 lb (68 kg)   202 lb (91.6 kg)   Height: 6' 2\" (1.88 m)   6' 2\" (1.88 m)                                              BP Readings from Last 3 Encounters:   09/25/21 108/67   09/16/21 132/80   08/03/21 132/82       NPO Status: Time of last liquid consumption: 1000                        Time of last solid consumption: 1000                        Date of last liquid consumption: 09/25/21                        Date of last solid food consumption: 09/25/21    BMI:   Wt Readings from Last 3 Encounters:   09/25/21 202 lb (91.6 kg)   09/16/21 227 lb 12.8 oz (103.3 kg)   08/03/21 220 lb (99.8 kg)     Body mass index is 25.94 kg/m².     CBC:   Lab

## 2021-09-25 NOTE — ED NOTES
Pt provided with 3 oz of water, pt swallowed water without difficulty. No choking or coughing noted during swallowing. Pt did have an episode of emesis after swallowing the water. Provider made aware.      Purvis Crigler, RN  09/25/21 1983

## 2021-09-26 LAB
ALBUMIN SERPL-MCNC: 3.9 G/DL (ref 3.4–5)
ALBUMIN SERPL-MCNC: 4 G/DL (ref 3.4–5)
ALP BLD-CCNC: 144 U/L (ref 40–129)
ALT SERPL-CCNC: 15 U/L (ref 10–40)
ANION GAP SERPL CALCULATED.3IONS-SCNC: 16 MMOL/L (ref 3–16)
ANION GAP SERPL CALCULATED.3IONS-SCNC: 20 MMOL/L (ref 3–16)
AST SERPL-CCNC: 17 U/L (ref 15–37)
BASOPHILS ABSOLUTE: 0 K/UL (ref 0–0.2)
BASOPHILS RELATIVE PERCENT: 0.3 %
BILIRUB SERPL-MCNC: 1.1 MG/DL (ref 0–1)
BILIRUBIN DIRECT: <0.2 MG/DL (ref 0–0.3)
BILIRUBIN, INDIRECT: ABNORMAL MG/DL (ref 0–1)
BUN BLDV-MCNC: 73 MG/DL (ref 7–20)
BUN BLDV-MCNC: 73 MG/DL (ref 7–20)
CALCIUM SERPL-MCNC: 9.5 MG/DL (ref 8.3–10.6)
CALCIUM SERPL-MCNC: 9.7 MG/DL (ref 8.3–10.6)
CHLORIDE BLD-SCNC: 105 MMOL/L (ref 99–110)
CHLORIDE BLD-SCNC: 105 MMOL/L (ref 99–110)
CO2: 20 MMOL/L (ref 21–32)
CO2: 23 MMOL/L (ref 21–32)
CREAT SERPL-MCNC: 3.6 MG/DL (ref 0.9–1.3)
CREAT SERPL-MCNC: 3.6 MG/DL (ref 0.9–1.3)
EKG ATRIAL RATE: 103 BPM
EKG ATRIAL RATE: 109 BPM
EKG ATRIAL RATE: 91 BPM
EKG DIAGNOSIS: NORMAL
EKG P AXIS: 32 DEGREES
EKG P AXIS: 41 DEGREES
EKG P AXIS: 48 DEGREES
EKG P-R INTERVAL: 130 MS
EKG P-R INTERVAL: 138 MS
EKG P-R INTERVAL: 148 MS
EKG Q-T INTERVAL: 366 MS
EKG Q-T INTERVAL: 378 MS
EKG Q-T INTERVAL: 382 MS
EKG QRS DURATION: 100 MS
EKG QRS DURATION: 102 MS
EKG QRS DURATION: 112 MS
EKG QTC CALCULATION (BAZETT): 464 MS
EKG QTC CALCULATION (BAZETT): 492 MS
EKG QTC CALCULATION (BAZETT): 500 MS
EKG R AXIS: 48 DEGREES
EKG R AXIS: 53 DEGREES
EKG R AXIS: 56 DEGREES
EKG T AXIS: 230 DEGREES
EKG T AXIS: 231 DEGREES
EKG T AXIS: 240 DEGREES
EKG VENTRICULAR RATE: 103 BPM
EKG VENTRICULAR RATE: 109 BPM
EKG VENTRICULAR RATE: 91 BPM
EOSINOPHILS ABSOLUTE: 0 K/UL (ref 0–0.6)
EOSINOPHILS RELATIVE PERCENT: 0.1 %
GFR AFRICAN AMERICAN: 22
GFR AFRICAN AMERICAN: 22
GFR NON-AFRICAN AMERICAN: 19
GFR NON-AFRICAN AMERICAN: 19
GLUCOSE BLD-MCNC: 113 MG/DL (ref 70–99)
GLUCOSE BLD-MCNC: 116 MG/DL (ref 70–99)
GLUCOSE BLD-MCNC: 170 MG/DL (ref 70–99)
GLUCOSE BLD-MCNC: 183 MG/DL (ref 70–99)
GLUCOSE BLD-MCNC: 233 MG/DL (ref 70–99)
GLUCOSE BLD-MCNC: 283 MG/DL (ref 70–99)
GLUCOSE BLD-MCNC: 89 MG/DL (ref 70–99)
HCT VFR BLD CALC: 42.3 % (ref 40.5–52.5)
HEMOGLOBIN: 13.9 G/DL (ref 13.5–17.5)
INFLUENZA A: NOT DETECTED
INFLUENZA B: NOT DETECTED
LYMPHOCYTES ABSOLUTE: 2.5 K/UL (ref 1–5.1)
LYMPHOCYTES RELATIVE PERCENT: 17.1 %
MAGNESIUM: 2.5 MG/DL (ref 1.8–2.4)
MCH RBC QN AUTO: 28 PG (ref 26–34)
MCHC RBC AUTO-ENTMCNC: 32.8 G/DL (ref 31–36)
MCV RBC AUTO: 85.2 FL (ref 80–100)
MONOCYTES ABSOLUTE: 1.4 K/UL (ref 0–1.3)
MONOCYTES RELATIVE PERCENT: 9.4 %
NEUTROPHILS ABSOLUTE: 10.7 K/UL (ref 1.7–7.7)
NEUTROPHILS RELATIVE PERCENT: 73.1 %
PDW BLD-RTO: 14.4 % (ref 12.4–15.4)
PERFORMED ON: ABNORMAL
PERFORMED ON: NORMAL
PHOSPHORUS: 2.8 MG/DL (ref 2.5–4.9)
PLATELET # BLD: 396 K/UL (ref 135–450)
PMV BLD AUTO: 7.1 FL (ref 5–10.5)
POTASSIUM REFLEX MAGNESIUM: 3 MMOL/L (ref 3.5–5.1)
POTASSIUM SERPL-SCNC: 3.1 MMOL/L (ref 3.5–5.1)
RBC # BLD: 4.96 M/UL (ref 4.2–5.9)
SARS-COV-2 RNA, RT PCR: NOT DETECTED
SODIUM BLD-SCNC: 144 MMOL/L (ref 136–145)
SODIUM BLD-SCNC: 145 MMOL/L (ref 136–145)
TOTAL PROTEIN: 8.1 G/DL (ref 6.4–8.2)
TROPONIN: 0.23 NG/ML
TROPONIN: 0.25 NG/ML
URIC ACID, SERUM: 10.9 MG/DL (ref 3.5–7.2)
WBC # BLD: 14.6 K/UL (ref 4–11)

## 2021-09-26 PROCEDURE — 93005 ELECTROCARDIOGRAM TRACING: CPT | Performed by: HOSPITALIST

## 2021-09-26 PROCEDURE — 6370000000 HC RX 637 (ALT 250 FOR IP): Performed by: INTERNAL MEDICINE

## 2021-09-26 PROCEDURE — 36415 COLL VENOUS BLD VENIPUNCTURE: CPT

## 2021-09-26 PROCEDURE — 85025 COMPLETE CBC W/AUTO DIFF WBC: CPT

## 2021-09-26 PROCEDURE — 6370000000 HC RX 637 (ALT 250 FOR IP): Performed by: HOSPITALIST

## 2021-09-26 PROCEDURE — 80076 HEPATIC FUNCTION PANEL: CPT

## 2021-09-26 PROCEDURE — C9113 INJ PANTOPRAZOLE SODIUM, VIA: HCPCS | Performed by: HOSPITALIST

## 2021-09-26 PROCEDURE — 6360000002 HC RX W HCPCS: Performed by: INTERNAL MEDICINE

## 2021-09-26 PROCEDURE — 93010 ELECTROCARDIOGRAM REPORT: CPT | Performed by: INTERNAL MEDICINE

## 2021-09-26 PROCEDURE — 2580000003 HC RX 258: Performed by: HOSPITALIST

## 2021-09-26 PROCEDURE — 6360000002 HC RX W HCPCS: Performed by: HOSPITALIST

## 2021-09-26 PROCEDURE — 1200000000 HC SEMI PRIVATE

## 2021-09-26 PROCEDURE — 80069 RENAL FUNCTION PANEL: CPT

## 2021-09-26 PROCEDURE — 99232 SBSQ HOSP IP/OBS MODERATE 35: CPT | Performed by: INTERNAL MEDICINE

## 2021-09-26 PROCEDURE — 83036 HEMOGLOBIN GLYCOSYLATED A1C: CPT

## 2021-09-26 PROCEDURE — 99222 1ST HOSP IP/OBS MODERATE 55: CPT | Performed by: INTERNAL MEDICINE

## 2021-09-26 PROCEDURE — G0378 HOSPITAL OBSERVATION PER HR: HCPCS

## 2021-09-26 PROCEDURE — 83735 ASSAY OF MAGNESIUM: CPT

## 2021-09-26 PROCEDURE — 84484 ASSAY OF TROPONIN QUANT: CPT

## 2021-09-26 PROCEDURE — 99231 SBSQ HOSP IP/OBS SF/LOW 25: CPT | Performed by: INTERNAL MEDICINE

## 2021-09-26 PROCEDURE — 84550 ASSAY OF BLOOD/URIC ACID: CPT

## 2021-09-26 PROCEDURE — 2500000003 HC RX 250 WO HCPCS: Performed by: INTERNAL MEDICINE

## 2021-09-26 RX ORDER — PROPRANOLOL HYDROCHLORIDE 40 MG/1
40 TABLET ORAL 2 TIMES DAILY
Status: DISCONTINUED | OUTPATIENT
Start: 2021-09-26 | End: 2021-09-27 | Stop reason: HOSPADM

## 2021-09-26 RX ORDER — DEXTROSE MONOHYDRATE 50 MG/ML
100 INJECTION, SOLUTION INTRAVENOUS PRN
Status: DISCONTINUED | OUTPATIENT
Start: 2021-09-26 | End: 2021-09-27 | Stop reason: HOSPADM

## 2021-09-26 RX ORDER — INSULIN GLARGINE 100 [IU]/ML
32 INJECTION, SOLUTION SUBCUTANEOUS NIGHTLY
Status: DISCONTINUED | OUTPATIENT
Start: 2021-09-26 | End: 2021-09-27 | Stop reason: HOSPADM

## 2021-09-26 RX ORDER — SODIUM CHLORIDE 9 MG/ML
10 INJECTION INTRAVENOUS 2 TIMES DAILY
Status: DISCONTINUED | OUTPATIENT
Start: 2021-09-26 | End: 2021-09-27 | Stop reason: HOSPADM

## 2021-09-26 RX ORDER — PANTOPRAZOLE SODIUM 40 MG/10ML
40 INJECTION, POWDER, LYOPHILIZED, FOR SOLUTION INTRAVENOUS 2 TIMES DAILY
Status: DISCONTINUED | OUTPATIENT
Start: 2021-09-26 | End: 2021-09-27 | Stop reason: HOSPADM

## 2021-09-26 RX ORDER — ACETAMINOPHEN 650 MG/1
650 SUPPOSITORY RECTAL EVERY 6 HOURS PRN
Status: DISCONTINUED | OUTPATIENT
Start: 2021-09-26 | End: 2021-09-27 | Stop reason: HOSPADM

## 2021-09-26 RX ORDER — ASPIRIN 81 MG/1
81 TABLET, CHEWABLE ORAL DAILY
Status: DISCONTINUED | OUTPATIENT
Start: 2021-09-26 | End: 2021-09-27 | Stop reason: HOSPADM

## 2021-09-26 RX ORDER — NICOTINE POLACRILEX 4 MG
15 LOZENGE BUCCAL PRN
Status: DISCONTINUED | OUTPATIENT
Start: 2021-09-26 | End: 2021-09-27 | Stop reason: HOSPADM

## 2021-09-26 RX ORDER — ONDANSETRON 4 MG/1
4 TABLET, ORALLY DISINTEGRATING ORAL EVERY 8 HOURS PRN
Status: DISCONTINUED | OUTPATIENT
Start: 2021-09-26 | End: 2021-09-27 | Stop reason: HOSPADM

## 2021-09-26 RX ORDER — SODIUM CHLORIDE 0.9 % (FLUSH) 0.9 %
5-40 SYRINGE (ML) INJECTION PRN
Status: DISCONTINUED | OUTPATIENT
Start: 2021-09-26 | End: 2021-09-27 | Stop reason: HOSPADM

## 2021-09-26 RX ORDER — DULOXETIN HYDROCHLORIDE 60 MG/1
60 CAPSULE, DELAYED RELEASE ORAL DAILY
Status: DISCONTINUED | OUTPATIENT
Start: 2021-09-26 | End: 2021-09-27 | Stop reason: HOSPADM

## 2021-09-26 RX ORDER — PREDNISOLONE ACETATE 10 MG/ML
1 SUSPENSION/ DROPS OPHTHALMIC 3 TIMES DAILY
COMMUNITY

## 2021-09-26 RX ORDER — ONDANSETRON 2 MG/ML
4 INJECTION INTRAMUSCULAR; INTRAVENOUS EVERY 6 HOURS PRN
Status: DISCONTINUED | OUTPATIENT
Start: 2021-09-26 | End: 2021-09-27 | Stop reason: HOSPADM

## 2021-09-26 RX ORDER — PREDNISOLONE ACETATE 10 MG/ML
1 SUSPENSION/ DROPS OPHTHALMIC 3 TIMES DAILY
Status: DISCONTINUED | OUTPATIENT
Start: 2021-09-26 | End: 2021-09-27 | Stop reason: HOSPADM

## 2021-09-26 RX ORDER — KETOROLAC TROMETHAMINE 5 MG/ML
1 SOLUTION OPHTHALMIC 3 TIMES DAILY
Status: DISCONTINUED | OUTPATIENT
Start: 2021-09-26 | End: 2021-09-27 | Stop reason: HOSPADM

## 2021-09-26 RX ORDER — CARBOXYMETHYLCELLULOSE SODIUM 10 MG/ML
1 GEL OPHTHALMIC PRN
Status: DISCONTINUED | OUTPATIENT
Start: 2021-09-26 | End: 2021-09-27 | Stop reason: HOSPADM

## 2021-09-26 RX ORDER — POLYETHYLENE GLYCOL 3350 17 G/17G
17 POWDER, FOR SOLUTION ORAL DAILY PRN
Status: DISCONTINUED | OUTPATIENT
Start: 2021-09-26 | End: 2021-09-27 | Stop reason: HOSPADM

## 2021-09-26 RX ORDER — PREDNISOLONE SODIUM PHOSPHATE 10 MG/ML
1 SOLUTION/ DROPS OPHTHALMIC 4 TIMES DAILY
COMMUNITY

## 2021-09-26 RX ORDER — SODIUM CHLORIDE 9 MG/ML
INJECTION, SOLUTION INTRAVENOUS CONTINUOUS
Status: DISCONTINUED | OUTPATIENT
Start: 2021-09-26 | End: 2021-09-26

## 2021-09-26 RX ORDER — MAGNESIUM 30 MG
30 TABLET ORAL 2 TIMES DAILY
Status: DISCONTINUED | OUTPATIENT
Start: 2021-09-26 | End: 2021-09-26 | Stop reason: ALTCHOICE

## 2021-09-26 RX ORDER — HEPARIN SODIUM 5000 [USP'U]/ML
5000 INJECTION, SOLUTION INTRAVENOUS; SUBCUTANEOUS EVERY 8 HOURS SCHEDULED
Status: DISCONTINUED | OUTPATIENT
Start: 2021-09-26 | End: 2021-09-27 | Stop reason: HOSPADM

## 2021-09-26 RX ORDER — FLUTICASONE PROPIONATE 50 MCG
2 SPRAY, SUSPENSION (ML) NASAL DAILY
Status: DISCONTINUED | OUTPATIENT
Start: 2021-09-26 | End: 2021-09-27 | Stop reason: HOSPADM

## 2021-09-26 RX ORDER — SODIUM CHLORIDE 0.9 % (FLUSH) 0.9 %
5-40 SYRINGE (ML) INJECTION EVERY 12 HOURS SCHEDULED
Status: DISCONTINUED | OUTPATIENT
Start: 2021-09-26 | End: 2021-09-27 | Stop reason: HOSPADM

## 2021-09-26 RX ORDER — ACETAMINOPHEN 325 MG/1
650 TABLET ORAL EVERY 6 HOURS PRN
Status: DISCONTINUED | OUTPATIENT
Start: 2021-09-26 | End: 2021-09-27 | Stop reason: HOSPADM

## 2021-09-26 RX ORDER — KETOROLAC TROMETHAMINE 5 MG/ML
1 SOLUTION OPHTHALMIC 3 TIMES DAILY
COMMUNITY

## 2021-09-26 RX ORDER — DEXTROSE MONOHYDRATE 25 G/50ML
12.5 INJECTION, SOLUTION INTRAVENOUS PRN
Status: DISCONTINUED | OUTPATIENT
Start: 2021-09-26 | End: 2021-09-27 | Stop reason: HOSPADM

## 2021-09-26 RX ORDER — TOPIRAMATE 25 MG/1
50 TABLET ORAL 2 TIMES DAILY
Status: DISCONTINUED | OUTPATIENT
Start: 2021-09-26 | End: 2021-09-27 | Stop reason: HOSPADM

## 2021-09-26 RX ORDER — SODIUM CHLORIDE 9 MG/ML
25 INJECTION, SOLUTION INTRAVENOUS PRN
Status: DISCONTINUED | OUTPATIENT
Start: 2021-09-26 | End: 2021-09-27 | Stop reason: HOSPADM

## 2021-09-26 RX ADMIN — Medication 10 ML: at 13:59

## 2021-09-26 RX ADMIN — PANTOPRAZOLE SODIUM 40 MG: 40 INJECTION, POWDER, FOR SOLUTION INTRAVENOUS at 01:36

## 2021-09-26 RX ADMIN — SODIUM BICARBONATE: 84 INJECTION, SOLUTION INTRAVENOUS at 21:45

## 2021-09-26 RX ADMIN — SODIUM CHLORIDE, POTASSIUM CHLORIDE, SODIUM LACTATE AND CALCIUM CHLORIDE: 600; 310; 30; 20 INJECTION, SOLUTION INTRAVENOUS at 05:17

## 2021-09-26 RX ADMIN — PROPRANOLOL HYDROCHLORIDE 40 MG: 40 TABLET ORAL at 21:26

## 2021-09-26 RX ADMIN — PANTOPRAZOLE SODIUM 40 MG: 40 INJECTION, POWDER, FOR SOLUTION INTRAVENOUS at 08:39

## 2021-09-26 RX ADMIN — DULOXETINE HYDROCHLORIDE 60 MG: 60 CAPSULE, DELAYED RELEASE ORAL at 08:39

## 2021-09-26 RX ADMIN — INSULIN GLARGINE 32 UNITS: 100 INJECTION, SOLUTION SUBCUTANEOUS at 01:37

## 2021-09-26 RX ADMIN — HEPARIN SODIUM 5000 UNITS: 5000 INJECTION INTRAVENOUS; SUBCUTANEOUS at 13:52

## 2021-09-26 RX ADMIN — Medication 10 ML: at 08:39

## 2021-09-26 RX ADMIN — SODIUM BICARBONATE: 84 INJECTION, SOLUTION INTRAVENOUS at 12:04

## 2021-09-26 RX ADMIN — PANTOPRAZOLE SODIUM 40 MG: 40 INJECTION, POWDER, FOR SOLUTION INTRAVENOUS at 21:26

## 2021-09-26 RX ADMIN — HEPARIN SODIUM 5000 UNITS: 5000 INJECTION INTRAVENOUS; SUBCUTANEOUS at 21:32

## 2021-09-26 RX ADMIN — TOPIRAMATE 50 MG: 25 TABLET, FILM COATED ORAL at 21:26

## 2021-09-26 RX ADMIN — ACETAMINOPHEN 650 MG: 325 TABLET ORAL at 21:26

## 2021-09-26 RX ADMIN — Medication 10 ML: at 01:37

## 2021-09-26 RX ADMIN — PROPRANOLOL HYDROCHLORIDE 40 MG: 40 TABLET ORAL at 08:39

## 2021-09-26 RX ADMIN — KETOROLAC TROMETHAMINE 1 DROP: 5 SOLUTION/ DROPS OPHTHALMIC at 21:28

## 2021-09-26 RX ADMIN — PREDNISOLONE ACETATE 1 DROP: 10 SUSPENSION/ DROPS OPHTHALMIC at 21:28

## 2021-09-26 RX ADMIN — POTASSIUM BICARBONATE 40 MEQ: 391 TABLET, EFFERVESCENT ORAL at 09:30

## 2021-09-26 RX ADMIN — TOPIRAMATE 50 MG: 25 TABLET, FILM COATED ORAL at 08:38

## 2021-09-26 RX ADMIN — ASPIRIN 81 MG: 81 TABLET, CHEWABLE ORAL at 09:30

## 2021-09-26 RX ADMIN — ACETAMINOPHEN 650 MG: 325 TABLET ORAL at 08:45

## 2021-09-26 RX ADMIN — INSULIN GLARGINE 32 UNITS: 100 INJECTION, SOLUTION SUBCUTANEOUS at 21:33

## 2021-09-26 RX ADMIN — Medication 10 ML: at 21:26

## 2021-09-26 ASSESSMENT — PAIN SCALES - GENERAL
PAINLEVEL_OUTOF10: 0
PAINLEVEL_OUTOF10: 3
PAINLEVEL_OUTOF10: 10

## 2021-09-26 NOTE — PROGRESS NOTES
Admit: 2021    Name:  Arthur Hodges  Room:  6273/0750-57  MRN:    [id number]     Daily Progress Note for 2021   Admitted primarily for food impaction in the esophagus. Underwent EGD- no FB seen. Black esophagus. Hiatal hernia. Ischemic-looking ulcers in duodenum. Found to be in acute kidney injury on chronic kidney disease. Interval History:     Scheduled Meds:   DULoxetine  60 mg Oral Daily    fluticasone  2 spray Each Nostril Daily    insulin glargine  32 Units SubCUTAneous Nightly    pantoprazole  40 mg IntraVENous BID    And    sodium chloride (PF)  10 mL IntraVENous BID    propranolol  40 mg Oral BID    topiramate  50 mg Oral BID    sodium chloride flush  5-40 mL IntraVENous 2 times per day    heparin (porcine)  5,000 Units SubCUTAneous 3 times per day    aspirin  81 mg Oral Daily    insulin glulisine  2-12 Units SubCUTAneous 4x Daily AC & HS       Continuous Infusions:   dextrose      sodium chloride      IV infusion builder 100 mL/hr at 21 1204       PRN Meds:  carboxymethylcellulose PF, glucose, dextrose, glucagon (rDNA), dextrose, sodium chloride flush, sodium chloride, ondansetron **OR** ondansetron, polyethylene glycol, acetaminophen **OR** acetaminophen                  Objective:     Temp  Av.5 °F (36.4 °C)  Min: 97 °F (36.1 °C)  Max: 98.7 °F (37.1 °C)  Pulse  Av.8  Min: 93  Max: 117  BP  Min: 78/49  Max: 158/90  SpO2  Av.1 %  Min: 84 %  Max: 100 %  FiO2   Av.7 %  Min: 92 %  Max: 99 %  No data found. Intake/Output Summary (Last 24 hours) at 2021 1344  Last data filed at 2021 0517  Gross per 24 hour   Intake 3220 ml   Output 400 ml   Net 2820 ml       Physical Exam:  General appearance:  No apparent distress, appears stated age and cooperative. HEENT:  Normal cephalic, atraumatic without obvious deformity. Pupils equal, round,  Extra ocular muscles intact. Conjunctivae/corneas clear. Neck: Supple, with full range of motion.  No jugular venous distention. Trachea midline. Respiratory:  Normal respiratory effort. No use of accessory muscles, no intercostal retractions  Cardiovascular:  Regular rate and rhythm   Abdomen: Soft, non-tender, non-distended    Musculoskeletal:  No clubbing, cyanosis or edema bilaterally. Skin: Skin color, texture, turgor normal.     Neurologic:   grossly non-focal.  Psychiatric:  Alert and oriented, thought content appropriate, normal insight       Lab Data:  CBC:   Recent Labs     09/25/21 1535 09/26/21 0527   WBC 12.4* 14.6*   RBC 5.71 4.96   HGB 16.1 13.9   HCT 48.4 42.3   MCV 84.9 85.2   RDW 14.1 14.4   * 396     BMP:   Recent Labs     09/25/21 1535 09/26/21 0527    145  144   K 3.1* 3.1*  3.0*   CL 99 105  105   CO2 19* 20*  23   PHOS  --  2.8   BUN 82* 73*  73*   CREATININE 5.2* 3.6*  3.6*     BNP: No results for input(s): BNP in the last 72 hours. PT/INR: No results for input(s): PROTIME, INR in the last 72 hours. APTT:No results for input(s): APTT in the last 72 hours. CARDIAC ENZYMES:   Recent Labs     09/25/21 2045 09/26/21  0111 09/26/21 0527   TROPONINI 0.24* 0.25* 0.23*     FASTING LIPID PANEL:  Lab Results   Component Value Date    CHOL 293 02/05/2020    HDL 37 02/05/2020    HDL 42 10/07/2011    TRIG 267 02/05/2020     LIVER PROFILE:   Recent Labs     09/25/21 1535 09/26/21 0527   AST 20 17   ALT 18 15   BILIDIR  --  <0.2   BILITOT 0.8 1.1*   ALKPHOS 178* 144*         US GALLBLADDER RUQ   Final Result   Cholelithiasis along with gallbladder sludge, but without other sonographic   evidence of cholecystitis. Circumscribed 3.2 cm hypoechoic lesion within the right hepatic lobe   laterally. That is probably a hemangioma. However, a nonemergent follow-up   dedicated MRI (preferred) or CT with contrast is recommended to better   evaluate that. CT ABDOMEN PELVIS WO CONTRAST Additional Contrast? None   Final Result   1.  Wall thickening of the visualized esophagus. Correlate with presentation   for esophagitis. 2. Thickened appearance of bladder wall. Correlate with presentation to   exclude acute cystitis. 3. Nonobstructive right renal calculus. 4. Cholelithiasis and nonspecific gallbladder distention. Ultrasound or HIDA   scan would better evaluate for acute cholecystitis. 5. Other findings as described. XR CHEST PORTABLE   Final Result   No cardiomegaly, pneumonia or interstitial edema. No significant change from 08/03/2021. Assessment & Plan:     Patient Active Problem List    Diagnosis Date Noted    Elevated troponin     Abnormal finding on EKG     Acute on chronic renal insufficiency     Esophagitis 09/25/2021    Proliferative diabetic retinopathy associated with type 1 diabetes mellitus ([facility] 11/01/2018    Polyneuropathy due to type 1 diabetes mellitus ([facility] 11/01/2018    Myofascial pain 07/13/2017    Chronic migraine without aura, with intractable migraine, so stated, with status migrainosus 03/04/2016    Depression 05/11/2015    Chronic pain syndrome     Peripheral neuropathy     Neuropathic pain     Osteomyelitis (HCC)     Pain of left lower extremity     Insomnia     Meningitis 08/19/2014    Encephalopathy     Acute encephalopathy 08/18/2014    Toxic metabolic encephalopathy 58/52/2685    Hyponatremia 08/18/2014    Staph aureus infection 10/08/2013    Pathological fracture of tibia, left 08/28/2013    Leg wound, left 07/18/2013    HTN (hypertension) 05/07/2013    Type 1 diabetes mellitus with stage 3 chronic kidney disease ([facility] 04/01/2013    Pilon fracture 02/14/2013       Foreign body sensation esophagus. Underwent esophagogastroduodenoscopy no foreign body retrieved. EGDs showed ischemic-looking ulcers in duodenum and according 'black esophagus'    Acute kidney injury on chronic kidney disease. Stage III. Nephrology consult. Continue IV fluids. Creatinine declining.     Elevated troponin. Ischemic EKG changes in inferolateral leads. Denies any chest pain or shortness of breath. Cardiology consult obtained. Delle Slider ordered. But patient has been going back and forth about getting the test done. Type 2 diabetes. Under poor control. Continue Lantus insulin. Placed on scheduled Apidra along with sliding scale insulin    Elevated alkaline phosphatase. CT shows cholelithiasis without evidence of cholecystitis. He denies any abdominal pain and does not have any abdominal tenderness. Continue Inderal 40 mg twice daily for now. Seems like he is taking it for migraine prophylaxis. However is not a good medication for someone with advanced diabetes.       Subcu heparin for DVT prophylaxis    Renae Zafar MD

## 2021-09-26 NOTE — PROGRESS NOTES
Patient is not able to demonstrated the ability to move from a reclining position to an upright position within the recliner. however patient is alert, oriented and able to provide informed consent     4 Eyes Skin Assessment     The patient is being assess for   Admission    I agree that 2 RN's have performed a thorough Head to Toe Skin Assessment on the patient. ALL assessment sites listed below have been assessed. Areas assessed for pressure by both nurses:   [x]   Head, Face, and Ears   [x]   Shoulders, Back, and Chest, Abdomen  [x]   Arms, Elbows, and Hands   []   Coccyx, Sacrum, and Ischium  [x]   Legs, Feet, and Heels  SCATTERED SCABS BILAT ARMS/LEGS. Pt refusing full skin assessment. Skin Assessed Under all Medical Devices by both nurses:  N/A              All Mepilex Borders were peeled back and area peeked at by both nurses:  No: N/A  Please list where Mepilex Borders are located:  N/A             **SHARE this note so that the co-signing nurse is able to place an eSignature**    Co-signer eSignature: Electronically signed by Greg Srinivasan RN on 9/26/21 at 2:44 AM EDT    Does the Patient have Skin Breakdown related to pressure?   No              Harpreet Prevention initiated:  Yes   Wound Care Orders initiated:  NA      [facility] nurse consulted for Pressure Injury (Stage 3,4, Unstageable, DTI, NWPT, Complex wounds)and New or Established Ostomies:  NA      Primary Nurse eSignature: Electronically signed by Aiden Chiu RN on 9/26/21 at 1:56 AM EDT

## 2021-09-26 NOTE — PROGRESS NOTES
Pt given diet karma mist/ice and water pitcher per his request. New order noted for clears.  [place] Bumpers, RN

## 2021-09-26 NOTE — ED NOTES
Aneta sent to Dr. Bryson Valdez at [address]  09/25/21 2019    Aneta completed with call back from Dr. Bryson Valdez at [facility]  09/25/21 2103

## 2021-09-26 NOTE — FLOWSHEET NOTE
09/26/21 0040   Vital Signs   Temp 98.7 °F (37.1 °C)   Temp Source Oral   Pulse 101   Resp 17   /68   BP Location Right upper arm   Patient Position Semi fowlers   Level of Consciousness Alert (0)   MEWS Score 2   Oxygen Therapy   SpO2 91 %   O2 Device None (Room air)   Height and Weight   Height 6' 2\" (1.88 m)   Weight 211 lb 4.8 oz (95.8 kg)   Weight Method Bed scale   BSA (Calculated - sq m) 2.24 sq meters   BMI (Calculated) 27.2   pt arrived from ER per w/c with sister at bedside. Admission questions completed. Pt asking for something to drink, explained that the order is NPO but that writer would ask about a diet. Pt also stated that humalog insulin doesn't work for him. He has his own Apidra insulin with him and wants to use this for sliding scale. Perfectserve sent to Dr. Figueroa Littleton regarding diet and insulin orders. Pt seems very \"child-like. \" Per report from [facility] in ER, pt is refusing powers but has been using the urinal. This output was not charted in ER.    Lindsey Willoughby RN

## 2021-09-26 NOTE — CONSULTS
[address] [place] [address]  [phone]        Reason for Consultation/Chief Complaint: \"I got cheese stuck in my throat . \"  Consulted for ST depression/T inversion on EKG; CKD; elevated Silke    History of Present Illness:  Breonna Sheth is a 42 y.o. patient who presented to [facility] 9/25/21 with c/o trouble swallowing and N/V after eating cheese off pizza. He has PMH long-standing DM, bilateral detached retina, asthma, chronic pain syndrome, osteomyelitis tibia, CRI (follow Dr Chris Caicedo), and peripheral neuropathy. No cardiac history or testing to his knowledge. Now presents with dysphagia and underwent EGD per Dr. HERNANDEZ [facility] 9/25/21 showing black esophagus hiatal hernia, and ischemic-looking ulcers in duodenum. Found to be in acute on CRI likely etiology prerenal and Dr. Molina Burns following on IVF. Admit EKG ST 109bpm; LVH with repolarization change vs ischemia; IVCD (new changes from 8/21 EKG). Subsequent EKG studies shows similar findings. Silke 0.30, 0.24, 0.25, 0.23; BUN/Cr=82/5.2 (15/1. Vearl Pippins 5 in 8/21); WVW=756 in 2/20; K+ 3.0. CXR negative; CT imaging esophageal wall thickening; gallstones; right renal stone; RUQ US + gallstones and sludge; no cholecystitis. Patient with no complaints of chest pain, SOB, palpitations, dizziness, edema, or orthopnea/PND. I have been asked to provide consultation regarding further management and testing. Past Medical History:   has a past medical history of Acute respiratory failure ([address] Asthma, Blood pressure instability, Chronic pain syndrome, Detached retina, bilateral, Diabetes mellitus ([address] Insomnia, Meningitis, Neuropathic pain, Osteomyelitis ([address] Osteomyelitis of tibia ([address] Pain of left lower extremity, and Peripheral neuropathy. Surgical History:   has a past surgical history that includes Ankle fracture surgery (Left, 1/28/13); Tibia fracture surgery; bone incision and drainage (August 2013); eye surgery; eye surgery (Right, 9-27-13);  Leg Surgery (Left, 3/31/14); pr egd flexible foreign body removal (N/A, 9/21/2018); and Upper gastrointestinal endoscopy (N/A, 9/21/2018). Social History:   reports that he has never smoked. He has never used smokeless tobacco. He reports that he does not drink alcohol and does not use drugs. Family History:  family history includes Asthma in an other family member; Diabetes in an other family member; High Blood Pressure in an other family member. Home Medications:  Were reviewed and are listed in nursing record. and/or listed below  Prior to Admission medications    Medication Sig Start Date End Date Taking? Authorizing Provider   Erenumab-aooe (AIMOVIG, 140 MG DOSE,) 70 MG/ML SOAJ Inject 140 mLs into the skin every 30 days  Patient taking differently: Inject 140 mLs into the skin every 30 days Last taken 2nd week of september 9/16/21  Yes Gabrielle Paul MD   traMADol (ULTRAM) 50 MG tablet Take 1 tablet by mouth every 6 hours as needed for Pain (max 1-2 per day) for up to 28 days.  9/16/21 10/14/21 Yes Gabrielle Paul MD   potassium chloride (KLOR-CON M) 10 MEQ extended release tablet Take 1 tablet by mouth 2 times daily 7/29/21 9/26/21 Yes Verner Crumble, MD   Glucagon (Bela Slipper 1-PACK) 1 MG/0.2ML SOAJ Use as needed for low sugar 7/22/21  Yes Crissy Chung MD   gabapentin (NEURONTIN) 400 MG capsule Take one tablet Po BID 6/21/21 9/26/21 Yes Gabrielle Paul MD   DULoxetine (CYMBALTA) 60 MG extended release capsule Take 1 capsule by mouth daily 6/21/21  Yes Gabrielle Paul MD   topiramate (TOPAMAX) 100 MG tablet TAKE ONE TABLET BY MOUTH TWO TIMES A DAY 6/21/21  Yes Gabrielle Paul MD   dicloxacillin (DYNAPEN) 500 MG capsule TAKE ONE CAPSULE BY MOUTH TWICE A DAY  Patient taking differently: daily  1/11/21  Yes Juvenal Molina MD   LANTUS 100 UNIT/ML injection vial INJECT 32 UNITS UNDER THE SKIN ONCE NIGHTLY 12/7/20  Yes Petra Smiley MD   insulin glulisine (APIDRA) 100 UNIT/ML injection INJECT 8-12 UNITS UNDER THE SKIN THREE TIMES A DAY WITH MEALS  Patient taking differently: INJECT 8-12 UNITS UNDER THE SKIN THREE TIMES A DAY WITH MEALS per sliding scale 10/2/20  Yes Madi Granda MD   triamcinolone (KENALOG) 0.1 % ointment Apply to thickened affected areas PRN sparingly for flares no longer than 2 weeks at one time, do not apply to cleared skin 5/30/19  Yes Eliz Telles,    fluticasone (FLONASE) 50 MCG/ACT nasal spray SPRAY TWO SPRAYS IN EACH NOSTRIL DAILY  Patient taking differently: daily as needed  1/8/19  Yes Jia Elaine MD   pantoprazole (PROTONIX) 40 MG tablet Take 1 tablet by mouth 2 times daily 9/21/18  Yes Jennifer Mata MD   hydrocortisone 2.5 % cream Apply topically 2 times daily. Patient taking differently: Apply topically 2 times daily as needed 4/12/18  Yes Jia Elaine MD   GLUCAGON EMERGENCY 1 MG injection INJECT 1MG INTO THE MUSCLE AS NEEDED 12/18/17  Yes Idalia Klinefelter, MD   Multiple Vitamin (DAILY KITTY) TABS TAKE ONE TABLET BY MOUTH DAILY 9/26/17  Yes Jia Elaine MD   Cholecalciferol (VITAMIN D3) 5000 units CAPS Take 1 capsule by mouth Daily 9/26/17  Yes Jia Elaine MD   Dextromethorphan-Guaifenesin [facility] DM)  MG TB12 Cough and congestion. Patient taking differently: as needed Cough and congestion.  6/22/17  Yes Jia Elaine MD   omega-3 acid ethyl esters (LOVAZA) 1 G capsule TAKE TWO CAPSULES BY MOUTH TWICE DAILY 6/10/17  Yes Idalia Klinefelter, MD   MUCUS RELIEF DM  MG TABS TAKE ONE BY MOUTH DAILY AS NEEDED 1/9/17  Yes Jia Elaine MD   Probiotic Product (ACIDOPHILUS PROBIOTIC) CAPS capsule TAKE ONE CAPSULE BY MOUTH DAILY 5/31/16  Yes Jia Elaine MD   polyvinyl alcohol (LIQUIFILM TEARS) 1.4 % ophthalmic solution 1 drop as needed   Yes Historical Provider, MD   propranolol (INDERAL) 80 MG tablet Take 40 mg by mouth 2 times daily For migraines    Yes Historical Provider, MD   Flaxseed, Linseed, (FLAX PO) Take 14 g by mouth daily as needed    Yes Historical Provider, MD   SUMAtriptan (IMITREX) 50 MG tablet Take one tab po at the start of migraine PRN max 1 every 24 hours 9/16/21   Orville Jewell MD   Continuous Blood Gluc Transmit (DEXCOM G6 TRANSMITTER) MISC USE TO CHECK BLOOD SUGAR 8/31/21   Antonia Decker MD   Continuous Blood Gluc Sensor (DEXCOM G6 SENSOR) MISC As needed 7/22/21   Antonia Decker MD   Continuous Blood Gluc  (Dierdre Mor) [address] As needed 7/22/21   Antonia Decker MD   blood glucose monitor kit and supplies Dispense sufficient amount for indicated testing frequency plus additional to accommodate PRN testing needs. Dispense all needed supplies to include: monitor, strips, lancing device, lancets, control solutions, alcohol swabs. 7/6/21   Antonia Decker MD   blood glucose test strips (ACCU-CHEK CAPO PLUS) strip 4 times a day As needed. 7/6/21   Antonia Decker MD   Handicap [facility] MISC by Does not apply route Diagnosis: Type 1 diabetes.      Expires 4/13/23. 4/16/21   Humberto Sánchez MD   BD INSULIN SYRINGE U/F 31G X 5/16\" 0.3 ML MISC USE ONE SYRINGE FOUR TIMES A DAY BEFORE MEALS AND ONCE NIGHTLY 1/11/21   ARTURO Levy - CNP   blood glucose test strips (ACCU-CHEK CAPO PLUS) strip 1 each by In Vitro route daily Test blood glucose 10 times daily Dx Code E10.8 11/17/20   Marquis Cohen MD   blood glucose test strips (ACCU-CHEK CAPO PLUS) strip USE ONE STRIP TO TEST THREE TIMES A DAY 6/15/20   Marquis Cohen MD   Accu-Chek FastClix Lancets MISC 1 each by Does not apply route daily Test blood glucose 10 times daily Dx Code E 10.8 6/15/20   Marquis Cohen MD   Insulin Syringe-Needle U-100 (BD INSULIN SYRINGE U/F) 31G X 5/16\" 0.5 ML MISC Inject 1 each into the skin 4 times daily DX CODE E 10.22 2/6/19   Marquis Cohen MD   Blood Glucose Monitoring Suppl (ACCU-CHEK CAPO PLUS) w/Device KIT 1 each by Does not apply route daily Test blood sugar 10 times daily Dx code E 10.8 3/2/18   Marquis Cohen MD Insulin Syringe-Needle U-100 (B-D INS SYR ULTRAFINE 1CC/31G) 31G X 5/16\" 1 ML MISC INJECT USING INSULIN ONCE DAILY 10/2/17   ARTURO Jensen - CNP   Alcohol Swabs PADS Use as needed for insulin injection.  6/10/16   Julia Schreiber MD        Allergies:  Tegaderm ag mesh 2\"x2\" [wound dressings], Codeine, Tape [adhesive tape], and Other     Review of Systems:   12 point ROS negative in all areas as listed below except as in [place]  Constitutional, EENT, Cardiovascular, pulmonary, GI, , Musculoskeletal, skin, neurological, hematological, endocrine, Psychiatric    Physical Examination:    Vitals:    09/26/21 0830   BP: (!) 144/69   Pulse: 93   Resp: 16   Temp: 97 °F (36.1 °C)   SpO2: 98%    Weight: 211 lb 4.8 oz (95.8 kg)         General Appearance:  Alert, cooperative, no distress, appears stated age   Head:  Normocephalic, without obvious abnormality, atraumatic   Eyes:  PERRL, conjunctiva/corneas clear       Nose: Nares normal, no drainage or sinus tenderness   Throat: Lips, mucosa, and tongue normal   Neck: Supple, symmetrical, trachea midline, no adenopathy, thyroid: not enlarged, symmetric, no tenderness/mass/nodules, no carotid bruit or JVD       Lungs:   Clear to auscultation bilaterally, respirations unlabored   Chest Wall:  No tenderness or deformity   Heart:  Regular rate and rhythm, S1, S2 normal, no murmur, rub or gallop   Abdomen:   Soft, non-tender, bowel sounds active all four quadrants,  no masses, no organomegaly           Extremities: Extremities normal, atraumatic, no cyanosis or edema   Pulses: 2+ and symmetric   Skin: Skin color, texture, turgor normal, no rashes or lesions   Pysch: Normal mood and affect   Neurologic: Normal gross motor and sensory exam.         Labs  CBC:   Lab Results   Component Value Date    WBC 14.6 09/26/2021    RBC 4.96 09/26/2021    HGB 13.9 09/26/2021    HCT 42.3 09/26/2021    MCV 85.2 09/26/2021    RDW 14.4 09/26/2021     09/26/2021     CMP:    Lab Results Component Value Date     09/26/2021     09/26/2021    K 3.1 09/26/2021    K 3.0 09/26/2021     09/26/2021     09/26/2021    CO2 20 09/26/2021    CO2 23 09/26/2021    BUN 73 09/26/2021    BUN 73 09/26/2021    CREATININE 3.6 09/26/2021    CREATININE 3.6 09/26/2021    GFRAA 22 09/26/2021    GFRAA 22 09/26/2021    GFRAA >60 05/14/2013    AGRATIO 1.0 09/25/2021    LABGLOM 19 09/26/2021    LABGLOM 19 09/26/2021    GLUCOSE 116 09/26/2021    GLUCOSE 113 09/26/2021    PROT 8.1 09/26/2021    PROT 8.1 08/07/2012    CALCIUM 9.7 09/26/2021    CALCIUM 9.5 09/26/2021    BILITOT 1.1 09/26/2021    ALKPHOS 144 09/26/2021    AST 17 09/26/2021    ALT 15 09/26/2021     PT/INR:  No results found for: PTINR  Lab Results   Component Value Date    TROPONINI 0.23 (H) 09/26/2021       EKG:  I have reviewed EKG with the following interpretation:  Impression:  See HPI    Assessment:  Brittney León is a 42 y.o. patient who presented to [facility] 9/25/21 with c/o trouble swallowing and N/V after eating cheese off pizza. He has PMH long-standing DM, bilateral detached retina, asthma, chronic pain syndrome, osteomyelitis tibia, CRI (follow Dr Sweet ), and peripheral neuropathy. No cardiac history or testing to his knowledge. Now presents with dysphagia and underwent EGD per Dr. Melton Right 9/25/21 showing black esophagus hiatal hernia, and ischemic-looking ulcers in duodenum. Found to be in acute on CRI likely etiology prerenal and Dr. Selwyn Mills following on IVF. Admit EKG ST 109bpm; LVH with repolarization change vs ischemia; IVCD (new changes from 8/21 EKG). Subsequent EKG studies shows similar findings. Silke 0.30, 0.24, 0.25, 0.23; BUN/Cr=82/5.2 (15/1. Algis Broaden 5 in 8/21); SAE=171 in 2/20; K+ 3.0. CXR negative; CT imaging esophageal wall thickening; gallstones; right renal stone; RUQ US + gallstones and sludge; no cholecystitis.    Diagnosis of elevated Silke and abnormal EKG findings c/w possible ischemia in middle-aged male with acute on CRI and long-standing DM history. Recs:  1. No anginal symptoms but he has CAD risk factors of age, DM, and CRI. 2. Silke elevation consider ischemia but possibly related to renal issues. 3. New EKG abnormality may represent ischemia vs repol changes from LVH. Given new change from August 2021 EKG, CAD RF's, and abnormal Silke I recommend lexiscan nuclear stress test to assess myocardial perfusion and LV function. He refuses testing at this time but may be open to it later as OP. He wants to think about it. If he changes his mind then order pelon nuc study and call us back if any abnormality. He fully understands risks in refusing cardiac w/u per our discussion including angina, CHF, MI, and SCD. Signing off    Patient Active Problem List   Diagnosis    Pilon fracture    Type 1 diabetes mellitus with stage 3 chronic kidney disease (HCC)    HTN (hypertension)    Leg wound, left    Pathological fracture of tibia, left    Staph aureus infection    Acute encephalopathy    Toxic metabolic encephalopathy    Hyponatremia    Meningitis    Encephalopathy    Chronic pain syndrome    Peripheral neuropathy    Neuropathic pain    Osteomyelitis (HCC)    Pain of left lower extremity    Insomnia    Depression    Chronic migraine without aura, with intractable migraine, so stated, with status migrainosus    Myofascial pain    Proliferative diabetic retinopathy associated with type 1 diabetes mellitus ([address]    Polyneuropathy due to type 1 diabetes mellitus ([address]    Esophagitis      Thank you for allowing to us to participate in the raffaele or Monet Manning. Further evaluation will be based upon the patient's clinical course and testing results.

## 2021-09-26 NOTE — FLOWSHEET NOTE
09/26/21 0830   Vital Signs   Temp 97 °F (36.1 °C)   Temp Source Oral   Pulse 93   Heart Rate Source Monitor   Resp 16   BP (!) 144/69   BP Location Right upper arm   Patient Position Semi fowlers   Level of Consciousness Alert (0)   MEWS Score 1   Patient Currently in Pain Yes   Pain Assessment   Patient's Stated Pain Goal 10   Oxygen Therapy   SpO2 98 %   O2 Device None (Room air)     Shift assessment complete, see flowsheets. AM medications given, see MAR. Pt awake, alert, and oriented x4. Respirations even and unlabored, lung sounds clear in all fields. Pt rated pain as a 10 on a scale of 0-10. PRN acetaminophen given. Pt denies any further needs or pain at this time. Call light in reach, and bed in lowest position.

## 2021-09-26 NOTE — PROGRESS NOTES
Pt's home insulin (apidra) in security bag locked up in pt's med  his room. Dusty sent to [facility] regarding an order for it instead of Humalog sliding scale.

## 2021-09-26 NOTE — PROGRESS NOTES
[address] [facility],  [address] [place] [address] Ave  [place], [address] [place] [address]  Phone: [phone] [facility] [place] He is a Single [1] White (non-) [1] 42 y.o. . male      Main Problems/Chief Complaint for which GI service is seeing pt     Black esophagus    Clinical Summary      The patient is doing better. He is able to tolerate oral intake.     PAST MEDICAL HISTORY     Past Medical History:   Diagnosis Date    Acute respiratory failure ([address] 8/18/2014    Asthma     Blood pressure instability     Chronic pain syndrome     Detached retina, bilateral     laser tx right eye    Diabetes mellitus ([address]     uncontrolled     Insomnia     Meningitis August 2014    admission Dorys Terry    Neuropathic pain     Osteomyelitis ([address]     Osteomyelitis of tibia ([address]     left    Pain of left lower extremity     Peripheral neuropathy      FAMILY HISTORY     Family History   Problem Relation Age of Onset    Asthma Other     Diabetes Other     High Blood Pressure Other        SURGICAL HISTORY     Past Surgical History:   Procedure Laterality Date    ANKLE FRACTURE SURGERY Left 1/28/13    Dr. Addis Berg  August 2013    left tibia with external fixation device    EYE SURGERY      retina reattachment left eye x 3 holes repairs    EYE SURGERY Right 9-27-13    retal detachment    LEG SURGERY Left 3/31/14    ORIF left fibula and tibia    GA EGD FLEXIBLE FOREIGN BODY REMOVAL N/A 9/21/2018    EGD FOREIGN BODY REMOVAL performed by Angelita Steele MD at [address] [place] [address]      with external device inplace    UPPER GASTROINTESTINAL ENDOSCOPY N/A 9/21/2018    EGD BIOPSY performed by Angelita Steele MD at [place] [address]   (This list may include medications prescribed during this encounter as epic can not insert only the list prior to this encounter.)      ALLERGIES     Allergies   Allergen Reactions    Tegaderm Ag Mesh 2\"X2\" [Wound Dressings]      \"Holes in skin from Tegaderm Adhesive\"    Codeine Other (See Comments)     hallucinates    Tape [place] Tape]      Blister      Other Other (See Comments)     Holes in skin  From Tegaderm Adhesive  Blister       FINAL IMPRESSION AND RECOMMENDATIONS     The patient is informed about the findings and the need for the Bx report to come back to possibly have more specific diagnosis. I favor the etiology to be renal disease. If he does well, he can be discharged tomorrow from [facility] on OMP 20mg q day for one month if ok with nephro consultants. The total time spent face-to-face, review of the record and on the tapia during this visit was 15 minutes with more than 50% of the time spent in review of the electronic record and its independent analysis revealing finding of black esophagus yesterday,  coordination of care and management of esophagitis and counseling the patient about the findings and awaited Bx as mentioned above. Gera Wolf MD 9/26/21 5:27 PM EDT    CC:  Linh Jenkins MD      IMPORTANT: Please note that some portions of this note may have been created using Dragon voice recognition software. Some \"sound-alike\" and totally wrong word substitutions may have taken place due to known inherent limitations of any such software, including this voice recognition software. In spite of efforts to eliminate such errors, some may not have been corrected. So please read the note with this in mind and recognize such mistakes and understand the correct version using the  context. Thanks.

## 2021-09-26 NOTE — H&P
Hospital Medicine History & Physical      PCP: Lyndsey Magallanes MD    Date of Admission: 9/25/2021    Date of Service: Pt seen/examined on 9/25/2021 and Admitted to Inpatient with expected LOS greater than two midnights due to medical therapy. Chief Complaint:  Difficulty swallowing      History Of Present Illness:       42 y.o. male presented earlier today with complaint of a piece of cheese becoming lodged in his throat from the evening prior. He states he attempted to swallow liquids but noted increasing nausea and vomiting within minutes of swallowing. He denies any black/bloody emesis, chest pain, abdominal pain. Denies fever, diarrhea, sob,, cough. He underwent EGD with removal of \"white cheesy exudate\" with a black exudate and duodenal ulcers. He is currently seen on room air, awake, alert in no respiratory distress, denying any chest/abd pain.     Past Medical History:          Diagnosis Date    Acute respiratory failure ([address] 8/18/2014    Asthma     Blood pressure instability     Chronic pain syndrome     Detached retina, bilateral     laser tx right eye    Diabetes mellitus ([address]     uncontrolled     Insomnia     Meningitis August 2014    admission [facility]    Neuropathic pain     Osteomyelitis ([address]     Osteomyelitis of tibia ([address]     left    Pain of left lower extremity     Peripheral neuropathy        Past Surgical History:          Procedure Laterality Date    ANKLE FRACTURE SURGERY Left 1/28/13    Dr. Sly Allen  August 2013    left tibia with external fixation device    EYE SURGERY      retina reattachment left eye x 3 holes repairs    EYE SURGERY Right 9-27-13    retal detachment    LEG SURGERY Left 3/31/14    ORIF left fibula and tibia    MN EGD FLEXIBLE FOREIGN BODY REMOVAL N/A 9/21/2018    EGD FOREIGN BODY REMOVAL performed by Cindy Joseph MD at [facility]      with external device inplace    UPPER GASTROINTESTINAL ENDOSCOPY N/A 9/21/2018    EGD BIOPSY performed by Cindy Joseph MD at [place] [id number]       Medications Prior to Admission:      Prior to Admission medications    Medication Sig Start Date End Date Taking? Authorizing Provider   Erenumab-aooe (AIMOVIG, 140 MG DOSE,) 70 MG/ML SOAJ Inject 140 mLs into the skin every 30 days  Patient taking differently: Inject 140 mLs into the skin every 30 days Last taken 2nd week of september 9/16/21  Yes Gris Marrero MD   traMADol (ULTRAM) 50 MG tablet Take 1 tablet by mouth every 6 hours as needed for Pain (max 1-2 per day) for up to 28 days.  9/16/21 10/14/21 Yes Gris Marrero MD   potassium chloride (KLOR-CON M) 10 MEQ extended release tablet Take 1 tablet by mouth 2 times daily 7/29/21 9/26/21 Yes Jennie Castleman, MD   Glucagon ([place] Puls 1-PACK) 1 MG/0.2ML SOAJ Use as needed for low sugar 7/22/21  Yes Vida Hills MD   gabapentin (NEURONTIN) 400 MG capsule Take one tablet Po BID 6/21/21 9/26/21 Yes Gris Marrero MD   DULoxetine (CYMBALTA) 60 MG extended release capsule Take 1 capsule by mouth daily 6/21/21  Yes Gris Marrero MD   topiramate (TOPAMAX) 100 MG tablet TAKE ONE TABLET BY MOUTH TWO TIMES A DAY 6/21/21  Yes Gris Marrero MD   dicloxacillin (DYNAPEN) 500 MG capsule TAKE ONE CAPSULE BY MOUTH TWICE A DAY  Patient taking differently: daily  1/11/21  Yes Georgette Nunez MD   LANTUS 100 UNIT/ML injection vial INJECT 32 UNITS UNDER THE SKIN ONCE NIGHTLY 12/7/20  Yes Michelle Ngo MD   insulin glulisine (APIDRA) 100 UNIT/ML injection INJECT 8-12 UNITS UNDER THE SKIN THREE TIMES A DAY WITH MEALS  Patient taking differently: INJECT 8-12 UNITS UNDER THE SKIN THREE TIMES A DAY WITH MEALS per sliding scale 10/2/20  Yes Vida Hills MD   triamcinolone (KENALOG) 0.1 % ointment Apply to thickened affected areas PRN sparingly for flares no longer than 2 weeks at one time, do not apply to cleared skin 5/30/19  Yes Sarthak Granados, DO   fluticasone (FLONASE) 50 MCG/ACT nasal spray SPRAY TWO SPRAYS IN EACH NOSTRIL DAILY  Patient taking differently: daily as needed  1/8/19  Yes Jeff Chi MD   pantoprazole (PROTONIX) 40 MG tablet Take 1 tablet by mouth 2 times daily 9/21/18  Yes Evie Lemon MD   hydrocortisone 2.5 % cream Apply topically 2 times daily. Patient taking differently: Apply topically 2 times daily as needed 4/12/18  Yes Jeff Chi MD   GLUCAGON EMERGENCY 1 MG injection INJECT 1MG INTO THE MUSCLE AS NEEDED 12/18/17  Yes Ramu Barreto MD   Multiple Vitamin (DAILY KITTY) TABS TAKE ONE TABLET BY MOUTH DAILY 9/26/17  Yes Jeff Chi MD   Cholecalciferol (VITAMIN D3) 5000 units CAPS Take 1 capsule by mouth Daily 9/26/17  Yes Jeff Chi MD   Dextromethorphan-Guaifenesin [facility] DM)  MG TB12 Cough and congestion. Patient taking differently: as needed Cough and congestion.  6/22/17  Yes Jeff Chi MD   omega-3 acid ethyl esters (LOVAZA) 1 G capsule TAKE TWO CAPSULES BY MOUTH TWICE DAILY 6/10/17  Yes Ramu Barreto MD   MUCUS RELIEF DM  MG TABS TAKE ONE BY MOUTH DAILY AS NEEDED 1/9/17  Yes Jeff Chi MD   Probiotic Product (ACIDOPHILUS PROBIOTIC) CAPS capsule TAKE ONE CAPSULE BY MOUTH DAILY 5/31/16  Yes Jeff Chi MD   polyvinyl alcohol (LIQUIFILM TEARS) 1.4 % ophthalmic solution 1 drop as needed   Yes Historical Provider, MD   propranolol (INDERAL) 80 MG tablet Take 40 mg by mouth 2 times daily For migraines    Yes Historical Provider, MD   Flaxseed, Linseed, (FLAX PO) Take 14 g by mouth daily as needed    Yes Historical Provider, MD   SUMAtriptan (IMITREX) 50 MG tablet Take one tab po at the start of migraine PRN max 1 every 24 hours 9/16/21   Ian Golden MD   Continuous Blood Gluc Transmit (DEXCOM G6 TRANSMITTER) MISC USE TO CHECK BLOOD SUGAR 8/31/21   Larissa Interiano MD   Continuous Blood Gluc Sensor (DEXCOM G6 SENSOR) MISC As needed 7/22/21   Larissa Interiano MD   Continuous Blood Gluc  (Hemant Philippe) LINDA As needed 7/22/21   Merlin Clark MD   blood glucose monitor kit and supplies Dispense sufficient amount for indicated testing frequency plus additional to accommodate PRN testing needs. Dispense all needed supplies to include: monitor, strips, lancing device, lancets, control solutions, alcohol swabs. 7/6/21   Merlin Clark MD   blood glucose test strips (ACCU-CHEK CAPO PLUS) strip 4 times a day As needed. 7/6/21   Merlin Clark MD   Handicap Placard MISC by Does not apply route Diagnosis: Type 1 diabetes. Expires 4/13/23. 4/16/21   Meryle Sees, MD   BD INSULIN SYRINGE U/F 31G X 5/16\" 0.3 ML MISC USE ONE SYRINGE FOUR TIMES A DAY BEFORE MEALS AND ONCE NIGHTLY 1/11/21   ARTURO Delacruz CNP   blood glucose test strips (ACCU-CHEK CAPO PLUS) strip 1 each by In Vitro route daily Test blood glucose 10 times daily Dx Code E10.8 11/17/20   Kayley Orozco MD   blood glucose test strips (ACCU-CHEK CAPO PLUS) strip USE ONE STRIP TO TEST THREE TIMES A DAY 6/15/20   Kayley Orozco MD   Accu-Chek FastClix Lancets MISC 1 each by Does not apply route daily Test blood glucose 10 times daily Dx Code E 10.8 6/15/20   Kayley Orozco MD   Insulin Syringe-Needle U-100 (BD INSULIN SYRINGE U/F) 31G X 5/16\" 0.5 ML MISC Inject 1 each into the skin 4 times daily DX CODE E 10.22 2/6/19   Kayley Orozco MD   Blood Glucose Monitoring Suppl (ACCU-CHEK CAPO PLUS) w/Device KIT 1 each by Does not apply route daily Test blood sugar 10 times daily Dx code E 10.8 3/2/18   Kayley Orozco MD   Insulin Syringe-Needle U-100 (B-D INS SYR ULTRAFINE 1CC/31G) 31G X 5/16\" 1 ML MISC INJECT USING INSULIN ONCE DAILY 10/2/17   ARTURO Delacruz CNP   Alcohol Swabs PADS Use as needed for insulin injection.  6/10/16   Kayley Orozco MD       Allergies:  Tegaderm ag mesh 2\"x2\" [wound dressings], Codeine, Tape Piqua Marck tape], and Other    Social History:           TOBACCO: reports that he has never smoked. He has never used smokeless tobacco.  ETOH:   reports no history of alcohol use. Family History:               Problem Relation Age of Onset    Asthma Other     Diabetes Other     High Blood Pressure Other        REVIEW OF SYSTEMS:   Pertinent positives as noted in the HPI. All other systems reviewed and negative. PHYSICAL EXAM PERFORMED:    /68   Pulse 101   Temp 98.7 °F (37.1 °C) (Oral)   Resp 17   Ht 6' 2\" (1.88 m)   Wt 211 lb 4.8 oz (95.8 kg)   SpO2 91%   BMI 27.13 kg/m²     General appearance:  No apparent distress, appears stated age and cooperative. HEENT:  Normal cephalic, atraumatic without obvious deformity. Pupils equal, round,  Extra ocular muscles intact. Conjunctivae/corneas clear. Neck: Supple, with full range of motion. No jugular venous distention. Trachea midline. Respiratory:  Normal respiratory effort. No use of accessory muscles, no intercostal retractions  Cardiovascular:  Regular rate and rhythm   Abdomen: Soft, non-tender, non-distended    Musculoskeletal:  No clubbing, cyanosis or edema bilaterally. Skin: Skin color, texture, turgor normal.     Neurologic:   grossly non-focal.  Psychiatric:  Alert and oriented, thought content appropriate, normal insight         Labs:     Recent Labs     09/25/21  1535   WBC 12.4*   HGB 16.1   HCT 48.4   *     Recent Labs     09/25/21  1535      K 3.1*   CL 99   CO2 19*   BUN 82*   CREATININE 5.2*   CALCIUM 10.7*     Recent Labs     09/25/21  1535   AST 20   ALT 18   BILITOT 0.8   ALKPHOS 178*     No results for input(s): INR in the last 72 hours.   Recent Labs     09/25/21  1535 09/25/21  2045 09/26/21  0111   TROPONINI 0.30* 0.24* 0.25*       Urinalysis:      Lab Results   Component Value Date    NITRU Negative 09/25/2021    WBCUA 3-5 09/25/2021    BACTERIA Rare 08/03/2021    RBCUA 5-10 09/25/2021    BLOODU MODERATE 09/25/2021    SPECGRAV >=1.030 09/25/2021    GLUCOSEU Negative 09/25/2021       Radiology:          US GALLBLADDER RUQ   Final Result   Cholelithiasis along with gallbladder sludge, but without other sonographic   evidence of cholecystitis. Circumscribed 3.2 cm hypoechoic lesion within the right hepatic lobe   laterally. That is probably a hemangioma. However, a nonemergent follow-up   dedicated MRI (preferred) or CT with contrast is recommended to better   evaluate that. CT ABDOMEN PELVIS WO CONTRAST Additional Contrast? None   Final Result   1. Wall thickening of the visualized esophagus. Correlate with presentation   for esophagitis. 2. Thickened appearance of bladder wall. Correlate with presentation to   exclude acute cystitis. 3. Nonobstructive right renal calculus. 4. Cholelithiasis and nonspecific gallbladder distention. Ultrasound or HIDA   scan would better evaluate for acute cholecystitis. 5. Other findings as described. XR CHEST PORTABLE   Final Result   No cardiomegaly, pneumonia or interstitial edema. No significant change from 08/03/2021. ASSESSMENT:    Active Hospital Problems    Diagnosis Date Noted    Esophagitis [K20.90] 09/25/2021         PLAN:    PUD/ Food impaction  - s/p EGD,GI consulted  - PPI    HERB  - Nephrology consulted, CKD /prerenal  - IVFs    DM  - corrective ISS, lantus    Elev trop  - serial trop, tele, asymptomatic  - unable to locate ER EKG, reordered    DVT Prophylaxis: heparin  Diet: ADULT DIET; Clear Liquid; No Concentrated sweets  Code Status: Full Conner Berrios MD    Thank you Zion Londono MD for the opportunity to be involved in this patient's care. If you have any questions or concerns please feel free to contact me at [phone].

## 2021-09-26 NOTE — ED NOTES
Denies any immediate needs. No complaints voiced. No noted distress.       Marifer Joyner, JULIA  09/25/21 [id number]

## 2021-09-26 NOTE — PROGRESS NOTES
LABGLOM  --  12* 19*  19*   GFRAA  --  15* 22*  22*       Assessment/     Acute Kidney Injury:  KDIGO stage 3  - Etiology:  Pre-renal  - Clinical:  Non-oliguric and stable electrolytes, Scr improving with volume       Chronic Kidney Disease:  Stage 3  - Etiology:  Diabetic nephropathy   - Baseline:  1.3 - 1.7  - Follows with Dr. Adwoa Monroe     Diabetes type 1:  Glucose elevated      Food impaction:  GI following, s/p EGD for dislodgement     Plan/     Continue IV fluids another 24 hours  Follow labs  K replacement and will change IV fluids   -----------------------------  Hawa Smiley M.D.   [facility]

## 2021-09-26 NOTE — CONSULTS
pressure instability     Detached retina, bilateral     Diabetes mellitus ([facility]          Review of Systems     Constitutional:  No weight loss, no fever/chills  Eyes:  No eye pain, no eye redness  Cardiovascular:  No chest pain, no worsening of edema  Respiratory:  No hemoptysis, no stridor  Gastrointestinal:  No blood in stool, no n/v, no diarrhea  Genitoruinary:  No hematuria, no difficulty with urination  Musculoskeletal:  No joint swelling, no redness  Integumentary:  No Rash, no itching  Neurological:  No focal weakness, No new sensory deficit  Psychiatric:  No depression, no confusion  Endocrine:  No polyuria, no polydipsia       Medications      Reviewed in EMR     Allergies     Tegaderm ag mesh 2\"x2\" [wound dressings], Codeine, Other, and Tape [adhesive tape]      Family History       Negative for Kidney Disease    Social History      Social History     Socioeconomic History    Marital status: Single     Spouse name: None    Number of children: None    Years of education: None    Highest education level: None   Occupational History    Occupation: n/a   Tobacco Use    Smoking status: Never Smoker    Smokeless tobacco: Never Used   Vaping Use    Vaping Use: Never used   Substance and Sexual Activity    Alcohol use: No     Alcohol/week: 0.0 standard drinks    Drug use: No    Sexual activity: None   Other Topics Concern    None   Social History Narrative    None     Social Determinants of Health     Financial Resource Strain: Low Risk     Difficulty of Paying Living Expenses: Not hard at all   Food Insecurity: No Food Insecurity    Worried About Running Out of Food in the Last Year: Never true    Bharati of Food in the Last Year: Never true   Transportation Needs:     Lack of Transportation (Medical):      Lack of Transportation (Non-Medical):    Physical Activity:     Days of Exercise per Week:     Minutes of Exercise per Session:    Stress:     Feeling of Stress :    Social Connections:  Frequency of Communication with Friends and Family:     Frequency of Social Gatherings with Friends and Family:     Attends Holiness Services:     Active Member of Clubs or Organizations:     Attends Club or Organization Meetings:     Marital Status:    Intimate Partner Violence:     Fear of Current or Ex-Partner:     Emotionally Abused:     Physically Abused:     Sexually Abused:        Physical Exam     Blood pressure 139/68, pulse 102, temperature 97.1 °F (36.2 °C), temperature source Infrared, resp. rate 18, height 6' 2\" (1.88 m), weight 202 lb (91.6 kg), SpO2 98 %.     General:  NAD  HEENT:  PERRL, EOMI  Neck:  Supple, normal range of movement  Chest:  CTAB, good respiratory effort, good air movement  CV:  Regular, no rub   Abdomen:  NTND, soft, +BS, no hepatosplenomegaly  Extremities:  No peripheral edema  Neurological:  Moving all four extremities, CN II-XII grossly intact  Lymphatics:  No palpable lymph nodes  Skin:  No rash, no jaundice  Psychiatric:  Normal insight and judgement, good recall    Data     Recent Labs     09/25/21  1535   WBC 12.4*   HGB 16.1   HCT 48.4   MCV 84.9   *     Recent Labs     09/25/21  1531 09/25/21  1535   NA  --  144   K  --  3.1*   CL  --  99   CO2  --  19*   GLUCOSE 285 350*   MG  --  2.70*   BUN  --  82*   CREATININE  --  5.2*   LABGLOM  --  12*   GFRAA  --  15*       Assessment:    Acute Kidney Injury:  KDIGO stage 3  - Etiology:  Pre-renal  - Clinical:  Non-oliguric and stable electrolytes     Chronic Kidney Disease:  Stage 3  - Etiology:  Diabetic nephropathy   - Baseline:  1.3 - 1.7  - Follows with Dr. Jackelyn Wheeler    Diabetes type 1:  Glucose elevated     Food impaction:  GI following, s/p EGD for dislodgement     Plan:    IV fluids  Follow labs  Expand work up pending response to IV fluids as needed, hopefully he will return to baseline quickly     Thank you for asking us to participate in the management of your patient, please do not hesitate to contact me for any concerns regarding my recommendations as outlined above.    -----------------------------  Hiwot Ko M.D.   [facility]

## 2021-09-26 NOTE — PROGRESS NOTES
When PCA went to turn off the lights in pt's room, he yelled at PCA very rudely to keep all the lights on and close the door. Bed alarm on and call light within reach.  Nolan Yoo RN

## 2021-09-26 NOTE — ED PROVIDER NOTES
I independently performed a history and physical on Jinny Nyhan. All diagnostic, treatment, and disposition decisions were made by myself in conjunction with the advanced practice provider. EKG interpretation:  Sinus tachycardia, rate 109, normal axis, QTC prolonged at 492, possible LVH. New inferior T wave inversions and lateral ST depressions compared with prior from 8/3/2021    Esophageal FB/ emesis. Possibly ongoing since Wednesday night. Endoscopy today shows apparent ischemia or necrosis of the esophagus. Patient also with ARF. No obstructive process on CT, may be from dehydration. Admitted to the hospitalist service     For further details of Oneil Joseph emergency department encounter, please see Remy VAZQUEZ's documentation.              Zara Mayorga MD  09/25/21 [id number]

## 2021-09-27 VITALS
HEIGHT: 74 IN | TEMPERATURE: 97.7 F | RESPIRATION RATE: 16 BRPM | HEART RATE: 63 BPM | WEIGHT: 211.3 LBS | OXYGEN SATURATION: 97 % | DIASTOLIC BLOOD PRESSURE: 77 MMHG | SYSTOLIC BLOOD PRESSURE: 139 MMHG | BODY MASS INDEX: 27.12 KG/M2

## 2021-09-27 LAB
ALBUMIN SERPL-MCNC: 3.5 G/DL (ref 3.4–5)
ANION GAP SERPL CALCULATED.3IONS-SCNC: 11 MMOL/L (ref 3–16)
BUN BLDV-MCNC: 41 MG/DL (ref 7–20)
CALCIUM SERPL-MCNC: 8.9 MG/DL (ref 8.3–10.6)
CHLORIDE BLD-SCNC: 100 MMOL/L (ref 99–110)
CO2: 27 MMOL/L (ref 21–32)
CREAT SERPL-MCNC: 1.7 MG/DL (ref 0.9–1.3)
ESTIMATED AVERAGE GLUCOSE: 214.5 MG/DL
GFR AFRICAN AMERICAN: 53
GFR NON-AFRICAN AMERICAN: 44
GLUCOSE BLD-MCNC: 164 MG/DL (ref 70–99)
GLUCOSE BLD-MCNC: 205 MG/DL (ref 70–99)
GLUCOSE BLD-MCNC: 220 MG/DL (ref 70–99)
GLUCOSE BLD-MCNC: 226 MG/DL (ref 70–99)
GLUCOSE BLD-MCNC: 233 MG/DL (ref 70–99)
GLUCOSE BLD-MCNC: 60 MG/DL (ref 70–99)
GLUCOSE BLD-MCNC: 88 MG/DL (ref 70–99)
HBA1C MFR BLD: 9.1 %
PERFORMED ON: ABNORMAL
PERFORMED ON: NORMAL
PHOSPHORUS: 2 MG/DL (ref 2.5–4.9)
POTASSIUM SERPL-SCNC: 3.1 MMOL/L (ref 3.5–5.1)
SODIUM BLD-SCNC: 138 MMOL/L (ref 136–145)

## 2021-09-27 PROCEDURE — 6370000000 HC RX 637 (ALT 250 FOR IP): Performed by: INTERNAL MEDICINE

## 2021-09-27 PROCEDURE — 6370000000 HC RX 637 (ALT 250 FOR IP): Performed by: HOSPITALIST

## 2021-09-27 PROCEDURE — 36415 COLL VENOUS BLD VENIPUNCTURE: CPT

## 2021-09-27 PROCEDURE — G0378 HOSPITAL OBSERVATION PER HR: HCPCS

## 2021-09-27 PROCEDURE — 2580000003 HC RX 258: Performed by: HOSPITALIST

## 2021-09-27 PROCEDURE — 99239 HOSP IP/OBS DSCHRG MGMT >30: CPT | Performed by: INTERNAL MEDICINE

## 2021-09-27 PROCEDURE — 6360000002 HC RX W HCPCS: Performed by: HOSPITALIST

## 2021-09-27 PROCEDURE — C9113 INJ PANTOPRAZOLE SODIUM, VIA: HCPCS | Performed by: HOSPITALIST

## 2021-09-27 PROCEDURE — 6360000002 HC RX W HCPCS: Performed by: INTERNAL MEDICINE

## 2021-09-27 PROCEDURE — 2500000003 HC RX 250 WO HCPCS: Performed by: INTERNAL MEDICINE

## 2021-09-27 PROCEDURE — 80069 RENAL FUNCTION PANEL: CPT

## 2021-09-27 RX ORDER — ASPIRIN 81 MG/1
81 TABLET, CHEWABLE ORAL DAILY
Qty: 30 TABLET | Refills: 0
Start: 2021-09-28

## 2021-09-27 RX ORDER — POTASSIUM CHLORIDE 20 MEQ/1
20 TABLET, EXTENDED RELEASE ORAL
Status: DISCONTINUED | OUTPATIENT
Start: 2021-09-27 | End: 2021-09-27 | Stop reason: HOSPADM

## 2021-09-27 RX ORDER — PANTOPRAZOLE SODIUM 40 MG/1
40 TABLET, DELAYED RELEASE ORAL 2 TIMES DAILY
Qty: 60 TABLET | Refills: 1 | Status: SHIPPED | OUTPATIENT
Start: 2021-09-27

## 2021-09-27 RX ADMIN — ASPIRIN 81 MG: 81 TABLET, CHEWABLE ORAL at 09:11

## 2021-09-27 RX ADMIN — TOPIRAMATE 50 MG: 25 TABLET, FILM COATED ORAL at 09:11

## 2021-09-27 RX ADMIN — PREDNISOLONE ACETATE 1 DROP: 10 SUSPENSION/ DROPS OPHTHALMIC at 09:13

## 2021-09-27 RX ADMIN — KETOROLAC TROMETHAMINE 1 DROP: 5 SOLUTION/ DROPS OPHTHALMIC at 09:13

## 2021-09-27 RX ADMIN — PROPRANOLOL HYDROCHLORIDE 40 MG: 40 TABLET ORAL at 09:11

## 2021-09-27 RX ADMIN — DULOXETINE HYDROCHLORIDE 60 MG: 60 CAPSULE, DELAYED RELEASE ORAL at 09:11

## 2021-09-27 RX ADMIN — SODIUM BICARBONATE: 84 INJECTION, SOLUTION INTRAVENOUS at 10:35

## 2021-09-27 RX ADMIN — PANTOPRAZOLE SODIUM 40 MG: 40 INJECTION, POWDER, FOR SOLUTION INTRAVENOUS at 09:11

## 2021-09-27 RX ADMIN — POTASSIUM CHLORIDE 20 MEQ: 1500 TABLET, EXTENDED RELEASE ORAL at 13:08

## 2021-09-27 RX ADMIN — ACETAMINOPHEN 650 MG: 325 TABLET ORAL at 04:59

## 2021-09-27 RX ADMIN — Medication 10 ML: at 09:11

## 2021-09-27 RX ADMIN — Medication 10 ML: at 09:13

## 2021-09-27 RX ADMIN — POTASSIUM CHLORIDE 20 MEQ: 1500 TABLET, EXTENDED RELEASE ORAL at 11:37

## 2021-09-27 ASSESSMENT — PAIN SCALES - GENERAL
PAINLEVEL_OUTOF10: 0
PAINLEVEL_OUTOF10: 3
PAINLEVEL_OUTOF10: 0
PAINLEVEL_OUTOF10: 3
PAINLEVEL_OUTOF10: 0

## 2021-09-27 ASSESSMENT — PAIN DESCRIPTION - DESCRIPTORS: DESCRIPTORS: ACHING;BURNING

## 2021-09-27 ASSESSMENT — PAIN DESCRIPTION - LOCATION: LOCATION: THROAT

## 2021-09-27 ASSESSMENT — PAIN DESCRIPTION - PAIN TYPE: TYPE: ACUTE PAIN

## 2021-09-27 NOTE — PROGRESS NOTES
Pt feels like his blood sugar is dropping. FSBS 60 at this time. Pt awake/alert and wants to eat some jello, pudding and juice. Will recheck FSBS in about 15-20 mins.  Ericka Medina RN

## 2021-09-27 NOTE — FLOWSHEET NOTE
09/26/21 2031   Vital Signs   Temp 97.1 °F (36.2 °C)   Temp Source Oral   Pulse 68   Heart Rate Source Monitor   Resp 18   /78   Level of Consciousness Alert (0)   MEWS Score 1   Oxygen Therapy   SpO2 99 %   O2 Device None (Room air)   tylenol given for c/o sore throat. Rest of evening meds given to pt.  Pt given warm blankets and water pitcher refilled per his request. Socrates Ferreira RN

## 2021-09-27 NOTE — PLAN OF CARE
Problem: Falls - Risk of:  Goal: Will remain free from falls  Description: Will remain free from falls  Outcome: Ongoing  Goal: Absence of physical injury  Description: Absence of physical injury  Outcome: Ongoing     Problem: Skin Integrity:  Goal: Will show no infection signs and symptoms  Description: Will show no infection signs and symptoms  Outcome: Ongoing  Goal: Absence of new skin breakdown  Description: Absence of new skin breakdown  Outcome: Ongoing     Problem: SAFETY  Goal: Free from accidental physical injury  Outcome: Ongoing  Goal: Free from intentional harm  Outcome: Ongoing     Problem: DAILY CARE  Goal: Daily care needs are met  Outcome: Ongoing     Problem: PAIN  Goal: Patient's pain/discomfort is manageable  Outcome: Ongoing     Problem: SKIN INTEGRITY  Goal: Skin integrity is maintained or improved  Outcome: Ongoing     Problem: KNOWLEDGE DEFICIT  Goal: Patient/S.O. demonstrates understanding of disease process, treatment plan, medications, and discharge instructions.   Outcome: Ongoing     Problem: DISCHARGE BARRIERS  Goal: Patient's continuum of care needs are met  Outcome: Ongoing     Problem: Pain:  Goal: Pain level will decrease  Description: Pain level will decrease  Outcome: Ongoing  Goal: Control of acute pain  Description: Control of acute pain  Outcome: Ongoing  Goal: Control of chronic pain  Description: Control of chronic pain  Outcome: Ongoing

## 2021-09-27 NOTE — PROGRESS NOTES

## 2021-09-27 NOTE — DISCHARGE SUMMARY
Name:  Chiqui Garcia  Room:  6013/8201-88  MRN:    [id number]    Discharge Summary      This discharge summary is in conjunction with a complete physical exam done on the day of discharge. Discharging Physician: Dr. Zack Polanco: 9/25/2021  Discharge:  9/27/2021    HPI taken from admission H&P:    42 y.o. male presented earlier today with complaint of a piece of cheese becoming lodged in his throat from the evening prior. He states he attempted to swallow liquids but noted increasing nausea and vomiting within minutes of swallowing. He denies any black/bloody emesis, chest pain, abdominal pain. Denies fever, diarrhea, sob,, cough. He underwent EGD with removal of \"white cheesy exudate\" with a black exudate and duodenal ulcers. He is currently seen on room air, awake, alert in no respiratory distress, denying any chest/abd pain. Diagnoses this Admission and Hospital Course   Foreign body sensation esophagus. Underwent esophagogastroduodenoscopy no foreign body retrieved. EGDs showed ischemic-looking ulcers in duodenum and according 'black esophagus'     Acute kidney injury on chronic kidney disease. Stage III. Nephrology consult. Continue IV fluids. Creatinine declining. Follow up with Dr. Cristal Barnard at discharge     Elevated troponin. Ischemic EKG changes in inferolateral leads. Denies any chest pain or shortness of breath. Cardiology consult obtained. Pancho Rosas ordered. But patient has been going back and forth about getting the test done. - patient refused stress test     Type 2 diabetes. Under poor control. Continue Lantus insulin. Placed on scheduled Apidra along with sliding scale insulin     Elevated alkaline phosphatase. CT shows cholelithiasis without evidence of cholecystitis. He denies any abdominal pain and does not have any abdominal tenderness.     Continue Inderal 40 mg twice daily for now. Seems like he is taking it for migraine prophylaxis.   However is not a good medication for someone with advanced diabetes.       Procedures (Please Review Full Report for Details)  None     Consults    Nephrology       Physical Exam at Discharge:    /77   Pulse 63   Temp 97.7 °F (36.5 °C) (Oral)   Resp 16   Ht 6' 2\" (1.88 m)   Wt 211 lb 4.8 oz (95.8 kg)   SpO2 97%   BMI 27.13 kg/m²     General appearance:  No apparent distress, appears stated age and cooperative. HEENT:  Normal cephalic, atraumatic without obvious deformity. Pupils equal, round,  Extra ocular muscles intact. Conjunctivae/corneas clear. Neck: Supple, with full range of motion. No jugular venous distention. Trachea midline. Respiratory:  Normal respiratory effort. No use of accessory muscles, no intercostal retractions  Cardiovascular:  Regular rate and rhythm   Abdomen: Soft, non-tender, non-distended    Musculoskeletal:  No clubbing, cyanosis or edema bilaterally.     Skin: Skin color, texture, turgor normal.     Neurologic:   grossly non-focal.  Psychiatric:  Alert and oriented, thought content appropriate, normal insight     CBC:   Recent Labs     09/25/21 1535 09/26/21 0527   WBC 12.4* 14.6*   HGB 16.1 13.9   HCT 48.4 42.3   MCV 84.9 85.2   * 396     BMP:   Recent Labs     09/25/21 1535 09/25/21  1535 09/26/21  0527 09/27/21  0553     --  145  144 138   K 3.1*   < > 3.1*  3.0* 3.1*   CL 99  --  105  105 100   CO2 19*  --  20*  23 27   PHOS  --   --  2.8 2.0*   BUN 82*  --  73*  73* 41*   CREATININE 5.2*  --  3.6*  3.6* 1.7*    < > = values in this interval not displayed. LIVER PROFILE:   Recent Labs     09/25/21 1535 09/26/21  0527   AST 20 17   ALT 18 15   BILIDIR  --  <0.2   BILITOT 0.8 1.1*   ALKPHOS 178* 144*     PT/INR: No results for input(s): PROTIME, INR in the last 72 hours. APTT: No results for input(s): APTT in the last 72 hours.   UA:  Recent Labs     09/25/21  1950   COLORU Yellow   PHUR 5.0   WBCUA 3-5   RBCUA 5-10*   MUCUS Rare*   CLARITYU Clear   SPECGRAV >=1.030 LEUKOCYTESUR Negative   UROBILINOGEN 1.0   BILIRUBINUR MODERATE*   BLOODU MODERATE*   GLUCOSEU Negative         CULTURES   SARS-CoV-2 RNA, RT PCR NOT DETECTED      INFLUENZA A NOT DETECTED    INFLUENZA B NOT DETECTED         RADIOLOGY  US GALLBLADDER RUQ   Final Result   Cholelithiasis along with gallbladder sludge, but without other sonographic   evidence of cholecystitis. Circumscribed 3.2 cm hypoechoic lesion within the right hepatic lobe   laterally. That is probably a hemangioma. However, a nonemergent follow-up   dedicated MRI (preferred) or CT with contrast is recommended to better   evaluate that. CT ABDOMEN PELVIS WO CONTRAST Additional Contrast? None   Final Result   1. Wall thickening of the visualized esophagus. Correlate with presentation   for esophagitis. 2. Thickened appearance of bladder wall. Correlate with presentation to   exclude acute cystitis. 3. Nonobstructive right renal calculus. 4. Cholelithiasis and nonspecific gallbladder distention. Ultrasound or HIDA   scan would better evaluate for acute cholecystitis. 5. Other findings as described. XR CHEST PORTABLE   Final Result   No cardiomegaly, pneumonia or interstitial edema. No significant change from 08/03/2021. Discharge Medications     Medication List      START taking these medications    aspirin 81 MG chewable tablet  Take 1 tablet by mouth daily  Start taking on: September 28, 2021        CHANGE how you take these medications    Aimovig (140 MG Dose) 70 MG/ML Soaj  Generic drug: Erenumab-aooe  Inject 140 mLs into the skin every 30 days  What changed: additional instructions     Apidra 100 UNIT/ML injection  Generic drug: insulin glulisine  INJECT 8-12 UNITS UNDER THE SKIN THREE TIMES A DAY WITH MEALS  What changed: additional instructions     dicloxacillin 500 MG capsule  Commonly known as: DYNAPEN  TAKE ONE CAPSULE BY MOUTH TWICE A DAY  What changed: See the new instructions. needed     Dexcom G6 Sensor Misc  As needed     Dexcom G6 Transmitter Misc  USE TO CHECK BLOOD SUGAR     DULoxetine 60 MG extended release capsule  Commonly known as: Cymbalta  Take 1 capsule by mouth daily     FLAX PO     gabapentin 400 MG capsule  Commonly known as: NEURONTIN  Take one tablet Po BID     Glucagon Emergency 1 MG Kit  INJECT 1MG INTO THE MUSCLE AS NEEDED     Gvoke HypoPen 1-Pack 1 MG/0.2ML Soaj  Generic drug: Glucagon  Use as needed for low sugar     Handicap Placard Misc  by Does not apply route Diagnosis: Type 1 diabetes. Expires 4/13/23.      * Insulin Syringe-Needle U-100 31G X 5/16\" 1 ML Misc  Commonly known as: B-D INS SYR ULTRAFINE 1CC/31G  INJECT USING INSULIN ONCE DAILY     * Insulin Syringe-Needle U-100 31G X 5/16\" 0.5 ML Misc  Commonly known as: BD Insulin Syringe U/F  Inject 1 each into the skin 4 times daily DX CODE E 10.22     * BD Insulin Syringe U/F 31G X 5/16\" 0.3 ML Misc  Generic drug: Insulin Syringe-Needle U-100  USE ONE SYRINGE FOUR TIMES A DAY BEFORE MEALS AND ONCE NIGHTLY     ketorolac 0.5 % ophthalmic solution  Commonly known as: ACULAR     Lantus 100 UNIT/ML injection vial  Generic drug: insulin glargine  INJECT 32 UNITS UNDER THE SKIN ONCE NIGHTLY     omega-3 acid ethyl esters 1 g capsule  Commonly known as: LOVAZA  TAKE TWO CAPSULES BY MOUTH TWICE DAILY     pantoprazole 40 MG tablet  Commonly known as: PROTONIX  Take 1 tablet by mouth 2 times daily     polyvinyl alcohol 1.4 % ophthalmic solution  Commonly known as: LIQUIFILM TEARS     potassium chloride 10 MEQ extended release tablet  Commonly known as: KLOR-CON M  Take 1 tablet by mouth 2 times daily     prednisoLONE acetate 1 % ophthalmic suspension  Commonly known as: PRED FORTE     prednisoLONE sodium phosphate 1 % ophthalmic solution  Commonly known as: INFLAMASE FORTE     propranolol 80 MG tablet  Commonly known as: INDERAL     SUMAtriptan 50 MG tablet  Commonly known as: Imitrex  Take one tab po at the start of migraine PRN max 1 every 24 hours     topiramate 100 MG tablet  Commonly known as: TOPAMAX  TAKE ONE TABLET BY MOUTH TWO TIMES A DAY     traMADol 50 MG tablet  Commonly known as: Ultram  Take 1 tablet by mouth every 6 hours as needed for Pain (max 1-2 per day) for up to 28 days. triamcinolone 0.1 % ointment  Commonly known as: KENALOG  Apply to thickened affected areas PRN sparingly for flares no longer than 2 weeks at one time, do not apply to cleared skin     vitamin D3 125 MCG (5000 UT) Caps  Take 1 capsule by mouth Daily         * This list has 8 medication(s) that are the same as other medications prescribed for you. Read the directions carefully, and ask your doctor or other care provider to review them with you. Where to Get Your Medications      These medications were sent to [facility] [address], [address] [address]    Phone: [phone]   · pantoprazole 40 MG tablet     Information about where to get these medications is not yet available    Ask your nurse or doctor about these medications  · aspirin 81 MG chewable tablet           Discharged in stable condition to home    Follow Up: Follow up with PCP in 1 week and nephrology     SEKOU Castro.

## 2021-09-27 NOTE — FLOWSHEET NOTE
09/27/21 0900   Vital Signs   Temp 97.7 °F (36.5 °C)   Temp Source Oral   Pulse 63   Heart Rate Source Monitor   Resp 16   /77   BP Location Right upper arm   Patient Position Semi fowlers   Level of Consciousness Alert (0)   MEWS Score 1   Patient Currently in Pain Denies   Pain Assessment   Pain Assessment 0-10   Pain Level 0   Response to Pain Intervention Patient Satisfied     Alert and oriented x4. Skin w/d  resp e/e unlabored. Dressing to IV site to [facility] changed. Nursing asmt completed. Safety devices in place. Call light in easy reach. No voiced concerns. No s/s distress noted.

## 2021-09-27 NOTE — FLOWSHEET NOTE
09/27/21 0113   Vital Signs   Temp 97.7 °F (36.5 °C)   Temp Source Oral   Pulse 69   Heart Rate Source Monitor   Resp 18   /67   Level of Consciousness Alert (0)   MEWS Score 1   Oxygen Therapy   SpO2 98 %   O2 Device None (Room air)   Pt resting in bed, awake/alert. No c/o voiced.  Shital Khalil, RN

## 2021-09-27 NOTE — PROGRESS NOTES
PROGRESS NOTE  S:46 yrs Patient  admitted on 9/25/2021 with Hypokalemia [E87.6]  Esophagitis [K20.90]  Elevated troponin [R77.8]  HERB (acute kidney injury) ([facility] [address] [N17.9]  Calculus of gallbladder with biliary obstruction but without cholecystitis [K80.21] . Today he feels well. He passed 2x brown BMs yesterday, and is tolerating diet. Exam:   Vitals:    09/27/21 0900   BP: 139/77   Pulse: 63   Resp: 16   Temp: 97.7 °F (36.5 °C)   SpO2: 97%      General appearance: alert, appears older than stated age, cooperative, no distress and syndromic appearance - chronically ill appearing  HEENT: Neck supple with midline trachea  Neck: no adenopathy and supple, symmetrical, trachea midline  Lungs: clear to auscultation bilaterally  Heart: regular rate and rhythm, S1, S2 normal, no murmur, click, rub or gallop  Abdomen: soft, non-tender; bowel sounds normal; no masses,  no organomegaly  Extremities: extremities normal, atraumatic, no cyanosis or edema     Medications: Reviewed    Labs:  CBC:   Recent Labs     09/25/21 1535 09/26/21  0527   WBC 12.4* 14.6*   HGB 16.1 13.9   HCT 48.4 42.3   MCV 84.9 85.2   * 396     BMP:   Recent Labs     09/25/21  1535 09/25/21  1535 09/26/21  0527 09/27/21  0553     --  145  144 138   K 3.1*   < > 3.1*  3.0* 3.1*   CL 99  --  105  105 100   CO2 19*  --  20*  23 27   PHOS  --   --  2.8 2.0*   BUN 82*  --  73*  73* 41*   CREATININE 5.2*  --  3.6*  3.6* 1.7*    < > = values in this interval not displayed. LIVER PROFILE:   Recent Labs     09/25/21  1535 09/26/21  0527   AST 20 17   ALT 18 15   PROT 9.6* 8.1   BILIDIR  --  <0.2   BILITOT 0.8 1.1*   ALKPHOS 178* 144*     PT/INR: No results for input(s): INR in the last 72 hours. Invalid input(s): PT      IMAGING:  US GALLBLADDER RUQ   Final Result   Cholelithiasis along with gallbladder sludge, but without other sonographic   evidence of cholecystitis.       Circumscribed 3.2 cm [phone]  (O) [phone]

## 2021-09-27 NOTE — PROGRESS NOTES
Progress Note    HISTORY     CC:   Difficulty swallowing             We are following for acute kidney injury       Sub/interval history  Reports no diarrhea, feels well  Wants to go home    No urine output charted    ROS: No chest pain/shortness of breath/fever/nausea/vomiting  PSFH: No visitor    Scheduled Meds:   DULoxetine  60 mg Oral Daily    fluticasone  2 spray Each Nostril Daily    insulin glargine  32 Units SubCUTAneous Nightly    pantoprazole  40 mg IntraVENous BID    And    sodium chloride (PF)  10 mL IntraVENous BID    propranolol  40 mg Oral BID    topiramate  50 mg Oral BID    sodium chloride flush  5-40 mL IntraVENous 2 times per day    heparin (porcine)  5,000 Units SubCUTAneous 3 times per day    aspirin  81 mg Oral Daily    insulin glulisine  2-12 Units SubCUTAneous 4x Daily AC & HS    insulin glulisine  6 Units SubCUTAneous TID WC    prednisoLONE acetate  1 drop Right Eye TID    ketorolac  1 drop Right Eye TID     Continuous Infusions:   dextrose      sodium chloride      IV infusion builder 100 mL/hr at 09/27/21 1035     PRN Meds:.carboxymethylcellulose PF, glucose, dextrose, glucagon (rDNA), dextrose, sodium chloride flush, sodium chloride, ondansetron **OR** ondansetron, polyethylene glycol, acetaminophen **OR** acetaminophen    EXAM       Objective/     Vitals:    09/26/21 1400 09/26/21 2031 09/27/21 0113 09/27/21 0900   BP: (!) 140/75 139/78 114/67 139/77   Pulse: 64 68 69 63   Resp: 18 18 18 16   Temp: 97 °F (36.1 °C) 97.1 °F (36.2 °C) 97.7 °F (36.5 °C) 97.7 °F (36.5 °C)   TempSrc: Oral Oral Oral Oral   SpO2: 95% 99% 98% 97%   Weight:       Height:         24HR INTAKE/OUTPUT:      Intake/Output Summary (Last 24 hours) at 9/27/2021 1113  Last data filed at 9/27/2021 3783  Gross per 24 hour   Intake 2400 ml   Output 2150 ml   Net 250 ml     Constitutional:  Alert, awake, no apparent distress  Cardiovascular:  Regular, no rub  Respiratory:  No distress, no wheezing  Psychiatry:  Appropriate mood/affect, alert  Abdomen: +bs, soft, nt, no masses   Musculoskeletal: No LE edema, no clubbing     Data/  Recent Labs     09/25/21  1535 09/26/21  0527   WBC 12.4* 14.6*   HGB 16.1 13.9   HCT 48.4 42.3   MCV 84.9 85.2   * 396     Recent Labs     09/25/21  1535 09/25/21  1535 09/26/21  0527 09/27/21  0553     --  145  144 138   K 3.1*   < > 3.1*  3.0* 3.1*   CL 99  --  105  105 100   CO2 19*  --  20*  23 27   GLUCOSE 350*  --  116*  113* 233*   PHOS  --   --  2.8 2.0*   MG 2.70*  --  2.50*  --    BUN 82*  --  73*  73* 41*   CREATININE 5.2*  --  3.6*  3.6* 1.7*   LABGLOM 12*  --  19*  19* 44*   GFRAA 15*  --  22*  22* 53*    < > = values in this interval not displayed.        Assessment/     Acute Kidney Injury:  KDIGO stage 3  - Etiology:  Pre-renal  - Clinical:  Non-oliguric and stable electrolytes, Scr improving with volume       Chronic Kidney Disease:  Stage 3  - Etiology:  Diabetic nephropathy   - Baseline:  1.3 - 1.7  - Follows with Dr. Panchito Berg     Diabetes type 1:  Glucose elevated      Food impaction:  GI following, s/p EGD for dislodgement     Plan/     -Stop IV fluid  -Replace potassium  -Okay to discharge from renal standpoint, patient will follow up with Dr. Panchito Berg as outpatient    Noemy Bright MD  [facility]  Office: [phone]  Fax:    [phone]

## 2021-09-27 NOTE — PROGRESS NOTES
Patient discharge instructions given to patient and sister. Both verbalized understanding. Patient pulled IV out at 1300 today. Tip intact. No bleeding. Dressing applied. Potassium given PO prior to leaving. Refused heparin. Home meds given to patient. Patient aware of med to  at pharmacy. Patient left via w/c to private car. Patient and sister both deny needing services at home. Sister is patients caregiver. Skin w/d. resp e/e unlabored. No s/s distress noted. Left via private car at this time.  VSS

## 2021-09-28 ENCOUNTER — CARE COORDINATION (OUTPATIENT)
Dept: CASE MANAGEMENT | Age: 46
End: 2021-09-28

## 2021-09-28 DIAGNOSIS — N17.9 ACUTE KIDNEY INJURY SUPERIMPOSED ON CKD (HCC): Primary | ICD-10-CM

## 2021-09-28 DIAGNOSIS — N18.9 ACUTE KIDNEY INJURY SUPERIMPOSED ON CKD (HCC): Primary | ICD-10-CM

## 2021-09-28 PROCEDURE — 1111F DSCHRG MED/CURRENT MED MERGE: CPT | Performed by: INTERNAL MEDICINE

## 2021-09-28 NOTE — CARE COORDINATION
[address] Transitions Initial Follow Up Call    Call within 2 business days of discharge: Yes    Patient: Chintan Gaines Patient : 1975   MRN: [id number]  Reason for Admission: foreign body esophagus s/p EGD, HERB on CKD3 (follows with Dr Omar Westbrook), elevated troponin (refused stress test), DM2 uncontrolled, elevated alk phos  Discharge Date: 21 RARS: Readmission Risk Score: 27      Last Discharge [facility]       Complaint Diagnosis Description Type Department Provider    21 Dysphagia HERB (acute kidney injury) ([facility] [address] . .. ED [facility]Discharged[facility]; Woo. .. Spoke with: Chintan Gaines (patient)    Facility: [facility]    Non-face-to-face services provided:  Obtained and reviewed discharge summary and/or continuity of care documents  Education of patient/family/caregiver/guardian to support self-management-s/s monitor; DM control; follow up   Assessment and support for treatment adherence and medication management-[id number] completed    Was this an external facility discharge? No Discharge Facility: NA    Challenges to be reviewed by the provider   Additional needs identified to be addressed with provider No       Method of communication with provider : none    Advance Care Planning:   Does patient have an Advance Directive:  decision maker updated. Was this a readmission? No  Patient stated reason for admission: foreign body   Patients top risk factors for readmission: medical condition-DM     Care Transition Nurse (CTN) contacted the patient by telephone to perform post hospital discharge assessment. Verified name and  with patient as identifiers. Provided introduction to self, and explanation of the CTN role. CTN reviewed discharge instructions, medical action plan and red flags with patient who verbalized understanding.  Patient given an opportunity to ask questions and does not have any further questions or concerns at this time. Were discharge instructions available to patient? Yes. Reviewed appropriate site of care based on symptoms and resources available to patient including: PCP and Specialist. The patient agrees to contact the PCP office for questions related to their healthcare. Medication reconciliation was performed with patient, who verbalizes understanding of administration of home medications. COVID Risk Education    COVID-19 and Influenza A&B Not Detected on 9/26/2021. No hx of COVID vaccine. States he had COVID diagnosis. Denies info on vaccine. Educated patient about risk for severe COVID-19 due to risk factors according to CDC guidelines. Discussed COVID vaccination status Yes. Education provided on COVID-19 vaccination as appropriate. Discussed exposure protocols and quarantine with CDC Guidelines. Patient was given an opportunity to verbalize any questions and concerns and agrees to contact CTN or health care provider for questions related to their healthcare. Was patient discharged with a pulse oximeter? No.     He had questions about starting ASA 81mg. We discussed and states his mother also takes this, states he takes krill oil for this and all diabetics have risk for heart disease. Reviewed outpatient stress test recommended. Offered to schedule TCM visit with PCP which he declined. States he will schedule vv on his own. States he is waiting on abx to be filled at pharmacy. States it is prescribed by Dr Attila Pastor and he intends to call them today to let them know he hasn't been able to start it today. Reviewed A1C during admission. Follows with endocrinology. States he administers insulin on SS. His sister helps him at times. He denies needs at this time. CTN provided contact information for future needs. Plan for follow-up call in 7-14 days based on severity of symptoms and risk factors.       Care Transitions 24 Hour Call    Schedule Follow Up Appointment with PCP: Declined  Do you have any ongoing symptoms?: No  Do you have a copy of your discharge instructions?: Yes  Do you have all of your prescriptions and are they filled?: No  Have you been contacted by a u[facility]?: No  Have you scheduled your follow up appointment?: No  Were you discharged with any Home Care or Post Acute Services: No  Do you feel like you have everything you need to keep you well at home?: Yes  Care Transitions Interventions         Follow Up  Future Appointments   Date Time Provider Mamie Portillo   10/14/2021  1:15 PM Wood Krishnan MD [facility]   11/2/2021  2:20 PM MD Elias Lyon  [facility]       Antoinette Mensah RN

## 2021-09-29 ENCOUNTER — TELEPHONE (OUTPATIENT)
Dept: INTERNAL MEDICINE CLINIC | Age: 46
End: 2021-09-29

## 2021-09-29 RX ORDER — CEFADROXIL 500 MG/1
500 CAPSULE ORAL DAILY
Qty: 30 CAPSULE | Refills: 5 | Status: SHIPPED | OUTPATIENT
Start: 2021-09-29 | End: 2022-04-25

## 2021-09-29 NOTE — TELEPHONE ENCOUNTER
----- Message from [employer] sent at 9/29/2021 11:50 AM EDT -----  Subject: Message to Provider    QUESTIONS  Information for Provider? Pt said she had a scope done on 9/26 she thinks. Pt said when they put the scope down her throat they saw that it was   gaulding /rash and black. They put her on some medication and she wants to   know should she do another scope or does she need a follow up appointment. She is currently on pantoprazole 2 x a day. She was told this was to help   heal it. She said her sugar was high when she went to go get the scope,   she had went because she could get anything down her throat.  ---------------------------------------------------------------------------  --------------  CALL BACK INFO  What is the best way for the office to contact you? OK to leave message on   voicemail  Preferred Call Back Phone Number? [phone]  ---------------------------------------------------------------------------  --------------  SCRIPT ANSWERS  Relationship to Patient?  Self

## 2021-09-29 NOTE — TELEPHONE ENCOUNTER
Pt called and is having trouble getting his dicloxacillin rx filled, it is on  back order- verified with Virgniia Ross at [facility]

## 2021-09-29 NOTE — TELEPHONE ENCOUNTER
Will order Cefadroxil 500 mg po daily  Had GI side effects with cephalexin.   Hopeful will tolerate cefadroxil

## 2021-09-29 NOTE — TELEPHONE ENCOUNTER
Tried calling patient to advise of MD order. VM full and couldn't leave message. Tried home phone and has been disconnected.

## 2021-10-01 ASSESSMENT — ENCOUNTER SYMPTOMS
GASTROINTESTINAL NEGATIVE: 1
RESPIRATORY NEGATIVE: 1
EYES NEGATIVE: 1

## 2021-10-05 ENCOUNTER — CARE COORDINATION (OUTPATIENT)
Dept: CASE MANAGEMENT | Age: 46
End: 2021-10-05

## 2021-10-05 NOTE — CARE COORDINATION
Ga 45 Transitions Follow Up Call    10/5/2021    Patient: Karlie Morin  Patient : 1975   MRN: [id number]  Reason for Admission: foreign body esophagus s/p EGD, HERB on CKD3 (follows with Dr Jeremias Morales), elevated troponin (refused stress test), DM2 uncontrolled, elevated alk phos  Discharge Date: 21 RARS: Readmission Risk Score: 27       Unable to reach patient by phone at this time. Message left including CTN contact info for return call.        Follow Up  Future Appointments   Date Time Provider Mamie Portillo   10/14/2021  1:15 PM Ian Golden MD [facility]   2021  [facility]       Shadia Macedo RN

## 2021-10-07 RX ORDER — GLUCAGON INJECTION, SOLUTION 1 MG/.2ML
INJECTION, SOLUTION SUBCUTANEOUS
Qty: 1 EACH | Refills: 3 | Status: SHIPPED | OUTPATIENT
Start: 2021-10-07 | End: 2022-03-07

## 2021-10-12 ENCOUNTER — CARE COORDINATION (OUTPATIENT)
Dept: CASE MANAGEMENT | Age: 46
End: 2021-10-12

## 2021-10-12 NOTE — CARE COORDINATION
Ga [facility] Transitions Follow Up Call    10/12/2021    Patient: Alex Solo  Patient : 1975   MRN: [id number]  Reason for Admission: foreign body esophagus s/p EGD, HERB on CKD3 (follows with Dr Gera Foster), elevated troponin (refused stress test), DM2 uncontrolled, elevated alk phos  Discharge Date: 21 RARS: Readmission Risk Score: 27       2nd attempt - Unable to reach patient by phone at this time. Mailbox was full. No further CTN outreach scheduled. Episode resolved at this time.      Follow Up  Future Appointments   Date Time Provider Mamie Portillo   10/14/2021  1:15 PM Jose F Hatfield MD [facility]   2021  2:20 PM MD Payal Page RN

## 2021-10-14 ENCOUNTER — OFFICE VISIT (OUTPATIENT)
Dept: PAIN MANAGEMENT | Age: 46
End: 2021-10-14
Payer: MEDICARE

## 2021-10-14 VITALS
SYSTOLIC BLOOD PRESSURE: 89 MMHG | BODY MASS INDEX: 28.71 KG/M2 | HEART RATE: 116 BPM | OXYGEN SATURATION: 99 % | DIASTOLIC BLOOD PRESSURE: 65 MMHG | WEIGHT: 223.6 LBS | TEMPERATURE: 97.7 F

## 2021-10-14 DIAGNOSIS — G62.9 PERIPHERAL POLYNEUROPATHY: ICD-10-CM

## 2021-10-14 DIAGNOSIS — G63 POLYNEUROPATHY ASSOCIATED WITH UNDERLYING DISEASE (HCC): ICD-10-CM

## 2021-10-14 DIAGNOSIS — G89.4 CHRONIC PAIN SYNDROME: ICD-10-CM

## 2021-10-14 DIAGNOSIS — L97.522 ULCER OF LEFT FOOT, WITH FAT LAYER EXPOSED (HCC): ICD-10-CM

## 2021-10-14 DIAGNOSIS — M79.18 MYOFASCIAL PAIN: ICD-10-CM

## 2021-10-14 DIAGNOSIS — M80.862S PATHOLOGICAL FRACTURE OF LEFT TIBIA DUE TO OTHER OSTEOPOROSIS, SEQUELA: ICD-10-CM

## 2021-10-14 DIAGNOSIS — M79.2 NEUROPATHIC PAIN: ICD-10-CM

## 2021-10-14 PROCEDURE — 99213 OFFICE O/P EST LOW 20 MIN: CPT | Performed by: INTERNAL MEDICINE

## 2021-10-14 RX ORDER — SUMATRIPTAN 50 MG/1
TABLET, FILM COATED ORAL
Qty: 9 TABLET | Refills: 0 | Status: SHIPPED | OUTPATIENT
Start: 2021-10-14 | End: 2021-11-11 | Stop reason: SDUPTHER

## 2021-10-14 RX ORDER — DULOXETIN HYDROCHLORIDE 60 MG/1
60 CAPSULE, DELAYED RELEASE ORAL DAILY
Qty: 30 CAPSULE | Refills: 1 | Status: SHIPPED | OUTPATIENT
Start: 2021-10-14 | End: 2021-11-11 | Stop reason: SDUPTHER

## 2021-10-14 RX ORDER — TIZANIDINE 4 MG/1
2-4 TABLET ORAL 2 TIMES DAILY
Qty: 60 TABLET | Refills: 1 | Status: SHIPPED | OUTPATIENT
Start: 2021-10-14 | End: 2021-11-13

## 2021-10-14 RX ORDER — GABAPENTIN 400 MG/1
CAPSULE ORAL
Qty: 60 CAPSULE | Refills: 1 | Status: SHIPPED | OUTPATIENT
Start: 2021-10-14 | End: 2021-11-11 | Stop reason: SDUPTHER

## 2021-10-14 RX ORDER — ERENUMAB-AOOE 70 MG/ML
140 INJECTION SUBCUTANEOUS
Qty: 1 PEN | Refills: 1 | Status: SHIPPED | OUTPATIENT
Start: 2021-10-14 | End: 2021-11-11 | Stop reason: SDUPTHER

## 2021-10-14 RX ORDER — TRAMADOL HYDROCHLORIDE 50 MG/1
50 TABLET ORAL EVERY 6 HOURS PRN
Qty: 60 TABLET | Refills: 0 | Status: SHIPPED | OUTPATIENT
Start: 2021-10-14 | End: 2021-12-09 | Stop reason: SDUPTHER

## 2021-10-14 NOTE — PROGRESS NOTES
Jeffrey Lopez  1975  [id number]      HISTORY OF PRESENT ILLNESS:  Mr. Harry Julien is a 42 y.o. male returns for a follow up visit for pain management  He has a diagnosis of   1. Chronic pain syndrome    2. Neuropathic pain    3. Chronic migraine without aura, with intractable migraine, so stated, with status migrainosus    4. Myofascial pain    5. Ulcer of left foot, with fat layer exposed ([address]    6. Pathological fracture of left tibia due to other osteoporosis, sequela    7. Peripheral polyneuropathy    . He complains of pain in the Neck, upper back, bilateral hands, left knee, lower leg and foot  He rates the pain 10/10 and describes it as sharp, aching. Current treatment regimen has helped relieve about 10% of the pain. He denies any side effects from the current pain regimen. Patient reports that since the last follow up visit the physical functioning is unchanged, family/social relationships are unchanged, mood is better sleep patterns are better, and that the overall functioning is unchanged. Patient denies misusing/abusing his narcotic pain medications or using any illegal drugs. There are No indicators for possible drug abuse, addiction or diversion problems, patient states he has been doing fair, his pain has been manageable with the medications. Mr. Harry Julien mentions he using Ultram 1-2 per day. Patient denies any constipation symptoms. Patient reports his blood sugar has been okay. He states he has been managing his ADL's. ALLERGIES: Patients list of allergies were reviewed     MEDICATIONS: Mr. Harry Julien list of medications were reviewed. His current medications are   Outpatient Medications Prior to Visit   Medication Sig Dispense Refill    Glucagon (Grayce Sammie 2-PACK) 1 MG/0.2ML SOAJ INJECT AS NEEDED FOR LOW BLOOD SUGAR 1 each 3    cefadroxil (DURICEF) 500 MG capsule Take 1 capsule by mouth daily 30 capsule 5    aspirin 81 MG chewable tablet Take 1 tablet by mouth daily 30 tablet 0    pantoprazole (PROTONIX) 40 MG tablet Take 1 tablet by mouth 2 times daily 60 tablet 1    prednisoLONE sodium phosphate (INFLAMASE FORTE) 1 % ophthalmic solution 1 drop 4 times daily      prednisoLONE acetate (PRED FORTE) 1 % ophthalmic suspension Place 1 drop into the right eye three times daily 3 times daily x 1 week then 2x daily x2 weeks, then 1 daily thereafter      ketorolac (ACULAR) 0.5 % ophthalmic solution Place 1 drop into the right eye 3 times daily 3 times daily x 1week, then 2 x daily x 2 weeks, then 1 daily thereafter      SUMAtriptan (IMITREX) 50 MG tablet Take one tab po at the start of migraine PRN max 1 every 24 hours 9 tablet 0    Erenumab-aooe (AIMOVIG, 140 MG DOSE,) 70 MG/ML SOAJ Inject 140 mLs into the skin every 30 days (Patient taking differently: Inject 140 mLs into the skin every 30 days Last taken 2nd week of september) 1 pen 1    traMADol (ULTRAM) 50 MG tablet Take 1 tablet by mouth every 6 hours as needed for Pain (max 1-2 per day) for up to 28 days. 56 tablet 0    Continuous Blood Gluc Transmit (DEXCOM G6 TRANSMITTER) MISC USE TO CHECK BLOOD SUGAR 2 each 3    Continuous Blood Gluc Sensor (DEXCOM G6 SENSOR) MISC As needed 3 each 2    Continuous Blood Gluc  (DEXCOM G6 ) LINDA As needed 1 Device 0    blood glucose monitor kit and supplies Dispense sufficient amount for indicated testing frequency plus additional to accommodate PRN testing needs. Dispense all needed supplies to include: monitor, strips, lancing device, lancets, control solutions, alcohol swabs. 1 kit 0    blood glucose test strips (ACCU-CHEK CAPO PLUS) strip 4 times a day As needed. 150 each 3    DULoxetine (CYMBALTA) 60 MG extended release capsule Take 1 capsule by mouth daily 30 capsule 1    topiramate (TOPAMAX) 100 MG tablet TAKE ONE TABLET BY MOUTH TWO TIMES A DAY 60 tablet 1    Handicap Placard MISC by Does not apply route Diagnosis: Type 1 diabetes.      Expires 4/13/23. 1 each 0    BD INSULIN SYRINGE U/F 31G X 5/16\" 0.3 ML MISC USE ONE SYRINGE FOUR TIMES A DAY BEFORE MEALS AND ONCE NIGHTLY 120 each 9    LANTUS 100 UNIT/ML injection vial INJECT 32 UNITS UNDER THE SKIN ONCE NIGHTLY 1 vial 0    blood glucose test strips (ACCU-CHEK CAPO PLUS) strip 1 each by In Vitro route daily Test blood glucose 10 times daily Dx Code E10.8 300 each 10    insulin glulisine (APIDRA) 100 UNIT/ML injection INJECT 8-12 UNITS UNDER THE SKIN THREE TIMES A DAY WITH MEALS (Patient taking differently: INJECT 8-12 UNITS UNDER THE SKIN THREE TIMES A DAY WITH MEALS per sliding scale) 40 mL 3    blood glucose test strips (ACCU-CHEK CAPO PLUS) strip USE ONE STRIP TO TEST THREE TIMES A  strip 4    Accu-Chek FastClix Lancets MISC 1 each by Does not apply route daily Test blood glucose 10 times daily Dx Code E 10.8 900 each 2    triamcinolone (KENALOG) 0.1 % ointment Apply to thickened affected areas PRN sparingly for flares no longer than 2 weeks at one time, do not apply to cleared skin 80 g 0    Insulin Syringe-Needle U-100 (BD INSULIN SYRINGE U/F) 31G X 5/16\" 0.5 ML MISC Inject 1 each into the skin 4 times daily DX CODE E 10.22 120 each 9    fluticasone (FLONASE) 50 MCG/ACT nasal spray SPRAY TWO SPRAYS IN EACH NOSTRIL DAILY (Patient taking differently: daily as needed ) 16 g 4    hydrocortisone 2.5 % cream Apply topically 2 times daily.  (Patient taking differently: Apply topically 2 times daily as needed) 30 g 0    Blood Glucose Monitoring Suppl (ACCU-CHEK CAPO PLUS) w/Device KIT 1 each by Does not apply route daily Test blood sugar 10 times daily Dx code E 10.8 1 kit 10    GLUCAGON EMERGENCY 1 MG injection INJECT 1MG INTO THE MUSCLE AS NEEDED 1 kit 3    Insulin Syringe-Needle U-100 (B-D INS SYR ULTRAFINE 1CC/31G) 31G X 5/16\" 1 ML MISC INJECT USING INSULIN ONCE DAILY 30 each 10    Multiple Vitamin (DAILY KITTY) TABS TAKE ONE TABLET BY MOUTH DAILY 30 tablet 5    Cholecalciferol (VITAMIN D3) 5000 units CAPS Take 1 capsule by mouth Daily 30 capsule 3    Dextromethorphan-Guaifenesin (MUCINEX DM)  MG TB12 Cough and congestion. (Patient taking differently: as needed Cough and congestion.) 60 tablet 1    omega-3 acid ethyl esters (LOVAZA) 1 G capsule TAKE TWO CAPSULES BY MOUTH TWICE DAILY 120 capsule 5    MUCUS RELIEF DM  MG TABS TAKE ONE BY MOUTH DAILY AS NEEDED 30 tablet 1    Alcohol Swabs PADS Use as needed for insulin injection. 100 each 5    Probiotic Product (ACIDOPHILUS PROBIOTIC) CAPS capsule TAKE ONE CAPSULE BY MOUTH DAILY 28 capsule 4    polyvinyl alcohol (LIQUIFILM TEARS) 1.4 % ophthalmic solution 1 drop as needed      propranolol (INDERAL) 80 MG tablet Take 40 mg by mouth 2 times daily For migraines       Flaxseed, Linseed, (FLAX PO) Take 14 g by mouth daily as needed       potassium chloride (KLOR-CON M) 10 MEQ extended release tablet Take 1 tablet by mouth 2 times daily 60 tablet 0    gabapentin (NEURONTIN) 400 MG capsule Take one tablet Po BID 60 capsule 1     No facility-administered medications prior to visit. REVIEW OF SYSTEMS:    Respiratory: Negative for apnea, chest tightness and shortness of breath or change in baseline breathing. PHYSICAL EXAM:   Nursing note and vitals reviewed. BP 89/65   Pulse 116   Temp 97.7 °F (36.5 °C) (Temporal)   Wt 223 lb 9.6 oz (101.4 kg)   SpO2 99%   BMI 28.71 kg/m²   Constitutional: He appears well-developed and well-nourished. No acute distress. Cardiovascular: Normal rate, regular rhythm, normal heart sounds, and does not have murmur. Pulmonary/Chest: Effort normal. No respiratory distress. He does not have wheezes in the lung fields. He has no rales. Neurological/Psychiatric:He is alert and oriented to person, place, and time. Coordination is  normal.  His mood isAppropriate and affect is Neutral/Euthymic(normal) . Other: + healing scab wounds BLE, using a cane   IMPRESSION:   1. Chronic pain syndrome    2.  Neuropathic pain    3. Myofascial pain    4. Ulcer of left foot, with fat layer exposed ([address]    5. Pathological fracture of left tibia due to other osteoporosis, sequela    6. Peripheral polyneuropathy    7. Polyneuropathy associated with underlying disease ([address]        PLAN:  Informed verbal consent was obtained  -ROM/Stretching exercises as advised   -He was advised to increase fluids ( 5-7  glasses of fluid daily), limit caffeine, avoid cheese products, increase dietary fiber, increase activity and exercise as tolerated and relax regularly and enjoy meals   -Walking as tolerated   -Monitor blood sugar regularly, diabetic control- adv diabetic diet. Goal for fasting blood sugars around 120. Follow up with Endocrinologist/PCP also for on going management    -Continue with all other adjuvants as before.     Current Outpatient Medications   Medication Sig Dispense Refill    Glucagon (GVOKE HYPOPEN 2-PACK) 1 MG/0.2ML SOAJ INJECT AS NEEDED FOR LOW BLOOD SUGAR 1 each 3    cefadroxil (DURICEF) 500 MG capsule Take 1 capsule by mouth daily 30 capsule 5    aspirin 81 MG chewable tablet Take 1 tablet by mouth daily 30 tablet 0    pantoprazole (PROTONIX) 40 MG tablet Take 1 tablet by mouth 2 times daily 60 tablet 1    prednisoLONE sodium phosphate (INFLAMASE FORTE) 1 % ophthalmic solution 1 drop 4 times daily      prednisoLONE acetate (PRED FORTE) 1 % ophthalmic suspension Place 1 drop into the right eye three times daily 3 times daily x 1 week then 2x daily x2 weeks, then 1 daily thereafter      ketorolac (ACULAR) 0.5 % ophthalmic solution Place 1 drop into the right eye 3 times daily 3 times daily x 1week, then 2 x daily x 2 weeks, then 1 daily thereafter      SUMAtriptan (IMITREX) 50 MG tablet Take one tab po at the start of migraine PRN max 1 every 24 hours 9 tablet 0    Erenumab-aooe (AIMOVIG, 140 MG DOSE,) 70 MG/ML SOAJ Inject 140 mLs into the skin every 30 days (Patient taking differently: Inject 140 mLs into the skin every 30 days Last taken 2nd week of september) 1 pen 1    traMADol (ULTRAM) 50 MG tablet Take 1 tablet by mouth every 6 hours as needed for Pain (max 1-2 per day) for up to 28 days. 56 tablet 0    Continuous Blood Gluc Transmit (DEXCOM G6 TRANSMITTER) MISC USE TO CHECK BLOOD SUGAR 2 each 3    Continuous Blood Gluc Sensor (DEXCOM G6 SENSOR) MISC As needed 3 each 2    Continuous Blood Gluc  (DEXCOM G6 ) LINDA As needed 1 Device 0    blood glucose monitor kit and supplies Dispense sufficient amount for indicated testing frequency plus additional to accommodate PRN testing needs. Dispense all needed supplies to include: monitor, strips, lancing device, lancets, control solutions, alcohol swabs. 1 kit 0    blood glucose test strips (ACCU-CHEK CAPO PLUS) strip 4 times a day As needed. 150 each 3    DULoxetine (CYMBALTA) 60 MG extended release capsule Take 1 capsule by mouth daily 30 capsule 1    topiramate (TOPAMAX) 100 MG tablet TAKE ONE TABLET BY MOUTH TWO TIMES A DAY 60 tablet 1    Handicap Placard MISC by Does not apply route Diagnosis: Type 1 diabetes.      Expires 4/13/23. 1 each 0    BD INSULIN SYRINGE U/F 31G X 5/16\" 0.3 ML MISC USE ONE SYRINGE FOUR TIMES A DAY BEFORE MEALS AND ONCE NIGHTLY 120 each 9    LANTUS 100 UNIT/ML injection vial INJECT 32 UNITS UNDER THE SKIN ONCE NIGHTLY 1 vial 0    blood glucose test strips (ACCU-CHEK CAPO PLUS) strip 1 each by In Vitro route daily Test blood glucose 10 times daily Dx Code E10.8 300 each 10    insulin glulisine (APIDRA) 100 UNIT/ML injection INJECT 8-12 UNITS UNDER THE SKIN THREE TIMES A DAY WITH MEALS (Patient taking differently: INJECT 8-12 UNITS UNDER THE SKIN THREE TIMES A DAY WITH MEALS per sliding scale) 40 mL 3    blood glucose test strips (ACCU-CHEK CAPO PLUS) strip USE ONE STRIP TO TEST THREE TIMES A  strip 4    Accu-Chek FastClix Lancets MISC 1 each by Does not apply route daily Test blood glucose 10 times daily Dx Code E 10.8 900 each 2    triamcinolone (KENALOG) 0.1 % ointment Apply to thickened affected areas PRN sparingly for flares no longer than 2 weeks at one time, do not apply to cleared skin 80 g 0    Insulin Syringe-Needle U-100 (BD INSULIN SYRINGE U/F) 31G X 5/16\" 0.5 ML MISC Inject 1 each into the skin 4 times daily DX CODE E 10.22 120 each 9    fluticasone (FLONASE) 50 MCG/ACT nasal spray SPRAY TWO SPRAYS IN EACH NOSTRIL DAILY (Patient taking differently: daily as needed ) 16 g 4    hydrocortisone 2.5 % cream Apply topically 2 times daily. (Patient taking differently: Apply topically 2 times daily as needed) 30 g 0    Blood Glucose Monitoring Suppl (ACCU-CHEK CAPO PLUS) w/Device KIT 1 each by Does not apply route daily Test blood sugar 10 times daily Dx code E 10.8 1 kit 10    GLUCAGON EMERGENCY 1 MG injection INJECT 1MG INTO THE MUSCLE AS NEEDED 1 kit 3    Insulin Syringe-Needle U-100 (B-D INS SYR ULTRAFINE 1CC/31G) 31G X 5/16\" 1 ML MISC INJECT USING INSULIN ONCE DAILY 30 each 10    Multiple Vitamin (DAILY KITTY) TABS TAKE ONE TABLET BY MOUTH DAILY 30 tablet 5    Cholecalciferol (VITAMIN D3) 5000 units CAPS Take 1 capsule by mouth Daily 30 capsule 3    Dextromethorphan-Guaifenesin (MUCINEX DM)  MG TB12 Cough and congestion. (Patient taking differently: as needed Cough and congestion.) 60 tablet 1    omega-3 acid ethyl esters (LOVAZA) 1 G capsule TAKE TWO CAPSULES BY MOUTH TWICE DAILY 120 capsule 5    MUCUS RELIEF DM  MG TABS TAKE ONE BY MOUTH DAILY AS NEEDED 30 tablet 1    Alcohol Swabs PADS Use as needed for insulin injection.  100 each 5    Probiotic Product (ACIDOPHILUS PROBIOTIC) CAPS capsule TAKE ONE CAPSULE BY MOUTH DAILY 28 capsule 4    polyvinyl alcohol (LIQUIFILM TEARS) 1.4 % ophthalmic solution 1 drop as needed      propranolol (INDERAL) 80 MG tablet Take 40 mg by mouth 2 times daily For migraines       Flaxseed, Linseed, (FLAX PO) Take 14 g by mouth daily as needed       to the current medications. Risk of overdose and death, if medicines not taken as prescribed, were also discussed. If the patient develops new symptoms or if the symptoms worsen, the patient should call the office. While transcribing every attempt was made to maintain the accuracy of the note in terms of it's contents,there may have been some errors made inadvertently. Thank you for allowing me to participate in the care of this patient.     Prashant Stone MD.    Cc: Ramiro Martinez MD

## 2021-10-20 DIAGNOSIS — G43.711 CHRONIC MIGRAINE WITHOUT AURA, WITH INTRACTABLE MIGRAINE, SO STATED, WITH STATUS MIGRAINOSUS: ICD-10-CM

## 2021-10-20 DIAGNOSIS — G89.4 CHRONIC PAIN SYNDROME: ICD-10-CM

## 2021-10-29 RX ORDER — BLOOD-GLUCOSE,RECEIVER,CONT
EACH MISCELLANEOUS
Qty: 1 EACH | Refills: 2 | Status: SHIPPED | OUTPATIENT
Start: 2021-10-29

## 2021-10-29 NOTE — TELEPHONE ENCOUNTER
Medication:   Requested Prescriptions     Pending Prescriptions Disp Refills    Continuous Blood Gluc  ([address]) [address] [Pharmacy Med Name: [address]       Sig: USE AS NEEDED TO CHECK BLOOD SUGAR       Last Filled:  07/22/2021    Patient Phone Number: [phone] (home)     Last appt: 07/06/2021   Next appt: 11/02/2021    Last Labs DM:   Lab Results   Component Value Date    LABA1C 9.1 09/26/2021

## 2021-11-01 RX ORDER — INSULIN GLULISINE 100 [IU]/ML
INJECTION, SOLUTION SUBCUTANEOUS
Qty: 1 PEN | Refills: 3 | Status: SHIPPED | OUTPATIENT
Start: 2021-11-01 | End: 2022-03-09

## 2021-11-01 NOTE — TELEPHONE ENCOUNTER
Medication:   Requested Prescriptions     Pending Prescriptions Disp Refills    insulin glulisine (APIDRA) 100 UNIT/ML injection [Pharmacy Med Name: Sharla Rodgers 100 UNIT/ML VIAL] 1 pen      Sig: INJECT 8 TO 12 UNITS UNDER THE SKIN THREE TIMES A DAY WITH MEALS       Last Filled:      Patient Phone Number: [phone] (home)     Last appt: 7/6/2021   Next appt: 11/2/2021    Last Labs DM:   Lab Results   Component Value Date    LABA1C 9.1 09/26/2021
87

## 2021-11-02 ENCOUNTER — OFFICE VISIT (OUTPATIENT)
Dept: ENDOCRINOLOGY | Age: 46
End: 2021-11-02
Payer: MEDICARE

## 2021-11-02 VITALS
HEART RATE: 88 BPM | DIASTOLIC BLOOD PRESSURE: 78 MMHG | SYSTOLIC BLOOD PRESSURE: 128 MMHG | OXYGEN SATURATION: 99 % | BODY MASS INDEX: 28.75 KG/M2 | WEIGHT: 224 LBS | HEIGHT: 74 IN

## 2021-11-02 DIAGNOSIS — Z79.4 INSULIN LONG-TERM USE (HCC): ICD-10-CM

## 2021-11-02 DIAGNOSIS — E78.49 OTHER HYPERLIPIDEMIA: ICD-10-CM

## 2021-11-02 PROCEDURE — 99214 OFFICE O/P EST MOD 30 MIN: CPT | Performed by: INTERNAL MEDICINE

## 2021-11-02 NOTE — PROGRESS NOTES
HPI      Adiel Khan is a 42 y.o. male here for a follow-up for management of DM    Interim:    Stopped trulicity, reports had low glucose  He even tried lower insulin    Has seen Dr. Ramsey Lovelace    He has a PMH of DM, CKD, HTN, hyperlipidemia,  Left tibia/fibula fracture, foot ulcer. He was diagnosed with Diabetes Mellitus at the age of 12 yrs. Never had DKA. Was never on oral meds. Always on insulin therapy. Microvascular complications: has  Retinopathy and also had retinal detachement (Follows with eye doctor at [address]),   Has microlabuminruia . No Peripheral neuropathy. A1c 9.6 %----> 8.4 --->8.9 %--->8.2 %--->7.1 %--->7.1 %---> 6.8 % --> 6.8 %--> 6.5 %---> 7.1 %---> 7.1 %---> 7.5 %---> 7.7 % ---> 7.4 %---> 11 % ---> 9 % --> 9.3 % ---> 8.4 % --> 11.3 % ---> 11 % ---> 10.2 % ---> 10.2 % ---> 9.8%---> 9.5%---> 9.1%    Moderate, uncontrolled  Worsened by diet    Home regimen:  Currently on lantus insulin 32  units at night. Apidra 6-12 units TID. ( insulin to carb ratio of 1:8)  Uses fixed unit    SSI    Previous medications: Was on humulin insulin in the past.    Check sugars occasionally        Using Tra    Hypoglycemia . Occasional. No pattern    No polyuria, polydipsia, weight loss. Diet: Eats 3 meals/day at regular times and 3 snacks. Had nutrition education. Exercise: No  planned physical activity    Hyperlipidemia: he has mixed hyperlipidemia    Currently is on Flexseed oil. He has refused statins   on 2/20    Patient says he feels dizzy and light headed when he stands up for the last 2-3 months. This is most likely due to autonomic neuropathy. No weight loss. Has gained weight. He had a fall and fractured his left distal tibia and fibula in 2013. Had ORIF and has developed osteomyelitis.      Review of Systems   Scanned, reviewed    Past Medical History:   Diagnosis Date    Acute respiratory failure ([facility] [address] 8/18/2014    Asthma     Blood pressure instability     Chronic pain syndrome     Detached retina, bilateral     laser tx right eye    Diabetes mellitus ([address]     uncontrolled     Insomnia     Meningitis August 2014    admission [facility]    Neuropathic pain     [facility]omyel[facility] ([facility] [address]     Osteomyelitis of tibia ([facility] [address]     left    Pain of left lower extremity     Peripheral neuropathy      Past Surgical History:   Procedure Laterality Date    ANKLE FRACTURE SURGERY Left 1/28/13    Dr. Abbie Kuhn  August 2013    left tibia with external fixation device    EYE SURGERY      retina reattachment left eye x 3 holes repairs    EYE SURGERY Right 9-27-13    retal detachment    LEG SURGERY Left 3/31/14    ORIF left fibula and tibia    OK EGD FLEXIBLE FOREIGN BODY REMOVAL N/A 9/21/2018    EGD FOREIGN BODY REMOVAL performed by Katarina Beckham MD at [facility]      with external device inplace    UPPER GASTROINTESTINAL ENDOSCOPY N/A 9/21/2018    EGD BIOPSY performed by Katarina Beckham MD at [facility] 9/25/2021    EGD BIOPSY performed by Javier Arora MD at [address] on 10/27/2021   Component Date Value Ref Range Status    Sodium 10/27/2021 132* 136 - 145 mmol/L Final    Potassium 10/27/2021 3.5  3.5 - 5.1 mmol/L Final    Chloride 10/27/2021 96* 99 - 110 mmol/L Final    CO2 10/27/2021 21  21 - 32 mmol/L Final    Anion Gap 10/27/2021 15  3 - 16 Final    Glucose 10/27/2021 115* 70 - 99 mg/dL Final    BUN 10/27/2021 23* 7 - 20 mg/dL Final    CREATININE 10/27/2021 1.8* 0.9 - 1.3 mg/dL Final    GFR Non- 10/27/2021 41* >60 Final    Comment: >60 mL/min/1.73m2 EGFR, calc. for ages 25 and older using the  MDRD formula (not corrected for weight), is valid for stable  renal function.       GFR  10/27/2021 50* >60 Final    Comment: Chronic Kidney Disease: less than 60 ml/min/1.73 sq.m. Kidney Failure: less than 15 ml/min/1.73 sq.m. Results valid for patients 18 years and older.  Calcium 10/27/2021 9.5  8.3 - 10.6 mg/dL Final    Phosphorus 10/27/2021 4.4  2.5 - 4.9 mg/dL Final    Albumin 10/27/2021 4.0  3.4 - 5.0 g/dL Final   Orders Only on 10/27/2021   Component Date Value Ref Range Status    Magnesium 10/27/2021 1.80  1.80 - 2.40 mg/dL Final   No results displayed because visit has over 200 results.       Admission on 08/03/2021, Discharged on 08/03/2021   Component Date Value Ref Range Status    WBC 08/03/2021 9.2  4.0 - 11.0 K/uL Final    RBC 08/03/2021 4.71  4.20 - 5.90 M/uL Final    Hemoglobin 08/03/2021 13.7  13.5 - 17.5 g/dL Final    Hematocrit 08/03/2021 41.0  40.5 - 52.5 % Final    MCV 08/03/2021 87.2  80.0 - 100.0 fL Final    MCH 08/03/2021 29.2  26.0 - 34.0 pg Final    MCHC 08/03/2021 33.4  31.0 - 36.0 g/dL Final    RDW 08/03/2021 14.3  12.4 - 15.4 % Final    Platelets 12/88/7805 372  135 - 450 K/uL Final    MPV 08/03/2021 6.4  5.0 - 10.5 fL Final    Neutrophils % 08/03/2021 65.9  % Final    Lymphocytes % 08/03/2021 23.1  % Final    Monocytes % 08/03/2021 4.8  % Final    Eosinophils % 08/03/2021 5.0  % Final    Basophils % 08/03/2021 1.2  % Final    Neutrophils Absolute 08/03/2021 6.1  1.7 - 7.7 K/uL Final    Lymphocytes Absolute 08/03/2021 2.1  1.0 - 5.1 K/uL Final    Monocytes Absolute 08/03/2021 0.4  0.0 - 1.3 K/uL Final    Eosinophils Absolute 08/03/2021 0.5  0.0 - 0.6 K/uL Final    Basophils Absolute 08/03/2021 0.1  0.0 - 0.2 K/uL Final    Sodium 08/03/2021 140  136 - 145 mmol/L Final    Potassium reflex Magnesium 08/03/2021 3.0* 3.5 - 5.1 mmol/L Final    Chloride 08/03/2021 102  99 - 110 mmol/L Final    CO2 08/03/2021 20* 21 - 32 mmol/L Final    Anion Gap 08/03/2021 18* 3 - 16 Final    Glucose 08/03/2021 93  70 - 99 mg/dL Final    BUN 08/03/2021 15  7 - 20 mg/dL Final    CREATININE 08/03/2021 1.5* 0.9 - 1.3 mg/dL Final  GFR Non- 08/03/2021 51* >60 Final    Comment: >60 mL/min/1.73m2 EGFR, calc. for ages 25 and older using the  MDRD formula (not corrected for weight), is valid for stable  renal function.  GFR  08/03/2021 >60  >60 Final    Comment: Chronic Kidney Disease: less than 60 ml/min/1.73 sq.m. Kidney Failure: less than 15 ml/min/1.73 sq.m. Results valid for patients 18 years and older.  Calcium 08/03/2021 9.1  8.3 - 10.6 mg/dL Final    Total Protein 08/03/2021 7.9  6.4 - 8.2 g/dL Final    Albumin 08/03/2021 4.1  3.4 - 5.0 g/dL Final    Albumin/Globulin Ratio 08/03/2021 1.1  1.1 - 2.2 Final    Total Bilirubin 08/03/2021 <0.2  0.0 - 1.0 mg/dL Final    Alkaline Phosphatase 08/03/2021 127  40 - 129 U/L Final    ALT 08/03/2021 14  10 - 40 U/L Final    AST 08/03/2021 16  15 - 37 U/L Final    Globulin 08/03/2021 3.8  g/dL Final    Color, UA 08/03/2021 Yellow  Straw/Yellow Final    Clarity, UA 08/03/2021 Clear  Clear Final    Glucose, Ur 08/03/2021 Negative  Negative mg/dL Final    Bilirubin Urine 08/03/2021 Negative  Negative Final    Ketones, Urine 08/03/2021 Negative  Negative mg/dL Final    Specific Gravity, UA 08/03/2021 1.025  1.005 - 1.030 Final    Blood, Urine 08/03/2021 Negative  Negative Final    pH, UA 08/03/2021 6.0  5.0 - 8.0 Final    Protein, UA 08/03/2021 100* Negative mg/dL Final    Urobilinogen, Urine 08/03/2021 0.2  <2.0 E.U./dL Final    Nitrite, Urine 08/03/2021 Negative  Negative Final    Leukocyte Esterase, Urine 08/03/2021 Negative  Negative Final    Microscopic Examination 08/03/2021 YES   Final    Urine Type 08/03/2021 NotGiven   Final    Urine received in a container without preservatives.     Glucose 08/03/2021 163  mg/dL Final    POC Glucose 08/03/2021 163* 70 - 99 mg/dl Final    Performed on 08/03/2021 ACCU-CHEK   Final    Magnesium 08/03/2021 1.80  1.80 - 2.40 mg/dL Final    WBC, UA 08/03/2021 6-9* 0 - 5 /HPF Final    RBC, UA 08/03/2021 3-4  0 - 4 /HPF Final    Bacteria, UA 08/03/2021 Rare* None Seen /HPF Final    POC Glucose 08/03/2021 95  70 - 99 mg/dl Final    Performed on 08/03/2021 ACCU-CHEK   Final    Ventricular Rate 08/03/2021 94  BPM Final    Atrial Rate 08/03/2021 94  BPM Final    P-R Interval 08/03/2021 148  ms Final    QRS Duration 08/03/2021 100  ms Final    Q-T Interval 08/03/2021 380  ms Final    QTc Calculation (Bazett) 08/03/2021 475  ms Final    P Axis 08/03/2021 36  degrees Final    R Axis 08/03/2021 -11  degrees Final    T Axis 08/03/2021 87  degrees Final    Diagnosis 08/03/2021 Normal sinus rhythmProlonged QTAbnormal ECGConfirmed by Shayne Hernandez MD, [address] (1986) on 8/4/2021 5:23:45 AM   Final    POC Glucose 08/03/2021 216* 70 - 99 mg/dl Final    Performed on 08/03/2021 ACCU-CHEK   Final         ROS:   Scanned, reviewed    Vitals:    11/02/21 1407   BP: 128/78   Pulse: 88   SpO2: 99%       Constitutional: Well-developed, appears stated age, cooperative, in no acute distress  H/E/N/M/T:atraumatic, normocephalic, external ears, nose, lips normal without lesions  Eyes: Lids, lashes, conjunctivae and sclerae normal, No proptosis, no redness  Neck: supple, symmetrical, no swelling  Skin: No obvious rashes or lesions present. Skin and hair texture normal  Psychiatric: Judgement and Insight:  judgement and insight appear normal  Neuro: Normal without focal findings, speech is normal normal, speech is spontaneous  Chest: No labored breathing, no chest deformity, no stridor  Musculoskeletal: No joint deformity, swelling     11/21 monofilament decreased, scar , foot deformity    Assessment/Plan    1. DM    This 41 yrs old male has DM. Complicated by retinopathy, microalbuminuria. Most likely it is Type 1 DM. HARIS antibodies and islet cell antibodies negative.  c-peptide  Undetectable. Uncontrolled.      Patient resistant to changes in his diabetes regimen per note    Discussed the impact of poor glycemic control on his health. BS are variable, no fixed pattern. Given occasional low, continue same dose    He says he sleeps all day and eats at different times of the day and not eating proper meals.     -Continue  Lantus insulin  32 units QHS    I:C to 1:8    Had allergic reaction to sensor adhesive Anjana James), states did not tolerate Dexcom either    He does not want to use insulin pump due to issue of adhesive    Updated on eye exam. continue follow-up with the ophthalmologist.states decreased vision, is legally blind  Has microalbuminuria. On Ace-inhibitor. Will repeat. Has peripheral neuropathy on exam. Discussed foot care    Non-smoker. BP at goal.    Stopped trulicity due to low glucose    2. CKD Follows with nephrologist.       3. Hyperlipidemia. LDL is high. He has refused to take statins, reports side effects  He understands the high risk of heart disease and stroke with untreated hyperlipidemia. On fish oil. 4. Foot ulcers. Healed. Managed by podiatry.  Dr. Shari Bhakta [facility])   Order diabetic shoes

## 2021-11-03 RX ORDER — BLOOD-GLUCOSE SENSOR
EACH MISCELLANEOUS
Qty: 2 EACH | Refills: 3 | Status: SHIPPED | OUTPATIENT
Start: 2021-11-03

## 2021-11-08 ENCOUNTER — TELEPHONE (OUTPATIENT)
Dept: ENDOCRINOLOGY | Age: 46
End: 2021-11-08

## 2021-11-08 NOTE — TELEPHONE ENCOUNTER
Pharmacy needs clarification on patient's Continuous Blood Gluc Sensor (DEXCOM G6 SENSOR) MISC quantity says 2; does not come individually, there are 6 in a box. Wants to know if you want 2 boxes or one box of 6.              [facility] [address], [address] [id number]   Phone:  [phone]  Fax:  [phone]

## 2021-11-11 ENCOUNTER — OFFICE VISIT (OUTPATIENT)
Dept: PAIN MANAGEMENT | Age: 46
End: 2021-11-11
Payer: MEDICARE

## 2021-11-11 VITALS
SYSTOLIC BLOOD PRESSURE: 100 MMHG | OXYGEN SATURATION: 99 % | HEART RATE: 86 BPM | WEIGHT: 222.2 LBS | BODY MASS INDEX: 28.53 KG/M2 | DIASTOLIC BLOOD PRESSURE: 62 MMHG

## 2021-11-11 DIAGNOSIS — G89.4 CHRONIC PAIN SYNDROME: ICD-10-CM

## 2021-11-11 DIAGNOSIS — M80.862S PATHOLOGICAL FRACTURE OF LEFT TIBIA DUE TO OTHER OSTEOPOROSIS, SEQUELA: ICD-10-CM

## 2021-11-11 DIAGNOSIS — L97.522 ULCER OF LEFT FOOT, WITH FAT LAYER EXPOSED (HCC): ICD-10-CM

## 2021-11-11 DIAGNOSIS — M79.18 MYOFASCIAL PAIN: ICD-10-CM

## 2021-11-11 DIAGNOSIS — M79.2 NEUROPATHIC PAIN: ICD-10-CM

## 2021-11-11 DIAGNOSIS — G62.9 PERIPHERAL POLYNEUROPATHY: ICD-10-CM

## 2021-11-11 DIAGNOSIS — G63 POLYNEUROPATHY ASSOCIATED WITH UNDERLYING DISEASE (HCC): ICD-10-CM

## 2021-11-11 PROCEDURE — 99213 OFFICE O/P EST LOW 20 MIN: CPT | Performed by: INTERNAL MEDICINE

## 2021-11-11 RX ORDER — TOPIRAMATE 100 MG/1
TABLET, FILM COATED ORAL
Qty: 60 TABLET | Refills: 1 | OUTPATIENT
Start: 2021-11-11

## 2021-11-11 RX ORDER — TOPIRAMATE 100 MG/1
100 TABLET, FILM COATED ORAL 2 TIMES DAILY
Qty: 60 TABLET | Refills: 1 | Status: SHIPPED | OUTPATIENT
Start: 2021-11-11 | End: 2021-12-09 | Stop reason: SDUPTHER

## 2021-11-11 RX ORDER — ERENUMAB-AOOE 70 MG/ML
INJECTION SUBCUTANEOUS
Qty: 1 PEN | Refills: 1 | Status: SHIPPED | OUTPATIENT
Start: 2021-11-11 | End: 2021-12-09 | Stop reason: SDUPTHER

## 2021-11-11 RX ORDER — DULOXETIN HYDROCHLORIDE 60 MG/1
60 CAPSULE, DELAYED RELEASE ORAL DAILY
Qty: 30 CAPSULE | Refills: 1 | Status: SHIPPED | OUTPATIENT
Start: 2021-11-11 | End: 2021-12-09 | Stop reason: SDUPTHER

## 2021-11-11 RX ORDER — GABAPENTIN 400 MG/1
400 CAPSULE ORAL 2 TIMES DAILY
Qty: 60 CAPSULE | Refills: 1 | Status: SHIPPED | OUTPATIENT
Start: 2021-11-11 | End: 2021-12-09 | Stop reason: SDUPTHER

## 2021-11-11 RX ORDER — SUMATRIPTAN 50 MG/1
TABLET, FILM COATED ORAL
Qty: 9 TABLET | Refills: 0 | Status: SHIPPED | OUTPATIENT
Start: 2021-11-11 | End: 2021-12-09 | Stop reason: SDUPTHER

## 2021-11-11 NOTE — PROGRESS NOTES
medicines at home but not sure if he is compliant with them, he states he thinks the last prescription is still at [facility]. ALLERGIES: Patients list of allergies were reviewed     MEDICATIONS: Mr. Génesis Russell list of medications were reviewed. His current medications are   Outpatient Medications Prior to Visit   Medication Sig Dispense Refill    Continuous Blood Gluc Sensor (DEXCOM G6 SENSOR) MISC USE ONE SENSOR AND CHANGE EVERY 10 DAYS 2 each 3    Diabetic Shoe MISC by Does not apply route 1 each 0    insulin glulisine (APIDRA) 100 UNIT/ML injection INJECT 8-12 UNITS UNDER THE SKIN THREE TIMES A DAY WITH MEALS per sliding scale 1 pen 3    Continuous Blood Gluc  (DEXCOM G6 ) LINDA USE AS NEEDED TO CHECK BLOOD SUGAR 1 each 2    SUMAtriptan (IMITREX) 50 MG tablet Take one tab po at the start of migraine PRN max 1 every 24 hours 9 tablet 0    Erenumab-aooe (AIMOVIG, 140 MG DOSE,) 70 MG/ML SOAJ Inject 140 mLs into the skin every 30 days 1 pen 1    traMADol (ULTRAM) 50 MG tablet Take 1 tablet by mouth every 6 hours as needed for Pain (max 1-2 per day) for up to 28 days.  60 tablet 0    gabapentin (NEURONTIN) 400 MG capsule Take one tablet Po BID 60 capsule 1    DULoxetine (CYMBALTA) 60 MG extended release capsule Take 1 capsule by mouth daily 30 capsule 1    tiZANidine (ZANAFLEX) 4 MG tablet Take 0.5-1 tablets by mouth 2 times daily 60 tablet 1    Glucagon (GVOKE HYPOPEN 2-PACK) 1 MG/0.2ML SOAJ INJECT AS NEEDED FOR LOW BLOOD SUGAR 1 each 3    aspirin 81 MG chewable tablet Take 1 tablet by mouth daily 30 tablet 0    pantoprazole (PROTONIX) 40 MG tablet Take 1 tablet by mouth 2 times daily 60 tablet 1    prednisoLONE sodium phosphate (INFLAMASE FORTE) 1 % ophthalmic solution 1 drop 4 times daily      prednisoLONE acetate (PRED FORTE) 1 % ophthalmic suspension Place 1 drop into the right eye three times daily 3 times daily x 1 week then 2x daily x2 weeks, then 1 daily thereafter      ketorolac (ACULAR) 0.5 % ophthalmic solution Place 1 drop into the right eye 3 times daily 3 times daily x 1week, then 2 x daily x 2 weeks, then 1 daily thereafter      Continuous Blood Gluc Transmit (DEXCOM G6 TRANSMITTER) MISC USE TO CHECK BLOOD SUGAR 2 each 3    blood glucose monitor kit and supplies Dispense sufficient amount for indicated testing frequency plus additional to accommodate PRN testing needs. Dispense all needed supplies to include: monitor, strips, lancing device, lancets, control solutions, alcohol swabs. 1 kit 0    blood glucose test strips (ACCU-CHEK CAPO PLUS) strip 4 times a day As needed. 150 each 3    topiramate (TOPAMAX) 100 MG tablet TAKE ONE TABLET BY MOUTH TWO TIMES A DAY 60 tablet 1    Handicap Placard MISC by Does not apply route Diagnosis: Type 1 diabetes.      Expires 4/13/23. 1 each 0    BD INSULIN SYRINGE U/F 31G X 5/16\" 0.3 ML MISC USE ONE SYRINGE FOUR TIMES A DAY BEFORE MEALS AND ONCE NIGHTLY 120 each 9    LANTUS 100 UNIT/ML injection vial INJECT 32 UNITS UNDER THE SKIN ONCE NIGHTLY 1 vial 0    blood glucose test strips (ACCU-CHEK CAPO PLUS) strip 1 each by In Vitro route daily Test blood glucose 10 times daily Dx Code E10.8 300 each 10    blood glucose test strips (ACCU-CHEK CAPO PLUS) strip USE ONE STRIP TO TEST THREE TIMES A  strip 4    Accu-Chek FastClix Lancets MISC 1 each by Does not apply route daily Test blood glucose 10 times daily Dx Code E 10.8 900 each 2    triamcinolone (KENALOG) 0.1 % ointment Apply to thickened affected areas PRN sparingly for flares no longer than 2 weeks at one time, do not apply to cleared skin 80 g 0    Insulin Syringe-Needle U-100 (BD INSULIN SYRINGE U/F) 31G X 5/16\" 0.5 ML MISC Inject 1 each into the skin 4 times daily DX CODE E 10.22 120 each 9    fluticasone (FLONASE) 50 MCG/ACT nasal spray SPRAY TWO SPRAYS IN EACH NOSTRIL DAILY (Patient taking differently: daily as needed ) 16 g 4    hydrocortisone 2.5 % cream Apply topically 2 times daily. (Patient taking differently: Apply topically 2 times daily as needed) 30 g 0    Blood Glucose Monitoring Suppl (ACCU-CHEK CAPO PLUS) w/Device KIT 1 each by Does not apply route daily Test blood sugar 10 times daily Dx code E 10.8 1 kit 10    GLUCAGON EMERGENCY 1 MG injection INJECT 1MG INTO THE MUSCLE AS NEEDED 1 kit 3    Insulin Syringe-Needle U-100 (B-D INS SYR ULTRAFINE 1CC/31G) 31G X 5/16\" 1 ML MISC INJECT USING INSULIN ONCE DAILY 30 each 10    Multiple Vitamin (DAILY KITTY) TABS TAKE ONE TABLET BY MOUTH DAILY 30 tablet 5    Cholecalciferol (VITAMIN D3) 5000 units CAPS Take 1 capsule by mouth Daily 30 capsule 3    Dextromethorphan-Guaifenesin (MUCINEX DM)  MG TB12 Cough and congestion. (Patient taking differently: as needed Cough and congestion.) 60 tablet 1    omega-3 acid ethyl esters (LOVAZA) 1 G capsule TAKE TWO CAPSULES BY MOUTH TWICE DAILY 120 capsule 5    MUCUS RELIEF DM  MG TABS TAKE ONE BY MOUTH DAILY AS NEEDED 30 tablet 1    Alcohol Swabs PADS Use as needed for insulin injection. 100 each 5    Probiotic Product (ACIDOPHILUS PROBIOTIC) CAPS capsule TAKE ONE CAPSULE BY MOUTH DAILY 28 capsule 4    polyvinyl alcohol (LIQUIFILM TEARS) 1.4 % ophthalmic solution 1 drop as needed      propranolol (INDERAL) 80 MG tablet Take 40 mg by mouth 2 times daily For migraines       Flaxseed, Linseed, (FLAX PO) Take 14 g by mouth daily as needed       potassium chloride (KLOR-CON M) 10 MEQ extended release tablet Take 1 tablet by mouth 2 times daily 60 tablet 0     No facility-administered medications prior to visit. REVIEW OF SYSTEMS:    Respiratory: Negative for apnea, chest tightness and shortness of breath or change in baseline breathing. PHYSICAL EXAM:   Nursing note and vitals reviewed. /62   Pulse 86   Wt 222 lb 3.2 oz (100.8 kg)   SpO2 99%   BMI 28.53 kg/m²   Constitutional: He appears well-developed and well-nourished. No acute distress. Cardiovascular: Normal rate, regular rhythm, normal heart sounds, and does not have murmur. Pulmonary/Chest: Effort normal. No respiratory distress. He does not have wheezes in the lung fields. He has no rales. Neurological/Psychiatric:He is alert and oriented to person, place, and time. Coordination is  normal.  His mood isAppropriate and affect is Neutral/Euthymic(normal) . Other: using a cane    IMPRESSION:   1. Chronic pain syndrome    2. Myofascial pain    3. Peripheral polyneuropathy    4. Ulcer of left foot, with fat layer exposed ([address]    5. Polyneuropathy associated with underlying disease ([address]    6. Neuropathic pain    7. Pathological fracture of left tibia due to other osteoporosis, sequela        PLAN:  Informed verbal consent was obtained  -ROM/Stretching exercises as advised   -Monitor blood sugar regularly, diabetic control- adv diabetic diet. Goal for fasting blood sugars around 120. Follow up with Endocrinologist/PCP also for on going management    -Discussed use, benefit, and side effects of prescribed medications. Barriers to medication compliance addressed. All patient questions answered. Pt voiced understanding.    -He was advised to increase fluids ( 5-7  glasses of fluid daily), limit caffeine, avoid cheese products, increase dietary fiber, increase activity and exercise as tolerated and relax regularly and enjoy meals   -Continue with Ultram 1-2 per day  -Continue with all other adjuvants   -Will fill his last prescription of Ultram   -Patient was advised to bring in all their medication bottles on the next follow up visit for medication review.        Current Outpatient Medications   Medication Sig Dispense Refill    Continuous Blood Gluc Sensor (DEXCOM G6 SENSOR) MISC USE ONE SENSOR AND CHANGE EVERY 10 DAYS 2 each 3    Diabetic Shoe MISC by Does not apply route 1 each 0    insulin glulisine (APIDRA) 100 UNIT/ML injection INJECT 8-12 UNITS UNDER THE SKIN THREE TIMES A DAY WITH MEALS per sliding scale 1 pen 3    Continuous Blood Gluc  (DEXCOM G6 ) LINDA USE AS NEEDED TO CHECK BLOOD SUGAR 1 each 2    SUMAtriptan (IMITREX) 50 MG tablet Take one tab po at the start of migraine PRN max 1 every 24 hours 9 tablet 0    Erenumab-aooe (AIMOVIG, 140 MG DOSE,) 70 MG/ML SOAJ Inject 140 mLs into the skin every 30 days 1 pen 1    traMADol (ULTRAM) 50 MG tablet Take 1 tablet by mouth every 6 hours as needed for Pain (max 1-2 per day) for up to 28 days. 60 tablet 0    gabapentin (NEURONTIN) 400 MG capsule Take one tablet Po BID 60 capsule 1    DULoxetine (CYMBALTA) 60 MG extended release capsule Take 1 capsule by mouth daily 30 capsule 1    tiZANidine (ZANAFLEX) 4 MG tablet Take 0.5-1 tablets by mouth 2 times daily 60 tablet 1    Glucagon (GVOKE HYPOPEN 2-PACK) 1 MG/0.2ML SOAJ INJECT AS NEEDED FOR LOW BLOOD SUGAR 1 each 3    aspirin 81 MG chewable tablet Take 1 tablet by mouth daily 30 tablet 0    pantoprazole (PROTONIX) 40 MG tablet Take 1 tablet by mouth 2 times daily 60 tablet 1    prednisoLONE sodium phosphate (INFLAMASE FORTE) 1 % ophthalmic solution 1 drop 4 times daily      prednisoLONE acetate (PRED FORTE) 1 % ophthalmic suspension Place 1 drop into the right eye three times daily 3 times daily x 1 week then 2x daily x2 weeks, then 1 daily thereafter      ketorolac (ACULAR) 0.5 % ophthalmic solution Place 1 drop into the right eye 3 times daily 3 times daily x 1week, then 2 x daily x 2 weeks, then 1 daily thereafter      Continuous Blood Gluc Transmit (DEXCOM G6 TRANSMITTER) MISC USE TO CHECK BLOOD SUGAR 2 each 3    blood glucose monitor kit and supplies Dispense sufficient amount for indicated testing frequency plus additional to accommodate PRN testing needs. Dispense all needed supplies to include: monitor, strips, lancing device, lancets, control solutions, alcohol swabs. 1 kit 0    blood glucose test strips (ACCU-CHEK CAPO PLUS) strip 4 times a day As needed. 150 each 3    topiramate (TOPAMAX) 100 MG tablet TAKE ONE TABLET BY MOUTH TWO TIMES A DAY 60 tablet 1    Handicap Placard MISC by Does not apply route Diagnosis: Type 1 diabetes. Expires 4/13/23. 1 each 0    BD INSULIN SYRINGE U/F 31G X 5/16\" 0.3 ML MISC USE ONE SYRINGE FOUR TIMES A DAY BEFORE MEALS AND ONCE NIGHTLY 120 each 9    LANTUS 100 UNIT/ML injection vial INJECT 32 UNITS UNDER THE SKIN ONCE NIGHTLY 1 vial 0    blood glucose test strips (ACCU-CHEK CAPO PLUS) strip 1 each by In Vitro route daily Test blood glucose 10 times daily Dx Code E10.8 300 each 10    blood glucose test strips (ACCU-CHEK CAPO PLUS) strip USE ONE STRIP TO TEST THREE TIMES A  strip 4    Accu-Chek FastClix Lancets MISC 1 each by Does not apply route daily Test blood glucose 10 times daily Dx Code E 10.8 900 each 2    triamcinolone (KENALOG) 0.1 % ointment Apply to thickened affected areas PRN sparingly for flares no longer than 2 weeks at one time, do not apply to cleared skin 80 g 0    Insulin Syringe-Needle U-100 (BD INSULIN SYRINGE U/F) 31G X 5/16\" 0.5 ML MISC Inject 1 each into the skin 4 times daily DX CODE E 10.22 120 each 9    fluticasone (FLONASE) 50 MCG/ACT nasal spray SPRAY TWO SPRAYS IN EACH NOSTRIL DAILY (Patient taking differently: daily as needed ) 16 g 4    hydrocortisone 2.5 % cream Apply topically 2 times daily.  (Patient taking differently: Apply topically 2 times daily as needed) 30 g 0    Blood Glucose Monitoring Suppl (ACCU-CHEK CAPO PLUS) w/Device KIT 1 each by Does not apply route daily Test blood sugar 10 times daily Dx code E 10.8 1 kit 10    GLUCAGON EMERGENCY 1 MG injection INJECT 1MG INTO THE MUSCLE AS NEEDED 1 kit 3    Insulin Syringe-Needle U-100 (B-D INS SYR ULTRAFINE 1CC/31G) 31G X 5/16\" 1 ML MISC INJECT USING INSULIN ONCE DAILY 30 each 10    Multiple Vitamin (DAILY KITTY) TABS TAKE ONE TABLET BY MOUTH DAILY 30 tablet 5    Cholecalciferol (VITAMIN D3) 5000 units CAPS Take 1 capsule by mouth Daily 30 capsule 3    Dextromethorphan-Guaifenesin (MUCINEX DM)  MG TB12 Cough and congestion. (Patient taking differently: as needed Cough and congestion.) 60 tablet 1    omega-3 acid ethyl esters (LOVAZA) 1 G capsule TAKE TWO CAPSULES BY MOUTH TWICE DAILY 120 capsule 5    MUCUS RELIEF DM  MG TABS TAKE ONE BY MOUTH DAILY AS NEEDED 30 tablet 1    Alcohol Swabs PADS Use as needed for insulin injection. 100 each 5    Probiotic Product (ACIDOPHILUS PROBIOTIC) CAPS capsule TAKE ONE CAPSULE BY MOUTH DAILY 28 capsule 4    polyvinyl alcohol (LIQUIFILM TEARS) 1.4 % ophthalmic solution 1 drop as needed      propranolol (INDERAL) 80 MG tablet Take 40 mg by mouth 2 times daily For migraines       Flaxseed, Linseed, (FLAX PO) Take 14 g by mouth daily as needed       potassium chloride (KLOR-CON M) 10 MEQ extended release tablet Take 1 tablet by mouth 2 times daily 60 tablet 0     No current facility-administered medications for this visit. I will continue his current medication regimen  which is part of the above treatment schedule. It has been helping with Mr. Brandon Quintero chronic  medical problems which for this visit include:   Diagnoses of Chronic pain syndrome, Myofascial pain, Peripheral polyneuropathy, Chronic migraine without aura, with intractable migraine, so stated, with status migrainosus, Ulcer of left foot, with fat layer exposed ([address] Polyneuropathy associated with underlying disease ([address] Neuropathic pain, and Pathological fracture of left tibia due to other osteoporosis, sequela were pertinent to this visit. Risks and benefits of the medications and other alternative treatments  including no treatment were discussed with the patient. The common side effects of these medications were also explained to the patient. Informed verbal consent was obtained.    Goals of current treatment regimen include improvement in pain, restoration of functioning- with focus on improvement in physical performance, general activity, work or disability,emotional distress, health care utilization and  decreased medication consumption. Will continue to monitor progress towards achieving/maintaining therapeutic goals with special emphasis on  1. Improvement in perceived interfernce  of pain with ADL's. Ability to do home exercises independently. Ability to do household chores indoor and/or outdoor work and social and leisure activities. Improve psychosocial and physical functioning. - he is showing progression towards this treatment goal with the current regimen. He was advised against drinking alcohol with the narcotic pain medicines, advised against driving or handling machinery while adjusting the dose of medicines or if having cognitive  issues related to the current medications. Risk of overdose and death, if medicines not taken as prescribed, were also discussed. If the patient develops new symptoms or if the symptoms worsen, the patient should call the office. While transcribing every attempt was made to maintain the accuracy of the note in terms of it's contents,there may have been some errors made inadvertently. Thank you for allowing me to participate in the care of this patient.     Brian Cao MD.    Cc: Brittanie Bernstein MD

## 2021-11-18 ENCOUNTER — CARE COORDINATION (OUTPATIENT)
Dept: CARE COORDINATION | Age: 46
End: 2021-11-18

## 2021-11-19 NOTE — CARE COORDINATION
Ambulatory Care Coordination Note  11/19/2021  CM Risk Score: 10  [place] 10 Year Mortality Risk Score: 47%     ACC: Gaby Sheppard RN    Summary Note:   Received request from payor source to reach out to pt to offer care coordination . Chart reviewed and call placed to pt. Introduced self and reason for call and benefits of care coordination. Pt agreeable to future acm call  Pt reports doing ok biggest concern currently is wound on foot and inability to locate iodine for wound care  Discussed local DME or independent pharmacy  Will assist pt locating pharmacy in his area where he can obtain. Plan:  Short term ACM support for continued disease management education and community resources assistance      Ambulatory Care Coordination Assessment    Care Coordination Protocol  Program Enrollment: Rising Risk  Referral from Primary Care Provider: No  Week 1 - Initial Assessment     Do you have all of your prescriptions and are they filled?: No                          Suggested Interventions and Community Resources                  Prior to Admission medications    Medication Sig Start Date End Date Taking? Authorizing Provider   SUMAtriptan (IMITREX) 50 MG tablet Take one tab po at the start of migraine PRN max 1 every 24 hours 11/11/21   Jose F Hatfield MD   Erenumab-aooe (AIMOVIG, 140 MG DOSE,) 70 MG/ML SOAJ Inject 140 mLs into the skin every 30 days 11/11/21   Jose F Hatfield MD   gabapentin (NEURONTIN) 400 MG capsule Take 1 capsule by mouth 2 times daily for 30 days.  11/11/21 12/11/21  Jose F Hatfield MD   DULoxetine (CYMBALTA) 60 MG extended release capsule Take 1 capsule by mouth daily 11/11/21   Jose F Hatfield MD   topiramate (TOPAMAX) 100 MG tablet Take 1 tablet by mouth 2 times daily 11/11/21   Jose F Hatfield MD   Continuous Blood Gluc Sensor (DEXCOM G6 SENSOR) MISC USE ONE SENSOR AND CHANGE EVERY 10 DAYS 11/3/21   Rinku Liu MD   Diabetic Shoe MISC by Does not apply route 11/2/21   Norma Crawford Luz Maria Girard MD   insulin glulisine (APIDRA) 100 UNIT/ML injection INJECT 8-12 UNITS UNDER THE SKIN THREE TIMES A DAY WITH MEALS per sliding scale 11/1/21   Hossein Jurado MD   Continuous Blood Gluc  (DEXCOM G6 ) LINDA USE AS NEEDED TO CHECK BLOOD SUGAR 10/29/21   Hossein Jurado MD   traMADol (ULTRAM) 50 MG tablet Take 1 tablet by mouth every 6 hours as needed for Pain (max 1-2 per day) for up to 28 days. 10/14/21 11/11/21  Preet Elizabeth MD   Glucagon ([place] 2-PACK) 1 MG/0.2ML SOAJ INJECT AS NEEDED FOR LOW BLOOD SUGAR 10/7/21   Hossein Jurado MD   aspirin 81 MG chewable tablet Take 1 tablet by mouth daily 9/28/21   Sita Cardenas MD   pantoprazole (PROTONIX) 40 MG tablet Take 1 tablet by mouth 2 times daily 9/27/21   Sita Cardenas MD   prednisoLONE sodium phosphate (INFLAMASE FORTE) 1 % ophthalmic solution 1 drop 4 times daily    Historical Provider, MD   prednisoLONE acetate (PRED FORTE) 1 % ophthalmic suspension Place 1 drop into the right eye three times daily 3 times daily x 1 week then 2x daily x2 weeks, then 1 daily thereafter    Historical Provider, MD   ketorolac (ACULAR) 0.5 % ophthalmic solution Place 1 drop into the right eye 3 times daily 3 times daily x 1week, then 2 x daily x 2 weeks, then 1 daily thereafter    Historical Provider, MD   Continuous Blood Gluc Transmit (DEXCOM G6 TRANSMITTER) MISC USE TO CHECK BLOOD SUGAR 8/31/21   Hossein Jurado MD   potassium chloride (KLOR-CON M) 10 MEQ extended release tablet Take 1 tablet by mouth 2 times daily 7/29/21 9/26/21  Lam Perez MD   blood glucose monitor kit and supplies Dispense sufficient amount for indicated testing frequency plus additional to accommodate PRN testing needs. Dispense all needed supplies to include: monitor, strips, lancing device, lancets, control solutions, alcohol swabs.  7/6/21   Hossein Jurado MD   blood glucose test strips (ACCU-CHEK CAPO PLUS) strip 4 times a day As needed. 7/6/21   Lavonne Sarkar MD   Handicap Placard MISC by Does not apply route Diagnosis: Type 1 diabetes. Expires 4/13/23. 4/16/21   Bessie Truong MD   BD INSULIN SYRINGE U/F 31G X 5/16\" 0.3 ML MISC USE ONE SYRINGE FOUR TIMES A DAY BEFORE MEALS AND ONCE NIGHTLY 1/11/21   Brenda How, APRN - CNP   LANTUS 100 UNIT/ML injection vial INJECT 32 UNITS UNDER THE SKIN ONCE NIGHTLY 12/7/20   Honey Linn, MD   blood glucose test strips (ACCU-CHEK CAPO PLUS) strip 1 each by In Vitro route daily Test blood glucose 10 times daily Dx Code E10.8 11/17/20   Honey Never, MD   blood glucose test strips (ACCU-CHEK CAPO PLUS) strip USE ONE STRIP TO TEST THREE TIMES A DAY 6/15/20   Honey Never, MD   Accu-Chek FastClix Lancets MISC 1 each by Does not apply route daily Test blood glucose 10 times daily Dx Code E 10.8 6/15/20   Honey Linn MD   triamcinolone (KENALOG) 0.1 % ointment Apply to thickened affected areas PRN sparingly for flares no longer than 2 weeks at one time, do not apply to cleared skin 5/30/19   Suzette Villagran,    Insulin Syringe-Needle U-100 (BD INSULIN SYRINGE U/F) 31G X 5/16\" 0.5 ML MISC Inject 1 each into the skin 4 times daily DX CODE E 10.22 2/6/19   Honey Linn MD   fluticasone (FLONASE) 50 MCG/ACT nasal spray SPRAY TWO SPRAYS IN EACH NOSTRIL DAILY  Patient taking differently: daily as needed  1/8/19   Bessie Truong MD   hydrocortisone 2.5 % cream Apply topically 2 times daily.   Patient taking differently: Apply topically 2 times daily as needed 4/12/18   Bessie Truong MD   Blood Glucose Monitoring Suppl (ACCU-CHEK CAPO PLUS) w/Device KIT 1 each by Does not apply route daily Test blood sugar 10 times daily Dx code E 10.8 3/2/18   Honey Linn MD   GLUCAGON EMERGENCY 1 MG injection INJECT 1MG INTO THE MUSCLE AS NEEDED 12/18/17   Honey Linn MD   Insulin Syringe-Needle U-100 (B-D INS SYR ULTRAFINE 1CC/31G) 31G X 5/16\" 1 ML MISC INJECT USING INSULIN ONCE DAILY 10/2/17   Neel Sahni, APRN - CNP   Multiple Vitamin (DAILY KITTY) TABS TAKE ONE TABLET BY MOUTH DAILY 9/26/17   Pau Jimenez MD   Cholecalciferol (VITAMIN D3) 5000 units CAPS Take 1 capsule by mouth Daily 9/26/17   Pau Jimenez MD   Dextromethorphan-Guaifenesin [facility] DM)  MG TB12 Cough and congestion. Patient taking differently: as needed Cough and congestion. 6/22/17   Pau Jimenez MD   omega-3 acid ethyl esters (LOVAZA) 1 G capsule TAKE TWO CAPSULES BY MOUTH TWICE DAILY 6/10/17   Jordon Dickson MD   MUCUS RELIEF DM  MG TABS TAKE ONE BY MOUTH DAILY AS NEEDED 1/9/17   Pau Jimenez MD   Alcohol Swabs PADS Use as needed for insulin injection.  6/10/16   Jordon Dickson MD   Probiotic Product (ACIDOPHILUS PROBIOTIC) CAPS capsule TAKE ONE CAPSULE BY MOUTH DAILY 5/31/16   Pau Jimenez MD   polyvinyl alcohol (LIQUIFILM TEARS) 1.4 % ophthalmic solution 1 drop as needed    Historical Provider, MD   propranolol (INDERAL) 80 MG tablet Take 40 mg by mouth 2 times daily For migraines     Historical Provider, MD   Flaxseed Linseed, (FLAX PO) Take 14 g by mouth daily as needed     Historical Provider, MD       Future Appointments   Date Time Provider Mamie Portillo   12/9/2021  3:30 PM Lulu Fritz MD [facility]   2/8/2022  2:00 PM Faythe Fothergill, MD [facility]      and   Diabetes Assessment

## 2021-12-09 ENCOUNTER — OFFICE VISIT (OUTPATIENT)
Dept: PAIN MANAGEMENT | Age: 46
End: 2021-12-09
Payer: MEDICARE

## 2021-12-09 VITALS
WEIGHT: 217.6 LBS | DIASTOLIC BLOOD PRESSURE: 85 MMHG | SYSTOLIC BLOOD PRESSURE: 124 MMHG | OXYGEN SATURATION: 99 % | BODY MASS INDEX: 27.94 KG/M2 | HEART RATE: 101 BPM

## 2021-12-09 DIAGNOSIS — M79.2 NEUROPATHIC PAIN: ICD-10-CM

## 2021-12-09 DIAGNOSIS — G62.9 PERIPHERAL POLYNEUROPATHY: ICD-10-CM

## 2021-12-09 DIAGNOSIS — L97.522 ULCER OF LEFT FOOT, WITH FAT LAYER EXPOSED (HCC): ICD-10-CM

## 2021-12-09 DIAGNOSIS — M80.862S PATHOLOGICAL FRACTURE OF LEFT TIBIA DUE TO OTHER OSTEOPOROSIS, SEQUELA: ICD-10-CM

## 2021-12-09 DIAGNOSIS — G89.4 CHRONIC PAIN SYNDROME: ICD-10-CM

## 2021-12-09 DIAGNOSIS — G63 POLYNEUROPATHY ASSOCIATED WITH UNDERLYING DISEASE (HCC): ICD-10-CM

## 2021-12-09 DIAGNOSIS — M79.18 MYOFASCIAL PAIN: ICD-10-CM

## 2021-12-09 PROCEDURE — 99213 OFFICE O/P EST LOW 20 MIN: CPT | Performed by: INTERNAL MEDICINE

## 2021-12-09 RX ORDER — SUMATRIPTAN 50 MG/1
TABLET, FILM COATED ORAL
Qty: 9 TABLET | Refills: 1 | Status: SHIPPED | OUTPATIENT
Start: 2021-12-09 | End: 2022-01-06 | Stop reason: SDUPTHER

## 2021-12-09 RX ORDER — DULOXETIN HYDROCHLORIDE 60 MG/1
60 CAPSULE, DELAYED RELEASE ORAL DAILY
Qty: 30 CAPSULE | Refills: 1 | Status: SHIPPED | OUTPATIENT
Start: 2021-12-09 | End: 2022-01-06 | Stop reason: SDUPTHER

## 2021-12-09 RX ORDER — ERENUMAB-AOOE 70 MG/ML
INJECTION SUBCUTANEOUS
Qty: 1 PEN | Refills: 1 | Status: SHIPPED | OUTPATIENT
Start: 2021-12-09 | End: 2022-01-06 | Stop reason: SDUPTHER

## 2021-12-09 RX ORDER — GABAPENTIN 400 MG/1
400 CAPSULE ORAL 2 TIMES DAILY
Qty: 60 CAPSULE | Refills: 1 | Status: SHIPPED | OUTPATIENT
Start: 2021-12-09 | End: 2022-01-06 | Stop reason: SDUPTHER

## 2021-12-09 RX ORDER — TRAMADOL HYDROCHLORIDE 50 MG/1
50 TABLET ORAL EVERY 6 HOURS PRN
Qty: 60 TABLET | Refills: 0 | Status: SHIPPED | OUTPATIENT
Start: 2021-12-09 | End: 2022-01-06 | Stop reason: SDUPTHER

## 2021-12-09 RX ORDER — TOPIRAMATE 100 MG/1
100 TABLET, FILM COATED ORAL 2 TIMES DAILY
Qty: 60 TABLET | Refills: 1 | Status: SHIPPED | OUTPATIENT
Start: 2021-12-09 | End: 2022-01-06 | Stop reason: SDUPTHER

## 2021-12-09 NOTE — PROGRESS NOTES
ComerÃ­ofield Borjas  1975  [id number]    HISTORY OF PRESENT ILLNESS:  Mr. Cheyenne Maria is a 42 y.o. male returns for a follow up visit for multiple medical problems. His current presenting problems are   1. Chronic pain syndrome    2. Peripheral polyneuropathy    3. Polyneuropathy associated with underlying disease ([facility] [address]    4. Pathological fracture of left tibia due to other osteoporosis, sequela    5. Myofascial pain    6. Ulcer of left foot, with fat layer exposed ([facility] [address]    7. Neuropathic pain    8. Chronic migraine without aura, with intractable migraine, so stated, with status migrainosus    . As per information/history obtained from the PADT(patient assessment and documentation tool) - He complains of pain in the head, neck and lower back with radiation to the shoulders Right, hands Bilateral, knees Right and lower leg Right He rates the pain 10/10 and describes it as sharp, aching, numbness, pins and needles. Pain is made worse by: nothing. Current treatment regimen has helped relieve about 20% of the pain. He denies side effects from the current pain regimen. Patient reports that since the last follow up visit the physical functioning is unchanged, family/social relationships are unchanged, mood is better and sleep patterns are better, and that the overall functioning is unchanged. Patient denies neurological bowel or bladder. Patient denies misusing/abusing his narcotic pain medications or using any illegal drugs. There are No indicators for possible drug abuse, addiction or diversion problems. Upon obtaining the medical history from Mr. Cheyenne Maria regarding today's office visit for his presenting problems, patient states he has been doing fair. Mr. Cheyenne Maria is complaining of his sleep being up and down, he is not on any medications for it. He states he is using Ultram 1-2 per day mostly which helps some. I am not sure if he has been compliant with his regimen.  Patient mentions he is using all the other adjuvants as before. He reports his blood sugar has been okay. ALLERGIES: Patients list of allergies were reviewed     MEDICATIONS: Mr. Luis Armando Spencer list of medications were reviewed. His current medications are   Outpatient Medications Prior to Visit   Medication Sig Dispense Refill    SUMAtriptan (IMITREX) 50 MG tablet Take one tab po at the start of migraine PRN max 1 every 24 hours 9 tablet 0    Erenumab-aooe (AIMOVIG, 140 MG DOSE,) 70 MG/ML SOAJ Inject 140 mLs into the skin every 30 days 1 pen 1    gabapentin (NEURONTIN) 400 MG capsule Take 1 capsule by mouth 2 times daily for 30 days.  60 capsule 1    DULoxetine (CYMBALTA) 60 MG extended release capsule Take 1 capsule by mouth daily 30 capsule 1    topiramate (TOPAMAX) 100 MG tablet Take 1 tablet by mouth 2 times daily 60 tablet 1    Continuous Blood Gluc Sensor (DEXCOM G6 SENSOR) MISC USE ONE SENSOR AND CHANGE EVERY 10 DAYS 2 each 3    Diabetic Shoe MISC by Does not apply route 1 each 0    insulin glulisine (APIDRA) 100 UNIT/ML injection INJECT 8-12 UNITS UNDER THE SKIN THREE TIMES A DAY WITH MEALS per sliding scale 1 pen 3    Continuous Blood Gluc  (DEXCOM G6 ) LINDA USE AS NEEDED TO CHECK BLOOD SUGAR 1 each 2    Glucagon (GVOKE HYPOPEN 2-PACK) 1 MG/0.2ML SOAJ INJECT AS NEEDED FOR LOW BLOOD SUGAR 1 each 3    aspirin 81 MG chewable tablet Take 1 tablet by mouth daily 30 tablet 0    pantoprazole (PROTONIX) 40 MG tablet Take 1 tablet by mouth 2 times daily 60 tablet 1    prednisoLONE sodium phosphate (INFLAMASE FORTE) 1 % ophthalmic solution 1 drop 4 times daily      prednisoLONE acetate (PRED FORTE) 1 % ophthalmic suspension Place 1 drop into the right eye three times daily 3 times daily x 1 week then 2x daily x2 weeks, then 1 daily thereafter      ketorolac (ACULAR) 0.5 % ophthalmic solution Place 1 drop into the right eye 3 times daily 3 times daily x 1week, then 2 x daily x 2 weeks, then 1 daily thereafter      Continuous Blood Gluc Transmit (DEXCOM G6 TRANSMITTER) MISC USE TO CHECK BLOOD SUGAR 2 each 3    blood glucose monitor kit and supplies Dispense sufficient amount for indicated testing frequency plus additional to accommodate PRN testing needs. Dispense all needed supplies to include: monitor, strips, lancing device, lancets, control solutions, alcohol swabs. 1 kit 0    blood glucose test strips (ACCU-CHEK CAPO PLUS) strip 4 times a day As needed. 150 each 3    Handicap Placard MISC by Does not apply route Diagnosis: Type 1 diabetes. Expires 4/13/23. 1 each 0    BD INSULIN SYRINGE U/F 31G X 5/16\" 0.3 ML MISC USE ONE SYRINGE FOUR TIMES A DAY BEFORE MEALS AND ONCE NIGHTLY 120 each 9    LANTUS 100 UNIT/ML injection vial INJECT 32 UNITS UNDER THE SKIN ONCE NIGHTLY 1 vial 0    blood glucose test strips (ACCU-CHEK CAPO PLUS) strip 1 each by In Vitro route daily Test blood glucose 10 times daily Dx Code E10.8 300 each 10    blood glucose test strips (ACCU-CHEK CAPO PLUS) strip USE ONE STRIP TO TEST THREE TIMES A  strip 4    Accu-Chek FastClix Lancets MISC 1 each by Does not apply route daily Test blood glucose 10 times daily Dx Code E 10.8 900 each 2    triamcinolone (KENALOG) 0.1 % ointment Apply to thickened affected areas PRN sparingly for flares no longer than 2 weeks at one time, do not apply to cleared skin 80 g 0    Insulin Syringe-Needle U-100 (BD INSULIN SYRINGE U/F) 31G X 5/16\" 0.5 ML MISC Inject 1 each into the skin 4 times daily DX CODE E 10.22 120 each 9    fluticasone (FLONASE) 50 MCG/ACT nasal spray SPRAY TWO SPRAYS IN EACH NOSTRIL DAILY (Patient taking differently: daily as needed ) 16 g 4    hydrocortisone 2.5 % cream Apply topically 2 times daily.  (Patient taking differently: Apply topically 2 times daily as needed) 30 g 0    Blood Glucose Monitoring Suppl (ACCU-CHEK CAPO PLUS) w/Device KIT 1 each by Does not apply route daily Test blood sugar 10 times daily Dx code E 10.8 1 kit 10    GLUCAGON EMERGENCY 1 MG injection INJECT 1MG INTO THE MUSCLE AS NEEDED 1 kit 3    Insulin Syringe-Needle U-100 (B-D INS SYR ULTRAFINE 1CC/31G) 31G X 5/16\" 1 ML MISC INJECT USING INSULIN ONCE DAILY 30 each 10    Multiple Vitamin (DAILY KITTY) TABS TAKE ONE TABLET BY MOUTH DAILY 30 tablet 5    Cholecalciferol (VITAMIN D3) 5000 units CAPS Take 1 capsule by mouth Daily 30 capsule 3    Dextromethorphan-Guaifenesin (MUCINEX DM)  MG TB12 Cough and congestion. (Patient taking differently: as needed Cough and congestion.) 60 tablet 1    omega-3 acid ethyl esters (LOVAZA) 1 G capsule TAKE TWO CAPSULES BY MOUTH TWICE DAILY 120 capsule 5    MUCUS RELIEF DM  MG TABS TAKE ONE BY MOUTH DAILY AS NEEDED 30 tablet 1    Alcohol Swabs PADS Use as needed for insulin injection. 100 each 5    Probiotic Product (ACIDOPHILUS PROBIOTIC) CAPS capsule TAKE ONE CAPSULE BY MOUTH DAILY 28 capsule 4    polyvinyl alcohol (LIQUIFILM TEARS) 1.4 % ophthalmic solution 1 drop as needed      propranolol (INDERAL) 80 MG tablet Take 40 mg by mouth 2 times daily For migraines       Flaxseed, Linseed, (FLAX PO) Take 14 g by mouth daily as needed       traMADol (ULTRAM) 50 MG tablet Take 1 tablet by mouth every 6 hours as needed for Pain (max 1-2 per day) for up to 28 days. 60 tablet 0    potassium chloride (KLOR-CON M) 10 MEQ extended release tablet Take 1 tablet by mouth 2 times daily 60 tablet 0     No facility-administered medications prior to visit. REVIEW OF SYSTEMS:    Respiratory: Negative for apnea, chest tightness and shortness of breath or change in baseline breathing. PHYSICAL EXAM:   Nursing note and vitals reviewed. /85   Pulse 101   Wt 217 lb 9.6 oz (98.7 kg)   SpO2 99%   BMI 27.94 kg/m²   Constitutional: He appears well-developed and well-nourished. No acute distress. Cardiovascular: Normal rate, regular rhythm, normal heart sounds, and does not have murmur.      Pulmonary/Chest: Effort normal. No respiratory distress. He does not have wheezes in the lung fields. He has no rales. Neurological/Psychiatric:He is alert and oriented to person, place, and time. Coordination is  normal.  His mood isAppropriate and affect is Neutral/Euthymic(normal) . Other: using a cane, +limp     IMPRESSION:   1. Chronic pain syndrome    2. Peripheral polyneuropathy    3. Polyneuropathy associated with underlying disease ([facility] [address]    4. Pathological fracture of left tibia due to other osteoporosis, sequela    5. Myofascial pain    6. Ulcer of left foot, with fat layer exposed ([facility] [address]    7. Neuropathic pain        PLAN:  Informed verbal consent was obtained  -Monitor blood sugar regularly, diabetic control- adv diabetic diet. Goal for fasting blood sugars around 120. Follow up with Endocrinologist/PCP also for on going management    -Continue with all other adjuvants   -Medication bottles reviewed   -he was advised  to avoid using too many OTC analgesics to control the headaches, avoid chocolates, increased caffeine, cheeses and MSG nitrite containing foods, cigarette smoking. To avoid bright lights, strong smells and skipping meals.   -Continue with Ultram 1-2 per day     Current Outpatient Medications   Medication Sig Dispense Refill    SUMAtriptan (IMITREX) 50 MG tablet Take one tab po at the start of migraine PRN max 1 every 24 hours 9 tablet 0    Erenumab-aooe (AIMOVIG, 140 MG DOSE,) 70 MG/ML SOAJ Inject 140 mLs into the skin every 30 days 1 pen 1    gabapentin (NEURONTIN) 400 MG capsule Take 1 capsule by mouth 2 times daily for 30 days.  60 capsule 1    DULoxetine (CYMBALTA) 60 MG extended release capsule Take 1 capsule by mouth daily 30 capsule 1    topiramate (TOPAMAX) 100 MG tablet Take 1 tablet by mouth 2 times daily 60 tablet 1    Continuous Blood Gluc Sensor (DEXCOM G6 SENSOR) MISC USE ONE SENSOR AND CHANGE EVERY 10 DAYS 2 each 3    Diabetic Shoe MISC by Does not apply route 1 each 0    insulin glulisine (APIDRA) 100 UNIT/ML injection INJECT 8-12 UNITS UNDER THE SKIN THREE TIMES A DAY WITH MEALS per sliding scale 1 pen 3    Continuous Blood Gluc  (DEXCOM G6 ) LINDA USE AS NEEDED TO CHECK BLOOD SUGAR 1 each 2    Glucagon (GVOKE HYPOPEN 2-PACK) 1 MG/0.2ML SOAJ INJECT AS NEEDED FOR LOW BLOOD SUGAR 1 each 3    aspirin 81 MG chewable tablet Take 1 tablet by mouth daily 30 tablet 0    pantoprazole (PROTONIX) 40 MG tablet Take 1 tablet by mouth 2 times daily 60 tablet 1    prednisoLONE sodium phosphate (INFLAMASE FORTE) 1 % ophthalmic solution 1 drop 4 times daily      prednisoLONE acetate (PRED FORTE) 1 % ophthalmic suspension Place 1 drop into the right eye three times daily 3 times daily x 1 week then 2x daily x2 weeks, then 1 daily thereafter      ketorolac (ACULAR) 0.5 % ophthalmic solution Place 1 drop into the right eye 3 times daily 3 times daily x 1week, then 2 x daily x 2 weeks, then 1 daily thereafter      Continuous Blood Gluc Transmit (DEXCOM G6 TRANSMITTER) MISC USE TO CHECK BLOOD SUGAR 2 each 3    blood glucose monitor kit and supplies Dispense sufficient amount for indicated testing frequency plus additional to accommodate PRN testing needs. Dispense all needed supplies to include: monitor, strips, lancing device, lancets, control solutions, alcohol swabs. 1 kit 0    blood glucose test strips (ACCU-CHEK CAPO PLUS) strip 4 times a day As needed. 150 each 3    Handicap Placard MISC by Does not apply route Diagnosis: Type 1 diabetes.      Expires 4/13/23. 1 each 0    BD INSULIN SYRINGE U/F 31G X 5/16\" 0.3 ML MISC USE ONE SYRINGE FOUR TIMES A DAY BEFORE MEALS AND ONCE NIGHTLY 120 each 9    LANTUS 100 UNIT/ML injection vial INJECT 32 UNITS UNDER THE SKIN ONCE NIGHTLY 1 vial 0    blood glucose test strips (ACCU-CHEK CAPO PLUS) strip 1 each by In Vitro route daily Test blood glucose 10 times daily Dx Code E10.8 300 each 10    blood glucose test strips (ACCU-CHEK CAPO PLUS) strip USE ONE STRIP TO TEST THREE TIMES A  strip 4    Accu-Chek FastClix Lancets MISC 1 each by Does not apply route daily Test blood glucose 10 times daily Dx Code E 10.8 900 each 2    triamcinolone (KENALOG) 0.1 % ointment Apply to thickened affected areas PRN sparingly for flares no longer than 2 weeks at one time, do not apply to cleared skin 80 g 0    Insulin Syringe-Needle U-100 (BD INSULIN SYRINGE U/F) 31G X 5/16\" 0.5 ML MISC Inject 1 each into the skin 4 times daily DX CODE E 10.22 120 each 9    fluticasone (FLONASE) 50 MCG/ACT nasal spray SPRAY TWO SPRAYS IN EACH NOSTRIL DAILY (Patient taking differently: daily as needed ) 16 g 4    hydrocortisone 2.5 % cream Apply topically 2 times daily. (Patient taking differently: Apply topically 2 times daily as needed) 30 g 0    Blood Glucose Monitoring Suppl (ACCU-CHEK CAPO PLUS) w/Device KIT 1 each by Does not apply route daily Test blood sugar 10 times daily Dx code E 10.8 1 kit 10    GLUCAGON EMERGENCY 1 MG injection INJECT 1MG INTO THE MUSCLE AS NEEDED 1 kit 3    Insulin Syringe-Needle U-100 (B-D INS SYR ULTRAFINE 1CC/31G) 31G X 5/16\" 1 ML MISC INJECT USING INSULIN ONCE DAILY 30 each 10    Multiple Vitamin (DAILY KITTY) TABS TAKE ONE TABLET BY MOUTH DAILY 30 tablet 5    Cholecalciferol (VITAMIN D3) 5000 units CAPS Take 1 capsule by mouth Daily 30 capsule 3    Dextromethorphan-Guaifenesin (MUCINEX DM)  MG TB12 Cough and congestion. (Patient taking differently: as needed Cough and congestion.) 60 tablet 1    omega-3 acid ethyl esters (LOVAZA) 1 G capsule TAKE TWO CAPSULES BY MOUTH TWICE DAILY 120 capsule 5    MUCUS RELIEF DM  MG TABS TAKE ONE BY MOUTH DAILY AS NEEDED 30 tablet 1    Alcohol Swabs PADS Use as needed for insulin injection.  100 each 5    Probiotic Product (ACIDOPHILUS PROBIOTIC) CAPS capsule TAKE ONE CAPSULE BY MOUTH DAILY 28 capsule 4    polyvinyl alcohol (LIQUIFILM TEARS) 1.4 % ophthalmic solution 1 drop as needed      propranolol (INDERAL) 80 MG tablet Take 40 mg by mouth 2 times daily For migraines       Flaxseed, Linseed, (FLAX PO) Take 14 g by mouth daily as needed       traMADol (ULTRAM) 50 MG tablet Take 1 tablet by mouth every 6 hours as needed for Pain (max 1-2 per day) for up to 28 days. 60 tablet 0    potassium chloride (KLOR-CON M) 10 MEQ extended release tablet Take 1 tablet by mouth 2 times daily 60 tablet 0     No current facility-administered medications for this visit. I will continue his current medication regimen  which is part of the above treatment schedule. It has been helping with Mr. Brandon Quintero chronic  medical problems which for this visit include:   Diagnoses of Chronic pain syndrome, Peripheral polyneuropathy, Polyneuropathy associated with underlying disease ([address] Pathological fracture of left tibia due to other osteoporosis, sequela, Myofascial pain, Ulcer of left foot, with fat layer exposed ([address] Neuropathic pain, and Chronic migraine without aura, with intractable migraine, so stated, with status migrainosus were pertinent to this visit. Risks and benefits of the medications and other alternative treatments  including no treatment were discussed with the patient. The common side effects of these medications were also explained to the patient. Informed verbal consent was obtained. Goals of current treatment regimen include improvement in pain, restoration of functioning- with focus on improvement in physical performance, general activity, work or disability,emotional distress, health care utilization and  decreased medication consumption. Will continue to monitor progress towards achieving/maintaining therapeutic goals with special emphasis on  1. Improvement in perceived interfernce  of pain with ADL's. Ability to do home exercises independently. Ability to do household chores indoor and/or outdoor work and social and leisure activities. Improve psychosocial and physical functioning. - he is showing progression towards this treatment goal with the current regimen. He was advised against drinking alcohol with the narcotic pain medicines, advised against driving or handling machinery while adjusting the dose of medicines or if having cognitive  issues related to the current medications. Risk of overdose and death, if medicines not taken as prescribed, were also discussed. If the patient develops new symptoms or if the symptoms worsen, the patient should call the office. While transcribing every attempt was made to maintain the accuracy of the note in terms of it's contents,there may have been some errors made inadvertently. Thank you for allowing me to participate in the care of this patient.     Negin Andino MD.    Cc: Vinny Vigil MD

## 2022-01-06 ENCOUNTER — OFFICE VISIT (OUTPATIENT)
Dept: PAIN MANAGEMENT | Age: 47
End: 2022-01-06
Payer: MEDICARE

## 2022-01-06 VITALS
DIASTOLIC BLOOD PRESSURE: 83 MMHG | WEIGHT: 219.8 LBS | HEART RATE: 81 BPM | SYSTOLIC BLOOD PRESSURE: 174 MMHG | OXYGEN SATURATION: 100 % | BODY MASS INDEX: 28.22 KG/M2

## 2022-01-06 DIAGNOSIS — M79.18 MYOFASCIAL PAIN: ICD-10-CM

## 2022-01-06 DIAGNOSIS — M79.2 NEUROPATHIC PAIN: ICD-10-CM

## 2022-01-06 DIAGNOSIS — L97.522 ULCER OF LEFT FOOT, WITH FAT LAYER EXPOSED (HCC): ICD-10-CM

## 2022-01-06 DIAGNOSIS — M80.862S PATHOLOGICAL FRACTURE OF LEFT TIBIA DUE TO OTHER OSTEOPOROSIS, SEQUELA: ICD-10-CM

## 2022-01-06 DIAGNOSIS — G89.4 CHRONIC PAIN SYNDROME: ICD-10-CM

## 2022-01-06 DIAGNOSIS — G62.9 PERIPHERAL POLYNEUROPATHY: ICD-10-CM

## 2022-01-06 DIAGNOSIS — G63 POLYNEUROPATHY ASSOCIATED WITH UNDERLYING DISEASE (HCC): ICD-10-CM

## 2022-01-06 PROCEDURE — 99213 OFFICE O/P EST LOW 20 MIN: CPT | Performed by: INTERNAL MEDICINE

## 2022-01-06 RX ORDER — SUMATRIPTAN 50 MG/1
TABLET, FILM COATED ORAL
Qty: 9 TABLET | Refills: 1 | Status: SHIPPED | OUTPATIENT
Start: 2022-01-06 | End: 2022-02-03 | Stop reason: SDUPTHER

## 2022-01-06 RX ORDER — ERENUMAB-AOOE 70 MG/ML
INJECTION SUBCUTANEOUS
Qty: 1 PEN | Refills: 1 | Status: SHIPPED | OUTPATIENT
Start: 2022-01-06 | End: 2022-02-03 | Stop reason: SDUPTHER

## 2022-01-06 RX ORDER — TOPIRAMATE 100 MG/1
100 TABLET, FILM COATED ORAL 2 TIMES DAILY
Qty: 60 TABLET | Refills: 1 | Status: SHIPPED | OUTPATIENT
Start: 2022-01-06 | End: 2022-02-03 | Stop reason: SDUPTHER

## 2022-01-06 RX ORDER — TRAMADOL HYDROCHLORIDE 50 MG/1
50 TABLET ORAL EVERY 6 HOURS PRN
Qty: 56 TABLET | Refills: 0 | Status: SHIPPED | OUTPATIENT
Start: 2022-01-06 | End: 2022-02-03 | Stop reason: SDUPTHER

## 2022-01-06 RX ORDER — DULOXETIN HYDROCHLORIDE 60 MG/1
60 CAPSULE, DELAYED RELEASE ORAL DAILY
Qty: 30 CAPSULE | Refills: 1 | Status: SHIPPED | OUTPATIENT
Start: 2022-01-06 | End: 2022-02-03 | Stop reason: SDUPTHER

## 2022-01-06 RX ORDER — GABAPENTIN 400 MG/1
400 CAPSULE ORAL 2 TIMES DAILY
Qty: 60 CAPSULE | Refills: 1 | Status: SHIPPED | OUTPATIENT
Start: 2022-01-06 | End: 2022-02-03 | Stop reason: SDUPTHER

## 2022-01-06 NOTE — PROGRESS NOTES
[facility]  1975  [id number]    HISTORY OF PRESENT ILLNESS:  Mr. Lyndsay Lui is a 42 y.o. male returns for a follow up visit for multiple medical problems. His current presenting problems are   1. Chronic pain syndrome    2. Polyneuropathy associated with underlying disease ([facility] [address]    3. Myofascial pain    4. Neuropathic pain    5. Peripheral polyneuropathy    6. Pathological fracture of left tibia due to other osteoporosis, sequela    7. Ulcer of left foot, with fat layer exposed ([facility] [address]    8. Chronic migraine without aura, with intractable migraine, so stated, with status migrainosus    . As per information/history obtained from the PADT(patient assessment and documentation tool) - He complains of pain in the neck, chest, abdomen and upper back with radiation to the head, shoulders Bilateral, arms Bilateral, hands Bilateral and lower leg Left He rates the pain 10/10 and describes it as sharp, aching. Pain is made worse by: nothing. Current treatment regimen has helped relieve about 20% of the pain. He denies side effects from the current pain regimen. Patient reports that since the last follow up visit the physical functioning is unchanged, family/social relationships are better, mood is better and sleep patterns are unchanged, and that the overall functioning is unchanged. Patient denies neurological bowel or bladder. Patient denies misusing/abusing his narcotic pain medications or using any illegal drugs. There are No indicators for possible drug abuse, addiction or diversion problems. Upon obtaining the medical history from Mr. Lyndsay Lui regarding today's office visit for his presenting problems, patient states he has not been feeling well, he is having a cold and sinus problems. Mr. Lyndsay Lui states he is using all the adjuvants as before. He mentions he is on Ultram 1-2 per day which is helping with the pain, he denies any side effects with it. Patient reports his blood sugar has been up and down.  He states he gets stiffness and spasms if he does not use the Zanaflex PRN. ALLERGIES: Patients list of allergies were reviewed     MEDICATIONS: Mr. Ofelia Guerin list of medications were reviewed. His current medications are   Outpatient Medications Prior to Visit   Medication Sig Dispense Refill    SUMAtriptan (IMITREX) 50 MG tablet Take one tab po at the start of migraine PRN max 1 every 24 hours 9 tablet 1    Erenumab-aooe (AIMOVIG, 140 MG DOSE,) 70 MG/ML SOAJ Inject 140 mLs into the skin every 30 days 1 pen 1    gabapentin (NEURONTIN) 400 MG capsule Take 1 capsule by mouth 2 times daily for 30 days. 60 capsule 1    DULoxetine (CYMBALTA) 60 MG extended release capsule Take 1 capsule by mouth daily 30 capsule 1    topiramate (TOPAMAX) 100 MG tablet Take 1 tablet by mouth 2 times daily 60 tablet 1    traMADol (ULTRAM) 50 MG tablet Take 1 tablet by mouth every 6 hours as needed for Pain (max 1-2 per day) for up to 28 days.  60 tablet 0    Continuous Blood Gluc Sensor (DEXCOM G6 SENSOR) MISC USE ONE SENSOR AND CHANGE EVERY 10 DAYS 2 each 3    Diabetic Shoe MISC by Does not apply route 1 each 0    insulin glulisine (APIDRA) 100 UNIT/ML injection INJECT 8-12 UNITS UNDER THE SKIN THREE TIMES A DAY WITH MEALS per sliding scale 1 pen 3    Continuous Blood Gluc  (DEXCOM G6 ) LINDA USE AS NEEDED TO CHECK BLOOD SUGAR 1 each 2    Glucagon (GVOKE HYPOPEN 2-PACK) 1 MG/0.2ML SOAJ INJECT AS NEEDED FOR LOW BLOOD SUGAR 1 each 3    aspirin 81 MG chewable tablet Take 1 tablet by mouth daily 30 tablet 0    pantoprazole (PROTONIX) 40 MG tablet Take 1 tablet by mouth 2 times daily 60 tablet 1    prednisoLONE sodium phosphate (INFLAMASE FORTE) 1 % ophthalmic solution 1 drop 4 times daily      prednisoLONE acetate (PRED FORTE) 1 % ophthalmic suspension Place 1 drop into the right eye three times daily 3 times daily x 1 week then 2x daily x2 weeks, then 1 daily thereafter      ketorolac (ACULAR) 0.5 % ophthalmic solution Place 1 drop into the right eye 3 times daily 3 times daily x 1week, then 2 x daily x 2 weeks, then 1 daily thereafter      Continuous Blood Gluc Transmit (DEXCOM G6 TRANSMITTER) MISC USE TO CHECK BLOOD SUGAR 2 each 3    blood glucose monitor kit and supplies Dispense sufficient amount for indicated testing frequency plus additional to accommodate PRN testing needs. Dispense all needed supplies to include: monitor, strips, lancing device, lancets, control solutions, alcohol swabs. 1 kit 0    blood glucose test strips (ACCU-CHEK CAPO PLUS) strip 4 times a day As needed. 150 each 3    Handicap Placard MISC by Does not apply route Diagnosis: Type 1 diabetes. Expires 4/13/23. 1 each 0    BD INSULIN SYRINGE U/F 31G X 5/16\" 0.3 ML MISC USE ONE SYRINGE FOUR TIMES A DAY BEFORE MEALS AND ONCE NIGHTLY 120 each 9    LANTUS 100 UNIT/ML injection vial INJECT 32 UNITS UNDER THE SKIN ONCE NIGHTLY 1 vial 0    blood glucose test strips (ACCU-CHEK CAPO PLUS) strip 1 each by In Vitro route daily Test blood glucose 10 times daily Dx Code E10.8 300 each 10    blood glucose test strips (ACCU-CHEK CAPO PLUS) strip USE ONE STRIP TO TEST THREE TIMES A  strip 4    Accu-Chek FastClix Lancets MISC 1 each by Does not apply route daily Test blood glucose 10 times daily Dx Code E 10.8 900 each 2    triamcinolone (KENALOG) 0.1 % ointment Apply to thickened affected areas PRN sparingly for flares no longer than 2 weeks at one time, do not apply to cleared skin 80 g 0    Insulin Syringe-Needle U-100 (BD INSULIN SYRINGE U/F) 31G X 5/16\" 0.5 ML MISC Inject 1 each into the skin 4 times daily DX CODE E 10.22 120 each 9    fluticasone (FLONASE) 50 MCG/ACT nasal spray SPRAY TWO SPRAYS IN EACH NOSTRIL DAILY (Patient taking differently: daily as needed ) 16 g 4    hydrocortisone 2.5 % cream Apply topically 2 times daily.  (Patient taking differently: Apply topically 2 times daily as needed) 30 g 0    Blood Glucose Monitoring Suppl (ACCU-CHEK CAPO PLUS) w/Device KIT 1 each by Does not apply route daily Test blood sugar 10 times daily Dx code E 10.8 1 kit 10    GLUCAGON EMERGENCY 1 MG injection INJECT 1MG INTO THE MUSCLE AS NEEDED 1 kit 3    Insulin Syringe-Needle U-100 (B-D INS SYR ULTRAFINE 1CC/31G) 31G X 5/16\" 1 ML MISC INJECT USING INSULIN ONCE DAILY 30 each 10    Multiple Vitamin (DAILY KITTY) TABS TAKE ONE TABLET BY MOUTH DAILY 30 tablet 5    Cholecalciferol (VITAMIN D3) 5000 units CAPS Take 1 capsule by mouth Daily 30 capsule 3    Dextromethorphan-Guaifenesin (MUCINEX DM)  MG TB12 Cough and congestion. (Patient taking differently: as needed Cough and congestion.) 60 tablet 1    omega-3 acid ethyl esters (LOVAZA) 1 G capsule TAKE TWO CAPSULES BY MOUTH TWICE DAILY 120 capsule 5    MUCUS RELIEF DM  MG TABS TAKE ONE BY MOUTH DAILY AS NEEDED 30 tablet 1    Alcohol Swabs PADS Use as needed for insulin injection. 100 each 5    Probiotic Product (ACIDOPHILUS PROBIOTIC) CAPS capsule TAKE ONE CAPSULE BY MOUTH DAILY 28 capsule 4    polyvinyl alcohol (LIQUIFILM TEARS) 1.4 % ophthalmic solution 1 drop as needed      propranolol (INDERAL) 80 MG tablet Take 40 mg by mouth 2 times daily For migraines       Flaxseed, Linseed, (FLAX PO) Take 14 g by mouth daily as needed       potassium chloride (KLOR-CON M) 10 MEQ extended release tablet Take 1 tablet by mouth 2 times daily 60 tablet 0     No facility-administered medications prior to visit. REVIEW OF SYSTEMS:    Respiratory: Negative for apnea, chest tightness and shortness of breath or change in baseline breathing. PHYSICAL EXAM:   Nursing note and vitals reviewed. BP (!) 174/83   Pulse 81   Wt 219 lb 12.8 oz (99.7 kg)   SpO2 100%   BMI 28.22 kg/m²   Constitutional: He appears well-developed and well-nourished. No acute distress. Cardiovascular: Normal rate, regular rhythm, normal heart sounds, and does not have murmur.      Pulmonary/Chest: Effort normal. No respiratory distress. He does not have wheezes in the lung fields. He has no rales. Neurological/Psychiatric:He is alert and oriented to person, place, and time. Coordination is  normal.  His mood isAppropriate and affect is Neutral/Euthymic(normal) . IMPRESSION:   1. Chronic pain syndrome    2. Polyneuropathy associated with underlying disease ([facility] [address]    3. Myofascial pain    4. Neuropathic pain    5. Peripheral polyneuropathy    6. Pathological fracture of left tibia due to other osteoporosis, sequela    7. Ulcer of left foot, with fat layer exposed ([facility] [address]        PLAN:  Informed verbal consent was obtained  -[place] was obtained and reviewed  for the last one year and no indicators of drug misuse  were found. Any other controlled substance prescriptions  seen on the record have been accounted for, I am aware of the patient receiving these medications. Geri Rued [place] record will be rechecked as part of office protocol. -ROM/Stretching exercises as advised   -He was advised to increase fluids ( 5-7  glasses of fluid daily), limit caffeine, avoid cheese products, increase dietary fiber, increase activity and exercise as tolerated and relax regularly and enjoy meals   -Continue with Ultram 1-2 per day   -Continue with all other adjuvant medications as before   -Walking as tolerated    -Monitor blood sugar regularly, diabetic control- adv diabetic diet. Goal for fasting blood sugars around 120. Follow up with Endocrinologist/PCP also for on going management       Current Outpatient Medications   Medication Sig Dispense Refill    SUMAtriptan (IMITREX) 50 MG tablet Take one tab po at the start of migraine PRN max 1 every 24 hours 9 tablet 1    Erenumab-aooe (AIMOVIG, 140 MG DOSE,) 70 MG/ML SOAJ Inject 140 mLs into the skin every 30 days 1 pen 1    gabapentin (NEURONTIN) 400 MG capsule Take 1 capsule by mouth 2 times daily for 30 days.  60 capsule 1    DULoxetine (CYMBALTA) 60 MG extended release capsule Take 1 capsule by mouth daily 30 capsule 1    topiramate (TOPAMAX) 100 MG tablet Take 1 tablet by mouth 2 times daily 60 tablet 1    traMADol (ULTRAM) 50 MG tablet Take 1 tablet by mouth every 6 hours as needed for Pain (max 1-2 per day) for up to 28 days. 60 tablet 0    Continuous Blood Gluc Sensor (DEXCOM G6 SENSOR) MISC USE ONE SENSOR AND CHANGE EVERY 10 DAYS 2 each 3    Diabetic Shoe MISC by Does not apply route 1 each 0    insulin glulisine (APIDRA) 100 UNIT/ML injection INJECT 8-12 UNITS UNDER THE SKIN THREE TIMES A DAY WITH MEALS per sliding scale 1 pen 3    Continuous Blood Gluc  (DEXCOM G6 ) LINDA USE AS NEEDED TO CHECK BLOOD SUGAR 1 each 2    Glucagon (GVOKE HYPOPEN 2-PACK) 1 MG/0.2ML SOAJ INJECT AS NEEDED FOR LOW BLOOD SUGAR 1 each 3    aspirin 81 MG chewable tablet Take 1 tablet by mouth daily 30 tablet 0    pantoprazole (PROTONIX) 40 MG tablet Take 1 tablet by mouth 2 times daily 60 tablet 1    prednisoLONE sodium phosphate (INFLAMASE FORTE) 1 % ophthalmic solution 1 drop 4 times daily      prednisoLONE acetate (PRED FORTE) 1 % ophthalmic suspension Place 1 drop into the right eye three times daily 3 times daily x 1 week then 2x daily x2 weeks, then 1 daily thereafter      ketorolac (ACULAR) 0.5 % ophthalmic solution Place 1 drop into the right eye 3 times daily 3 times daily x 1week, then 2 x daily x 2 weeks, then 1 daily thereafter      Continuous Blood Gluc Transmit (DEXCOM G6 TRANSMITTER) MISC USE TO CHECK BLOOD SUGAR 2 each 3    blood glucose monitor kit and supplies Dispense sufficient amount for indicated testing frequency plus additional to accommodate PRN testing needs. Dispense all needed supplies to include: monitor, strips, lancing device, lancets, control solutions, alcohol swabs. 1 kit 0    blood glucose test strips (ACCU-CHEK CAPO PLUS) strip 4 times a day As needed.  150 each 3    Handicap Placard MISC by Does not apply route Diagnosis: Type 1 diabetes. Expires 4/13/23. 1 each 0    BD INSULIN SYRINGE U/F 31G X 5/16\" 0.3 ML MISC USE ONE SYRINGE FOUR TIMES A DAY BEFORE MEALS AND ONCE NIGHTLY 120 each 9    LANTUS 100 UNIT/ML injection vial INJECT 32 UNITS UNDER THE SKIN ONCE NIGHTLY 1 vial 0    blood glucose test strips (ACCU-CHEK CAPO PLUS) strip 1 each by In Vitro route daily Test blood glucose 10 times daily Dx Code E10.8 300 each 10    blood glucose test strips (ACCU-CHEK CAPO PLUS) strip USE ONE STRIP TO TEST THREE TIMES A  strip 4    Accu-Chek FastClix Lancets MISC 1 each by Does not apply route daily Test blood glucose 10 times daily Dx Code E 10.8 900 each 2    triamcinolone (KENALOG) 0.1 % ointment Apply to thickened affected areas PRN sparingly for flares no longer than 2 weeks at one time, do not apply to cleared skin 80 g 0    Insulin Syringe-Needle U-100 (BD INSULIN SYRINGE U/F) 31G X 5/16\" 0.5 ML MISC Inject 1 each into the skin 4 times daily DX CODE E 10.22 120 each 9    fluticasone (FLONASE) 50 MCG/ACT nasal spray SPRAY TWO SPRAYS IN EACH NOSTRIL DAILY (Patient taking differently: daily as needed ) 16 g 4    hydrocortisone 2.5 % cream Apply topically 2 times daily. (Patient taking differently: Apply topically 2 times daily as needed) 30 g 0    Blood Glucose Monitoring Suppl (ACCU-CHEK CAPO PLUS) w/Device KIT 1 each by Does not apply route daily Test blood sugar 10 times daily Dx code E 10.8 1 kit 10    GLUCAGON EMERGENCY 1 MG injection INJECT 1MG INTO THE MUSCLE AS NEEDED 1 kit 3    Insulin Syringe-Needle U-100 (B-D INS SYR ULTRAFINE 1CC/31G) 31G X 5/16\" 1 ML MISC INJECT USING INSULIN ONCE DAILY 30 each 10    Multiple Vitamin (DAILY KITTY) TABS TAKE ONE TABLET BY MOUTH DAILY 30 tablet 5    Cholecalciferol (VITAMIN D3) 5000 units CAPS Take 1 capsule by mouth Daily 30 capsule 3    Dextromethorphan-Guaifenesin (MUCINEX DM)  MG TB12 Cough and congestion.  (Patient taking differently: as needed Cough and congestion.) 60 tablet 1    omega-3 acid ethyl esters (LOVAZA) 1 G capsule TAKE TWO CAPSULES BY MOUTH TWICE DAILY 120 capsule 5    MUCUS RELIEF DM  MG TABS TAKE ONE BY MOUTH DAILY AS NEEDED 30 tablet 1    Alcohol Swabs PADS Use as needed for insulin injection. 100 each 5    Probiotic Product (ACIDOPHILUS PROBIOTIC) CAPS capsule TAKE ONE CAPSULE BY MOUTH DAILY 28 capsule 4    polyvinyl alcohol (LIQUIFILM TEARS) 1.4 % ophthalmic solution 1 drop as needed      propranolol (INDERAL) 80 MG tablet Take 40 mg by mouth 2 times daily For migraines       Flaxseed, Linseed, (FLAX PO) Take 14 g by mouth daily as needed       potassium chloride (KLOR-CON M) 10 MEQ extended release tablet Take 1 tablet by mouth 2 times daily 60 tablet 0     No current facility-administered medications for this visit. I will continue his current medication regimen  which is part of the above treatment schedule. It has been helping with Mr. Tiffanie Nath chronic  medical problems which for this visit include:   Diagnoses of Chronic pain syndrome, Polyneuropathy associated with underlying disease ([address] Myofascial pain, Neuropathic pain, Peripheral polyneuropathy, Pathological fracture of left tibia due to other osteoporosis, sequela, Ulcer of left foot, with fat layer exposed ([address] and Chronic migraine without aura, with intractable migraine, so stated, with status migrainosus were pertinent to this visit. Risks and benefits of the medications and other alternative treatments  including no treatment were discussed with the patient. The common side effects of these medications were also explained to the patient. Informed verbal consent was obtained. Goals of current treatment regimen include improvement in pain, restoration of functioning- with focus on improvement in physical performance, general activity, work or disability,emotional distress, health care utilization and  decreased medication consumption.  Will continue to monitor progress towards achieving/maintaining therapeutic goals with special emphasis on  1. Improvement in perceived interfernce  of pain with ADL's. Ability to do home exercises independently. Ability to do household chores indoor and/or outdoor work and social and leisure activities. Improve psychosocial and physical functioning. - he is showing progression towards this treatment goal with the current regimen. He was advised against drinking alcohol with the narcotic pain medicines, advised against driving or handling machinery while adjusting the dose of medicines or if having cognitive  issues related to the current medications. Risk of overdose and death, if medicines not taken as prescribed, were also discussed. If the patient develops new symptoms or if the symptoms worsen, the patient should call the office. While transcribing every attempt was made to maintain the accuracy of the note in terms of it's contents,there may have been some errors made inadvertently. Thank you for allowing me to participate in the care of this patient.     Sterling Hastings MD.    Cc: Dustin Matamoros MD

## 2022-01-08 DIAGNOSIS — E10.22 TYPE 1 DIABETES MELLITUS WITH STAGE 3 CHRONIC KIDNEY DISEASE (HCC): ICD-10-CM

## 2022-01-08 DIAGNOSIS — N18.30 TYPE 1 DIABETES MELLITUS WITH STAGE 3 CHRONIC KIDNEY DISEASE (HCC): ICD-10-CM

## 2022-01-10 RX ORDER — INSULIN GLARGINE 100 [IU]/ML
INJECTION, SOLUTION SUBCUTANEOUS
Qty: 1000 ML | Refills: 3 | Status: SHIPPED | OUTPATIENT
Start: 2022-01-10 | End: 2022-01-14 | Stop reason: SDUPTHER

## 2022-01-10 NOTE — TELEPHONE ENCOUNTER
Medication:   Requested Prescriptions     Pending Prescriptions Disp Refills    LANTUS 100 UNIT/ML injection vial [Pharmacy Med Name: LANTUS 100 UNIT/ML VIAL] 1000 mL 3     Sig: INJECT 30 UNITS UNDER THE SKIN ONCE NIGHTLY       Last Filled:      Patient Phone Number: [phone] (home)     Last appt: 11/2/2021   Next appt: 02/08/2022  Last Labs DM:   Lab Results   Component Value Date    LABA1C 9.1 09/26/2021

## 2022-01-13 ENCOUNTER — TELEPHONE (OUTPATIENT)
Dept: ENDOCRINOLOGY | Age: 47
End: 2022-01-13

## 2022-01-14 DIAGNOSIS — E10.22 TYPE 1 DIABETES MELLITUS WITH STAGE 3 CHRONIC KIDNEY DISEASE (HCC): ICD-10-CM

## 2022-01-14 DIAGNOSIS — N18.30 TYPE 1 DIABETES MELLITUS WITH STAGE 3 CHRONIC KIDNEY DISEASE (HCC): ICD-10-CM

## 2022-01-14 RX ORDER — INSULIN GLARGINE 100 [IU]/ML
INJECTION, SOLUTION SUBCUTANEOUS
Qty: 30 ML | Refills: 3 | Status: ON HOLD | OUTPATIENT
Start: 2022-01-14 | End: 2022-04-18 | Stop reason: SDUPTHER

## 2022-01-14 NOTE — TELEPHONE ENCOUNTER
NOV- 02/08/2022        Requested Prescriptions      No prescriptions requested or ordered in this encounter

## 2022-02-03 ENCOUNTER — OFFICE VISIT (OUTPATIENT)
Dept: PAIN MANAGEMENT | Age: 47
End: 2022-02-03
Payer: MEDICARE

## 2022-02-03 VITALS
HEART RATE: 85 BPM | WEIGHT: 213 LBS | BODY MASS INDEX: 27.35 KG/M2 | SYSTOLIC BLOOD PRESSURE: 135 MMHG | DIASTOLIC BLOOD PRESSURE: 82 MMHG

## 2022-02-03 DIAGNOSIS — L97.522 ULCER OF LEFT FOOT, WITH FAT LAYER EXPOSED (HCC): ICD-10-CM

## 2022-02-03 DIAGNOSIS — M80.862S PATHOLOGICAL FRACTURE OF LEFT TIBIA DUE TO OTHER OSTEOPOROSIS, SEQUELA: ICD-10-CM

## 2022-02-03 DIAGNOSIS — G62.9 PERIPHERAL POLYNEUROPATHY: ICD-10-CM

## 2022-02-03 DIAGNOSIS — M79.18 MYOFASCIAL PAIN: ICD-10-CM

## 2022-02-03 DIAGNOSIS — M79.2 NEUROPATHIC PAIN: ICD-10-CM

## 2022-02-03 DIAGNOSIS — G63 POLYNEUROPATHY ASSOCIATED WITH UNDERLYING DISEASE (HCC): ICD-10-CM

## 2022-02-03 DIAGNOSIS — G89.4 CHRONIC PAIN SYNDROME: ICD-10-CM

## 2022-02-03 PROCEDURE — 99213 OFFICE O/P EST LOW 20 MIN: CPT | Performed by: INTERNAL MEDICINE

## 2022-02-03 RX ORDER — SUMATRIPTAN 50 MG/1
TABLET, FILM COATED ORAL
Qty: 9 TABLET | Refills: 1 | Status: SHIPPED | OUTPATIENT
Start: 2022-02-03 | End: 2022-03-03 | Stop reason: SDUPTHER

## 2022-02-03 RX ORDER — DULOXETIN HYDROCHLORIDE 60 MG/1
60 CAPSULE, DELAYED RELEASE ORAL DAILY
Qty: 30 CAPSULE | Refills: 1 | Status: SHIPPED | OUTPATIENT
Start: 2022-02-03 | End: 2022-03-03 | Stop reason: SDUPTHER

## 2022-02-03 RX ORDER — ERENUMAB-AOOE 70 MG/ML
INJECTION SUBCUTANEOUS
Qty: 1 PEN | Refills: 1 | Status: SHIPPED | OUTPATIENT
Start: 2022-02-03 | End: 2022-03-03 | Stop reason: SDUPTHER

## 2022-02-03 RX ORDER — TOPIRAMATE 100 MG/1
100 TABLET, FILM COATED ORAL 2 TIMES DAILY
Qty: 60 TABLET | Refills: 1 | Status: SHIPPED | OUTPATIENT
Start: 2022-02-03 | End: 2022-03-03 | Stop reason: SDUPTHER

## 2022-02-03 RX ORDER — GABAPENTIN 400 MG/1
400 CAPSULE ORAL 2 TIMES DAILY
Qty: 60 CAPSULE | Refills: 1 | Status: SHIPPED | OUTPATIENT
Start: 2022-02-03 | End: 2022-03-03 | Stop reason: SDUPTHER

## 2022-02-03 RX ORDER — TRAMADOL HYDROCHLORIDE 50 MG/1
50 TABLET ORAL EVERY 6 HOURS PRN
Qty: 56 TABLET | Refills: 0 | Status: SHIPPED | OUTPATIENT
Start: 2022-02-03 | End: 2022-03-03 | Stop reason: SDUPTHER

## 2022-02-03 NOTE — PROGRESS NOTES
[facility]  1975  [id number]    HISTORY OF PRESENT ILLNESS:  Mr. Susie Jimenez is a 42 y.o. male returns for a follow up visit for multiple medical problems. His current presenting problems are   1. Chronic pain syndrome    2. Polyneuropathy associated with underlying disease ([address]    3. Myofascial pain    4. Neuropathic pain    5. Pathological fracture of left tibia due to other osteoporosis, sequela    6. Ulcer of left foot, with fat layer exposed ([address]    7. Chronic migraine without aura, with intractable migraine, so stated, with status migrainosus    8. Primary insomnia    9. Recurrent major depressive disorder, in partial remission ([address]    10. Constipation due to opioid therapy    . As per information/history obtained from the PADT(patient assessment and documentation tool) - He complains of pain in the neck and lower back with radiation to the shoulders Bilateral, arms Bilateral, elbows Bilateral, hands Bilateral, buttocks, hips Bilateral, upper leg Bilateral, knees Bilateral, lower leg Bilateral, ankles Bilateral and feet Bilateral He rates the pain 10/10 and describes it as sharp, aching. Pain is made worse by: movement, walking, standing, sitting, bending, lifting. Current treatment regimen has helped relieve about 10% of the pain. He denies side effects from the current pain regimen. Patient reports that since the last follow up visit the physical functioning is unchanged, family/social relationships are unchanged, mood is unchanged and sleep patterns are worse, and that the overall functioning is unchanged. Patient denies neurological bowel or bladder. Patient denies misusing/abusing his narcotic pain medications or using any illegal drugs. There are No indicators for possible drug abuse, addiction or diversion problems.   Upon obtaining the medical history from Mr. Susie Jimenez regarding today's office visit for his presenting problems, patient states he has been doing fair, his pain has been manageable with the medications. Mr. Arabella Perez mentions he is using Ultram 1-2 per day. Patient states his headaches are manageable with the regimen. Patient says he has been walking some daily. He is complaining of his legs hurting worse than his back. ALLERGIES: Patients list of allergies were reviewed     MEDICATIONS: Mr. Arabella Perez list of medications were reviewed. His current medications are   Outpatient Medications Prior to Visit   Medication Sig Dispense Refill    dicloxacillin (DYNAPEN) 500 MG capsule TAKE ONE CAPSULE BY MOUTH TWICE A DAY 60 capsule 11    LANTUS 100 UNIT/ML injection vial INJECT 30 UNITS UNDER THE SKIN ONCE NIGHTLY 30 mL 3    Continuous Blood Gluc Sensor (DEXCOM G6 SENSOR) MISC USE ONE SENSOR AND CHANGE EVERY 10 DAYS 2 each 3    Diabetic Shoe MISC by Does not apply route 1 each 0    insulin glulisine (APIDRA) 100 UNIT/ML injection INJECT 8-12 UNITS UNDER THE SKIN THREE TIMES A DAY WITH MEALS per sliding scale 1 pen 3    Continuous Blood Gluc  (DEXCOM G6 ) LINDA USE AS NEEDED TO CHECK BLOOD SUGAR 1 each 2    Glucagon (GVOKE HYPOPEN 2-PACK) 1 MG/0.2ML SOAJ INJECT AS NEEDED FOR LOW BLOOD SUGAR 1 each 3    aspirin 81 MG chewable tablet Take 1 tablet by mouth daily 30 tablet 0    pantoprazole (PROTONIX) 40 MG tablet Take 1 tablet by mouth 2 times daily 60 tablet 1    prednisoLONE sodium phosphate (INFLAMASE FORTE) 1 % ophthalmic solution 1 drop 4 times daily      prednisoLONE acetate (PRED FORTE) 1 % ophthalmic suspension Place 1 drop into the right eye three times daily 3 times daily x 1 week then 2x daily x2 weeks, then 1 daily thereafter      ketorolac (ACULAR) 0.5 % ophthalmic solution Place 1 drop into the right eye 3 times daily 3 times daily x 1week, then 2 x daily x 2 weeks, then 1 daily thereafter      Continuous Blood Gluc Transmit (DEXCOM G6 TRANSMITTER) MISC USE TO CHECK BLOOD SUGAR 2 each 3    blood glucose monitor kit and supplies Dispense sufficient amount for indicated testing frequency plus additional to accommodate PRN testing needs. Dispense all needed supplies to include: monitor, strips, lancing device, lancets, control solutions, alcohol swabs. 1 kit 0    blood glucose test strips (ACCU-CHEK CAPO PLUS) strip 4 times a day As needed. 150 each 3    Handicap Placard MISC by Does not apply route Diagnosis: Type 1 diabetes. Expires 4/13/23. 1 each 0    BD INSULIN SYRINGE U/F 31G X 5/16\" 0.3 ML MISC USE ONE SYRINGE FOUR TIMES A DAY BEFORE MEALS AND ONCE NIGHTLY 120 each 9    blood glucose test strips (ACCU-CHEK CAPO PLUS) strip 1 each by In Vitro route daily Test blood glucose 10 times daily Dx Code E10.8 300 each 10    blood glucose test strips (ACCU-CHEK CAPO PLUS) strip USE ONE STRIP TO TEST THREE TIMES A  strip 4    Accu-Chek FastClix Lancets MISC 1 each by Does not apply route daily Test blood glucose 10 times daily Dx Code E 10.8 900 each 2    triamcinolone (KENALOG) 0.1 % ointment Apply to thickened affected areas PRN sparingly for flares no longer than 2 weeks at one time, do not apply to cleared skin 80 g 0    Insulin Syringe-Needle U-100 (BD INSULIN SYRINGE U/F) 31G X 5/16\" 0.5 ML MISC Inject 1 each into the skin 4 times daily DX CODE E 10.22 120 each 9    fluticasone (FLONASE) 50 MCG/ACT nasal spray SPRAY TWO SPRAYS IN EACH NOSTRIL DAILY (Patient taking differently: daily as needed ) 16 g 4    hydrocortisone 2.5 % cream Apply topically 2 times daily.  (Patient taking differently: Apply topically 2 times daily as needed) 30 g 0    Blood Glucose Monitoring Suppl (ACCU-CHEK CAPO PLUS) w/Device KIT 1 each by Does not apply route daily Test blood sugar 10 times daily Dx code E 10.8 1 kit 10    GLUCAGON EMERGENCY 1 MG injection INJECT 1MG INTO THE MUSCLE AS NEEDED 1 kit 3    Insulin Syringe-Needle U-100 (B-D INS SYR ULTRAFINE 1CC/31G) 31G X 5/16\" 1 ML MISC INJECT USING INSULIN ONCE DAILY 30 each 10    Multiple Vitamin (DAILY KITTY) TABS TAKE ONE TABLET BY MOUTH DAILY 30 tablet 5    Cholecalciferol (VITAMIN D3) 5000 units CAPS Take 1 capsule by mouth Daily 30 capsule 3    Dextromethorphan-Guaifenesin (MUCINEX DM)  MG TB12 Cough and congestion. (Patient taking differently: as needed Cough and congestion.) 60 tablet 1    omega-3 acid ethyl esters (LOVAZA) 1 G capsule TAKE TWO CAPSULES BY MOUTH TWICE DAILY 120 capsule 5    MUCUS RELIEF DM  MG TABS TAKE ONE BY MOUTH DAILY AS NEEDED 30 tablet 1    Alcohol Swabs PADS Use as needed for insulin injection. 100 each 5    Probiotic Product (ACIDOPHILUS PROBIOTIC) CAPS capsule TAKE ONE CAPSULE BY MOUTH DAILY 28 capsule 4    polyvinyl alcohol (LIQUIFILM TEARS) 1.4 % ophthalmic solution 1 drop as needed      propranolol (INDERAL) 80 MG tablet Take 40 mg by mouth 2 times daily For migraines       Flaxseed, Linseed, (FLAX PO) Take 14 g by mouth daily as needed       SUMAtriptan (IMITREX) 50 MG tablet Take one tab po at the start of migraine PRN max 1 every 24 hours 9 tablet 1    Erenumab-aooe (AIMOVIG, 140 MG DOSE,) 70 MG/ML SOAJ Inject 140 mLs into the skin every 30 days 1 pen 1    gabapentin (NEURONTIN) 400 MG capsule Take 1 capsule by mouth 2 times daily for 30 days. 60 capsule 1    DULoxetine (CYMBALTA) 60 MG extended release capsule Take 1 capsule by mouth daily 30 capsule 1    topiramate (TOPAMAX) 100 MG tablet Take 1 tablet by mouth 2 times daily 60 tablet 1    traMADol (ULTRAM) 50 MG tablet Take 1 tablet by mouth every 6 hours as needed for Pain (max 1-2 per day) for up to 28 days. 56 tablet 0    potassium chloride (KLOR-CON M) 10 MEQ extended release tablet Take 1 tablet by mouth 2 times daily 60 tablet 0     No facility-administered medications prior to visit. REVIEW OF SYSTEMS:    Respiratory: Negative for apnea, chest tightness and shortness of breath or change in baseline breathing. PHYSICAL EXAM:   Nursing note and vitals reviewed.  /82   Pulse 85   Wt 213 lb (96.6 kg)   BMI 27.35 kg/m²   Constitutional: He appears well-developed and well-nourished. No acute distress. Cardiovascular: Normal rate, regular rhythm, normal heart sounds, and does not have murmur. Pulmonary/Chest: Effort normal. No respiratory distress. He does not have wheezes in the lung fields. He has no rales. Neurological/Psychiatric:He is alert and oriented to person, place, and time. Coordination is  normal.  His mood isAppropriate and affect is Neutral/Euthymic(normal) . Other: using a cane     IMPRESSION:   1. Chronic pain syndrome    2. Polyneuropathy associated with underlying disease ([address]    3. Myofascial pain    4. Neuropathic pain    5. Pathological fracture of left tibia due to other osteoporosis, sequela    6. Ulcer of left foot, with fat layer exposed ([address]    7. Peripheral polyneuropathy        PLAN:  Informed verbal consent was obtained  -ROM/Stretching exercises as advised for the back  -He was advised to increase fluids ( 5-7  glasses of fluid daily), limit caffeine, avoid cheese products, increase dietary fiber, increase activity and exercise as tolerated and relax regularly and enjoy meals   -Continue with Ultram 1-2 per day  -he was advised  to avoid using too many OTC analgesics to control the headaches, avoid chocolates, increased caffeine, cheeses and MSG nitrite containing foods, cigarette smoking. To avoid bright lights, strong smells and skipping meals. Current Outpatient Medications   Medication Sig Dispense Refill    SUMAtriptan (IMITREX) 50 MG tablet Take one tab po at the start of migraine PRN max 1 every 24 hours 9 tablet 1    Erenumab-aooe (AIMOVIG, 140 MG DOSE,) 70 MG/ML SOAJ Inject 140 mLs into the skin every 30 days 1 pen 1    gabapentin (NEURONTIN) 400 MG capsule Take 1 capsule by mouth 2 times daily for 30 days.  60 capsule 1    DULoxetine (CYMBALTA) 60 MG extended release capsule Take 1 capsule by mouth daily 30 capsule 1    topiramate (TOPAMAX) 100 MG tablet Take 1 tablet by mouth 2 times daily 60 tablet 1    traMADol (ULTRAM) 50 MG tablet Take 1 tablet by mouth every 6 hours as needed for Pain (max 1-2 per day) for up to 28 days. 56 tablet 0    dicloxacillin (DYNAPEN) 500 MG capsule TAKE ONE CAPSULE BY MOUTH TWICE A DAY 60 capsule 11    LANTUS 100 UNIT/ML injection vial INJECT 30 UNITS UNDER THE SKIN ONCE NIGHTLY 30 mL 3    Continuous Blood Gluc Sensor (DEXCOM G6 SENSOR) MISC USE ONE SENSOR AND CHANGE EVERY 10 DAYS 2 each 3    Diabetic Shoe MISC by Does not apply route 1 each 0    insulin glulisine (APIDRA) 100 UNIT/ML injection INJECT 8-12 UNITS UNDER THE SKIN THREE TIMES A DAY WITH MEALS per sliding scale 1 pen 3    Continuous Blood Gluc  (DEXCOM G6 ) LINDA USE AS NEEDED TO CHECK BLOOD SUGAR 1 each 2    Glucagon (GVOKE HYPOPEN 2-PACK) 1 MG/0.2ML SOAJ INJECT AS NEEDED FOR LOW BLOOD SUGAR 1 each 3    aspirin 81 MG chewable tablet Take 1 tablet by mouth daily 30 tablet 0    pantoprazole (PROTONIX) 40 MG tablet Take 1 tablet by mouth 2 times daily 60 tablet 1    prednisoLONE sodium phosphate (INFLAMASE FORTE) 1 % ophthalmic solution 1 drop 4 times daily      prednisoLONE acetate (PRED FORTE) 1 % ophthalmic suspension Place 1 drop into the right eye three times daily 3 times daily x 1 week then 2x daily x2 weeks, then 1 daily thereafter      ketorolac (ACULAR) 0.5 % ophthalmic solution Place 1 drop into the right eye 3 times daily 3 times daily x 1week, then 2 x daily x 2 weeks, then 1 daily thereafter      Continuous Blood Gluc Transmit (DEXCOM G6 TRANSMITTER) MISC USE TO CHECK BLOOD SUGAR 2 each 3    blood glucose monitor kit and supplies Dispense sufficient amount for indicated testing frequency plus additional to accommodate PRN testing needs. Dispense all needed supplies to include: monitor, strips, lancing device, lancets, control solutions, alcohol swabs.  1 kit 0    blood glucose test strips (ACCU-CHEK CAPO PLUS) strip 4 times a day As needed. 150 each 3    Handicap Placard MISC by Does not apply route Diagnosis: Type 1 diabetes. Expires 4/13/23. 1 each 0    BD INSULIN SYRINGE U/F 31G X 5/16\" 0.3 ML MISC USE ONE SYRINGE FOUR TIMES A DAY BEFORE MEALS AND ONCE NIGHTLY 120 each 9    blood glucose test strips (ACCU-CHEK CAPO PLUS) strip 1 each by In Vitro route daily Test blood glucose 10 times daily Dx Code E10.8 300 each 10    blood glucose test strips (ACCU-CHEK CAPO PLUS) strip USE ONE STRIP TO TEST THREE TIMES A  strip 4    Accu-Chek FastClix Lancets MISC 1 each by Does not apply route daily Test blood glucose 10 times daily Dx Code E 10.8 900 each 2    triamcinolone (KENALOG) 0.1 % ointment Apply to thickened affected areas PRN sparingly for flares no longer than 2 weeks at one time, do not apply to cleared skin 80 g 0    Insulin Syringe-Needle U-100 (BD INSULIN SYRINGE U/F) 31G X 5/16\" 0.5 ML MISC Inject 1 each into the skin 4 times daily DX CODE E 10.22 120 each 9    fluticasone (FLONASE) 50 MCG/ACT nasal spray SPRAY TWO SPRAYS IN EACH NOSTRIL DAILY (Patient taking differently: daily as needed ) 16 g 4    hydrocortisone 2.5 % cream Apply topically 2 times daily. (Patient taking differently: Apply topically 2 times daily as needed) 30 g 0    Blood Glucose Monitoring Suppl (ACCU-CHEK CAPO PLUS) w/Device KIT 1 each by Does not apply route daily Test blood sugar 10 times daily Dx code E 10.8 1 kit 10    GLUCAGON EMERGENCY 1 MG injection INJECT 1MG INTO THE MUSCLE AS NEEDED 1 kit 3    Insulin Syringe-Needle U-100 (B-D INS SYR ULTRAFINE 1CC/31G) 31G X 5/16\" 1 ML MISC INJECT USING INSULIN ONCE DAILY 30 each 10    Multiple Vitamin (DAILY KITTY) TABS TAKE ONE TABLET BY MOUTH DAILY 30 tablet 5    Cholecalciferol (VITAMIN D3) 5000 units CAPS Take 1 capsule by mouth Daily 30 capsule 3    Dextromethorphan-Guaifenesin (MUCINEX DM)  MG TB12 Cough and congestion.  (Patient taking performance, general activity, work or disability,emotional distress, health care utilization and  decreased opioid medication consumption- titrating to the lowest effective dose. Will continue to monitor progress towards achieving/maintaining therapeutic goals with special emphasis on  1. Improvement in perceived interfernce  of pain with ADL's. Ability to do home exercises independently. Ability to do household chores indoor and/or outdoor work and social and leisure activities. Improve psychosocial and physical functioning. - he is showing progression towards this treatment goal with the current regimen. He was advised against drinking alcohol with the narcotic pain medicines, advised against driving or handling machinery while adjusting the dose of medicines or if having cognitive  issues related to the current medications. Risk of overdose and death, if medicines not taken as prescribed, were also discussed. If the patient develops new symptoms or if the symptoms worsen, the patient should call the office. While transcribing every attempt was made to maintain the accuracy of the note in terms of it's contents,there may have been some errors made inadvertently. Thank you for allowing me to participate in the care of this patient.     Pina Hills MD.    Cc: Audie Oliva MD

## 2022-02-08 ENCOUNTER — OFFICE VISIT (OUTPATIENT)
Dept: ENDOCRINOLOGY | Age: 47
End: 2022-02-08
Payer: MEDICARE

## 2022-02-08 VITALS
BODY MASS INDEX: 28.11 KG/M2 | DIASTOLIC BLOOD PRESSURE: 80 MMHG | HEART RATE: 80 BPM | HEIGHT: 74 IN | SYSTOLIC BLOOD PRESSURE: 153 MMHG | OXYGEN SATURATION: 98 % | WEIGHT: 219 LBS

## 2022-02-08 DIAGNOSIS — E10.22 TYPE 1 DIABETES MELLITUS WITH STAGE 3A CHRONIC KIDNEY DISEASE (HCC): Primary | ICD-10-CM

## 2022-02-08 DIAGNOSIS — N18.31 TYPE 1 DIABETES MELLITUS WITH STAGE 3A CHRONIC KIDNEY DISEASE (HCC): Primary | ICD-10-CM

## 2022-02-08 DIAGNOSIS — E78.49 OTHER HYPERLIPIDEMIA: ICD-10-CM

## 2022-02-08 LAB — HBA1C MFR BLD: 8.2 %

## 2022-02-08 PROCEDURE — 83036 HEMOGLOBIN GLYCOSYLATED A1C: CPT | Performed by: INTERNAL MEDICINE

## 2022-02-08 PROCEDURE — 99214 OFFICE O/P EST MOD 30 MIN: CPT | Performed by: INTERNAL MEDICINE

## 2022-02-08 NOTE — PROGRESS NOTES
HPI      Jesus Toscano is a 42 y.o. male here for a follow-up for management of DM    Interim:  Glucose high today  500 at 12  Gave apidra 30 units  Now glucose 132    Advised to monitor glucose closely as he gave 32 units lantus extra this morning    Has seen Dr. Tyrel Batres    He has a PMH of DM, CKD, HTN, hyperlipidemia,  Left tibia/fibula fracture, foot ulcer. He was diagnosed with Diabetes Mellitus at the age of 12 yrs. Never had DKA. Was never on oral meds. Always on insulin therapy. Microvascular complications: has  Retinopathy and also had retinal detachement (Follows with eye doctor at [address]),   Has microlabuminruia . No Peripheral neuropathy. A1c 9.6 %----> 8.4 --->8.9 %--->8.2 %--->7.1 %--->7.1 %---> 6.8 % --> 6.8 %--> 6.5 %---> 7.1 %---> 7.1 %---> 7.5 %---> 7.7 % ---> 7.4 %---> 11 % ---> 9 % --> 9.3 % ---> 8.4 % --> 11.3 % ---> 11 % ---> 10.2 % ---> 10.2 % ---> 9.8%---> 9.5%---> 9.1%---> 7.9%    Moderate, uncontrolled  Worsened by diet    Home regimen:  Currently on lantus insulin 32  units at night. Apidra 6-12 units TID. ( insulin to carb ratio of 1:8)  Uses fixed unit    SSI 2 for 50 > 150    Previous medications: Was on humulin insulin in the past.    Check sugars occasionally        Using Tra    Hypoglycemia . Occasional. No pattern    No polyuria, polydipsia, weight loss. Diet: Eats 3 meals/day at regular times and 3 snacks. Had nutrition education. Exercise: No  planned physical activity    Hyperlipidemia: he has mixed hyperlipidemia    Currently is on Flexseed oil. He has refused statins  Discussed PCSK-9 inhibitor, not interested   on 2/20    Patient says he feels dizzy and light headed when he stands up for the last 2-3 months. This is most likely due to autonomic neuropathy. No weight loss. Has gained weight. He had a fall and fractured his left distal tibia and fibula in 2013. Had ORIF and has developed osteomyelitis.      Review of Systems   Scanned, reviewed    Past Medical History:   Diagnosis Date    Acute respiratory failure ([address] 8/18/2014    Asthma     Blood pressure instability     Chronic pain syndrome     Detached retina, bilateral     laser tx right eye    Diabetes mellitus ([address]     uncontrolled     Insomnia     Meningitis August 2014    admission [facility]    Neuropathic pain     Osteomyelitis ([address]     Osteomyelitis of tibia ([address]     left    Pain of left lower extremity     Peripheral neuropathy      Past Surgical History:   Procedure Laterality Date    ANKLE FRACTURE SURGERY Left 1/28/13    Dr. Burak Cage  August 2013    left tibia with external fixation device    EYE SURGERY      retina reattachment left eye x 3 holes repairs    EYE SURGERY Right 9-27-13    retal detachment    LEG SURGERY Left 3/31/14    ORIF left fibula and tibia    [place] EGD FLEXIBLE FOREIGN BODY REMOVAL N/A 9/21/2018    EGD FOREIGN BODY REMOVAL performed by Valentina Leija MD at [address] [place] [address]      with external device inplace    UPPER GASTROINTESTINAL ENDOSCOPY N/A 9/21/2018    EGD BIOPSY performed by Valentina Leija MD at [place] [address] 9/25/2021    EGD BIOPSY performed by Adam Newby MD at [address] [place] [address]           No visits with results within 3 Month(s) from this visit.    Latest known visit with results is:   Orders Only on 10/27/2021   Component Date Value Ref Range Status    Sodium 10/27/2021 132* 136 - 145 mmol/L Final    Potassium 10/27/2021 3.5  3.5 - 5.1 mmol/L Final    Chloride 10/27/2021 96* 99 - 110 mmol/L Final    CO2 10/27/2021 21  21 - 32 mmol/L Final    Anion Gap 10/27/2021 15  3 - 16 Final    Glucose 10/27/2021 115* 70 - 99 mg/dL Final    BUN 10/27/2021 23* 7 - 20 mg/dL Final    CREATININE 10/27/2021 1.8* 0.9 - 1.3 mg/dL Final    GFR Non- 10/27/2021 41* >60 Final    Comment: >60 mL/min/1.73m2 EGFR, calc. for ages 25 and older using the  MDRD formula (not corrected for weight), is valid for stable  renal function.  GFR  10/27/2021 50* >60 Final    Comment: Chronic Kidney Disease: less than 60 ml/min/1.73 sq.m. Kidney Failure: less than 15 ml/min/1.73 sq.m. Results valid for patients 18 years and older.  Calcium 10/27/2021 9.5  8.3 - 10.6 mg/dL Final    Phosphorus 10/27/2021 4.4  2.5 - 4.9 mg/dL Final    Albumin 10/27/2021 4.0  3.4 - 5.0 g/dL Final         ROS:   Constitutional: Negative for weight loss and malaise/fatigue. Negative for fever and chills. HENT: Negative for hearing loss, ear pain, nosebleeds, neck pain and tinnitus. Eyes: Negative for blurred vision. Negative for double vision, photophobia and pain. Respiratory: Negative for cough and sputum production. Cardiovascular: Negative for chest pain, palpitations and leg swelling. Gastrointestinal: Negative for nausea, vomiting and abdominal pain. Genitourinary: Negative for dysuria, urgency and frequency. Musculoskeletal: Negative for back pain. No joint pain  Skin: Negative for itching and rash. Neurological: + headache, for dizziness. Negative for tingling, tremors, focal weakness and headaches. Endo/Heme/Allergies: see HPI  Psychiatric/Behavioral: Negative for depression and substance abuse. Vitals:    02/08/22 1429   BP: (!) 153/80   Pulse: 80   SpO2: 98%       Constitutional: Well-developed, appears stated age, cooperative, in no acute distress  H/E/N/M/T:atraumatic, normocephalic, external ears, nose, lips normal without lesions  Eyes: Lids, lashes, conjunctivae and sclerae normal, No proptosis, no redness  Neck: supple, symmetrical, no swelling  Skin: No obvious rashes or lesions present.   Skin and hair texture normal  Psychiatric: Judgement and Insight:  judgement and insight appear normal  Neuro: Normal without focal findings, speech is normal normal, speech is spontaneous  Chest: No labored breathing, no chest deformity, no stridor  Musculoskeletal: No joint deformity, swelling     11/21 monofilament decreased, scar , foot deformity    Assessment/Plan    1. DM    This 41 yrs old male has DM. Complicated by retinopathy, microalbuminuria. Most likely it is Type 1 DM. HARIS antibodies and islet cell antibodies negative.  c-peptide  Undetectable. Uncontrolled. Patient resistant to changes in his diabetes regimen per note    Discussed the impact of poor glycemic control on his health. BS are variable, no fixed pattern. Given occasional low, continue same dose  A1c better    He says he sleeps all day and eats at different times of the day and not eating proper meals.     -Continue  Lantus insulin  32 units QHS    I:C to 1:8    Had allergic reaction to sensor adhesive Jacinta Stall), states did not tolerate Dexcom either    He does not want to use insulin pump due to issue of adhesive    Updated on eye exam. continue follow-up with the ophthalmologist.states decreased vision, is legally blind  Has microalbuminuria. On Ace-inhibitor. Cr 1.8   Has peripheral neuropathy on exam. Discussed foot care    Non-smoker. BP elevated    Stopped trulicity due to low glucose    2. CKD Follows with nephrologist.       3. Hyperlipidemia. LDL is high. He has refused to take statins, reports side effects  He understands the high risk of heart disease and stroke with untreated hyperlipidemia. On fish oil. 4. Foot ulcers. Healed. Managed by podiatry.  Dr. Adwoa Cuellar [facility])   Order diabetic shoes

## 2022-02-21 ENCOUNTER — TELEPHONE (OUTPATIENT)
Dept: PAIN MANAGEMENT | Age: 47
End: 2022-02-21

## 2022-02-21 NOTE — TELEPHONE ENCOUNTER
Submitted PA for AIMOVIG  Via CMM Key: [id number] STATUS: \"The member recently filled this medication and will be able to return for their next refill according to their plan limits. \"

## 2022-03-03 ENCOUNTER — OFFICE VISIT (OUTPATIENT)
Dept: PAIN MANAGEMENT | Age: 47
End: 2022-03-03
Payer: MEDICARE

## 2022-03-03 VITALS
OXYGEN SATURATION: 100 % | WEIGHT: 212.8 LBS | DIASTOLIC BLOOD PRESSURE: 77 MMHG | HEART RATE: 82 BPM | BODY MASS INDEX: 27.32 KG/M2 | SYSTOLIC BLOOD PRESSURE: 122 MMHG

## 2022-03-03 DIAGNOSIS — G62.9 PERIPHERAL POLYNEUROPATHY: ICD-10-CM

## 2022-03-03 DIAGNOSIS — M79.2 NEUROPATHIC PAIN: ICD-10-CM

## 2022-03-03 DIAGNOSIS — M79.18 MYOFASCIAL PAIN: ICD-10-CM

## 2022-03-03 DIAGNOSIS — L97.522 ULCER OF LEFT FOOT, WITH FAT LAYER EXPOSED (HCC): ICD-10-CM

## 2022-03-03 DIAGNOSIS — G63 POLYNEUROPATHY ASSOCIATED WITH UNDERLYING DISEASE (HCC): ICD-10-CM

## 2022-03-03 DIAGNOSIS — M80.862S PATHOLOGICAL FRACTURE OF LEFT TIBIA DUE TO OTHER OSTEOPOROSIS, SEQUELA: ICD-10-CM

## 2022-03-03 DIAGNOSIS — G89.4 CHRONIC PAIN SYNDROME: ICD-10-CM

## 2022-03-03 PROCEDURE — 99213 OFFICE O/P EST LOW 20 MIN: CPT | Performed by: INTERNAL MEDICINE

## 2022-03-03 RX ORDER — GABAPENTIN 400 MG/1
400 CAPSULE ORAL 2 TIMES DAILY
Qty: 60 CAPSULE | Refills: 1 | Status: SHIPPED | OUTPATIENT
Start: 2022-03-03 | End: 2022-03-31 | Stop reason: SDUPTHER

## 2022-03-03 RX ORDER — TOPIRAMATE 100 MG/1
100 TABLET, FILM COATED ORAL 2 TIMES DAILY
Qty: 60 TABLET | Refills: 1 | Status: SHIPPED | OUTPATIENT
Start: 2022-03-03 | End: 2022-03-31 | Stop reason: SDUPTHER

## 2022-03-03 RX ORDER — ERENUMAB-AOOE 70 MG/ML
INJECTION SUBCUTANEOUS
Qty: 1 PEN | Refills: 1 | Status: SHIPPED | OUTPATIENT
Start: 2022-03-03 | End: 2022-03-31 | Stop reason: SDUPTHER

## 2022-03-03 RX ORDER — DULOXETIN HYDROCHLORIDE 60 MG/1
60 CAPSULE, DELAYED RELEASE ORAL DAILY
Qty: 30 CAPSULE | Refills: 1 | Status: SHIPPED | OUTPATIENT
Start: 2022-03-03 | End: 2022-03-31 | Stop reason: SDUPTHER

## 2022-03-03 RX ORDER — SUMATRIPTAN 50 MG/1
TABLET, FILM COATED ORAL
Qty: 9 TABLET | Refills: 1 | Status: SHIPPED | OUTPATIENT
Start: 2022-03-03 | End: 2022-03-31 | Stop reason: SDUPTHER

## 2022-03-03 RX ORDER — TRAMADOL HYDROCHLORIDE 50 MG/1
50 TABLET ORAL EVERY 6 HOURS PRN
Qty: 56 TABLET | Refills: 0 | Status: SHIPPED | OUTPATIENT
Start: 2022-03-03 | End: 2022-03-31 | Stop reason: SDUPTHER

## 2022-03-03 NOTE — PROGRESS NOTES
helping with the headaches  and they are tolerable. Denies nausea, vomiting, sonophobia, photophobia, photopsia, diplopia, vertigo, neck stiffness. Patient reports her weight has been stable. ALLERGIES: Patients list of allergies were reviewed     MEDICATIONS: Mr. Ronn Putnam list of medications were reviewed. His current medications are   Outpatient Medications Prior to Visit   Medication Sig Dispense Refill    dicloxacillin (DYNAPEN) 500 MG capsule TAKE ONE CAPSULE BY MOUTH TWICE A DAY 60 capsule 11    LANTUS 100 UNIT/ML injection vial INJECT 30 UNITS UNDER THE SKIN ONCE NIGHTLY 30 mL 3    Continuous Blood Gluc Sensor (DEXCOM G6 SENSOR) MISC USE ONE SENSOR AND CHANGE EVERY 10 DAYS 2 each 3    Diabetic Shoe MISC by Does not apply route 1 each 0    insulin glulisine (APIDRA) 100 UNIT/ML injection INJECT 8-12 UNITS UNDER THE SKIN THREE TIMES A DAY WITH MEALS per sliding scale 1 pen 3    Continuous Blood Gluc  (DEXCOM G6 ) LINDA USE AS NEEDED TO CHECK BLOOD SUGAR 1 each 2    Glucagon (GVOKE HYPOPEN 2-PACK) 1 MG/0.2ML SOAJ INJECT AS NEEDED FOR LOW BLOOD SUGAR 1 each 3    aspirin 81 MG chewable tablet Take 1 tablet by mouth daily 30 tablet 0    pantoprazole (PROTONIX) 40 MG tablet Take 1 tablet by mouth 2 times daily 60 tablet 1    prednisoLONE sodium phosphate (INFLAMASE FORTE) 1 % ophthalmic solution 1 drop 4 times daily      prednisoLONE acetate (PRED FORTE) 1 % ophthalmic suspension Place 1 drop into the right eye three times daily 3 times daily x 1 week then 2x daily x2 weeks, then 1 daily thereafter      ketorolac (ACULAR) 0.5 % ophthalmic solution Place 1 drop into the right eye 3 times daily 3 times daily x 1week, then 2 x daily x 2 weeks, then 1 daily thereafter      Continuous Blood Gluc Transmit (DEXCOM G6 TRANSMITTER) MISC USE TO CHECK BLOOD SUGAR 2 each 3    blood glucose monitor kit and supplies Dispense sufficient amount for indicated testing frequency plus additional to accommodate PRN testing needs. Dispense all needed supplies to include: monitor, strips, lancing device, lancets, control solutions, alcohol swabs. 1 kit 0    blood glucose test strips (ACCU-CHEK CAPO PLUS) strip 4 times a day As needed. 150 each 3    Handicap Placard MISC by Does not apply route Diagnosis: Type 1 diabetes. Expires 4/13/23. 1 each 0    BD INSULIN SYRINGE U/F 31G X 5/16\" 0.3 ML MISC USE ONE SYRINGE FOUR TIMES A DAY BEFORE MEALS AND ONCE NIGHTLY 120 each 9    blood glucose test strips (ACCU-CHEK CAPO PLUS) strip 1 each by In Vitro route daily Test blood glucose 10 times daily Dx Code E10.8 300 each 10    blood glucose test strips (ACCU-CHEK CAPO PLUS) strip USE ONE STRIP TO TEST THREE TIMES A  strip 4    Accu-Chek FastClix Lancets MISC 1 each by Does not apply route daily Test blood glucose 10 times daily Dx Code E 10.8 900 each 2    triamcinolone (KENALOG) 0.1 % ointment Apply to thickened affected areas PRN sparingly for flares no longer than 2 weeks at one time, do not apply to cleared skin 80 g 0    Insulin Syringe-Needle U-100 (BD INSULIN SYRINGE U/F) 31G X 5/16\" 0.5 ML MISC Inject 1 each into the skin 4 times daily DX CODE E 10.22 120 each 9    fluticasone (FLONASE) 50 MCG/ACT nasal spray SPRAY TWO SPRAYS IN EACH NOSTRIL DAILY (Patient taking differently: daily as needed ) 16 g 4    hydrocortisone 2.5 % cream Apply topically 2 times daily.  (Patient taking differently: Apply topically 2 times daily as needed) 30 g 0    Blood Glucose Monitoring Suppl (ACCU-CHEK CAPO PLUS) w/Device KIT 1 each by Does not apply route daily Test blood sugar 10 times daily Dx code E 10.8 1 kit 10    GLUCAGON EMERGENCY 1 MG injection INJECT 1MG INTO THE MUSCLE AS NEEDED 1 kit 3    Insulin Syringe-Needle U-100 (B-D INS SYR ULTRAFINE 1CC/31G) 31G X 5/16\" 1 ML MISC INJECT USING INSULIN ONCE DAILY 30 each 10    Multiple Vitamin (DAILY KITTY) TABS TAKE ONE TABLET BY MOUTH DAILY 30 tablet 5    Cholecalciferol (VITAMIN D3) 5000 units CAPS Take 1 capsule by mouth Daily 30 capsule 3    Dextromethorphan-Guaifenesin (MUCINEX DM)  MG TB12 Cough and congestion. (Patient taking differently: as needed Cough and congestion.) 60 tablet 1    omega-3 acid ethyl esters (LOVAZA) 1 G capsule TAKE TWO CAPSULES BY MOUTH TWICE DAILY 120 capsule 5    MUCUS RELIEF DM  MG TABS TAKE ONE BY MOUTH DAILY AS NEEDED 30 tablet 1    Alcohol Swabs PADS Use as needed for insulin injection. 100 each 5    Probiotic Product (ACIDOPHILUS PROBIOTIC) CAPS capsule TAKE ONE CAPSULE BY MOUTH DAILY 28 capsule 4    polyvinyl alcohol (LIQUIFILM TEARS) 1.4 % ophthalmic solution 1 drop as needed      propranolol (INDERAL) 80 MG tablet Take 40 mg by mouth 2 times daily For migraines       Flaxseed, Linseed, (FLAX PO) Take 14 g by mouth daily as needed       SUMAtriptan (IMITREX) 50 MG tablet Take one tab po at the start of migraine PRN max 1 every 24 hours 9 tablet 1    Erenumab-aooe (AIMOVIG, 140 MG DOSE,) 70 MG/ML SOAJ Inject 140 mLs into the skin every 30 days 1 pen 1    gabapentin (NEURONTIN) 400 MG capsule Take 1 capsule by mouth 2 times daily for 30 days. 60 capsule 1    DULoxetine (CYMBALTA) 60 MG extended release capsule Take 1 capsule by mouth daily 30 capsule 1    topiramate (TOPAMAX) 100 MG tablet Take 1 tablet by mouth 2 times daily 60 tablet 1    traMADol (ULTRAM) 50 MG tablet Take 1 tablet by mouth every 6 hours as needed for Pain (max 1-2 per day) for up to 28 days. 56 tablet 0    potassium chloride (KLOR-CON M) 10 MEQ extended release tablet Take 1 tablet by mouth 2 times daily 60 tablet 0     No facility-administered medications prior to visit. REVIEW OF SYSTEMS:    Respiratory: Negative for apnea, chest tightness and shortness of breath or change in baseline breathing. PHYSICAL EXAM:   Nursing note and vitals reviewed.  /77   Pulse 82   Wt 212 lb 12.8 oz (96.5 kg)   SpO2 100%   BMI 27.32 kg/m²   Constitutional: He appears well-developed and well-nourished. No acute distress. Cardiovascular: Normal rate, regular rhythm, normal heart sounds, and does not have murmur. Pulmonary/Chest: Effort normal. No respiratory distress. He does not have wheezes in the lung fields. He has no rales. Neurological/Psychiatric:He is alert and oriented to person, place, and time. Coordination is  normal.  His mood isAppropriate and affect is Neutral/Euthymic(normal) . IMPRESSION:   1. Chronic pain syndrome    2. Myofascial pain    3. Pathological fracture of left tibia due to other osteoporosis, sequela    4. Peripheral polyneuropathy    5. Polyneuropathy associated with underlying disease ([facility] [address]    6. Neuropathic pain    7. Ulcer of left foot, with fat layer exposed ([facility]        PLAN:  Informed verbal consent was obtained  -ROM/Stretching exercises as advised   -He was advised to increase fluids ( 5-7  glasses of fluid daily), limit caffeine, avoid cheese products, increase dietary fiber, increase activity and exercise as tolerated and relax regularly and enjoy meals   -he was advised  to avoid using too many OTC analgesics to control the headaches, avoid chocolates, increased caffeine, cheeses and MSG nitrite containing foods, cigarette smoking. To avoid bright lights, strong smells and skipping meals.   -Continue with Ultram 1-2 per day  -Walking 20 minutes daily as tolerated      Current Outpatient Medications   Medication Sig Dispense Refill    SUMAtriptan (IMITREX) 50 MG tablet Take one tab po at the start of migraine PRN max 1 every 24 hours 9 tablet 1    Erenumab-aooe (AIMOVIG, 140 MG DOSE,) 70 MG/ML SOAJ Inject 140 mLs into the skin every 30 days 1 pen 1    gabapentin (NEURONTIN) 400 MG capsule Take 1 capsule by mouth 2 times daily for 30 days.  60 capsule 1    DULoxetine (CYMBALTA) 60 MG extended release capsule Take 1 capsule by mouth daily 30 capsule 1    topiramate (TOPAMAX) 100 MG tablet Take 1 tablet by mouth 2 times daily 60 tablet 1    traMADol (ULTRAM) 50 MG tablet Take 1 tablet by mouth every 6 hours as needed for Pain (max 1-2 per day) for up to 28 days. 56 tablet 0    dicloxacillin (DYNAPEN) 500 MG capsule TAKE ONE CAPSULE BY MOUTH TWICE A DAY 60 capsule 11    LANTUS 100 UNIT/ML injection vial INJECT 30 UNITS UNDER THE SKIN ONCE NIGHTLY 30 mL 3    Continuous Blood Gluc Sensor (DEXCOM G6 SENSOR) MISC USE ONE SENSOR AND CHANGE EVERY 10 DAYS 2 each 3    Diabetic Shoe MISC by Does not apply route 1 each 0    insulin glulisine (APIDRA) 100 UNIT/ML injection INJECT 8-12 UNITS UNDER THE SKIN THREE TIMES A DAY WITH MEALS per sliding scale 1 pen 3    Continuous Blood Gluc  (DEXCOM G6 ) LINDA USE AS NEEDED TO CHECK BLOOD SUGAR 1 each 2    Glucagon (GVOKE HYPOPEN 2-PACK) 1 MG/0.2ML SOAJ INJECT AS NEEDED FOR LOW BLOOD SUGAR 1 each 3    aspirin 81 MG chewable tablet Take 1 tablet by mouth daily 30 tablet 0    pantoprazole (PROTONIX) 40 MG tablet Take 1 tablet by mouth 2 times daily 60 tablet 1    prednisoLONE sodium phosphate (INFLAMASE FORTE) 1 % ophthalmic solution 1 drop 4 times daily      prednisoLONE acetate (PRED FORTE) 1 % ophthalmic suspension Place 1 drop into the right eye three times daily 3 times daily x 1 week then 2x daily x2 weeks, then 1 daily thereafter      ketorolac (ACULAR) 0.5 % ophthalmic solution Place 1 drop into the right eye 3 times daily 3 times daily x 1week, then 2 x daily x 2 weeks, then 1 daily thereafter      Continuous Blood Gluc Transmit (DEXCOM G6 TRANSMITTER) MISC USE TO CHECK BLOOD SUGAR 2 each 3    blood glucose monitor kit and supplies Dispense sufficient amount for indicated testing frequency plus additional to accommodate PRN testing needs. Dispense all needed supplies to include: monitor, strips, lancing device, lancets, control solutions, alcohol swabs.  1 kit 0    blood glucose test strips (ACCU-CHEK CAPO PLUS) strip 4 times a day As needed. 150 each 3    Handicap Placard MISC by Does not apply route Diagnosis: Type 1 diabetes. Expires 4/13/23. 1 each 0    BD INSULIN SYRINGE U/F 31G X 5/16\" 0.3 ML MISC USE ONE SYRINGE FOUR TIMES A DAY BEFORE MEALS AND ONCE NIGHTLY 120 each 9    blood glucose test strips (ACCU-CHEK CAPO PLUS) strip 1 each by In Vitro route daily Test blood glucose 10 times daily Dx Code E10.8 300 each 10    blood glucose test strips (ACCU-CHEK CAPO PLUS) strip USE ONE STRIP TO TEST THREE TIMES A  strip 4    Accu-Chek FastClix Lancets MISC 1 each by Does not apply route daily Test blood glucose 10 times daily Dx Code E 10.8 900 each 2    triamcinolone (KENALOG) 0.1 % ointment Apply to thickened affected areas PRN sparingly for flares no longer than 2 weeks at one time, do not apply to cleared skin 80 g 0    Insulin Syringe-Needle U-100 (BD INSULIN SYRINGE U/F) 31G X 5/16\" 0.5 ML MISC Inject 1 each into the skin 4 times daily DX CODE E 10.22 120 each 9    fluticasone (FLONASE) 50 MCG/ACT nasal spray SPRAY TWO SPRAYS IN EACH NOSTRIL DAILY (Patient taking differently: daily as needed ) 16 g 4    hydrocortisone 2.5 % cream Apply topically 2 times daily. (Patient taking differently: Apply topically 2 times daily as needed) 30 g 0    Blood Glucose Monitoring Suppl (ACCU-CHEK CAPO PLUS) w/Device KIT 1 each by Does not apply route daily Test blood sugar 10 times daily Dx code E 10.8 1 kit 10    GLUCAGON EMERGENCY 1 MG injection INJECT 1MG INTO THE MUSCLE AS NEEDED 1 kit 3    Insulin Syringe-Needle U-100 (B-D INS SYR ULTRAFINE 1CC/31G) 31G X 5/16\" 1 ML MISC INJECT USING INSULIN ONCE DAILY 30 each 10    Multiple Vitamin (DAILY KITTY) TABS TAKE ONE TABLET BY MOUTH DAILY 30 tablet 5    Cholecalciferol (VITAMIN D3) 5000 units CAPS Take 1 capsule by mouth Daily 30 capsule 3    Dextromethorphan-Guaifenesin (MUCINEX DM)  MG TB12 Cough and congestion.  (Patient taking differently: as needed Cough and congestion.) 60 tablet 1    omega-3 acid ethyl esters (LOVAZA) 1 G capsule TAKE TWO CAPSULES BY MOUTH TWICE DAILY 120 capsule 5    MUCUS RELIEF DM  MG TABS TAKE ONE BY MOUTH DAILY AS NEEDED 30 tablet 1    Alcohol Swabs PADS Use as needed for insulin injection. 100 each 5    Probiotic Product (ACIDOPHILUS PROBIOTIC) CAPS capsule TAKE ONE CAPSULE BY MOUTH DAILY 28 capsule 4    polyvinyl alcohol (LIQUIFILM TEARS) 1.4 % ophthalmic solution 1 drop as needed      propranolol (INDERAL) 80 MG tablet Take 40 mg by mouth 2 times daily For migraines       Flaxseed, Linseed, (FLAX PO) Take 14 g by mouth daily as needed       potassium chloride (KLOR-CON M) 10 MEQ extended release tablet Take 1 tablet by mouth 2 times daily 60 tablet 0     No current facility-administered medications for this visit. I will continue his current medication regimen  which is part of the above treatment schedule. It has been helping with Mr. Rony Ramirez chronic  medical problems which for this visit include:   Diagnoses of Chronic pain syndrome, Myofascial pain, Pathological fracture of left tibia due to other osteoporosis, sequela, Peripheral polyneuropathy, Polyneuropathy associated with underlying disease ([address] Neuropathic pain, Ulcer of left foot, with fat layer exposed ([address] and Chronic migraine without aura, with intractable migraine, so stated, with status migrainosus were pertinent to this visit. Risks and benefits of the medications and other alternative treatments  including no treatment were discussed with the patient. The common side effects of these medications were also explained to the patient. Informed verbal consent was obtained.    Goals of current treatment regimen include improvement in pain, restoration of functioning- with focus on improvement in physical performance, general activity, work or disability,emotional distress, health care utilization and  decreased opioid medication consumption- titrating to the lowest effective dose. Will continue to monitor progress towards achieving/maintaining therapeutic goals with special emphasis on  1. Improvement in perceived interfernce  of pain with ADL's. Ability to do home exercises independently. Ability to do household chores indoor and/or outdoor work and social and leisure activities. Improve psychosocial and physical functioning. - he is showing progression towards this treatment goal with the current regimen. He was advised against drinking alcohol with the narcotic pain medicines, advised against driving or handling machinery while adjusting the dose of medicines or if having cognitive  issues related to the current medications. Risk of overdose and death, if medicines not taken as prescribed, were also discussed. If the patient develops new symptoms or if the symptoms worsen, the patient should call the office. While transcribing every attempt was made to maintain the accuracy of the note in terms of it's contents,there may have been some errors made inadvertently. Thank you for allowing me to participate in the care of this patient.     Pramod Vo MD.    Cc: Nikkie Gonzalez MD

## 2022-03-07 RX ORDER — GLUCAGON INJECTION, SOLUTION 1 MG/.2ML
INJECTION, SOLUTION SUBCUTANEOUS
Qty: 0.4 ML | Refills: 3 | Status: SHIPPED | OUTPATIENT
Start: 2022-03-07

## 2022-03-08 ENCOUNTER — TELEPHONE (OUTPATIENT)
Dept: ENDOCRINOLOGY | Age: 47
End: 2022-03-08

## 2022-03-08 NOTE — TELEPHONE ENCOUNTER
Submitted PA for Apidra 100UNIT/ML solution Key: [id number]   Via CM STATUS: Available without authorization.

## 2022-03-09 DIAGNOSIS — E10.22 TYPE 1 DIABETES MELLITUS WITH STAGE 3A CHRONIC KIDNEY DISEASE (HCC): Primary | ICD-10-CM

## 2022-03-09 DIAGNOSIS — N18.31 TYPE 1 DIABETES MELLITUS WITH STAGE 3A CHRONIC KIDNEY DISEASE (HCC): Primary | ICD-10-CM

## 2022-03-09 RX ORDER — INSULIN GLULISINE 100 [IU]/ML
INJECTION, SOLUTION SUBCUTANEOUS
Qty: 10 ML | Refills: 2 | Status: SHIPPED | OUTPATIENT
Start: 2022-03-09 | End: 2022-06-30

## 2022-03-09 NOTE — TELEPHONE ENCOUNTER
Requested Prescriptions     Pending Prescriptions Disp Refills    insulin glulisine (APIDRA) 100 UNIT/ML injection [Pharmacy Med Name: APIDRA 100 UNIT/ML VIAL]       Sig: INJECT 8 TO 12 UNITS UNDER THE SKIN THREE TIMES A DAY WITH MEALS PER SLIDING SCALE     LOV: 2/8/22  NOV: 5/24/22    Last Refilled: 11/1/21

## 2022-03-14 ENCOUNTER — TELEPHONE (OUTPATIENT)
Dept: PAIN MANAGEMENT | Age: 47
End: 2022-03-14

## 2022-03-14 NOTE — TELEPHONE ENCOUNTER
Submitted PA for AIMOVIG  Via [facility] Key: [id number] STATUS: \"Available without authorization. \"

## 2022-03-31 ENCOUNTER — OFFICE VISIT (OUTPATIENT)
Dept: PAIN MANAGEMENT | Age: 47
End: 2022-03-31
Payer: MEDICARE

## 2022-03-31 VITALS
DIASTOLIC BLOOD PRESSURE: 80 MMHG | SYSTOLIC BLOOD PRESSURE: 139 MMHG | HEART RATE: 92 BPM | WEIGHT: 215 LBS | OXYGEN SATURATION: 93 % | BODY MASS INDEX: 27.6 KG/M2

## 2022-03-31 DIAGNOSIS — M80.862S PATHOLOGICAL FRACTURE OF LEFT TIBIA DUE TO OTHER OSTEOPOROSIS, SEQUELA: ICD-10-CM

## 2022-03-31 DIAGNOSIS — G62.9 PERIPHERAL POLYNEUROPATHY: ICD-10-CM

## 2022-03-31 DIAGNOSIS — M79.2 NEUROPATHIC PAIN: ICD-10-CM

## 2022-03-31 DIAGNOSIS — M79.18 MYOFASCIAL PAIN: ICD-10-CM

## 2022-03-31 DIAGNOSIS — L97.522 ULCER OF LEFT FOOT, WITH FAT LAYER EXPOSED (HCC): ICD-10-CM

## 2022-03-31 DIAGNOSIS — G89.4 CHRONIC PAIN SYNDROME: ICD-10-CM

## 2022-03-31 DIAGNOSIS — G63 POLYNEUROPATHY ASSOCIATED WITH UNDERLYING DISEASE (HCC): ICD-10-CM

## 2022-03-31 PROCEDURE — 99213 OFFICE O/P EST LOW 20 MIN: CPT | Performed by: INTERNAL MEDICINE

## 2022-03-31 RX ORDER — DULOXETIN HYDROCHLORIDE 60 MG/1
60 CAPSULE, DELAYED RELEASE ORAL DAILY
Qty: 30 CAPSULE | Refills: 1 | Status: SHIPPED | OUTPATIENT
Start: 2022-03-31 | End: 2022-04-28 | Stop reason: SDUPTHER

## 2022-03-31 RX ORDER — GABAPENTIN 400 MG/1
400 CAPSULE ORAL 2 TIMES DAILY
Qty: 60 CAPSULE | Refills: 1 | Status: SHIPPED | OUTPATIENT
Start: 2022-03-31 | End: 2022-04-28 | Stop reason: SDUPTHER

## 2022-03-31 RX ORDER — ERENUMAB-AOOE 70 MG/ML
INJECTION SUBCUTANEOUS
Qty: 1 PEN | Refills: 1 | Status: SHIPPED | OUTPATIENT
Start: 2022-03-31 | End: 2022-04-28 | Stop reason: SDUPTHER

## 2022-03-31 RX ORDER — SUMATRIPTAN 50 MG/1
TABLET, FILM COATED ORAL
Qty: 9 TABLET | Refills: 1 | Status: SHIPPED | OUTPATIENT
Start: 2022-03-31 | End: 2022-04-28 | Stop reason: SDUPTHER

## 2022-03-31 RX ORDER — TOPIRAMATE 100 MG/1
100 TABLET, FILM COATED ORAL 2 TIMES DAILY
Qty: 60 TABLET | Refills: 1 | Status: SHIPPED | OUTPATIENT
Start: 2022-03-31 | End: 2022-04-28 | Stop reason: SDUPTHER

## 2022-03-31 RX ORDER — TRAMADOL HYDROCHLORIDE 50 MG/1
50 TABLET ORAL EVERY 6 HOURS PRN
Qty: 56 TABLET | Refills: 0 | Status: SHIPPED | OUTPATIENT
Start: 2022-03-31 | End: 2022-04-28 | Stop reason: SDUPTHER

## 2022-03-31 NOTE — PROGRESS NOTES
Ridge Guillory  1975  [id number]      HISTORY OF PRESENT ILLNESS:  Mr. Mahnaz Morales is a 42 y.o. male returns for a follow up visit for pain management  He has a diagnosis of   1. Chronic pain syndrome    2. Pathological fracture of left tibia due to other osteoporosis, sequela    3. Polyneuropathy associated with underlying disease ([address]    4. Ulcer of left foot, with fat layer exposed ([address]    5. Myofascial pain    6. Peripheral polyneuropathy    7. Neuropathic pain    8. Chronic migraine without aura, with intractable migraine, so stated, with status migrainosus    . He complains of pain in the head, neck, bilateral hands, left ankle, left foot He rates the pain 10/10 and describes it as sharp, aching, burning, numbness. Current treatment regimen has helped relieve about 10% of the pain. He denies any side effects from the current pain regimen. Patient reports that since the last follow up visit the physical functioning is better, family/social relationships are better, mood is unchanged sleep patterns are unchanged, and that the overall functioning is better. Patient denies misusing/abusing his narcotic pain medications or using any illegal drugs. There are No indicators for possible drug abuse, addiction or diversion problems, patient states she has been doing fair, his pain has been manageable. Mr. Mahnaz Morales mentions he is using Ultram 1-2 per day which is helping with the pain. He reports his blood sugar has been okay. he states that the medications are helping with the headaches  and they are tolerable. Denies nausea, vomiting, sonophobia, photophobia, photopsia, diplopia, vertigo, neck stiffness, Aimovig is helping with migraines along with the preventive medications. ALLERGIES: Patients list of allergies were reviewed     MEDICATIONS: Mr. Mahnaz Morales list of medications were reviewed. His current medications are   Outpatient Medications Prior to Visit   Medication Sig Dispense Refill    insulin  Continuous Blood Gluc Transmit (DEXCOM G6 TRANSMITTER) MISC USE TO CHECK BLOOD SUGAR 2 each 3    blood glucose monitor kit and supplies Dispense sufficient amount for indicated testing frequency plus additional to accommodate PRN testing needs. Dispense all needed supplies to include: monitor, strips, lancing device, lancets, control solutions, alcohol swabs. 1 kit 0    blood glucose test strips (ACCU-CHEK CAPO PLUS) strip 4 times a day As needed. 150 each 3    Handicap Placard MISC by Does not apply route Diagnosis: Type 1 diabetes. Expires 4/13/23. 1 each 0    BD INSULIN SYRINGE U/F 31G X 5/16\" 0.3 ML MISC USE ONE SYRINGE FOUR TIMES A DAY BEFORE MEALS AND ONCE NIGHTLY 120 each 9    blood glucose test strips (ACCU-CHEK CAPO PLUS) strip 1 each by In Vitro route daily Test blood glucose 10 times daily Dx Code E10.8 300 each 10    blood glucose test strips (ACCU-CHEK CAPO PLUS) strip USE ONE STRIP TO TEST THREE TIMES A  strip 4    Accu-Chek FastClix Lancets MISC 1 each by Does not apply route daily Test blood glucose 10 times daily Dx Code E 10.8 900 each 2    triamcinolone (KENALOG) 0.1 % ointment Apply to thickened affected areas PRN sparingly for flares no longer than 2 weeks at one time, do not apply to cleared skin 80 g 0    Insulin Syringe-Needle U-100 (BD INSULIN SYRINGE U/F) 31G X 5/16\" 0.5 ML MISC Inject 1 each into the skin 4 times daily DX CODE E 10.22 120 each 9    fluticasone (FLONASE) 50 MCG/ACT nasal spray SPRAY TWO SPRAYS IN EACH NOSTRIL DAILY (Patient taking differently: daily as needed ) 16 g 4    hydrocortisone 2.5 % cream Apply topically 2 times daily.  (Patient taking differently: Apply topically 2 times daily as needed) 30 g 0    Blood Glucose Monitoring Suppl (ACCU-CHEK CAPO PLUS) w/Device KIT 1 each by Does not apply route daily Test blood sugar 10 times daily Dx code E 10.8 1 kit 10    GLUCAGON EMERGENCY 1 MG injection INJECT 1MG INTO THE MUSCLE AS NEEDED 1 kit 3  Insulin Syringe-Needle U-100 (B-D INS SYR ULTRAFINE 1CC/31G) 31G X 5/16\" 1 ML MISC INJECT USING INSULIN ONCE DAILY 30 each 10    Multiple Vitamin (DAILY KITTY) TABS TAKE ONE TABLET BY MOUTH DAILY 30 tablet 5    Cholecalciferol (VITAMIN D3) 5000 units CAPS Take 1 capsule by mouth Daily 30 capsule 3    Dextromethorphan-Guaifenesin (MUCINEX DM)  MG TB12 Cough and congestion. (Patient taking differently: as needed Cough and congestion.) 60 tablet 1    omega-3 acid ethyl esters (LOVAZA) 1 G capsule TAKE TWO CAPSULES BY MOUTH TWICE DAILY 120 capsule 5    MUCUS RELIEF DM  MG TABS TAKE ONE BY MOUTH DAILY AS NEEDED 30 tablet 1    Alcohol Swabs PADS Use as needed for insulin injection. 100 each 5    Probiotic Product (ACIDOPHILUS PROBIOTIC) CAPS capsule TAKE ONE CAPSULE BY MOUTH DAILY 28 capsule 4    polyvinyl alcohol (LIQUIFILM TEARS) 1.4 % ophthalmic solution 1 drop as needed      propranolol (INDERAL) 80 MG tablet Take 40 mg by mouth 2 times daily For migraines       Flaxseed, Linseed, (FLAX PO) Take 14 g by mouth daily as needed       potassium chloride (KLOR-CON M) 10 MEQ extended release tablet Take 1 tablet by mouth 2 times daily 60 tablet 0     No facility-administered medications prior to visit. REVIEW OF SYSTEMS:    Respiratory: Negative for apnea, chest tightness and shortness of breath or change in baseline breathing. PHYSICAL EXAM:   Nursing note and vitals reviewed. /80   Pulse 92   Wt 215 lb (97.5 kg)   SpO2 93%   BMI 27.60 kg/m²   Constitutional: He appears well-developed and well-nourished. No acute distress. Cardiovascular: Normal rate, regular rhythm, normal heart sounds, and does not have murmur. Pulmonary/Chest: Effort normal. No respiratory distress. He does not have wheezes in the lung fields. He has no rales. Neurological/Psychiatric:He is alert and oriented to person, place, and time.  Coordination is  normal.  His mood isAppropriate and affect is Neutral/Euthymic(normal) . His  Other: using a cane, post op shoes bilateral feet     IMPRESSION:   1. Chronic pain syndrome    2. Pathological fracture of left tibia due to other osteoporosis, sequela    3. Polyneuropathy associated with underlying disease ([address]    4. Ulcer of left foot, with fat layer exposed ([address]    5. Myofascial pain    6. Peripheral polyneuropathy    7. Neuropathic pain        PLAN:  Informed verbal consent was obtained  -Continue with Ultram 1-2 per day   -Monitor blood sugar regularly, diabetic control- adv diabetic diet. Goal for fasting blood sugars around 120. Follow up with Endocrinologist/PCP also for on going management    -he was advised  to avoid using too many OTC analgesics to control the headaches, avoid chocolates, increased caffeine, cheeses and MSG nitrite containing foods, cigarette smoking. To avoid bright lights, strong smells and skipping meals.   -Continue with all other adjuvant medications as before      Current Outpatient Medications   Medication Sig Dispense Refill    insulin glulisine (APIDRA) 100 UNIT/ML injection INJECT 8 TO 12 UNITS UNDER THE SKIN THREE TIMES A DAY WITH MEALS PER SLIDING SCALE 10 mL 2    GVOKE HYPOPEN 2-PACK 1 MG/0.2ML SOAJ INJECT AS NEEDED FOR LOW BLOOD SUGAR 0.4 mL 3    SUMAtriptan (IMITREX) 50 MG tablet Take one tab po at the start of migraine PRN max 1 every 24 hours 9 tablet 1    Erenumab-aooe (AIMOVIG, 140 MG DOSE,) 70 MG/ML SOAJ Inject 140 mLs into the skin every 30 days 1 pen 1    gabapentin (NEURONTIN) 400 MG capsule Take 1 capsule by mouth 2 times daily for 30 days. 60 capsule 1    DULoxetine (CYMBALTA) 60 MG extended release capsule Take 1 capsule by mouth daily 30 capsule 1    topiramate (TOPAMAX) 100 MG tablet Take 1 tablet by mouth 2 times daily 60 tablet 1    traMADol (ULTRAM) 50 MG tablet Take 1 tablet by mouth every 6 hours as needed for Pain (max 1-2 per day) for up to 28 days.  56 tablet 0    dicloxacillin (DYNAPEN) 500 MG capsule TAKE ONE CAPSULE BY MOUTH TWICE A DAY 60 capsule 11    LANTUS 100 UNIT/ML injection vial INJECT 30 UNITS UNDER THE SKIN ONCE NIGHTLY 30 mL 3    Continuous Blood Gluc Sensor (DEXCOM G6 SENSOR) MISC USE ONE SENSOR AND CHANGE EVERY 10 DAYS 2 each 3    Diabetic Shoe MISC by Does not apply route 1 each 0    Continuous Blood Gluc  (DEXCOM G6 ) LINDA USE AS NEEDED TO CHECK BLOOD SUGAR 1 each 2    aspirin 81 MG chewable tablet Take 1 tablet by mouth daily 30 tablet 0    pantoprazole (PROTONIX) 40 MG tablet Take 1 tablet by mouth 2 times daily 60 tablet 1    prednisoLONE sodium phosphate (INFLAMASE FORTE) 1 % ophthalmic solution 1 drop 4 times daily      prednisoLONE acetate (PRED FORTE) 1 % ophthalmic suspension Place 1 drop into the right eye three times daily 3 times daily x 1 week then 2x daily x2 weeks, then 1 daily thereafter      ketorolac (ACULAR) 0.5 % ophthalmic solution Place 1 drop into the right eye 3 times daily 3 times daily x 1week, then 2 x daily x 2 weeks, then 1 daily thereafter      Continuous Blood Gluc Transmit (DEXCOM G6 TRANSMITTER) MISC USE TO CHECK BLOOD SUGAR 2 each 3    blood glucose monitor kit and supplies Dispense sufficient amount for indicated testing frequency plus additional to accommodate PRN testing needs. Dispense all needed supplies to include: monitor, strips, lancing device, lancets, control solutions, alcohol swabs. 1 kit 0    blood glucose test strips (ACCU-CHEK CAPO PLUS) strip 4 times a day As needed. 150 each 3    Handicap Placard MISC by Does not apply route Diagnosis: Type 1 diabetes.      Expires 4/13/23. 1 each 0    BD INSULIN SYRINGE U/F 31G X 5/16\" 0.3 ML MISC USE ONE SYRINGE FOUR TIMES A DAY BEFORE MEALS AND ONCE NIGHTLY 120 each 9    blood glucose test strips (ACCU-CHEK CAPO PLUS) strip 1 each by In Vitro route daily Test blood glucose 10 times daily Dx Code E10.8 300 each 10    blood glucose test strips (ACCU-CHEK CAPO PLUS) strip USE ONE STRIP TO TEST THREE TIMES A  strip 4    Accu-Chek FastClix Lancets MISC 1 each by Does not apply route daily Test blood glucose 10 times daily Dx Code E 10.8 900 each 2    triamcinolone (KENALOG) 0.1 % ointment Apply to thickened affected areas PRN sparingly for flares no longer than 2 weeks at one time, do not apply to cleared skin 80 g 0    Insulin Syringe-Needle U-100 (BD INSULIN SYRINGE U/F) 31G X 5/16\" 0.5 ML MISC Inject 1 each into the skin 4 times daily DX CODE E 10.22 120 each 9    fluticasone (FLONASE) 50 MCG/ACT nasal spray SPRAY TWO SPRAYS IN EACH NOSTRIL DAILY (Patient taking differently: daily as needed ) 16 g 4    hydrocortisone 2.5 % cream Apply topically 2 times daily. (Patient taking differently: Apply topically 2 times daily as needed) 30 g 0    Blood Glucose Monitoring Suppl (ACCU-CHEK CAPO PLUS) w/Device KIT 1 each by Does not apply route daily Test blood sugar 10 times daily Dx code E 10.8 1 kit 10    GLUCAGON EMERGENCY 1 MG injection INJECT 1MG INTO THE MUSCLE AS NEEDED 1 kit 3    Insulin Syringe-Needle U-100 (B-D INS SYR ULTRAFINE 1CC/31G) 31G X 5/16\" 1 ML MISC INJECT USING INSULIN ONCE DAILY 30 each 10    Multiple Vitamin (DAILY KITTY) TABS TAKE ONE TABLET BY MOUTH DAILY 30 tablet 5    Cholecalciferol (VITAMIN D3) 5000 units CAPS Take 1 capsule by mouth Daily 30 capsule 3    Dextromethorphan-Guaifenesin (MUCINEX DM)  MG TB12 Cough and congestion. (Patient taking differently: as needed Cough and congestion.) 60 tablet 1    omega-3 acid ethyl esters (LOVAZA) 1 G capsule TAKE TWO CAPSULES BY MOUTH TWICE DAILY 120 capsule 5    MUCUS RELIEF DM  MG TABS TAKE ONE BY MOUTH DAILY AS NEEDED 30 tablet 1    Alcohol Swabs PADS Use as needed for insulin injection.  100 each 5    Probiotic Product (ACIDOPHILUS PROBIOTIC) CAPS capsule TAKE ONE CAPSULE BY MOUTH DAILY 28 capsule 4    polyvinyl alcohol (LIQUIFILM TEARS) 1.4 % ophthalmic solution 1 drop as needed      propranolol (INDERAL) 80 MG tablet Take 40 mg by mouth 2 times daily For migraines       Flaxseed, Linseed, (FLAX PO) Take 14 g by mouth daily as needed       potassium chloride (KLOR-CON M) 10 MEQ extended release tablet Take 1 tablet by mouth 2 times daily 60 tablet 0     No current facility-administered medications for this visit. I will continue his current medication regimen  which is part of the above treatment schedule. It has been helping with Mr. Cristal Zamorano chronic  medical problems which for this visit include:   Diagnoses of Chronic pain syndrome, Pathological fracture of left tibia due to other osteoporosis, sequela, Polyneuropathy associated with underlying disease ([address] Ulcer of left foot, with fat layer exposed ([address] Myofascial pain, Peripheral polyneuropathy, Neuropathic pain, and Chronic migraine without aura, with intractable migraine, so stated, with status migrainosus were pertinent to this visit. Risks and benefits of the medications and other alternative treatments  including no treatment were discussed with the patient. The common side effects of these medications were also explained to the patient. Informed verbal consent was obtained. Goals of current treatment regimen include improvement in pain, restoration of functioning- with focus on improvement in physical performance, general activity, work or disability,emotional distress, health care utilization and  decreased medication consumption. Will continue to monitor progress towards achieving/maintaining therapeutic goals with special emphasis on  1. Improvement in perceived interfernce  of pain with ADL's. Ability to do home exercises independently. Ability to do household chores indoor and/or outdoor work and social and leisure activities. Improve psychosocial and physical functioning. - he is showing progression towards this treatment goal with the current regimen.     He was advised against drinking alcohol with the narcotic pain medicines, advised against driving or handling machinery while adjusting the dose of medicines or if having cognitive  issues related to the current medications. Risk of overdose and death, if medicines not taken as prescribed, were also discussed. If the patient develops new symptoms or if the symptoms worsen, the patient should call the office. While transcribing every attempt was made to maintain the accuracy of the note in terms of it's contents,there may have been some errors made inadvertently. Thank you for allowing me to participate in the care of this patient.     Alvaro Alvarado MD.    Cc: Sudhir Oden MD

## 2022-04-14 ENCOUNTER — HOSPITAL ENCOUNTER (EMERGENCY)
Age: 47
Discharge: HOME | End: 2022-04-14
Payer: MEDICARE

## 2022-04-14 VITALS
RESPIRATION RATE: 16 BRPM | DIASTOLIC BLOOD PRESSURE: 87 MMHG | TEMPERATURE: 97.9 F | BODY MASS INDEX: 27.6 KG/M2 | HEART RATE: 77 BPM | OXYGEN SATURATION: 100 % | SYSTOLIC BLOOD PRESSURE: 148 MMHG | WEIGHT: 215 LBS

## 2022-04-14 DIAGNOSIS — E16.2 HYPOGLYCEMIA: Primary | ICD-10-CM

## 2022-04-14 LAB
GLUCOSE BLD-MCNC: 126 MG/DL (ref 70–99)
GLUCOSE BLD-MCNC: 138 MG/DL (ref 70–99)
PERFORMED ON: ABNORMAL
PERFORMED ON: ABNORMAL

## 2022-04-14 PROCEDURE — 99283 EMERGENCY DEPT VISIT LOW MDM: CPT

## 2022-04-14 ASSESSMENT — ENCOUNTER SYMPTOMS
SORE THROAT: 0
SHORTNESS OF BREATH: 0
DIARRHEA: 0
BLOOD IN STOOL: 0
RHINORRHEA: 0
BACK PAIN: 0
COUGH: 0
ABDOMINAL PAIN: 0
VOMITING: 0
NAUSEA: 0
EYE PAIN: 0

## 2022-04-14 NOTE — ED TRIAGE NOTES
Chief Complaint   Patient presents with    Hypoglycemia     bs 40 at home. squad gave pt dextrose iv.  fsbs on arrival is 126. pt stating he wants to leave.

## 2022-04-14 NOTE — ED PROVIDER NOTES
[address] ED  EMERGENCY DEPARTMENT ENCOUNTER        Pt Name: Azar Chapman  MRN: [id number]  [place] 1975  Date of evaluation: 4/14/2022  Provider: ARTURO Redman - CNP  PCP: Kaylee Montero MD  Note Started: 4:39 PM EDT      SOCORRO. I have evaluated this patient. My supervising physician was available for consultation. Triage CHIEF COMPLAINT       Chief Complaint   Patient presents with    Hypoglycemia     bs 40 at home. squad gave pt dextrose iv.  fsbs on arrival is 126. pt stating he wants to leave. HISTORY OF PRESENT ILLNESS   (Location/Symptom, Timing/Onset, Context/Setting, Quality, Duration, Modifying Factors, Severity)  Note limiting factors. Chief Complaint: Hypoglycemia    Azar Chapman is a 42 y.o. male who presents to the emergency department symptoms of hypoglycemia. Patient states that he is a type I diabetic and takes insulin. He states that he took his dose of insulin today and believes he may have had enough to eat afterwards. He states that he began to feel drowsy. He states that his family then called EMS as he did not look well and upon their arrival patient's glucose was 42. Patient was provided dextrose and immediately returned back to his normal.  Patient states that he feels well at this time. He does not want a work-up and states he wants to leave. States that he is feeling much better and is going to be calling his ride to come pick him up. He states that he has no complaints. He denies any symptoms of headache or vision changes. No chest pain or shortness of breath. No dizziness. Denies any abdominal pain. States that he has not been vomiting or ill. No recent fever. States that he has no other acute complaints states otherwise feeling really well. States that he does not need a work-up. Emergency department and will be leaving when his ride arrives.     Nursing Notes were all reviewed and agreed with or any disagreements were addressed in the HPI. REVIEW OF SYSTEMS    (2-9 systems for level 4, 10 or more for level 5)     Review of Systems   Constitutional: Negative for chills, diaphoresis and fever. HENT: Negative for congestion, ear pain, rhinorrhea and sore throat. Eyes: Negative for pain and visual disturbance. Respiratory: Negative for cough and shortness of breath. Cardiovascular: Negative for chest pain and leg swelling. Gastrointestinal: Negative for abdominal pain, blood in stool, diarrhea, nausea and vomiting. Genitourinary: Negative for difficulty urinating, dysuria, flank pain and frequency. Musculoskeletal: Negative for back pain and neck pain. Skin: Negative for rash and wound. Neurological: Negative for dizziness and light-headedness.        PAST MEDICAL HISTORY     Past Medical History:   Diagnosis Date    Acute respiratory failure ([address] 8/18/2014    Asthma     Blood pressure instability     Chronic pain syndrome     Detached retina, bilateral     laser tx right eye    Diabetes mellitus ([address]     uncontrolled     Insomnia     Meningitis August 2014    admission [facility]    Neuropathic pain     Osteomyelitis ([address]     Osteomyelitis of tibia ([address]     left    Pain of left lower extremity     Peripheral neuropathy        SURGICAL HISTORY     Past Surgical History:   Procedure Laterality Date    ANKLE FRACTURE SURGERY Left 1/28/13    Dr. Adam Mensah  August 2013    left tibia with external fixation device    EYE SURGERY      retina reattachment left eye x 3 holes repairs    EYE SURGERY Right 9-27-13    retal detachment    LEG SURGERY Left 3/31/14    ORIF left fibula and tibia    AL EGD FLEXIBLE FOREIGN BODY REMOVAL N/A 9/21/2018    EGD FOREIGN BODY REMOVAL performed by Kenya Loredo MD at [address]      with external device inplace    UPPER GASTROINTESTINAL ENDOSCOPY N/A 9/21/2018    EGD BIOPSY performed by Jesi House Gabe Stroud MD at [address] [place] N/A 9/25/2021    EGD BIOPSY performed by Andres Dykes MD at [address]       Previous Medications    ACCU-CHEK FASTCLIX LANCETS MISC    1 each by Does not apply route daily Test blood glucose 10 times daily Dx Code E 10.8    ALCOHOL SWABS PADS    Use as needed for insulin injection. ASPIRIN 81 MG CHEWABLE TABLET    Take 1 tablet by mouth daily    BD INSULIN SYRINGE U/F 31G X 5/16\" 0.3 ML MISC    USE ONE SYRINGE FOUR TIMES A DAY BEFORE MEALS AND ONCE NIGHTLY    BLOOD GLUCOSE MONITOR KIT AND SUPPLIES    Dispense sufficient amount for indicated testing frequency plus additional to accommodate PRN testing needs. Dispense all needed supplies to include: monitor, strips, lancing device, lancets, control solutions, alcohol swabs. BLOOD GLUCOSE MONITORING SUPPL (ACCU-CHEK CAPO PLUS) W/DEVICE KIT    1 each by Does not apply route daily Test blood sugar 10 times daily Dx code E 10.8    BLOOD GLUCOSE TEST STRIPS (ACCU-CHEK CAPO PLUS) STRIP    USE ONE STRIP TO TEST THREE TIMES A DAY    BLOOD GLUCOSE TEST STRIPS (ACCU-CHEK CAPO PLUS) STRIP    1 each by In Vitro route daily Test blood glucose 10 times daily Dx Code E10.8    BLOOD GLUCOSE TEST STRIPS (ACCU-CHEK CAPO PLUS) STRIP    4 times a day As needed. CHOLECALCIFEROL (VITAMIN D3) 5000 UNITS CAPS    Take 1 capsule by mouth Daily    CONTINUOUS BLOOD GLUC  (DEXCOM G6 ) LINDA    USE AS NEEDED TO CHECK BLOOD SUGAR    CONTINUOUS BLOOD GLUC SENSOR (DEXCOM G6 SENSOR) MISC    USE ONE SENSOR AND CHANGE EVERY 10 DAYS    CONTINUOUS BLOOD GLUC TRANSMIT (DEXCOM G6 TRANSMITTER) MISC    USE TO CHECK BLOOD SUGAR    DEXTROMETHORPHAN-GUAIFENESIN (MUCINEX DM)  MG TB12    Cough and congestion.     DIABETIC SHOE MISC    by Does not apply route    DICLOXACILLIN (DYNAPEN) 500 MG CAPSULE    TAKE ONE CAPSULE BY MOUTH TWICE A DAY    DULOXETINE (CYMBALTA) 60 MG EXTENDED RELEASE CAPSULE    Take 1 capsule by mouth daily    ERENUMAB-AOOE (AIMOVIG, 140 MG DOSE,) 70 MG/ML SOAJ    Inject 140 mLs into the skin every 30 days    FLAXSEED, LINSEED, (FLAX PO)    Take 14 g by mouth daily as needed     FLUTICASONE (FLONASE) 50 MCG/ACT NASAL SPRAY    SPRAY TWO SPRAYS IN EACH NOSTRIL DAILY    GABAPENTIN (NEURONTIN) 400 MG CAPSULE    Take 1 capsule by mouth 2 times daily for 30 days. GLUCAGON EMERGENCY 1 MG INJECTION    INJECT 1MG INTO THE MUSCLE AS NEEDED    GVOKE HYPOPEN 2-PACK 1 MG/0.2ML SOAJ    INJECT AS NEEDED FOR LOW BLOOD SUGAR    HANDICAP PLACARD MISC    by Does not apply route Diagnosis: Type 1 diabetes. Expires 4/13/23. HYDROCORTISONE 2.5 % CREAM    Apply topically 2 times daily.     INSULIN GLULISINE (APIDRA) 100 UNIT/ML INJECTION    INJECT 8 TO 12 UNITS UNDER THE SKIN THREE TIMES A DAY WITH MEALS PER SLIDING SCALE    INSULIN SYRINGE-NEEDLE U-100 (B-D INS SYR ULTRAFINE 1CC/31G) 31G X 5/16\" 1 ML MISC    INJECT USING INSULIN ONCE DAILY    INSULIN SYRINGE-NEEDLE U-100 (BD INSULIN SYRINGE U/F) 31G X 5/16\" 0.5 ML MISC    Inject 1 each into the skin 4 times daily DX CODE E 10.22    KETOROLAC (ACULAR) 0.5 % OPHTHALMIC SOLUTION    Place 1 drop into the right eye 3 times daily 3 times daily x 1week, then 2 x daily x 2 weeks, then 1 daily thereafter    LANTUS 100 UNIT/ML INJECTION VIAL    INJECT 30 UNITS UNDER THE SKIN ONCE NIGHTLY    MUCUS RELIEF DM  MG TABS    TAKE ONE BY MOUTH DAILY AS NEEDED    MULTIPLE VITAMIN (DAILY KITTY) TABS    TAKE ONE TABLET BY MOUTH DAILY    OMEGA-3 ACID ETHYL ESTERS (LOVAZA) 1 G CAPSULE    TAKE TWO CAPSULES BY MOUTH TWICE DAILY    PANTOPRAZOLE (PROTONIX) 40 MG TABLET    Take 1 tablet by mouth 2 times daily    POLYVINYL ALCOHOL (LIQUIFILM TEARS) 1.4 % OPHTHALMIC SOLUTION    1 drop as needed    POTASSIUM CHLORIDE (KLOR-CON M) 10 MEQ EXTENDED RELEASE TABLET    Take 1 tablet by mouth 2 times daily    PREDNISOLONE ACETATE (PRED FORTE) 1 % OPHTHALMIC SUSPENSION    Place 1 drop into the right eye three times daily 3 times daily x 1 week then 2x daily x2 weeks, then 1 daily thereafter    PREDNISOLONE SODIUM PHOSPHATE (INFLAMASE FORTE) 1 % OPHTHALMIC SOLUTION    1 drop 4 times daily    PROBIOTIC PRODUCT (ACIDOPHILUS PROBIOTIC) CAPS CAPSULE    TAKE ONE CAPSULE BY MOUTH DAILY    PROPRANOLOL (INDERAL) 80 MG TABLET    Take 40 mg by mouth 2 times daily For migraines     SUMATRIPTAN (IMITREX) 50 MG TABLET    Take one tab po at the start of migraine PRN max 1 every 24 hours    TOPIRAMATE (TOPAMAX) 100 MG TABLET    Take 1 tablet by mouth 2 times daily    TRAMADOL (ULTRAM) 50 MG TABLET    Take 1 tablet by mouth every 6 hours as needed for Pain (max 1-2 per day) for up to 28 days.     TRIAMCINOLONE (KENALOG) 0.1 % OINTMENT    Apply to thickened affected areas PRN sparingly for flares no longer than 2 weeks at one time, do not apply to cleared skin       ALLERGIES     Tegaderm ag mesh 2\"x2\" [wound dressings], Codeine, Tape [adhesive tape], and Other    FAMILYHISTORY       Family History   Problem Relation Age of Onset    Asthma Other     Diabetes Other     High Blood Pressure Other         SOCIAL HISTORY       Social History     Socioeconomic History    Marital status: Single     Spouse name: None    Number of children: None    Years of education: None    Highest education level: None   Occupational History    Occupation: n/a   Tobacco Use    Smoking status: Never Smoker    Smokeless tobacco: Never Used   Vaping Use    Vaping Use: Never used   Substance and Sexual Activity    Alcohol use: No     Alcohol/week: 0.0 standard drinks    Drug use: No    Sexual activity: Not Currently   Other Topics Concern    None   Social History Narrative    None     Social Determinants of Health     Financial Resource Strain: Low Risk     Difficulty of Paying Living Expenses: Not hard at all   Food Insecurity: No Food Insecurity    Worried About Running Out of Food in the Last Year: Never true    Ran Out of Food in the Last Year: Never true   Transportation Needs:     Lack of Transportation (Medical): Not on file    Lack of Transportation (Non-Medical): Not on file   Physical Activity:     Days of Exercise per Week: Not on file    Minutes of Exercise per Session: Not on file   Stress:     Feeling of Stress : Not on file   Social Connections:     Frequency of Communication with Friends and Family: Not on file    Frequency of Social Gatherings with Friends and Family: Not on file    Attends Faith Services: Not on file    Active Member of [address] [place] [address] [employer] or Organizations: Not on file    Attends Club or Organization Meetings: Not on file    Marital Status: Not on file   Intimate Partner Violence:     Fear of Current or Ex-Partner: Not on file    Emotionally Abused: Not on file    Physically Abused: Not on file    Sexually Abused: Not on file   Housing Stability:     Unable to Pay for Housing in the Last Year: Not on file    Number of [place] in the Last Year: Not on file    Unstable Housing in the Last Year: Not on file       SCREENINGS    [place] Coma Scale  Eye Opening: Spontaneous  Best Verbal Response: Oriented  Best Motor Response: Obeys commands  [place] Coma Scale Score: 15        PHYSICAL EXAM    (up to 7 for level 4, 8 or more for level 5)     ED Triage Vitals [04/14/22 1555]   BP Temp Temp Source Pulse Resp SpO2 Height Weight   (!) 148/87 97.9 °F (36.6 °C) Oral 77 16 100 % -- 215 lb (97.5 kg)       Physical Exam  Vitals and nursing note reviewed. Constitutional:       Appearance: Normal appearance. He is not toxic-appearing or diaphoretic. HENT:      Head: Normocephalic and atraumatic. Nose: Nose normal.   Eyes:      General:         Right eye: No discharge. Left eye: No discharge. Cardiovascular:      Rate and Rhythm: Normal rate and regular rhythm. Pulses: Normal pulses. Heart sounds: No murmur heard.       Pulmonary:      Effort: Pulmonary effort is normal. No respiratory distress. Breath sounds: No wheezing or rhonchi. Abdominal:      Palpations: Abdomen is soft. Tenderness: There is no abdominal tenderness. There is no guarding or rebound. Musculoskeletal:         General: Normal range of motion. Cervical back: Normal range of motion and neck supple. Skin:     General: Skin is warm and dry. Capillary Refill: Capillary refill takes less than 2 seconds. Neurological:      General: No focal deficit present. Mental Status: He is alert and oriented to person, place, and time. GCS: GCS eye subscore is 4. GCS verbal subscore is 5. GCS motor subscore is 6. Cranial Nerves: No dysarthria or facial asymmetry. Sensory: Sensation is intact. No sensory deficit. Motor: No weakness or tremor. Psychiatric:         Mood and Affect: Mood normal.         Behavior: Behavior normal.         DIAGNOSTIC RESULTS   LABS:    Labs Reviewed   POCT GLUCOSE - Abnormal; Notable for the following components:       Result Value    POC Glucose 126 (*)     All other components within normal limits       When ordered, only abnormal lab results are displayed. All other labs were within normal range or not returned as of this dictation. EKG: When ordered, EKG's are interpreted by the Emergency Department Physician in the absence of a cardiologist.  Please see their note for interpretation of EKG. RADIOLOGY:   Non-plain film images such as CT, Ultrasound and MRI are read by the radiologist. Plain radiographic images are visualized andpreliminarily interpreted by the  ED Provider with the below findings:        Interpretation perthe Radiologist below, if available at the time of this note:    No orders to display     No results found.       PROCEDURES   Unless otherwise noted below, none     Procedures    CRITICAL CARE TIME   N/A    CONSULTS:  None      EMERGENCY DEPARTMENT COURSE and DIFFERENTIAL DIAGNOSIS/MDM: Vitals:    Vitals:    04/14/22 1555   BP: (!) 148/87   Pulse: 77   Resp: 16   Temp: 97.9 °F (36.6 °C)   TempSrc: Oral   SpO2: 100%   Weight: 215 lb (97.5 kg)       Patient was given thefollowing medications:  Medications - No data to display        Patient is eating at bedside. We will go ahead and give the patient oral nutrition here in the emergency department while he is awaiting for his ride. Upon his arrival arrival we will recheck a blood glucose and if within normal limits plan is to discharge the patient. He states that he has no other acute complaints and states he feels well. He continues to refuse further work-up or evaluation in the emergency department. Patient has eating currently and is in no acute distress. FINAL IMPRESSION      1.  Hypoglycemia          DISPOSITION/PLAN   DISPOSITION Decision To Discharge 04/14/2022 04:48:29 PM      PATIENT REFERREDTO:  Emile Mendiola MD  [phone]  [address]   [address] [facility] [address]  [phone]    Schedule an appointment as soon as possible for a visit         DISCHARGE MEDICATIONS:  New Prescriptions    No medications on file       DISCONTINUED MEDICATIONS:  Discontinued Medications    No medications on file              (Please note that portions ofthis note were completed with a voice recognition program.  Efforts were made to edit the dictations but occasionally words are mis-transcribed.)    ARTURO Smith CNP (electronically signed)            ARTURO Smith CNP  04/14/22 [id number]

## 2022-04-15 ENCOUNTER — CARE COORDINATION (OUTPATIENT)
Dept: CARE COORDINATION | Age: 47
End: 2022-04-15

## 2022-04-15 NOTE — CARE COORDINATION
Ed follow up call attempted, pt vm picked up. hipaa compliant message left.  Will attempt again at later date

## 2022-04-17 ENCOUNTER — APPOINTMENT (OUTPATIENT)
Dept: GENERAL RADIOLOGY | Age: 47
End: 2022-04-17
Payer: MEDICARE

## 2022-04-17 ENCOUNTER — HOSPITAL ENCOUNTER (OUTPATIENT)
Age: 47
Setting detail: OBSERVATION
Discharge: HOME | End: 2022-04-18
Attending: EMERGENCY MEDICINE | Admitting: INTERNAL MEDICINE
Payer: MEDICARE

## 2022-04-17 DIAGNOSIS — E10.22 TYPE 1 DIABETES MELLITUS WITH STAGE 3 CHRONIC KIDNEY DISEASE (HCC): ICD-10-CM

## 2022-04-17 DIAGNOSIS — B88.8 INFESTATION BY BED BUG: ICD-10-CM

## 2022-04-17 DIAGNOSIS — E87.20 LACTIC ACIDOSIS: ICD-10-CM

## 2022-04-17 DIAGNOSIS — N18.30 TYPE 1 DIABETES MELLITUS WITH STAGE 3 CHRONIC KIDNEY DISEASE (HCC): ICD-10-CM

## 2022-04-17 DIAGNOSIS — E16.2 HYPOGLYCEMIA: Primary | ICD-10-CM

## 2022-04-17 DIAGNOSIS — T68.XXXA HYPOTHERMIA, INITIAL ENCOUNTER: ICD-10-CM

## 2022-04-17 DIAGNOSIS — R65.10 SIRS (SYSTEMIC INFLAMMATORY RESPONSE SYNDROME) (HCC): ICD-10-CM

## 2022-04-17 LAB
A/G RATIO: 1.1 (ref 1.1–2.2)
ALBUMIN SERPL-MCNC: 4.1 G/DL (ref 3.4–5)
ALP BLD-CCNC: 123 U/L (ref 40–129)
ALT SERPL-CCNC: 17 U/L (ref 10–40)
ANION GAP SERPL CALCULATED.3IONS-SCNC: 19 MMOL/L (ref 3–16)
AST SERPL-CCNC: 16 U/L (ref 15–37)
BASE EXCESS VENOUS: -6.8 MMOL/L (ref -3–3)
BASOPHILS ABSOLUTE: 0.1 K/UL (ref 0–0.2)
BASOPHILS RELATIVE PERCENT: 0.7 %
BETA-HYDROXYBUTYRATE: 0.2 MMOL/L (ref 0–0.27)
BILIRUB SERPL-MCNC: 0.3 MG/DL (ref 0–1)
BILIRUBIN URINE: NEGATIVE
BLOOD, URINE: NEGATIVE
BUN BLDV-MCNC: 14 MG/DL (ref 7–20)
CALCIUM SERPL-MCNC: 9.2 MG/DL (ref 8.3–10.6)
CARBOXYHEMOGLOBIN: 1 % (ref 0–1.5)
CHLORIDE BLD-SCNC: 101 MMOL/L (ref 99–110)
CLARITY: CLEAR
CO2: 20 MMOL/L (ref 21–32)
COLOR: YELLOW
CREAT SERPL-MCNC: 1.4 MG/DL (ref 0.9–1.3)
EOSINOPHILS ABSOLUTE: 0.2 K/UL (ref 0–0.6)
EOSINOPHILS RELATIVE PERCENT: 1.9 %
GFR AFRICAN AMERICAN: >60
GFR NON-AFRICAN AMERICAN: 55
GLUCOSE BLD-MCNC: 130 MG/DL (ref 70–99)
GLUCOSE BLD-MCNC: 150 MG/DL (ref 70–99)
GLUCOSE BLD-MCNC: 170 MG/DL (ref 70–99)
GLUCOSE URINE: 250 MG/DL
HCO3 VENOUS: 20.8 MMOL/L (ref 23–29)
HCT VFR BLD CALC: 42.2 % (ref 40.5–52.5)
HEMOGLOBIN: 14.1 G/DL (ref 13.5–17.5)
INFLUENZA A: NOT DETECTED
INFLUENZA B: NOT DETECTED
KETONES, URINE: NEGATIVE MG/DL
LACTIC ACID, SEPSIS: 1.9 MMOL/L (ref 0.4–1.9)
LACTIC ACID, SEPSIS: 1.9 MMOL/L (ref 0.4–1.9)
LACTIC ACID: 7.1 MMOL/L (ref 0.4–2)
LEUKOCYTE ESTERASE, URINE: NEGATIVE
LYMPHOCYTES ABSOLUTE: 1.6 K/UL (ref 1–5.1)
LYMPHOCYTES RELATIVE PERCENT: 17.8 %
MAGNESIUM: 2.3 MG/DL (ref 1.8–2.4)
MCH RBC QN AUTO: 27.9 PG (ref 26–34)
MCHC RBC AUTO-ENTMCNC: 33.4 G/DL (ref 31–36)
MCV RBC AUTO: 83.6 FL (ref 80–100)
METHEMOGLOBIN VENOUS: 0.3 %
MICROSCOPIC EXAMINATION: YES
MONOCYTES ABSOLUTE: 0.4 K/UL (ref 0–1.3)
MONOCYTES RELATIVE PERCENT: 4.7 %
MUCUS: ABNORMAL /LPF
NEUTROPHILS ABSOLUTE: 6.5 K/UL (ref 1.7–7.7)
NEUTROPHILS RELATIVE PERCENT: 74.9 %
NITRITE, URINE: NEGATIVE
O2 CONTENT, VEN: 14 VOL %
O2 SAT, VEN: 68 %
O2 THERAPY: ABNORMAL
PCO2, VEN: 49.8 MMHG (ref 40–50)
PDW BLD-RTO: 14.2 % (ref 12.4–15.4)
PERFORMED ON: ABNORMAL
PERFORMED ON: ABNORMAL
PH UA: 6 (ref 5–8)
PH VENOUS: 7.24 (ref 7.35–7.45)
PLATELET # BLD: 407 K/UL (ref 135–450)
PMV BLD AUTO: 6.6 FL (ref 5–10.5)
PO2, VEN: 41.3 MMHG (ref 25–40)
POTASSIUM REFLEX MAGNESIUM: 3.1 MMOL/L (ref 3.5–5.1)
PROTEIN UA: 30 MG/DL
RBC # BLD: 5.05 M/UL (ref 4.2–5.9)
RBC UA: ABNORMAL /HPF (ref 0–4)
SARS-COV-2 RNA, RT PCR: NOT DETECTED
SODIUM BLD-SCNC: 140 MMOL/L (ref 136–145)
SPECIFIC GRAVITY UA: 1.02 (ref 1–1.03)
TCO2 CALC VENOUS: 22 MMOL/L
TOTAL PROTEIN: 7.7 G/DL (ref 6.4–8.2)
URINE TYPE: ABNORMAL
UROBILINOGEN, URINE: 0.2 E.U./DL
WBC # BLD: 8.7 K/UL (ref 4–11)
WBC UA: ABNORMAL /HPF (ref 0–5)

## 2022-04-17 PROCEDURE — 6370000000 HC RX 637 (ALT 250 FOR IP): Performed by: INTERNAL MEDICINE

## 2022-04-17 PROCEDURE — 6360000002 HC RX W HCPCS

## 2022-04-17 PROCEDURE — 96366 THER/PROPH/DIAG IV INF ADDON: CPT

## 2022-04-17 PROCEDURE — 71045 X-RAY EXAM CHEST 1 VIEW: CPT

## 2022-04-17 PROCEDURE — 82010 KETONE BODYS QUAN: CPT

## 2022-04-17 PROCEDURE — 96375 TX/PRO/DX INJ NEW DRUG ADDON: CPT

## 2022-04-17 PROCEDURE — 96372 THER/PROPH/DIAG INJ SC/IM: CPT

## 2022-04-17 PROCEDURE — 87040 BLOOD CULTURE FOR BACTERIA: CPT

## 2022-04-17 PROCEDURE — 96365 THER/PROPH/DIAG IV INF INIT: CPT

## 2022-04-17 PROCEDURE — 83605 ASSAY OF LACTIC ACID: CPT

## 2022-04-17 PROCEDURE — 6360000002 HC RX W HCPCS: Performed by: EMERGENCY MEDICINE

## 2022-04-17 PROCEDURE — G0378 HOSPITAL OBSERVATION PER HR: HCPCS

## 2022-04-17 PROCEDURE — 82803 BLOOD GASES ANY COMBINATION: CPT

## 2022-04-17 PROCEDURE — 36415 COLL VENOUS BLD VENIPUNCTURE: CPT

## 2022-04-17 PROCEDURE — 83735 ASSAY OF MAGNESIUM: CPT

## 2022-04-17 PROCEDURE — 85025 COMPLETE CBC W/AUTO DIFF WBC: CPT

## 2022-04-17 PROCEDURE — 2580000003 HC RX 258: Performed by: EMERGENCY MEDICINE

## 2022-04-17 PROCEDURE — 81001 URINALYSIS AUTO W/SCOPE: CPT

## 2022-04-17 PROCEDURE — 87086 URINE CULTURE/COLONY COUNT: CPT

## 2022-04-17 PROCEDURE — 87636 SARSCOV2 & INF A&B AMP PRB: CPT

## 2022-04-17 PROCEDURE — 80053 COMPREHEN METABOLIC PANEL: CPT

## 2022-04-17 PROCEDURE — 96367 TX/PROPH/DG ADDL SEQ IV INF: CPT

## 2022-04-17 PROCEDURE — 6360000002 HC RX W HCPCS: Performed by: INTERNAL MEDICINE

## 2022-04-17 PROCEDURE — 99285 EMERGENCY DEPT VISIT HI MDM: CPT

## 2022-04-17 RX ORDER — SODIUM CHLORIDE 0.9 % (FLUSH) 0.9 %
5-40 SYRINGE (ML) INJECTION PRN
Status: DISCONTINUED | OUTPATIENT
Start: 2022-04-17 | End: 2022-04-18 | Stop reason: HOSPADM

## 2022-04-17 RX ORDER — SODIUM CHLORIDE 9 MG/ML
INJECTION, SOLUTION INTRAVENOUS PRN
Status: DISCONTINUED | OUTPATIENT
Start: 2022-04-17 | End: 2022-04-17

## 2022-04-17 RX ORDER — PROPRANOLOL HYDROCHLORIDE 40 MG/1
40 TABLET ORAL 2 TIMES DAILY
Status: DISCONTINUED | OUTPATIENT
Start: 2022-04-17 | End: 2022-04-18 | Stop reason: HOSPADM

## 2022-04-17 RX ORDER — ONDANSETRON 2 MG/ML
INJECTION INTRAMUSCULAR; INTRAVENOUS
Status: COMPLETED
Start: 2022-04-17 | End: 2022-04-17

## 2022-04-17 RX ORDER — SODIUM CHLORIDE 0.9 % (FLUSH) 0.9 %
5-40 SYRINGE (ML) INJECTION EVERY 12 HOURS SCHEDULED
Status: DISCONTINUED | OUTPATIENT
Start: 2022-04-17 | End: 2022-04-17

## 2022-04-17 RX ORDER — SODIUM CHLORIDE 9 MG/ML
INJECTION, SOLUTION INTRAVENOUS PRN
Status: DISCONTINUED | OUTPATIENT
Start: 2022-04-17 | End: 2022-04-18 | Stop reason: HOSPADM

## 2022-04-17 RX ORDER — GABAPENTIN 100 MG/1
100 CAPSULE ORAL 2 TIMES DAILY
Status: DISCONTINUED | OUTPATIENT
Start: 2022-04-17 | End: 2022-04-18 | Stop reason: HOSPADM

## 2022-04-17 RX ORDER — ACETAMINOPHEN 650 MG/1
650 SUPPOSITORY RECTAL EVERY 6 HOURS PRN
Status: DISCONTINUED | OUTPATIENT
Start: 2022-04-17 | End: 2022-04-18 | Stop reason: HOSPADM

## 2022-04-17 RX ORDER — ONDANSETRON 2 MG/ML
4 INJECTION INTRAMUSCULAR; INTRAVENOUS EVERY 6 HOURS PRN
Status: DISCONTINUED | OUTPATIENT
Start: 2022-04-17 | End: 2022-04-18 | Stop reason: HOSPADM

## 2022-04-17 RX ORDER — ACETAMINOPHEN 325 MG/1
650 TABLET ORAL EVERY 6 HOURS PRN
Status: DISCONTINUED | OUTPATIENT
Start: 2022-04-17 | End: 2022-04-18 | Stop reason: HOSPADM

## 2022-04-17 RX ORDER — 0.9 % SODIUM CHLORIDE 0.9 %
30 INTRAVENOUS SOLUTION INTRAVENOUS ONCE
Status: COMPLETED | OUTPATIENT
Start: 2022-04-17 | End: 2022-04-17

## 2022-04-17 RX ORDER — ONDANSETRON 2 MG/ML
4 INJECTION INTRAMUSCULAR; INTRAVENOUS EVERY 6 HOURS PRN
Status: DISCONTINUED | OUTPATIENT
Start: 2022-04-17 | End: 2022-04-17

## 2022-04-17 RX ORDER — ONDANSETRON 4 MG/1
4 TABLET, ORALLY DISINTEGRATING ORAL EVERY 8 HOURS PRN
Status: DISCONTINUED | OUTPATIENT
Start: 2022-04-17 | End: 2022-04-18 | Stop reason: HOSPADM

## 2022-04-17 RX ORDER — PANTOPRAZOLE SODIUM 40 MG/1
40 TABLET, DELAYED RELEASE ORAL 2 TIMES DAILY
Status: DISCONTINUED | OUTPATIENT
Start: 2022-04-17 | End: 2022-04-18 | Stop reason: HOSPADM

## 2022-04-17 RX ORDER — TOPIRAMATE 100 MG/1
100 TABLET, FILM COATED ORAL 2 TIMES DAILY
Status: DISCONTINUED | OUTPATIENT
Start: 2022-04-17 | End: 2022-04-18 | Stop reason: HOSPADM

## 2022-04-17 RX ORDER — POLYETHYLENE GLYCOL 3350 17 G/17G
17 POWDER, FOR SOLUTION ORAL DAILY PRN
Status: DISCONTINUED | OUTPATIENT
Start: 2022-04-17 | End: 2022-04-18 | Stop reason: HOSPADM

## 2022-04-17 RX ORDER — SODIUM CHLORIDE 0.9 % (FLUSH) 0.9 %
5-40 SYRINGE (ML) INJECTION PRN
Status: DISCONTINUED | OUTPATIENT
Start: 2022-04-17 | End: 2022-04-17

## 2022-04-17 RX ORDER — ASPIRIN 81 MG/1
81 TABLET, CHEWABLE ORAL DAILY
Status: DISCONTINUED | OUTPATIENT
Start: 2022-04-17 | End: 2022-04-18 | Stop reason: HOSPADM

## 2022-04-17 RX ORDER — SODIUM CHLORIDE 0.9 % (FLUSH) 0.9 %
5-40 SYRINGE (ML) INJECTION EVERY 12 HOURS SCHEDULED
Status: DISCONTINUED | OUTPATIENT
Start: 2022-04-17 | End: 2022-04-18 | Stop reason: HOSPADM

## 2022-04-17 RX ADMIN — ONDANSETRON HYDROCHLORIDE 4 MG: 2 INJECTION, SOLUTION INTRAMUSCULAR; INTRAVENOUS at 13:00

## 2022-04-17 RX ADMIN — ONDANSETRON 4 MG: 2 INJECTION INTRAMUSCULAR; INTRAVENOUS at 13:00

## 2022-04-17 RX ADMIN — INSULIN LISPRO 2 UNITS: 100 INJECTION, SOLUTION INTRAVENOUS; SUBCUTANEOUS at 17:54

## 2022-04-17 RX ADMIN — ASPIRIN 81 MG 81 MG: 81 TABLET ORAL at 17:54

## 2022-04-17 RX ADMIN — CEFEPIME 2000 MG: 2 INJECTION, POWDER, FOR SOLUTION INTRAVENOUS at 13:54

## 2022-04-17 RX ADMIN — GABAPENTIN 100 MG: 100 CAPSULE ORAL at 23:23

## 2022-04-17 RX ADMIN — TOPIRAMATE 100 MG: 100 TABLET, FILM COATED ORAL at 23:23

## 2022-04-17 RX ADMIN — PROPRANOLOL HYDROCHLORIDE 40 MG: 40 TABLET ORAL at 23:24

## 2022-04-17 RX ADMIN — PANTOPRAZOLE SODIUM 40 MG: 40 TABLET, DELAYED RELEASE ORAL at 23:23

## 2022-04-17 RX ADMIN — ENOXAPARIN SODIUM 40 MG: 40 INJECTION SUBCUTANEOUS at 17:54

## 2022-04-17 RX ADMIN — SODIUM CHLORIDE 2466 ML: 9 INJECTION, SOLUTION INTRAVENOUS at 13:56

## 2022-04-17 RX ADMIN — VANCOMYCIN HYDROCHLORIDE 2000 MG: 1 INJECTION, POWDER, LYOPHILIZED, FOR SOLUTION INTRAVENOUS at 14:19

## 2022-04-17 RX ADMIN — Medication 5000 UNITS: at 17:54

## 2022-04-17 ASSESSMENT — PAIN DESCRIPTION - LOCATION: LOCATION: GENERALIZED

## 2022-04-17 ASSESSMENT — PAIN SCALES - GENERAL: PAINLEVEL_OUTOF10: 5

## 2022-04-17 ASSESSMENT — PAIN DESCRIPTION - PAIN TYPE: TYPE: CHRONIC PAIN

## 2022-04-17 NOTE — ED NOTES
2nd line established in the left [facility], blood cultures drawn with sterile technique before IV antibiotics started     Ti Schmitt RN  04/17/22 [address] Paramjit Aguilar RN  04/17/22 [id number]

## 2022-04-17 NOTE — PROGRESS NOTES
Received report from ED RN and obtained patient from ED by wheelchair. Patient admitted [facility] on room air. Patient sneezing and blowing his nose a lot. Patient complains of generalized chronic pain, mostly to bilateral lower legs. Patient has multiple scattered scabs and bug bites but no open wounds. Patient oriented to room. Patient on tele- he did not want to put it on at first because he does not believe that it shows us any good information. RN educated patient that it is very important that we monitor his heart rhythm. Patient still refused, and this RN spoke with Dr. Bill Carver and per MD we could either use tele monitor or use tele sitter and monitor for seizures. RN gave patient those two choices and he chose the tele monitor. Patient is now compliant. Resting comfortably in bed and eating dinner- patient covered w/ 2 units with humalog at which patient stated, \"It won't do anything. \" RN told patient we will cover him for meals and monitor his blood sugars during his stay.

## 2022-04-17 NOTE — ED NOTES
Patient refuses to keep pulse oximeter on finger, explained the improtance but he still refuses, will continue to monitor with intermittent oxygen checks     Lorna Parker RN  04/17/22 [id number]

## 2022-04-17 NOTE — ED PROVIDER NOTES
[address] [place] MEDICAL-SURGICAL      CHIEF COMPLAINT  Hypoglycemia (EMS states patient had low reading on glucometer. 250ml of D10 given enroute. FSBS 188 just PTA. Patient combative upon arousal.)       HISTORY OF PRESENT ILLNESS  Amada Osler is a 42 y.o. male history of insulin-dependent diabetes who presents to the emergency department for evaluation of hypoglycemic episode. Per EMS, patient was found altered by his sister. He was unconscious when EMS arrived on scene. He had low readings on glucometer. IV established and he was given 250 mL of D10. He became combative. Fingerstick glucose just before arrival to the ED was 188. Patient reports he does not recall what happened. Says his sister was the one that called the ambulance. Says he is not been sick although he is constantly sneezing and has runny nose and is coughing. Says he does not believe in Covid. Denies having any complaints whatsoever. When asked if he is on insulin for his diabetes, he answers no. Per his medication list, he is on insulin. No other complaints, modifying factors or associated symptoms. I have reviewed the following from the nursing documentation.     Past Medical History:   Diagnosis Date    Acute respiratory failure ([address] 8/18/2014    Asthma     Blood pressure instability     Chronic pain syndrome     Detached retina, bilateral     laser tx right eye    Diabetes mellitus ([address]     uncontrolled     Insomnia     Meningitis August 2014    admission Svetlana Gomez    Neuropathic pain     Osteomyelitis ([address]     Osteomyelitis of tibia ([address]     left    Pain of left lower extremity     Peripheral neuropathy      Past Surgical History:   Procedure Laterality Date    ANKLE FRACTURE SURGERY Left 1/28/13    Dr. Timi Terry  August 2013    left tibia with external fixation device    EYE SURGERY      retina reattachment left eye x 3 holes repairs    EYE SURGERY Right 9-27-13 retal detachment    LEG SURGERY Left 3/31/14    ORIF left fibula and tibia    HI EGD FLEXIBLE FOREIGN BODY REMOVAL N/A 9/21/2018    EGD FOREIGN BODY REMOVAL performed by Elia Gentile MD at [address] [place] [address]      with external device inplace    UPPER GASTROINTESTINAL ENDOSCOPY N/A 9/21/2018    EGD BIOPSY performed by Elia Gentile MD at [facility] N/A 9/25/2021    EGD BIOPSY performed by Christa Dale MD at [facility]     Family History   Problem Relation Age of Onset    Asthma Other     Diabetes Other     High Blood Pressure Other      Social History     Socioeconomic History    Marital status: Single     Spouse name: Not on file    Number of children: Not on file    Years of education: Not on file    Highest education level: Not on file   Occupational History    Occupation: n/a   Tobacco Use    Smoking status: Never Smoker    Smokeless tobacco: Never Used   Vaping Use    Vaping Use: Never used   Substance and Sexual Activity    Alcohol use: No     Alcohol/week: 0.0 standard drinks    Drug use: No    Sexual activity: Not Currently   Other Topics Concern    Not on file   Social History Narrative    Not on file     Social Determinants of Health     Financial Resource Strain: Low Risk     Difficulty of Paying Living Expenses: Not hard at all   Food Insecurity: No Food Insecurity    Worried About [address] in the Last Year: Never true    [address] in the Last Year: Never true   Transportation Needs:     Lack of Transportation (Medical): Not on file    Lack of Transportation (Non-Medical):  Not on file   Physical Activity:     Days of Exercise per Week: Not on file    Minutes of Exercise per Session: Not on file   Stress:     Feeling of Stress : Not on file   Social Connections:     Frequency of Communication with Friends and Family: Not on file    Frequency of Social Gatherings with Friends and Family: Not on file    Attends Hinduism Services: Not on file    Active Member of Clubs or Organizations: Not on file    Attends Club or Organization Meetings: Not on file    Marital Status: Not on file   Intimate Partner Violence:     Fear of Current or Ex-Partner: Not on file    Emotionally Abused: Not on file    Physically Abused: Not on file    Sexually Abused: Not on file   Housing Stability:     Unable to Pay for Housing in the Last Year: Not on file    Number of [place] in the Last Year: Not on file    Unstable Housing in the Last Year: Not on file     Current Facility-Administered Medications   Medication Dose Route Frequency Provider Last Rate Last Admin    insulin glargine (LANTUS) injection vial 15 Units  15 Units SubCUTAneous QAM AC Smitha Johnson MD        glucagon (rDNA) injection 1 mg  1 mg IntraMUSCular PRN Smitha Johnson MD        dextrose 5 % solution  100 mL/hr IntraVENous PRN Smitha Johnson MD        dextrose bolus (hypoglycemia) 10% 125 mL  125 mL IntraVENous PRN Smitha Johnson MD        Or    dextrose bolus (hypoglycemia) 10% 250 mL  250 mL IntraVENous PRN Smitha Johnson MD        glucose chewable tablet 4 each  4 each Oral PRN Smitha Johnson MD        aspirin chewable tablet 81 mg  81 mg Oral Daily Smitha Johnson MD   81 mg at 04/18/22 0810    vitamin D3 (CHOLECALCIFEROL) tablet 5,000 Units  5,000 Units Oral Daily Smitha Johnson MD   5,000 Units at 04/17/22 1754    gabapentin (NEURONTIN) capsule 100 mg  100 mg Oral BID Smitha Johnson MD   100 mg at 04/18/22 0809    pantoprazole (PROTONIX) tablet 40 mg  40 mg Oral BID Smitha Johnson MD   40 mg at 04/18/22 0810    propranolol (INDERAL) tablet 40 mg  40 mg Oral BID Smitha Johnson MD   40 mg at 04/18/22 0810    topiramate (TOPAMAX) tablet 100 mg  100 mg Oral BID Smitha Johnson MD   100 mg at 04/18/22 0810    sodium chloride flush 0.9 % injection 5-40 mL  5-40 mL IntraVENous 2 times per day Karen Olguin MD        sodium chloride flush 0.9 % injection 5-40 mL  5-40 mL IntraVENous PRN Karen Olguin MD        0.9 % sodium chloride infusion   IntraVENous PRN Karen Olguin MD        enoxaparin (LOVENOX) injection 40 mg  40 mg SubCUTAneous Daily Karen Olguin MD   40 mg at 04/18/22 0810    ondansetron (ZOFRAN-ODT) disintegrating tablet 4 mg  4 mg Oral Q8H PRN Karen Olguin MD        Or    ondansetron (ZOFRAN) injection 4 mg  4 mg IntraVENous Q6H PRN Karen Olguin MD        polyethylene glycol (GLYCOLAX) packet 17 g  17 g Oral Daily PRN Karen Olguin MD        acetaminophen (TYLENOL) tablet 650 mg  650 mg Oral Q6H PRN Karen Olguin MD        Or    acetaminophen (TYLENOL) suppository 650 mg  650 mg Rectal Q6H PRN Karen Olguin MD        insulin lispro (HUMALOG) injection vial 0-12 Units  0-12 Units SubCUTAneous TID WC Karen Olguin MD   8 Units at 04/18/22 0814    insulin lispro (HUMALOG) injection vial 0-6 Units  0-6 Units SubCUTAneous Nightly Karen Olguin MD         Allergies   Allergen Reactions    Tegaderm Ag Mesh 2\"X2\" [Wound Dressings]      \"Holes in skin from Tegaderm Adhesive\"    Codeine Other (See Comments)     hallucinates    Tape Corrinne Gens Tape]      Blister      Other Other (See Comments)     Holes in skin  From Tegaderm Adhesive  Blister       REVIEW OF SYSTEMS  10 systems reviewed, pertinent positives per HPI otherwise noted to be negative. PHYSICAL EXAM  BP (!) 145/75   Pulse 74   Temp 97.8 °F (36.6 °C) (Oral)   Resp 16   Ht 6' 2\" (1.88 m)   Wt 215 lb (97.5 kg)   SpO2 99%   BMI 27.60 kg/m²    GENERAL APPEARANCE: Agitated. Temp of 90.9. Poor historian. HENT: Normocephalic. Atraumatic. Rhinorrhea. No facial droop. HEART/CHEST: RRR. LUNGS: Respirations unlabored. Speaking comfortably in full sentences. Sneezing. Coughing. CTAB  ABDOMEN: Soft, non-distended abdomen. Non tender to palpation. No guarding. No rebound. EXTREMITIES: No gross deformities. Moving all extremities. SKIN: Warm and dry. Bed bugs present  NEUROLOGICAL: Alert and oriented. No gross facial drooping. Answering questions appropriately. Moving all extremities. PSYCHIATRIC: Pleasant. Normal mood and affect.     LABS  Results for orders placed or performed during the hospital encounter of 04/17/22   COVID-19 & Influenza Combo    Specimen: Nasopharyngeal Swab   Result Value Ref Range    SARS-CoV-2 RNA, RT PCR NOT DETECTED NOT DETECTED    INFLUENZA A NOT DETECTED NOT DETECTED    INFLUENZA B NOT DETECTED NOT DETECTED   CBC with Auto Differential   Result Value Ref Range    WBC 8.7 4.0 - 11.0 K/uL    RBC 5.05 4.20 - 5.90 M/uL    Hemoglobin 14.1 13.5 - 17.5 g/dL    Hematocrit 42.2 40.5 - 52.5 %    MCV 83.6 80.0 - 100.0 fL    MCH 27.9 26.0 - 34.0 pg    MCHC 33.4 31.0 - 36.0 g/dL    RDW 14.2 12.4 - 15.4 %    Platelets 887 974 - 513 K/uL    MPV 6.6 5.0 - 10.5 fL    Neutrophils % 74.9 %    Lymphocytes % 17.8 %    Monocytes % 4.7 %    Eosinophils % 1.9 %    Basophils % 0.7 %    Neutrophils Absolute 6.5 1.7 - 7.7 K/uL    Lymphocytes Absolute 1.6 1.0 - 5.1 K/uL    Monocytes Absolute 0.4 0.0 - 1.3 K/uL    Eosinophils Absolute 0.2 0.0 - 0.6 K/uL    Basophils Absolute 0.1 0.0 - 0.2 K/uL   Comprehensive Metabolic Panel w/ Reflex to MG   Result Value Ref Range    Sodium 140 136 - 145 mmol/L    Potassium reflex Magnesium 3.1 (L) 3.5 - 5.1 mmol/L    Chloride 101 99 - 110 mmol/L    CO2 20 (L) 21 - 32 mmol/L    Anion Gap 19 (H) 3 - 16    Glucose 130 (H) 70 - 99 mg/dL    BUN 14 7 - 20 mg/dL    CREATININE 1.4 (H) 0.9 - 1.3 mg/dL    GFR Non-African American 55 (A) >60    GFR African American >60 >60    Calcium 9.2 8.3 - 10.6 mg/dL    Total Protein 7.7 6.4 - 8.2 g/dL    Albumin 4.1 3.4 - 5.0 g/dL    Albumin/Globulin Ratio 1.1 1.1 - 2.2    Total Bilirubin 0.3 0.0 - 1.0 mg/dL    Alkaline Phosphatase 123 40 - 129 U/L    ALT 17 10 - 40 U/L    AST 16 15 - 37 U/L   Lactic Acid   Result Value Ref Range    Lactic Acid 7.1 (HH) 0.4 - 2.0 mmol/L   Urinalysis   Result Value Ref Range    Color, UA Yellow Straw/Yellow    Clarity, UA Clear Clear    Glucose, Ur 250 (A) Negative mg/dL    Bilirubin Urine Negative Negative    Ketones, Urine Negative Negative mg/dL    Specific Gravity, UA 1.025 1.005 - 1.030    Blood, Urine Negative Negative    pH, UA 6.0 5.0 - 8.0    Protein, UA 30 (A) Negative mg/dL    Urobilinogen, Urine 0.2 <2.0 E.U./dL    Nitrite, Urine Negative Negative    Leukocyte Esterase, Urine Negative Negative    Microscopic Examination YES     Urine Type NotGiven    Lactate, Sepsis   Result Value Ref Range    Lactic Acid, Sepsis 1.9 0.4 - 1.9 mmol/L   Lactate, Sepsis   Result Value Ref Range    Lactic Acid, Sepsis 1.9 0.4 - 1.9 mmol/L   Blood gas, venous   Result Value Ref Range    pH, [place] 7.239 (L) 7.350 - 7.450    pCO2, [place] 49.8 40.0 - 50.0 mmHg    pO2, [place] 41.3 (H) 25.0 - 40.0 mmHg    HCO3, Venous 20.8 (L) 23.0 - 29.0 mmol/L    Base Excess, [place] -6.8 (L) -3.0 - 3.0 mmol/L    O2 Sat, Bradford 68 Not Established %    Carboxyhemoglobin 1.0 0.0 - 1.5 %    MetHgb, Bradford 0.3 <1.5 %    TC02 (Calc), Bradford 22 Not Established mmol/L    O2 Content, Bradford 14 Not Established VOL %    O2 Therapy Unknown    Beta-Hydroxybutyrate   Result Value Ref Range    Beta-Hydroxybutyrate 0.20 0.00 - 0.27 mmol/L   Magnesium   Result Value Ref Range    Magnesium 2.30 1.80 - 2.40 mg/dL   Microscopic Urinalysis   Result Value Ref Range    Mucus, UA 1+ (A) None Seen /LPF    WBC, UA 3-5 0 - 5 /HPF    RBC, UA None seen 0 - 4 /HPF   POCT Glucose   Result Value Ref Range    POC Glucose 150 (H) 70 - 99 mg/dl    Performed on ACCU-CHEK    POCT Glucose   Result Value Ref Range    POC Glucose 170 (H) 70 - 99 mg/dl    Performed on ACCU-CHEK    POCT Glucose   Result Value Ref Range    POC Glucose 346 (H) 70 - 99 mg/dl    Performed on ACCU-CHEK        I have reviewed all labs for this visit. RADIOLOGY  XR CHEST PORTABLE    Result Date: 4/17/2022  EXAMINATION: ONE XRAY VIEW OF THE CHEST 4/17/2022 1:29 pm COMPARISON: 09/25/2021. HISTORY: ORDERING SYSTEM PROVIDED HISTORY: cough. congestion TECHNOLOGIST PROVIDED HISTORY: Reason for exam:->cough. congestion Reason for Exam: cough. congestion FINDINGS: The exam is rotated. The cardiomediastinal silhouette is unremarkable. The lungs remain clear with no infiltrate, pleural fluid or evidence of overt failure. No acute cardiopulmonary disease. ED COURSE/MDM  Patient seen and evaluated. At presentation, patient was hypothermic to 90.9. He was a poor historian. Labile and agitated at times. Abdomen soft, nondistended, and nontender to palpation. He has diffuse rhinorrhea, is sneezing, coughing. Patient was covered in bedbugs and he was sent for decon. Septic work-up obtained. DDx includes sepsis, hypoglycemia, covid, others. He was warmed with warm blankets. He was given empiric antibiotics with vancomycin and cefepime. 30 cc/kg of IV fluid bolus ordered. Initial lactate is elevated at 7.1. Will reassess after the IV fluid bolus. Patient remains hemodynamically stable. He has a pH of 7.24 and bicarb 20.8. Glucose is 130. Creatinine is 1.4. This is improved compared to priors. Chest x-ray shows no focal consolidation. Repeat lactate improved at 1.9. covid negative. Patient remained hemodynamically stable during his stay in the ED. This is patient's second visit this week for hypoglycemia. May need adjustment of insulin for his renal function. Hospitalist consulted for admission for further evaluation and treatment. Admit. I provided at least 30 minutes of critical care excluding separately billable procedures. Pt was seen during the [place] 19 pandemic. Appropriate PPE worn by ME during patient encounters.  Patient was cared for during a time with constrained hospital bed capacity with nationwide stress on resources and staffing.      During the patient's ED course, the patient was given:  Medications   aspirin chewable tablet 81 mg (81 mg Oral Given 4/18/22 0810)   vitamin D3 (CHOLECALCIFEROL) tablet 5,000 Units (5,000 Units Oral Not Given 4/18/22 0513)   gabapentin (NEURONTIN) capsule 100 mg (100 mg Oral Given 4/18/22 0809)   pantoprazole (PROTONIX) tablet 40 mg (40 mg Oral Given 4/18/22 0810)   propranolol (INDERAL) tablet 40 mg (40 mg Oral Given 4/18/22 0810)   topiramate (TOPAMAX) tablet 100 mg (100 mg Oral Given 4/18/22 0810)   sodium chloride flush 0.9 % injection 5-40 mL (5 mLs IntraVENous Not Given 4/17/22 2058)   sodium chloride flush 0.9 % injection 5-40 mL (has no administration in time range)   0.9 % sodium chloride infusion (has no administration in time range)   enoxaparin (LOVENOX) injection 40 mg (40 mg SubCUTAneous Given 4/18/22 0810)   ondansetron (ZOFRAN-ODT) disintegrating tablet 4 mg (has no administration in time range)     Or   ondansetron (ZOFRAN) injection 4 mg (has no administration in time range)   polyethylene glycol (GLYCOLAX) packet 17 g (has no administration in time range)   acetaminophen (TYLENOL) tablet 650 mg (has no administration in time range)     Or   acetaminophen (TYLENOL) suppository 650 mg (has no administration in time range)   insulin lispro (HUMALOG) injection vial 0-12 Units (8 Units SubCUTAneous Given 4/18/22 0814)   insulin lispro (HUMALOG) injection vial 0-6 Units (0 Units SubCUTAneous Not Given 4/17/22 2058)   insulin glargine (LANTUS) injection vial 15 Units (has no administration in time range)   glucagon (rDNA) injection 1 mg (has no administration in time range)   dextrose 5 % solution (has no administration in time range)   dextrose bolus (hypoglycemia) 10% 125 mL (has no administration in time range)     Or   dextrose bolus (hypoglycemia) 10% 250 mL (has no administration in time range)   glucose chewable tablet 4 each (has no administration in time range)   0.9 % sodium chloride IV bolus 2,466 mL (0 mL/kg × 82.2 kg (Ideal) IntraVENous Stopped 4/17/22 1531)   cefepime (MAXIPIME) 2000 mg IVPB minibag (0 mg IntraVENous Stopped 4/17/22 1430)   vancomycin (VANCOCIN) 2,000 mg in dextrose 5 % 500 mL IVPB (0 mg IntraVENous Stopped 4/17/22 1603)        CLINICAL IMPRESSION  1. Hypoglycemia    2. Hypothermia, initial encounter    3. Lactic acidosis    4. Infestation by bed bug    5. SIRS (systemic inflammatory response syndrome) (HCC)        Blood pressure (!) 145/75, pulse 74, temperature 97.8 °F (36.6 °C), temperature source Oral, resp. rate 16, height 6' 2\" (1.88 m), weight 215 lb (97.5 kg), SpO2 99 %. DISPOSITION  Thiago Musa was admitted to the hospital.     Patient was given scripts for the following medications. I counseled patient how to take these medications. Current Discharge Medication List          Follow-up with:  No follow-up provider specified. DISCLAIMER: This chart was created using Dragon dictation software. Efforts were made by me to ensure accuracy, however some errors may be present due to limitations of this technology and occasionally words are not transcribed correctly.         Toni Berg MD  04/18/22 1989

## 2022-04-17 NOTE — ED NOTES
Verbal report given to 2 west RN, patient out of ER in stable condition via wheelchair     Zuly Claire RN  04/17/22 [id number]

## 2022-04-17 NOTE — ED NOTES
Patient provided with urinal, sitting on the edge of the bed to obtain sample     Marina Vargas RN  04/17/22 6626

## 2022-04-17 NOTE — ED NOTES
PerfectServe sent to Dr. Ailyn Guerrero at [facility] [address]. 04/17/22 1525    PerfectServe completed with orders placed by Dr. Ailyn Guerrero at [facility] [address]. 04/17/22 1681

## 2022-04-17 NOTE — PROGRESS NOTES
4 Eyes Admission Assessment     I agree as the admission nurse that 2 RN's have performed a thorough Head to Toe Skin Assessment on the patient. ALL assessment sites listed below have been assessed on admission. Areas assessed by both nurses:   [x]   Head, Face, and Ears   [x]   Shoulders, Back, and Chest  [x]   Arms, Elbows, and Hands   [x]   Coccyx, Sacrum, and Ischium  [x]   Legs, Feet, and Heels        Does the Patient have Skin Breakdown? Patient has several scabs and bug bites all over body.          Harpreet Prevention initiated:  no   Wound Care Orders initiated:  No      [facility] nurse consulted for Pressure Injury (Stage 3,4, Unstageable, DTI, NWPT, and Complex wounds) or Harpreet score 18 or lower:  No      Nurse 1 eSignature: Electronically signed by Carla Morales RN on 4/17/22 at 6:36 PM EDT    **SHARE this note so that the co-signing nurse is able to place an eSignature**    Nurse 2 eSignature: Electronically signed by Misha Solo RN on 4/17/22 at 9:59 PM EDT

## 2022-04-18 VITALS
BODY MASS INDEX: 27.59 KG/M2 | WEIGHT: 215 LBS | OXYGEN SATURATION: 99 % | HEART RATE: 74 BPM | HEIGHT: 74 IN | RESPIRATION RATE: 16 BRPM | TEMPERATURE: 97.8 F | DIASTOLIC BLOOD PRESSURE: 75 MMHG | SYSTOLIC BLOOD PRESSURE: 145 MMHG

## 2022-04-18 LAB
ALBUMIN SERPL-MCNC: 3.9 G/DL (ref 3.4–5)
ANION GAP SERPL CALCULATED.3IONS-SCNC: 12 MMOL/L (ref 3–16)
BASOPHILS ABSOLUTE: 0.1 K/UL (ref 0–0.2)
BASOPHILS RELATIVE PERCENT: 0.9 %
BUN BLDV-MCNC: 16 MG/DL (ref 7–20)
CALCIUM SERPL-MCNC: 8.9 MG/DL (ref 8.3–10.6)
CHLORIDE BLD-SCNC: 102 MMOL/L (ref 99–110)
CO2: 21 MMOL/L (ref 21–32)
CREAT SERPL-MCNC: 1.6 MG/DL (ref 0.9–1.3)
EOSINOPHILS ABSOLUTE: 0.2 K/UL (ref 0–0.6)
EOSINOPHILS RELATIVE PERCENT: 2.1 %
GFR AFRICAN AMERICAN: 57
GFR NON-AFRICAN AMERICAN: 47
GLUCOSE BLD-MCNC: 300 MG/DL (ref 70–99)
GLUCOSE BLD-MCNC: 346 MG/DL (ref 70–99)
GLUCOSE BLD-MCNC: 443 MG/DL (ref 70–99)
HCT VFR BLD CALC: 37 % (ref 40.5–52.5)
HEMOGLOBIN: 12.5 G/DL (ref 13.5–17.5)
LYMPHOCYTES ABSOLUTE: 2.1 K/UL (ref 1–5.1)
LYMPHOCYTES RELATIVE PERCENT: 25.2 %
MCH RBC QN AUTO: 27.9 PG (ref 26–34)
MCHC RBC AUTO-ENTMCNC: 33.7 G/DL (ref 31–36)
MCV RBC AUTO: 82.9 FL (ref 80–100)
MONOCYTES ABSOLUTE: 0.4 K/UL (ref 0–1.3)
MONOCYTES RELATIVE PERCENT: 4.5 %
NEUTROPHILS ABSOLUTE: 5.8 K/UL (ref 1.7–7.7)
NEUTROPHILS RELATIVE PERCENT: 67.3 %
PDW BLD-RTO: 14.4 % (ref 12.4–15.4)
PERFORMED ON: ABNORMAL
PERFORMED ON: ABNORMAL
PHOSPHORUS: 2.7 MG/DL (ref 2.5–4.9)
PLATELET # BLD: 365 K/UL (ref 135–450)
PMV BLD AUTO: 6.7 FL (ref 5–10.5)
POTASSIUM SERPL-SCNC: 4 MMOL/L (ref 3.5–5.1)
RBC # BLD: 4.47 M/UL (ref 4.2–5.9)
SODIUM BLD-SCNC: 135 MMOL/L (ref 136–145)
URINE CULTURE, ROUTINE: NORMAL
WBC # BLD: 8.5 K/UL (ref 4–11)

## 2022-04-18 PROCEDURE — 6370000000 HC RX 637 (ALT 250 FOR IP): Performed by: INTERNAL MEDICINE

## 2022-04-18 PROCEDURE — 6360000002 HC RX W HCPCS: Performed by: INTERNAL MEDICINE

## 2022-04-18 PROCEDURE — 36415 COLL VENOUS BLD VENIPUNCTURE: CPT

## 2022-04-18 PROCEDURE — 99220 PR INITIAL OBSERVATION CARE/DAY 70 MINUTES: CPT | Performed by: INTERNAL MEDICINE

## 2022-04-18 PROCEDURE — 80069 RENAL FUNCTION PANEL: CPT

## 2022-04-18 PROCEDURE — 96372 THER/PROPH/DIAG INJ SC/IM: CPT

## 2022-04-18 PROCEDURE — G0378 HOSPITAL OBSERVATION PER HR: HCPCS

## 2022-04-18 PROCEDURE — 85025 COMPLETE CBC W/AUTO DIFF WBC: CPT

## 2022-04-18 RX ORDER — NICOTINE POLACRILEX 4 MG
15 LOZENGE BUCCAL PRN
Status: DISCONTINUED | OUTPATIENT
Start: 2022-04-18 | End: 2022-04-18 | Stop reason: ALTCHOICE

## 2022-04-18 RX ORDER — DEXTROSE MONOHYDRATE 50 MG/ML
100 INJECTION, SOLUTION INTRAVENOUS PRN
Status: DISCONTINUED | OUTPATIENT
Start: 2022-04-18 | End: 2022-04-18 | Stop reason: HOSPADM

## 2022-04-18 RX ORDER — INSULIN GLARGINE 100 [IU]/ML
INJECTION, SOLUTION SUBCUTANEOUS
Qty: 30 ML | Refills: 3 | Status: SHIPPED | OUTPATIENT
Start: 2022-04-18

## 2022-04-18 RX ORDER — DEXTROSE MONOHYDRATE 25 G/50ML
12.5 INJECTION, SOLUTION INTRAVENOUS PRN
Status: DISCONTINUED | OUTPATIENT
Start: 2022-04-18 | End: 2022-04-18 | Stop reason: ALTCHOICE

## 2022-04-18 RX ORDER — INSULIN GLARGINE 100 [IU]/ML
15 INJECTION, SOLUTION SUBCUTANEOUS
Status: DISCONTINUED | OUTPATIENT
Start: 2022-04-18 | End: 2022-04-18 | Stop reason: HOSPADM

## 2022-04-18 RX ADMIN — PROPRANOLOL HYDROCHLORIDE 40 MG: 40 TABLET ORAL at 08:10

## 2022-04-18 RX ADMIN — INSULIN LISPRO 8 UNITS: 100 INJECTION, SOLUTION INTRAVENOUS; SUBCUTANEOUS at 08:14

## 2022-04-18 RX ADMIN — PANTOPRAZOLE SODIUM 40 MG: 40 TABLET, DELAYED RELEASE ORAL at 08:10

## 2022-04-18 RX ADMIN — INSULIN GLARGINE 15 UNITS: 100 INJECTION, SOLUTION SUBCUTANEOUS at 09:35

## 2022-04-18 RX ADMIN — ASPIRIN 81 MG 81 MG: 81 TABLET ORAL at 08:10

## 2022-04-18 RX ADMIN — ENOXAPARIN SODIUM 40 MG: 40 INJECTION SUBCUTANEOUS at 08:10

## 2022-04-18 RX ADMIN — GABAPENTIN 100 MG: 100 CAPSULE ORAL at 08:09

## 2022-04-18 RX ADMIN — INSULIN LISPRO 8 UNITS: 100 INJECTION, SOLUTION INTRAVENOUS; SUBCUTANEOUS at 11:33

## 2022-04-18 RX ADMIN — TOPIRAMATE 100 MG: 100 TABLET, FILM COATED ORAL at 08:10

## 2022-04-18 NOTE — DISCHARGE SUMMARY
Hospital Medicine History & Physical and Discharge Summary      PCP: Kaylee Montero MD    Date of Admission: 4/17/2022    Date of Service: Pt seen/examined on 4/18/2022 and Placed in Observation. Chief Complaint:  AMS      History Of Present Illness: The patient is a 42 y.o. male type I DM on regimen of lantus 30 and apidra per sliding scale, who presents with AMS. Pt has very limited recollection of events from yesterday. His sister called EMS when she found him unconscious at home. Pt remembers eating and taking his insulin and going to nap and he thought he was planning to get up a little later to eat some more, but that has not happened. When EMS arrived at the scene he was unconscious with low BG readings. He was given D10 via IV and woke up confused and combative. His BG improved before arriving to ED to 188, but he remained confused and disoriented and was directed for admission. Of note he was seen in ED few days prior (4/14) with BG 40s at home. He was minimally symptomatic at that time and his symptoms corrected rapidly with BG correction and he refused to be evaluated further and left to return home. This morning he is awake and alert and oriented x4. He is not sure what happened. He thinks his leg infection may be contributory - he reports Hx of chronic hardware infection and is on PO suppressive Abx and follows with [facility] ID service. He denies leg or ankle or foot to have any open sores or ulcers or swelling or redness or fever though, and clinically shows no signs of infection on physical examination.          Past Medical History:        Diagnosis Date    Acute respiratory failure ([address] 8/18/2014    Asthma     Blood pressure instability     Chronic pain syndrome     Detached retina, bilateral     laser tx right eye    Diabetes mellitus ([facility]     uncontrolled     Insomnia     Meningitis August 2014    admission [facility]    Neuropathic pain     Osteomyelitis ([facility]     Osteomyelitis of tibia ([facility]     left    Pain of left lower extremity     Peripheral neuropathy        Past Surgical History:        Procedure Laterality Date    ANKLE FRACTURE SURGERY Left 1/28/13    Dr. Raven Brice  August 2013    left tibia with external fixation device    EYE SURGERY      retina reattachment left eye x 3 holes repairs    EYE SURGERY Right 9-27-13    retal detachment    LEG SURGERY Left 3/31/14    ORIF left fibula and tibia    PA EGD FLEXIBLE FOREIGN BODY REMOVAL N/A 9/21/2018    EGD FOREIGN BODY REMOVAL performed by Manoj Brian MD at [facility]      with external device inplace    UPPER GASTROINTESTINAL ENDOSCOPY N/A 9/21/2018    EGD BIOPSY performed by Manoj Brian MD at [address] [facility] 9/25/2021    EGD BIOPSY performed by Alcides Bellamy MD at [address] [facility]       Medications Prior to Admission:    Prior to Admission medications    Medication Sig Start Date End Date Taking? Authorizing Provider   LANTUS 100 UNIT/ML injection vial INJECT 15 UNITS UNDER THE SKIN twice daily before breakfast and nightly. Hold nightly dose if PM BG<90 4/18/22  Yes Penelope Aleman MD   Insulin Syringe-Needle U-100 (B-D INS SYR ULTRAFINE 1CC/31G) 31G X 5/16\" 1 ML MISC INJECT USING INSULIN ONCE DAILY 4/12/22   Monica Ellis MD   SUMAtriptan (IMITREX) 50 MG tablet Take one tab po at the start of migraine PRN max 1 every 24 hours 3/31/22   Leti Bolanos MD   Erenumab-aooe (AIMOVIG, 140 MG DOSE,) 70 MG/ML SOAJ Inject 140 mLs into the skin every 30 days 3/31/22   Leti Bolanos MD   gabapentin (NEURONTIN) 400 MG capsule Take 1 capsule by mouth 2 times daily for 30 days.  3/31/22 4/30/22  Leti Bolanos MD   DULoxetine (CYMBALTA) 60 MG extended release capsule Take 1 capsule by mouth daily 3/31/22   Dian Chowdhury MD   topiramate (TOPAMAX) 100 MG tablet Take 1 tablet by mouth 2 times daily 3/31/22   Dian Chowdhury MD   traMADol (ULTRAM) 50 MG tablet Take 1 tablet by mouth every 6 hours as needed for Pain (max 1-2 per day) for up to 28 days.  3/31/22 4/28/22  Dian Chowdhury MD   insulin glulisine (APIDRA) 100 UNIT/ML injection INJECT 8 TO 12 UNITS UNDER THE SKIN THREE TIMES A DAY WITH MEALS PER SLIDING SCALE 3/9/22   Reinier Cruz MD   GVOKE HYPOPEN 2-PACK 1 MG/0.2ML SOAJ INJECT AS NEEDED FOR LOW BLOOD SUGAR 3/7/22   Reinier Cruz MD   dicloxacillin (DYNAPEN) 500 MG capsule TAKE ONE CAPSULE BY MOUTH TWICE A DAY 1/17/22   Tejal Cid MD   Continuous Blood Gluc Sensor (DEXCOM G6 SENSOR) MISC USE ONE SENSOR AND CHANGE EVERY 10 DAYS 11/3/21   Reinier Cruz MD   Diabetic Shoe MISC by Does not apply route 11/2/21   Reinier Cruz MD   Continuous Blood Gluc  (DEXCOM G6 ) LINDA USE AS NEEDED TO CHECK BLOOD SUGAR 10/29/21   Reinier Cruz MD   aspirin 81 MG chewable tablet Take 1 tablet by mouth daily 9/28/21   Yan Vazquez MD   pantoprazole (PROTONIX) 40 MG tablet Take 1 tablet by mouth 2 times daily 9/27/21   Yan Vazquez MD   prednisoLONE sodium phosphate (INFLAMASE FORTE) 1 % ophthalmic solution 1 drop 4 times daily    Historical Provider, MD   prednisoLONE acetate (PRED FORTE) 1 % ophthalmic suspension Place 1 drop into the right eye three times daily 3 times daily x 1 week then 2x daily x2 weeks, then 1 daily thereafter    Historical Provider, MD   ketorolac (ACULAR) 0.5 % ophthalmic solution Place 1 drop into the right eye 3 times daily 3 times daily x 1week, then 2 x daily x 2 weeks, then 1 daily thereafter    Historical Provider, MD   Continuous Blood Gluc Transmit (DEXCOM G6 TRANSMITTER) MISC USE TO CHECK BLOOD SUGAR 8/31/21   Reinier Cruz MD   potassium chloride (KLOR-CON M) 10 MEQ extended release tablet Take 1 tablet by mouth 2 times daily 7/29/21 9/26/21  Andree Brower MD   blood glucose monitor kit and supplies Dispense sufficient amount for indicated testing frequency plus additional to accommodate PRN testing needs. Dispense all needed supplies to include: monitor, strips, lancing device, lancets, control solutions, alcohol swabs. 7/6/21   Romaine Moncada MD   blood glucose test strips (ACCU-CHEK CAPO PLUS) strip 4 times a day As needed. 7/6/21   MD [place] MISC by Does not apply route Diagnosis: Type 1 diabetes. Expires 4/13/23. 4/16/21   Arthur Murdock MD   BD INSULIN SYRINGE U/F 31G X 5/16\" 0.3 ML MISC USE ONE SYRINGE FOUR TIMES A DAY BEFORE MEALS AND ONCE NIGHTLY 1/11/21   Manuel Duty, APRN - CNP   blood glucose test strips (ACCU-CHEK CAPO PLUS) strip 1 each by In Vitro route daily Test blood glucose 10 times daily Dx Code E10.8 11/17/20   Adam López MD   blood glucose test strips (ACCU-CHEK CAPO PLUS) strip USE ONE STRIP TO TEST THREE TIMES A DAY 6/15/20   Adam López MD   Accu-Chek FastClix Lancets MISC 1 each by Does not apply route daily Test blood glucose 10 times daily Dx Code E 10.8 6/15/20   Adam López MD   triamcinolone (KENALOG) 0.1 % ointment Apply to thickened affected areas PRN sparingly for flares no longer than 2 weeks at one time, do not apply to cleared skin 5/30/19   Gia Rai,    Insulin Syringe-Needle U-100 (BD INSULIN SYRINGE U/F) 31G X 5/16\" 0.5 ML MISC Inject 1 each into the skin 4 times daily DX CODE E 10.22 2/6/19   Adam López MD   fluticasone (FLONASE) 50 MCG/ACT nasal spray SPRAY TWO SPRAYS IN EACH NOSTRIL DAILY  Patient taking differently: daily as needed  1/8/19   Arthur Murdock MD   hydrocortisone 2.5 % cream Apply topically 2 times daily.   Patient taking differently: Apply topically 2 times daily as needed 4/12/18   Arthur Murdock MD   Blood Glucose Monitoring Suppl (ACCU-CHEK CAPO PLUS) w/Device KIT 1 each by Does not apply route daily Test blood sugar 10 times daily Dx code E 10.8 3/2/18   Dina Monk MD   GLUCAGON EMERGENCY 1 MG injection INJECT 1MG INTO THE MUSCLE AS NEEDED 12/18/17   Mateen Ethridge Kawasaki, MD   Multiple Vitamin (DAILY KITTY) TABS TAKE ONE TABLET BY MOUTH DAILY 9/26/17   Emile Mendiola MD   Cholecalciferol (VITAMIN D3) 5000 units CAPS Take 1 capsule by mouth Daily 9/26/17   Emile Mendiola MD   Dextromethorphan-Guaifenesin [facility] DM)  MG TB12 Cough and congestion. Patient taking differently: as needed Cough and congestion. 6/22/17   Emile Mendiola MD   omega-3 acid ethyl esters (LOVAZA) 1 G capsule TAKE TWO CAPSULES BY MOUTH TWICE DAILY 6/10/17   Dina Monk MD   MUCUS RELIEF DM  MG TABS TAKE ONE BY MOUTH DAILY AS NEEDED 1/9/17   Emile Mendiola MD   Alcohol Swabs PADS Use as needed for insulin injection. 6/10/16   Dina Monk MD   Probiotic Product (ACIDOPHILUS PROBIOTIC) CAPS capsule TAKE ONE CAPSULE BY MOUTH DAILY 5/31/16   Emile Mendiola MD   polyvinyl alcohol (LIQUIFILM TEARS) 1.4 % ophthalmic solution 1 drop as needed    Historical Provider, MD   propranolol (INDERAL) 80 MG tablet Take 40 mg by mouth 2 times daily For migraines     Historical Provider, MD   Flaxseed, Linseed, (FLAX PO) Take 14 g by mouth daily as needed     Historical Provider, MD       Allergies:  Tegaderm ag mesh 2\"x2\" [wound dressings], Codeine, Tape [adhesive tape], and Other    Social History:  The patient currently lives at home. TOBACCO:   reports that he has never smoked. He has never used smokeless tobacco.  ETOH:   reports no history of alcohol use. Family History:  Reviewed in detail and negative for DM, Early CAD, Cancer, CVA. Positive as follows:        Problem Relation Age of Onset    Asthma Other     Diabetes Other     High Blood Pressure Other        REVIEW OF SYSTEMS:   As noted in the HPI.  All other systems reviewed and negative. PHYSICAL EXAM:    BP (!) 145/75   Pulse 74   Temp 97.8 °F (36.6 °C) (Oral)   Resp 16   Ht 6' 2\" (1.88 m)   Wt 215 lb (97.5 kg)   SpO2 99%   BMI 27.60 kg/m²     General appearance: No apparent distress appears stated age and cooperative. HEENT Normal cephalic, atraumatic without obvious deformity. Pupils equal, round, and reactive to light. Extra ocular muscles intact. Conjunctivae/corneas clear. Neck: Supple, No jugular venous distention/bruits. Trachea midline without thyromegaly or adenopathy with full range of motion. Lungs: Clear to auscultation, bilaterally without Rales/Wheezes/Rhonchi with good respiratory effort. Heart: Regular rate and rhythm with Normal S1/S2 without murmurs, rubs or gallops, point of maximum impulse non-displaced  Abdomen: Soft, non-tender or non-distended without rigidity or guarding and positive bowel sounds all four quadrants. Extremities: No clubbing, cyanosis, or edema bilaterally. Full range of motion without deformity and normal gait intact. Skin: Skin color, texture, turgor normal.  No rashes or lesions. Neurologic: Alert and oriented X 3, neurovascularly intact with sensory/motor intact upper extremities/lower extremities, bilaterally. Cranial nerves: II-XII intact, grossly non-focal.  Mental status: Alert, oriented, thought content appropriate. Capillary Refill: Acceptable  < 3 seconds  Peripheral Pulses: +3 Easily felt, not easily obliterated with pressure        CBC   Recent Labs     04/17/22  1037 04/18/22  0830   WBC 8.7 8.5   HGB 14.1 12.5*   HCT 42.2 37.0*    365      RENAL  Recent Labs     04/17/22  1037 04/18/22  0830    135*   K 3.1* 4.0    102   CO2 20* 21   PHOS  --  2.7   BUN 14 16   CREATININE 1.4* 1.6*     LFT'S  Recent Labs     04/17/22  1037   AST 16   ALT 17   BILITOT 0.3   ALKPHOS 123     COAG  No results for input(s): INR in the last 72 hours.   CARDIAC ENZYMES  No results for input(s): CKTOTAL, CKMB, CKMBINDEX, TROPONINI in the last 72 hours. U/A:    Lab Results   Component Value Date    COLORU Yellow 04/17/2022    WBCUA 3-5 04/17/2022    RBCUA None seen 04/17/2022    MUCUS 1+ 04/17/2022    BACTERIA Rare 08/03/2021    CLARITYU Clear 04/17/2022    SPECGRAV 1.025 04/17/2022    LEUKOCYTESUR Negative 04/17/2022    BLOODU Negative 04/17/2022    GLUCOSEU 250 04/17/2022       ABG    Lab Results   Component Value Date    NRX6GNR 18.3 08/19/2014    BEART -6 08/19/2014    A8HIJTLH 90 08/19/2014    PHART 7.350 08/19/2014    CHV0ZTD 33 08/19/2014    PO2ART 62 08/19/2014    CYP7UZB 19 08/19/2014           Active Hospital Problems    Diagnosis Date Noted    Hypoglycemia [E16.2] 04/17/2022         PHYSICIANS CERTIFICATION:    I certify that Dolores Cisneros is expected to be hospitalized for less than 2 midnights based on the following assessment and plan:      ASSESSMENT/PLAN:      Symptomatic Hypoglycemia - recurrent. Acute Metabolic Encephalopathy - due to above. DM type I. This is second episode in the past few days. Pt is very reluctant to have his insulin regimen adjusted, though he agrees to split lantus to 15unit dose (instead of 30) and give one dose in am and then continue apidra coverage during the day and then recheck BG again at bedtime and if BG on the low side - to hold the pm lantus dose or decrease it to 5units to avoid hypoglycemia events. Pt understands risks including permament brain damage and death that can be associated with recurrent hypoglycemia. Dispo - plan to discharge home in stable condition later today if no repeat bouts of hypoglycemia. Close PCP follow up on discharge for further insulin adjustment as indicated. Glenn Dugan MD    Thank you Elida Fletcher MD for the opportunity to be involved in this patient's care. If you have any questions or concerns please feel free to contact me at [phone].

## 2022-04-18 NOTE — PROGRESS NOTES
AM assessment complete. See flowsheets. Gave am meds due see mar. A/O x 4. Denies any pain. Eating breakfast. Denies any needs. Bed locked/lowest position. Low fall risk/compliant with call light. Call light/tray table within reach.

## 2022-04-18 NOTE — FLOWSHEET NOTE
04/18/22 0222   Vital Signs   Temp 97.6 °F (36.4 °C)   Temp Source Oral   Pulse 73   Heart Rate Source Monitor   Resp 16   /69   BP Location Left upper arm   Patient Position Sitting   Level of Consciousness Alert (0)   MEWS Score 1   Oxygen Therapy   SpO2 100 %   O2 Device None (Room air)   Pt resting in bed, no acute distress.  Lilli Eisenberg, RN

## 2022-04-18 NOTE — H&P
Hospital Medicine History & Physical      PCP: Junie Nick MD    Date of Admission: 4/17/2022    Date of Service: Pt seen/examined on 4/18/2022 and Placed in Observation. Chief Complaint:  AMS      History Of Present Illness: The patient is a 42 y.o. male type I DM on regimen of lantus 30 and apidra per sliding scale, who presents with AMS. Pt has very limited recollection of events from yesterday. His sister called EMS when she found him unconscious at home. Pt remembers eating and taking his insulin and going to nap and he thought he was planning to get up a little later to eat some more, but that has not happened. When EMS arrived at the scene he was unconscious with low BG readings. He was given D10 via IV and woke up confused and combative. His BG improved before arriving to ED to 188, but he remained confused and disoriented and was directed for admission. Of note he was seen in ED few days prior (4/14) with BG 40s at home. He was minimally symptomatic at that time and his symptoms corrected rapidly with BG correction and he refused to be evaluated further and left to return home. This morning he is awake and alert and oriented x4. He is not sure what happened. He thinks his leg infection may be contributory - he reports Hx of chronic hardware infection and is on PO suppressive Abx and follows with [facility] ID service. He denies leg or ankle or foot to have any open sores or ulcers or swelling or redness or fever though, and clinically shows no signs of infection on physical examination.          Past Medical History:        Diagnosis Date    Acute respiratory failure ([address] 8/18/2014    Asthma     Blood pressure instability     Chronic pain syndrome     Detached retina, bilateral     laser tx right eye    Diabetes mellitus ([address] uncontrolled     Insomnia     Meningitis August 2014    admission Irma Singh    Neuropathic pain     [facility]     Osteomyelitis of tibia ([facility]     left    Pain of left lower extremity     Peripheral neuropathy        Past Surgical History:        Procedure Laterality Date    ANKLE FRACTURE SURGERY Left 1/28/13    Dr. Raven Brice  August 2013    left tibia with external fixation device    EYE SURGERY      retina reattachment left eye x 3 holes repairs    EYE SURGERY Right 9-27-13    retal detachment    LEG SURGERY Left 3/31/14    ORIF left fibula and tibia    SD EGD FLEXIBLE FOREIGN BODY REMOVAL N/A 9/21/2018    EGD FOREIGN BODY REMOVAL performed by Manoj Brian MD at [facility]      with external device inplace    UPPER GASTROINTESTINAL ENDOSCOPY N/A 9/21/2018    EGD BIOPSY performed by Manoj Brian MD at [facility] 9/25/2021    EGD BIOPSY performed by Alcides Bellamy MD at [address] [facility]       Medications Prior to Admission:    Prior to Admission medications    Medication Sig Start Date End Date Taking? Authorizing Provider   LANTUS 100 UNIT/ML injection vial INJECT 15 UNITS UNDER THE SKIN twice daily before breakfast and nightly. Hold nightly dose if PM BG<90 4/18/22  Yes Penelope Aleman MD   Insulin Syringe-Needle U-100 (B-D INS SYR ULTRAFINE 1CC/31G) 31G X 5/16\" 1 ML MISC INJECT USING INSULIN ONCE DAILY 4/12/22   Monica Ellis MD   SUMAtriptan (IMITREX) 50 MG tablet Take one tab po at the start of migraine PRN max 1 every 24 hours 3/31/22   Leti Bolanos MD   Erenumab-aooe (AIMOVIG, 140 MG DOSE,) 70 MG/ML SOAJ Inject 140 mLs into the skin every 30 days 3/31/22   Leti Bolanos MD   gabapentin (NEURONTIN) 400 MG capsule Take 1 capsule by mouth 2 times daily for 30 days.  3/31/22 4/30/22  Leti Bolanos MD   DULoxetine (CYMBALTA) 60 MG extended release capsule Take 1 capsule by mouth daily 3/31/22   Marcela New MD   topiramate (TOPAMAX) 100 MG tablet Take 1 tablet by mouth 2 times daily 3/31/22   Marcela New MD   traMADol (ULTRAM) 50 MG tablet Take 1 tablet by mouth every 6 hours as needed for Pain (max 1-2 per day) for up to 28 days.  3/31/22 4/28/22  Marcela New MD   insulin glulisine (APIDRA) 100 UNIT/ML injection INJECT 8 TO 12 UNITS UNDER THE SKIN THREE TIMES A DAY WITH MEALS PER SLIDING SCALE 3/9/22   Betsy Hernandez MD   GVOKE HYPOPEN 2-PACK 1 MG/0.2ML SOAJ INJECT AS NEEDED FOR LOW BLOOD SUGAR 3/7/22   Betsy Hernandez MD   dicloxacillin (DYNAPEN) 500 MG capsule TAKE ONE CAPSULE BY MOUTH TWICE A DAY 1/17/22   Lakshmi Fairbanks MD   Continuous Blood Gluc Sensor (DEXCOM G6 SENSOR) MISC USE ONE SENSOR AND CHANGE EVERY 10 DAYS 11/3/21   Betsy Hernandez MD   Diabetic Shoe MISC by Does not apply route 11/2/21   Betsy Hernandez MD   Continuous Blood Gluc  (DEXCOM G6 ) LINDA USE AS NEEDED TO CHECK BLOOD SUGAR 10/29/21   Betsy Hernandez MD   aspirin 81 MG chewable tablet Take 1 tablet by mouth daily 9/28/21   Bari Fajardo MD   pantoprazole (PROTONIX) 40 MG tablet Take 1 tablet by mouth 2 times daily 9/27/21   Bari Fajardo MD   prednisoLONE sodium phosphate (INFLAMASE FORTE) 1 % ophthalmic solution 1 drop 4 times daily    Historical Provider, MD   prednisoLONE acetate (PRED FORTE) 1 % ophthalmic suspension Place 1 drop into the right eye three times daily 3 times daily x 1 week then 2x daily x2 weeks, then 1 daily thereafter    Historical Provider, MD   ketorolac (ACULAR) 0.5 % ophthalmic solution Place 1 drop into the right eye 3 times daily 3 times daily x 1week, then 2 x daily x 2 weeks, then 1 daily thereafter    Historical Provider, MD   Continuous Blood Gluc Transmit (DEXCOM G6 TRANSMITTER) MISC USE TO CHECK BLOOD SUGAR 8/31/21   Betsy Hernandez MD   potassium chloride (KLOR-CON M) 10 MEQ extended release tablet Take 1 tablet by mouth 2 times daily 7/29/21 9/26/21  Saira Gipson MD   blood glucose monitor kit and supplies Dispense sufficient amount for indicated testing frequency plus additional to accommodate PRN testing needs. Dispense all needed supplies to include: monitor, strips, lancing device, lancets, control solutions, alcohol swabs. 7/6/21   Constantino Soria MD   blood glucose test strips (ACCU-CHEK CAPO PLUS) strip 4 times a day As needed. 7/6/21   Constantino Soria MD   Handicap Placard MISC by Does not apply route Diagnosis: Type 1 diabetes. Expires 4/13/23. 4/16/21   Pamela Samayoa MD   BD INSULIN SYRINGE U/F 31G X 5/16\" 0.3 ML MISC USE ONE SYRINGE FOUR TIMES A DAY BEFORE MEALS AND ONCE NIGHTLY 1/11/21   Junior Tinajero, APRN - CNP   blood glucose test strips (ACCU-CHEK CAPO PLUS) strip 1 each by In Vitro route daily Test blood glucose 10 times daily Dx Code E10.8 11/17/20   Danish Jenkins MD   blood glucose test strips (ACCU-CHEK CAPO PLUS) strip USE ONE STRIP TO TEST THREE TIMES A DAY 6/15/20   Danish Jenkins MD   Accu-Chek FastClix Lancets MISC 1 each by Does not apply route daily Test blood glucose 10 times daily Dx Code E 10.8 6/15/20   Danish Jenkins MD   triamcinolone (KENALOG) 0.1 % ointment Apply to thickened affected areas PRN sparingly for flares no longer than 2 weeks at one time, do not apply to cleared skin 5/30/19   Ofelia Melendez,    Insulin Syringe-Needle U-100 (BD INSULIN SYRINGE U/F) 31G X 5/16\" 0.5 ML MISC Inject 1 each into the skin 4 times daily DX CODE E 10.22 2/6/19   Danish Jenkins MD   fluticasone (FLONASE) 50 MCG/ACT nasal spray SPRAY TWO SPRAYS IN EACH NOSTRIL DAILY  Patient taking differently: daily as needed  1/8/19   Pamela Samayoa MD   hydrocortisone 2.5 % cream Apply topically 2 times daily.   Patient taking differently: Apply topically 2 times daily as needed 4/12/18   Pamela Samayoa MD   Blood Glucose Monitoring Suppl (ACCU-CHEK CAPO PLUS) w/Device KIT 1 each by Does not apply route daily Test blood sugar 10 times daily Dx code E 10.8 3/2/18   Mariusz Morales MD   GLUCAGON EMERGENCY 1 MG injection INJECT 1MG INTO THE MUSCLE AS NEEDED 12/18/17   Juanjose Baldwin MD   Multiple Vitamin (DAILY KITTY) TABS TAKE ONE TABLET BY MOUTH DAILY 9/26/17   Elida Fletcher MD   Cholecalciferol (VITAMIN D3) 5000 units CAPS Take 1 capsule by mouth Daily 9/26/17   Elida Fletcher MD   Dextromethorphan-Guaifenesin [facility] DM)  MG TB12 Cough and congestion. Patient taking differently: as needed Cough and congestion. 6/22/17   Elida Fletcher MD   omega-3 acid ethyl esters (LOVAZA) 1 G capsule TAKE TWO CAPSULES BY MOUTH TWICE DAILY 6/10/17   Mariusz Morales MD   MUCUS RELIEF DM  MG TABS TAKE ONE BY MOUTH DAILY AS NEEDED 1/9/17   Elida Fletcher MD   Alcohol Swabs PADS Use as needed for insulin injection. 6/10/16   Mariusz Morales MD   Probiotic Product (ACIDOPHILUS PROBIOTIC) CAPS capsule TAKE ONE CAPSULE BY MOUTH DAILY 5/31/16   Elida Fletcher MD   polyvinyl alcohol (LIQUIFILM TEARS) 1.4 % ophthalmic solution 1 drop as needed    Historical Provider, MD   propranolol (INDERAL) 80 MG tablet Take 40 mg by mouth 2 times daily For migraines     Historical Provider, MD   Flaxseed, Linseed, (FLAX PO) Take 14 g by mouth daily as needed     Historical Provider, MD       Allergies:  Tegaderm ag mesh 2\"x2\" [wound dressings], Codeine, Tape [adhesive tape], and Other    Social History:  The patient currently lives at home. TOBACCO:   reports that he has never smoked. He has never used smokeless tobacco.  ETOH:   reports no history of alcohol use. Family History:  Reviewed in detail and negative for DM, Early CAD, Cancer, CVA. Positive as follows:        Problem Relation Age of Onset    Asthma Other     Diabetes Other     High Blood Pressure Other        REVIEW OF SYSTEMS:   As noted in the HPI. All other systems reviewed and negative.     PHYSICAL EXAM:    BP (!) 145/75   Pulse 74   Temp 97.8 °F (36.6 °C) (Oral)   Resp 16   Ht 6' 2\" (1.88 m)   Wt 215 lb (97.5 kg)   SpO2 99%   BMI 27.60 kg/m²     General appearance: No apparent distress appears stated age and cooperative. HEENT Normal cephalic, atraumatic without obvious deformity. Pupils equal, round, and reactive to light. Extra ocular muscles intact. Conjunctivae/corneas clear. Neck: Supple, No jugular venous distention/bruits. Trachea midline without thyromegaly or adenopathy with full range of motion. Lungs: Clear to auscultation, bilaterally without Rales/Wheezes/Rhonchi with good respiratory effort. Heart: Regular rate and rhythm with Normal S1/S2 without murmurs, rubs or gallops, point of maximum impulse non-displaced  Abdomen: Soft, non-tender or non-distended without rigidity or guarding and positive bowel sounds all four quadrants. Extremities: No clubbing, cyanosis, or edema bilaterally. Full range of motion without deformity and normal gait intact. Skin: Skin color, texture, turgor normal.  No rashes or lesions. Neurologic: Alert and oriented X 3, neurovascularly intact with sensory/motor intact upper extremities/lower extremities, bilaterally. Cranial nerves: II-XII intact, grossly non-focal.  Mental status: Alert, oriented, thought content appropriate. Capillary Refill: Acceptable  < 3 seconds  Peripheral Pulses: +3 Easily felt, not easily obliterated with pressure        CBC   Recent Labs     04/17/22  1037 04/18/22  0830   WBC 8.7 8.5   HGB 14.1 12.5*   HCT 42.2 37.0*    365      RENAL  Recent Labs     04/17/22  1037 04/18/22  0830    135*   K 3.1* 4.0    102   CO2 20* 21   PHOS  --  2.7   BUN 14 16   CREATININE 1.4* 1.6*     LFT'S  Recent Labs     04/17/22  1037   AST 16   ALT 17   BILITOT 0.3   ALKPHOS 123     COAG  No results for input(s): INR in the last 72 hours.   CARDIAC ENZYMES  No results for input(s): CKTOTAL, CKMB, CKMBINDEX, TROPONINI in the last 72 hours. U/A:    Lab Results   Component Value Date    COLORU Yellow 04/17/2022    WBCUA 3-5 04/17/2022    RBCUA None seen 04/17/2022    MUCUS 1+ 04/17/2022    BACTERIA Rare 08/03/2021    CLARITYU Clear 04/17/2022    SPECGRAV 1.025 04/17/2022    LEUKOCYTESUR Negative 04/17/2022    BLOODU Negative 04/17/2022    GLUCOSEU 250 04/17/2022       ABG    Lab Results   Component Value Date    KEX3MUD 18.3 08/19/2014    BEART -6 08/19/2014    J3EMBQSQ 90 08/19/2014    PHART 7.350 08/19/2014    JGC7HSJ 33 08/19/2014    PO2ART 62 08/19/2014    ELF3UWK 19 08/19/2014           Active Hospital Problems    Diagnosis Date Noted    Hypoglycemia [E16.2] 04/17/2022         PHYSICIANS CERTIFICATION:    I certify that Preet Austin is expected to be hospitalized for less than 2 midnights based on the following assessment and plan:      ASSESSMENT/PLAN:      Symptomatic Hypoglycemia - recurrent. Acute Metabolic Encephalopathy - due to above. DM type I. This is second episode in the past few days. Pt is very reluctant to have his insulin regimen adjusted, though he agrees to split lantus to 15unit dose (instead of 30) and give one dose in am and then continue apidra coverage during the day and then recheck BG again at bedtime and if BG on the low side - to hold the pm lantus dose or decrease it to 5units to avoid hypoglycemia events. Pt understands risks including permament brain damage and death that can be associated with recurrent hypoglycemia. Dispo - plan to discharge home in stable condition later today if no repeat bouts of hypoglycemia. Close PCP follow up on discharge for further insulin adjustment as indicated. Kael Gongora MD    Thank you Rob Pastor MD for the opportunity to be involved in this patient's care. If you have any questions or concerns please feel free to contact me at [phone].

## 2022-04-18 NOTE — FLOWSHEET NOTE
04/17/22 2037   Vital Signs   Temp 98.2 °F (36.8 °C)   Temp Source Oral   Pulse 95   Heart Rate Source Monitor   Resp 17   BP (!) 150/74   BP Location Left upper arm   Patient Position Semi fowlers   Level of Consciousness Alert (0)   MEWS Score 1   Oxygen Therapy   SpO2 99 %   O2 Device None (Room air)   Pt resting in bed, fell asleep while talking to him. Woke pt up for medication/insulin shot, pt refused all meds at this time. Stated he just wanted to sleep. FSBS 170 at this time.  Nigel Majano, RN

## 2022-04-18 NOTE — PROGRESS NOTES
Prescription: Tyra Rossh and discharge instructions given. Pt verbalized understanding denies any questions/ needs at this time. [employer]  for discharge home.

## 2022-04-18 NOTE — CARE COORDINATION
DISCHARGE ORDER  Date/Time 2022 1:09 PM  Completed by: Rufus Schirmer MSW, LISW-S  Case Management    Patient Name: Marquis Cooley      : 1975  Admitting Diagnosis: Hypoglycemia [E16.2]  Sepsis, due to unspecified organism, unspecified whether acute organ dysfunction present [facility]) [A41.9]      Admit order Date and Status: 2022 Observation   (verify MD's last order for status of admission)      Noted discharge order. If applicable PT/OT recommendation at Discharge: n/a  DME recommendation by PT/OT:n/a    Confirmed discharge plan: Yes  with whom pt   If pt confirmed DC plan does family need to be contacted by CM No if yes who n/a    Discharge Plan: Reviewed chart. Role of discharge planner explained and patient verbalized understanding. Discharge order is noted. Spoke with pt via phone. Pt states he is independent at home and lives with family. He plans on returning home today and requested a taxi from his RN. He states that he uses a walking stick. He denied needing skilled [facility] when writer discussed. He states that he can afford his medications, including his insulin and gets them from [facility]. He states he has a glucometer. He denied needs from . RN updated. Has Home O2 in place on admit:  No    Pt is being d/c'd to home today. Pt's O2 sats are 99% on Room Air per vitals in epic. Discharge timeout done with [facility]. All discharge needs and concerns addressed.

## 2022-04-18 NOTE — PROGRESS NOTES
Pt given evening meds at this time per his request. Pt used walker to ambulate to bathroom. Steady gait.   Pt given pajama pants per his request. Michael Priest RN

## 2022-04-19 ENCOUNTER — CARE COORDINATION (OUTPATIENT)
Dept: CASE MANAGEMENT | Age: 47
End: 2022-04-19

## 2022-04-19 NOTE — CARE COORDINATION
[address] Transitions Initial Follow Up Call    Call within 2 business days of discharge: Yes    Patient: Dolores Cisneros Patient : 1975   MRN: [id number]  Reason for Admission: symptomatic hypoglycemia, acute metabolic encephalopathy, DM type 1, second episode in past few days -> home no services  Discharge Date: 22 RARS: Readmission Risk Score: 27      Last Discharge [facility]       Complaint Diagnosis Description Type Department Provider    22 Hypoglycemia Hypoglycemia . .. ED to Hosp-Admission (Discharged) (ADMITTED) [facility] Hiren Tolbert MD; Sharan Leahy. .. Was this an external facility discharge? No Discharge Facility: NA    1st attempt - Unable to reach or leave message because voice mailbox is full. A Becual message was sent to patient at this time.      Follow Up  Future Appointments   Date Time Provider Mamie Portillo   2022  3:45 PM Bernadine Quintana MD [facility]   2022 12:10 PM MD Amadou Guzmán [facility]       Bonnie Dangelo RN

## 2022-04-20 ENCOUNTER — CARE COORDINATION (OUTPATIENT)
Dept: CASE MANAGEMENT | Age: 47
End: 2022-04-20

## 2022-04-20 NOTE — CARE COORDINATION
[address] Transitions Initial Follow Up Call    Call within 2 business days of discharge: Yes    Patient: Adair Villasenor Patient : 1975   MRN: [id number]  Reason for Admission: symptomatic hypoglycemia, acute metabolic encephalopathy, DM type 1, second episode in past few days -> home no services  Discharge Date: 22 RARS: Readmission Risk Score: 27      Last Discharge [facility]       Complaint Diagnosis Description Type Department Provider    22 Hypoglycemia Hypoglycemia . .. ED to Hosp-Admission (Discharged) (ADMITTED) [facility] Alta Muhammad MD; Arvind Martin. ..           2nd attempt - Unable to reach or leave message because voice mailbox is full. No further CTN outreach scheduled. Chart routing to support staff at Dr Gan's office to schedule TCM visit. Episode resolved at this time.      Follow Up  Future Appointments   Date Time Provider Mamie Portillo   2022  3:45 PM MD MARK Paredes   2022 12:10 PM MD Bradnie Mariano RN

## 2022-04-21 LAB
BLOOD CULTURE, ROUTINE: NORMAL
CULTURE, BLOOD 2: NORMAL

## 2022-04-25 RX ORDER — CEFADROXIL 500 MG/1
CAPSULE ORAL
Qty: 30 CAPSULE | Refills: 5 | Status: SHIPPED | OUTPATIENT
Start: 2022-04-25

## 2022-04-27 ENCOUNTER — TELEPHONE (OUTPATIENT)
Dept: PAIN MANAGEMENT | Age: 47
End: 2022-04-27

## 2022-04-27 NOTE — TELEPHONE ENCOUNTER
[employer] [place] called on behalf of patient stating the patient brought in a rx for ULTRAM that was written back in March. Pharmacy is wanting to know if it is okay to fill the script since it is past 14 days. Please advise.

## 2022-04-28 ENCOUNTER — OFFICE VISIT (OUTPATIENT)
Dept: PAIN MANAGEMENT | Age: 47
End: 2022-04-28
Payer: MEDICARE

## 2022-04-28 VITALS
BODY MASS INDEX: 27.46 KG/M2 | HEART RATE: 85 BPM | RESPIRATION RATE: 16 BRPM | OXYGEN SATURATION: 98 % | HEIGHT: 74 IN | DIASTOLIC BLOOD PRESSURE: 78 MMHG | SYSTOLIC BLOOD PRESSURE: 132 MMHG | WEIGHT: 214 LBS

## 2022-04-28 DIAGNOSIS — G62.9 PERIPHERAL POLYNEUROPATHY: ICD-10-CM

## 2022-04-28 DIAGNOSIS — M80.862S PATHOLOGICAL FRACTURE OF LEFT TIBIA DUE TO OTHER OSTEOPOROSIS, SEQUELA: ICD-10-CM

## 2022-04-28 DIAGNOSIS — G89.4 CHRONIC PAIN SYNDROME: ICD-10-CM

## 2022-04-28 DIAGNOSIS — M79.18 MYOFASCIAL PAIN: ICD-10-CM

## 2022-04-28 DIAGNOSIS — L97.522 ULCER OF LEFT FOOT, WITH FAT LAYER EXPOSED (HCC): ICD-10-CM

## 2022-04-28 DIAGNOSIS — G63 POLYNEUROPATHY ASSOCIATED WITH UNDERLYING DISEASE (HCC): ICD-10-CM

## 2022-04-28 DIAGNOSIS — M79.2 NEUROPATHIC PAIN: ICD-10-CM

## 2022-04-28 PROCEDURE — 99213 OFFICE O/P EST LOW 20 MIN: CPT | Performed by: INTERNAL MEDICINE

## 2022-04-28 RX ORDER — TOPIRAMATE 100 MG/1
100 TABLET, FILM COATED ORAL 2 TIMES DAILY
Qty: 60 TABLET | Refills: 1 | Status: SHIPPED | OUTPATIENT
Start: 2022-04-28 | End: 2022-06-02 | Stop reason: SDUPTHER

## 2022-04-28 RX ORDER — TRAMADOL HYDROCHLORIDE 50 MG/1
50 TABLET ORAL EVERY 6 HOURS PRN
Qty: 60 TABLET | Refills: 0 | Status: SHIPPED | OUTPATIENT
Start: 2022-04-28 | End: 2022-06-02 | Stop reason: SDUPTHER

## 2022-04-28 RX ORDER — SUMATRIPTAN 50 MG/1
TABLET, FILM COATED ORAL
Qty: 9 TABLET | Refills: 1 | Status: SHIPPED | OUTPATIENT
Start: 2022-04-28 | End: 2022-06-02 | Stop reason: SDUPTHER

## 2022-04-28 RX ORDER — GABAPENTIN 400 MG/1
400 CAPSULE ORAL 2 TIMES DAILY
Qty: 60 CAPSULE | Refills: 1 | Status: SHIPPED | OUTPATIENT
Start: 2022-04-28 | End: 2022-06-02 | Stop reason: SDUPTHER

## 2022-04-28 RX ORDER — DULOXETIN HYDROCHLORIDE 60 MG/1
60 CAPSULE, DELAYED RELEASE ORAL DAILY
Qty: 30 CAPSULE | Refills: 1 | Status: SHIPPED | OUTPATIENT
Start: 2022-04-28 | End: 2022-06-02 | Stop reason: SDUPTHER

## 2022-04-28 RX ORDER — ERENUMAB-AOOE 70 MG/ML
INJECTION SUBCUTANEOUS
Qty: 1 PEN | Refills: 1 | Status: SHIPPED | OUTPATIENT
Start: 2022-04-28 | End: 2022-06-02 | Stop reason: SDUPTHER

## 2022-04-28 NOTE — PROGRESS NOTES
Mega Nuñez  1975  [id number]      HISTORY OF PRESENT ILLNESS:  Mr. Kinza Field is a 42 y.o. male returns for a follow up visit for pain management  He has a diagnosis of   1. Chronic pain syndrome    2. Ulcer of left foot, with fat layer exposed ([facility] [address]    3. Neuropathic pain    4. Recurrent major depressive disorder, in partial remission ([address]    5. Pathological fracture of left tibia due to other osteoporosis, sequela    6. Myofascial pain    7. Chronic migraine without aura, with intractable migraine, so stated, with status migrainosus    8. Constipation due to opioid therapy    9. Polyneuropathy associated with underlying disease ([address]    10. Peripheral polyneuropathy    11. Primary insomnia    . He complains of pain in the head, neck, upper back, lower back with radiation to the bilateral shoulders, bilateral arms, bilateral hands, left knee, left lower leg, left ankle  He rates the pain 10/10 and describes it as sharp, aching, burning, numbness, pins and needles. Current treatment regimen has helped relieve about 10% of the pain. He denies any side effects from the current pain regimen. Patient reports that since the last follow up visit the physical functioning is better, family/social relationships are better, mood is unchanged sleep patterns are unchanged, and that the overall functioning is better. Patient denies misusing/abusing his narcotic pain medications or using any illegal drugs. There are No indicators for possible drug abuse, addiction or diversion problems, patient states he was in the hospital, his blood sugar was in the 50's. Mr. Kizna Field states he is using Ultram along with the other adjuvants. He says his headache symptoms are manageable, he is on Aimovig and Topamax. Patient states he has been walking with a cane. ALLERGIES: Patients list of allergies were reviewed     MEDICATIONS: Mr. Kinza Field list of medications were reviewed. His current medications are   Outpatient Medications Prior to Visit   Medication Sig Dispense Refill    cefadroxil (DURICEF) 500 MG capsule TAKE ONE CAPSULE BY MOUTH DAILY 30 capsule 5    LANTUS 100 UNIT/ML injection vial INJECT 15 UNITS UNDER THE SKIN twice daily before breakfast and nightly. Hold nightly dose if PM BG<90 30 mL 3    Insulin Syringe-Needle U-100 (B-D INS SYR ULTRAFINE 1CC/31G) 31G X 5/16\" 1 ML MISC INJECT USING INSULIN ONCE DAILY 30 each 10    SUMAtriptan (IMITREX) 50 MG tablet Take one tab po at the start of migraine PRN max 1 every 24 hours 9 tablet 1    Erenumab-aooe (AIMOVIG, 140 MG DOSE,) 70 MG/ML SOAJ Inject 140 mLs into the skin every 30 days 1 pen 1    gabapentin (NEURONTIN) 400 MG capsule Take 1 capsule by mouth 2 times daily for 30 days. 60 capsule 1    DULoxetine (CYMBALTA) 60 MG extended release capsule Take 1 capsule by mouth daily 30 capsule 1    topiramate (TOPAMAX) 100 MG tablet Take 1 tablet by mouth 2 times daily 60 tablet 1    traMADol (ULTRAM) 50 MG tablet Take 1 tablet by mouth every 6 hours as needed for Pain (max 1-2 per day) for up to 28 days.  56 tablet 0    insulin glulisine (APIDRA) 100 UNIT/ML injection INJECT 8 TO 12 UNITS UNDER THE SKIN THREE TIMES A DAY WITH MEALS PER SLIDING SCALE 10 mL 2    GVOKE HYPOPEN 2-PACK 1 MG/0.2ML SOAJ INJECT AS NEEDED FOR LOW BLOOD SUGAR 0.4 mL 3    dicloxacillin (DYNAPEN) 500 MG capsule TAKE ONE CAPSULE BY MOUTH TWICE A DAY 60 capsule 11    Continuous Blood Gluc Sensor (DEXCOM G6 SENSOR) MISC USE ONE SENSOR AND CHANGE EVERY 10 DAYS 2 each 3    Diabetic Shoe MISC by Does not apply route 1 each 0    Continuous Blood Gluc  (DEXCOM G6 ) LINDA USE AS NEEDED TO CHECK BLOOD SUGAR 1 each 2    aspirin 81 MG chewable tablet Take 1 tablet by mouth daily 30 tablet 0    pantoprazole (PROTONIX) 40 MG tablet Take 1 tablet by mouth 2 times daily 60 tablet 1    prednisoLONE sodium phosphate (INFLAMASE FORTE) 1 % ophthalmic solution 1 drop 4 times daily      prednisoLONE acetate (PRED FORTE) 1 % ophthalmic suspension Place 1 drop into the right eye three times daily 3 times daily x 1 week then 2x daily x2 weeks, then 1 daily thereafter      ketorolac (ACULAR) 0.5 % ophthalmic solution Place 1 drop into the right eye 3 times daily 3 times daily x 1week, then 2 x daily x 2 weeks, then 1 daily thereafter      Continuous Blood Gluc Transmit (DEXCOM G6 TRANSMITTER) MISC USE TO CHECK BLOOD SUGAR 2 each 3    potassium chloride (KLOR-CON M) 10 MEQ extended release tablet Take 1 tablet by mouth 2 times daily 60 tablet 0    blood glucose monitor kit and supplies Dispense sufficient amount for indicated testing frequency plus additional to accommodate PRN testing needs. Dispense all needed supplies to include: monitor, strips, lancing device, lancets, control solutions, alcohol swabs. 1 kit 0    blood glucose test strips (ACCU-CHEK CAPO PLUS) strip 4 times a day As needed. 150 each 3    Handicap Placard MISC by Does not apply route Diagnosis: Type 1 diabetes.      Expires 4/13/23. 1 each 0    BD INSULIN SYRINGE U/F 31G X 5/16\" 0.3 ML MISC USE ONE SYRINGE FOUR TIMES A DAY BEFORE MEALS AND ONCE NIGHTLY 120 each 9    blood glucose test strips (ACCU-CHEK CAPO PLUS) strip 1 each by In Vitro route daily Test blood glucose 10 times daily Dx Code E10.8 300 each 10    blood glucose test strips (ACCU-CHEK CAPO PLUS) strip USE ONE STRIP TO TEST THREE TIMES A  strip 4    Accu-Chek FastClix Lancets MISC 1 each by Does not apply route daily Test blood glucose 10 times daily Dx Code E 10.8 900 each 2    triamcinolone (KENALOG) 0.1 % ointment Apply to thickened affected areas PRN sparingly for flares no longer than 2 weeks at one time, do not apply to cleared skin 80 g 0    Insulin Syringe-Needle U-100 (BD INSULIN SYRINGE U/F) 31G X 5/16\" 0.5 ML MISC Inject 1 each into the skin 4 times daily DX CODE E 10.22 120 each 9    fluticasone (FLONASE) 50 MCG/ACT nasal spray SPRAY TWO SPRAYS IN EACH NOSTRIL DAILY (Patient taking differently: daily as needed ) 16 g 4    hydrocortisone 2.5 % cream Apply topically 2 times daily. (Patient taking differently: Apply topically 2 times daily as needed) 30 g 0    Blood Glucose Monitoring Suppl (ACCU-CHEK CAPO PLUS) w/Device KIT 1 each by Does not apply route daily Test blood sugar 10 times daily Dx code E 10.8 1 kit 10    GLUCAGON EMERGENCY 1 MG injection INJECT 1MG INTO THE MUSCLE AS NEEDED 1 kit 3    Multiple Vitamin (DAILY KITTY) TABS TAKE ONE TABLET BY MOUTH DAILY 30 tablet 5    Cholecalciferol (VITAMIN D3) 5000 units CAPS Take 1 capsule by mouth Daily 30 capsule 3    Dextromethorphan-Guaifenesin (MUCINEX DM)  MG TB12 Cough and congestion. (Patient taking differently: as needed Cough and congestion.) 60 tablet 1    omega-3 acid ethyl esters (LOVAZA) 1 G capsule TAKE TWO CAPSULES BY MOUTH TWICE DAILY 120 capsule 5    MUCUS RELIEF DM  MG TABS TAKE ONE BY MOUTH DAILY AS NEEDED 30 tablet 1    Alcohol Swabs PADS Use as needed for insulin injection. 100 each 5    Probiotic Product (ACIDOPHILUS PROBIOTIC) CAPS capsule TAKE ONE CAPSULE BY MOUTH DAILY 28 capsule 4    polyvinyl alcohol (LIQUIFILM TEARS) 1.4 % ophthalmic solution 1 drop as needed      propranolol (INDERAL) 80 MG tablet Take 40 mg by mouth 2 times daily For migraines       Flaxseed, Linseed, (FLAX PO) Take 14 g by mouth daily as needed        No facility-administered medications prior to visit. REVIEW OF SYSTEMS:    Respiratory: Negative for apnea, chest tightness and shortness of breath or change in baseline breathing. PHYSICAL EXAM:   Nursing note and vitals reviewed. /78   Pulse 85   Resp 16   Ht 6' 2\" (1.88 m)   Wt 214 lb (97.1 kg)   SpO2 98%   BMI 27.48 kg/m²   Constitutional: He appears well-developed and well-nourished. No acute distress. Cardiovascular: Normal rate, regular rhythm, normal heart sounds, and does not have murmur. Pulmonary/Chest: Effort normal. No respiratory distress. He does not have wheezes in the lung fields. He has no rales. Neurological/Psychiatric:He is alert and oriented to person, place, and time. Coordination is  normal.  His mood isAppropriate and affect is Neutral/Euthymic(normal) . His  Other: using a cane     IMPRESSION:   1. Chronic pain syndrome    2. Ulcer of left foot, with fat layer exposed ([facility] [address]    3. Neuropathic pain    4. Pathological fracture of left tibia due to other osteoporosis, sequela    5. Myofascial pain    6. Polyneuropathy associated with underlying disease ([address]    7. Peripheral polyneuropathy        PLAN:  Informed verbal consent was obtained  -ROM/Stretching exercises as advised   -He was advised to increase fluids ( 5-7  glasses of fluid daily), limit caffeine, avoid cheese products, increase dietary fiber, increase activity and exercise as tolerated and relax regularly and enjoy meals   -Continue with Ultram 1-2 per day  -Walking as tolerated    -Monitor blood sugar regularly, diabetic control- adv diabetic diet. Goal for fasting blood sugars around 120. Follow up with Endocrinologist/PCP also for on going management    -Urine drug screen with GC/MS for opiates and drugs of abuse was ordered and will follow up on results. Current Outpatient Medications   Medication Sig Dispense Refill    cefadroxil (DURICEF) 500 MG capsule TAKE ONE CAPSULE BY MOUTH DAILY 30 capsule 5    LANTUS 100 UNIT/ML injection vial INJECT 15 UNITS UNDER THE SKIN twice daily before breakfast and nightly.  Hold nightly dose if PM BG<90 30 mL 3    Insulin Syringe-Needle U-100 (B-D INS SYR ULTRAFINE 1CC/31G) 31G X 5/16\" 1 ML MISC INJECT USING INSULIN ONCE DAILY 30 each 10    SUMAtriptan (IMITREX) 50 MG tablet Take one tab po at the start of migraine PRN max 1 every 24 hours 9 tablet 1    Erenumab-aooe (AIMOVIG, 140 MG DOSE,) 70 MG/ML SOAJ Inject 140 mLs into the skin every 30 days 1 pen 1    gabapentin (NEURONTIN) 400 MG capsule Take 1 capsule by mouth 2 times daily for 30 days. 60 capsule 1    DULoxetine (CYMBALTA) 60 MG extended release capsule Take 1 capsule by mouth daily 30 capsule 1    topiramate (TOPAMAX) 100 MG tablet Take 1 tablet by mouth 2 times daily 60 tablet 1    traMADol (ULTRAM) 50 MG tablet Take 1 tablet by mouth every 6 hours as needed for Pain (max 1-2 per day) for up to 28 days.  56 tablet 0    insulin glulisine (APIDRA) 100 UNIT/ML injection INJECT 8 TO 12 UNITS UNDER THE SKIN THREE TIMES A DAY WITH MEALS PER SLIDING SCALE 10 mL 2    GVOKE HYPOPEN 2-PACK 1 MG/0.2ML SOAJ INJECT AS NEEDED FOR LOW BLOOD SUGAR 0.4 mL 3    dicloxacillin (DYNAPEN) 500 MG capsule TAKE ONE CAPSULE BY MOUTH TWICE A DAY 60 capsule 11    Continuous Blood Gluc Sensor (DEXCOM G6 SENSOR) MISC USE ONE SENSOR AND CHANGE EVERY 10 DAYS 2 each 3    Diabetic Shoe MISC by Does not apply route 1 each 0    Continuous Blood Gluc  (DEXCOM G6 ) LINDA USE AS NEEDED TO CHECK BLOOD SUGAR 1 each 2    aspirin 81 MG chewable tablet Take 1 tablet by mouth daily 30 tablet 0    pantoprazole (PROTONIX) 40 MG tablet Take 1 tablet by mouth 2 times daily 60 tablet 1    prednisoLONE sodium phosphate (INFLAMASE FORTE) 1 % ophthalmic solution 1 drop 4 times daily      prednisoLONE acetate (PRED FORTE) 1 % ophthalmic suspension Place 1 drop into the right eye three times daily 3 times daily x 1 week then 2x daily x2 weeks, then 1 daily thereafter      ketorolac (ACULAR) 0.5 % ophthalmic solution Place 1 drop into the right eye 3 times daily 3 times daily x 1week, then 2 x daily x 2 weeks, then 1 daily thereafter      Continuous Blood Gluc Transmit (DEXCOM G6 TRANSMITTER) MISC USE TO CHECK BLOOD SUGAR 2 each 3    potassium chloride (KLOR-CON M) 10 MEQ extended release tablet Take 1 tablet by mouth 2 times daily 60 tablet 0    blood glucose monitor kit and supplies Dispense sufficient amount for indicated testing frequency plus additional to accommodate PRN testing needs. Dispense all needed supplies to include: monitor, strips, lancing device, lancets, control solutions, alcohol swabs. 1 kit 0    blood glucose test strips (ACCU-CHEK CAPO PLUS) strip 4 times a day As needed. 150 each 3    Handicap Placard MISC by Does not apply route Diagnosis: Type 1 diabetes. Expires 4/13/23. 1 each 0    BD INSULIN SYRINGE U/F 31G X 5/16\" 0.3 ML MISC USE ONE SYRINGE FOUR TIMES A DAY BEFORE MEALS AND ONCE NIGHTLY 120 each 9    blood glucose test strips (ACCU-CHEK CAPO PLUS) strip 1 each by In Vitro route daily Test blood glucose 10 times daily Dx Code E10.8 300 each 10    blood glucose test strips (ACCU-CHEK CAPO PLUS) strip USE ONE STRIP TO TEST THREE TIMES A  strip 4    Accu-Chek FastClix Lancets MISC 1 each by Does not apply route daily Test blood glucose 10 times daily Dx Code E 10.8 900 each 2    triamcinolone (KENALOG) 0.1 % ointment Apply to thickened affected areas PRN sparingly for flares no longer than 2 weeks at one time, do not apply to cleared skin 80 g 0    Insulin Syringe-Needle U-100 (BD INSULIN SYRINGE U/F) 31G X 5/16\" 0.5 ML MISC Inject 1 each into the skin 4 times daily DX CODE E 10.22 120 each 9    fluticasone (FLONASE) 50 MCG/ACT nasal spray SPRAY TWO SPRAYS IN EACH NOSTRIL DAILY (Patient taking differently: daily as needed ) 16 g 4    hydrocortisone 2.5 % cream Apply topically 2 times daily.  (Patient taking differently: Apply topically 2 times daily as needed) 30 g 0    Blood Glucose Monitoring Suppl (ACCU-CHEK CAPO PLUS) w/Device KIT 1 each by Does not apply route daily Test blood sugar 10 times daily Dx code E 10.8 1 kit 10    GLUCAGON EMERGENCY 1 MG injection INJECT 1MG INTO THE MUSCLE AS NEEDED 1 kit 3    Multiple Vitamin (DAILY KITTY) TABS TAKE ONE TABLET BY MOUTH DAILY 30 tablet 5    Cholecalciferol (VITAMIN D3) 5000 units CAPS Take 1 capsule by mouth Daily 30 capsule 3    Dextromethorphan-Guaifenesin (MUCINEX DM)  MG TB12 Cough and congestion. (Patient taking differently: as needed Cough and congestion.) 60 tablet 1    omega-3 acid ethyl esters (LOVAZA) 1 G capsule TAKE TWO CAPSULES BY MOUTH TWICE DAILY 120 capsule 5    MUCUS RELIEF DM  MG TABS TAKE ONE BY MOUTH DAILY AS NEEDED 30 tablet 1    Alcohol Swabs PADS Use as needed for insulin injection. 100 each 5    Probiotic Product (ACIDOPHILUS PROBIOTIC) CAPS capsule TAKE ONE CAPSULE BY MOUTH DAILY 28 capsule 4    polyvinyl alcohol (LIQUIFILM TEARS) 1.4 % ophthalmic solution 1 drop as needed      propranolol (INDERAL) 80 MG tablet Take 40 mg by mouth 2 times daily For migraines       Flaxseed, Linseed, (FLAX PO) Take 14 g by mouth daily as needed        No current facility-administered medications for this visit. I will continue his current medication regimen  which is part of the above treatment schedule. It has been helping with Mr. Ashleigh Naranjo chronic  medical problems which for this visit include:   Diagnoses of Chronic pain syndrome, Ulcer of left foot, with fat layer exposed ([facility] [address] Neuropathic pain, Recurrent major depressive disorder, in partial remission [facility]), Pathological fracture of left tibia due to other osteoporosis, sequela, Myofascial pain, Chronic migraine without aura, with intractable migraine, so stated, with status migrainosus, Constipation due to opioid therapy, Polyneuropathy associated with underlying disease ([facility] [address] Peripheral polyneuropathy, and Primary insomnia were pertinent to this visit. Risks and benefits of the medications and other alternative treatments  including no treatment were discussed with the patient. The common side effects of these medications were also explained to the patient. Informed verbal consent was obtained.    Goals of current treatment regimen include improvement in pain, restoration of functioning- with focus on improvement in physical performance, general activity, work or disability,emotional distress, health care utilization and  decreased medication consumption. Will continue to monitor progress towards achieving/maintaining therapeutic goals with special emphasis on  1. Improvement in perceived interfernce  of pain with ADL's. Ability to do home exercises independently. Ability to do household chores indoor and/or outdoor work and social and leisure activities. Improve psychosocial and physical functioning. - he is showing progression towards this treatment goal with the current regimen. He was advised against drinking alcohol with the narcotic pain medicines, advised against driving or handling machinery while adjusting the dose of medicines or if having cognitive  issues related to the current medications. Risk of overdose and death, if medicines not taken as prescribed, were also discussed. If the patient develops new symptoms or if the symptoms worsen, the patient should call the office. While transcribing every attempt was made to maintain the accuracy of the note in terms of it's contents,there may have been some errors made inadvertently. Thank you for allowing me to participate in the care of this patient.     Alena Knight MD.    Cc: Michael Daniel MD

## 2022-05-04 ENCOUNTER — TELEPHONE (OUTPATIENT)
Dept: ENDOCRINOLOGY | Age: 47
End: 2022-05-04

## 2022-05-04 DIAGNOSIS — E10.22 TYPE 1 DIABETES MELLITUS WITH STAGE 3 CHRONIC KIDNEY DISEASE (HCC): ICD-10-CM

## 2022-05-04 DIAGNOSIS — N18.30 TYPE 1 DIABETES MELLITUS WITH STAGE 3 CHRONIC KIDNEY DISEASE (HCC): ICD-10-CM

## 2022-05-04 NOTE — TELEPHONE ENCOUNTER
The patient states he is running out of syringes and does not have enough to last until his May 24th visit. He is asking our office to order more from [address] in [facility].   It is on [address] [place] [address] phone is

## 2022-05-05 RX ORDER — BLOOD SUGAR DIAGNOSTIC
STRIP MISCELLANEOUS
Qty: 120 EACH | Refills: 9 | Status: SHIPPED | OUTPATIENT
Start: 2022-05-05

## 2022-05-14 ENCOUNTER — APPOINTMENT (OUTPATIENT)
Dept: GENERAL RADIOLOGY | Age: 47
End: 2022-05-14
Payer: MEDICARE

## 2022-05-14 ENCOUNTER — HOSPITAL ENCOUNTER (EMERGENCY)
Age: 47
Discharge: HOME | End: 2022-05-15
Attending: EMERGENCY MEDICINE
Payer: MEDICARE

## 2022-05-14 DIAGNOSIS — E16.2 HYPOGLYCEMIA: Primary | ICD-10-CM

## 2022-05-14 LAB
A/G RATIO: 1 (ref 1.1–2.2)
ALBUMIN SERPL-MCNC: 4.1 G/DL (ref 3.4–5)
ALP BLD-CCNC: 128 U/L (ref 40–129)
ALT SERPL-CCNC: 18 U/L (ref 10–40)
ANION GAP SERPL CALCULATED.3IONS-SCNC: 19 MMOL/L (ref 3–16)
AST SERPL-CCNC: 22 U/L (ref 15–37)
BASE EXCESS VENOUS: -9.2 MMOL/L (ref -3–3)
BASOPHILS ABSOLUTE: 0.1 K/UL (ref 0–0.2)
BASOPHILS RELATIVE PERCENT: 1.3 %
BETA-HYDROXYBUTYRATE: 0.2 MMOL/L (ref 0–0.27)
BILIRUB SERPL-MCNC: 0.3 MG/DL (ref 0–1)
BUN BLDV-MCNC: 18 MG/DL (ref 7–20)
CALCIUM SERPL-MCNC: 9.5 MG/DL (ref 8.3–10.6)
CARBOXYHEMOGLOBIN: 3.2 % (ref 0–1.5)
CHLORIDE BLD-SCNC: 101 MMOL/L (ref 99–110)
CO2: 16 MMOL/L (ref 21–32)
CREAT SERPL-MCNC: 1.5 MG/DL (ref 0.9–1.3)
EOSINOPHILS ABSOLUTE: 0.5 K/UL (ref 0–0.6)
EOSINOPHILS RELATIVE PERCENT: 5 %
GFR AFRICAN AMERICAN: >60
GFR NON-AFRICAN AMERICAN: 51
GLUCOSE BLD-MCNC: 224 MG/DL (ref 70–99)
GLUCOSE BLD-MCNC: 238 MG/DL (ref 70–99)
GLUCOSE BLD-MCNC: 28 MG/DL (ref 70–99)
GLUCOSE BLD-MCNC: 90 MG/DL (ref 70–99)
HCO3 VENOUS: 16.5 MMOL/L (ref 23–29)
HCT VFR BLD CALC: 44.3 % (ref 40.5–52.5)
HEMOGLOBIN: 14.7 G/DL (ref 13.5–17.5)
LACTIC ACID, SEPSIS: 5.3 MMOL/L (ref 0.4–1.9)
LYMPHOCYTES ABSOLUTE: 2.2 K/UL (ref 1–5.1)
LYMPHOCYTES RELATIVE PERCENT: 20.6 %
MCH RBC QN AUTO: 27.9 PG (ref 26–34)
MCHC RBC AUTO-ENTMCNC: 33.1 G/DL (ref 31–36)
MCV RBC AUTO: 84.1 FL (ref 80–100)
METHEMOGLOBIN VENOUS: 0 %
MONOCYTES ABSOLUTE: 0.5 K/UL (ref 0–1.3)
MONOCYTES RELATIVE PERCENT: 4.9 %
NEUTROPHILS ABSOLUTE: 7.2 K/UL (ref 1.7–7.7)
NEUTROPHILS RELATIVE PERCENT: 68.2 %
O2 SAT, VEN: 92 %
O2 THERAPY: ABNORMAL
PCO2, VEN: 35.7 MMHG (ref 40–50)
PDW BLD-RTO: 14 % (ref 12.4–15.4)
PERFORMED ON: ABNORMAL
PH VENOUS: 7.28 (ref 7.35–7.45)
PLATELET # BLD: 438 K/UL (ref 135–450)
PMV BLD AUTO: 6.4 FL (ref 5–10.5)
PO2, VEN: 68.3 MMHG (ref 25–40)
POTASSIUM SERPL-SCNC: 3.5 MMOL/L (ref 3.5–5.1)
RBC # BLD: 5.27 M/UL (ref 4.2–5.9)
SODIUM BLD-SCNC: 136 MMOL/L (ref 136–145)
TCO2 CALC VENOUS: 18 MMOL/L
TOTAL PROTEIN: 8.1 G/DL (ref 6.4–8.2)
TROPONIN: <0.01 NG/ML
WBC # BLD: 10.5 K/UL (ref 4–11)

## 2022-05-14 PROCEDURE — 36415 COLL VENOUS BLD VENIPUNCTURE: CPT

## 2022-05-14 PROCEDURE — 83605 ASSAY OF LACTIC ACID: CPT

## 2022-05-14 PROCEDURE — 96365 THER/PROPH/DIAG IV INF INIT: CPT

## 2022-05-14 PROCEDURE — 71045 X-RAY EXAM CHEST 1 VIEW: CPT

## 2022-05-14 PROCEDURE — 99285 EMERGENCY DEPT VISIT HI MDM: CPT

## 2022-05-14 PROCEDURE — 84484 ASSAY OF TROPONIN QUANT: CPT

## 2022-05-14 PROCEDURE — 6360000002 HC RX W HCPCS

## 2022-05-14 PROCEDURE — 80053 COMPREHEN METABOLIC PANEL: CPT

## 2022-05-14 PROCEDURE — 85025 COMPLETE CBC W/AUTO DIFF WBC: CPT

## 2022-05-14 PROCEDURE — 96375 TX/PRO/DX INJ NEW DRUG ADDON: CPT

## 2022-05-14 PROCEDURE — 82803 BLOOD GASES ANY COMBINATION: CPT

## 2022-05-14 PROCEDURE — 96366 THER/PROPH/DIAG IV INF ADDON: CPT

## 2022-05-14 PROCEDURE — 82010 KETONE BODYS QUAN: CPT

## 2022-05-14 PROCEDURE — 2580000003 HC RX 258: Performed by: EMERGENCY MEDICINE

## 2022-05-14 RX ORDER — LORAZEPAM 2 MG/ML
2 INJECTION INTRAMUSCULAR ONCE
Status: COMPLETED | OUTPATIENT
Start: 2022-05-14 | End: 2022-05-14

## 2022-05-14 RX ORDER — DEXTROSE MONOHYDRATE 100 MG/ML
INJECTION, SOLUTION INTRAVENOUS CONTINUOUS
Status: DISCONTINUED | OUTPATIENT
Start: 2022-05-14 | End: 2022-05-15 | Stop reason: HOSPADM

## 2022-05-14 RX ORDER — 0.9 % SODIUM CHLORIDE 0.9 %
1000 INTRAVENOUS SOLUTION INTRAVENOUS ONCE
Status: COMPLETED | OUTPATIENT
Start: 2022-05-14 | End: 2022-05-15

## 2022-05-14 RX ORDER — LORAZEPAM 2 MG/ML
INJECTION INTRAMUSCULAR
Status: COMPLETED
Start: 2022-05-14 | End: 2022-05-14

## 2022-05-14 RX ORDER — DEXTROSE MONOHYDRATE 100 MG/ML
INJECTION, SOLUTION INTRAVENOUS ONCE
Status: COMPLETED | OUTPATIENT
Start: 2022-05-14 | End: 2022-05-14

## 2022-05-14 RX ORDER — DEXTROSE MONOHYDRATE 100 MG/ML
INJECTION, SOLUTION INTRAVENOUS ONCE
Status: CANCELLED | OUTPATIENT
Start: 2022-05-14

## 2022-05-14 RX ADMIN — LORAZEPAM 2 MG: 2 INJECTION INTRAMUSCULAR; INTRAVENOUS at 21:40

## 2022-05-14 RX ADMIN — SODIUM CHLORIDE 1000 ML: 9 INJECTION, SOLUTION INTRAVENOUS at 23:00

## 2022-05-14 RX ADMIN — LORAZEPAM 2 MG: 2 INJECTION INTRAMUSCULAR at 21:40

## 2022-05-14 RX ADMIN — DEXTROSE MONOHYDRATE 1000 ML: 100 INJECTION, SOLUTION INTRAVENOUS at 22:36

## 2022-05-14 RX ADMIN — SODIUM CHLORIDE 1000 ML: 9 INJECTION, SOLUTION INTRAVENOUS at 22:36

## 2022-05-14 RX ADMIN — DEXTROSE MONOHYDRATE: 100 INJECTION, SOLUTION INTRAVENOUS at 21:45

## 2022-05-14 ASSESSMENT — LIFESTYLE VARIABLES: HOW OFTEN DO YOU HAVE A DRINK CONTAINING ALCOHOL: NEVER

## 2022-05-15 VITALS
WEIGHT: 212 LBS | OXYGEN SATURATION: 96 % | SYSTOLIC BLOOD PRESSURE: 175 MMHG | TEMPERATURE: 96.1 F | HEART RATE: 84 BPM | HEIGHT: 74 IN | RESPIRATION RATE: 12 BRPM | BODY MASS INDEX: 27.21 KG/M2 | DIASTOLIC BLOOD PRESSURE: 85 MMHG

## 2022-05-15 LAB
AMORPHOUS: ABNORMAL /HPF
BACTERIA: ABNORMAL /HPF
BASE EXCESS VENOUS: -6.6 MMOL/L (ref -3–3)
BILIRUBIN URINE: NEGATIVE
BLOOD, URINE: NEGATIVE
CARBOXYHEMOGLOBIN: 1.5 % (ref 0–1.5)
CLARITY: CLEAR
COLOR: YELLOW
EKG ATRIAL RATE: 89 BPM
EKG DIAGNOSIS: NORMAL
EKG P AXIS: 56 DEGREES
EKG P-R INTERVAL: 160 MS
EKG Q-T INTERVAL: 376 MS
EKG QRS DURATION: 94 MS
EKG QTC CALCULATION (BAZETT): 457 MS
EKG R AXIS: 13 DEGREES
EKG T AXIS: 47 DEGREES
EKG VENTRICULAR RATE: 89 BPM
GLUCOSE URINE: 250 MG/DL
HCO3 VENOUS: 20.4 MMOL/L (ref 23–29)
KETONES, URINE: NEGATIVE MG/DL
LACTIC ACID, SEPSIS: 1.2 MMOL/L (ref 0.4–1.9)
LEUKOCYTE ESTERASE, URINE: NEGATIVE
METHEMOGLOBIN VENOUS: 0.3 %
MICROSCOPIC EXAMINATION: YES
MUCUS: ABNORMAL /LPF
NITRITE, URINE: NEGATIVE
O2 SAT, VEN: 45 %
O2 THERAPY: ABNORMAL
PCO2, VEN: 46.7 MMHG (ref 40–50)
PH UA: 6.5 (ref 5–8)
PH VENOUS: 7.26 (ref 7.35–7.45)
PO2, VEN: 28.5 MMHG (ref 25–40)
PROTEIN UA: 30 MG/DL
RBC UA: ABNORMAL /HPF (ref 0–4)
SPECIFIC GRAVITY UA: 1.01 (ref 1–1.03)
TCO2 CALC VENOUS: 22 MMOL/L
URINE REFLEX TO CULTURE: ABNORMAL
URINE TYPE: ABNORMAL
UROBILINOGEN, URINE: 2 E.U./DL
WBC UA: ABNORMAL /HPF (ref 0–5)

## 2022-05-15 PROCEDURE — 93010 ELECTROCARDIOGRAM REPORT: CPT | Performed by: INTERNAL MEDICINE

## 2022-05-15 PROCEDURE — 93005 ELECTROCARDIOGRAM TRACING: CPT | Performed by: EMERGENCY MEDICINE

## 2022-05-15 PROCEDURE — 36415 COLL VENOUS BLD VENIPUNCTURE: CPT

## 2022-05-15 PROCEDURE — 81001 URINALYSIS AUTO W/SCOPE: CPT

## 2022-05-15 PROCEDURE — 83605 ASSAY OF LACTIC ACID: CPT

## 2022-05-15 PROCEDURE — 82803 BLOOD GASES ANY COMBINATION: CPT

## 2022-05-15 NOTE — ED NOTES
Writer took panic lab of lactic acid 5.3. Primary RN [facility] SARAHI DUMONT aware. Dr. Janay Mixon aware.       Lisy Kolb, JULIA  05/14/22 [id number]

## 2022-05-15 NOTE — ED NOTES
Pt arrived unresponsive with low blood sugar responded to Dextrose IV now awake alert calm. VS stable.       Dejuan Watson RN  05/14/22 [id number]

## 2022-05-15 NOTE — ED NOTES
Resting quietly. Appropriate conversation when awake. Sister at bedside. VS Stable. 2 l NS finishing will collect repeat Lactic and VBG #2 when completed in approx 15 min. ERMD in agreement.        John De La Vega RN  05/15/22 [id number]

## 2022-05-15 NOTE — ED PROVIDER NOTES
[address] ED      CHIEF COMPLAINT  Hypoglycemia and Altered Mental Status (unresponsive with blood sugar 28 on arrival to ED. Squad gave Glucagon IM and reported blood sugar 60s. Pt well known to EMS as diabetic with hx of similar presentation.  )       HISTORY OF PRESENT ILLNESS  Clif Diamond is a 42 y.o. male with history of diabetes who presents to the emergency department for evaluation of altered mental status. Per EMS, he had low blood glucose. He was given IM glucagon. Unable to establish IV. IV established. He has no family members present at bedside. Per squad, he has bedbugs. No other complaints, modifying factors or associated symptoms. I have reviewed the following from the nursing documentation.     Past Medical History:   Diagnosis Date    Acute respiratory failure ([address] 8/18/2014    Asthma     Blood pressure instability     Chronic pain syndrome     Detached retina, bilateral     laser tx right eye    Diabetes mellitus ([address]     uncontrolled     Insomnia     Meningitis August 2014    admission [facility]    Neuropathic pain     Osteomyelitis ([address]     Osteomyelitis of tibia ([address]     left    Pain of left lower extremity     Peripheral neuropathy      Past Surgical History:   Procedure Laterality Date    ANKLE FRACTURE SURGERY Left 1/28/13    Dr. Prince Montelongo  August 2013    left tibia with external fixation device    EYE SURGERY      retina reattachment left eye x 3 holes repairs    EYE SURGERY Right 9-27-13    retal detachment    LEG SURGERY Left 3/31/14    ORIF left fibula and tibia    UT EGD FLEXIBLE FOREIGN BODY REMOVAL N/A 9/21/2018    EGD FOREIGN BODY REMOVAL performed by Jo Sadler MD at [address] [place] [address]      with external device inplace    UPPER GASTROINTESTINAL ENDOSCOPY N/A 9/21/2018    EGD BIOPSY performed by Jo Sadler MD at [place] [id number] ENDOSCOPY N/A 9/25/2021    EGD BIOPSY performed by Ramos Campos MD at [facility] ENDOSCOPY     Family History   Problem Relation Age of Onset    Asthma Other     Diabetes Other     High Blood Pressure Other      Social History     Socioeconomic History    Marital status: Single     Spouse name: Not on file    Number of children: Not on file    Years of education: Not on file    Highest education level: Not on file   Occupational History    Occupation: n/a   Tobacco Use    Smoking status: Never Smoker    Smokeless tobacco: Never Used   Vaping Use    Vaping Use: Never used   Substance and Sexual Activity    Alcohol use: No     Alcohol/week: 0.0 standard drinks    Drug use: No    Sexual activity: Not Currently   Other Topics Concern    Not on file   Social History Narrative    Not on file     Social Determinants of Health     Financial Resource Strain: Low Risk     Difficulty of Paying Living Expenses: Not hard at all   Food Insecurity: No Food Insecurity    Worried About [address] in the Last Year: Never true    [facility] in the Last Year: Never true   Transportation Needs:     Lack of Transportation (Medical): Not on file    Lack of Transportation (Non-Medical):  Not on file   Physical Activity:     Days of Exercise per Week: Not on file    Minutes of Exercise per Session: Not on file   Stress:     Feeling of Stress : Not on file   Social Connections:     Frequency of Communication with Friends and Family: Not on file    Frequency of Social Gatherings with Friends and Family: Not on file    Attends Worship Services: Not on file    Active Member of Clubs or Organizations: Not on file    Attends Club or Organization Meetings: Not on file    Marital Status: Not on file   Intimate Partner Violence:     Fear of Current or Ex-Partner: Not on file    Emotionally Abused: Not on file    Physically Abused: Not on file    Sexually Abused: Not on file   Housing Stability:     Unable to Pay for Housing in the Last Year: Not on file    Number of Places Lived in the Last Year: Not on file    Unstable Housing in the Last Year: Not on file     Current Facility-Administered Medications   Medication Dose Route Frequency Provider Last Rate Last Admin    dextrose 10 % infusion   IntraVENous Continuous Arben Noel  mL/hr at 05/14/22 2236 1,000 mL at 05/14/22 2236    0.9 % sodium chloride bolus  1,000 mL IntraVENous Once Arben Noel MD         Current Outpatient Medications   Medication Sig Dispense Refill    Insulin Syringe-Needle U-100 (BD INSULIN SYRINGE U/F) 31G X 5/16\" 0.3 ML MISC USE ONE SYRINGE FOUR TIMES A DAY BEFORE MEALS AND ONCE NIGHTLY 120 each 9    SUMAtriptan (IMITREX) 50 MG tablet Take one tab po at the start of migraine PRN max 1 every 24 hours 9 tablet 1    Erenumab-aooe (AIMOVIG, 140 MG DOSE,) 70 MG/ML SOAJ Inject 140 mLs into the skin every 30 days 1 pen 1    gabapentin (NEURONTIN) 400 MG capsule Take 1 capsule by mouth 2 times daily for 30 days. 60 capsule 1    DULoxetine (CYMBALTA) 60 MG extended release capsule Take 1 capsule by mouth daily 30 capsule 1    topiramate (TOPAMAX) 100 MG tablet Take 1 tablet by mouth 2 times daily 60 tablet 1    traMADol (ULTRAM) 50 MG tablet Take 1 tablet by mouth every 6 hours as needed for Pain (max 1-2 per day) for up to 30 days. 60 tablet 0    cefadroxil (DURICEF) 500 MG capsule TAKE ONE CAPSULE BY MOUTH DAILY 30 capsule 5    LANTUS 100 UNIT/ML injection vial INJECT 15 UNITS UNDER THE SKIN twice daily before breakfast and nightly.  Hold nightly dose if PM BG<90 30 mL 3    Insulin Syringe-Needle U-100 (B-D INS SYR ULTRAFINE 1CC/31G) 31G X 5/16\" 1 ML MISC INJECT USING INSULIN ONCE DAILY 30 each 10    insulin glulisine (APIDRA) 100 UNIT/ML injection INJECT 8 TO 12 UNITS UNDER THE SKIN THREE TIMES A DAY WITH MEALS PER SLIDING SCALE (Patient taking differently: 15 Units 2 times daily ) 10 mL 2    Evalina [place] 2-PACK 1 MG/0.2ML SOAJ INJECT AS NEEDED FOR LOW BLOOD SUGAR 0.4 mL 3    dicloxacillin (DYNAPEN) 500 MG capsule TAKE ONE CAPSULE BY MOUTH TWICE A DAY (Patient not taking: Reported on 5/14/2022) 60 capsule 11    Continuous Blood Gluc Sensor (DEXCOM G6 SENSOR) MISC USE ONE SENSOR AND CHANGE EVERY 10 DAYS 2 each 3    Diabetic Shoe MISC by Does not apply route 1 each 0    Continuous Blood Gluc  (DEXCOM G6 ) LINDA USE AS NEEDED TO CHECK BLOOD SUGAR 1 each 2    aspirin 81 MG chewable tablet Take 1 tablet by mouth daily 30 tablet 0    pantoprazole (PROTONIX) 40 MG tablet Take 1 tablet by mouth 2 times daily (Patient not taking: Reported on 5/14/2022) 60 tablet 1    prednisoLONE sodium phosphate (INFLAMASE FORTE) 1 % ophthalmic solution 1 drop 4 times daily (Patient not taking: Reported on 5/14/2022)      prednisoLONE acetate (PRED FORTE) 1 % ophthalmic suspension Place 1 drop into the right eye three times daily 3 times daily x 1 week then 2x daily x2 weeks, then 1 daily thereafter (Patient not taking: Reported on 5/14/2022)      ketorolac (ACULAR) 0.5 % ophthalmic solution Place 1 drop into the right eye 3 times daily 3 times daily x 1week, then 2 x daily x 2 weeks, then 1 daily thereafter      Continuous Blood Gluc Transmit (DEXCOM G6 TRANSMITTER) MISC USE TO CHECK BLOOD SUGAR 2 each 3    potassium chloride (KLOR-CON M) 10 MEQ extended release tablet Take 1 tablet by mouth 2 times daily 60 tablet 0    blood glucose monitor kit and supplies Dispense sufficient amount for indicated testing frequency plus additional to accommodate PRN testing needs. Dispense all needed supplies to include: monitor, strips, lancing device, lancets, control solutions, alcohol swabs. 1 kit 0    blood glucose test strips (ACCU-CHEK CAPO PLUS) strip 4 times a day As needed. 150 each 3    Handicap Placard MISC by Does not apply route Diagnosis: Type 1 diabetes.      Expires 4/13/23. 1 each 0    blood glucose test strips (ACCU-CHEK CAPO PLUS) strip 1 each by In Vitro route daily Test blood glucose 10 times daily Dx Code E10.8 300 each 10    blood glucose test strips (ACCU-CHEK CAPO PLUS) strip USE ONE STRIP TO TEST THREE TIMES A  strip 4    Accu-Chek FastClix Lancets MISC 1 each by Does not apply route daily Test blood glucose 10 times daily Dx Code E 10.8 900 each 2    triamcinolone (KENALOG) 0.1 % ointment Apply to thickened affected areas PRN sparingly for flares no longer than 2 weeks at one time, do not apply to cleared skin 80 g 0    Insulin Syringe-Needle U-100 (BD INSULIN SYRINGE U/F) 31G X 5/16\" 0.5 ML MISC Inject 1 each into the skin 4 times daily DX CODE E 10.22 120 each 9    fluticasone (FLONASE) 50 MCG/ACT nasal spray SPRAY TWO SPRAYS IN EACH NOSTRIL DAILY (Patient taking differently: daily as needed ) 16 g 4    hydrocortisone 2.5 % cream Apply topically 2 times daily. (Patient taking differently: Apply topically 2 times daily as needed) 30 g 0    Blood Glucose Monitoring Suppl (ACCU-CHEK CAPO PLUS) w/Device KIT 1 each by Does not apply route daily Test blood sugar 10 times daily Dx code E 10.8 1 kit 10    GLUCAGON EMERGENCY 1 MG injection INJECT 1MG INTO THE MUSCLE AS NEEDED 1 kit 3    Multiple Vitamin (DAILY KITTY) TABS TAKE ONE TABLET BY MOUTH DAILY (Patient not taking: Reported on 5/14/2022) 30 tablet 5    Cholecalciferol (VITAMIN D3) 5000 units CAPS Take 1 capsule by mouth Daily 30 capsule 3    Dextromethorphan-Guaifenesin (MUCINEX DM)  MG TB12 Cough and congestion. (Patient taking differently: as needed Cough and congestion.) 60 tablet 1    omega-3 acid ethyl esters (LOVAZA) 1 G capsule TAKE TWO CAPSULES BY MOUTH TWICE DAILY 120 capsule 5    MUCUS RELIEF DM  MG TABS TAKE ONE BY MOUTH DAILY AS NEEDED 30 tablet 1    Alcohol Swabs PADS Use as needed for insulin injection.  100 each 5    Probiotic Product (ACIDOPHILUS PROBIOTIC) CAPS capsule TAKE ONE CAPSULE BY MOUTH DAILY 28 capsule 4    polyvinyl alcohol (LIQUIFILM TEARS) 1.4 % ophthalmic solution 1 drop as needed      propranolol (INDERAL) 80 MG tablet Take 40 mg by mouth 2 times daily For migraines       Flaxseed, Linseed, (FLAX PO) Take 14 g by mouth daily as needed        Allergies   Allergen Reactions    Tegaderm Ag Mesh 2\"X2\" [Wound Dressings]      \"Holes in skin from Tegaderm Adhesive\"    Codeine Other (See Comments)     hallucinates    Tape [place] Gary Tape]      Blister      Other Other (See Comments)     Holes in skin  From Tegaderm Adhesive  Blister       REVIEW OF SYSTEMS  10 systems reviewed, pertinent positives per HPI otherwise noted to be negative. PHYSICAL EXAM  /75   Pulse 75   Temp 96.1 °F (35.6 °C)   Resp 13   Ht 6' 2\" (1.88 m)   Wt 212 lb (96.2 kg)   SpO2 98%   BMI 27.22 kg/m²    GENERAL APPEARANCE: Moaning. Spontaneously moving extremities. HENT: Normocephalic. Atraumatic. PERRL. EOMI. No facial droop. HEART/CHEST: RRR. LUNGS: Respirations unlabored. satting >90 on room air. ABDOMEN: Soft, non-distended abdomen. Non tender to palpation. No guarding. No rebound. EXTREMITIES: No gross deformities. Moving all extremities. SKIN: Warm and dry. No acute rashes. NEUROLOGICAL: Alert and oriented. No gross facial drooping. Answering questions appropriately. Moving all extremities. PSYCHIATRIC: Pleasant. Normal mood and affect.     LABS  Results for orders placed or performed during the hospital encounter of 05/14/22   Comprehensive Metabolic Panel   Result Value Ref Range    Sodium 136 136 - 145 mmol/L    Potassium 3.5 3.5 - 5.1 mmol/L    Chloride 101 99 - 110 mmol/L    CO2 16 (L) 21 - 32 mmol/L    Anion Gap 19 (H) 3 - 16    Glucose 90 70 - 99 mg/dL    BUN 18 7 - 20 mg/dL    CREATININE 1.5 (H) 0.9 - 1.3 mg/dL    GFR Non- 51 (A) >60    GFR African American >60 >60    Calcium 9.5 8.3 - 10.6 mg/dL    Total Protein 8.1 6.4 - 8.2 g/dL    Albumin 4.1 3.4 - 5.0 g/dL    Albumin/Globulin Ratio 1.0 (L) 1.1 - 2.2    Total Bilirubin 0.3 0.0 - 1.0 mg/dL    Alkaline Phosphatase 128 40 - 129 U/L    ALT 18 10 - 40 U/L    AST 22 15 - 37 U/L   CBC with Auto Differential   Result Value Ref Range    WBC 10.5 4.0 - 11.0 K/uL    RBC 5.27 4.20 - 5.90 M/uL    Hemoglobin 14.7 13.5 - 17.5 g/dL    Hematocrit 44.3 40.5 - 52.5 %    MCV 84.1 80.0 - 100.0 fL    MCH 27.9 26.0 - 34.0 pg    MCHC 33.1 31.0 - 36.0 g/dL    RDW 14.0 12.4 - 15.4 %    Platelets 108 171 - 046 K/uL    MPV 6.4 5.0 - 10.5 fL    Neutrophils % 68.2 %    Lymphocytes % 20.6 %    Monocytes % 4.9 %    Eosinophils % 5.0 %    Basophils % 1.3 %    Neutrophils Absolute 7.2 1.7 - 7.7 K/uL    Lymphocytes Absolute 2.2 1.0 - 5.1 K/uL    Monocytes Absolute 0.5 0.0 - 1.3 K/uL    Eosinophils Absolute 0.5 0.0 - 0.6 K/uL    Basophils Absolute 0.1 0.0 - 0.2 K/uL   Blood Gas, Venous   Result Value Ref Range    pH, Bradford 7.283 (L) 7.350 - 7.450    pCO2, Bradford 35.7 (L) 40.0 - 50.0 mmHg    pO2, [place] 68.3 (H) 25.0 - 40.0 mmHg    HCO3, Venous 16.5 (L) 23.0 - 29.0 mmol/L    Base Excess, Bradford -9.2 (L) -3.0 - 3.0 mmol/L    O2 Sat, Bradford 92 Not Established %    Carboxyhemoglobin 3.2 (H) 0.0 - 1.5 %    MetHgb, Bradford 0.0 <1.5 %    TC02 (Calc), Bradford 18 Not Established mmol/L    O2 Therapy Unknown    Beta-Hydroxybutyrate   Result Value Ref Range    Beta-Hydroxybutyrate 0.20 0.00 - 0.27 mmol/L   Lactate, Sepsis   Result Value Ref Range    Lactic Acid, Sepsis 5.3 (HH) 0.4 - 1.9 mmol/L   Troponin   Result Value Ref Range    Troponin <0.01 <0.01 ng/mL   POCT Glucose   Result Value Ref Range    POC Glucose 28 (LL) 70 - 99 mg/dl    Performed on ACCU-CHEK    POCT Glucose   Result Value Ref Range    POC Glucose 238 (H) 70 - 99 mg/dl    Performed on ACCU-CHEK        I have reviewed all labs for this visit.      ECG  The Ekg interpreted by me shows  Sinus rhythm with sinus arrhythmia with a rate of 89  Axis is normal  QTc is double in 457  ST Segments: Nonspecific changes    RADIOLOGY  XR CHEST PORTABLE    Result Date: 5/14/2022  EXAMINATION: ONE XRAY VIEW OF THE CHEST 5/14/2022 11:44 pm COMPARISON: Prior study(s) most recent 04/17/2022. HISTORY: ORDERING SYSTEM PROVIDED HISTORY: AMS TECHNOLOGIST PROVIDED HISTORY: Reason for exam:->AMS Reason for Exam: hypoglycemia, AMS FINDINGS: The heart is within normal limits size. Pulmonary vessels are normal.  No acute airspace disease. Increased density in the right upper chest relates to asymmetric calcification of the right 1st rib costochondral junction. This is seen with various projections on prior studies. No acute cardiac or pulmonary disease. XR CHEST PORTABLE    Result Date: 4/17/2022  EXAMINATION: ONE XRAY VIEW OF THE CHEST 4/17/2022 1:29 pm COMPARISON: 09/25/2021. HISTORY: ORDERING SYSTEM PROVIDED HISTORY: cough. congestion TECHNOLOGIST PROVIDED HISTORY: Reason for exam:->cough. congestion Reason for Exam: cough. congestion FINDINGS: The exam is rotated. The cardiomediastinal silhouette is unremarkable. The lungs remain clear with no infiltrate, pleural fluid or evidence of overt failure. No acute cardiopulmonary disease. ED COURSE/MDM  Patient seen and evaluated. At presentation, patient was difficult to arouse, moaning on sternal rub, hemodynamically stable, and satting 98% on room air. Fingerstick glucose obtained which was 28.  2 IVs were established. He was started on D10 (due to [facility] shortage nationwide). On reassessment, patient is much more arousable. Repeat glucose improved to 200s. Troponin less than 0.01. Patient says he does not recall what happened. He has not been sick recently. No injury to head. Headache. Lactate Is elevated at 5.3. Will reassess after IV fluids. Creatinine is 1.5, which is not significantly changed compared to priors. Patient denies having a headache or any injuries. Given this, do not think patient warrants CT head at this time.   Suspect the altered mental status was due to hypoglycemia and now improved after receiving D10. Patient decontaminated in ED. glucose remained stable at 224. After 2 L IV fluid bolus, plan to recheck lactate. Chest x-ray shows no focal consolidation. Urinalysis not indicative of UTI. Later, family present at bedside. Patricio lactate after IV fluids were normal.  Patient asks if he could be discharged. Denies having any complaints. He is answering questions appropriately. Discussed cutting the insulin by half until follow-up with endocrinology. Patient and sister deny any traumas or any recent illnesses. Denies having any concerns or questions about discharge home. His glucose remained stable during his stay in the ED. Patient discharged home with strict return precautions. I provided at least 15 minutes of critical care excluding separately billable procedures. Pt was seen during the [place] 19 pandemic. Appropriate PPE worn by ME during patient encounters. Patient was cared for during a time with constrained hospital bed capacity with nationwide stress on resources and staffing. During the patient's ED course, the patient was given:  Medications   dextrose 10 % infusion (1,000 mLs IntraVENous New Bag 5/14/22 2236)   0.9 % sodium chloride bolus (has no administration in time range)   LORazepam (ATIVAN) injection 2 mg (2 mg IntraVENous Given 5/14/22 2140)   dextrose 10 % infusion ( IntraVENous New Bag 5/14/22 2145)   0.9 % sodium chloride bolus (1,000 mLs IntraVENous New Bag 5/14/22 2236)        CLINICAL IMPRESSION  1. Hypoglycemia        Blood pressure 119/75, pulse 75, temperature 96.1 °F (35.6 °C), resp. rate 13, height 6' 2\" (1.88 m), weight 212 lb (96.2 kg), SpO2 98 %. [address] [place] -discharged home in stable condition. Patient was given scripts for the following medications. I counseled patient how to take these medications.    New Prescriptions    No medications on file       Follow-up with:  No follow-up provider specified. DISCLAIMER: This chart was created using Dragon dictation software. Efforts were made by me to ensure accuracy, however some errors may be present due to limitations of this technology and occasionally words are not transcribed correctly.         Abimael Roca MD  05/15/22 Leonila Fernandez MD  05/15/22 [id number]

## 2022-05-16 ENCOUNTER — CARE COORDINATION (OUTPATIENT)
Dept: CARE COORDINATION | Age: 47
End: 2022-05-16

## 2022-05-18 ENCOUNTER — CARE COORDINATION (OUTPATIENT)
Dept: CARE COORDINATION | Age: 47
End: 2022-05-18

## 2022-05-18 NOTE — CARE COORDINATION
Second attempt to reach pt following ed visit. hipaa compliant message left on vm no further attempts to be made

## 2022-06-02 ENCOUNTER — OFFICE VISIT (OUTPATIENT)
Dept: PAIN MANAGEMENT | Age: 47
End: 2022-06-02
Payer: MEDICARE

## 2022-06-02 VITALS
HEART RATE: 73 BPM | WEIGHT: 208 LBS | BODY MASS INDEX: 26.71 KG/M2 | OXYGEN SATURATION: 100 % | SYSTOLIC BLOOD PRESSURE: 134 MMHG | DIASTOLIC BLOOD PRESSURE: 80 MMHG

## 2022-06-02 DIAGNOSIS — G63 POLYNEUROPATHY ASSOCIATED WITH UNDERLYING DISEASE (HCC): ICD-10-CM

## 2022-06-02 DIAGNOSIS — M80.862S PATHOLOGICAL FRACTURE OF LEFT TIBIA DUE TO OTHER OSTEOPOROSIS, SEQUELA: ICD-10-CM

## 2022-06-02 DIAGNOSIS — L97.522 ULCER OF LEFT FOOT, WITH FAT LAYER EXPOSED (HCC): ICD-10-CM

## 2022-06-02 DIAGNOSIS — G89.4 CHRONIC PAIN SYNDROME: ICD-10-CM

## 2022-06-02 DIAGNOSIS — M79.2 NEUROPATHIC PAIN: ICD-10-CM

## 2022-06-02 DIAGNOSIS — M79.18 MYOFASCIAL PAIN: ICD-10-CM

## 2022-06-02 DIAGNOSIS — G62.9 PERIPHERAL POLYNEUROPATHY: ICD-10-CM

## 2022-06-02 PROCEDURE — 99213 OFFICE O/P EST LOW 20 MIN: CPT | Performed by: INTERNAL MEDICINE

## 2022-06-02 RX ORDER — ERENUMAB-AOOE 70 MG/ML
INJECTION SUBCUTANEOUS
Qty: 1 PEN | Refills: 1 | Status: SHIPPED | OUTPATIENT
Start: 2022-06-02 | End: 2022-06-30 | Stop reason: SDUPTHER

## 2022-06-02 RX ORDER — GABAPENTIN 400 MG/1
400 CAPSULE ORAL 2 TIMES DAILY
Qty: 60 CAPSULE | Refills: 1 | Status: SHIPPED | OUTPATIENT
Start: 2022-06-02 | End: 2022-06-30 | Stop reason: SDUPTHER

## 2022-06-02 RX ORDER — SUMATRIPTAN 50 MG/1
TABLET, FILM COATED ORAL
Qty: 9 TABLET | Refills: 1 | Status: SHIPPED | OUTPATIENT
Start: 2022-06-02 | End: 2022-06-30 | Stop reason: SDUPTHER

## 2022-06-02 RX ORDER — DULOXETIN HYDROCHLORIDE 60 MG/1
60 CAPSULE, DELAYED RELEASE ORAL DAILY
Qty: 30 CAPSULE | Refills: 1 | Status: SHIPPED | OUTPATIENT
Start: 2022-06-02 | End: 2022-06-30 | Stop reason: SDUPTHER

## 2022-06-02 RX ORDER — TRAMADOL HYDROCHLORIDE 50 MG/1
50 TABLET ORAL EVERY 6 HOURS PRN
Qty: 56 TABLET | Refills: 0 | Status: SHIPPED | OUTPATIENT
Start: 2022-06-02 | End: 2022-06-30 | Stop reason: SDUPTHER

## 2022-06-02 RX ORDER — TOPIRAMATE 100 MG/1
100 TABLET, FILM COATED ORAL 2 TIMES DAILY
Qty: 60 TABLET | Refills: 1 | Status: SHIPPED | OUTPATIENT
Start: 2022-06-02 | End: 2022-06-30 | Stop reason: SDUPTHER

## 2022-06-02 NOTE — PROGRESS NOTES
Chris Ibrahim  1975  [id number]      HISTORY OF PRESENT ILLNESS:  Mr. Nandini Jerez is a 42 y.o. male returns for a follow up visit for pain management  He has a diagnosis of   1. Chronic pain syndrome    2. Neuropathic pain    3. Myofascial pain    4. Peripheral polyneuropathy    5. Primary insomnia    6. Constipation due to opioid therapy    7. Ulcer of left foot, with fat layer exposed ([facility] [address]    8. Pathological fracture of left tibia due to other osteoporosis, sequela    9. Polyneuropathy associated with underlying disease ([facility] [address]    10. Chronic migraine without aura, with intractable migraine, so stated, with status migrainosus    11. Recurrent major depressive disorder, in partial remission ([facility] [address]    . He complains of pain in the head, neck, upper back, lower back with radiation to the bilateral shoulders, bilateral arms, bilateral hands, left knee, left lower leg, left ankle  He rates the pain 10/10 and describes it as sharp, aching, burning, numbness, pins and needles. Current treatment regimen has helped relieve about 10% of the pain. He denies any side effects from the current pain regimen. Patient reports that since the last follow up visit the physical functioning is unchanged, family/social relationships are unchanged, mood is better, sleep patterns are better, and that the overall functioning is unchanged. Patient denies misusing/abusing his narcotic pain medications or using any illegal drugs. There are No indicators for possible drug abuse, addiction or diversion problems, patient states he has been doing fair, he is managing okay with the regimen. Mr. Nandini Jerez says his pain has been up and down. He complains of increased pain with changes in weather. Extreme temperatures- cold and damp weather causes increased pain, triggers is headaches but managing with the regimen. Patient mentions he is using Ultram 1-2 per day along with the other adjuvants.      ALLERGIES: Patients list of allergies were reviewed     MEDICATIONS: Mr. Blackman Nephew list of medications were reviewed. His current medications are   Outpatient Medications Prior to Visit   Medication Sig Dispense Refill    Insulin Syringe-Needle U-100 (BD INSULIN SYRINGE U/F) 31G X 5/16\" 0.3 ML MISC USE ONE SYRINGE FOUR TIMES A DAY BEFORE MEALS AND ONCE NIGHTLY 120 each 9    SUMAtriptan (IMITREX) 50 MG tablet Take one tab po at the start of migraine PRN max 1 every 24 hours 9 tablet 1    Erenumab-aooe (AIMOVIG, 140 MG DOSE,) 70 MG/ML SOAJ Inject 140 mLs into the skin every 30 days 1 pen 1    DULoxetine (CYMBALTA) 60 MG extended release capsule Take 1 capsule by mouth daily 30 capsule 1    topiramate (TOPAMAX) 100 MG tablet Take 1 tablet by mouth 2 times daily 60 tablet 1    cefadroxil (DURICEF) 500 MG capsule TAKE ONE CAPSULE BY MOUTH DAILY 30 capsule 5    LANTUS 100 UNIT/ML injection vial INJECT 15 UNITS UNDER THE SKIN twice daily before breakfast and nightly.  Hold nightly dose if PM BG<90 30 mL 3    Insulin Syringe-Needle U-100 (B-D INS SYR ULTRAFINE 1CC/31G) 31G X 5/16\" 1 ML MISC INJECT USING INSULIN ONCE DAILY 30 each 10    insulin glulisine (APIDRA) 100 UNIT/ML injection INJECT 8 TO 12 UNITS UNDER THE SKIN THREE TIMES A DAY WITH MEALS PER SLIDING SCALE (Patient taking differently: 15 Units 2 times daily ) 10 mL 2    GVOKE HYPOPEN 2-PACK 1 MG/0.2ML SOAJ INJECT AS NEEDED FOR LOW BLOOD SUGAR 0.4 mL 3    dicloxacillin (DYNAPEN) 500 MG capsule TAKE ONE CAPSULE BY MOUTH TWICE A DAY 60 capsule 11    Continuous Blood Gluc Sensor (DEXCOM G6 SENSOR) MISC USE ONE SENSOR AND CHANGE EVERY 10 DAYS 2 each 3    Diabetic Shoe MISC by Does not apply route 1 each 0    Continuous Blood Gluc  (DEXCOM G6 ) LINDA USE AS NEEDED TO CHECK BLOOD SUGAR 1 each 2    aspirin 81 MG chewable tablet Take 1 tablet by mouth daily 30 tablet 0    pantoprazole (PROTONIX) 40 MG tablet Take 1 tablet by mouth 2 times daily 60 tablet 1    prednisoLONE sodium phosphate (INFLAMASE FORTE) 1 % ophthalmic solution 1 drop 4 times daily       prednisoLONE acetate (PRED FORTE) 1 % ophthalmic suspension Place 1 drop into the right eye three times daily 3 times daily x 1 week then 2x daily x2 weeks, then 1 daily thereafter       ketorolac (ACULAR) 0.5 % ophthalmic solution Place 1 drop into the right eye 3 times daily 3 times daily x 1week, then 2 x daily x 2 weeks, then 1 daily thereafter      Continuous Blood Gluc Transmit (DEXCOM G6 TRANSMITTER) MISC USE TO CHECK BLOOD SUGAR 2 each 3    blood glucose monitor kit and supplies Dispense sufficient amount for indicated testing frequency plus additional to accommodate PRN testing needs. Dispense all needed supplies to include: monitor, strips, lancing device, lancets, control solutions, alcohol swabs. 1 kit 0    blood glucose test strips (ACCU-CHEK CAPO PLUS) strip 4 times a day As needed. 150 each 3    Handicap Placard MISC by Does not apply route Diagnosis: Type 1 diabetes. Expires 4/13/23. 1 each 0    blood glucose test strips (ACCU-CHEK CAPO PLUS) strip 1 each by In Vitro route daily Test blood glucose 10 times daily Dx Code E10.8 300 each 10    blood glucose test strips (ACCU-CHEK CAPO PLUS) strip USE ONE STRIP TO TEST THREE TIMES A  strip 4    Accu-Chek FastClix Lancets MISC 1 each by Does not apply route daily Test blood glucose 10 times daily Dx Code E 10.8 900 each 2    triamcinolone (KENALOG) 0.1 % ointment Apply to thickened affected areas PRN sparingly for flares no longer than 2 weeks at one time, do not apply to cleared skin 80 g 0    Insulin Syringe-Needle U-100 (BD INSULIN SYRINGE U/F) 31G X 5/16\" 0.5 ML MISC Inject 1 each into the skin 4 times daily DX CODE E 10.22 120 each 9    fluticasone (FLONASE) 50 MCG/ACT nasal spray SPRAY TWO SPRAYS IN EACH NOSTRIL DAILY (Patient taking differently: daily as needed ) 16 g 4    hydrocortisone 2.5 % cream Apply topically 2 times daily.  (Patient taking differently: Apply topically 2 times daily as needed) 30 g 0    Blood Glucose Monitoring Suppl (ACCU-CHEK CAPO PLUS) w/Device KIT 1 each by Does not apply route daily Test blood sugar 10 times daily Dx code E 10.8 1 kit 10    GLUCAGON EMERGENCY 1 MG injection INJECT 1MG INTO THE MUSCLE AS NEEDED 1 kit 3    Multiple Vitamin (DAILY KITTY) TABS TAKE ONE TABLET BY MOUTH DAILY 30 tablet 5    Cholecalciferol (VITAMIN D3) 5000 units CAPS Take 1 capsule by mouth Daily 30 capsule 3    Dextromethorphan-Guaifenesin (MUCINEX DM)  MG TB12 Cough and congestion. (Patient taking differently: as needed Cough and congestion.) 60 tablet 1    omega-3 acid ethyl esters (LOVAZA) 1 G capsule TAKE TWO CAPSULES BY MOUTH TWICE DAILY 120 capsule 5    MUCUS RELIEF DM  MG TABS TAKE ONE BY MOUTH DAILY AS NEEDED 30 tablet 1    Alcohol Swabs PADS Use as needed for insulin injection. 100 each 5    Probiotic Product (ACIDOPHILUS PROBIOTIC) CAPS capsule TAKE ONE CAPSULE BY MOUTH DAILY 28 capsule 4    polyvinyl alcohol (LIQUIFILM TEARS) 1.4 % ophthalmic solution 1 drop as needed      propranolol (INDERAL) 80 MG tablet Take 40 mg by mouth 2 times daily For migraines       Flaxseed, Linseed, (FLAX PO) Take 14 g by mouth daily as needed       gabapentin (NEURONTIN) 400 MG capsule Take 1 capsule by mouth 2 times daily for 30 days. 60 capsule 1    traMADol (ULTRAM) 50 MG tablet Take 1 tablet by mouth every 6 hours as needed for Pain (max 1-2 per day) for up to 30 days. 60 tablet 0    potassium chloride (KLOR-CON M) 10 MEQ extended release tablet Take 1 tablet by mouth 2 times daily 60 tablet 0     No facility-administered medications prior to visit. REVIEW OF SYSTEMS:    Respiratory: Negative for apnea, chest tightness and shortness of breath or change in baseline breathing. PHYSICAL EXAM:   Nursing note and vitals reviewed.  /80   Pulse 73   Wt 208 lb (94.3 kg)   SpO2 100%   BMI 26.71 kg/m²   Constitutional: He appears well-developed and well-nourished. No acute distress. Cardiovascular: Normal rate, regular rhythm, normal heart sounds, and does not have murmur. Pulmonary/Chest: Effort normal. No respiratory distress. He does not have wheezes in the lung fields. He has no rales. Neurological/Psychiatric:He is alert and oriented to person, place, and time. Coordination is  normal.  His mood isAppropriate and affect is Neutral/Euthymic(normal) . His  Other: using a cane    IMPRESSION:   1. Chronic pain syndrome    2. Neuropathic pain    3. Myofascial pain    4. Peripheral polyneuropathy    5. Ulcer of left foot, with fat layer exposed ([facility] [address]    6. Pathological fracture of left tibia due to other osteoporosis, sequela    7. Polyneuropathy associated with underlying disease ([facility] [address]        PLAN:  Informed verbal consent was obtained  [place] was obtained and reviewed  for the last one year and no indicators of drug misuse  were found. Any other controlled substance prescriptions  seen on the record have been accounted for, I am aware of the patient receiving these medications. Braden Garcia [place] record will be rechecked as part of office protocol.    -Patient's urine drug screen results with GC/MS confirmation were obtained and reviewed and were negative for any illicit drugs. Prescribed medications were within acceptable range. -Monitor blood sugar regularly, diabetic control- adv diabetic diet. Goal for fasting blood sugars around 120. Follow up with Endocrinologist/PCP also for on going management    -he was advised  to avoid using too many OTC analgesics to control the headaches, avoid chocolates, increased caffeine, cheeses and MSG nitrite containing foods, cigarette smoking.  To avoid bright lights, strong smells and skipping meals.   -Continue with all other adjuvant medications as before   -Continue with Ultram 1-2 per day  -He was advised to increase fluids ( 5-7  glasses of fluid daily), limit caffeine, avoid sodium phosphate (INFLAMASE FORTE) 1 % ophthalmic solution 1 drop 4 times daily       prednisoLONE acetate (PRED FORTE) 1 % ophthalmic suspension Place 1 drop into the right eye three times daily 3 times daily x 1 week then 2x daily x2 weeks, then 1 daily thereafter       ketorolac (ACULAR) 0.5 % ophthalmic solution Place 1 drop into the right eye 3 times daily 3 times daily x 1week, then 2 x daily x 2 weeks, then 1 daily thereafter      Continuous Blood Gluc Transmit (DEXCOM G6 TRANSMITTER) MISC USE TO CHECK BLOOD SUGAR 2 each 3    blood glucose monitor kit and supplies Dispense sufficient amount for indicated testing frequency plus additional to accommodate PRN testing needs. Dispense all needed supplies to include: monitor, strips, lancing device, lancets, control solutions, alcohol swabs. 1 kit 0    blood glucose test strips (ACCU-CHEK CAPO PLUS) strip 4 times a day As needed. 150 each 3    Handicap Placard MISC by Does not apply route Diagnosis: Type 1 diabetes. Expires 4/13/23. 1 each 0    blood glucose test strips (ACCU-CHEK CAPO PLUS) strip 1 each by In Vitro route daily Test blood glucose 10 times daily Dx Code E10.8 300 each 10    blood glucose test strips (ACCU-CHEK CAPO PLUS) strip USE ONE STRIP TO TEST THREE TIMES A  strip 4    Accu-Chek FastClix Lancets MISC 1 each by Does not apply route daily Test blood glucose 10 times daily Dx Code E 10.8 900 each 2    triamcinolone (KENALOG) 0.1 % ointment Apply to thickened affected areas PRN sparingly for flares no longer than 2 weeks at one time, do not apply to cleared skin 80 g 0    Insulin Syringe-Needle U-100 (BD INSULIN SYRINGE U/F) 31G X 5/16\" 0.5 ML MISC Inject 1 each into the skin 4 times daily DX CODE E 10.22 120 each 9    fluticasone (FLONASE) 50 MCG/ACT nasal spray SPRAY TWO SPRAYS IN EACH NOSTRIL DAILY (Patient taking differently: daily as needed ) 16 g 4    hydrocortisone 2.5 % cream Apply topically 2 times daily. (Patient taking differently: Apply topically 2 times daily as needed) 30 g 0    Blood Glucose Monitoring Suppl (ACCU-CHEK CAPO PLUS) w/Device KIT 1 each by Does not apply route daily Test blood sugar 10 times daily Dx code E 10.8 1 kit 10    GLUCAGON EMERGENCY 1 MG injection INJECT 1MG INTO THE MUSCLE AS NEEDED 1 kit 3    Multiple Vitamin (DAILY KITTY) TABS TAKE ONE TABLET BY MOUTH DAILY 30 tablet 5    Cholecalciferol (VITAMIN D3) 5000 units CAPS Take 1 capsule by mouth Daily 30 capsule 3    Dextromethorphan-Guaifenesin (MUCINEX DM)  MG TB12 Cough and congestion. (Patient taking differently: as needed Cough and congestion.) 60 tablet 1    omega-3 acid ethyl esters (LOVAZA) 1 G capsule TAKE TWO CAPSULES BY MOUTH TWICE DAILY 120 capsule 5    MUCUS RELIEF DM  MG TABS TAKE ONE BY MOUTH DAILY AS NEEDED 30 tablet 1    Alcohol Swabs PADS Use as needed for insulin injection. 100 each 5    Probiotic Product (ACIDOPHILUS PROBIOTIC) CAPS capsule TAKE ONE CAPSULE BY MOUTH DAILY 28 capsule 4    polyvinyl alcohol (LIQUIFILM TEARS) 1.4 % ophthalmic solution 1 drop as needed      propranolol (INDERAL) 80 MG tablet Take 40 mg by mouth 2 times daily For migraines       Flaxseed, Linseed, (FLAX PO) Take 14 g by mouth daily as needed       gabapentin (NEURONTIN) 400 MG capsule Take 1 capsule by mouth 2 times daily for 30 days. 60 capsule 1    traMADol (ULTRAM) 50 MG tablet Take 1 tablet by mouth every 6 hours as needed for Pain (max 1-2 per day) for up to 30 days. 60 tablet 0    potassium chloride (KLOR-CON M) 10 MEQ extended release tablet Take 1 tablet by mouth 2 times daily 60 tablet 0     No current facility-administered medications for this visit. I will continue his current medication regimen  which is part of the above treatment schedule.  It has been helping with Mr. Garcia An chronic  medical problems which for this visit include:   Diagnoses of Chronic pain syndrome, Neuropathic pain, Myofascial pain, Peripheral polyneuropathy, Primary insomnia, Constipation due to opioid therapy, Ulcer of left foot, with fat layer exposed ([address] Pathological fracture of left tibia due to other osteoporosis, sequela, Polyneuropathy associated with underlying disease ([address] Chronic migraine without aura, with intractable migraine, so stated, with status migrainosus, and Recurrent major depressive disorder, in partial remission ([address] were pertinent to this visit. Risks and benefits of the medications and other alternative treatments  including no treatment were discussed with the patient. The common side effects of these medications were also explained to the patient. Informed verbal consent was obtained. Goals of current treatment regimen include improvement in pain, restoration of functioning- with focus on improvement in physical performance, general activity, work or disability,emotional distress, health care utilization and  decreased medication consumption. Will continue to monitor progress towards achieving/maintaining therapeutic goals with special emphasis on  1. Improvement in perceived interfernce  of pain with ADL's. Ability to do home exercises independently. Ability to do household chores indoor and/or outdoor work and social and leisure activities. Improve psychosocial and physical functioning. - he is showing progression towards this treatment goal with the current regimen. He was advised against drinking alcohol with the narcotic pain medicines, advised against driving or handling machinery while adjusting the dose of medicines or if having cognitive  issues related to the current medications. Risk of overdose and death, if medicines not taken as prescribed, were also discussed. If the patient develops new symptoms or if the symptoms worsen, the patient should call the office.     While transcribing every attempt was made to maintain the accuracy of the note in terms of it's contents,there may have been some errors made inadvertently. Thank you for allowing me to participate in the care of this patient.     Bernadine Quintana MD.    Cc: Romayne Smoke, MD

## 2022-06-30 ENCOUNTER — OFFICE VISIT (OUTPATIENT)
Dept: PAIN MANAGEMENT | Age: 47
End: 2022-06-30
Payer: MEDICARE

## 2022-06-30 VITALS
SYSTOLIC BLOOD PRESSURE: 118 MMHG | OXYGEN SATURATION: 100 % | BODY MASS INDEX: 27.22 KG/M2 | DIASTOLIC BLOOD PRESSURE: 67 MMHG | WEIGHT: 212 LBS | HEART RATE: 93 BPM

## 2022-06-30 DIAGNOSIS — M80.862S PATHOLOGICAL FRACTURE OF LEFT TIBIA DUE TO OTHER OSTEOPOROSIS, SEQUELA: ICD-10-CM

## 2022-06-30 DIAGNOSIS — F33.41 RECURRENT MAJOR DEPRESSIVE DISORDER, IN PARTIAL REMISSION (HCC): ICD-10-CM

## 2022-06-30 DIAGNOSIS — E10.22 TYPE 1 DIABETES MELLITUS WITH STAGE 3A CHRONIC KIDNEY DISEASE (HCC): ICD-10-CM

## 2022-06-30 DIAGNOSIS — K59.03 CONSTIPATION DUE TO OPIOID THERAPY: ICD-10-CM

## 2022-06-30 DIAGNOSIS — F51.01 PRIMARY INSOMNIA: ICD-10-CM

## 2022-06-30 DIAGNOSIS — T40.2X5A CONSTIPATION DUE TO OPIOID THERAPY: ICD-10-CM

## 2022-06-30 DIAGNOSIS — G62.9 PERIPHERAL POLYNEUROPATHY: ICD-10-CM

## 2022-06-30 DIAGNOSIS — L97.522 ULCER OF LEFT FOOT, WITH FAT LAYER EXPOSED (HCC): ICD-10-CM

## 2022-06-30 DIAGNOSIS — G43.711 CHRONIC MIGRAINE WITHOUT AURA, WITH INTRACTABLE MIGRAINE, SO STATED, WITH STATUS MIGRAINOSUS: ICD-10-CM

## 2022-06-30 DIAGNOSIS — M79.2 NEUROPATHIC PAIN: ICD-10-CM

## 2022-06-30 DIAGNOSIS — M79.18 MYOFASCIAL PAIN: ICD-10-CM

## 2022-06-30 DIAGNOSIS — N18.31 TYPE 1 DIABETES MELLITUS WITH STAGE 3A CHRONIC KIDNEY DISEASE (HCC): ICD-10-CM

## 2022-06-30 DIAGNOSIS — G63 POLYNEUROPATHY ASSOCIATED WITH UNDERLYING DISEASE (HCC): ICD-10-CM

## 2022-06-30 DIAGNOSIS — G89.4 CHRONIC PAIN SYNDROME: ICD-10-CM

## 2022-06-30 PROCEDURE — 99214 OFFICE O/P EST MOD 30 MIN: CPT | Performed by: INTERNAL MEDICINE

## 2022-06-30 RX ORDER — ERENUMAB-AOOE 70 MG/ML
INJECTION SUBCUTANEOUS
Qty: 1 PEN | Refills: 1 | Status: SHIPPED | OUTPATIENT
Start: 2022-06-30 | End: 2022-07-28 | Stop reason: SDUPTHER

## 2022-06-30 RX ORDER — SUMATRIPTAN 50 MG/1
TABLET, FILM COATED ORAL
Qty: 9 TABLET | Refills: 1 | Status: SHIPPED | OUTPATIENT
Start: 2022-06-30 | End: 2022-07-28 | Stop reason: SDUPTHER

## 2022-06-30 RX ORDER — TRAMADOL HYDROCHLORIDE 50 MG/1
50 TABLET ORAL EVERY 6 HOURS PRN
Qty: 56 TABLET | Refills: 0 | Status: SHIPPED | OUTPATIENT
Start: 2022-06-30 | End: 2022-07-28 | Stop reason: SDUPTHER

## 2022-06-30 RX ORDER — DULOXETIN HYDROCHLORIDE 60 MG/1
60 CAPSULE, DELAYED RELEASE ORAL DAILY
Qty: 30 CAPSULE | Refills: 1 | Status: SHIPPED | OUTPATIENT
Start: 2022-06-30 | End: 2022-07-28 | Stop reason: SDUPTHER

## 2022-06-30 RX ORDER — TOPIRAMATE 100 MG/1
100 TABLET, FILM COATED ORAL 2 TIMES DAILY
Qty: 60 TABLET | Refills: 1 | Status: SHIPPED | OUTPATIENT
Start: 2022-06-30 | End: 2022-07-28 | Stop reason: SDUPTHER

## 2022-06-30 RX ORDER — INSULIN GLULISINE 100 [IU]/ML
INJECTION, SOLUTION SUBCUTANEOUS
Qty: 10 ML | Refills: 3 | Status: SHIPPED | OUTPATIENT
Start: 2022-06-30 | End: 2022-10-18

## 2022-06-30 RX ORDER — GABAPENTIN 400 MG/1
400 CAPSULE ORAL 2 TIMES DAILY
Qty: 60 CAPSULE | Refills: 1 | Status: SHIPPED | OUTPATIENT
Start: 2022-06-30 | End: 2022-07-28 | Stop reason: SDUPTHER

## 2022-06-30 NOTE — TELEPHONE ENCOUNTER
Medication:   Requested Prescriptions     Pending Prescriptions Disp Refills    APIDRA 100 UNIT/ML injection [Pharmacy Med Name: APIDRA 100 UNIT/ML VIAL] 10 mL 3     Sig: INJECT 8 TO 12 UNITS UNDER THE SKIN THREE TIMES A DAY WITH MEALS PER SLIDING SCALE       Last Filled:      Patient Phone Number: [phone] (home)     Last appt: 2/8/2022   Next appt: 7/22/2022    Last Labs DM:   Lab Results   Component Value Date/Time    LABA1C 8.2 02/08/2022 04:16 PM

## 2022-06-30 NOTE — PROGRESS NOTES
Clif Lobo  1975  [id number]    HISTORY OF PRESENT ILLNESS:  Mr. Alicia Larkin is a 42 y.o. male returns for a follow up visit for multiple medical problems. His  presenting problems are   1. Chronic pain syndrome    2. Myofascial pain    3. Ulcer of left foot, with fat layer exposed ([address]    4. Polyneuropathy associated with underlying disease ([address]    5. Primary insomnia    6. Constipation due to opioid therapy    7. Neuropathic pain    8. Peripheral polyneuropathy    9. Pathological fracture of left tibia due to other osteoporosis, sequela    10. Chronic migraine without aura, with intractable migraine, so stated, with status migrainosus    11. Recurrent major depressive disorder, in partial remission ([address]    . As per information/history obtained from the PADT(patient assessment and documentation tool) -  He complains of pain in the head, neck, hands Bilateral, upper back, mid back, lower back and knees Left with radiation to the shoulders Bilateral, lower leg Left, ankles Left and feet Left He rates the pain 10/10 and describes it as sharp, aching, burning, pins and needles. Pain is made worse by: movement, walking, standing. Current treatment regimen has helped relieve about 10% of the pain. He denies side effects from the current pain regimen. Patient reports that since the last follow up visit the physical functioning is unchanged, family/social relationships are better, mood is better and sleep patterns are better, and that the overall functioning is unchanged. Patient denies neurological bowel or bladder. Patient denies misusing/abusing his narcotic pain medications or using any illegal drugs. There are No indicators for possible drug abuse, addiction or diversion problems. Upon obtaining the medical history from Mr. Alicia Larkin regarding today's office visit for his presenting problems, patient states his leg has been hurting more.  Mr. Alicia Larkin states he has been having some increase headaches and migraines, He complains of increased pain with changes in weather. Extreme temperatures- cold and damp weather causes increased pain. He states he has been compliant with his medications. Patient states he is using Aimovig along with Imitrex and Topamax. Patient's  subjective report of his mood is fair. he describes occasional symptoms of depression, occasional  irritability and some mood swings. Describes his mood as being neutral and reports some pleasure in his daily activities. Reports  fair  appetite, energy and concentration. Able to function well in different aspects of his daily activities. Denies suicidal or homicidal ideation. Denies any complaints of increased tension, does   Worry sometimes and occasional  irritability  he denies any c/o increased anxiety, No c/o panic attacks or symptoms of PTSD, he is using Cymbalta 60 mg. Sleep is poor,  poor sleep latency, averages 2-3 hours of sleep at night. Intermittent, non restorative. Does not feel rested in AM. Complains of feeling sleepy  during the day. Patient reports his blood has been okay, he has some increase numbers. ALLERGIES/PAST MED/FAM/SOC HISTORY: Mr. Marie Guzman allergies, past medical, family and social history were reviewed in the chart. Mr. Marie Guzman current medications are   Outpatient Medications Prior to Visit   Medication Sig Dispense Refill    SUMAtriptan (IMITREX) 50 MG tablet Take one tab po at the start of migraine PRN max 1 every 24 hours 9 tablet 1    Erenumab-aooe (AIMOVIG, 140 MG DOSE,) 70 MG/ML SOAJ Inject 140 mLs into the skin every 30 days 1 pen 1    DULoxetine (CYMBALTA) 60 MG extended release capsule Take 1 capsule by mouth daily 30 capsule 1    topiramate (TOPAMAX) 100 MG tablet Take 1 tablet by mouth 2 times daily 60 tablet 1    traMADol (ULTRAM) 50 MG tablet Take 1 tablet by mouth every 6 hours as needed for Pain (max 1-2 per day) for up to 28 days.  56 tablet 0    gabapentin (NEURONTIN) 400 MG capsule Take 1 capsule by mouth 2 times daily for 30 days. 60 capsule 1    Insulin Syringe-Needle U-100 (BD INSULIN SYRINGE U/F) 31G X 5/16\" 0.3 ML MISC USE ONE SYRINGE FOUR TIMES A DAY BEFORE MEALS AND ONCE NIGHTLY 120 each 9    cefadroxil (DURICEF) 500 MG capsule TAKE ONE CAPSULE BY MOUTH DAILY 30 capsule 5    LANTUS 100 UNIT/ML injection vial INJECT 15 UNITS UNDER THE SKIN twice daily before breakfast and nightly.  Hold nightly dose if PM BG<90 30 mL 3    Insulin Syringe-Needle U-100 (B-D INS SYR ULTRAFINE 1CC/31G) 31G X 5/16\" 1 ML MISC INJECT USING INSULIN ONCE DAILY 30 each 10    insulin glulisine (APIDRA) 100 UNIT/ML injection INJECT 8 TO 12 UNITS UNDER THE SKIN THREE TIMES A DAY WITH MEALS PER SLIDING SCALE (Patient taking differently: 15 Units 2 times daily ) 10 mL 2    GVOKE HYPOPEN 2-PACK 1 MG/0.2ML SOAJ INJECT AS NEEDED FOR LOW BLOOD SUGAR 0.4 mL 3    dicloxacillin (DYNAPEN) 500 MG capsule TAKE ONE CAPSULE BY MOUTH TWICE A DAY 60 capsule 11    Continuous Blood Gluc Sensor (DEXCOM G6 SENSOR) MISC USE ONE SENSOR AND CHANGE EVERY 10 DAYS 2 each 3    Diabetic Shoe MISC by Does not apply route 1 each 0    Continuous Blood Gluc  (DEXCOM G6 ) LINDA USE AS NEEDED TO CHECK BLOOD SUGAR 1 each 2    aspirin 81 MG chewable tablet Take 1 tablet by mouth daily 30 tablet 0    pantoprazole (PROTONIX) 40 MG tablet Take 1 tablet by mouth 2 times daily 60 tablet 1    prednisoLONE sodium phosphate (INFLAMASE FORTE) 1 % ophthalmic solution 1 drop 4 times daily       prednisoLONE acetate (PRED FORTE) 1 % ophthalmic suspension Place 1 drop into the right eye three times daily 3 times daily x 1 week then 2x daily x2 weeks, then 1 daily thereafter       ketorolac (ACULAR) 0.5 % ophthalmic solution Place 1 drop into the right eye 3 times daily 3 times daily x 1week, then 2 x daily x 2 weeks, then 1 daily thereafter      Continuous Blood Gluc Transmit (DEXCOM G6 TRANSMITTER) MISC USE TO CHECK BLOOD SUGAR 2 each 3    blood glucose monitor kit and supplies Dispense sufficient amount for indicated testing frequency plus additional to accommodate PRN testing needs. Dispense all needed supplies to include: monitor, strips, lancing device, lancets, control solutions, alcohol swabs. 1 kit 0    blood glucose test strips (ACCU-CHEK CAPO PLUS) strip 4 times a day As needed. 150 each 3    Handicap Placard MISC by Does not apply route Diagnosis: Type 1 diabetes. Expires 4/13/23. 1 each 0    blood glucose test strips (ACCU-CHEK CAPO PLUS) strip 1 each by In Vitro route daily Test blood glucose 10 times daily Dx Code E10.8 300 each 10    blood glucose test strips (ACCU-CHEK CAPO PLUS) strip USE ONE STRIP TO TEST THREE TIMES A  strip 4    Accu-Chek FastClix Lancets MISC 1 each by Does not apply route daily Test blood glucose 10 times daily Dx Code E 10.8 900 each 2    triamcinolone (KENALOG) 0.1 % ointment Apply to thickened affected areas PRN sparingly for flares no longer than 2 weeks at one time, do not apply to cleared skin 80 g 0    Insulin Syringe-Needle U-100 (BD INSULIN SYRINGE U/F) 31G X 5/16\" 0.5 ML MISC Inject 1 each into the skin 4 times daily DX CODE E 10.22 120 each 9    fluticasone (FLONASE) 50 MCG/ACT nasal spray SPRAY TWO SPRAYS IN EACH NOSTRIL DAILY (Patient taking differently: daily as needed ) 16 g 4    hydrocortisone 2.5 % cream Apply topically 2 times daily.  (Patient taking differently: Apply topically 2 times daily as needed) 30 g 0    Blood Glucose Monitoring Suppl (ACCU-CHEK CAPO PLUS) w/Device KIT 1 each by Does not apply route daily Test blood sugar 10 times daily Dx code E 10.8 1 kit 10    GLUCAGON EMERGENCY 1 MG injection INJECT 1MG INTO THE MUSCLE AS NEEDED 1 kit 3    Multiple Vitamin (DAILY KITTY) TABS TAKE ONE TABLET BY MOUTH DAILY 30 tablet 5    Cholecalciferol (VITAMIN D3) 5000 units CAPS Take 1 capsule by mouth Daily 30 capsule 3    Dextromethorphan-Guaifenesin (MUCINEX DM)  MG TB12 Cough and congestion. (Patient taking differently: as needed Cough and congestion.) 60 tablet 1    omega-3 acid ethyl esters (LOVAZA) 1 G capsule TAKE TWO CAPSULES BY MOUTH TWICE DAILY 120 capsule 5    MUCUS RELIEF DM  MG TABS TAKE ONE BY MOUTH DAILY AS NEEDED 30 tablet 1    Alcohol Swabs PADS Use as needed for insulin injection. 100 each 5    Probiotic Product (ACIDOPHILUS PROBIOTIC) CAPS capsule TAKE ONE CAPSULE BY MOUTH DAILY 28 capsule 4    polyvinyl alcohol (LIQUIFILM TEARS) 1.4 % ophthalmic solution 1 drop as needed      propranolol (INDERAL) 80 MG tablet Take 40 mg by mouth 2 times daily For migraines       Flaxseed, Linseed, (FLAX PO) Take 14 g by mouth daily as needed       potassium chloride (KLOR-CON M) 10 MEQ extended release tablet Take 1 tablet by mouth 2 times daily 60 tablet 0     No facility-administered medications prior to visit. REVIEW OF SYSTEMS: .   Respiratory: Negative for shortness of breath. Cardiovascular: Negative for chest pain, palpitations  Gastrointestinal: Negative for blood in stool, abdominal distention, nausea, vomiting, abdominal pain, diarrhea,constipation. Neurological: Negative for speech difficulty, weakness and light-headedness, dizziness, tremors, sleepiness  Psychiatric/Behavioral: Negative for suicidal ideas, hallucinations, behavioral problems, self-injury, decreased concentration/cognition, agitation, confusion. PHYSICAL EXAM:   Nursing note and vitals reviewed. /67   Pulse 93   Wt 212 lb (96.2 kg)   SpO2 100%   BMI 27.22 kg/m²   General Appearance: Patient is well nourished, well developed, well groomed and in no acute distress. Skin: Skin is warm and dry, good turgor . No rash or lesions noted. He is not diaphoretic. Pulmonary/Chest: Effort normal. No respiratory distress or use of accessory muscles. Auscultation revealing normal air entry. He does not have wheezes in the lung fields. He has no rales.    Cardiovascular: Normal rate, regular rhythm, normal heart sounds, and does not have murmur. Exam reveals no gallop and no friction rub. Musculoskeletal / Extremities: Range of motion is normal. Gait is normal, assistive devices use: cane. He exhibits edema: none, and no tenderness. Neurological/Psychiatric:He is alert and oriented to person, place, and time. Coordination is  normal.   Judgement and Insight is normal  His mood is Appropriate and affect is Flat/blunted . His behavior is normal.   thought content normal.        IMPRESSION:     1. Chronic migraine without aura, with intractable migraine, so stated, with status migrainosus    2. Primary insomnia    3. Chronic pain syndrome    4. Myofascial pain    5. Ulcer of left foot, with fat layer exposed ([address]    6. Polyneuropathy associated with underlying disease ([address]    7. Constipation due to opioid therapy    8. Neuropathic pain    9. Peripheral polyneuropathy    10. Pathological fracture of left tibia due to other osteoporosis, sequela    11. Recurrent major depressive disorder, in partial remission ([address]        PLAN:  Informed verbal consent was obtained. -ROM/Stretching exercises as advised   -he was advised  to avoid using too many OTC analgesics to control the headaches, avoid chocolates, increased caffeine, cheeses and MSG nitrite containing foods, cigarette smoking.  To avoid bright lights, strong smells and skipping meals.   -Continue with Aimovig along with Topamax and Imitrex   -he was advised proper sleep hygiene-told to avoid:use of caffeine or other stimulants after noon, alcohol use near bedtime, long or frequent naps during the day, erratic sleep schedule, heavy meals near bedtime, vigorous exercise near bedtime and use of electronic devices near bedtime   -Start Elavil 25 mg 1-2 nightly   -CBT techniques- relaxation therapies such as biofeedback, mindfulness based stress reduction, imagery, cognitive restructuring, problem solving discussed with (BD INSULIN SYRINGE U/F) 31G X 5/16\" 0.3 ML MISC USE ONE SYRINGE FOUR TIMES A DAY BEFORE MEALS AND ONCE NIGHTLY 120 each 9    cefadroxil (DURICEF) 500 MG capsule TAKE ONE CAPSULE BY MOUTH DAILY 30 capsule 5    LANTUS 100 UNIT/ML injection vial INJECT 15 UNITS UNDER THE SKIN twice daily before breakfast and nightly.  Hold nightly dose if PM BG<90 30 mL 3    Insulin Syringe-Needle U-100 (B-D INS SYR ULTRAFINE 1CC/31G) 31G X 5/16\" 1 ML MISC INJECT USING INSULIN ONCE DAILY 30 each 10    insulin glulisine (APIDRA) 100 UNIT/ML injection INJECT 8 TO 12 UNITS UNDER THE SKIN THREE TIMES A DAY WITH MEALS PER SLIDING SCALE (Patient taking differently: 15 Units 2 times daily ) 10 mL 2    GVOKE HYPOPEN 2-PACK 1 MG/0.2ML SOAJ INJECT AS NEEDED FOR LOW BLOOD SUGAR 0.4 mL 3    dicloxacillin (DYNAPEN) 500 MG capsule TAKE ONE CAPSULE BY MOUTH TWICE A DAY 60 capsule 11    Continuous Blood Gluc Sensor (DEXCOM G6 SENSOR) MISC USE ONE SENSOR AND CHANGE EVERY 10 DAYS 2 each 3    Diabetic Shoe MISC by Does not apply route 1 each 0    Continuous Blood Gluc  (DEXCOM G6 ) LINDA USE AS NEEDED TO CHECK BLOOD SUGAR 1 each 2    aspirin 81 MG chewable tablet Take 1 tablet by mouth daily 30 tablet 0    pantoprazole (PROTONIX) 40 MG tablet Take 1 tablet by mouth 2 times daily 60 tablet 1    prednisoLONE sodium phosphate (INFLAMASE FORTE) 1 % ophthalmic solution 1 drop 4 times daily       prednisoLONE acetate (PRED FORTE) 1 % ophthalmic suspension Place 1 drop into the right eye three times daily 3 times daily x 1 week then 2x daily x2 weeks, then 1 daily thereafter       ketorolac (ACULAR) 0.5 % ophthalmic solution Place 1 drop into the right eye 3 times daily 3 times daily x 1week, then 2 x daily x 2 weeks, then 1 daily thereafter      Continuous Blood Gluc Transmit (DEXCOM G6 TRANSMITTER) MISC USE TO CHECK BLOOD SUGAR 2 each 3    blood glucose monitor kit and supplies Dispense sufficient amount for indicated testing frequency plus additional to accommodate PRN testing needs. Dispense all needed supplies to include: monitor, strips, lancing device, lancets, control solutions, alcohol swabs. 1 kit 0    blood glucose test strips (ACCU-CHEK CAPO PLUS) strip 4 times a day As needed. 150 each 3    Handicap Placard MISC by Does not apply route Diagnosis: Type 1 diabetes. Expires 4/13/23. 1 each 0    blood glucose test strips (ACCU-CHEK CAPO PLUS) strip 1 each by In Vitro route daily Test blood glucose 10 times daily Dx Code E10.8 300 each 10    blood glucose test strips (ACCU-CHEK CAPO PLUS) strip USE ONE STRIP TO TEST THREE TIMES A  strip 4    Accu-Chek FastClix Lancets MISC 1 each by Does not apply route daily Test blood glucose 10 times daily Dx Code E 10.8 900 each 2    triamcinolone (KENALOG) 0.1 % ointment Apply to thickened affected areas PRN sparingly for flares no longer than 2 weeks at one time, do not apply to cleared skin 80 g 0    Insulin Syringe-Needle U-100 (BD INSULIN SYRINGE U/F) 31G X 5/16\" 0.5 ML MISC Inject 1 each into the skin 4 times daily DX CODE E 10.22 120 each 9    fluticasone (FLONASE) 50 MCG/ACT nasal spray SPRAY TWO SPRAYS IN EACH NOSTRIL DAILY (Patient taking differently: daily as needed ) 16 g 4    hydrocortisone 2.5 % cream Apply topically 2 times daily. (Patient taking differently: Apply topically 2 times daily as needed) 30 g 0    Blood Glucose Monitoring Suppl (ACCU-CHEK CAPO PLUS) w/Device KIT 1 each by Does not apply route daily Test blood sugar 10 times daily Dx code E 10.8 1 kit 10    GLUCAGON EMERGENCY 1 MG injection INJECT 1MG INTO THE MUSCLE AS NEEDED 1 kit 3    Multiple Vitamin (DAILY KITTY) TABS TAKE ONE TABLET BY MOUTH DAILY 30 tablet 5    Cholecalciferol (VITAMIN D3) 5000 units CAPS Take 1 capsule by mouth Daily 30 capsule 3    Dextromethorphan-Guaifenesin (MUCINEX DM)  MG TB12 Cough and congestion.  (Patient taking differently: as needed Cough and congestion.) 60 tablet 1    omega-3 acid ethyl esters (LOVAZA) 1 G capsule TAKE TWO CAPSULES BY MOUTH TWICE DAILY 120 capsule 5    MUCUS RELIEF DM  MG TABS TAKE ONE BY MOUTH DAILY AS NEEDED 30 tablet 1    Alcohol Swabs PADS Use as needed for insulin injection. 100 each 5    Probiotic Product (ACIDOPHILUS PROBIOTIC) CAPS capsule TAKE ONE CAPSULE BY MOUTH DAILY 28 capsule 4    polyvinyl alcohol (LIQUIFILM TEARS) 1.4 % ophthalmic solution 1 drop as needed      propranolol (INDERAL) 80 MG tablet Take 40 mg by mouth 2 times daily For migraines       Flaxseed, Linseed, (FLAX PO) Take 14 g by mouth daily as needed       potassium chloride (KLOR-CON M) 10 MEQ extended release tablet Take 1 tablet by mouth 2 times daily 60 tablet 0     No current facility-administered medications for this visit. Goals of current treatment regimen include improvement in pain, restoration of functioning- with focus on improvement in physical performance, general activity, work or disability,emotional distress, health care utilization and  decreased medication consumption. Will continue to monitor progress towards achieving/maintaining therapeutic goals with special emphasis on  1. Improvement in perceived interfernce  of pain with ADL's. Ability to do home exercises independently. Ability to do household chores indoor and/or outdoor work and social and leisure activities. To increase flexibility/ROM, strength and endurance. Improve psychosocial and physical functioning. - he is not showing any significant progress/or showing regression  towards this goal and reassessment and adjustment of goals/treatment have been made. 2. Improving sleep to 6-7 hours a night. Improve mood/ anxiety and depression symptoms such as crying spells, low energy, problems with concentration, motivation.- he is showing progression towards this treatment goal with the current regimen.    3. Reduction of reliance on opioid analgesia/more appropriate opioid use. - he is showing progression towards this treatment goal with the current regimen. Risks and benefits of the medications and other alternative treatments have been/were  discussed with the patient. Any questions on the  common side effects of these medications were also answered. He was advised against drinking alcohol with the narcotic pain medicines, advised against driving or handling machinery when  starting or adjusting the dose of medicines, feeling groggy or drowsy, or if having any cognitive issues related to the current medications. Heis fully aware of the risk of overdose and death, if medicines are misused and not taken as prescribed. If he develops new symptoms or if the symptoms worsen, he was told to call the office. .  Thank you for allowing me to participate in the care of this patient.     James Borjas MD    Cc: Gonzalez Kee MD

## 2022-07-23 ENCOUNTER — HOSPITAL ENCOUNTER (EMERGENCY)
Age: 47
Discharge: HOME | End: 2022-07-23
Attending: EMERGENCY MEDICINE
Payer: MEDICARE

## 2022-07-23 VITALS
HEART RATE: 88 BPM | OXYGEN SATURATION: 100 % | BODY MASS INDEX: 27.35 KG/M2 | WEIGHT: 213 LBS | TEMPERATURE: 98.2 F | RESPIRATION RATE: 16 BRPM | SYSTOLIC BLOOD PRESSURE: 135 MMHG | DIASTOLIC BLOOD PRESSURE: 85 MMHG

## 2022-07-23 DIAGNOSIS — E16.2 HYPOGLYCEMIA: Primary | ICD-10-CM

## 2022-07-23 LAB
GLUCOSE BLD-MCNC: 54 MG/DL (ref 70–99)
GLUCOSE BLD-MCNC: 68 MG/DL (ref 70–99)
GLUCOSE BLD-MCNC: 71 MG/DL (ref 70–99)
PERFORMED ON: ABNORMAL
PERFORMED ON: ABNORMAL
PERFORMED ON: NORMAL

## 2022-07-23 PROCEDURE — 99283 EMERGENCY DEPT VISIT LOW MDM: CPT

## 2022-07-23 ASSESSMENT — PAIN SCALES - GENERAL: PAINLEVEL_OUTOF10: 9

## 2022-07-23 ASSESSMENT — PAIN DESCRIPTION - PAIN TYPE: TYPE: CHRONIC PAIN

## 2022-07-23 ASSESSMENT — PAIN DESCRIPTION - FREQUENCY: FREQUENCY: CONTINUOUS

## 2022-07-23 ASSESSMENT — PAIN DESCRIPTION - DESCRIPTORS: DESCRIPTORS: ACHING;DISCOMFORT

## 2022-07-23 ASSESSMENT — PAIN - FUNCTIONAL ASSESSMENT: PAIN_FUNCTIONAL_ASSESSMENT: 0-10

## 2022-07-23 ASSESSMENT — PAIN DESCRIPTION - ORIENTATION: ORIENTATION: LEFT

## 2022-07-23 ASSESSMENT — PAIN DESCRIPTION - LOCATION: LOCATION: LEG

## 2022-07-23 NOTE — ED NOTES
Dr Augustina Hernández at bedside FS 54 pt refuses dextrose, requesting box lunch, box lunch given. Vitals taken pt refuses heart monitor. Will cont to monitor.      101 Paulette Steve RN  07/23/22 1956

## 2022-07-23 NOTE — ED NOTES
Blodd sugar obtained via finger stick with the result of 54. Dr. Minesh Barrios at bedside and notified of result as it was obtained.      Dominique Tirado  07/23/22 1946

## 2022-07-24 NOTE — DISCHARGE INSTRUCTIONS
Your blood glucose was low at home. It improved after eating here. We did want to give you IV dextrose but you declined. You declined any additional work-up. You were feeling better after eating and your blood glucose did improve.

## 2022-07-24 NOTE — ED NOTES
Repeat FS 68 , Pt requesting claudette crackers and apple juice, given Dr Kaya Chaudhary aware will cont to monitor.      101 Paulette Steve, JULIA  07/23/22 2034

## 2022-07-24 NOTE — ED NOTES
Repeat FS 71 Made Dr Ana Arnold aware. Will cont to monitor.      101 Chichester Power, RN  07/23/22 [id number]

## 2022-07-24 NOTE — ED NOTES
Called patients sister to pick patient up. Sister stated she would come get him. Patient to be discharged to [facility].       Susannah Sullivan RN  07/23/22 [id number]

## 2022-07-26 NOTE — ED PROVIDER NOTES
[facility]  Emergency Department      CHIEF COMPLAINT  Hypoglycemia (Per squad pt unresponsive, FS 44 250 dectrose given. When pt arrived, FS 54, pt A&O x 4 and talking.)      HISTORY OF PRESENT ILLNESS  Robles Knight is a 42 y.o. male with a history of diabetes presents with low blood sugar at home. He states this is a recurrent issue for him and he has quite brittle diabetes. He states his family called 911 because he was confused and his blood sugar was apparently 44. He was given oral dextrose in route to the hospital.  He has no complaints. He denies chest pain or shortness of breath. No recent illnesses. No weakness or numbness of extremities. He denies vomiting or abdominal pain. He states this is very common for him to have blood sugar lows. No other complaints, modifying factors or associated symptoms. I have reviewed the following from the nursing documentation.     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 Gillian Gonzalez  August 2013    left tibia with external fixation device    EYE SURGERY      retina reattachment left eye x 3 holes repairs    EYE SURGERY Right 9-27-13    retal detachment    LEG SURGERY Left 3/31/14    ORIF left fibula and tibia    GA EGD FLEXIBLE FOREIGN BODY REMOVAL N/A 9/21/2018    EGD FOREIGN BODY REMOVAL performed by Zack Polanco MD at [facility]      with external device inplace UPPER GASTROINTESTINAL ENDOSCOPY N/A 9/21/2018    EGD BIOPSY performed by Nayeli Ferguson MD at [facility] N/A 9/25/2021    EGD BIOPSY performed by Sushma Thacker MD at [facility]     Family History   Problem Relation Age of Onset    Asthma Other     Diabetes Other     High Blood Pressure Other      Social History     Socioeconomic History    Marital status: Single     Spouse name: Not on file    Number of children: Not on file    Years of education: Not on file    Highest education level: Not on file   Occupational History    Occupation: n/a   Tobacco Use    Smoking status: Never    Smokeless tobacco: Never   Vaping Use    Vaping Use: Never used   Substance and Sexual Activity    Alcohol use: No     Alcohol/week: 0.0 standard drinks    Drug use: No    Sexual activity: Not Currently   Other Topics Concern    Not on file   Social History Narrative    Not on file     Social Determinants of Health     Financial Resource Strain: Low Risk     Difficulty of Paying Living Expenses: Not hard at all   Food Insecurity: No Food Insecurity    Worried About Running Out of Food in the Last Year: Never true    Ran Out of Food in the Last Year: Never true   Transportation Needs: Not on file   Physical Activity: Not on file   Stress: Not on file   Social Connections: Not on file   Intimate Partner Violence: Not on file   Housing Stability: Not on file     No current facility-administered medications for this encounter.      Current Outpatient Medications   Medication Sig Dispense Refill    SUMAtriptan (IMITREX) 50 MG tablet Take one tab po at the start of migraine PRN max 1 every 24 hours 9 tablet 1    Erenumab-aooe (AIMOVIG, 140 MG DOSE,) 70 MG/ML SOAJ Inject 140 mLs into the skin every 30 days 1 pen 1    DULoxetine (CYMBALTA) 60 MG extended release capsule Take 1 capsule by mouth daily 30 capsule 1    topiramate (TOPAMAX) 100 MG tablet Take 1 tablet by mouth 2 times daily 60 tablet Continuous Blood Gluc Transmit (DEXCOM G6 TRANSMITTER) MISC USE TO CHECK BLOOD SUGAR 2 each 3    potassium chloride (KLOR-CON M) 10 MEQ extended release tablet Take 1 tablet by mouth 2 times daily 60 tablet 0    blood glucose monitor kit and supplies Dispense sufficient amount for indicated testing frequency plus additional to accommodate PRN testing needs. Dispense all needed supplies to include: monitor, strips, lancing device, lancets, control solutions, alcohol swabs. 1 kit 0    blood glucose test strips (ACCU-CHEK CAPO PLUS) strip 4 times a day As needed. 150 each 3    [facility] by Does not apply route Diagnosis: Type 1 diabetes. Expires 4/13/23. 1 each 0    blood glucose test strips (ACCU-CHEK CAPO PLUS) strip 1 each by In Vitro route daily Test blood glucose 10 times daily Dx Code E10.8 300 each 10    blood glucose test strips (ACCU-CHEK CAPO PLUS) strip USE ONE STRIP TO TEST THREE TIMES A  strip 4    Accu-Chek FastClix Lancets MISC 1 each by Does not apply route daily Test blood glucose 10 times daily Dx Code E 10.8 900 each 2    triamcinolone (KENALOG) 0.1 % ointment Apply to thickened affected areas PRN sparingly for flares no longer than 2 weeks at one time, do not apply to cleared skin 80 g 0    Insulin Syringe-Needle U-100 (BD INSULIN SYRINGE U/F) 31G X 5/16\" 0.5 ML MISC Inject 1 each into the skin 4 times daily DX CODE E 10.22 120 each 9    fluticasone (FLONASE) 50 MCG/ACT nasal spray SPRAY TWO SPRAYS IN EACH NOSTRIL DAILY (Patient taking differently: daily as needed ) 16 g 4    hydrocortisone 2.5 % cream Apply topically 2 times daily.  (Patient taking differently: Apply topically 2 times daily as needed) 30 g 0    Blood Glucose Monitoring Suppl (ACCU-CHEK CAPO PLUS) w/Device KIT 1 each by Does not apply route daily Test blood sugar 10 times daily Dx code E 10.8 1 kit 10    GLUCAGON EMERGENCY 1 MG injection INJECT 1MG INTO THE MUSCLE AS NEEDED 1 kit 3    Multiple Vitamin (DAILY KITTY) TABS TAKE ONE TABLET BY MOUTH DAILY 30 tablet 5    Cholecalciferol (VITAMIN D3) 5000 units CAPS Take 1 capsule by mouth Daily 30 capsule 3    Dextromethorphan-Guaifenesin (MUCINEX DM)  MG TB12 Cough and congestion. (Patient taking differently: as needed Cough and congestion.) 60 tablet 1    omega-3 acid ethyl esters (LOVAZA) 1 G capsule TAKE TWO CAPSULES BY MOUTH TWICE DAILY 120 capsule 5    MUCUS RELIEF DM  MG TABS TAKE ONE BY MOUTH DAILY AS NEEDED 30 tablet 1    Alcohol Swabs PADS Use as needed for insulin injection.  100 each 5    Probiotic Product (ACIDOPHILUS PROBIOTIC) CAPS capsule TAKE ONE CAPSULE BY MOUTH DAILY 28 capsule 4    polyvinyl alcohol (LIQUIFILM TEARS) 1.4 % ophthalmic solution 1 drop as needed      propranolol (INDERAL) 80 MG tablet Take 40 mg by mouth 2 times daily For migraines       Flaxseed, Linseed, (FLAX PO) Take 14 g by mouth daily as needed        Allergies   Allergen Reactions    Tegaderm Ag Mesh 2\"X2\" [Wound Dressings]      \"Holes in skin from Tegaderm Adhesive\"    Codeine Other (See Comments)     hallucinates    Tape Chelsey Toscano Tape]      Blister      Other Other (See Comments)     Holes in skin  From Tegaderm Adhesive  Blister       REVIEW OF SYSTEMS      General:  No fevers  Eyes:  No recent vison changes  ENT:  No sore throat, no nasal congestion  Cardiovascular:  no palpitations  Respiratory:   no cough, no wheezing  Gastrointestinal:  No abdominal pain, no vomiting, no diarrhea  Musculoskeletal:  No muscle pain, no joint pain  Skin:  No rash   Neurologic:  No speech problems, no headache, no extremity numbness, no extremity weakness  Genitourinary:  No dysuria  Extremities:  no edema, no pain      Unless otherwise stated in this report, this patient's positive and negative responses for review of systems (constitutional, eyes, ENT, cardiovascular, respiratory, gastrointestinal, neurological, genitourinary, musculoskeletal, integument systems and systems related to the presenting problem) are either stated in the preceding paragraph, were not pertinent or were negative for the symptoms and/or complaints related to the medical problem. PHYSICAL EXAM  /85   Pulse 88   Temp 98.2 °F (36.8 °C) (Oral)   Resp 16   Wt 213 lb (96.6 kg)   SpO2 100%   BMI 27.35 kg/m²   GENERAL APPEARANCE: Awake and alert. Cooperative. No acute distress. HEAD: Normocephalic. Atraumatic. EYES: PERRL. EOM's grossly intact. ENT: Mucous membranes are moist.   NECK: Supple, trachea midline. HEART: RRR. LUNGS: Respirations unlabored. Speaking comfortably in full sentences. ABDOMEN: Nondistended. EXTREMITIES: No peripheral edema. MAEE. No acute deformities. SKIN: Warm, dry and intact. No acute rashes. NEUROLOGICAL: Alert and oriented X 3. CN II-XII grossly intact. Strength 5/5, sensation intact. PSYCHIATRIC: Normal mood and affect. LABS  I have reviewed all labs for this visit. Results for orders placed or performed during the hospital encounter of 07/23/22   POCT Glucose   Result Value Ref Range    POC Glucose 54 (L) 70 - 99 mg/dl    Performed on ACCU-CHEK    POCT Glucose   Result Value Ref Range    POC Glucose 68 (L) 70 - 99 mg/dl    Performed on ACCU-CHEK    POCT Glucose   Result Value Ref Range    POC Glucose 71 70 - 99 mg/dl    Performed on ACCU-CHEK                RADIOLOGY  X-RAYS: ALL IMAGES INCLUDING PLAIN FILMS, CT, ULTRASOUND AND MRI HAVE BEEN READ BY THE RADIOLOGIST. I have personally reviewed plain film images and have reviewed the radiology reports. No orders to display              Rechecks: Physical assessment performed. After eating here the patient's blood sugar improved to 71. He has declined any further treatment and wants to go home. Sepsis:  Is this patient to be included in the SEP-1 Core Measure due to severe sepsis or septic shock?    No   Exclusion criteria - the patient is NOT to be included for SEP-1 Core Measure due to:  Infection is not suspected           ED COURSE/MDM  Patient seen and evaluated. Here the patient is afebrile with normal vitals signs. Old records reviewed. Here the patient has no complaints. He is awake and alert. Blood glucose was 54 on arrival.  The patient is refusing dextrose IV because he states makes his blood sugars go too high. He is requesting something to eat. He does not want to be hooked up to the monitors and does not want any blood work done. He is awake and alert and I think can make these decisions for himself. He was given a box lunch here, claudette crackers and apple juice and his blood sugars improved to 71. He is requesting discharge. His sister will come pick him up. Labs and imaging reviewed and results discussed with patient. Patient was reassessed as noted above . Plan of care discussed with patient. Patient in agreement with plan. Strict return precautions have been given. Patient was given scripts for the following medications. I counseled patient how to take these medications. Discharge Medication List as of 2022  9:49 PM          No prescriptions given. CLINICAL IMPRESSION  1. Hypoglycemia        Blood pressure 135/85, pulse 88, temperature 98.2 °F (36.8 °C), temperature source Oral, resp. rate 16, weight 213 lb (96.6 kg), SpO2 100 %. DISPOSITION  Susanna Nickerson was discharged to home in stable condition. Demetrio Queen MD am the primary clinician of record.     (Please note this note was completed with a voice recognition program.  Efforts were made to edit the dictations but occasionally words are mis-transcribed.)       Eve Salazar MD  22

## 2022-07-28 ENCOUNTER — OFFICE VISIT (OUTPATIENT)
Dept: PAIN MANAGEMENT | Age: 47
End: 2022-07-28
Payer: MEDICARE

## 2022-07-28 VITALS
DIASTOLIC BLOOD PRESSURE: 77 MMHG | HEART RATE: 111 BPM | SYSTOLIC BLOOD PRESSURE: 122 MMHG | BODY MASS INDEX: 26.45 KG/M2 | WEIGHT: 206 LBS | OXYGEN SATURATION: 100 %

## 2022-07-28 DIAGNOSIS — G89.4 CHRONIC PAIN SYNDROME: ICD-10-CM

## 2022-07-28 DIAGNOSIS — M79.18 MYOFASCIAL PAIN: ICD-10-CM

## 2022-07-28 DIAGNOSIS — L97.522 ULCER OF LEFT FOOT, WITH FAT LAYER EXPOSED (HCC): ICD-10-CM

## 2022-07-28 DIAGNOSIS — G63 POLYNEUROPATHY ASSOCIATED WITH UNDERLYING DISEASE (HCC): ICD-10-CM

## 2022-07-28 DIAGNOSIS — M79.2 NEUROPATHIC PAIN: ICD-10-CM

## 2022-07-28 DIAGNOSIS — M80.862S PATHOLOGICAL FRACTURE OF LEFT TIBIA DUE TO OTHER OSTEOPOROSIS, SEQUELA: ICD-10-CM

## 2022-07-28 DIAGNOSIS — G62.9 PERIPHERAL POLYNEUROPATHY: ICD-10-CM

## 2022-07-28 PROCEDURE — 99213 OFFICE O/P EST LOW 20 MIN: CPT | Performed by: INTERNAL MEDICINE

## 2022-07-28 RX ORDER — TOPIRAMATE 100 MG/1
100 TABLET, FILM COATED ORAL 2 TIMES DAILY
Qty: 60 TABLET | Refills: 1 | Status: SHIPPED | OUTPATIENT
Start: 2022-07-28 | End: 2022-08-25 | Stop reason: SDUPTHER

## 2022-07-28 RX ORDER — GABAPENTIN 400 MG/1
400 CAPSULE ORAL 2 TIMES DAILY
Qty: 60 CAPSULE | Refills: 1 | Status: SHIPPED | OUTPATIENT
Start: 2022-07-28 | End: 2022-08-25 | Stop reason: SDUPTHER

## 2022-07-28 RX ORDER — DULOXETIN HYDROCHLORIDE 60 MG/1
60 CAPSULE, DELAYED RELEASE ORAL DAILY
Qty: 30 CAPSULE | Refills: 1 | Status: SHIPPED | OUTPATIENT
Start: 2022-07-28 | End: 2022-08-25 | Stop reason: SDUPTHER

## 2022-07-28 RX ORDER — TRAMADOL HYDROCHLORIDE 50 MG/1
50 TABLET ORAL EVERY 6 HOURS PRN
Qty: 56 TABLET | Refills: 0 | Status: SHIPPED | OUTPATIENT
Start: 2022-07-28 | End: 2022-08-25 | Stop reason: SDUPTHER

## 2022-07-28 RX ORDER — ERENUMAB-AOOE 70 MG/ML
INJECTION SUBCUTANEOUS
Qty: 1 PEN | Refills: 1 | Status: SHIPPED | OUTPATIENT
Start: 2022-07-28 | End: 2022-08-25 | Stop reason: SDUPTHER

## 2022-07-28 RX ORDER — SUMATRIPTAN 50 MG/1
TABLET, FILM COATED ORAL
Qty: 9 TABLET | Refills: 1 | Status: SHIPPED | OUTPATIENT
Start: 2022-07-28 | End: 2022-09-22 | Stop reason: SDUPTHER

## 2022-07-28 NOTE — PROGRESS NOTES
[facility] [place]  1975  [id number]      HISTORY OF PRESENT ILLNESS:  Mr. Susy Gonzalez is a 42 y.o. male returns for a follow up visit for pain management  He has a diagnosis of   1. Chronic migraine without aura, with intractable migraine, so stated, with status migrainosus    2. Chronic pain syndrome    3. Ulcer of left foot, with fat layer exposed ([address]    4. Constipation due to opioid therapy    5. Peripheral polyneuropathy    6. Recurrent major depressive disorder, in partial remission ([address]    7. Primary insomnia    8. Myofascial pain    9. Polyneuropathy associated with underlying disease ([address]    10. Neuropathic pain    11. Pathological fracture of left tibia due to other osteoporosis, sequela    . He complains of pain in the  Neck, upper back, bilateral shoulders, bilateral hands   He rates the pain 10/10 and describes it as sharp, aching, burning, numbness. Current treatment regimen has helped relieve about 10% of the pain. He denies any side effects from the current pain regimen. Patient reports that since the last follow up visit the physical functioning is unchanged, family/social relationships are unchanged, mood is unchanged sleep patterns are better, and that the overall functioning is better. Patient denies misusing/abusing his narcotic pain medications or using any illegal drugs. There are No indicators for possible drug abuse, addiction or diversion problems. Patient states he has been doing fair, pain has been manageable with the medications. He says his pain has been manageable with the medications. He mentions he is using Ultram 2 per day. He states his headaches symptoms are better. He mentions is using Aimovig with Imitrex. He reports his blood sugar has been okay. ALLERGIES: Patients list of allergies were reviewed     MEDICATIONS: Mr. Susy Gonzalez list of medications were reviewed. His current medications are   Outpatient Medications Prior to Visit   Medication Sig Dispense Refill APIDRA 100 UNIT/ML injection INJECT 8 TO 12 UNITS UNDER THE SKIN THREE TIMES A DAY WITH MEALS PER SLIDING SCALE 10 mL 3    Insulin Syringe-Needle U-100 (BD INSULIN SYRINGE U/F) 31G X 5/16\" 0.3 ML MISC USE ONE SYRINGE FOUR TIMES A DAY BEFORE MEALS AND ONCE NIGHTLY 120 each 9    cefadroxil (DURICEF) 500 MG capsule TAKE ONE CAPSULE BY MOUTH DAILY 30 capsule 5    LANTUS 100 UNIT/ML injection vial INJECT 15 UNITS UNDER THE SKIN twice daily before breakfast and nightly. Hold nightly dose if PM BG<90 30 mL 3    Insulin Syringe-Needle U-100 (B-D INS SYR ULTRAFINE 1CC/31G) 31G X 5/16\" 1 ML MISC INJECT USING INSULIN ONCE DAILY 30 each 10    GVOKE HYPOPEN 2-PACK 1 MG/0.2ML SOAJ INJECT AS NEEDED FOR LOW BLOOD SUGAR 0.4 mL 3    dicloxacillin (DYNAPEN) 500 MG capsule TAKE ONE CAPSULE BY MOUTH TWICE A DAY 60 capsule 11    Continuous Blood Gluc Sensor (DEXCOM G6 SENSOR) MISC USE ONE SENSOR AND CHANGE EVERY 10 DAYS 2 each 3    Diabetic Shoe MISC by Does not apply route 1 each 0    Continuous Blood Gluc  (DEXCOM G6 ) LINDA USE AS NEEDED TO CHECK BLOOD SUGAR 1 each 2    aspirin 81 MG chewable tablet Take 1 tablet by mouth daily 30 tablet 0    pantoprazole (PROTONIX) 40 MG tablet Take 1 tablet by mouth 2 times daily 60 tablet 1    prednisoLONE sodium phosphate (INFLAMASE FORTE) 1 % ophthalmic solution 1 drop 4 times daily       prednisoLONE acetate (PRED FORTE) 1 % ophthalmic suspension Place 1 drop into the right eye three times daily 3 times daily x 1 week then 2x daily x2 weeks, then 1 daily thereafter       ketorolac (ACULAR) 0.5 % ophthalmic solution Place 1 drop into the right eye 3 times daily 3 times daily x 1week, then 2 x daily x 2 weeks, then 1 daily thereafter      Continuous Blood Gluc Transmit (DEXCOM G6 TRANSMITTER) MISC USE TO CHECK BLOOD SUGAR 2 each 3    blood glucose monitor kit and supplies Dispense sufficient amount for indicated testing frequency plus additional to accommodate PRN testing needs. Dispense all needed supplies to include: monitor, strips, lancing device, lancets, control solutions, alcohol swabs. 1 kit 0    blood glucose test strips (ACCU-CHEK CAPO PLUS) strip 4 times a day As needed. 150 each 3    Handicap Placard MISC by Does not apply route Diagnosis: Type 1 diabetes. Expires 4/13/23. 1 each 0    blood glucose test strips (ACCU-CHEK CAPO PLUS) strip 1 each by In Vitro route daily Test blood glucose 10 times daily Dx Code E10.8 300 each 10    blood glucose test strips (ACCU-CHEK CAPO PLUS) strip USE ONE STRIP TO TEST THREE TIMES A  strip 4    Accu-Chek FastClix Lancets MISC 1 each by Does not apply route daily Test blood glucose 10 times daily Dx Code E 10.8 900 each 2    triamcinolone (KENALOG) 0.1 % ointment Apply to thickened affected areas PRN sparingly for flares no longer than 2 weeks at one time, do not apply to cleared skin 80 g 0    Insulin Syringe-Needle U-100 (BD INSULIN SYRINGE U/F) 31G X 5/16\" 0.5 ML MISC Inject 1 each into the skin 4 times daily DX CODE E 10.22 120 each 9    fluticasone (FLONASE) 50 MCG/ACT nasal spray SPRAY TWO SPRAYS IN EACH NOSTRIL DAILY (Patient taking differently: daily as needed) 16 g 4    hydrocortisone 2.5 % cream Apply topically 2 times daily. (Patient taking differently: Apply topically 2 times daily as needed) 30 g 0    Blood Glucose Monitoring Suppl (ACCU-CHEK CAPO PLUS) w/Device KIT 1 each by Does not apply route daily Test blood sugar 10 times daily Dx code E 10.8 1 kit 10    GLUCAGON EMERGENCY 1 MG injection INJECT 1MG INTO THE MUSCLE AS NEEDED 1 kit 3    Multiple Vitamin (DAILY KITTY) TABS TAKE ONE TABLET BY MOUTH DAILY 30 tablet 5    Cholecalciferol (VITAMIN D3) 5000 units CAPS Take 1 capsule by mouth Daily 30 capsule 3    Dextromethorphan-Guaifenesin (MUCINEX DM)  MG TB12 Cough and congestion.  (Patient taking differently: as needed Cough and congestion.) 60 tablet 1    omega-3 acid ethyl esters (LOVAZA) 1 G capsule TAKE TWO CAPSULES BY MOUTH TWICE DAILY 120 capsule 5    MUCUS RELIEF DM  MG TABS TAKE ONE BY MOUTH DAILY AS NEEDED 30 tablet 1    Alcohol Swabs PADS Use as needed for insulin injection. 100 each 5    Probiotic Product (ACIDOPHILUS PROBIOTIC) CAPS capsule TAKE ONE CAPSULE BY MOUTH DAILY 28 capsule 4    polyvinyl alcohol (LIQUIFILM TEARS) 1.4 % ophthalmic solution 1 drop as needed      propranolol (INDERAL) 80 MG tablet Take 40 mg by mouth 2 times daily For migraines       Flaxseed, Linseed, (FLAX PO) Take 14 g by mouth daily as needed       SUMAtriptan (IMITREX) 50 MG tablet Take one tab po at the start of migraine PRN max 1 every 24 hours 9 tablet 1    Erenumab-aooe (AIMOVIG, 140 MG DOSE,) 70 MG/ML SOAJ Inject 140 mLs into the skin every 30 days 1 pen 1    DULoxetine (CYMBALTA) 60 MG extended release capsule Take 1 capsule by mouth daily 30 capsule 1    topiramate (TOPAMAX) 100 MG tablet Take 1 tablet by mouth 2 times daily 60 tablet 1    traMADol (ULTRAM) 50 MG tablet Take 1 tablet by mouth every 6 hours as needed for Pain (max 1-2 per day) for up to 28 days. 56 tablet 0    gabapentin (NEURONTIN) 400 MG capsule Take 1 capsule by mouth 2 times daily for 30 days. 60 capsule 1    potassium chloride (KLOR-CON M) 10 MEQ extended release tablet Take 1 tablet by mouth 2 times daily 60 tablet 0     No facility-administered medications prior to visit. REVIEW OF SYSTEMS:    Respiratory: Negative for apnea, chest tightness and shortness of breath or change in baseline breathing. PHYSICAL EXAM:   Nursing note and vitals reviewed. /77   Pulse (!) 111   Wt 206 lb (93.4 kg)   SpO2 100%   BMI 26.45 kg/m²   Constitutional: He appears well-developed and well-nourished. No acute distress. Cardiovascular: Normal rate, regular rhythm, normal heart sounds, and does not have murmur. Pulmonary/Chest: Effort normal. No respiratory distress. He does not have wheezes in the lung fields. He has no rales. Neurological/Psychiatric:He is alert and oriented to person, place, and time. Coordination is  normal.  His mood isAppropriate and affect is Neutral/Euthymic(normal) . Other: using a walker     IMPRESSION:   1. Chronic pain syndrome    2. Ulcer of left foot, with fat layer exposed ([facility] [address]    3. Peripheral polyneuropathy    4. Myofascial pain    5. Polyneuropathy associated with underlying disease ([facility] [address]    6. Neuropathic pain    7. Pathological fracture of left tibia due to other osteoporosis, sequela        PLAN:  Informed verbal consent was obtained   -ROM/Stretching exercises   -He was advised to increase fluids ( 5-7  glasses of fluid daily), limit caffeine, avoid cheese products, increase dietary fiber, increase activity and exercise as tolerated and relax regularly and enjoy meals   -Has been using Ultram 1-2 per day   -Walking as tolerated 20-30 minutes daily   Current Outpatient Medications   Medication Sig Dispense Refill    SUMAtriptan (IMITREX) 50 MG tablet Take one tab po at the start of migraine PRN max 1 every 24 hours 9 tablet 1    Erenumab-aooe (AIMOVIG, 140 MG DOSE,) 70 MG/ML SOAJ Inject 140 mLs into the skin every 30 days 1 pen 1    DULoxetine (CYMBALTA) 60 MG extended release capsule Take 1 capsule by mouth in the morning. 30 capsule 1    topiramate (TOPAMAX) 100 MG tablet Take 1 tablet by mouth in the morning and 1 tablet before bedtime. 60 tablet 1    traMADol (ULTRAM) 50 MG tablet Take 1 tablet by mouth every 6 hours as needed for Pain (max 1-2 per day) for up to 28 days. 56 tablet 0    gabapentin (NEURONTIN) 400 MG capsule Take 1 capsule by mouth in the morning and 1 capsule before bedtime. Do all this for 30 days.  60 capsule 1    APIDRA 100 UNIT/ML injection INJECT 8 TO 12 UNITS UNDER THE SKIN THREE TIMES A DAY WITH MEALS PER SLIDING SCALE 10 mL 3    Insulin Syringe-Needle U-100 (BD INSULIN SYRINGE U/F) 31G X 5/16\" 0.3 ML MISC USE ONE SYRINGE FOUR TIMES A DAY BEFORE MEALS AND ONCE NIGHTLY 120 each 9    cefadroxil (DURICEF) 500 MG capsule TAKE ONE CAPSULE BY MOUTH DAILY 30 capsule 5    LANTUS 100 UNIT/ML injection vial INJECT 15 UNITS UNDER THE SKIN twice daily before breakfast and nightly. Hold nightly dose if PM BG<90 30 mL 3    Insulin Syringe-Needle U-100 (B-D INS SYR ULTRAFINE 1CC/31G) 31G X 5/16\" 1 ML MISC INJECT USING INSULIN ONCE DAILY 30 each 10    GVOKE HYPOPEN 2-PACK 1 MG/0.2ML SOAJ INJECT AS NEEDED FOR LOW BLOOD SUGAR 0.4 mL 3    dicloxacillin (DYNAPEN) 500 MG capsule TAKE ONE CAPSULE BY MOUTH TWICE A DAY 60 capsule 11    Continuous Blood Gluc Sensor (DEXCOM G6 SENSOR) MISC USE ONE SENSOR AND CHANGE EVERY 10 DAYS 2 each 3    Diabetic Shoe MISC by Does not apply route 1 each 0    Continuous Blood Gluc  (DEXCOM G6 ) LINDA USE AS NEEDED TO CHECK BLOOD SUGAR 1 each 2    aspirin 81 MG chewable tablet Take 1 tablet by mouth daily 30 tablet 0    pantoprazole (PROTONIX) 40 MG tablet Take 1 tablet by mouth 2 times daily 60 tablet 1    prednisoLONE sodium phosphate (INFLAMASE FORTE) 1 % ophthalmic solution 1 drop 4 times daily       prednisoLONE acetate (PRED FORTE) 1 % ophthalmic suspension Place 1 drop into the right eye three times daily 3 times daily x 1 week then 2x daily x2 weeks, then 1 daily thereafter       ketorolac (ACULAR) 0.5 % ophthalmic solution Place 1 drop into the right eye 3 times daily 3 times daily x 1week, then 2 x daily x 2 weeks, then 1 daily thereafter      Continuous Blood Gluc Transmit (DEXCOM G6 TRANSMITTER) MISC USE TO CHECK BLOOD SUGAR 2 each 3    blood glucose monitor kit and supplies Dispense sufficient amount for indicated testing frequency plus additional to accommodate PRN testing needs. Dispense all needed supplies to include: monitor, strips, lancing device, lancets, control solutions, alcohol swabs. 1 kit 0    blood glucose test strips (ACCU-CHEK CAPO PLUS) strip 4 times a day As needed.  150 each 3    Handicap Placard MISC by Does not apply route Diagnosis: Type 1 diabetes. Expires 4/13/23. 1 each 0    blood glucose test strips (ACCU-CHEK CAPO PLUS) strip 1 each by In Vitro route daily Test blood glucose 10 times daily Dx Code E10.8 300 each 10    blood glucose test strips (ACCU-CHEK CAPO PLUS) strip USE ONE STRIP TO TEST THREE TIMES A  strip 4    Accu-Chek FastClix Lancets MISC 1 each by Does not apply route daily Test blood glucose 10 times daily Dx Code E 10.8 900 each 2    triamcinolone (KENALOG) 0.1 % ointment Apply to thickened affected areas PRN sparingly for flares no longer than 2 weeks at one time, do not apply to cleared skin 80 g 0    Insulin Syringe-Needle U-100 (BD INSULIN SYRINGE U/F) 31G X 5/16\" 0.5 ML MISC Inject 1 each into the skin 4 times daily DX CODE E 10.22 120 each 9    fluticasone (FLONASE) 50 MCG/ACT nasal spray SPRAY TWO SPRAYS IN EACH NOSTRIL DAILY (Patient taking differently: daily as needed) 16 g 4    hydrocortisone 2.5 % cream Apply topically 2 times daily. (Patient taking differently: Apply topically 2 times daily as needed) 30 g 0    Blood Glucose Monitoring Suppl (ACCU-CHEK CAPO PLUS) w/Device KIT 1 each by Does not apply route daily Test blood sugar 10 times daily Dx code E 10.8 1 kit 10    GLUCAGON EMERGENCY 1 MG injection INJECT 1MG INTO THE MUSCLE AS NEEDED 1 kit 3    Multiple Vitamin (DAILY KITTY) TABS TAKE ONE TABLET BY MOUTH DAILY 30 tablet 5    Cholecalciferol (VITAMIN D3) 5000 units CAPS Take 1 capsule by mouth Daily 30 capsule 3    Dextromethorphan-Guaifenesin (MUCINEX DM)  MG TB12 Cough and congestion. (Patient taking differently: as needed Cough and congestion.) 60 tablet 1    omega-3 acid ethyl esters (LOVAZA) 1 G capsule TAKE TWO CAPSULES BY MOUTH TWICE DAILY 120 capsule 5    MUCUS RELIEF DM  MG TABS TAKE ONE BY MOUTH DAILY AS NEEDED 30 tablet 1    Alcohol Swabs PADS Use as needed for insulin injection.  100 each 5    Probiotic Product (ACIDOPHILUS PROBIOTIC) CAPS capsule TAKE ONE CAPSULE BY MOUTH DAILY 28 capsule 4    polyvinyl alcohol (LIQUIFILM TEARS) 1.4 % ophthalmic solution 1 drop as needed      propranolol (INDERAL) 80 MG tablet Take 40 mg by mouth 2 times daily For migraines       Flaxseed, Linseed, (FLAX PO) Take 14 g by mouth daily as needed       potassium chloride (KLOR-CON M) 10 MEQ extended release tablet Take 1 tablet by mouth 2 times daily 60 tablet 0     No current facility-administered medications for this visit. I will continue his current medication regimen  which is part of the above treatment schedule. It has been helping with Mr. Mosquera Immanuel chronic  medical problems which for this visit include:   Diagnoses of Chronic migraine without aura, with intractable migraine, so stated, with status migrainosus, Chronic pain syndrome, Ulcer of left foot, with fat layer exposed ([address] Constipation due to opioid therapy, Peripheral polyneuropathy, Recurrent major depressive disorder, in partial remission ([address] Primary insomnia, Myofascial pain, Polyneuropathy associated with underlying disease ([address] Neuropathic pain, and Pathological fracture of left tibia due to other osteoporosis, sequela were pertinent to this visit. Risks and benefits of the medications and other alternative treatments  including no treatment were discussed with the patient. The common side effects of these medications were also explained to the patient. Informed verbal consent was obtained. Goals of current treatment regimen include improvement in pain, restoration of functioning- with focus on improvement in physical performance, general activity, work or disability,emotional distress, health care utilization and  decreased medication consumption. Will continue to monitor progress towards achieving/maintaining therapeutic goals with special emphasis on  1. Improvement in perceived interfernce  of pain with ADL's. Ability to do home exercises independently.  Ability to do household chores indoor and/or outdoor work and social and leisure activities. Improve psychosocial and physical functioning. - he is showing progression towards this treatment goal with the current regimen. He was advised against drinking alcohol with the narcotic pain medicines, advised against driving or handling machinery while adjusting the dose of medicines or if having cognitive  issues related to the current medications. Risk of overdose and death, if medicines not taken as prescribed, were also discussed. If the patient develops new symptoms or if the symptoms worsen, the patient should call the office. While transcribing every attempt was made to maintain the accuracy of the note in terms of it's contents,there may have been some errors made inadvertently. Thank you for allowing me to participate in the care of this patient.     Florencia Thompson MD.    Cc: Castillo Chin MD

## 2022-08-25 ENCOUNTER — OFFICE VISIT (OUTPATIENT)
Dept: PAIN MANAGEMENT | Age: 47
End: 2022-08-25
Payer: MEDICARE

## 2022-08-25 VITALS
SYSTOLIC BLOOD PRESSURE: 112 MMHG | HEART RATE: 115 BPM | BODY MASS INDEX: 25.29 KG/M2 | WEIGHT: 197 LBS | DIASTOLIC BLOOD PRESSURE: 77 MMHG | OXYGEN SATURATION: 99 %

## 2022-08-25 DIAGNOSIS — M79.18 MYOFASCIAL PAIN: ICD-10-CM

## 2022-08-25 DIAGNOSIS — M79.2 NEUROPATHIC PAIN: ICD-10-CM

## 2022-08-25 DIAGNOSIS — M80.862S PATHOLOGICAL FRACTURE OF LEFT TIBIA DUE TO OTHER OSTEOPOROSIS, SEQUELA: ICD-10-CM

## 2022-08-25 DIAGNOSIS — G62.9 PERIPHERAL POLYNEUROPATHY: ICD-10-CM

## 2022-08-25 DIAGNOSIS — L97.522 ULCER OF LEFT FOOT, WITH FAT LAYER EXPOSED (HCC): ICD-10-CM

## 2022-08-25 DIAGNOSIS — G89.4 CHRONIC PAIN SYNDROME: ICD-10-CM

## 2022-08-25 DIAGNOSIS — G63 POLYNEUROPATHY ASSOCIATED WITH UNDERLYING DISEASE (HCC): ICD-10-CM

## 2022-08-25 PROCEDURE — 99213 OFFICE O/P EST LOW 20 MIN: CPT | Performed by: INTERNAL MEDICINE

## 2022-08-25 RX ORDER — ERENUMAB-AOOE 70 MG/ML
INJECTION SUBCUTANEOUS
Qty: 1 PEN | Refills: 1 | Status: SHIPPED | OUTPATIENT
Start: 2022-08-25 | End: 2022-09-22 | Stop reason: SDUPTHER

## 2022-08-25 RX ORDER — GABAPENTIN 400 MG/1
400 CAPSULE ORAL 2 TIMES DAILY
Qty: 60 CAPSULE | Refills: 1 | Status: SHIPPED | OUTPATIENT
Start: 2022-08-25 | End: 2022-09-22 | Stop reason: SDUPTHER

## 2022-08-25 RX ORDER — TOPIRAMATE 100 MG/1
100 TABLET, FILM COATED ORAL 2 TIMES DAILY
Qty: 60 TABLET | Refills: 1 | Status: SHIPPED | OUTPATIENT
Start: 2022-08-25 | End: 2022-09-22 | Stop reason: SDUPTHER

## 2022-08-25 RX ORDER — TRAMADOL HYDROCHLORIDE 50 MG/1
50 TABLET ORAL EVERY 6 HOURS PRN
Qty: 56 TABLET | Refills: 0 | Status: SHIPPED | OUTPATIENT
Start: 2022-08-25 | End: 2022-09-22 | Stop reason: SDUPTHER

## 2022-08-25 RX ORDER — DULOXETIN HYDROCHLORIDE 60 MG/1
60 CAPSULE, DELAYED RELEASE ORAL DAILY
Qty: 30 CAPSULE | Refills: 1 | Status: SHIPPED | OUTPATIENT
Start: 2022-08-25 | End: 2022-09-22 | Stop reason: SDUPTHER

## 2022-08-25 NOTE — PROGRESS NOTES
Christiano Gonzalez  1975  [id number]      HISTORY OF PRESENT ILLNESS:  Mr. Jose Luis Berg is a 42 y.o. male returns for a follow up visit for pain management  He has a diagnosis of   1. Chronic pain syndrome    2. Ulcer of left foot, with fat layer exposed ([facility] [address]    3. Peripheral polyneuropathy    4. Myofascial pain    5. Polyneuropathy associated with underlying disease ([address]    6. Neuropathic pain    7. Pathological fracture of left tibia due to other osteoporosis, sequela    8. Chronic migraine without aura, with intractable migraine, so stated, with status migrainosus    9. Primary insomnia    10. Constipation due to opioid therapy    11. Recurrent major depressive disorder, in partial remission ([facility] [address]    . He complains of pain in the  Neck, lower back, bilatreal hadns, left shin, left ankle and foot. He rates the pain 10/10 and describes it as sharp, aching, burning, numbness, pins and needles. Current treatment regimen has helped relieve about 10% of the pain. He denies any side effects from the current pain regimen. Patient reports that since the last follow up visit the physical functioning is unchanged, family/social relationships are unchanged, mood is unchanged sleep patterns are unchanged, and that the overall functioning is unchanged. Patient denies misusing/abusing his narcotic pain medications or using any illegal drugs. There are No indicators for possible drug abuse, addiction or diversion problems. Patient reports he is doing fair and has been getting more sleep. He mentions he has been using Neurontin along with Ultram 1-2 per day, which is helping with the pain. He states the Aimovig is helping with the migraine headaches. ALLERGIES: Patients list of allergies were reviewed     MEDICATIONS: Mr. Jose Luis Berg list of medications were reviewed. His current medications are   Outpatient Medications Prior to Visit   Medication Sig Dispense Refill    APIDRA 100 UNIT/ML injection INJECT 8 TO 12 UNITS UNDER THE SKIN THREE TIMES A DAY WITH MEALS PER SLIDING SCALE 10 mL 3    Insulin Syringe-Needle U-100 (BD INSULIN SYRINGE U/F) 31G X 5/16\" 0.3 ML MISC USE ONE SYRINGE FOUR TIMES A DAY BEFORE MEALS AND ONCE NIGHTLY 120 each 9    LANTUS 100 UNIT/ML injection vial INJECT 15 UNITS UNDER THE SKIN twice daily before breakfast and nightly. Hold nightly dose if PM BG<90 30 mL 3    Insulin Syringe-Needle U-100 (B-D INS SYR ULTRAFINE 1CC/31G) 31G X 5/16\" 1 ML MISC INJECT USING INSULIN ONCE DAILY 30 each 10    GVOKE HYPOPEN 2-PACK 1 MG/0.2ML SOAJ INJECT AS NEEDED FOR LOW BLOOD SUGAR 0.4 mL 3    dicloxacillin (DYNAPEN) 500 MG capsule TAKE ONE CAPSULE BY MOUTH TWICE A DAY 60 capsule 11    Continuous Blood Gluc Sensor (DEXCOM G6 SENSOR) MISC USE ONE SENSOR AND CHANGE EVERY 10 DAYS 2 each 3    Diabetic Shoe MISC by Does not apply route 1 each 0    Continuous Blood Gluc  (DEXCOM G6 ) LINDA USE AS NEEDED TO CHECK BLOOD SUGAR 1 each 2    aspirin 81 MG chewable tablet Take 1 tablet by mouth daily 30 tablet 0    prednisoLONE acetate (PRED FORTE) 1 % ophthalmic suspension Place 1 drop into the right eye three times daily 3 times daily x 1 week then 2x daily x2 weeks, then 1 daily thereafter       ketorolac (ACULAR) 0.5 % ophthalmic solution Place 1 drop into the right eye 3 times daily 3 times daily x 1week, then 2 x daily x 2 weeks, then 1 daily thereafter      Continuous Blood Gluc Transmit (DEXCOM G6 TRANSMITTER) MISC USE TO CHECK BLOOD SUGAR 2 each 3    blood glucose monitor kit and supplies Dispense sufficient amount for indicated testing frequency plus additional to accommodate PRN testing needs. Dispense all needed supplies to include: monitor, strips, lancing device, lancets, control solutions, alcohol swabs. 1 kit 0    blood glucose test strips (ACCU-CHEK CAPO PLUS) strip 4 times a day As needed. 150 each 3    Handicap Placard MISC by Does not apply route Diagnosis: Type 1 diabetes.      Expires 4/13/23. 1 each 0 blood glucose test strips (ACCU-CHEK CAPO PLUS) strip 1 each by In Vitro route daily Test blood glucose 10 times daily Dx Code E10.8 300 each 10    blood glucose test strips (ACCU-CHEK CAPO PLUS) strip USE ONE STRIP TO TEST THREE TIMES A  strip 4    Accu-Chek FastClix Lancets MISC 1 each by Does not apply route daily Test blood glucose 10 times daily Dx Code E 10.8 900 each 2    triamcinolone (KENALOG) 0.1 % ointment Apply to thickened affected areas PRN sparingly for flares no longer than 2 weeks at one time, do not apply to cleared skin 80 g 0    Insulin Syringe-Needle U-100 (BD INSULIN SYRINGE U/F) 31G X 5/16\" 0.5 ML MISC Inject 1 each into the skin 4 times daily DX CODE E 10.22 120 each 9    fluticasone (FLONASE) 50 MCG/ACT nasal spray SPRAY TWO SPRAYS IN EACH NOSTRIL DAILY (Patient taking differently: daily as needed) 16 g 4    Blood Glucose Monitoring Suppl (ACCU-CHEK CAPO PLUS) w/Device KIT 1 each by Does not apply route daily Test blood sugar 10 times daily Dx code E 10.8 1 kit 10    GLUCAGON EMERGENCY 1 MG injection INJECT 1MG INTO THE MUSCLE AS NEEDED 1 kit 3    Multiple Vitamin (DAILY KITTY) TABS TAKE ONE TABLET BY MOUTH DAILY 30 tablet 5    Cholecalciferol (VITAMIN D3) 5000 units CAPS Take 1 capsule by mouth Daily 30 capsule 3    Dextromethorphan-Guaifenesin (MUCINEX DM)  MG TB12 Cough and congestion. (Patient taking differently: as needed Cough and congestion.) 60 tablet 1    omega-3 acid ethyl esters (LOVAZA) 1 G capsule TAKE TWO CAPSULES BY MOUTH TWICE DAILY 120 capsule 5    MUCUS RELIEF DM  MG TABS TAKE ONE BY MOUTH DAILY AS NEEDED 30 tablet 1    Alcohol Swabs PADS Use as needed for insulin injection.  100 each 5    Probiotic Product (ACIDOPHILUS PROBIOTIC) CAPS capsule TAKE ONE CAPSULE BY MOUTH DAILY 28 capsule 4    polyvinyl alcohol (LIQUIFILM TEARS) 1.4 % ophthalmic solution 1 drop as needed      propranolol (INDERAL) 80 MG tablet Take 40 mg by mouth 2 times daily For migraines       Flaxseed, Linseed, (FLAX PO) Take 14 g by mouth daily as needed       SUMAtriptan (IMITREX) 50 MG tablet Take one tab po at the start of migraine PRN max 1 every 24 hours 9 tablet 1    Erenumab-aooe (AIMOVIG, 140 MG DOSE,) 70 MG/ML SOAJ Inject 140 mLs into the skin every 30 days (Patient not taking: Reported on 8/25/2022) 1 pen 1    DULoxetine (CYMBALTA) 60 MG extended release capsule Take 1 capsule by mouth in the morning. 30 capsule 1    topiramate (TOPAMAX) 100 MG tablet Take 1 tablet by mouth in the morning and 1 tablet before bedtime. 60 tablet 1    traMADol (ULTRAM) 50 MG tablet Take 1 tablet by mouth every 6 hours as needed for Pain (max 1-2 per day) for up to 28 days. 56 tablet 0    gabapentin (NEURONTIN) 400 MG capsule Take 1 capsule by mouth in the morning and 1 capsule before bedtime. Do all this for 30 days. 60 capsule 1    cefadroxil (DURICEF) 500 MG capsule TAKE ONE CAPSULE BY MOUTH DAILY 30 capsule 5    pantoprazole (PROTONIX) 40 MG tablet Take 1 tablet by mouth 2 times daily (Patient not taking: Reported on 8/25/2022) 60 tablet 1    prednisoLONE sodium phosphate (INFLAMASE FORTE) 1 % ophthalmic solution 1 drop 4 times daily       potassium chloride (KLOR-CON M) 10 MEQ extended release tablet Take 1 tablet by mouth 2 times daily 60 tablet 0    hydrocortisone 2.5 % cream Apply topically 2 times daily. (Patient not taking: Reported on 8/25/2022) 30 g 0     No facility-administered medications prior to visit. REVIEW OF SYSTEMS:    Respiratory: Negative for apnea, chest tightness and shortness of breath or change in baseline breathing. PHYSICAL EXAM:   Nursing note and vitals reviewed. /77   Pulse (!) 115   Wt 197 lb (89.4 kg)   SpO2 99%   BMI 25.29 kg/m²   Constitutional: He appears well-developed and well-nourished. No acute distress. Cardiovascular: Normal rate, regular rhythm, normal heart sounds, and does not have murmur.      Pulmonary/Chest: Effort normal. No respiratory distress. He does not have wheezes in the lung fields. He has no rales. Neurological/Psychiatric:He is alert and oriented to person, place, and time. Coordination is  normal.  His mood isAppropriate and affect is Neutral/Euthymic(normal) . Other: using a cane     IMPRESSION:   1. Chronic pain syndrome    2. Ulcer of left foot, with fat layer exposed ([address]    3. Peripheral polyneuropathy    4. Myofascial pain    5. Polyneuropathy associated with underlying disease ([address]    6. Neuropathic pain    7. Pathological fracture of left tibia due to other osteoporosis, sequela        PLAN:  Informed verbal consent was obtained  -ROM/Stretching exercises as advised   -He was advised to increase fluids ( 5-7  glasses of fluid daily), limit caffeine, avoid cheese products, increase dietary fiber, increase activity and exercise as tolerated and relax regularly and enjoy meals   -Continue with Ultram 1-2 per day along with the other adjuvants   -Monitor blood sugar regularly, diabetic control- adv diabetic diet. Goal for fasting blood sugars around 120. Follow up with Endocrinologist/PCP also for on going management     Current Outpatient Medications   Medication Sig Dispense Refill    traMADol (ULTRAM) 50 MG tablet Take 1 tablet by mouth every 6 hours as needed for Pain (max 1-2 per day) for up to 28 days. 56 tablet 0    gabapentin (NEURONTIN) 400 MG capsule Take 1 capsule by mouth 2 times daily for 30 days.  60 capsule 1    topiramate (TOPAMAX) 100 MG tablet Take 1 tablet by mouth 2 times daily 60 tablet 1    Erenumab-aooe (AIMOVIG, 140 MG DOSE,) 70 MG/ML SOAJ Inject 140 mLs into the skin every 30 days 1 pen 1    DULoxetine (CYMBALTA) 60 MG extended release capsule Take 1 capsule by mouth daily 30 capsule 1    APIDRA 100 UNIT/ML injection INJECT 8 TO 12 UNITS UNDER THE SKIN THREE TIMES A DAY WITH MEALS PER SLIDING SCALE 10 mL 3    Insulin Syringe-Needle U-100 (BD INSULIN SYRINGE U/F) 31G X 5/16\" 0.3 ML MISC USE ONE SYRINGE FOUR TIMES A DAY BEFORE MEALS AND ONCE NIGHTLY 120 each 9    LANTUS 100 UNIT/ML injection vial INJECT 15 UNITS UNDER THE SKIN twice daily before breakfast and nightly. Hold nightly dose if PM BG<90 30 mL 3    Insulin Syringe-Needle U-100 (B-D INS SYR ULTRAFINE 1CC/31G) 31G X 5/16\" 1 ML MISC INJECT USING INSULIN ONCE DAILY 30 each 10    GVOKE HYPOPEN 2-PACK 1 MG/0.2ML SOAJ INJECT AS NEEDED FOR LOW BLOOD SUGAR 0.4 mL 3    dicloxacillin (DYNAPEN) 500 MG capsule TAKE ONE CAPSULE BY MOUTH TWICE A DAY 60 capsule 11    Continuous Blood Gluc Sensor (DEXCOM G6 SENSOR) MISC USE ONE SENSOR AND CHANGE EVERY 10 DAYS 2 each 3    Diabetic Shoe MISC by Does not apply route 1 each 0    Continuous Blood Gluc  (DEXCOM G6 ) LINDA USE AS NEEDED TO CHECK BLOOD SUGAR 1 each 2    aspirin 81 MG chewable tablet Take 1 tablet by mouth daily 30 tablet 0    prednisoLONE acetate (PRED FORTE) 1 % ophthalmic suspension Place 1 drop into the right eye three times daily 3 times daily x 1 week then 2x daily x2 weeks, then 1 daily thereafter       ketorolac (ACULAR) 0.5 % ophthalmic solution Place 1 drop into the right eye 3 times daily 3 times daily x 1week, then 2 x daily x 2 weeks, then 1 daily thereafter      Continuous Blood Gluc Transmit (DEXCOM G6 TRANSMITTER) MISC USE TO CHECK BLOOD SUGAR 2 each 3    blood glucose monitor kit and supplies Dispense sufficient amount for indicated testing frequency plus additional to accommodate PRN testing needs. Dispense all needed supplies to include: monitor, strips, lancing device, lancets, control solutions, alcohol swabs. 1 kit 0    blood glucose test strips (ACCU-CHEK CAPO PLUS) strip 4 times a day As needed. 150 each 3    Handicap Placard MISC by Does not apply route Diagnosis: Type 1 diabetes.      Expires 4/13/23. 1 each 0    blood glucose test strips (ACCU-CHEK CAPO PLUS) strip 1 each by In Vitro route daily Test blood glucose 10 times daily Dx Code E10.8 300 each 10 blood glucose test strips (ACCU-CHEK CAPO PLUS) strip USE ONE STRIP TO TEST THREE TIMES A  strip 4    Accu-Chek FastClix Lancets MISC 1 each by Does not apply route daily Test blood glucose 10 times daily Dx Code E 10.8 900 each 2    triamcinolone (KENALOG) 0.1 % ointment Apply to thickened affected areas PRN sparingly for flares no longer than 2 weeks at one time, do not apply to cleared skin 80 g 0    Insulin Syringe-Needle U-100 (BD INSULIN SYRINGE U/F) 31G X 5/16\" 0.5 ML MISC Inject 1 each into the skin 4 times daily DX CODE E 10.22 120 each 9    fluticasone (FLONASE) 50 MCG/ACT nasal spray SPRAY TWO SPRAYS IN EACH NOSTRIL DAILY (Patient taking differently: daily as needed) 16 g 4    Blood Glucose Monitoring Suppl (ACCU-CHEK CAPO PLUS) w/Device KIT 1 each by Does not apply route daily Test blood sugar 10 times daily Dx code E 10.8 1 kit 10    GLUCAGON EMERGENCY 1 MG injection INJECT 1MG INTO THE MUSCLE AS NEEDED 1 kit 3    Multiple Vitamin (DAILY KITTY) TABS TAKE ONE TABLET BY MOUTH DAILY 30 tablet 5    Cholecalciferol (VITAMIN D3) 5000 units CAPS Take 1 capsule by mouth Daily 30 capsule 3    Dextromethorphan-Guaifenesin (MUCINEX DM)  MG TB12 Cough and congestion. (Patient taking differently: as needed Cough and congestion.) 60 tablet 1    omega-3 acid ethyl esters (LOVAZA) 1 G capsule TAKE TWO CAPSULES BY MOUTH TWICE DAILY 120 capsule 5    MUCUS RELIEF DM  MG TABS TAKE ONE BY MOUTH DAILY AS NEEDED 30 tablet 1    Alcohol Swabs PADS Use as needed for insulin injection.  100 each 5    Probiotic Product (ACIDOPHILUS PROBIOTIC) CAPS capsule TAKE ONE CAPSULE BY MOUTH DAILY 28 capsule 4    polyvinyl alcohol (LIQUIFILM TEARS) 1.4 % ophthalmic solution 1 drop as needed      propranolol (INDERAL) 80 MG tablet Take 40 mg by mouth 2 times daily For migraines       Flaxseed, Linseed, (FLAX PO) Take 14 g by mouth daily as needed       SUMAtriptan (IMITREX) 50 MG tablet Take one tab po at the start of migraine PRN max 1 every 24 hours 9 tablet 1    cefadroxil (DURICEF) 500 MG capsule TAKE ONE CAPSULE BY MOUTH DAILY 30 capsule 5    pantoprazole (PROTONIX) 40 MG tablet Take 1 tablet by mouth 2 times daily (Patient not taking: Reported on 8/25/2022) 60 tablet 1    prednisoLONE sodium phosphate (INFLAMASE FORTE) 1 % ophthalmic solution 1 drop 4 times daily       potassium chloride (KLOR-CON M) 10 MEQ extended release tablet Take 1 tablet by mouth 2 times daily 60 tablet 0    hydrocortisone 2.5 % cream Apply topically 2 times daily. (Patient not taking: Reported on 8/25/2022) 30 g 0     No current facility-administered medications for this visit. I will continue his current medication regimen  which is part of the above treatment schedule. It has been helping with Mr. Maritza Cheng chronic  medical problems which for this visit include:   Diagnoses of Chronic pain syndrome, Ulcer of left foot, with fat layer exposed ([address] Peripheral polyneuropathy, Myofascial pain, Polyneuropathy associated with underlying disease ([address] Neuropathic pain, Pathological fracture of left tibia due to other osteoporosis, sequela, Chronic migraine without aura, with intractable migraine, so stated, with status migrainosus, Primary insomnia, Constipation due to opioid therapy, Recurrent major depressive disorder, in partial remission ([address] and ERRONEOUS ENCOUNTER--DISREGARD were pertinent to this visit. Risks and benefits of the medications and other alternative treatments  including no treatment were discussed with the patient. The common side effects of these medications were also explained to the patient. Informed verbal consent was obtained. Goals of current treatment regimen include improvement in pain, restoration of functioning- with focus on improvement in physical performance, general activity, work or disability,emotional distress, health care utilization and  decreased medication consumption.  Will continue to monitor progress towards achieving/maintaining therapeutic goals with special emphasis on  1. Improvement in perceived interfernce  of pain with ADL's. Ability to do home exercises independently. Ability to do household chores indoor and/or outdoor work and social and leisure activities. Improve psychosocial and physical functioning. - he is showing progression towards this treatment goal with the current regimen. He was advised against drinking alcohol with the narcotic pain medicines, advised against driving or handling machinery while adjusting the dose of medicines or if having cognitive  issues related to the current medications. Risk of overdose and death, if medicines not taken as prescribed, were also discussed. If the patient develops new symptoms or if the symptoms worsen, the patient should call the office. While transcribing every attempt was made to maintain the accuracy of the note in terms of it's contents,there may have been some errors made inadvertently. Thank you for allowing me to participate in the care of this patient.     Gabrielle Paul MD.    Cc: Kimberly Schmidt MD

## 2022-09-22 ENCOUNTER — OFFICE VISIT (OUTPATIENT)
Dept: PAIN MANAGEMENT | Age: 47
End: 2022-09-22
Payer: MEDICARE

## 2022-09-22 VITALS
DIASTOLIC BLOOD PRESSURE: 70 MMHG | OXYGEN SATURATION: 99 % | BODY MASS INDEX: 25.81 KG/M2 | HEART RATE: 92 BPM | WEIGHT: 201 LBS | SYSTOLIC BLOOD PRESSURE: 112 MMHG

## 2022-09-22 DIAGNOSIS — L97.522 ULCER OF LEFT FOOT, WITH FAT LAYER EXPOSED (HCC): ICD-10-CM

## 2022-09-22 DIAGNOSIS — M80.862S PATHOLOGICAL FRACTURE OF LEFT TIBIA DUE TO OTHER OSTEOPOROSIS, SEQUELA: ICD-10-CM

## 2022-09-22 DIAGNOSIS — M79.18 MYOFASCIAL PAIN: ICD-10-CM

## 2022-09-22 DIAGNOSIS — G89.4 CHRONIC PAIN SYNDROME: ICD-10-CM

## 2022-09-22 DIAGNOSIS — G63 POLYNEUROPATHY ASSOCIATED WITH UNDERLYING DISEASE (HCC): ICD-10-CM

## 2022-09-22 DIAGNOSIS — M79.2 NEUROPATHIC PAIN: ICD-10-CM

## 2022-09-22 DIAGNOSIS — G62.9 PERIPHERAL POLYNEUROPATHY: ICD-10-CM

## 2022-09-22 PROCEDURE — 99213 OFFICE O/P EST LOW 20 MIN: CPT | Performed by: INTERNAL MEDICINE

## 2022-09-22 RX ORDER — GABAPENTIN 400 MG/1
400 CAPSULE ORAL 2 TIMES DAILY
Qty: 60 CAPSULE | Refills: 1 | Status: SHIPPED | OUTPATIENT
Start: 2022-09-22 | End: 2022-10-20 | Stop reason: SDUPTHER

## 2022-09-22 RX ORDER — DULOXETIN HYDROCHLORIDE 60 MG/1
60 CAPSULE, DELAYED RELEASE ORAL DAILY
Qty: 30 CAPSULE | Refills: 1 | Status: SHIPPED | OUTPATIENT
Start: 2022-09-22 | End: 2022-10-20 | Stop reason: SDUPTHER

## 2022-09-22 RX ORDER — TRAMADOL HYDROCHLORIDE 50 MG/1
50 TABLET ORAL EVERY 6 HOURS PRN
Qty: 56 TABLET | Refills: 0 | Status: SHIPPED | OUTPATIENT
Start: 2022-09-22 | End: 2022-10-20 | Stop reason: SDUPTHER

## 2022-09-22 RX ORDER — SUMATRIPTAN 50 MG/1
TABLET, FILM COATED ORAL
Qty: 9 TABLET | Refills: 1 | Status: SHIPPED | OUTPATIENT
Start: 2022-09-22

## 2022-09-22 RX ORDER — TOPIRAMATE 100 MG/1
100 TABLET, FILM COATED ORAL 2 TIMES DAILY
Qty: 60 TABLET | Refills: 1 | Status: SHIPPED | OUTPATIENT
Start: 2022-09-22 | End: 2022-10-20 | Stop reason: SDUPTHER

## 2022-09-22 RX ORDER — ERENUMAB-AOOE 70 MG/ML
INJECTION SUBCUTANEOUS
Qty: 1 ADJUSTABLE DOSE PRE-FILLED PEN SYRINGE | Refills: 1 | Status: SHIPPED | OUTPATIENT
Start: 2022-09-22 | End: 2022-10-20 | Stop reason: SDUPTHER

## 2022-09-28 NOTE — PROGRESS NOTES
THREE TIMES A DAY WITH MEALS PER SLIDING SCALE 10 mL 3    Insulin Syringe-Needle U-100 (BD INSULIN SYRINGE U/F) 31G X 5/16\" 0.3 ML MISC USE ONE SYRINGE FOUR TIMES A DAY BEFORE MEALS AND ONCE NIGHTLY 120 each 9    cefadroxil (DURICEF) 500 MG capsule TAKE ONE CAPSULE BY MOUTH DAILY 30 capsule 5    LANTUS 100 UNIT/ML injection vial INJECT 15 UNITS UNDER THE SKIN twice daily before breakfast and nightly. Hold nightly dose if PM BG<90 30 mL 3    Insulin Syringe-Needle U-100 (B-D INS SYR ULTRAFINE 1CC/31G) 31G X 5/16\" 1 ML MISC INJECT USING INSULIN ONCE DAILY 30 each 10    GVOKE HYPOPEN 2-PACK 1 MG/0.2ML SOAJ INJECT AS NEEDED FOR LOW BLOOD SUGAR 0.4 mL 3    dicloxacillin (DYNAPEN) 500 MG capsule TAKE ONE CAPSULE BY MOUTH TWICE A DAY 60 capsule 11    Continuous Blood Gluc Sensor (DEXCOM G6 SENSOR) MISC USE ONE SENSOR AND CHANGE EVERY 10 DAYS 2 each 3    Diabetic Shoe MISC by Does not apply route 1 each 0    Continuous Blood Gluc  (DEXCOM G6 ) LINDA USE AS NEEDED TO CHECK BLOOD SUGAR 1 each 2    aspirin 81 MG chewable tablet Take 1 tablet by mouth daily 30 tablet 0    pantoprazole (PROTONIX) 40 MG tablet Take 1 tablet by mouth 2 times daily 60 tablet 1    prednisoLONE sodium phosphate (INFLAMASE FORTE) 1 % ophthalmic solution 1 drop 4 times daily       prednisoLONE acetate (PRED FORTE) 1 % ophthalmic suspension Place 1 drop into the right eye three times daily 3 times daily x 1 week then 2x daily x2 weeks, then 1 daily thereafter       ketorolac (ACULAR) 0.5 % ophthalmic solution Place 1 drop into the right eye 3 times daily 3 times daily x 1week, then 2 x daily x 2 weeks, then 1 daily thereafter      Continuous Blood Gluc Transmit (DEXCOM G6 TRANSMITTER) MISC USE TO CHECK BLOOD SUGAR 2 each 3    blood glucose monitor kit and supplies Dispense sufficient amount for indicated testing frequency plus additional to accommodate PRN testing needs.  Dispense all needed supplies to include: monitor, strips, lancing device, lancets, control solutions, alcohol swabs. 1 kit 0    blood glucose test strips (ACCU-CHEK CAPO PLUS) strip 4 times a day As needed. 150 each 3    Handicap Placard MISC by Does not apply route Diagnosis: Type 1 diabetes. Expires 4/13/23. 1 each 0    blood glucose test strips (ACCU-CHEK CAPO PLUS) strip 1 each by In Vitro route daily Test blood glucose 10 times daily Dx Code E10.8 300 each 10    blood glucose test strips (ACCU-CHEK CAPO PLUS) strip USE ONE STRIP TO TEST THREE TIMES A  strip 4    Accu-Chek FastClix Lancets MISC 1 each by Does not apply route daily Test blood glucose 10 times daily Dx Code E 10.8 900 each 2    triamcinolone (KENALOG) 0.1 % ointment Apply to thickened affected areas PRN sparingly for flares no longer than 2 weeks at one time, do not apply to cleared skin 80 g 0    Insulin Syringe-Needle U-100 (BD INSULIN SYRINGE U/F) 31G X 5/16\" 0.5 ML MISC Inject 1 each into the skin 4 times daily DX CODE E 10.22 120 each 9    fluticasone (FLONASE) 50 MCG/ACT nasal spray SPRAY TWO SPRAYS IN EACH NOSTRIL DAILY (Patient taking differently: daily as needed) 16 g 4    hydrocortisone 2.5 % cream Apply topically 2 times daily. 30 g 0    Blood Glucose Monitoring Suppl (ACCU-CHEK CAPO PLUS) w/Device KIT 1 each by Does not apply route daily Test blood sugar 10 times daily Dx code E 10.8 1 kit 10    GLUCAGON EMERGENCY 1 MG injection INJECT 1MG INTO THE MUSCLE AS NEEDED 1 kit 3    Multiple Vitamin (DAILY KITTY) TABS TAKE ONE TABLET BY MOUTH DAILY 30 tablet 5    Cholecalciferol (VITAMIN D3) 5000 units CAPS Take 1 capsule by mouth Daily 30 capsule 3    Dextromethorphan-Guaifenesin (MUCINEX DM)  MG TB12 Cough and congestion.  (Patient taking differently: as needed Cough and congestion.) 60 tablet 1    omega-3 acid ethyl esters (LOVAZA) 1 G capsule TAKE TWO CAPSULES BY MOUTH TWICE DAILY 120 capsule 5    MUCUS RELIEF DM  MG TABS TAKE ONE BY MOUTH DAILY AS NEEDED 30 tablet 1    Alcohol Swabs PADS Use as needed for insulin injection. 100 each 5    Probiotic Product (ACIDOPHILUS PROBIOTIC) CAPS capsule TAKE ONE CAPSULE BY MOUTH DAILY 28 capsule 4    polyvinyl alcohol (LIQUIFILM TEARS) 1.4 % ophthalmic solution 1 drop as needed      propranolol (INDERAL) 80 MG tablet Take 40 mg by mouth 2 times daily For migraines       Flaxseed, Linseed, (FLAX PO) Take 14 g by mouth daily as needed       traMADol (ULTRAM) 50 MG tablet Take 1 tablet by mouth every 6 hours as needed for Pain (max 1-2 per day) for up to 28 days. 56 tablet 0    gabapentin (NEURONTIN) 400 MG capsule Take 1 capsule by mouth 2 times daily for 30 days. 60 capsule 1    topiramate (TOPAMAX) 100 MG tablet Take 1 tablet by mouth 2 times daily 60 tablet 1    Erenumab-aooe (AIMOVIG, 140 MG DOSE,) 70 MG/ML SOAJ Inject 140 mLs into the skin every 30 days 1 pen 1    DULoxetine (CYMBALTA) 60 MG extended release capsule Take 1 capsule by mouth daily 30 capsule 1    SUMAtriptan (IMITREX) 50 MG tablet Take one tab po at the start of migraine PRN max 1 every 24 hours 9 tablet 1    potassium chloride (KLOR-CON M) 10 MEQ extended release tablet Take 1 tablet by mouth 2 times daily 60 tablet 0     No facility-administered medications prior to visit. REVIEW OF SYSTEMS:    Respiratory: Negative for apnea, chest tightness and shortness of breath or change in baseline breathing. PHYSICAL EXAM:   Nursing note and vitals reviewed. /70   Pulse 92   Wt 201 lb (91.2 kg)   SpO2 99%   BMI 25.81 kg/m²   Constitutional: He appears well-developed and well-nourished. No acute distress. Cardiovascular: Normal rate, regular rhythm, normal heart sounds, and does not have murmur. Pulmonary/Chest: Effort normal. No respiratory distress. He does not have wheezes in the lung fields. He has no rales. Neurological/Psychiatric:He is alert and oriented to person, place, and time.  Coordination is  normal.  His mood isAppropriate and affect is Neutral/Euthymic(normal) . His    IMPRESSION:   1. Chronic pain syndrome    2. Myofascial pain    3. Polyneuropathy associated with underlying disease ([address]    4. Peripheral polyneuropathy    5. Neuropathic pain    6. Ulcer of left foot, with fat layer exposed ([address]    7. Pathological fracture of left tibia due to other osteoporosis, sequela        PLAN:  Informed verbal consent was obtained  -ROM/Stretching exercises as advised   -Continue with Ultram 1-2 per day  -He was advised to increase fluids ( 5-7  glasses of fluid daily), limit caffeine, avoid cheese products, increase dietary fiber, increase activity and exercise as tolerated and relax regularly and enjoy meals   -Continue with all other adjuvant medications as before    Current Outpatient Medications   Medication Sig Dispense Refill    traMADol (ULTRAM) 50 MG tablet Take 1 tablet by mouth every 6 hours as needed for Pain (max 1-2 per day) for up to 28 days. 56 tablet 0    gabapentin (NEURONTIN) 400 MG capsule Take 1 capsule by mouth 2 times daily for 30 days.  60 capsule 1    topiramate (TOPAMAX) 100 MG tablet Take 1 tablet by mouth 2 times daily 60 tablet 1    Erenumab-aooe (AIMOVIG, 140 MG DOSE,) 70 MG/ML SOAJ Inject 140 mLs into the skin every 30 days 1 Adjustable Dose Pre-filled Pen Syringe 1    DULoxetine (CYMBALTA) 60 MG extended release capsule Take 1 capsule by mouth daily 30 capsule 1    SUMAtriptan (IMITREX) 50 MG tablet Take one tab po at the start of migraine PRN max 1 every 24 hours 9 tablet 1    APIDRA 100 UNIT/ML injection INJECT 8 TO 12 UNITS UNDER THE SKIN THREE TIMES A DAY WITH MEALS PER SLIDING SCALE 10 mL 3    Insulin Syringe-Needle U-100 (BD INSULIN SYRINGE U/F) 31G X 5/16\" 0.3 ML MISC USE ONE SYRINGE FOUR TIMES A DAY BEFORE MEALS AND ONCE NIGHTLY 120 each 9    cefadroxil (DURICEF) 500 MG capsule TAKE ONE CAPSULE BY MOUTH DAILY 30 capsule 5    LANTUS 100 UNIT/ML injection vial INJECT 15 UNITS UNDER THE SKIN twice daily before breakfast and nightly. Hold nightly dose if PM BG<90 30 mL 3    Insulin Syringe-Needle U-100 (B-D INS SYR ULTRAFINE 1CC/31G) 31G X 5/16\" 1 ML MISC INJECT USING INSULIN ONCE DAILY 30 each 10    GVOKE HYPOPEN 2-PACK 1 MG/0.2ML SOAJ INJECT AS NEEDED FOR LOW BLOOD SUGAR 0.4 mL 3    dicloxacillin (DYNAPEN) 500 MG capsule TAKE ONE CAPSULE BY MOUTH TWICE A DAY 60 capsule 11    Continuous Blood Gluc Sensor (DEXCOM G6 SENSOR) MISC USE ONE SENSOR AND CHANGE EVERY 10 DAYS 2 each 3    Diabetic Shoe MISC by Does not apply route 1 each 0    Continuous Blood Gluc  (DEXCOM G6 ) LINDA USE AS NEEDED TO CHECK BLOOD SUGAR 1 each 2    aspirin 81 MG chewable tablet Take 1 tablet by mouth daily 30 tablet 0    pantoprazole (PROTONIX) 40 MG tablet Take 1 tablet by mouth 2 times daily 60 tablet 1    prednisoLONE sodium phosphate (INFLAMASE FORTE) 1 % ophthalmic solution 1 drop 4 times daily       prednisoLONE acetate (PRED FORTE) 1 % ophthalmic suspension Place 1 drop into the right eye three times daily 3 times daily x 1 week then 2x daily x2 weeks, then 1 daily thereafter       ketorolac (ACULAR) 0.5 % ophthalmic solution Place 1 drop into the right eye 3 times daily 3 times daily x 1week, then 2 x daily x 2 weeks, then 1 daily thereafter      Continuous Blood Gluc Transmit (DEXCOM G6 TRANSMITTER) MISC USE TO CHECK BLOOD SUGAR 2 each 3    blood glucose monitor kit and supplies Dispense sufficient amount for indicated testing frequency plus additional to accommodate PRN testing needs. Dispense all needed supplies to include: monitor, strips, lancing device, lancets, control solutions, alcohol swabs. 1 kit 0    blood glucose test strips (ACCU-CHEK CAPO PLUS) strip 4 times a day As needed. 150 each 3    Handicap Placard MISC by Does not apply route Diagnosis: Type 1 diabetes.      Expires 4/13/23. 1 each 0    blood glucose test strips (ACCU-CHEK CAPO PLUS) strip 1 each by In Vitro route daily Test blood glucose 10 times daily Dx Code E10.8 300 each 10    blood glucose test strips (ACCU-CHEK CAPO PLUS) strip USE ONE STRIP TO TEST THREE TIMES A  strip 4    Accu-Chek FastClix Lancets MISC 1 each by Does not apply route daily Test blood glucose 10 times daily Dx Code E 10.8 900 each 2    triamcinolone (KENALOG) 0.1 % ointment Apply to thickened affected areas PRN sparingly for flares no longer than 2 weeks at one time, do not apply to cleared skin 80 g 0    Insulin Syringe-Needle U-100 (BD INSULIN SYRINGE U/F) 31G X 5/16\" 0.5 ML MISC Inject 1 each into the skin 4 times daily DX CODE E 10.22 120 each 9    fluticasone (FLONASE) 50 MCG/ACT nasal spray SPRAY TWO SPRAYS IN EACH NOSTRIL DAILY (Patient taking differently: daily as needed) 16 g 4    hydrocortisone 2.5 % cream Apply topically 2 times daily. 30 g 0    Blood Glucose Monitoring Suppl (ACCU-CHEK CAPO PLUS) w/Device KIT 1 each by Does not apply route daily Test blood sugar 10 times daily Dx code E 10.8 1 kit 10    GLUCAGON EMERGENCY 1 MG injection INJECT 1MG INTO THE MUSCLE AS NEEDED 1 kit 3    Multiple Vitamin (DAILY KITTY) TABS TAKE ONE TABLET BY MOUTH DAILY 30 tablet 5    Cholecalciferol (VITAMIN D3) 5000 units CAPS Take 1 capsule by mouth Daily 30 capsule 3    Dextromethorphan-Guaifenesin (MUCINEX DM)  MG TB12 Cough and congestion. (Patient taking differently: as needed Cough and congestion.) 60 tablet 1    omega-3 acid ethyl esters (LOVAZA) 1 G capsule TAKE TWO CAPSULES BY MOUTH TWICE DAILY 120 capsule 5    MUCUS RELIEF DM  MG TABS TAKE ONE BY MOUTH DAILY AS NEEDED 30 tablet 1    Alcohol Swabs PADS Use as needed for insulin injection.  100 each 5    Probiotic Product (ACIDOPHILUS PROBIOTIC) CAPS capsule TAKE ONE CAPSULE BY MOUTH DAILY 28 capsule 4    polyvinyl alcohol (LIQUIFILM TEARS) 1.4 % ophthalmic solution 1 drop as needed      propranolol (INDERAL) 80 MG tablet Take 40 mg by mouth 2 times daily For migraines       Flaxseed, Linseed, (FLAX PO) Take 14 g by mouth daily against driving or handling machinery while adjusting the dose of medicines or if having cognitive  issues related to the current medications. Risk of overdose and death, if medicines not taken as prescribed, were also discussed. If the patient develops new symptoms or if the symptoms worsen, the patient should call the office. While transcribing every attempt was made to maintain the accuracy of the note in terms of it's contents,there may have been some errors made inadvertently. Thank you for allowing me to participate in the care of this patient.     Gabrielle Paul MD.    Cc: Kimberly Schmidt MD

## 2022-10-18 DIAGNOSIS — N18.31 TYPE 1 DIABETES MELLITUS WITH STAGE 3A CHRONIC KIDNEY DISEASE (HCC): ICD-10-CM

## 2022-10-18 DIAGNOSIS — E10.22 TYPE 1 DIABETES MELLITUS WITH STAGE 3A CHRONIC KIDNEY DISEASE (HCC): ICD-10-CM

## 2022-10-18 RX ORDER — INSULIN GLULISINE 100 [IU]/ML
INJECTION, SOLUTION SUBCUTANEOUS
Qty: 10 ADJUSTABLE DOSE PRE-FILLED PEN SYRINGE | Refills: 1 | Status: SHIPPED | OUTPATIENT
Start: 2022-10-18

## 2022-10-18 NOTE — TELEPHONE ENCOUNTER
Medication:   Requested Prescriptions     Pending Prescriptions Disp Refills    APIDRA 100 UNIT/ML injection [Pharmacy Med Name: APIDRA 100 UNIT/ML VIAL] 10 Adjustable Dose Pre-filled Pen Syringe 1     Sig: INJECT 8 TO 12 UNITS UNDER THE SKIN THREE TIMES A DAY WITH MEALS PER SLIDING SCALE       Last Filled:      Patient Phone Number: [phone] (home)     Last appt: 2/8/2022   Next appt: 12/6/2022    Last Labs DM:   Lab Results   Component Value Date/Time    LABA1C 8.2 02/08/2022 04:16 PM

## 2022-10-20 ENCOUNTER — OFFICE VISIT (OUTPATIENT)
Dept: PAIN MANAGEMENT | Age: 47
End: 2022-10-20
Payer: MEDICARE

## 2022-10-20 VITALS
SYSTOLIC BLOOD PRESSURE: 127 MMHG | DIASTOLIC BLOOD PRESSURE: 83 MMHG | HEART RATE: 91 BPM | WEIGHT: 203 LBS | BODY MASS INDEX: 26.06 KG/M2

## 2022-10-20 DIAGNOSIS — M79.18 MYOFASCIAL PAIN: ICD-10-CM

## 2022-10-20 DIAGNOSIS — F33.41 RECURRENT MAJOR DEPRESSIVE DISORDER, IN PARTIAL REMISSION (HCC): ICD-10-CM

## 2022-10-20 DIAGNOSIS — F51.01 PRIMARY INSOMNIA: ICD-10-CM

## 2022-10-20 DIAGNOSIS — G89.4 CHRONIC PAIN SYNDROME: ICD-10-CM

## 2022-10-20 DIAGNOSIS — T40.2X5A CONSTIPATION DUE TO OPIOID THERAPY: ICD-10-CM

## 2022-10-20 DIAGNOSIS — G63 POLYNEUROPATHY ASSOCIATED WITH UNDERLYING DISEASE (HCC): ICD-10-CM

## 2022-10-20 DIAGNOSIS — L97.522 ULCER OF LEFT FOOT, WITH FAT LAYER EXPOSED (HCC): ICD-10-CM

## 2022-10-20 DIAGNOSIS — G43.711 CHRONIC MIGRAINE WITHOUT AURA, WITH INTRACTABLE MIGRAINE, SO STATED, WITH STATUS MIGRAINOSUS: ICD-10-CM

## 2022-10-20 DIAGNOSIS — M79.2 NEUROPATHIC PAIN: ICD-10-CM

## 2022-10-20 DIAGNOSIS — K59.03 CONSTIPATION DUE TO OPIOID THERAPY: ICD-10-CM

## 2022-10-20 DIAGNOSIS — G62.9 PERIPHERAL POLYNEUROPATHY: ICD-10-CM

## 2022-10-20 DIAGNOSIS — M80.862S PATHOLOGICAL FRACTURE OF LEFT TIBIA DUE TO OTHER OSTEOPOROSIS, SEQUELA: ICD-10-CM

## 2022-10-20 PROCEDURE — 99214 OFFICE O/P EST MOD 30 MIN: CPT | Performed by: INTERNAL MEDICINE

## 2022-10-20 RX ORDER — ERENUMAB-AOOE 70 MG/ML
INJECTION SUBCUTANEOUS
Qty: 1 ADJUSTABLE DOSE PRE-FILLED PEN SYRINGE | Refills: 1 | Status: SHIPPED | OUTPATIENT
Start: 2022-10-20

## 2022-10-20 RX ORDER — DULOXETIN HYDROCHLORIDE 60 MG/1
60 CAPSULE, DELAYED RELEASE ORAL DAILY
Qty: 30 CAPSULE | Refills: 1 | Status: SHIPPED | OUTPATIENT
Start: 2022-10-20

## 2022-10-20 RX ORDER — TOPIRAMATE 100 MG/1
100 TABLET, FILM COATED ORAL 2 TIMES DAILY
Qty: 60 TABLET | Refills: 1 | Status: SHIPPED | OUTPATIENT
Start: 2022-10-20

## 2022-10-20 RX ORDER — TRAMADOL HYDROCHLORIDE 50 MG/1
50 TABLET ORAL 2 TIMES DAILY PRN
Qty: 84 TABLET | Refills: 0 | Status: SHIPPED | OUTPATIENT
Start: 2022-10-20 | End: 2022-12-01

## 2022-10-20 RX ORDER — GABAPENTIN 400 MG/1
400 CAPSULE ORAL 2 TIMES DAILY
Qty: 60 CAPSULE | Refills: 1 | Status: SHIPPED | OUTPATIENT
Start: 2022-10-20 | End: 2022-11-19

## 2022-10-20 RX ORDER — RIMEGEPANT SULFATE 75 MG/75MG
TABLET, ORALLY DISINTEGRATING ORAL
Qty: 16 TABLET | Refills: 1 | Status: SHIPPED | OUTPATIENT
Start: 2022-10-20

## 2022-10-20 NOTE — PROGRESS NOTES
Alyssa Jethro  1975  [id number]    HISTORY OF PRESENT ILLNESS:  Mr. Ashlie Brambila is a 41 y.o. male returns for a follow up visit for multiple medical problems. His  presenting problems are   1. Chronic pain syndrome    2. Myofascial pain    3. Polyneuropathy associated with underlying disease ([facility] [address]    4. Peripheral polyneuropathy    5. Neuropathic pain    6. Ulcer of left foot, with fat layer exposed ([address]    7. Pathological fracture of left tibia due to other osteoporosis, sequela    8. Chronic migraine without aura, with intractable migraine, so stated, with status migrainosus    9. Primary insomnia    10. Constipation due to opioid therapy    11. Recurrent major depressive disorder, in partial remission ([facility] [address]    . As per information/history obtained from the PADT(patient assessment and documentation tool) -  He complains of pain in the head, neck, elbows Bilateral, upper back, mid back, lower back, and knees Bilateral with radiation to the shoulders Bilateral, arms Bilateral, hands Bilateral, lower leg Left, ankles Left, and feet Left He rates the pain 10/10 and describes it as sharp, aching, burning, numbness, pins and needles. Pain is made worse by: movement, walking, standing, bending, lifting. Current treatment regimen has helped relieve about 10% of the pain. He denies side effects from the current pain regimen. Patient reports that since the last follow up visit the physical functioning is unchanged, family/social relationships are better, mood is unchanged and sleep patterns are better, and that the overall functioning is unchanged. Patient denies neurological bowel or bladder. Patient denies misusing/abusing his narcotic pain medications or using any illegal drugs. There are No indicators for possible drug abuse, addiction or diversion problems.    Upon obtaining the medical history from Mr. Ashlie Brambila regarding today's office visit for his presenting problems, patient states he has been doing fair, he is managing with the medications. Mr. Bernie Aguillon mentions he is using Ultram 2 per day, he denies any side effects with the medications. He mentions he is using Topamax and Aimovig which is helping with headaches, he states his headaches are getting worse. He complains of increased pain with changes in weather. Extreme temperatures- cold and damp weather causes increased pain. Patient states his sleep is fair. Has fairly normal sleep latency. Averages about 4-6 hours of sleep a night. Denies any signs of sleep apnea. Feels somewhat rested in the morning. Patient's  subjective report of his mood is fair. he describes occasional symptoms of depression, occasional  irritability and some mood swings. Describes his mood as being neutral and reports some pleasure in his daily activities. Reports  fair  appetite, energy and concentration. Able to function well in different aspects of his daily activities. Denies suicidal or homicidal ideation. Denies any complaints of increased tension, does   Worry sometimes and occasional  irritability  he denies any c/o increased anxiety, No c/o panic attacks or symptoms of PTSD. He says the pain is greater in the neck than the back. ALLERGIES/PAST MED/FAM/SOC HISTORY: Mr. Bernie Aguillon allergies, past medical, family and social history were reviewed in the chart. Mr. Bernie Aguillon current medications are   Outpatient Medications Prior to Visit   Medication Sig Dispense Refill    APIDRA 100 UNIT/ML injection INJECT 8 TO 12 UNITS UNDER THE SKIN THREE TIMES A DAY WITH MEALS PER SLIDING SCALE 10 Adjustable Dose Pre-filled Pen Syringe 1    traMADol (ULTRAM) 50 MG tablet Take 1 tablet by mouth every 6 hours as needed for Pain (max 1-2 per day) for up to 28 days. 56 tablet 0    gabapentin (NEURONTIN) 400 MG capsule Take 1 capsule by mouth 2 times daily for 30 days.  60 capsule 1    topiramate (TOPAMAX) 100 MG tablet Take 1 tablet by mouth 2 times daily 60 tablet 1    Erenumab-aooe (AIMOVIG, 140 MG DOSE,) 70 MG/ML SOAJ Inject 140 mLs into the skin every 30 days 1 Adjustable Dose Pre-filled Pen Syringe 1    DULoxetine (CYMBALTA) 60 MG extended release capsule Take 1 capsule by mouth daily 30 capsule 1    SUMAtriptan (IMITREX) 50 MG tablet Take one tab po at the start of migraine PRN max 1 every 24 hours 9 tablet 1    Insulin Syringe-Needle U-100 (BD INSULIN SYRINGE U/F) 31G X 5/16\" 0.3 ML MISC USE ONE SYRINGE FOUR TIMES A DAY BEFORE MEALS AND ONCE NIGHTLY 120 each 9    cefadroxil (DURICEF) 500 MG capsule TAKE ONE CAPSULE BY MOUTH DAILY 30 capsule 5    LANTUS 100 UNIT/ML injection vial INJECT 15 UNITS UNDER THE SKIN twice daily before breakfast and nightly.  Hold nightly dose if PM BG<90 30 mL 3    Insulin Syringe-Needle U-100 (B-D INS SYR ULTRAFINE 1CC/31G) 31G X 5/16\" 1 ML MISC INJECT USING INSULIN ONCE DAILY 30 each 10    GVOKE HYPOPEN 2-PACK 1 MG/0.2ML SOAJ INJECT AS NEEDED FOR LOW BLOOD SUGAR 0.4 mL 3    dicloxacillin (DYNAPEN) 500 MG capsule TAKE ONE CAPSULE BY MOUTH TWICE A DAY 60 capsule 11    Continuous Blood Gluc Sensor (DEXCOM G6 SENSOR) MISC USE ONE SENSOR AND CHANGE EVERY 10 DAYS 2 each 3    Diabetic Shoe MISC by Does not apply route 1 each 0    Continuous Blood Gluc  (DEXCOM G6 ) LINDA USE AS NEEDED TO CHECK BLOOD SUGAR 1 each 2    aspirin 81 MG chewable tablet Take 1 tablet by mouth daily 30 tablet 0    pantoprazole (PROTONIX) 40 MG tablet Take 1 tablet by mouth 2 times daily 60 tablet 1    prednisoLONE sodium phosphate (INFLAMASE FORTE) 1 % ophthalmic solution 1 drop 4 times daily       prednisoLONE acetate (PRED FORTE) 1 % ophthalmic suspension Place 1 drop into the right eye three times daily 3 times daily x 1 week then 2x daily x2 weeks, then 1 daily thereafter       ketorolac (ACULAR) 0.5 % ophthalmic solution Place 1 drop into the right eye 3 times daily 3 times daily x 1week, then 2 x daily x 2 weeks, then 1 daily thereafter      Continuous Blood Gluc Transmit (DEXCOM G6 TRANSMITTER) MISC USE TO CHECK BLOOD SUGAR 2 each 3    blood glucose monitor kit and supplies Dispense sufficient amount for indicated testing frequency plus additional to accommodate PRN testing needs. Dispense all needed supplies to include: monitor, strips, lancing device, lancets, control solutions, alcohol swabs. 1 kit 0    blood glucose test strips (ACCU-CHEK CAPO PLUS) strip 4 times a day As needed. 150 each 3    Handicap Placard MISC by Does not apply route Diagnosis: Type 1 diabetes. Expires 4/13/23. 1 each 0    blood glucose test strips (ACCU-CHEK CAPO PLUS) strip 1 each by In Vitro route daily Test blood glucose 10 times daily Dx Code E10.8 300 each 10    blood glucose test strips (ACCU-CHEK CAPO PLUS) strip USE ONE STRIP TO TEST THREE TIMES A  strip 4    Accu-Chek FastClix Lancets MISC 1 each by Does not apply route daily Test blood glucose 10 times daily Dx Code E 10.8 900 each 2    triamcinolone (KENALOG) 0.1 % ointment Apply to thickened affected areas PRN sparingly for flares no longer than 2 weeks at one time, do not apply to cleared skin 80 g 0    Insulin Syringe-Needle U-100 (BD INSULIN SYRINGE U/F) 31G X 5/16\" 0.5 ML MISC Inject 1 each into the skin 4 times daily DX CODE E 10.22 120 each 9    fluticasone (FLONASE) 50 MCG/ACT nasal spray SPRAY TWO SPRAYS IN EACH NOSTRIL DAILY (Patient taking differently: daily as needed) 16 g 4    hydrocortisone 2.5 % cream Apply topically 2 times daily. 30 g 0    Blood Glucose Monitoring Suppl (ACCU-CHEK CAPO PLUS) w/Device KIT 1 each by Does not apply route daily Test blood sugar 10 times daily Dx code E 10.8 1 kit 10    GLUCAGON EMERGENCY 1 MG injection INJECT 1MG INTO THE MUSCLE AS NEEDED 1 kit 3    Multiple Vitamin (DAILY KITTY) TABS TAKE ONE TABLET BY MOUTH DAILY 30 tablet 5    Cholecalciferol (VITAMIN D3) 5000 units CAPS Take 1 capsule by mouth Daily 30 capsule 3    Dextromethorphan-Guaifenesin (MUCINEX DM)  MG TB12 Cough and congestion.  (Patient taking differently: as needed Cough and congestion.) 60 tablet 1    omega-3 acid ethyl esters (LOVAZA) 1 G capsule TAKE TWO CAPSULES BY MOUTH TWICE DAILY 120 capsule 5    MUCUS RELIEF DM  MG TABS TAKE ONE BY MOUTH DAILY AS NEEDED 30 tablet 1    Alcohol Swabs PADS Use as needed for insulin injection. 100 each 5    Probiotic Product (ACIDOPHILUS PROBIOTIC) CAPS capsule TAKE ONE CAPSULE BY MOUTH DAILY 28 capsule 4    polyvinyl alcohol (LIQUIFILM TEARS) 1.4 % ophthalmic solution 1 drop as needed      propranolol (INDERAL) 80 MG tablet Take 40 mg by mouth 2 times daily For migraines       Flaxseed, Linseed, (FLAX PO) Take 14 g by mouth daily as needed       potassium chloride (KLOR-CON M) 10 MEQ extended release tablet Take 1 tablet by mouth 2 times daily 60 tablet 0     No facility-administered medications prior to visit. REVIEW OF SYSTEMS: .   Respiratory: Negative for shortness of breath. Cardiovascular: Negative for chest pain, palpitations  Gastrointestinal: Negative for blood in stool, abdominal distention, nausea, vomiting, abdominal pain, diarrhea,constipation. Neurological: Negative for speech difficulty, weakness and light-headedness, dizziness, tremors, sleepiness  Psychiatric/Behavioral: Negative for suicidal ideas, hallucinations, behavioral problems, self-injury, decreased concentration/cognition, agitation, confusion. PHYSICAL EXAM:   Nursing note and vitals reviewed. /83   Pulse 91   Wt 203 lb (92.1 kg)   BMI 26.06 kg/m²   General Appearance: Patient is well nourished, well developed, well groomed and in no acute distress. Skin: Skin is warm and dry, good turgor . No rash or lesions noted. He is not diaphoretic. Pulmonary/Chest: Effort normal. No respiratory distress or use of accessory muscles. Auscultation revealing normal air entry. He does not have wheezes in the lung fields. He has no rales.    Cardiovascular: Normal rate, regular rhythm, normal heart sounds, and does not have murmur. Exam reveals no gallop and no friction rub. Musculoskeletal / Extremities: Range of motion is normal. Gait is normal, assistive devices use: cane. He exhibits edema: none, and no tenderness. Neurological/Psychiatric:He is alert and oriented to person, place, and time. Coordination is  normal.   Judgement and Insight is normal  His mood is Appropriate and affect is Neutral/Euthymic(normal) . His behavior is normal.   thought content normal.        IMPRESSION:     1. Chronic pain syndrome    2. Myofascial pain    3. Polyneuropathy associated with underlying disease ([address]    4. Peripheral polyneuropathy    5. Neuropathic pain    6. Ulcer of left foot, with fat layer exposed ([address]    7. Pathological fracture of left tibia due to other osteoporosis, sequela    8. Chronic migraine without aura, with intractable migraine, so stated, with status migrainosus    9. Primary insomnia    10. Constipation due to opioid therapy    11. Recurrent major depressive disorder, in partial remission ([address]        PLAN:  Informed verbal consent was obtained. -[place] record was obtained and reviewed  for the last one year and no indicators of drug misuse  were found. Any other controlled substance prescriptions  seen on the record have been accounted for, I am aware of the patient receiving these medications. [facility] [place] record will be rechecked as part of office protocol.    -he was advised  to avoid using too many OTC analgesics to control the headaches, avoid chocolates, increased caffeine, cheeses and MSG nitrite containing foods, cigarette smoking. To avoid bright lights, strong smells and skipping meals.   -Continue with Topamax along with Aimovig and start Nurtec 75 mg daily PRN   -Monitor blood sugar regularly, diabetic control- adv diabetic diet. Goal for fasting blood sugars around 120.  Follow up with Endocrinologist/PCP also for on going management    -He was advised to increase fluids ( 5-7  glasses of fluid daily), limit caffeine, avoid cheese products, increase dietary fiber, increase activity and exercise as tolerated and relax regularly and enjoy meals   -Walking 20-30 minutes daily as tolerated   -Adv Biofeedback, relaxation and meditation techniques. Referral to psychologist for CBT was also discussed with patient   Mr. Bryon Zarate will be prescribed  the medications  listed below which are for treatment of his presenting  medical problems which for this visit include:   Diagnoses of Chronic pain syndrome, Myofascial pain, Polyneuropathy associated with underlying disease ([address] Peripheral polyneuropathy, Neuropathic pain, Ulcer of left foot, with fat layer exposed ([address] Pathological fracture of left tibia due to other osteoporosis, sequela, Chronic migraine without aura, with intractable migraine, so stated, with status migrainosus, Primary insomnia, Constipation due to opioid therapy, and Recurrent major depressive disorder, in partial remission ([address] were pertinent to this visit. Medications/orders associated with this visit:    Current Outpatient Medications   Medication Sig Dispense Refill    APIDRA 100 UNIT/ML injection INJECT 8 TO 12 UNITS UNDER THE SKIN THREE TIMES A DAY WITH MEALS PER SLIDING SCALE 10 Adjustable Dose Pre-filled Pen Syringe 1    traMADol (ULTRAM) 50 MG tablet Take 1 tablet by mouth every 6 hours as needed for Pain (max 1-2 per day) for up to 28 days. 56 tablet 0    gabapentin (NEURONTIN) 400 MG capsule Take 1 capsule by mouth 2 times daily for 30 days.  60 capsule 1    topiramate (TOPAMAX) 100 MG tablet Take 1 tablet by mouth 2 times daily 60 tablet 1    Erenumab-aooe (AIMOVIG, 140 MG DOSE,) 70 MG/ML SOAJ Inject 140 mLs into the skin every 30 days 1 Adjustable Dose Pre-filled Pen Syringe 1    DULoxetine (CYMBALTA) 60 MG extended release capsule Take 1 capsule by mouth daily 30 capsule 1    SUMAtriptan (IMITREX) 50 MG tablet Take one tab po at the start of migraine PRN max 1 every 24 hours 9 tablet 1    Insulin Syringe-Needle U-100 (BD INSULIN SYRINGE U/F) 31G X 5/16\" 0.3 ML MISC USE ONE SYRINGE FOUR TIMES A DAY BEFORE MEALS AND ONCE NIGHTLY 120 each 9    cefadroxil (DURICEF) 500 MG capsule TAKE ONE CAPSULE BY MOUTH DAILY 30 capsule 5    LANTUS 100 UNIT/ML injection vial INJECT 15 UNITS UNDER THE SKIN twice daily before breakfast and nightly. Hold nightly dose if PM BG<90 30 mL 3    Insulin Syringe-Needle U-100 (B-D INS SYR ULTRAFINE 1CC/31G) 31G X 5/16\" 1 ML MISC INJECT USING INSULIN ONCE DAILY 30 each 10    GVOKE HYPOPEN 2-PACK 1 MG/0.2ML SOAJ INJECT AS NEEDED FOR LOW BLOOD SUGAR 0.4 mL 3    dicloxacillin (DYNAPEN) 500 MG capsule TAKE ONE CAPSULE BY MOUTH TWICE A DAY 60 capsule 11    Continuous Blood Gluc Sensor (DEXCOM G6 SENSOR) MISC USE ONE SENSOR AND CHANGE EVERY 10 DAYS 2 each 3    Diabetic Shoe MISC by Does not apply route 1 each 0    Continuous Blood Gluc  (DEXCOM G6 ) LINDA USE AS NEEDED TO CHECK BLOOD SUGAR 1 each 2    aspirin 81 MG chewable tablet Take 1 tablet by mouth daily 30 tablet 0    pantoprazole (PROTONIX) 40 MG tablet Take 1 tablet by mouth 2 times daily 60 tablet 1    prednisoLONE sodium phosphate (INFLAMASE FORTE) 1 % ophthalmic solution 1 drop 4 times daily       prednisoLONE acetate (PRED FORTE) 1 % ophthalmic suspension Place 1 drop into the right eye three times daily 3 times daily x 1 week then 2x daily x2 weeks, then 1 daily thereafter       ketorolac (ACULAR) 0.5 % ophthalmic solution Place 1 drop into the right eye 3 times daily 3 times daily x 1week, then 2 x daily x 2 weeks, then 1 daily thereafter      Continuous Blood Gluc Transmit (DEXCOM G6 TRANSMITTER) MISC USE TO CHECK BLOOD SUGAR 2 each 3    blood glucose monitor kit and supplies Dispense sufficient amount for indicated testing frequency plus additional to accommodate PRN testing needs.  Dispense all needed supplies to include: monitor, strips, lancing device, lancets, control solutions, alcohol swabs. 1 kit 0    blood glucose test strips (ACCU-CHEK CAPO PLUS) strip 4 times a day As needed. 150 each 3    Handicap Placard MISC by Does not apply route Diagnosis: Type 1 diabetes. Expires 4/13/23. 1 each 0    blood glucose test strips (ACCU-CHEK CAPO PLUS) strip 1 each by In Vitro route daily Test blood glucose 10 times daily Dx Code E10.8 300 each 10    blood glucose test strips (ACCU-CHEK CAPO PLUS) strip USE ONE STRIP TO TEST THREE TIMES A  strip 4    Accu-Chek FastClix Lancets MISC 1 each by Does not apply route daily Test blood glucose 10 times daily Dx Code E 10.8 900 each 2    triamcinolone (KENALOG) 0.1 % ointment Apply to thickened affected areas PRN sparingly for flares no longer than 2 weeks at one time, do not apply to cleared skin 80 g 0    Insulin Syringe-Needle U-100 (BD INSULIN SYRINGE U/F) 31G X 5/16\" 0.5 ML MISC Inject 1 each into the skin 4 times daily DX CODE E 10.22 120 each 9    fluticasone (FLONASE) 50 MCG/ACT nasal spray SPRAY TWO SPRAYS IN EACH NOSTRIL DAILY (Patient taking differently: daily as needed) 16 g 4    hydrocortisone 2.5 % cream Apply topically 2 times daily. 30 g 0    Blood Glucose Monitoring Suppl (ACCU-CHEK CAPO PLUS) w/Device KIT 1 each by Does not apply route daily Test blood sugar 10 times daily Dx code E 10.8 1 kit 10    GLUCAGON EMERGENCY 1 MG injection INJECT 1MG INTO THE MUSCLE AS NEEDED 1 kit 3    Multiple Vitamin (DAILY KITTY) TABS TAKE ONE TABLET BY MOUTH DAILY 30 tablet 5    Cholecalciferol (VITAMIN D3) 5000 units CAPS Take 1 capsule by mouth Daily 30 capsule 3    Dextromethorphan-Guaifenesin (MUCINEX DM)  MG TB12 Cough and congestion.  (Patient taking differently: as needed Cough and congestion.) 60 tablet 1    omega-3 acid ethyl esters (LOVAZA) 1 G capsule TAKE TWO CAPSULES BY MOUTH TWICE DAILY 120 capsule 5    MUCUS RELIEF DM  MG TABS TAKE ONE BY MOUTH DAILY AS NEEDED 30 tablet 1    Alcohol Swabs PADS Use as needed for insulin injection. 100 each 5    Probiotic Product (ACIDOPHILUS PROBIOTIC) CAPS capsule TAKE ONE CAPSULE BY MOUTH DAILY 28 capsule 4    polyvinyl alcohol (LIQUIFILM TEARS) 1.4 % ophthalmic solution 1 drop as needed      propranolol (INDERAL) 80 MG tablet Take 40 mg by mouth 2 times daily For migraines       Flaxseed, Linseed, (FLAX PO) Take 14 g by mouth daily as needed       potassium chloride (KLOR-CON M) 10 MEQ extended release tablet Take 1 tablet by mouth 2 times daily 60 tablet 0     No current facility-administered medications for this visit. Goals of current treatment regimen include improvement in pain, restoration of functioning- with focus on improvement in physical performance, general activity, work or disability,emotional distress, health care utilization and  decreased medication consumption. Will continue to monitor progress towards achieving/maintaining therapeutic goals with special emphasis on  1. Improvement in perceived interfernce  of pain with ADL's. Ability to do home exercises independently. Ability to do household chores indoor and/or outdoor work and social and leisure activities. To increase flexibility/ROM, strength and endurance. Improve psychosocial and physical functioning.- he is not showing any significant progress/or showing regression  towards this goal and reassessment and adjustment of goals/treatment have been made. 2. Improving sleep to 6-7 hours a night. Improve mood/ anxiety and depression symptoms such as crying spells, low energy, problems with concentration, motivation.- he is showing progression towards this treatment goal with the current regimen. 3. Reduction of reliance on opioid analgesia/more appropriate opioid use. - he is showing progression towards this treatment goal with the current regimen. Risks and benefits of the medications and other alternative treatments have been/were  discussed with the patient.  Any questions on the  common side effects of these

## 2022-11-01 ENCOUNTER — TELEPHONE (OUTPATIENT)
Dept: PAIN MANAGEMENT | Age: 47
End: 2022-11-01

## 2022-11-01 NOTE — TELEPHONE ENCOUNTER
Submitted PA for [facility]  [facility] Key: [id number] STATUS: Baljinder Miramontes is an existing case within the [facility], [place], operated by [facility] environment that has the same patient, prescriber, and drug. This case must be finalized before proceeding with similar requests. \"    This means  A.  either the patient started the PA; in which case they will reach our for additional clinicals if needed. [facility] started it.

## 2022-11-03 ENCOUNTER — OFFICE VISIT (OUTPATIENT)
Dept: INTERNAL MEDICINE CLINIC | Age: 47
End: 2022-11-03
Payer: MEDICARE

## 2022-11-03 VITALS
DIASTOLIC BLOOD PRESSURE: 68 MMHG | SYSTOLIC BLOOD PRESSURE: 102 MMHG | HEART RATE: 98 BPM | WEIGHT: 203 LBS | OXYGEN SATURATION: 99 % | HEIGHT: 74 IN | BODY MASS INDEX: 26.05 KG/M2

## 2022-11-03 DIAGNOSIS — E10.8 DIABETES MELLITUS TYPE 1 WITH COMPLICATIONS (HCC): Primary | ICD-10-CM

## 2022-11-03 DIAGNOSIS — Z23 FLU VACCINE NEED: ICD-10-CM

## 2022-11-03 DIAGNOSIS — N18.31 STAGE 3A CHRONIC KIDNEY DISEASE (CKD) (HCC): ICD-10-CM

## 2022-11-03 DIAGNOSIS — Z12.11 COLON CANCER SCREENING: ICD-10-CM

## 2022-11-03 DIAGNOSIS — E10.3599 PROLIFERATIVE DIABETIC RETINOPATHY ASSOCIATED WITH TYPE 1 DIABETES MELLITUS, UNSPECIFIED LATERALITY, UNSPECIFIED PROLIFERATIVE RETINOPATHY TYPE (HCC): ICD-10-CM

## 2022-11-03 DIAGNOSIS — E78.2 MIXED HYPERLIPIDEMIA: ICD-10-CM

## 2022-11-03 LAB
CHP ED QC CHECK: NORMAL
GLUCOSE BLD-MCNC: 181 MG/DL
HBA1C MFR BLD: 8.4 %

## 2022-11-03 PROCEDURE — 83036 HEMOGLOBIN GLYCOSYLATED A1C: CPT | Performed by: INTERNAL MEDICINE

## 2022-11-03 PROCEDURE — 3078F DIAST BP <80 MM HG: CPT | Performed by: INTERNAL MEDICINE

## 2022-11-03 PROCEDURE — 82962 GLUCOSE BLOOD TEST: CPT | Performed by: INTERNAL MEDICINE

## 2022-11-03 PROCEDURE — 99214 OFFICE O/P EST MOD 30 MIN: CPT | Performed by: INTERNAL MEDICINE

## 2022-11-03 PROCEDURE — 3074F SYST BP LT 130 MM HG: CPT | Performed by: INTERNAL MEDICINE

## 2022-11-03 PROCEDURE — 3052F HG A1C>EQUAL 8.0%<EQUAL 9.0%: CPT | Performed by: INTERNAL MEDICINE

## 2022-11-03 SDOH — ECONOMIC STABILITY: FOOD INSECURITY: WITHIN THE PAST 12 MONTHS, YOU WORRIED THAT YOUR FOOD WOULD RUN OUT BEFORE YOU GOT MONEY TO BUY MORE.: NEVER TRUE

## 2022-11-03 SDOH — ECONOMIC STABILITY: FOOD INSECURITY: WITHIN THE PAST 12 MONTHS, THE FOOD YOU BOUGHT JUST DIDN'T LAST AND YOU DIDN'T HAVE MONEY TO GET MORE.: NEVER TRUE

## 2022-11-03 ASSESSMENT — PATIENT HEALTH QUESTIONNAIRE - PHQ9
7. TROUBLE CONCENTRATING ON THINGS, SUCH AS READING THE NEWSPAPER OR WATCHING TELEVISION: 0
8. MOVING OR SPEAKING SO SLOWLY THAT OTHER PEOPLE COULD HAVE NOTICED. OR THE OPPOSITE, BEING SO FIGETY OR RESTLESS THAT YOU HAVE BEEN MOVING AROUND A LOT MORE THAN USUAL: 0
SUM OF ALL RESPONSES TO PHQ QUESTIONS 1-9: 0
10. IF YOU CHECKED OFF ANY PROBLEMS, HOW DIFFICULT HAVE THESE PROBLEMS MADE IT FOR YOU TO DO YOUR WORK, TAKE CARE OF THINGS AT HOME, OR GET ALONG WITH OTHER PEOPLE: 0
4. FEELING TIRED OR HAVING LITTLE ENERGY: 0
5. POOR APPETITE OR OVEREATING: 0
SUM OF ALL RESPONSES TO PHQ QUESTIONS 1-9: 0
SUM OF ALL RESPONSES TO PHQ QUESTIONS 1-9: 0
2. FEELING DOWN, DEPRESSED OR HOPELESS: 0
3. TROUBLE FALLING OR STAYING ASLEEP: 0
SUM OF ALL RESPONSES TO PHQ QUESTIONS 1-9: 0
9. THOUGHTS THAT YOU WOULD BE BETTER OFF DEAD, OR OF HURTING YOURSELF: 0
6. FEELING BAD ABOUT YOURSELF - OR THAT YOU ARE A FAILURE OR HAVE LET YOURSELF OR YOUR FAMILY DOWN: 0
1. LITTLE INTEREST OR PLEASURE IN DOING THINGS: 0
SUM OF ALL RESPONSES TO PHQ9 QUESTIONS 1 & 2: 0

## 2022-11-03 ASSESSMENT — SOCIAL DETERMINANTS OF HEALTH (SDOH): HOW HARD IS IT FOR YOU TO PAY FOR THE VERY BASICS LIKE FOOD, HOUSING, MEDICAL CARE, AND HEATING?: NOT HARD AT ALL

## 2022-11-03 NOTE — PROGRESS NOTES
liPatient: Bonnie Calderon is a 41 y.o. male who presents today with the following Chief Complaint(s):    Chief Complaint   Patient presents with    Annual Exam    Diabetes         HPIhe is here for a check up. He has T1 DM, followed by endo. Notes he cannot use CGM. Allergic to adhesive. Continue to work on meal planning and physical activity. A1c 8.4. treated with B-B insulin. He has hyperlipidemia but refuses rx therapy. Relies on diet and physical activity. Stage 3a CKD. Followed by nephrology. Declines routine vaccines. Current Outpatient Medications   Medication Sig Dispense Refill    gabapentin (NEURONTIN) 400 MG capsule Take 1 capsule by mouth 2 times daily for 30 days. 60 capsule 1    traMADol (ULTRAM) 50 MG tablet Take 1 tablet by mouth 2 times daily as needed for Pain for up to 42 days. 84 tablet 0    topiramate (TOPAMAX) 100 MG tablet Take 1 tablet by mouth 2 times daily 60 tablet 1    Erenumab-aooe (AIMOVIG, 140 MG DOSE,) 70 MG/ML SOAJ Inject 140 mLs into the skin every 30 days 1 Adjustable Dose Pre-filled Pen Syringe 1    DULoxetine (CYMBALTA) 60 MG extended release capsule Take 1 capsule by mouth daily 30 capsule 1    Rimegepant Sulfate (NURTEC) 75 MG TBDP Take 1 tablet daily, may repeat in 24 hours PRN 16 tablet 1    APIDRA 100 UNIT/ML injection INJECT 8 TO 12 UNITS UNDER THE SKIN THREE TIMES A DAY WITH MEALS PER SLIDING SCALE 10 Adjustable Dose Pre-filled Pen Syringe 1    SUMAtriptan (IMITREX) 50 MG tablet Take one tab po at the start of migraine PRN max 1 every 24 hours 9 tablet 1    Insulin Syringe-Needle U-100 (BD INSULIN SYRINGE U/F) 31G X 5/16\" 0.3 ML MISC USE ONE SYRINGE FOUR TIMES A DAY BEFORE MEALS AND ONCE NIGHTLY 120 each 9    cefadroxil (DURICEF) 500 MG capsule TAKE ONE CAPSULE BY MOUTH DAILY 30 capsule 5    LANTUS 100 UNIT/ML injection vial INJECT 15 UNITS UNDER THE SKIN twice daily before breakfast and nightly.  Hold nightly dose if PM BG<90 30 mL 3 Insulin Syringe-Needle U-100 (B-D INS SYR ULTRAFINE 1CC/31G) 31G X 5/16\" 1 ML MISC INJECT USING INSULIN ONCE DAILY 30 each 10    GVOKE HYPOPEN 2-PACK 1 MG/0.2ML SOAJ INJECT AS NEEDED FOR LOW BLOOD SUGAR 0.4 mL 3    dicloxacillin (DYNAPEN) 500 MG capsule TAKE ONE CAPSULE BY MOUTH TWICE A DAY 60 capsule 11    Continuous Blood Gluc Sensor (DEXCOM G6 SENSOR) MISC USE ONE SENSOR AND CHANGE EVERY 10 DAYS 2 each 3    Diabetic Shoe MISC by Does not apply route 1 each 0    Continuous Blood Gluc  (DEXCOM G6 ) LINDA USE AS NEEDED TO CHECK BLOOD SUGAR 1 each 2    aspirin 81 MG chewable tablet Take 1 tablet by mouth daily 30 tablet 0    pantoprazole (PROTONIX) 40 MG tablet Take 1 tablet by mouth 2 times daily 60 tablet 1    prednisoLONE sodium phosphate (INFLAMASE FORTE) 1 % ophthalmic solution 1 drop 4 times daily       prednisoLONE acetate (PRED FORTE) 1 % ophthalmic suspension Place 1 drop into the right eye three times daily 3 times daily x 1 week then 2x daily x2 weeks, then 1 daily thereafter       ketorolac (ACULAR) 0.5 % ophthalmic solution Place 1 drop into the right eye 3 times daily 3 times daily x 1week, then 2 x daily x 2 weeks, then 1 daily thereafter      Continuous Blood Gluc Transmit (DEXCOM G6 TRANSMITTER) MISC USE TO CHECK BLOOD SUGAR 2 each 3    blood glucose monitor kit and supplies Dispense sufficient amount for indicated testing frequency plus additional to accommodate PRN testing needs. Dispense all needed supplies to include: monitor, strips, lancing device, lancets, control solutions, alcohol swabs. 1 kit 0    blood glucose test strips (ACCU-CHEK CAPO PLUS) strip 4 times a day As needed. 150 each 3    Handicap Placard MISC by Does not apply route Diagnosis: Type 1 diabetes.      Expires 4/13/23. 1 each 0    blood glucose test strips (ACCU-CHEK CAPO PLUS) strip 1 each by In Vitro route daily Test blood glucose 10 times daily Dx Code E10.8 300 each 10    blood glucose test strips (ACCU-CHEK CAPO PLUS) strip USE ONE STRIP TO TEST THREE TIMES A  strip 4    Accu-Chek FastClix Lancets MISC 1 each by Does not apply route daily Test blood glucose 10 times daily Dx Code E 10.8 900 each 2    triamcinolone (KENALOG) 0.1 % ointment Apply to thickened affected areas PRN sparingly for flares no longer than 2 weeks at one time, do not apply to cleared skin 80 g 0    Insulin Syringe-Needle U-100 (BD INSULIN SYRINGE U/F) 31G X 5/16\" 0.5 ML MISC Inject 1 each into the skin 4 times daily DX CODE E 10.22 120 each 9    fluticasone (FLONASE) 50 MCG/ACT nasal spray SPRAY TWO SPRAYS IN EACH NOSTRIL DAILY (Patient taking differently: daily as needed) 16 g 4    hydrocortisone 2.5 % cream Apply topically 2 times daily. 30 g 0    Blood Glucose Monitoring Suppl (ACCU-CHEK CAPO PLUS) w/Device KIT 1 each by Does not apply route daily Test blood sugar 10 times daily Dx code E 10.8 1 kit 10    GLUCAGON EMERGENCY 1 MG injection INJECT 1MG INTO THE MUSCLE AS NEEDED 1 kit 3    Multiple Vitamin (DAILY KITTY) TABS TAKE ONE TABLET BY MOUTH DAILY 30 tablet 5    Cholecalciferol (VITAMIN D3) 5000 units CAPS Take 1 capsule by mouth Daily 30 capsule 3    Dextromethorphan-Guaifenesin (MUCINEX DM)  MG TB12 Cough and congestion. (Patient taking differently: as needed Cough and congestion.) 60 tablet 1    omega-3 acid ethyl esters (LOVAZA) 1 G capsule TAKE TWO CAPSULES BY MOUTH TWICE DAILY 120 capsule 5    MUCUS RELIEF DM  MG TABS TAKE ONE BY MOUTH DAILY AS NEEDED 30 tablet 1    Alcohol Swabs PADS Use as needed for insulin injection.  100 each 5    Probiotic Product (ACIDOPHILUS PROBIOTIC) CAPS capsule TAKE ONE CAPSULE BY MOUTH DAILY 28 capsule 4    polyvinyl alcohol (LIQUIFILM TEARS) 1.4 % ophthalmic solution 1 drop as needed      propranolol (INDERAL) 80 MG tablet Take 40 mg by mouth 2 times daily For migraines       Flaxseed, Linseed, (FLAX PO) Take 14 g by mouth daily as needed       potassium chloride (KLOR-CON M) 10 MEQ extended release tablet Take 1 tablet by mouth 2 times daily 60 tablet 0     No current facility-administered medications for this visit. Patient's past medical history, surgical history, family history, medications,and allergies  were all reviewed and updated as appropriate today. Review of Systems   Constitutional: Negative. HENT: Negative. Eyes: Negative. Diabetic retinopathy. F/U ophthalmology. Respiratory: Negative. Cardiovascular: Negative. Gastrointestinal: Negative. Endocrine:        T1DM, and HLD, see HPI. Genitourinary:         CKD, see HPI. Musculoskeletal: Negative. Skin: Negative. Neurological: Negative. Psychiatric/Behavioral: Negative. Physical Exam  Constitutional:       General: He is not in acute distress. Appearance: He is well-developed. HENT:      Head: Normocephalic and atraumatic. Right Ear: External ear normal.      Left Ear: External ear normal.      Nose: Nose normal.   Eyes:      General: No scleral icterus. Conjunctiva/sclera: Conjunctivae normal.      Pupils: Pupils are equal, round, and reactive to light. Neck:      Thyroid: No thyromegaly. Cardiovascular:      Rate and Rhythm: Normal rate and regular rhythm. Heart sounds: Normal heart sounds. Pulmonary:      Effort: Pulmonary effort is normal.      Breath sounds: Normal breath sounds. Abdominal:      General: Bowel sounds are normal.      Palpations: Abdomen is soft. There is no mass. Musculoskeletal:      Cervical back: Normal range of motion and neck supple. Lymphadenopathy:      Cervical: No cervical adenopathy. Skin:     General: Skin is warm and dry. Neurological:      Mental Status: He is alert and oriented to person, place, and time. Deep Tendon Reflexes: Reflexes are normal and symmetric. Psychiatric:         Behavior: Behavior normal.         Thought Content:  Thought content normal.         Judgment: Judgment normal.       Vitals:    11/03/22 1142   BP: 102/68   Pulse: 98   SpO2: 99%       Assessment:   Diagnosis Orders   1. 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 F/U ENDO. 2. Mixed hyperlipidemia  Lipid Panel  Heart healthy diet. Discussed ezetimibe. 3. Stage 3a chronic kidney disease (CKD) ([facility]  Risk factor control stressed. 4. Proliferative diabetic retinopathy associated with type 1 diabetes mellitus, unspecified laterality, unspecified proliferative retinopathy type ([facility].)  Blood sugar control discussed. 5. Colon cancer screening  SANDY - Rossana Veras MD, Gastroenterology (ERCP & EUS), [facility]      6. Flu vaccine need  Influenza, FLUAD, (age 72 y+), IM, Preservative Free, 0.5 mL            Plan:  See plans above.

## 2022-11-03 NOTE — PATIENT INSTRUCTIONS
[place] [facility] Locations - No appointment necessary. @ indicates the location is open Saturdays in addition to Monday through Friday. Call your preferred location for test preparation, business hours and other information you need. [employer] accepts [employer]. [facility]    @ [place] [facility] [address] [facility] [address] [place] [address]. [place], [facility] [address]   Ph: [phone] [facility] Lab Svcs. [address]   Ph: [phone]  @ [facility] S[facility] [address] [facility]   Ph: [phone]    [facility] S[facility]. [address]   Ph: [phone] @ [place] [facility] [address]  Ph: [phone] @ [place] Sv[facility]. [address] [facility] [address]   Ph: [phone]     [facility]  Ph: [phone]    [place]   @ [place] [facility] S[facility]cs[facility] [address] [place], [place] [address]   Ph: [phone] [facility] Med. Office Bldg. [facility] [facility] [address]  Ph: [phone] [facility] 95, [phone]   [facility]:  [address] Lab Svcs[address] [facility]   Ph: [phone] [facility]. Lab Svcs[address] [place], [place] [address]   Ph: [phone]     Ezetimibe for cholesterol.

## 2022-11-08 NOTE — TELEPHONE ENCOUNTER
Medication:   Requested Prescriptions     Pending Prescriptions Disp Refills    Ayse Lara 2-PACK 1 MG/0.2ML Moniquefort [Pharmacy Med Name: Ayse Lara 2-PK 1 MG/0.2 ML] 0.4 mL 3     Sig: INJECT AS NEEDED FOR LOW BLOOD SUGAR       Last Filled:      Patient Phone Number: [phone] (home)     Last appt: 2/8/2022   Next appt: 12/6/2022    Last Labs DM:   Lab Results   Component Value Date/Time    LABA1C 8.4 11/03/2022 12:06 PM

## 2022-11-09 RX ORDER — GLUCAGON INJECTION, SOLUTION 1 MG/.2ML
INJECTION, SOLUTION SUBCUTANEOUS
Qty: 0.4 ML | Refills: 3 | Status: SHIPPED | OUTPATIENT
Start: 2022-11-09

## 2022-12-01 ENCOUNTER — OFFICE VISIT (OUTPATIENT)
Dept: PAIN MANAGEMENT | Age: 47
End: 2022-12-01
Payer: MEDICARE

## 2022-12-01 VITALS
HEART RATE: 91 BPM | WEIGHT: 204 LBS | DIASTOLIC BLOOD PRESSURE: 89 MMHG | BODY MASS INDEX: 26.19 KG/M2 | SYSTOLIC BLOOD PRESSURE: 130 MMHG

## 2022-12-01 DIAGNOSIS — M79.18 MYOFASCIAL PAIN: ICD-10-CM

## 2022-12-01 DIAGNOSIS — M80.862S PATHOLOGICAL FRACTURE OF LEFT TIBIA DUE TO OTHER OSTEOPOROSIS, SEQUELA: ICD-10-CM

## 2022-12-01 DIAGNOSIS — G62.9 PERIPHERAL POLYNEUROPATHY: ICD-10-CM

## 2022-12-01 DIAGNOSIS — M79.2 NEUROPATHIC PAIN: ICD-10-CM

## 2022-12-01 DIAGNOSIS — L97.522 ULCER OF LEFT FOOT, WITH FAT LAYER EXPOSED (HCC): ICD-10-CM

## 2022-12-01 DIAGNOSIS — G89.4 CHRONIC PAIN SYNDROME: ICD-10-CM

## 2022-12-01 DIAGNOSIS — G63 POLYNEUROPATHY ASSOCIATED WITH UNDERLYING DISEASE (HCC): ICD-10-CM

## 2022-12-01 PROCEDURE — 3078F DIAST BP <80 MM HG: CPT | Performed by: INTERNAL MEDICINE

## 2022-12-01 PROCEDURE — 99213 OFFICE O/P EST LOW 20 MIN: CPT | Performed by: INTERNAL MEDICINE

## 2022-12-01 PROCEDURE — 3074F SYST BP LT 130 MM HG: CPT | Performed by: INTERNAL MEDICINE

## 2022-12-01 RX ORDER — GABAPENTIN 400 MG/1
400 CAPSULE ORAL 2 TIMES DAILY
Qty: 60 CAPSULE | Refills: 1 | Status: SHIPPED | OUTPATIENT
Start: 2022-12-01 | End: 2022-12-31

## 2022-12-01 RX ORDER — TRAMADOL HYDROCHLORIDE 50 MG/1
50 TABLET ORAL 2 TIMES DAILY PRN
Qty: 56 TABLET | Refills: 0 | Status: SHIPPED | OUTPATIENT
Start: 2022-12-01 | End: 2022-12-29

## 2022-12-01 RX ORDER — RIMEGEPANT SULFATE 75 MG/75MG
TABLET, ORALLY DISINTEGRATING ORAL
Qty: 16 TABLET | Refills: 1 | Status: SHIPPED | OUTPATIENT
Start: 2022-12-01

## 2022-12-01 RX ORDER — TOPIRAMATE 100 MG/1
100 TABLET, FILM COATED ORAL 2 TIMES DAILY
Qty: 60 TABLET | Refills: 1 | Status: SHIPPED | OUTPATIENT
Start: 2022-12-01

## 2022-12-01 RX ORDER — DULOXETIN HYDROCHLORIDE 60 MG/1
60 CAPSULE, DELAYED RELEASE ORAL DAILY
Qty: 30 CAPSULE | Refills: 1 | Status: SHIPPED | OUTPATIENT
Start: 2022-12-01

## 2022-12-01 RX ORDER — ERENUMAB-AOOE 70 MG/ML
INJECTION SUBCUTANEOUS
Qty: 1 ADJUSTABLE DOSE PRE-FILLED PEN SYRINGE | Refills: 1 | Status: SHIPPED | OUTPATIENT
Start: 2022-12-01

## 2022-12-02 NOTE — PROGRESS NOTES
Janneth Brought  1975  [id number]      HISTORY OF PRESENT ILLNESS:  Mr. Devante Izaguirre is a 41 y.o. male returns for a follow up visit for pain management  He has a diagnosis of   1. Chronic pain syndrome    2. Neuropathic pain    3. Myofascial pain    4. Peripheral polyneuropathy    5. Polyneuropathy associated with underlying disease ([address]    6. Ulcer of left foot, with fat layer exposed ([address]    7. Pathological fracture of left tibia due to other osteoporosis, sequela    8. Chronic migraine without aura, with intractable migraine, so stated, with status migrainosus    9. Recurrent major depressive disorder, in partial remission ([address]    . He complains of pain in the  Neck, upper and lower back, Bilateral shoulders, bilateral hands, left foot and ankle   He rates the pain 10/10 and describes it as sharp, aching, burning. Current treatment regimen has helped relieve about 10% of the pain. He denies any side effects from the current pain regimen. Patient reports that since the last follow up visit the physical functioning is unchanged, family/social relationships are unchanged, mood is unchanged sleep patterns are unchanged, and that the overall functioning is unchanged. Patient denies misusing/abusing his narcotic pain medications or using any illegal drugs. There are No indicators for possible drug abuse, addiction or diversion problems. Patient reports he is doing fair, managing somewhat with the medications. He denies any side effects. He says he is using Ultram 2 per day, which is helping some. He mentions the headache symptoms are manageable. He states he is using all the adjuvants. He reports he is not sure if he is getting Nurtec. He says he is using Aimovig     ALLERGIES: Patients list of allergies were reviewed     MEDICATIONS: Mr. Devante Izaguirre list of medications were reviewed. His current medications are   Outpatient Medications Prior to Visit   Medication Sig Dispense Refill    Art Briscoe 2-PACK 1 MG/0.2ML SOAJ INJECT AS NEEDED FOR LOW BLOOD SUGAR 0.4 mL 3    APIDRA 100 UNIT/ML injection INJECT 8 TO 12 UNITS UNDER THE SKIN THREE TIMES A DAY WITH MEALS PER SLIDING SCALE 10 Adjustable Dose Pre-filled Pen Syringe 1    SUMAtriptan (IMITREX) 50 MG tablet Take one tab po at the start of migraine PRN max 1 every 24 hours 9 tablet 1    Insulin Syringe-Needle U-100 (BD INSULIN SYRINGE U/F) 31G X 5/16\" 0.3 ML MISC USE ONE SYRINGE FOUR TIMES A DAY BEFORE MEALS AND ONCE NIGHTLY 120 each 9    cefadroxil (DURICEF) 500 MG capsule TAKE ONE CAPSULE BY MOUTH DAILY 30 capsule 5    LANTUS 100 UNIT/ML injection vial INJECT 15 UNITS UNDER THE SKIN twice daily before breakfast and nightly.  Hold nightly dose if PM BG<90 30 mL 3    Insulin Syringe-Needle U-100 (B-D INS SYR ULTRAFINE 1CC/31G) 31G X 5/16\" 1 ML MISC INJECT USING INSULIN ONCE DAILY 30 each 10    dicloxacillin (DYNAPEN) 500 MG capsule TAKE ONE CAPSULE BY MOUTH TWICE A DAY 60 capsule 11    Continuous Blood Gluc Sensor (DEXCOM G6 SENSOR) MISC USE ONE SENSOR AND CHANGE EVERY 10 DAYS 2 each 3    Diabetic Shoe MISC by Does not apply route 1 each 0    Continuous Blood Gluc  (DEXCOM G6 ) LINDA USE AS NEEDED TO CHECK BLOOD SUGAR 1 each 2    aspirin 81 MG chewable tablet Take 1 tablet by mouth daily 30 tablet 0    pantoprazole (PROTONIX) 40 MG tablet Take 1 tablet by mouth 2 times daily 60 tablet 1    prednisoLONE sodium phosphate (INFLAMASE FORTE) 1 % ophthalmic solution 1 drop 4 times daily       prednisoLONE acetate (PRED FORTE) 1 % ophthalmic suspension Place 1 drop into the right eye three times daily 3 times daily x 1 week then 2x daily x2 weeks, then 1 daily thereafter       ketorolac (ACULAR) 0.5 % ophthalmic solution Place 1 drop into the right eye 3 times daily 3 times daily x 1week, then 2 x daily x 2 weeks, then 1 daily thereafter      Continuous Blood Gluc Transmit (DEXCOM G6 TRANSMITTER) MISC USE TO CHECK BLOOD SUGAR 2 each 3    blood glucose monitor kit and supplies Dispense sufficient amount for indicated testing frequency plus additional to accommodate PRN testing needs. Dispense all needed supplies to include: monitor, strips, lancing device, lancets, control solutions, alcohol swabs. 1 kit 0    blood glucose test strips (ACCU-CHEK CAPO PLUS) strip 4 times a day As needed. 150 each 3    Handicap Placard MISC by Does not apply route Diagnosis: Type 1 diabetes. Expires 4/13/23. 1 each 0    blood glucose test strips (ACCU-CHEK CAPO PLUS) strip 1 each by In Vitro route daily Test blood glucose 10 times daily Dx Code E10.8 300 each 10    blood glucose test strips (ACCU-CHEK CAPO PLUS) strip USE ONE STRIP TO TEST THREE TIMES A  strip 4    Accu-Chek FastClix Lancets MISC 1 each by Does not apply route daily Test blood glucose 10 times daily Dx Code E 10.8 900 each 2    triamcinolone (KENALOG) 0.1 % ointment Apply to thickened affected areas PRN sparingly for flares no longer than 2 weeks at one time, do not apply to cleared skin 80 g 0    Insulin Syringe-Needle U-100 (BD INSULIN SYRINGE U/F) 31G X 5/16\" 0.5 ML MISC Inject 1 each into the skin 4 times daily DX CODE E 10.22 120 each 9    fluticasone (FLONASE) 50 MCG/ACT nasal spray SPRAY TWO SPRAYS IN EACH NOSTRIL DAILY (Patient taking differently: daily as needed) 16 g 4    hydrocortisone 2.5 % cream Apply topically 2 times daily. 30 g 0    Blood Glucose Monitoring Suppl (ACCU-CHEK CAPO PLUS) w/Device KIT 1 each by Does not apply route daily Test blood sugar 10 times daily Dx code E 10.8 1 kit 10    GLUCAGON EMERGENCY 1 MG injection INJECT 1MG INTO THE MUSCLE AS NEEDED 1 kit 3    Multiple Vitamin (DAILY KITTY) TABS TAKE ONE TABLET BY MOUTH DAILY 30 tablet 5    Cholecalciferol (VITAMIN D3) 5000 units CAPS Take 1 capsule by mouth Daily 30 capsule 3    Dextromethorphan-Guaifenesin (MUCINEX DM)  MG TB12 Cough and congestion.  (Patient taking differently: as needed Cough and congestion.) 60 tablet 1 omega-3 acid ethyl esters (LOVAZA) 1 G capsule TAKE TWO CAPSULES BY MOUTH TWICE DAILY 120 capsule 5    MUCUS RELIEF DM  MG TABS TAKE ONE BY MOUTH DAILY AS NEEDED 30 tablet 1    Alcohol Swabs PADS Use as needed for insulin injection. 100 each 5    Probiotic Product (ACIDOPHILUS PROBIOTIC) CAPS capsule TAKE ONE CAPSULE BY MOUTH DAILY 28 capsule 4    polyvinyl alcohol (LIQUIFILM TEARS) 1.4 % ophthalmic solution 1 drop as needed      propranolol (INDERAL) 80 MG tablet Take 40 mg by mouth 2 times daily For migraines       Flaxseed, Linseed, (FLAX PO) Take 14 g by mouth daily as needed       gabapentin (NEURONTIN) 400 MG capsule Take 1 capsule by mouth 2 times daily for 30 days. 60 capsule 1    traMADol (ULTRAM) 50 MG tablet Take 1 tablet by mouth 2 times daily as needed for Pain for up to 42 days. 84 tablet 0    topiramate (TOPAMAX) 100 MG tablet Take 1 tablet by mouth 2 times daily 60 tablet 1    Erenumab-aooe (AIMOVIG, 140 MG DOSE,) 70 MG/ML SOAJ Inject 140 mLs into the skin every 30 days 1 Adjustable Dose Pre-filled Pen Syringe 1    DULoxetine (CYMBALTA) 60 MG extended release capsule Take 1 capsule by mouth daily 30 capsule 1    Rimegepant Sulfate (NURTEC) 75 MG TBDP Take 1 tablet daily, may repeat in 24 hours PRN 16 tablet 1    potassium chloride (KLOR-CON M) 10 MEQ extended release tablet Take 1 tablet by mouth 2 times daily 60 tablet 0     No facility-administered medications prior to visit. REVIEW OF SYSTEMS:    Respiratory: Negative for apnea, chest tightness and shortness of breath or change in baseline breathing. PHYSICAL EXAM:   Nursing note and vitals reviewed. /89   Pulse 91   Wt 204 lb (92.5 kg)   BMI 26.19 kg/m²   Constitutional: He appears well-developed and well-nourished. No acute distress. Cardiovascular: Normal rate, regular rhythm, normal heart sounds, and does not have murmur. Pulmonary/Chest: Effort normal. No respiratory distress.  He does not have wheezes in the lung fields. He has no rales. Neurological/Psychiatric:He is alert and oriented to person, place, and  Other: using crutches, slow gait     IMPRESSION:   1. Chronic pain syndrome    2. Neuropathic pain    3. Myofascial pain    4. Peripheral polyneuropathy    5. Polyneuropathy associated with underlying disease ([address]    6. Ulcer of left foot, with fat layer exposed ([facility]    7. Pathological fracture of left tibia due to other osteoporosis, sequela        PLAN:  Informed verbal consent was obtained  -ROM/Stretching exercises as advised   -Continue with Ultram 50 mg BID   -He was advised to increase fluids ( 5-7  glasses of fluid daily), limit caffeine, avoid cheese products, increase dietary fiber, increase activity and exercise as tolerated and relax regularly and enjoy meals   -Walking as tolerated   -Monitor blood sugar regularly, diabetic control- adv diabetic diet. Goal for fasting blood sugars around 120. Follow up with Endocrinologist/PCP also for on going management     Current Outpatient Medications   Medication Sig Dispense Refill    traMADol (ULTRAM) 50 MG tablet Take 1 tablet by mouth 2 times daily as needed for Pain for up to 28 days. 56 tablet 0    topiramate (TOPAMAX) 100 MG tablet Take 1 tablet by mouth 2 times daily 60 tablet 1    Erenumab-aooe (AIMOVIG, 140 MG DOSE,) 70 MG/ML SOAJ Inject 140 mLs into the skin every 30 days 1 Adjustable Dose Pre-filled Pen Syringe 1    DULoxetine (CYMBALTA) 60 MG extended release capsule Take 1 capsule by mouth daily 30 capsule 1    Rimegepant Sulfate (NURTEC) 75 MG TBDP Take 1 tablet daily, may repeat in 24 hours PRN 16 tablet 1    gabapentin (NEURONTIN) 400 MG capsule Take 1 capsule by mouth 2 times daily for 30 days.  60 capsule 1    GVOKE HYPOPEN 2-PACK 1 MG/0.2ML SOAJ INJECT AS NEEDED FOR LOW BLOOD SUGAR 0.4 mL 3    APIDRA 100 UNIT/ML injection INJECT 8 TO 12 UNITS UNDER THE SKIN THREE TIMES A DAY WITH MEALS PER SLIDING SCALE 10 Adjustable Dose Pre-filled Pen Syringe 1    SUMAtriptan (IMITREX) 50 MG tablet Take one tab po at the start of migraine PRN max 1 every 24 hours 9 tablet 1    Insulin Syringe-Needle U-100 (BD INSULIN SYRINGE U/F) 31G X 5/16\" 0.3 ML MISC USE ONE SYRINGE FOUR TIMES A DAY BEFORE MEALS AND ONCE NIGHTLY 120 each 9    cefadroxil (DURICEF) 500 MG capsule TAKE ONE CAPSULE BY MOUTH DAILY 30 capsule 5    LANTUS 100 UNIT/ML injection vial INJECT 15 UNITS UNDER THE SKIN twice daily before breakfast and nightly. Hold nightly dose if PM BG<90 30 mL 3    Insulin Syringe-Needle U-100 (B-D INS SYR ULTRAFINE 1CC/31G) 31G X 5/16\" 1 ML MISC INJECT USING INSULIN ONCE DAILY 30 each 10    dicloxacillin (DYNAPEN) 500 MG capsule TAKE ONE CAPSULE BY MOUTH TWICE A DAY 60 capsule 11    Continuous Blood Gluc Sensor (DEXCOM G6 SENSOR) MISC USE ONE SENSOR AND CHANGE EVERY 10 DAYS 2 each 3    Diabetic Shoe MISC by Does not apply route 1 each 0    Continuous Blood Gluc  (DEXCOM G6 ) LINDA USE AS NEEDED TO CHECK BLOOD SUGAR 1 each 2    aspirin 81 MG chewable tablet Take 1 tablet by mouth daily 30 tablet 0    pantoprazole (PROTONIX) 40 MG tablet Take 1 tablet by mouth 2 times daily 60 tablet 1    prednisoLONE sodium phosphate (INFLAMASE FORTE) 1 % ophthalmic solution 1 drop 4 times daily       prednisoLONE acetate (PRED FORTE) 1 % ophthalmic suspension Place 1 drop into the right eye three times daily 3 times daily x 1 week then 2x daily x2 weeks, then 1 daily thereafter       ketorolac (ACULAR) 0.5 % ophthalmic solution Place 1 drop into the right eye 3 times daily 3 times daily x 1week, then 2 x daily x 2 weeks, then 1 daily thereafter      Continuous Blood Gluc Transmit (DEXCOM G6 TRANSMITTER) MISC USE TO CHECK BLOOD SUGAR 2 each 3    blood glucose monitor kit and supplies Dispense sufficient amount for indicated testing frequency plus additional to accommodate PRN testing needs.  Dispense all needed supplies to include: monitor, strips, lancing device, lancets, control solutions, alcohol swabs. 1 kit 0    blood glucose test strips (ACCU-CHEK CAPO PLUS) strip 4 times a day As needed. 150 each 3    Handicap Placard MISC by Does not apply route Diagnosis: Type 1 diabetes. Expires 4/13/23. 1 each 0    blood glucose test strips (ACCU-CHEK CAPO PLUS) strip 1 each by In Vitro route daily Test blood glucose 10 times daily Dx Code E10.8 300 each 10    blood glucose test strips (ACCU-CHEK CAPO PLUS) strip USE ONE STRIP TO TEST THREE TIMES A  strip 4    Accu-Chek FastClix Lancets MISC 1 each by Does not apply route daily Test blood glucose 10 times daily Dx Code E 10.8 900 each 2    triamcinolone (KENALOG) 0.1 % ointment Apply to thickened affected areas PRN sparingly for flares no longer than 2 weeks at one time, do not apply to cleared skin 80 g 0    Insulin Syringe-Needle U-100 (BD INSULIN SYRINGE U/F) 31G X 5/16\" 0.5 ML MISC Inject 1 each into the skin 4 times daily DX CODE E 10.22 120 each 9    fluticasone (FLONASE) 50 MCG/ACT nasal spray SPRAY TWO SPRAYS IN EACH NOSTRIL DAILY (Patient taking differently: daily as needed) 16 g 4    hydrocortisone 2.5 % cream Apply topically 2 times daily. 30 g 0    Blood Glucose Monitoring Suppl (ACCU-CHEK CAPO PLUS) w/Device KIT 1 each by Does not apply route daily Test blood sugar 10 times daily Dx code E 10.8 1 kit 10    GLUCAGON EMERGENCY 1 MG injection INJECT 1MG INTO THE MUSCLE AS NEEDED 1 kit 3    Multiple Vitamin (DAILY KITTY) TABS TAKE ONE TABLET BY MOUTH DAILY 30 tablet 5    Cholecalciferol (VITAMIN D3) 5000 units CAPS Take 1 capsule by mouth Daily 30 capsule 3    Dextromethorphan-Guaifenesin (MUCINEX DM)  MG TB12 Cough and congestion.  (Patient taking differently: as needed Cough and congestion.) 60 tablet 1    omega-3 acid ethyl esters (LOVAZA) 1 G capsule TAKE TWO CAPSULES BY MOUTH TWICE DAILY 120 capsule 5    MUCUS RELIEF DM  MG TABS TAKE ONE BY MOUTH DAILY AS NEEDED 30 tablet 1    Alcohol Swabs PADS Use as needed for insulin injection. 100 each 5    Probiotic Product (ACIDOPHILUS PROBIOTIC) CAPS capsule TAKE ONE CAPSULE BY MOUTH DAILY 28 capsule 4    polyvinyl alcohol (LIQUIFILM TEARS) 1.4 % ophthalmic solution 1 drop as needed      propranolol (INDERAL) 80 MG tablet Take 40 mg by mouth 2 times daily For migraines       Flaxseed, Linseed, (FLAX PO) Take 14 g by mouth daily as needed       potassium chloride (KLOR-CON M) 10 MEQ extended release tablet Take 1 tablet by mouth 2 times daily 60 tablet 0     No current facility-administered medications for this visit. I will continue his current medication regimen  which is part of the above treatment schedule. It has been helping with Mr. Renato Mcclelland chronic  medical problems which for this visit include:   Diagnoses of Chronic pain syndrome, Neuropathic pain, Myofascial pain, Peripheral polyneuropathy, Polyneuropathy associated with underlying disease ([address] Ulcer of left foot, with fat layer exposed ([address] Pathological fracture of left tibia due to other osteoporosis, sequela, Chronic migraine without aura, with intractable migraine, so stated, with status migrainosus, and Recurrent major depressive disorder, in partial remission ([address] were pertinent to this visit. Risks and benefits of the medications and other alternative treatments  including no treatment were discussed with the patient. The common side effects of these medications were also explained to the patient. Informed verbal consent was obtained. Goals of current treatment regimen include improvement in pain, restoration of functioning- with focus on improvement in physical performance, general activity, work or disability,emotional distress, health care utilization and  decreased medication consumption. Will continue to monitor progress towards achieving/maintaining therapeutic goals with special emphasis on  1. Improvement in perceived interfernce  of pain with ADL's.  Ability to do home exercises independently. Ability to do household chores indoor and/or outdoor work and social and leisure activities. Improve psychosocial and physical functioning. - he is showing progression towards this treatment goal with the current regimen. He was advised against drinking alcohol with the narcotic pain medicines, advised against driving or handling machinery while adjusting the dose of medicines or if having cognitive  issues related to the current medications. Risk of overdose and death, if medicines not taken as prescribed, were also discussed. If the patient develops new symptoms or if the symptoms worsen, the patient should call the office. While transcribing every attempt was made to maintain the accuracy of the note in terms of it's contents,there may have been some errors made inadvertently. Thank you for allowing me to participate in the care of this patient.     Kyle Umanzor MD.    Cc: Miguelina Randle MD

## 2022-12-03 PROCEDURE — G0008 ADMIN INFLUENZA VIRUS VAC: HCPCS | Performed by: INTERNAL MEDICINE

## 2022-12-03 PROCEDURE — 90694 VACC AIIV4 NO PRSRV 0.5ML IM: CPT | Performed by: INTERNAL MEDICINE

## 2022-12-03 ASSESSMENT — ENCOUNTER SYMPTOMS
EYES NEGATIVE: 1
GASTROINTESTINAL NEGATIVE: 1
RESPIRATORY NEGATIVE: 1

## 2022-12-06 ENCOUNTER — OFFICE VISIT (OUTPATIENT)
Dept: ENDOCRINOLOGY | Age: 47
End: 2022-12-06
Payer: MEDICARE

## 2022-12-06 VITALS
HEIGHT: 74 IN | WEIGHT: 205 LBS | TEMPERATURE: 98 F | RESPIRATION RATE: 14 BRPM | SYSTOLIC BLOOD PRESSURE: 114 MMHG | DIASTOLIC BLOOD PRESSURE: 77 MMHG | HEART RATE: 77 BPM | BODY MASS INDEX: 26.31 KG/M2

## 2022-12-06 DIAGNOSIS — E10.649 UNCONTROLLED TYPE 1 DIABETES MELLITUS WITH HYPOGLYCEMIA WITHOUT COMA (HCC): Primary | ICD-10-CM

## 2022-12-06 PROCEDURE — 99214 OFFICE O/P EST MOD 30 MIN: CPT | Performed by: INTERNAL MEDICINE

## 2022-12-06 PROCEDURE — 3052F HG A1C>EQUAL 8.0%<EQUAL 9.0%: CPT | Performed by: INTERNAL MEDICINE

## 2022-12-06 PROCEDURE — 3078F DIAST BP <80 MM HG: CPT | Performed by: INTERNAL MEDICINE

## 2022-12-06 PROCEDURE — 3074F SYST BP LT 130 MM HG: CPT | Performed by: INTERNAL MEDICINE

## 2022-12-06 RX ORDER — INSULIN GLULISINE 100 [IU]/ML
INJECTION, SOLUTION SUBCUTANEOUS
Qty: 10 ADJUSTABLE DOSE PRE-FILLED PEN SYRINGE | Refills: 3 | Status: SHIPPED | OUTPATIENT
Start: 2022-12-06

## 2022-12-06 RX ORDER — INSULIN GLARGINE 100 [IU]/ML
INJECTION, SOLUTION SUBCUTANEOUS
Qty: 30 ML | Refills: 3 | Status: SHIPPED | OUTPATIENT
Start: 2022-12-06

## 2022-12-06 NOTE — PROGRESS NOTES
MAHSA Payan is a 41 y.o. male here for a follow-up for management of DM    Interim:    Fair control  No severe low    Advised to monitor glucose closely as he gave 32 units lantus extra this morning    Has seen Dr. Mark Talavera    He has a PMH of DM, CKD, HTN, hyperlipidemia,  Left tibia/fibula fracture, foot ulcer. He was diagnosed with Diabetes Mellitus at the age of 12 yrs. Never had DKA. Was never on oral meds. Always on insulin therapy. Microvascular complications: has  Retinopathy and also had retinal detachement (Follows with eye doctor at [address]),   Has microlabuminruia . No Peripheral neuropathy. A1c 9.6 %----> 8.4 --->8.9 %--->8.2 %--->7.1 %--->7.1 %---> 6.8 % --> 6.8 %--> 6.5 %---> 7.1 %---> 7.1 %---> 7.5 %---> 7.7 % ---> 7.4 %---> 11 % ---> 9 % --> 9.3 % ---> 8.4 % --> 11.3 % ---> 11 % ---> 10.2 % ---> 10.2 % ---> 9.8%---> 9.5%---> 9.1%---> 7.9%---> 8.4%    Moderate, uncontrolled  Worsened by diet    Home regimen:  Currently on lantus insulin 15/15    Apidra 6-12 units TID. ( insulin to carb ratio of 1:8)  Uses fixed unit    SSI 2 for 50 > 150    Previous medications: Was on humulin insulin in the past.    Check sugars occasionally        Using Tra    Hypoglycemia . Occasional. No pattern    No polyuria, polydipsia, weight loss. Diet: Eats 3 meals/day at regular times and 3 snacks. Had nutrition education. Exercise: No  planned physical activity    Hyperlipidemia: he has mixed hyperlipidemia    Currently is on Flexseed oil. He has refused statins  Discussed PCSK-9 inhibitor, not interested   on 2/20    Patient says he feels dizzy and light headed when he stands up for the last 2-3 months. This is most likely due to autonomic neuropathy. No weight loss. Has gained weight. He had a fall and fractured his left distal tibia and fibula in 2013. Had ORIF and has developed osteomyelitis.      Review of Systems   Scanned, reviewed    Past Medical History:   Diagnosis Date    Acute respiratory failure ([address] 8/18/2014    Asthma     Blood pressure instability     Chronic pain syndrome     Detached retina, bilateral     laser tx right eye    Diabetes mellitus ([address]     uncontrolled     Insomnia     Meningitis August 2014    admission [facility]    Neuropathic pain     Osteomyel[address]     Osteomyelitis of tibia ([address]     left    Pain of left lower extremity     Peripheral neuropathy      Past Surgical History:   Procedure Laterality Date    ANKLE FRACTURE SURGERY Left 1/28/13    Dr. Dipti Patel  August 2013    left tibia with external fixation device    EYE SURGERY      retina reattachment left eye x 3 holes repairs    EYE SURGERY Right 9-27-13    retal detachment    LEG SURGERY Left 3/31/14    ORIF left fibula and tibia    MS EGD FLEXIBLE FOREIGN BODY REMOVAL N/A 9/21/2018    EGD FOREIGN BODY REMOVAL performed by Salima Bhandari MD at [facility]      with external device inplace    UPPER GASTROINTESTINAL ENDOSCOPY N/A 9/21/2018    EGD BIOPSY performed by Salima Bhandari MD at [facility] 9/25/2021    EGD BIOPSY performed by Lorenzo Hernandez MD at [id number] [place] [address] Visit on 11/03/2022   Component Date Value Ref Range Status    Glucose 11/03/2022 181  mg/dL Final    Hemoglobin A1C 11/03/2022 8.4  % Final         ROS:   Constitutional: Negative for weight loss and malaise/fatigue. Negative for fever and chills. HENT: Negative for hearing loss, ear pain, nosebleeds, neck pain and tinnitus. Eyes: Negative for blurred vision. Negative for double vision, photophobia and pain. Respiratory: Negative for cough and sputum production. Cardiovascular: Negative for chest pain, palpitations and leg swelling. Gastrointestinal: Negative for nausea, vomiting and abdominal pain. Genitourinary: Negative for dysuria, urgency and frequency. Musculoskeletal: Negative for back pain. No joint pain  Skin: Negative for itching and rash. Neurological: + headache, for dizziness. Negative for tingling, tremors, focal weakness and headaches. Endo/Heme/Allergies: see HPI  Psychiatric/Behavioral: Negative for depression and substance abuse. Vitals:    12/06/22 1414   BP: 114/77   Pulse: 77   Resp: 14   Temp: 98 °F (36.7 °C)       Constitutional: Well-developed, appears stated age, cooperative, in no acute distress  H/E/N/M/T:atraumatic, normocephalic, external ears, nose, lips normal without lesions  Eyes: Lids, lashes, conjunctivae and sclerae normal, No proptosis, no redness  Neck: supple, symmetrical, no swelling  Skin: No obvious rashes or lesions present. Skin and hair texture normal  Psychiatric: Judgement and Insight:  judgement and insight appear normal  Neuro: Normal without focal findings, speech is normal normal, speech is spontaneous  Chest: No labored breathing, no chest deformity, no stridor  Musculoskeletal: No joint deformity, swelling     11/21 monofilament decreased, scar , foot deformity    Assessment/Plan    1. DM    This 41 yrs old male has DM. Complicated by retinopathy, microalbuminuria. Most likely it is Type 1 DM. HARIS antibodies and islet cell antibodies negative.  c-peptide  Undetectable. Uncontrolled. Patient resistant to changes in his diabetes regimen per note    Discussed the impact of poor glycemic control on his health. BS are variable, no fixed pattern.    continue same dose  A1c near goal 7-8% given co morbidities and hypoglycemia in the past    He says he sleeps all day and eats at different times of the day and not eating proper meals.     -Continue  Lantus insulin  32 units QHS    I:C to 1:8    Had allergic reaction to sensor adhesive Lady Henao), states did not tolerate Dexcom either    He does not want to use insulin pump due to issue of adhesive    Updated on eye exam. continue follow-up with the ophthalmologist.states decreased vision, is legally blind  Has microalbuminuria. On Ace-inhibitor. Cr 1.5 sees nephrology   Has peripheral neuropathy on exam. Discussed foot care    Non-smoker. BP elevated    Stopped trulicity due to low glucose    2. CKD Follows with nephrologist.       3. Hyperlipidemia. LDL is high. He has refused to take statins, reports side effects  He understands the high risk of heart disease and stroke with untreated hyperlipidemia. On fish oil. 4. Foot ulcers. Healed. Managed by podiatry.  Dr. [facility])   Order diabetic shoes

## 2022-12-08 ENCOUNTER — OFFICE VISIT (OUTPATIENT)
Dept: INFECTIOUS DISEASES | Age: 47
End: 2022-12-08
Payer: MEDICARE

## 2022-12-08 VITALS
OXYGEN SATURATION: 97 % | HEART RATE: 101 BPM | BODY MASS INDEX: 26.28 KG/M2 | DIASTOLIC BLOOD PRESSURE: 61 MMHG | WEIGHT: 204.8 LBS | HEIGHT: 74 IN | TEMPERATURE: 97.5 F | SYSTOLIC BLOOD PRESSURE: 98 MMHG

## 2022-12-08 DIAGNOSIS — M86.662 CHRONIC OSTEOMYELITIS OF LEFT LOWER LEG (HCC): Primary | ICD-10-CM

## 2022-12-08 PROCEDURE — 3078F DIAST BP <80 MM HG: CPT | Performed by: INTERNAL MEDICINE

## 2022-12-08 PROCEDURE — 99214 OFFICE O/P EST MOD 30 MIN: CPT | Performed by: INTERNAL MEDICINE

## 2022-12-08 PROCEDURE — 3074F SYST BP LT 130 MM HG: CPT | Performed by: INTERNAL MEDICINE

## 2022-12-08 NOTE — PROGRESS NOTES
Infectious Diseases Follow-up Note    Reason for Consult:   L ankle / lower leg osteomyelitis  Requesting Physician:   Dr Ramos Stephens  Primary Care Physician:  Natacha Jain MD  History Obtained From:   Patient, EPIC    CHIEF COMPLAINT:    Chief Complaint   Patient presents with    Follow-up     Chronic Osteomyelitis L Tibia -nk       HISTORY OF PRESENT ILLNESS:      Pt sustained fall 1/28 and had ORIF. Pt developed wound dehiscence, drainage. On 8/28, hardware removed and external fixator placed (Garon Hammans). Culture + MSSA. Pt had PICC placed and started on iv ceftriaxone at [facility] which he received until approximately 10/17 (7 weeks). He has been on po keflex 500 tid since this time. He reports occasional GI upset. Ortho visit 1/23, had x-ray, given clearance for full weight bearing. Last visit 2/17/14, pt reported L leg pain with ambulating. He attributes pain to muscle weakness. He has not had any new or worse redness nor drainage. BS control improved (sees Endocrinologist). Seen 4/16/14, pt presents with sister. He had L fibula fracture (proximal to ankle implant) and had operative ORIF 3/31/14 with removal of ankle implant and hardware placed (see report). Seen 7/21/14, pt presents with sister, in wheelchair (non-ambulatory), has bone stimulator (useds for 20 min once a day). He has chronic pain that varies, worse at night. Taking po keflex with occasional GI upset. Hosp at [facility] 8/17 - 25/14  with CNS infection - CSF , no definitely diagnosis (Enterovirus PCR neg, HSV PCR neg, cult neg). Seen 11/3, pt presents with sister, feeling well. He continues to be non-weight bearing, on keflex 500 tid. Last Ortho 10/7 - cont with bone stimulator. He had LE CT (tibial fx with \"moderate bridging bone\". + chronic pain. Seen 2/2/15, pt presented with sister. Had bone stimulator, ambulating with walking stick, taking keflex 500 tid.     Seen 5/4/15, pt c/o worse LLE pain, feels \"like glass\". L tibia healed though still uses a bone stimulator (external unit, once a day x 20 min). He is now walking, 'weak', uses stick. + pain. Taking gabapentin, patch (bu). He is taking keflex 500 - now bid. Seen 11/2/15, pt reports he has ongoing LLE pain. He is taking keflex 500 bid. He is ambulating with a stick. He has neuropathy, fibromyalgia. He has a L 5th toe ulcer, sees Dr Kiersten Ramirez, wearing a surgical shoe. Seen 5/2/16, pt has mult health issues:  L lower leg wound, L foot plantar ulcer,   He is taking and tolerating keflex 500 bid. Still ambulating with a stick. Seen 12/15/16, pt reports he has R DM foot infection, admitted to [facility] 10/28-11/5, had debridement, Dr Roscoe Epley (podiatry). Seen and followed by ID, Dr Georgia Swift. Followed by Dr Roscoe Epley at [facility] [address], receiving HBO. Receiving iv oritavancin on 12/1 and 12/14. R foot wound is slowing healing. L lower leg ok - pain with standing, ambulating. Keflex on hold while getting oritavancin infusions. Seen 6/15/17, pt reports LLE pain, same. R DM foot infection is ongoing problem. Completed HBO, still has a wound, still seen Dr Cassandra De La Fuente at [facility] [address]. Now taking both keflex and ciprofloxacin. Seen 2/8/18:  L LE still with pain, same. Taking keflex bid as prescribed. R DM foot healed and off iv antibiotic (for 1 mo). Seen 2/7/19:  L LE still with pain, same. Taking keflex bid as prescribed. Difficulty with heat regulation - chills or hot at times    Seen 6/4/20:  Video Visit (consent obtained, pt at home, doxy. me - disclaimer at bottom)  L LE still with pain, same. Taking keflex bid as prescribed. Pt has had 'bowel issue', occasional 'upset' - frequent stools. R foot healed. Today 12/8/22:   L LE still with pain, same. Taking Dicloxacillin bid as prescribed.   Occasional GI sx (not as bad as when taking cephalexin)      Past Medical History:   Diagnosis Date    Acute respiratory failure ([facility] [address] 8/18/2014 Asthma     Blood pressure instability     Chronic pain syndrome     Detached retina, bilateral     laser tx right eye    Diabetes mellitus ([address]     uncontrolled     Insomnia     [facility]    Neuropathic pain     Osteomyelitis ([facility] [address]     Osteomyelitis of tibia ([address]     left    Pain of left lower extremity     Peripheral neuropathy         Past Surgical History:   Procedure Laterality Date    ANKLE FRACTURE SURGERY Left 1/28/13    Dr. Damien Lynn  August 2013    left tibia with external fixation device    EYE SURGERY      retina reattachment left eye x 3 holes repairs    EYE SURGERY Right 9-27-13    retal detachment    LEG SURGERY Left 3/31/14    ORIF left fibula and tibia    SD EGD FLEXIBLE FOREIGN BODY REMOVAL N/A 9/21/2018    EGD FOREIGN BODY REMOVAL performed by Khushbu Rendon MD at [address] [place] [address]      with external device inplace    UPPER GASTROINTESTINAL ENDOSCOPY N/A 9/21/2018    EGD BIOPSY performed by Khushbu Rendon MD at [address] [place] [address] 9/25/2021    EGD BIOPSY performed by Cristobal Corbett MD at [facility]       Current Outpatient Medications   Medication Sig Dispense Refill    LANTUS 100 UNIT/ML injection vial INJECT 15 UNITS UNDER THE SKIN twice daily before breakfast and nightly. Hold nightly dose if PM BG<90 30 mL 3    insulin glulisine (APIDRA) 100 UNIT/ML injection INJECT 8 TO 12 UNITS UNDER THE SKIN THREE TIMES A DAY WITH MEALS PER SLIDING SCALE 10 Adjustable Dose Pre-filled Pen Syringe 3    traMADol (ULTRAM) 50 MG tablet Take 1 tablet by mouth 2 times daily as needed for Pain for up to 28 days.  56 tablet 0    topiramate (TOPAMAX) 100 MG tablet Take 1 tablet by mouth 2 times daily 60 tablet 1    Erenumab-aooe (AIMOVIG, 140 MG DOSE,) 70 MG/ML SOAJ Inject 140 mLs into the skin every 30 days 1 Adjustable Dose Pre-filled Pen Syringe 1    DULoxetine (CYMBALTA) 60 MG extended release capsule Take 1 capsule by mouth daily 30 capsule 1    Rimegepant Sulfate (NURTEC) 75 MG TBDP Take 1 tablet daily, may repeat in 24 hours PRN 16 tablet 1    gabapentin (NEURONTIN) 400 MG capsule Take 1 capsule by mouth 2 times daily for 30 days.  60 capsule 1    GVOKE HYPOPEN 2-PACK 1 MG/0.2ML SOAJ INJECT AS NEEDED FOR LOW BLOOD SUGAR 0.4 mL 3    SUMAtriptan (IMITREX) 50 MG tablet Take one tab po at the start of migraine PRN max 1 every 24 hours 9 tablet 1    Insulin Syringe-Needle U-100 (BD INSULIN SYRINGE U/F) 31G X 5/16\" 0.3 ML MISC USE ONE SYRINGE FOUR TIMES A DAY BEFORE MEALS AND ONCE NIGHTLY 120 each 9    cefadroxil (DURICEF) 500 MG capsule TAKE ONE CAPSULE BY MOUTH DAILY 30 capsule 5    Insulin Syringe-Needle U-100 (B-D INS SYR ULTRAFINE 1CC/31G) 31G X 5/16\" 1 ML MISC INJECT USING INSULIN ONCE DAILY 30 each 10    dicloxacillin (DYNAPEN) 500 MG capsule TAKE ONE CAPSULE BY MOUTH TWICE A DAY 60 capsule 11    Continuous Blood Gluc Sensor (DEXCOM G6 SENSOR) MISC USE ONE SENSOR AND CHANGE EVERY 10 DAYS 2 each 3    Diabetic Shoe MISC by Does not apply route 1 each 0    Continuous Blood Gluc  (DEXCOM G6 ) LINDA USE AS NEEDED TO CHECK BLOOD SUGAR 1 each 2    aspirin 81 MG chewable tablet Take 1 tablet by mouth daily 30 tablet 0    pantoprazole (PROTONIX) 40 MG tablet Take 1 tablet by mouth 2 times daily 60 tablet 1    prednisoLONE sodium phosphate (INFLAMASE FORTE) 1 % ophthalmic solution 1 drop 4 times daily       prednisoLONE acetate (PRED FORTE) 1 % ophthalmic suspension Place 1 drop into the right eye three times daily 3 times daily x 1 week then 2x daily x2 weeks, then 1 daily thereafter       ketorolac (ACULAR) 0.5 % ophthalmic solution Place 1 drop into the right eye 3 times daily 3 times daily x 1week, then 2 x daily x 2 weeks, then 1 daily thereafter      Continuous Blood Gluc Transmit (DEXCOM G6 TRANSMITTER) MISC USE TO CHECK BLOOD SUGAR 2 each 3    blood glucose monitor kit and supplies Dispense sufficient amount for indicated testing frequency plus additional to accommodate PRN testing needs. Dispense all needed supplies to include: monitor, strips, lancing device, lancets, control solutions, alcohol swabs. 1 kit 0    blood glucose test strips (ACCU-CHEK CAPO PLUS) strip 4 times a day As needed. 150 each 3    Handicap Placard MISC by Does not apply route Diagnosis: Type 1 diabetes. Expires 4/13/23. 1 each 0    blood glucose test strips (ACCU-CHEK CAPO PLUS) strip 1 each by In Vitro route daily Test blood glucose 10 times daily Dx Code E10.8 300 each 10    blood glucose test strips (ACCU-CHEK CAPO PLUS) strip USE ONE STRIP TO TEST THREE TIMES A  strip 4    Accu-Chek FastClix Lancets MISC 1 each by Does not apply route daily Test blood glucose 10 times daily Dx Code E 10.8 900 each 2    triamcinolone (KENALOG) 0.1 % ointment Apply to thickened affected areas PRN sparingly for flares no longer than 2 weeks at one time, do not apply to cleared skin 80 g 0    Insulin Syringe-Needle U-100 (BD INSULIN SYRINGE U/F) 31G X 5/16\" 0.5 ML MISC Inject 1 each into the skin 4 times daily DX CODE E 10.22 120 each 9    fluticasone (FLONASE) 50 MCG/ACT nasal spray SPRAY TWO SPRAYS IN EACH NOSTRIL DAILY (Patient taking differently: daily as needed) 16 g 4    hydrocortisone 2.5 % cream Apply topically 2 times daily. 30 g 0    Blood Glucose Monitoring Suppl (ACCU-CHEK CAPO PLUS) w/Device KIT 1 each by Does not apply route daily Test blood sugar 10 times daily Dx code E 10.8 1 kit 10    GLUCAGON EMERGENCY 1 MG injection INJECT 1MG INTO THE MUSCLE AS NEEDED 1 kit 3    Multiple Vitamin (DAILY KITTY) TABS TAKE ONE TABLET BY MOUTH DAILY 30 tablet 5    Cholecalciferol (VITAMIN D3) 5000 units CAPS Take 1 capsule by mouth Daily 30 capsule 3    Dextromethorphan-Guaifenesin (MUCINEX DM)  MG TB12 Cough and congestion.  (Patient taking differently: as needed Cough and congestion.) 60 tablet 1 omega-3 acid ethyl esters (LOVAZA) 1 G capsule TAKE TWO CAPSULES BY MOUTH TWICE DAILY 120 capsule 5    MUCUS RELIEF DM  MG TABS TAKE ONE BY MOUTH DAILY AS NEEDED 30 tablet 1    Alcohol Swabs PADS Use as needed for insulin injection. 100 each 5    Probiotic Product (ACIDOPHILUS PROBIOTIC) CAPS capsule TAKE ONE CAPSULE BY MOUTH DAILY 28 capsule 4    polyvinyl alcohol (LIQUIFILM TEARS) 1.4 % ophthalmic solution 1 drop as needed      propranolol (INDERAL) 80 MG tablet Take 40 mg by mouth 2 times daily For migraines       Flaxseed, Linseed, (FLAX PO) Take 14 g by mouth daily as needed       potassium chloride (KLOR-CON M) 10 MEQ extended release tablet Take 1 tablet by mouth 2 times daily 60 tablet 0     No current facility-administered medications for this visit. Allergies:  Tegaderm ag mesh 2\"x2\" [wound dressings], Codeine, Tape [adhesive tape], and Other    Social History:    TOBACCO:   None    ETOH:    None    DRUGS:    None    MARITAL STATUS:    Single   OCCUPATION:         Unemployed        Patient currently lives mother and sister Ju Bueno)    Family History:   No immunodeficiency     REVIEW OF SYSTEMS:    No fever / chills / sweats. No weight loss. No visual change, eye pain, eye discharge. No oral lesion, sore throat, dysphagia. Denies cough / sputum. Denies chest pain, palpitations. Denies n / v / abd pain. No diarrhea. Denies dysuria or change in urinary function. Denies joint swelling or pain. No myalgia, arthralgia. Denies focal weakness, sensory change or other neurologic symptom  No lymph node swelling or tenderness. No symptoms endocrinopathy. No symptoms hematologic disease. PHYSICAL EXAM:    Vitals:  See intake vitals including weight    GENERAL: No apparent distress.     HEENT: Membranes moist, no conjunctival changes, no oral lesion  NECK:  Supple, no masses  LYMPH: No adenopathy   LUNGS: Clear b/l, no rales, no dullness  CARDIAC: RRR, no murmur appreciated  ABD:  + BS, soft / NT, no masses  EXT:  LLE with healed scars, mild pedal edema. No erythema;   NEURO: No focal neurologic findings  PSYCH: Orientation, sensorium, mood normal  Wound:  R foot     DATA:    7/2/14   ESR 36, CRP 6.8  10/9/14 ESR 42, CRP 10.4  1/22/15 ESR 61, CRP 9.5  5/2/16  ESR 33, CRP 16.8  10/21/16 ESR 67, CRP 37.3    10/7/2014 CT scan: fracture line in the tibial shaft visible, moderate bridging bone [per Dr Ellis Ao note]. IMPRESSION    # DM, neuropathy, CKD, LLE trauma - 1/2013 bimalleolar fracture. # L LE hardware-assoc infection, s/p removal hardware 8/28, cult + MSSA. Pt had iv ceftriaxone to mid October. He has been on Cephalexin 500 po tid since that time. Course complicated by keanu-prosthetic fracture 3/2014. Pt has chronic tibia osteomyelitis and should remain on antibiotic, especial given limb threaten problem with non-union. Pt had fibula fracture requiring ORIF 3/31. He now has ext fixator off, has bone stimulator. # R DM foot infection[facility] [address] [facility] / ID - wound healed 6/4/20 per pt     # GI side effects with keflex 6/4/20 - changed to dicloxacillin    RECOMMENDATIONS:      1. Cont Dicloxacillin 500 bid    2. Call in 1-2 weeks to inform us if med change helped     Other providers Pain (Doc), Podiatry Mildred Yoo), PCP Sivakumar Miranda), DM (Dr Patricia Diaz) - reviewed upcoming appointments (will see Laura Escobar as needed)    Return in 1 year    - Spent over 30 minutes on visit (including history, physical exam, review of data, development and implementation of treatment plan and coordination of care. - Over 50% of time spent with pt counseling and education.     Henry Patel MD

## 2023-01-23 DIAGNOSIS — N18.30 TYPE 1 DM WITH CKD STAGE 3 AND HYPERTENSION (HCC): Primary | ICD-10-CM

## 2023-01-23 DIAGNOSIS — I12.9 TYPE 1 DM WITH CKD STAGE 3 AND HYPERTENSION (HCC): Primary | ICD-10-CM

## 2023-01-23 DIAGNOSIS — E10.22 TYPE 1 DM WITH CKD STAGE 3 AND HYPERTENSION (HCC): Primary | ICD-10-CM

## 2023-01-26 ENCOUNTER — OFFICE VISIT (OUTPATIENT)
Dept: PAIN MANAGEMENT | Age: 48
End: 2023-01-26
Payer: MEDICARE

## 2023-01-26 VITALS
BODY MASS INDEX: 25.94 KG/M2 | HEART RATE: 82 BPM | DIASTOLIC BLOOD PRESSURE: 85 MMHG | WEIGHT: 202 LBS | SYSTOLIC BLOOD PRESSURE: 135 MMHG

## 2023-01-26 DIAGNOSIS — M80.862S PATHOLOGICAL FRACTURE OF LEFT TIBIA DUE TO OTHER OSTEOPOROSIS, SEQUELA: ICD-10-CM

## 2023-01-26 DIAGNOSIS — L97.522 ULCER OF LEFT FOOT, WITH FAT LAYER EXPOSED (HCC): ICD-10-CM

## 2023-01-26 DIAGNOSIS — G63 POLYNEUROPATHY ASSOCIATED WITH UNDERLYING DISEASE (HCC): ICD-10-CM

## 2023-01-26 DIAGNOSIS — M79.2 NEUROPATHIC PAIN: ICD-10-CM

## 2023-01-26 DIAGNOSIS — G62.9 PERIPHERAL POLYNEUROPATHY: ICD-10-CM

## 2023-01-26 DIAGNOSIS — M79.18 MYOFASCIAL PAIN: ICD-10-CM

## 2023-01-26 DIAGNOSIS — G89.4 CHRONIC PAIN SYNDROME: ICD-10-CM

## 2023-01-26 PROCEDURE — 99213 OFFICE O/P EST LOW 20 MIN: CPT | Performed by: INTERNAL MEDICINE

## 2023-01-26 PROCEDURE — 3074F SYST BP LT 130 MM HG: CPT | Performed by: INTERNAL MEDICINE

## 2023-01-26 PROCEDURE — 3078F DIAST BP <80 MM HG: CPT | Performed by: INTERNAL MEDICINE

## 2023-01-26 RX ORDER — GABAPENTIN 400 MG/1
400 CAPSULE ORAL 2 TIMES DAILY
Qty: 60 CAPSULE | Refills: 1 | Status: SHIPPED | OUTPATIENT
Start: 2023-01-26 | End: 2023-02-25

## 2023-01-26 RX ORDER — SUMATRIPTAN 50 MG/1
TABLET, FILM COATED ORAL
Qty: 9 TABLET | Refills: 1 | Status: SHIPPED | OUTPATIENT
Start: 2023-01-26

## 2023-01-26 RX ORDER — ERENUMAB-AOOE 70 MG/ML
INJECTION SUBCUTANEOUS
Qty: 1 ADJUSTABLE DOSE PRE-FILLED PEN SYRINGE | Refills: 1 | Status: SHIPPED | OUTPATIENT
Start: 2023-01-26

## 2023-01-26 RX ORDER — TOPIRAMATE 100 MG/1
100 TABLET, FILM COATED ORAL 2 TIMES DAILY
Qty: 60 TABLET | Refills: 1 | Status: SHIPPED | OUTPATIENT
Start: 2023-01-26

## 2023-01-26 RX ORDER — TRAMADOL HYDROCHLORIDE 50 MG/1
50 TABLET ORAL 2 TIMES DAILY PRN
Qty: 56 TABLET | Refills: 0 | Status: SHIPPED | OUTPATIENT
Start: 2023-01-26 | End: 2023-02-23

## 2023-01-26 NOTE — PROGRESS NOTES
Aleksandra Eliezerclaudytopheron  1975  [id number]      HISTORY OF PRESENT ILLNESS:  Mr. Frank Song is a 41 y.o. male returns for a follow up visit for pain management  He has a diagnosis of   1. Chronic pain syndrome    2. Neuropathic pain    3. Myofascial pain    4. Peripheral polyneuropathy    5. Polyneuropathy associated with underlying disease ([address]    6. Ulcer of left foot, with fat layer exposed ([facility] [address]    7. Pathological fracture of left tibia due to other osteoporosis, sequela    . He complains of pain in the  head, neck, bilateral elbows, mid back, lower back with radiation to the bilateral arms, bilateral hands, left lower leg, left ankle, left foot  He rates the pain 10/10 and describes it as sharp, aching, burning, numbness, pins and needles. Current treatment regimen has helped relieve about 10% of the pain. He denies any side effects from the current pain regimen. Patient reports that since the last follow up visit the physical functioning is unchanged, family/social relationships are unchanged, mood is better sleep patterns are better, and that the overall functioning is better. Patient denies misusing/abusing his narcotic pain medications or using any illegal drugs. There are No indicators for possible drug abuse, addiction or diversion problems.  complains he has been having some increase muscle twitching and spasms. He states he has been more active. He reports he is using Tramadol 2 per day along with other adjuvants. He mentions his headache symptoms have been manageable. ALLERGIES: Patients list of allergies were reviewed     MEDICATIONS: Mr. Frank Song list of medications were reviewed. His current medications are   Outpatient Medications Prior to Visit   Medication Sig Dispense Refill    Handicap Placard MISC by Does not apply route Diagnosis: Type 1 diabetes.     Expires: 1/23/26. 1 each 0    dicloxacillin (DYNAPEN) 500 MG capsule Take 1 capsule by mouth 2 times daily 180 capsule 11    LANTUS 100 UNIT/ML injection vial INJECT 15 UNITS UNDER THE SKIN twice daily before breakfast and nightly.  Hold nightly dose if PM BG<90 30 mL 3    insulin glulisine (APIDRA) 100 UNIT/ML injection INJECT 8 TO 12 UNITS UNDER THE SKIN THREE TIMES A DAY WITH MEALS PER SLIDING SCALE 10 Adjustable Dose Pre-filled Pen Syringe 3    DULoxetine (CYMBALTA) 60 MG extended release capsule Take 1 capsule by mouth daily 30 capsule 1    Rimegepant Sulfate (NURTEC) 75 MG TBDP Take 1 tablet daily, may repeat in 24 hours PRN 16 tablet 1    GVOKE HYPOPEN 2-PACK 1 MG/0.2ML SOAJ INJECT AS NEEDED FOR LOW BLOOD SUGAR 0.4 mL 3    Insulin Syringe-Needle U-100 (BD INSULIN SYRINGE U/F) 31G X 5/16\" 0.3 ML MISC USE ONE SYRINGE FOUR TIMES A DAY BEFORE MEALS AND ONCE NIGHTLY 120 each 9    Insulin Syringe-Needle U-100 (B-D INS SYR ULTRAFINE 1CC/31G) 31G X 5/16\" 1 ML MISC INJECT USING INSULIN ONCE DAILY 30 each 10    Continuous Blood Gluc Sensor (DEXCOM G6 SENSOR) MISC USE ONE SENSOR AND CHANGE EVERY 10 DAYS 2 each 3    Diabetic Shoe MISC by Does not apply route 1 each 0    Continuous Blood Gluc  (DEXCOM G6 ) LINDA USE AS NEEDED TO CHECK BLOOD SUGAR 1 each 2    aspirin 81 MG chewable tablet Take 1 tablet by mouth daily 30 tablet 0    pantoprazole (PROTONIX) 40 MG tablet Take 1 tablet by mouth 2 times daily 60 tablet 1    prednisoLONE sodium phosphate (INFLAMASE FORTE) 1 % ophthalmic solution 1 drop 4 times daily       prednisoLONE acetate (PRED FORTE) 1 % ophthalmic suspension Place 1 drop into the right eye three times daily 3 times daily x 1 week then 2x daily x2 weeks, then 1 daily thereafter       ketorolac (ACULAR) 0.5 % ophthalmic solution Place 1 drop into the right eye 3 times daily 3 times daily x 1week, then 2 x daily x 2 weeks, then 1 daily thereafter      Continuous Blood Gluc Transmit (DEXCOM G6 TRANSMITTER) MISC USE TO CHECK BLOOD SUGAR 2 each 3    blood glucose monitor kit and supplies Dispense sufficient amount for indicated testing frequency plus additional to accommodate PRN testing needs. Dispense all needed supplies to include: monitor, strips, lancing device, lancets, control solutions, alcohol swabs. 1 kit 0    blood glucose test strips (ACCU-CHEK CAPO PLUS) strip 4 times a day As needed. 150 each 3    Handicap Placard MISC by Does not apply route Diagnosis: Type 1 diabetes. Expires 4/13/23. 1 each 0    blood glucose test strips (ACCU-CHEK CAPO PLUS) strip 1 each by In Vitro route daily Test blood glucose 10 times daily Dx Code E10.8 300 each 10    blood glucose test strips (ACCU-CHEK CAPO PLUS) strip USE ONE STRIP TO TEST THREE TIMES A  strip 4    Accu-Chek FastClix Lancets MISC 1 each by Does not apply route daily Test blood glucose 10 times daily Dx Code E 10.8 900 each 2    triamcinolone (KENALOG) 0.1 % ointment Apply to thickened affected areas PRN sparingly for flares no longer than 2 weeks at one time, do not apply to cleared skin 80 g 0    Insulin Syringe-Needle U-100 (BD INSULIN SYRINGE U/F) 31G X 5/16\" 0.5 ML MISC Inject 1 each into the skin 4 times daily DX CODE E 10.22 120 each 9    fluticasone (FLONASE) 50 MCG/ACT nasal spray SPRAY TWO SPRAYS IN EACH NOSTRIL DAILY (Patient taking differently: daily as needed) 16 g 4    hydrocortisone 2.5 % cream Apply topically 2 times daily. 30 g 0    Blood Glucose Monitoring Suppl (ACCU-CHEK CAPO PLUS) w/Device KIT 1 each by Does not apply route daily Test blood sugar 10 times daily Dx code E 10.8 1 kit 10    GLUCAGON EMERGENCY 1 MG injection INJECT 1MG INTO THE MUSCLE AS NEEDED 1 kit 3    Multiple Vitamin (DAILY KITTY) TABS TAKE ONE TABLET BY MOUTH DAILY 30 tablet 5    Cholecalciferol (VITAMIN D3) 5000 units CAPS Take 1 capsule by mouth Daily 30 capsule 3    Dextromethorphan-Guaifenesin (MUCINEX DM)  MG TB12 Cough and congestion.  (Patient taking differently: as needed Cough and congestion.) 60 tablet 1    omega-3 acid ethyl esters (LOVAZA) 1 G capsule TAKE TWO CAPSULES BY MOUTH TWICE DAILY 120 capsule 5    MUCUS RELIEF DM  MG TABS TAKE ONE BY MOUTH DAILY AS NEEDED 30 tablet 1    Alcohol Swabs PADS Use as needed for insulin injection. 100 each 5    Probiotic Product (ACIDOPHILUS PROBIOTIC) CAPS capsule TAKE ONE CAPSULE BY MOUTH DAILY 28 capsule 4    polyvinyl alcohol (LIQUIFILM TEARS) 1.4 % ophthalmic solution 1 drop as needed      propranolol (INDERAL) 80 MG tablet Take 40 mg by mouth 2 times daily For migraines       Flaxseed, Linseed, (FLAX PO) Take 14 g by mouth daily as needed       traMADol (ULTRAM) 50 MG tablet Take 1 tablet by mouth 2 times daily as needed for Pain for up to 28 days. 56 tablet 0    topiramate (TOPAMAX) 100 MG tablet Take 1 tablet by mouth 2 times daily 60 tablet 1    Erenumab-aooe (AIMOVIG, 140 MG DOSE,) 70 MG/ML SOAJ Inject 140 mLs into the skin every 30 days 1 Adjustable Dose Pre-filled Pen Syringe 1    gabapentin (NEURONTIN) 400 MG capsule Take 1 capsule by mouth 2 times daily for 30 days. 60 capsule 1    SUMAtriptan (IMITREX) 50 MG tablet Take one tab po at the start of migraine PRN max 1 every 24 hours 9 tablet 1    potassium chloride (KLOR-CON M) 10 MEQ extended release tablet Take 1 tablet by mouth 2 times daily 60 tablet 0     No facility-administered medications prior to visit. REVIEW OF SYSTEMS:    Respiratory: Negative for apnea, chest tightness and shortness of breath or change in baseline breathing. PHYSICAL EXAM:   Nursing note and vitals reviewed. /85   Pulse 82   Wt 202 lb (91.6 kg)   BMI 25.94 kg/m²   Constitutional: He appears well-developed and well-nourished. No acute distress. Cardiovascular: Normal rate, regular rhythm, normal heart sounds, and does not have murmur. Pulmonary/Chest: Effort normal. No respiratory distress. He does not have wheezes in the lung fields. He has no rales. Neurological/Psychiatric:He is alert and oriented to person, place, and time.  Coordination is  normal.  His mood isAppropriate and affect is Neutral/Euthymic(normal) . His  Other: using a cane  IMPRESSION:   1. Chronic pain syndrome    2. Neuropathic pain    3. Myofascial pain    4. Peripheral polyneuropathy    5. Polyneuropathy associated with underlying disease ([address]    6. Ulcer of left foot, with fat layer exposed ([facility] [address]    7. Pathological fracture of left tibia due to other osteoporosis, sequela        PLAN:  Informed verbal consent was obtained  -[place] was obtained and reviewed  for the last one year and no indicators of drug misuse  were found. Any other controlled substance prescriptions  seen on the record have been accounted for, I am aware of the patient receiving these medications. Fatmata Newsome [place] record will be rechecked as part of office protocol. -ROM/Stretching exercises as advised   -Continue with Ultram 50 mg BID PRN   -He was advised to increase fluids ( 5-7  glasses of fluid daily), limit caffeine, avoid cheese products, increase dietary fiber, increase activity and exercise as tolerated and relax regularly and enjoy meals   -Walking as tolerated    -he was advised  to avoid using too many OTC analgesics to control the headaches, avoid chocolates, increased caffeine, cheeses and MSG nitrite containing foods, cigarette smoking. To avoid bright lights, strong smells and skipping meals. Current Outpatient Medications   Medication Sig Dispense Refill    topiramate (TOPAMAX) 100 MG tablet Take 1 tablet by mouth 2 times daily 60 tablet 1    Erenumab-aooe (AIMOVIG, 140 MG DOSE,) 70 MG/ML SOAJ Inject 140 mLs into the skin every 30 days 1 Adjustable Dose Pre-filled Pen Syringe 1    traMADol (ULTRAM) 50 MG tablet Take 1 tablet by mouth 2 times daily as needed for Pain for up to 28 days. 56 tablet 0    gabapentin (NEURONTIN) 400 MG capsule Take 1 capsule by mouth 2 times daily for 30 days.  60 capsule 1    SUMAtriptan (IMITREX) 50 MG tablet Take one tab po at the start of migraine PRN max 1 every 24 hours 9 tablet 1    Handicap Placard MISC by Does not apply route Diagnosis: Type 1 diabetes. Expires: 1/23/26. 1 each 0    dicloxacillin (DYNAPEN) 500 MG capsule Take 1 capsule by mouth 2 times daily 180 capsule 11    LANTUS 100 UNIT/ML injection vial INJECT 15 UNITS UNDER THE SKIN twice daily before breakfast and nightly.  Hold nightly dose if PM BG<90 30 mL 3    insulin glulisine (APIDRA) 100 UNIT/ML injection INJECT 8 TO 12 UNITS UNDER THE SKIN THREE TIMES A DAY WITH MEALS PER SLIDING SCALE 10 Adjustable Dose Pre-filled Pen Syringe 3    DULoxetine (CYMBALTA) 60 MG extended release capsule Take 1 capsule by mouth daily 30 capsule 1    Rimegepant Sulfate (NURTEC) 75 MG TBDP Take 1 tablet daily, may repeat in 24 hours PRN 16 tablet 1    GVOKE HYPOPEN 2-PACK 1 MG/0.2ML SOAJ INJECT AS NEEDED FOR LOW BLOOD SUGAR 0.4 mL 3    Insulin Syringe-Needle U-100 (BD INSULIN SYRINGE U/F) 31G X 5/16\" 0.3 ML MISC USE ONE SYRINGE FOUR TIMES A DAY BEFORE MEALS AND ONCE NIGHTLY 120 each 9    Insulin Syringe-Needle U-100 (B-D INS SYR ULTRAFINE 1CC/31G) 31G X 5/16\" 1 ML MISC INJECT USING INSULIN ONCE DAILY 30 each 10    Continuous Blood Gluc Sensor (DEXCOM G6 SENSOR) MISC USE ONE SENSOR AND CHANGE EVERY 10 DAYS 2 each 3    Diabetic Shoe MISC by Does not apply route 1 each 0    Continuous Blood Gluc  (DEXCOM G6 ) LINDA USE AS NEEDED TO CHECK BLOOD SUGAR 1 each 2    aspirin 81 MG chewable tablet Take 1 tablet by mouth daily 30 tablet 0    pantoprazole (PROTONIX) 40 MG tablet Take 1 tablet by mouth 2 times daily 60 tablet 1    prednisoLONE sodium phosphate (INFLAMASE FORTE) 1 % ophthalmic solution 1 drop 4 times daily       prednisoLONE acetate (PRED FORTE) 1 % ophthalmic suspension Place 1 drop into the right eye three times daily 3 times daily x 1 week then 2x daily x2 weeks, then 1 daily thereafter       ketorolac (ACULAR) 0.5 % ophthalmic solution Place 1 drop into the right eye 3 times daily 3 times daily x 1week, then 2 x daily x 2 weeks, then 1 daily thereafter      Continuous Blood Gluc Transmit (DEXCOM G6 TRANSMITTER) MISC USE TO CHECK BLOOD SUGAR 2 each 3    blood glucose monitor kit and supplies Dispense sufficient amount for indicated testing frequency plus additional to accommodate PRN testing needs. Dispense all needed supplies to include: monitor, strips, lancing device, lancets, control solutions, alcohol swabs. 1 kit 0    blood glucose test strips (ACCU-CHEK CAPO PLUS) strip 4 times a day As needed. 150 each 3    Handicap Placard MISC by Does not apply route Diagnosis: Type 1 diabetes. Expires 4/13/23. 1 each 0    blood glucose test strips (ACCU-CHEK CAPO PLUS) strip 1 each by In Vitro route daily Test blood glucose 10 times daily Dx Code E10.8 300 each 10    blood glucose test strips (ACCU-CHEK CAPO PLUS) strip USE ONE STRIP TO TEST THREE TIMES A  strip 4    Accu-Chek FastClix Lancets MISC 1 each by Does not apply route daily Test blood glucose 10 times daily Dx Code E 10.8 900 each 2    triamcinolone (KENALOG) 0.1 % ointment Apply to thickened affected areas PRN sparingly for flares no longer than 2 weeks at one time, do not apply to cleared skin 80 g 0    Insulin Syringe-Needle U-100 (BD INSULIN SYRINGE U/F) 31G X 5/16\" 0.5 ML MISC Inject 1 each into the skin 4 times daily DX CODE E 10.22 120 each 9    fluticasone (FLONASE) 50 MCG/ACT nasal spray SPRAY TWO SPRAYS IN EACH NOSTRIL DAILY (Patient taking differently: daily as needed) 16 g 4    hydrocortisone 2.5 % cream Apply topically 2 times daily.  30 g 0    Blood Glucose Monitoring Suppl (ACCU-CHEK CAPO PLUS) w/Device KIT 1 each by Does not apply route daily Test blood sugar 10 times daily Dx code E 10.8 1 kit 10    GLUCAGON EMERGENCY 1 MG injection INJECT 1MG INTO THE MUSCLE AS NEEDED 1 kit 3    Multiple Vitamin (DAILY KITTY) TABS TAKE ONE TABLET BY MOUTH DAILY 30 tablet 5    Cholecalciferol (VITAMIN D3) 5000 units CAPS Take 1 capsule by mouth Daily 30 capsule 3    Dextromethorphan-Guaifenesin (MUCINEX DM)  MG TB12 Cough and congestion. (Patient taking differently: as needed Cough and congestion.) 60 tablet 1    omega-3 acid ethyl esters (LOVAZA) 1 G capsule TAKE TWO CAPSULES BY MOUTH TWICE DAILY 120 capsule 5    MUCUS RELIEF DM  MG TABS TAKE ONE BY MOUTH DAILY AS NEEDED 30 tablet 1    Alcohol Swabs PADS Use as needed for insulin injection. 100 each 5    Probiotic Product (ACIDOPHILUS PROBIOTIC) CAPS capsule TAKE ONE CAPSULE BY MOUTH DAILY 28 capsule 4    polyvinyl alcohol (LIQUIFILM TEARS) 1.4 % ophthalmic solution 1 drop as needed      propranolol (INDERAL) 80 MG tablet Take 40 mg by mouth 2 times daily For migraines       Flaxseed, Linseed, (FLAX PO) Take 14 g by mouth daily as needed       potassium chloride (KLOR-CON M) 10 MEQ extended release tablet Take 1 tablet by mouth 2 times daily 60 tablet 0     No current facility-administered medications for this visit.     I will continue his current medication regimen  which is part of the above treatment schedule. It has been helping with Mr. Carson's chronic  medical problems which for this visit include:   Diagnoses of Chronic pain syndrome, Neuropathic pain, Myofascial pain, Peripheral polyneuropathy, Polyneuropathy associated with underlying disease (HCC), Ulcer of left foot, with fat layer exposed (H[facility]), and Pathological fracture of left tibia due to other osteoporosis, sequela were pertinent to this visit.   Risks and benefits of the medications and other alternative treatments  including no treatment were discussed with the patient.The common side effects of these medications were also explained to the patient.  Informed verbal consent was obtained.   Goals of current treatment regimen include improvement in pain, restoration of functioning- with focus on improvement in physical performance, general activity, work or disability,emotional distress, health care utilization and  decreased  medication consumption. Will continue to monitor progress towards achieving/maintaining therapeutic goals with special emphasis on  1. Improvement in perceived interfernce  of pain with ADL's. Ability to do home exercises independently. Ability to do household chores indoor and/or outdoor work and social and leisure activities. Improve psychosocial and physical functioning. - he is showing progression towards this treatment goal with the current regimen. He was advised against drinking alcohol with the narcotic pain medicines, advised against driving or handling machinery while adjusting the dose of medicines or if having cognitive  issues related to the current medications. Risk of overdose and death, if medicines not taken as prescribed, were also discussed. If the patient develops new symptoms or if the symptoms worsen, the patient should call the office. While transcribing every attempt was made to maintain the accuracy of the note in terms of it's contents,there may have been some errors made inadvertently. Thank you for allowing me to participate in the care of this patient.     Beverley Castro MD.    Cc: Lu Bowen MD

## 2023-02-10 ENCOUNTER — TELEPHONE (OUTPATIENT)
Dept: INFECTIOUS DISEASES | Age: 48
End: 2023-02-10

## 2023-02-13 NOTE — TELEPHONE ENCOUNTER
Pt left vm stating  \"Medication Dr Drake ordered for me in December was never received by [facility].  I'd like a call back to discuss.  You can leave me a vm with any and all information\"    I reached out to [facility] in [place]  Pharmacist stated they do have the Rx for Dicloxacillin; but at the time pt attempted to fill, it was on back order and pt has not attempted to fill since.  [facility] also did not call pt to let them know it is now available.  I asked pharmacist to order medication.  I will be available to pt on Monday    I attempted to contact pt to notify them of what I learned and the vm box is full and cannot accept any messages  
The mailbox is full and cannot accept any messages at this time
Home with home care

## 2023-02-21 ENCOUNTER — TELEPHONE (OUTPATIENT)
Dept: PAIN MANAGEMENT | Age: 48
End: 2023-02-21

## 2023-02-21 NOTE — TELEPHONE ENCOUNTER
Submitted PA for Aimovig   [facility]:[id number]  STATUS: PENDING

## 2023-02-23 ENCOUNTER — OFFICE VISIT (OUTPATIENT)
Dept: PAIN MANAGEMENT | Age: 48
End: 2023-02-23
Payer: MEDICARE

## 2023-02-23 VITALS
OXYGEN SATURATION: 100 % | HEIGHT: 74 IN | WEIGHT: 207 LBS | SYSTOLIC BLOOD PRESSURE: 116 MMHG | DIASTOLIC BLOOD PRESSURE: 67 MMHG | BODY MASS INDEX: 26.56 KG/M2 | HEART RATE: 82 BPM

## 2023-02-23 DIAGNOSIS — G43.711 CHRONIC MIGRAINE WITHOUT AURA, WITH INTRACTABLE MIGRAINE, SO STATED, WITH STATUS MIGRAINOSUS: ICD-10-CM

## 2023-02-23 DIAGNOSIS — G62.9 PERIPHERAL POLYNEUROPATHY: ICD-10-CM

## 2023-02-23 DIAGNOSIS — F51.01 PRIMARY INSOMNIA: ICD-10-CM

## 2023-02-23 DIAGNOSIS — F33.41 RECURRENT MAJOR DEPRESSIVE DISORDER, IN PARTIAL REMISSION (HCC): ICD-10-CM

## 2023-02-23 DIAGNOSIS — M79.2 NEUROPATHIC PAIN: ICD-10-CM

## 2023-02-23 DIAGNOSIS — G89.4 CHRONIC PAIN SYNDROME: ICD-10-CM

## 2023-02-23 DIAGNOSIS — T40.2X5A CONSTIPATION DUE TO OPIOID THERAPY: ICD-10-CM

## 2023-02-23 DIAGNOSIS — K59.03 CONSTIPATION DUE TO OPIOID THERAPY: ICD-10-CM

## 2023-02-23 DIAGNOSIS — G63 POLYNEUROPATHY ASSOCIATED WITH UNDERLYING DISEASE (HCC): ICD-10-CM

## 2023-02-23 DIAGNOSIS — L97.522 ULCER OF LEFT FOOT, WITH FAT LAYER EXPOSED (HCC): ICD-10-CM

## 2023-02-23 DIAGNOSIS — M80.862S PATHOLOGICAL FRACTURE OF LEFT TIBIA DUE TO OTHER OSTEOPOROSIS, SEQUELA: ICD-10-CM

## 2023-02-23 DIAGNOSIS — M79.18 MYOFASCIAL PAIN: ICD-10-CM

## 2023-02-23 PROCEDURE — 3074F SYST BP LT 130 MM HG: CPT | Performed by: INTERNAL MEDICINE

## 2023-02-23 PROCEDURE — 99214 OFFICE O/P EST MOD 30 MIN: CPT | Performed by: INTERNAL MEDICINE

## 2023-02-23 PROCEDURE — 3078F DIAST BP <80 MM HG: CPT | Performed by: INTERNAL MEDICINE

## 2023-02-23 RX ORDER — TRAMADOL HYDROCHLORIDE 50 MG/1
50 TABLET ORAL 2 TIMES DAILY PRN
Qty: 60 TABLET | Refills: 0 | Status: SHIPPED | OUTPATIENT
Start: 2023-02-23 | End: 2023-03-25

## 2023-02-23 RX ORDER — ERENUMAB-AOOE 70 MG/ML
INJECTION SUBCUTANEOUS
Qty: 1 ADJUSTABLE DOSE PRE-FILLED PEN SYRINGE | Refills: 1 | Status: SHIPPED | OUTPATIENT
Start: 2023-02-23

## 2023-02-23 RX ORDER — SUMATRIPTAN 50 MG/1
TABLET, FILM COATED ORAL
Qty: 9 TABLET | Refills: 1 | Status: SHIPPED | OUTPATIENT
Start: 2023-02-23

## 2023-02-23 RX ORDER — GABAPENTIN 400 MG/1
400 CAPSULE ORAL 2 TIMES DAILY
Qty: 60 CAPSULE | Refills: 1 | Status: SHIPPED | OUTPATIENT
Start: 2023-02-23 | End: 2023-03-25

## 2023-02-23 RX ORDER — TOPIRAMATE 100 MG/1
100 TABLET, FILM COATED ORAL 2 TIMES DAILY
Qty: 60 TABLET | Refills: 1 | Status: SHIPPED | OUTPATIENT
Start: 2023-02-23

## 2023-02-23 NOTE — PROGRESS NOTES
Estrellita Posadas  1975  [id number]    HISTORY OF PRESENT ILLNESS:  Mr. Jennifer Mueller is a 41 y.o. male returns for a follow up visit for multiple medical problems. His  presenting problems are   1. Chronic pain syndrome    2. Pathological fracture of left tibia due to other osteoporosis, sequela    3. Peripheral polyneuropathy    4. Neuropathic pain    5. Polyneuropathy associated with underlying disease ([facility] [address]    6. Chronic migraine without aura, with intractable migraine, so stated, with status migrainosus    7. Recurrent major depressive disorder, in partial remission ([facility] [address]    8. Constipation due to opioid therapy    9. Myofascial pain    10. Primary insomnia    . As per information/history obtained from the PADT(patient assessment and documentation tool) -  He complains of pain in the head, neck, lower back, and knees Left with radiation to the hands Bilateral, lower leg Left, ankles Left, and feet Left He rates the pain 10/10 and describes it as sharp, aching, pins and needles. Pain is made worse by: walking, standing, sitting. Current treatment regimen has helped relieve about 10% of the pain. He denies side effects from the current pain regimen. Patient reports that since the last follow up visit the physical functioning is unchanged, family/social relationships are unchanged, mood is better and sleep patterns are better, and that the overall functioning is unchanged. Patient denies neurological bowel or bladder. Patient denies misusing/abusing his narcotic pain medications or using any illegal drugs. There are No indicators for possible drug abuse, addiction or diversion problems. Upon obtaining the medical history from Mr. Jennifer Mueller regarding today's office visit for his presenting problems, patient states he has been doing fair, trying to walk some daily and doing balance exercises.  He says he is using Ultram along with other adjuvants he was advised  to avoid using too many OTC analgesics to control the headaches, avoid chocolates, increased caffeine, cheeses and MSG nitrite containing foods, cigarette smoking. To avoid bright lights, strong smells and skipping meals. He says he is using topamax along with Aimovig and Imitrex. Patient's  subjective report of his mood is fair. he describes occasional symptoms of depression, occasional  irritability and some mood swings. Describes his mood as being neutral and reports some pleasure in his daily activities. Reports  fair  appetite, energy and concentration. Able to function well in different aspects of his daily activities. Denies suicidal or homicidal ideation. Denies any complaints of increased tension, does   Worry sometimes and occasional  irritability  he denies any c/o increased anxiety, No c/o panic attacks or symptoms of PTSD He says he is on Cymbalta 60 mg. Patient states he sleeps well. Has normal sleep latency. Averages about 5-7 hours of sleep a night. Denies any signs of sleep apnea. Feels rested in the AM. Denies any sleep attacks during the day. Medication regimen helps with maintaining/regulating sleep. He reports his blood sugar has been okay. ALLERGIES/PAST MED/FAM/SOC HISTORY: Mr. Fartun Davenport allergies, past medical, family and social history were reviewed in the chart. Mr. Fartun Davenport current medications are   Outpatient Medications Prior to Visit   Medication Sig Dispense Refill    topiramate (TOPAMAX) 100 MG tablet Take 1 tablet by mouth 2 times daily 60 tablet 1    Erenumab-aooe (AIMOVIG, 140 MG DOSE,) 70 MG/ML SOAJ Inject 140 mLs into the skin every 30 days 1 Adjustable Dose Pre-filled Pen Syringe 1    traMADol (ULTRAM) 50 MG tablet Take 1 tablet by mouth 2 times daily as needed for Pain for up to 28 days. 56 tablet 0    gabapentin (NEURONTIN) 400 MG capsule Take 1 capsule by mouth 2 times daily for 30 days.  60 capsule 1    SUMAtriptan (IMITREX) 50 MG tablet Take one tab po at the start of migraine PRN max 1 every 24 hours 9 tablet 1    Handicap Placard MISC by Does not apply route Diagnosis: Type 1 diabetes. Expires: 1/23/26. 1 each 0    dicloxacillin (DYNAPEN) 500 MG capsule Take 1 capsule by mouth 2 times daily 180 capsule 11    LANTUS 100 UNIT/ML injection vial INJECT 15 UNITS UNDER THE SKIN twice daily before breakfast and nightly.  Hold nightly dose if PM BG<90 30 mL 3    insulin glulisine (APIDRA) 100 UNIT/ML injection INJECT 8 TO 12 UNITS UNDER THE SKIN THREE TIMES A DAY WITH MEALS PER SLIDING SCALE 10 Adjustable Dose Pre-filled Pen Syringe 3    DULoxetine (CYMBALTA) 60 MG extended release capsule Take 1 capsule by mouth daily 30 capsule 1    Rimegepant Sulfate (NURTEC) 75 MG TBDP Take 1 tablet daily, may repeat in 24 hours PRN 16 tablet 1    GVOKE HYPOPEN 2-PACK 1 MG/0.2ML SOAJ INJECT AS NEEDED FOR LOW BLOOD SUGAR 0.4 mL 3    Insulin Syringe-Needle U-100 (BD INSULIN SYRINGE U/F) 31G X 5/16\" 0.3 ML MISC USE ONE SYRINGE FOUR TIMES A DAY BEFORE MEALS AND ONCE NIGHTLY 120 each 9    Insulin Syringe-Needle U-100 (B-D INS SYR ULTRAFINE 1CC/31G) 31G X 5/16\" 1 ML MISC INJECT USING INSULIN ONCE DAILY 30 each 10    Continuous Blood Gluc Sensor (DEXCOM G6 SENSOR) MISC USE ONE SENSOR AND CHANGE EVERY 10 DAYS 2 each 3    Diabetic Shoe MISC by Does not apply route 1 each 0    Continuous Blood Gluc  (DEXCOM G6 ) LINDA USE AS NEEDED TO CHECK BLOOD SUGAR 1 each 2    aspirin 81 MG chewable tablet Take 1 tablet by mouth daily 30 tablet 0    pantoprazole (PROTONIX) 40 MG tablet Take 1 tablet by mouth 2 times daily 60 tablet 1    prednisoLONE sodium phosphate (INFLAMASE FORTE) 1 % ophthalmic solution 1 drop 4 times daily       prednisoLONE acetate (PRED FORTE) 1 % ophthalmic suspension Place 1 drop into the right eye three times daily 3 times daily x 1 week then 2x daily x2 weeks, then 1 daily thereafter       ketorolac (ACULAR) 0.5 % ophthalmic solution Place 1 drop into the right eye 3 times daily 3 times daily x 1week, then 2 x daily x 2 weeks, then 1 daily thereafter      Continuous Blood Gluc Transmit (DEXCOM G6 TRANSMITTER) MISC USE TO CHECK BLOOD SUGAR 2 each 3    blood glucose monitor kit and supplies Dispense sufficient amount for indicated testing frequency plus additional to accommodate PRN testing needs. Dispense all needed supplies to include: monitor, strips, lancing device, lancets, control solutions, alcohol swabs. 1 kit 0    blood glucose test strips (ACCU-CHEK CAPO PLUS) strip 4 times a day As needed. 150 each 3    Handicap Placard MISC by Does not apply route Diagnosis: Type 1 diabetes. Expires 4/13/23. 1 each 0    blood glucose test strips (ACCU-CHEK CAPO PLUS) strip 1 each by In Vitro route daily Test blood glucose 10 times daily Dx Code E10.8 300 each 10    blood glucose test strips (ACCU-CHEK CAPO PLUS) strip USE ONE STRIP TO TEST THREE TIMES A  strip 4    Accu-Chek FastClix Lancets MISC 1 each by Does not apply route daily Test blood glucose 10 times daily Dx Code E 10.8 900 each 2    triamcinolone (KENALOG) 0.1 % ointment Apply to thickened affected areas PRN sparingly for flares no longer than 2 weeks at one time, do not apply to cleared skin 80 g 0    Insulin Syringe-Needle U-100 (BD INSULIN SYRINGE U/F) 31G X 5/16\" 0.5 ML MISC Inject 1 each into the skin 4 times daily DX CODE E 10.22 120 each 9    fluticasone (FLONASE) 50 MCG/ACT nasal spray SPRAY TWO SPRAYS IN EACH NOSTRIL DAILY (Patient taking differently: daily as needed) 16 g 4    hydrocortisone 2.5 % cream Apply topically 2 times daily.  30 g 0    Blood Glucose Monitoring Suppl (ACCU-CHEK CAPO PLUS) w/Device KIT 1 each by Does not apply route daily Test blood sugar 10 times daily Dx code E 10.8 1 kit 10    GLUCAGON EMERGENCY 1 MG injection INJECT 1MG INTO THE MUSCLE AS NEEDED 1 kit 3    Multiple Vitamin (DAILY KITTY) TABS TAKE ONE TABLET BY MOUTH DAILY 30 tablet 5    Cholecalciferol (VITAMIN D3) 5000 units CAPS Take 1 capsule by mouth Daily 30 capsule 3 Dextromethorphan-Guaifenesin (MUCINEX DM)  MG TB12 Cough and congestion. (Patient taking differently: as needed Cough and congestion.) 60 tablet 1    omega-3 acid ethyl esters (LOVAZA) 1 G capsule TAKE TWO CAPSULES BY MOUTH TWICE DAILY 120 capsule 5    MUCUS RELIEF DM  MG TABS TAKE ONE BY MOUTH DAILY AS NEEDED 30 tablet 1    Alcohol Swabs PADS Use as needed for insulin injection. 100 each 5    Probiotic Product (ACIDOPHILUS PROBIOTIC) CAPS capsule TAKE ONE CAPSULE BY MOUTH DAILY 28 capsule 4    polyvinyl alcohol (LIQUIFILM TEARS) 1.4 % ophthalmic solution 1 drop as needed      propranolol (INDERAL) 80 MG tablet Take 40 mg by mouth 2 times daily For migraines       Flaxseed, Linseed, (FLAX PO) Take 14 g by mouth daily as needed       potassium chloride (KLOR-CON M) 10 MEQ extended release tablet Take 1 tablet by mouth 2 times daily 60 tablet 0     No facility-administered medications prior to visit. REVIEW OF SYSTEMS: .   Respiratory: Negative for shortness of breath. Cardiovascular: Negative for chest pain, palpitations  Gastrointestinal: Negative for blood in stool, abdominal distention, nausea, vomiting, abdominal pain, diarrhea,constipation. Neurological: Negative for speech difficulty, weakness and light-headedness, dizziness, tremors, sleepiness  Psychiatric/Behavioral: Negative for suicidal ideas, hallucinations, behavioral problems, self-injury, decreased concentration/cognition, agitation, confusion. PHYSICAL EXAM:   Nursing note and vitals reviewed. /67   Pulse 82   Ht 6' 2\" (1.88 m)   Wt 207 lb (93.9 kg)   SpO2 100%   BMI 26.58 kg/m²   General Appearance: Patient is well nourished, well developed, well groomed and in no acute distress. Skin: Skin is warm and dry, good turgor . No rash or lesions noted. He is not diaphoretic. Pulmonary/Chest: Effort normal. No respiratory distress or use of accessory muscles. Auscultation revealing normal air entry.  He does not have wheezes in the lung fields. He has no rales. Cardiovascular: Normal rate, regular rhythm, normal heart sounds, and does not have murmur. Exam reveals no gallop and no friction rub. Musculoskeletal / Extremities: Range of motion is normal. Gait is normal, assistive devices use: cane, crutches. He exhibits edema: none, and no tenderness. Neurological/Psychiatric:He is alert and oriented to person, place, and time. Coordination is  normal.   Judgement and Insight is normal  His mood is Appropriate and affect is Neutral/Euthymic(normal) . His behavior is normal.   thought content normal.        IMPRESSION:     1. Chronic pain syndrome - STABLE: Continue current treatment plan    2. Pathological fracture of left tibia due to other osteoporosis, sequela - STABLE: Continue current treatment plan    3. Peripheral polyneuropathy - STABLE: Continue current treatment plan    4. Neuropathic pain - STABLE: Continue current treatment plan    5. Polyneuropathy associated with underlying disease ([facility] - STABLE: Continue current treatment plan    6. Chronic migraine without aura, with intractable migraine, so stated, with status migrainosus - STABLE: Continue current treatment plan    7. Recurrent major depressive disorder, in partial remission ([facility] [address] - STABLE: Continue current treatment plan    8. Constipation due to opioid therapy - STABLE: Continue current treatment plan    9. Myofascial pain - STABLE: Continue current treatment plan    10. Primary insomnia - STABLE: Continue current treatment plan    11. Ulcer of left foot, with fat layer exposed ([facility] - STABLE: Continue current treatment plan        PLAN:  Informed verbal consent was obtained. -Urine drug screen with GC/MS for opiates and drugs of abuse was ordered and will follow up on results. -Monitor blood sugar regularly, diabetic control- adv diabetic diet. Goal for fasting blood sugars around 120.  Follow up with Endocrinologist/PCP also for on going management    -he was advised  to avoid using too many OTC analgesics to control the headaches, avoid chocolates, increased caffeine, cheeses and MSG nitrite containing foods, cigarette smoking. To avoid bright lights, strong smells and skipping meals.   -Continue with Aimovig and Topamax 100 mg BID   -Interim history reviewed   -[place] was obtained and reviewed  for the last one year and no indicators of drug misuse  were found. Any other controlled substance prescriptions  seen on the record have been accounted for, I am aware of the patient receiving these medications. Rommel Carranza [place] record will be rechecked as part of office protocol.    -he was advised proper sleep hygiene-told to avoid:use of caffeine or other stimulants after noon, alcohol use near bedtime, long or frequent naps during the day, erratic sleep schedule, heavy meals near bedtime, vigorous exercise near bedtime and use of electronic devices near bedtime   -CBT techniques- relaxation therapies such as biofeedback, mindfulness based stress reduction, imagery, cognitive restructuring, problem solving discussed with patient   -Continue with Cymblata 60 mg   -Labs ordered CBC, CMP, and TSH. Mr. Augustine Ovalle will be prescribed  the medications  listed below which are for treatment of his presenting  medical problems which for this visit include:   Diagnoses of Chronic pain syndrome, Pathological fracture of left tibia due to other osteoporosis, sequela, Peripheral polyneuropathy, Neuropathic pain, Polyneuropathy associated with underlying disease ([address] Chronic migraine without aura, with intractable migraine, so stated, with status migrainosus, Recurrent major depressive disorder, in partial remission ([address] Constipation due to opioid therapy, Myofascial pain, and Primary insomnia were pertinent to this visit.   Medications/orders associated with this visit:    Current Outpatient Medications   Medication Sig Dispense Refill    topiramate (TOPAMAX) 100 MG tablet Take 1 tablet by mouth 2 times daily 60 tablet 1    Erenumab-aooe (AIMOVIG, 140 MG DOSE,) 70 MG/ML SOAJ Inject 140 mLs into the skin every 30 days 1 Adjustable Dose Pre-filled Pen Syringe 1    traMADol (ULTRAM) 50 MG tablet Take 1 tablet by mouth 2 times daily as needed for Pain for up to 28 days. 56 tablet 0    gabapentin (NEURONTIN) 400 MG capsule Take 1 capsule by mouth 2 times daily for 30 days. 60 capsule 1    SUMAtriptan (IMITREX) 50 MG tablet Take one tab po at the start of migraine PRN max 1 every 24 hours 9 tablet 1    Handicap Placard MISC by Does not apply route Diagnosis: Type 1 diabetes. Expires: 1/23/26. 1 each 0    dicloxacillin (DYNAPEN) 500 MG capsule Take 1 capsule by mouth 2 times daily 180 capsule 11    LANTUS 100 UNIT/ML injection vial INJECT 15 UNITS UNDER THE SKIN twice daily before breakfast and nightly.  Hold nightly dose if PM BG<90 30 mL 3    insulin glulisine (APIDRA) 100 UNIT/ML injection INJECT 8 TO 12 UNITS UNDER THE SKIN THREE TIMES A DAY WITH MEALS PER SLIDING SCALE 10 Adjustable Dose Pre-filled Pen Syringe 3    DULoxetine (CYMBALTA) 60 MG extended release capsule Take 1 capsule by mouth daily 30 capsule 1    Rimegepant Sulfate (NURTEC) 75 MG TBDP Take 1 tablet daily, may repeat in 24 hours PRN 16 tablet 1    GVOKE HYPOPEN 2-PACK 1 MG/0.2ML SOAJ INJECT AS NEEDED FOR LOW BLOOD SUGAR 0.4 mL 3    Insulin Syringe-Needle U-100 (BD INSULIN SYRINGE U/F) 31G X 5/16\" 0.3 ML MISC USE ONE SYRINGE FOUR TIMES A DAY BEFORE MEALS AND ONCE NIGHTLY 120 each 9    Insulin Syringe-Needle U-100 (B-D INS SYR ULTRAFINE 1CC/31G) 31G X 5/16\" 1 ML MISC INJECT USING INSULIN ONCE DAILY 30 each 10    Continuous Blood Gluc Sensor (DEXCOM G6 SENSOR) MISC USE ONE SENSOR AND CHANGE EVERY 10 DAYS 2 each 3    Diabetic Shoe MISC by Does not apply route 1 each 0    Continuous Blood Gluc  (DEXCOM G6 ) LINDA USE AS NEEDED TO CHECK BLOOD SUGAR 1 each 2    aspirin 81 MG chewable tablet Take 1 tablet by mouth daily 30 tablet 0    pantoprazole (PROTONIX) 40 MG tablet Take 1 tablet by mouth 2 times daily 60 tablet 1    prednisoLONE sodium phosphate (INFLAMASE FORTE) 1 % ophthalmic solution 1 drop 4 times daily       prednisoLONE acetate (PRED FORTE) 1 % ophthalmic suspension Place 1 drop into the right eye three times daily 3 times daily x 1 week then 2x daily x2 weeks, then 1 daily thereafter       ketorolac (ACULAR) 0.5 % ophthalmic solution Place 1 drop into the right eye 3 times daily 3 times daily x 1week, then 2 x daily x 2 weeks, then 1 daily thereafter      Continuous Blood Gluc Transmit (DEXCOM G6 TRANSMITTER) MISC USE TO CHECK BLOOD SUGAR 2 each 3    blood glucose monitor kit and supplies Dispense sufficient amount for indicated testing frequency plus additional to accommodate PRN testing needs. Dispense all needed supplies to include: monitor, strips, lancing device, lancets, control solutions, alcohol swabs. 1 kit 0    blood glucose test strips (ACCU-CHEK CAPO PLUS) strip 4 times a day As needed. 150 each 3    [facility] Does not apply route Diagnosis: Type 1 diabetes.      Expires 4/13/23. 1 each 0    blood glucose test strips (ACCU-CHEK CAPO PLUS) strip 1 each by In Vitro route daily Test blood glucose 10 times daily Dx Code E10.8 300 each 10    blood glucose test strips (ACCU-CHEK CAPO PLUS) strip USE ONE STRIP TO TEST THREE TIMES A  strip 4    Accu-Chek FastClix Lancets MISC 1 each by Does not apply route daily Test blood glucose 10 times daily Dx Code E 10.8 900 each 2    triamcinolone (KENALOG) 0.1 % ointment Apply to thickened affected areas PRN sparingly for flares no longer than 2 weeks at one time, do not apply to cleared skin 80 g 0    Insulin Syringe-Needle U-100 (BD INSULIN SYRINGE U/F) 31G X 5/16\" 0.5 ML MISC Inject 1 each into the skin 4 times daily DX CODE E 10.22 120 each 9    fluticasone (FLONASE) 50 MCG/ACT nasal spray SPRAY TWO SPRAYS IN EACH NOSTRIL DAILY (Patient taking differently: daily as needed) 16 g 4    hydrocortisone 2.5 % cream Apply topically 2 times daily. 30 g 0    Blood Glucose Monitoring Suppl (ACCU-CHEK CAPO PLUS) w/Device KIT 1 each by Does not apply route daily Test blood sugar 10 times daily Dx code E 10.8 1 kit 10    GLUCAGON EMERGENCY 1 MG injection INJECT 1MG INTO THE MUSCLE AS NEEDED 1 kit 3    Multiple Vitamin (DAILY KITTY) TABS TAKE ONE TABLET BY MOUTH DAILY 30 tablet 5    Cholecalciferol (VITAMIN D3) 5000 units CAPS Take 1 capsule by mouth Daily 30 capsule 3    Dextromethorphan-Guaifenesin (MUCINEX DM)  MG TB12 Cough and congestion. (Patient taking differently: as needed Cough and congestion.) 60 tablet 1    omega-3 acid ethyl esters (LOVAZA) 1 G capsule TAKE TWO CAPSULES BY MOUTH TWICE DAILY 120 capsule 5    MUCUS RELIEF DM  MG TABS TAKE ONE BY MOUTH DAILY AS NEEDED 30 tablet 1    Alcohol Swabs PADS Use as needed for insulin injection. 100 each 5    Probiotic Product (ACIDOPHILUS PROBIOTIC) CAPS capsule TAKE ONE CAPSULE BY MOUTH DAILY 28 capsule 4    polyvinyl alcohol (LIQUIFILM TEARS) 1.4 % ophthalmic solution 1 drop as needed      propranolol (INDERAL) 80 MG tablet Take 40 mg by mouth 2 times daily For migraines       Flaxseed, Linseed, (FLAX PO) Take 14 g by mouth daily as needed       potassium chloride (KLOR-CON M) 10 MEQ extended release tablet Take 1 tablet by mouth 2 times daily 60 tablet 0     No current facility-administered medications for this visit. Goals of current treatment regimen include improvement in pain, restoration of functioning- with focus on improvement in physical performance, general activity, work or disability,emotional distress, health care utilization and  decreased medication consumption. Will continue to monitor progress towards achieving/maintaining therapeutic goals with special emphasis on  1. Improvement in perceived interfernce  of pain with ADL's.  Ability to do home exercises independently. Ability to do household chores indoor and/or outdoor work and social and leisure activities. To increase flexibility/ROM, strength and endurance. Improve psychosocial and physical functioning.- he is showing progression towards this treatment goal with the current regimen.   2. Improving sleep to 6-7 hours a night. Improve mood/ anxiety and depression symptoms such as crying spells, low energy, problems with concentration, motivation.- he is showing progression towards this treatment goal with the current regimen.   3. Reduction of reliance on opioid analgesia/more appropriate opioid use.- he is showing progression towards this treatment goal with the current regimen.     Risks and benefits of the medications and other alternative treatments have been/were  discussed with the patient. Any questions on the  common side effects of these medications were also answered.  He was advised against drinking alcohol with the narcotic pain medicines, advised against driving or handling machinery when  starting or adjusting the dose of medicines, feeling groggy or drowsy, or if having any cognitive issues related to the current medications. Heis fully aware of the risk of overdose and death, if medicines are misused and not taken as prescribed. If he develops new symptoms or if the symptoms worsen, he was told to call the office. .  Thank you for allowing me to participate in the care of this patient.    Kyle Ramires MD    Cc: Dr.Kent Malik MD

## 2023-02-28 DIAGNOSIS — E10.29 TYPE 1 DIABETES MELLITUS WITH MICROALBUMINURIA (HCC): Primary | ICD-10-CM

## 2023-02-28 DIAGNOSIS — R80.9 TYPE 1 DIABETES MELLITUS WITH MICROALBUMINURIA (HCC): Primary | ICD-10-CM

## 2023-03-20 ENCOUNTER — TELEPHONE (OUTPATIENT)
Dept: PHARMACY | Facility: CLINIC | Age: 48
End: 2023-03-20

## 2023-03-21 ENCOUNTER — OFFICE VISIT (OUTPATIENT)
Dept: ENDOCRINOLOGY | Age: 48
End: 2023-03-21
Payer: MEDICARE

## 2023-03-21 VITALS
BODY MASS INDEX: 25.87 KG/M2 | TEMPERATURE: 98 F | SYSTOLIC BLOOD PRESSURE: 130 MMHG | HEIGHT: 74 IN | HEART RATE: 81 BPM | WEIGHT: 201.6 LBS | OXYGEN SATURATION: 98 % | RESPIRATION RATE: 15 BRPM | DIASTOLIC BLOOD PRESSURE: 79 MMHG

## 2023-03-21 DIAGNOSIS — E78.49 OTHER HYPERLIPIDEMIA: ICD-10-CM

## 2023-03-21 DIAGNOSIS — E10.3599 PROLIFERATIVE DIABETIC RETINOPATHY ASSOCIATED WITH TYPE 1 DIABETES MELLITUS, UNSPECIFIED LATERALITY, UNSPECIFIED PROLIFERATIVE RETINOPATHY TYPE (HCC): Primary | ICD-10-CM

## 2023-03-21 DIAGNOSIS — E10.649 UNCONTROLLED TYPE 1 DIABETES MELLITUS WITH HYPOGLYCEMIA WITHOUT COMA (HCC): ICD-10-CM

## 2023-03-21 LAB — HBA1C MFR BLD: 9.4 %

## 2023-03-21 PROCEDURE — 99214 OFFICE O/P EST MOD 30 MIN: CPT | Performed by: INTERNAL MEDICINE

## 2023-03-21 PROCEDURE — 3075F SYST BP GE 130 - 139MM HG: CPT | Performed by: INTERNAL MEDICINE

## 2023-03-21 PROCEDURE — 83036 HEMOGLOBIN GLYCOSYLATED A1C: CPT | Performed by: INTERNAL MEDICINE

## 2023-03-21 PROCEDURE — 3078F DIAST BP <80 MM HG: CPT | Performed by: INTERNAL MEDICINE

## 2023-03-21 NOTE — PROGRESS NOTES
MAHSA Garcia is a 41 y.o. male here for a follow-up for management of DM    Interim:    Fair control  No severe low  Some days may not eat at all or glucose checks    Advised to monitor glucose closely as he gave 32 units lantus extra this morning    Has seen Dr. Clarence Swann    He has a PMH of DM, CKD, HTN, hyperlipidemia,  Left tibia/fibula fracture, foot ulcer. He was diagnosed with Diabetes Mellitus at the age of 12 yrs. Never had DKA. Was never on oral meds. Always on insulin therapy. Microvascular complications: has  Retinopathy and also had retinal detachement (Follows with eye doctor at [address]),   Has microlabuminruia . No Peripheral neuropathy. A1c 9.6 %----> 8.4 --->8.9 %--->8.2 %--->7.1 %--->7.1 %---> 6.8 % --> > 7.4 %---> 11 % ---> 9 % --> 9.3 % ---> 8.4 % --> 11.3 % ---> 11 % ---> 10.2 % ---> 10.2 % ---> 9.8%---> 9.5%---> 9.1%---> 7.9%---> 8.4%--->9.4%    Moderate, uncontrolled  Worsened by diet    Home regimen:  Currently on lantus insulin 15/15    Apidra 7-12 units TID. ( insulin to carb ratio of 1:7)  Uses fixed unit    SSI 2 for 50 > 150    Previous medications: Was on humulin insulin in the past.    Check sugars occasionally    [id number]    Using Tar    Hypoglycemia . Occasional. No pattern    No polyuria, polydipsia, weight loss. Diet: Eats 3 meals/day at regular times and 3 snacks. Had nutrition education. Exercise: No  planned physical activity    Hyperlipidemia: he has mixed hyperlipidemia    Currently is on Flexseed oil. He has refused statins  Discussed PCSK-9 inhibitor, not interested   on 2/20    Patient says he feels dizzy and light headed when he stands up for the last 2-3 months. This is most likely due to autonomic neuropathy. No weight loss. Has gained weight. He had a fall and fractured his left distal tibia and fibula in 2013. Had ORIF and has developed osteomyelitis.      Review of Systems   Scanned, reviewed    Past Medical

## 2023-03-25 NOTE — TELEPHONE ENCOUNTER
Immanuel Diaz MD, - Statin Therapy    Provider response noted (patient refuses statin/reports side effects). CMS is allowing patients to be excluded from statin therapy if certain TAGIGGRVX/KVA-10 codes are added at provider visits (diagnosis from visit claim is used to track the exclusion).     Please consider adding a CMS allowable diagnosis within your 3/21/23 visit encounter for statin exclusion in 0280 if applicable to this patient:  statin myopathy (ICD-10 G72.0, T46.6X5A) OR  adverse reaction to statin medication (ICD-10 X36.3Z6Y)    Thank you,    Emily Oden, PharmD, [address] [place] [address], toll free: [phone], option 1
Pt had OV on 3/21/23 - patient refuses statin/reports side effects; dx code not added to exclude patient from SUPD measure for 2023. Will continue to follow.       Vineet Mahan, PharmD, [facility], toll free: [phone], option 1      For Pharmacy Admin Tracking Only  Program: [address] in place:  No  Recommendation Provided To: Provider: 1 via Note to Provider  Intervention Accepted By: Provider: 0  Gap Closed?: No   Time Spent (min): 30
total cholesterol lab       Per chart review, initiation of statin therapy is recommended as per the 2023 ADA Guidelines: For primary prevention:     Patients with diabetes (age 43-69) without ASCVD: moderate-intensity statin is recommended. Patients with diabetes (age 43-69) at higher cardiovascular risk (patients with one or more ASCVD risk factors: family history of premature ASCVD, hypertension, dyslipidemia, smoking, or CKD/albuminuria): high-intensity statin is recommended. It is recommended to reduce LDL >/=50% from baseline and to a target LDL goal <70. Per chart review, patient has previously declined statins due to concerns over side effects. PLAN  Interventions that have been identified include:  - Patient identified as having SUPD gap  Suggest initiation of moderate intensity statin therapy (e.g., atorvastatin 10-20 mg; rosuvastatin 5-10 mg). No patient out reach planned at this time.     Future Appointments   Date Time Provider Mamie Portillo   3/21/2023  3:00 PM Penelope Mix MD [facility]   3/30/2023 10:30 AM MD MARK Anne PAIN [facility]   4/13/2023  1:40 PM MD BRIAN Christopher Cinci - DYD   12/7/2023  3:40 PM Page Meckel, MD Erenest Rued [facility]     Ade Oscar, PharmD, [address] [facility], toll free: [phone], option 1

## 2023-03-30 ENCOUNTER — OFFICE VISIT (OUTPATIENT)
Dept: PAIN MANAGEMENT | Age: 48
End: 2023-03-30

## 2023-03-30 VITALS
OXYGEN SATURATION: 100 % | BODY MASS INDEX: 26.22 KG/M2 | SYSTOLIC BLOOD PRESSURE: 187 MMHG | WEIGHT: 204.2 LBS | DIASTOLIC BLOOD PRESSURE: 90 MMHG | HEART RATE: 78 BPM

## 2023-03-30 DIAGNOSIS — M80.862S PATHOLOGICAL FRACTURE OF LEFT TIBIA DUE TO OTHER OSTEOPOROSIS, SEQUELA: ICD-10-CM

## 2023-03-30 DIAGNOSIS — G43.711 CHRONIC MIGRAINE WITHOUT AURA, WITH INTRACTABLE MIGRAINE, SO STATED, WITH STATUS MIGRAINOSUS: ICD-10-CM

## 2023-03-30 DIAGNOSIS — L97.522 ULCER OF LEFT FOOT, WITH FAT LAYER EXPOSED (HCC): ICD-10-CM

## 2023-03-30 DIAGNOSIS — G63 POLYNEUROPATHY ASSOCIATED WITH UNDERLYING DISEASE (HCC): ICD-10-CM

## 2023-03-30 DIAGNOSIS — G62.9 PERIPHERAL POLYNEUROPATHY: ICD-10-CM

## 2023-03-30 DIAGNOSIS — M79.2 NEUROPATHIC PAIN: ICD-10-CM

## 2023-03-30 DIAGNOSIS — M79.18 MYOFASCIAL PAIN: ICD-10-CM

## 2023-03-30 DIAGNOSIS — G89.4 CHRONIC PAIN SYNDROME: ICD-10-CM

## 2023-03-30 RX ORDER — TRAMADOL HYDROCHLORIDE 50 MG/1
50 TABLET ORAL 2 TIMES DAILY PRN
Qty: 56 TABLET | Refills: 0 | Status: SHIPPED | OUTPATIENT
Start: 2023-03-30 | End: 2023-04-27

## 2023-03-30 RX ORDER — ERENUMAB-AOOE 70 MG/ML
INJECTION SUBCUTANEOUS
Qty: 1 ADJUSTABLE DOSE PRE-FILLED PEN SYRINGE | Refills: 1 | Status: SHIPPED | OUTPATIENT
Start: 2023-03-30

## 2023-03-30 RX ORDER — GABAPENTIN 400 MG/1
400 CAPSULE ORAL 2 TIMES DAILY
Qty: 60 CAPSULE | Refills: 1 | Status: SHIPPED | OUTPATIENT
Start: 2023-03-30 | End: 2023-04-29

## 2023-03-30 RX ORDER — SUMATRIPTAN 50 MG/1
TABLET, FILM COATED ORAL
Qty: 9 TABLET | Refills: 1 | Status: SHIPPED | OUTPATIENT
Start: 2023-03-30

## 2023-03-30 RX ORDER — TOPIRAMATE 100 MG/1
100 TABLET, FILM COATED ORAL 2 TIMES DAILY
Qty: 60 TABLET | Refills: 1 | Status: SHIPPED | OUTPATIENT
Start: 2023-03-30

## 2023-03-31 NOTE — PROGRESS NOTES
Will continue to monitor progress towards achieving/maintaining therapeutic goals with special emphasis on  1. Improvement in perceived interfernce  of pain with ADL's. Ability to do home exercises independently. Ability to do household chores indoor and/or outdoor work and social and leisure activities. Improve psychosocial and physical functioning. - he is showing progression towards this treatment goal with the current regimen. He was advised against drinking alcohol with the narcotic pain medicines, advised against driving or handling machinery while adjusting the dose of medicines or if having cognitive  issues related to the current medications. Risk of overdose and death, if medicines not taken as prescribed, were also discussed. If the patient develops new symptoms or if the symptoms worsen, the patient should call the office. While transcribing every attempt was made to maintain the accuracy of the note in terms of it's contents,there may have been some errors made inadvertently. Thank you for allowing me to participate in the care of this patient.     Lay Ramos MD.    Cc: Sanford Barbour MD

## 2023-04-15 ENCOUNTER — HOSPITAL ENCOUNTER (EMERGENCY)
Age: 48
Discharge: HOME | End: 2023-04-16
Attending: STUDENT IN AN ORGANIZED HEALTH CARE EDUCATION/TRAINING PROGRAM
Payer: MEDICARE

## 2023-04-15 DIAGNOSIS — E16.2 HYPOGLYCEMIA: Primary | ICD-10-CM

## 2023-04-15 DIAGNOSIS — E87.6 HYPOKALEMIA: ICD-10-CM

## 2023-04-15 LAB
GLUCOSE BLD-MCNC: 89 MG/DL (ref 70–99)
PERFORMED ON: NORMAL

## 2023-04-15 PROCEDURE — 96365 THER/PROPH/DIAG IV INF INIT: CPT

## 2023-04-15 PROCEDURE — 96366 THER/PROPH/DIAG IV INF ADDON: CPT

## 2023-04-15 PROCEDURE — 99284 EMERGENCY DEPT VISIT MOD MDM: CPT

## 2023-04-15 ASSESSMENT — PAIN - FUNCTIONAL ASSESSMENT: PAIN_FUNCTIONAL_ASSESSMENT: NONE - DENIES PAIN

## 2023-04-16 ENCOUNTER — APPOINTMENT (OUTPATIENT)
Dept: CT IMAGING | Age: 48
End: 2023-04-16
Payer: MEDICARE

## 2023-04-16 VITALS
TEMPERATURE: 97.6 F | SYSTOLIC BLOOD PRESSURE: 161 MMHG | OXYGEN SATURATION: 100 % | HEART RATE: 91 BPM | HEIGHT: 74 IN | WEIGHT: 200 LBS | DIASTOLIC BLOOD PRESSURE: 85 MMHG | BODY MASS INDEX: 25.67 KG/M2 | RESPIRATION RATE: 15 BRPM

## 2023-04-16 LAB
ALBUMIN SERPL-MCNC: 4.3 G/DL (ref 3.4–5)
ALBUMIN/GLOB SERPL: 1.3 {RATIO} (ref 1.1–2.2)
ALP SERPL-CCNC: 127 U/L (ref 40–129)
ALT SERPL-CCNC: 10 U/L (ref 10–40)
ANION GAP SERPL CALCULATED.3IONS-SCNC: 13 MMOL/L (ref 3–16)
ANION GAP SERPL CALCULATED.3IONS-SCNC: 13 MMOL/L (ref 3–16)
AST SERPL-CCNC: 16 U/L (ref 15–37)
BASOPHILS # BLD: 0.1 K/UL (ref 0–0.2)
BASOPHILS NFR BLD: 0.6 %
BILIRUB SERPL-MCNC: <0.2 MG/DL (ref 0–1)
BUN SERPL-MCNC: 16 MG/DL (ref 7–20)
BUN SERPL-MCNC: 17 MG/DL (ref 7–20)
CALCIUM SERPL-MCNC: 8.7 MG/DL (ref 8.3–10.6)
CALCIUM SERPL-MCNC: 9.5 MG/DL (ref 8.3–10.6)
CHLORIDE SERPL-SCNC: 104 MMOL/L (ref 99–110)
CHLORIDE SERPL-SCNC: 105 MMOL/L (ref 99–110)
CHP ED QC CHECK: YES
CO2 SERPL-SCNC: 20 MMOL/L (ref 21–32)
CO2 SERPL-SCNC: 23 MMOL/L (ref 21–32)
CREAT SERPL-MCNC: 1.5 MG/DL (ref 0.9–1.3)
CREAT SERPL-MCNC: 1.7 MG/DL (ref 0.9–1.3)
DEPRECATED RDW RBC AUTO: 14.3 % (ref 12.4–15.4)
EOSINOPHIL # BLD: 0.4 K/UL (ref 0–0.6)
EOSINOPHIL NFR BLD: 3.4 %
GFR SERPLBLD CREATININE-BSD FMLA CKD-EPI: 50 ML/MIN/{1.73_M2}
GFR SERPLBLD CREATININE-BSD FMLA CKD-EPI: 58 ML/MIN/{1.73_M2}
GLUCOSE BLD-MCNC: 117 MG/DL (ref 70–99)
GLUCOSE BLD-MCNC: 215 MG/DL (ref 70–99)
GLUCOSE BLD-MCNC: 76 MG/DL
GLUCOSE BLD-MCNC: 76 MG/DL (ref 70–99)
GLUCOSE SERPL-MCNC: 276 MG/DL (ref 70–99)
GLUCOSE SERPL-MCNC: 68 MG/DL (ref 70–99)
HCT VFR BLD AUTO: 36.8 % (ref 40.5–52.5)
HGB BLD-MCNC: 12.3 G/DL (ref 13.5–17.5)
LYMPHOCYTES # BLD: 1.7 K/UL (ref 1–5.1)
LYMPHOCYTES NFR BLD: 13.8 %
MAGNESIUM SERPL-MCNC: 2.2 MG/DL (ref 1.8–2.4)
MCH RBC QN AUTO: 27.2 PG (ref 26–34)
MCHC RBC AUTO-ENTMCNC: 33.5 G/DL (ref 31–36)
MCV RBC AUTO: 81.3 FL (ref 80–100)
MONOCYTES # BLD: 0.8 K/UL (ref 0–1.3)
MONOCYTES NFR BLD: 6.4 %
NEUTROPHILS # BLD: 9.1 K/UL (ref 1.7–7.7)
NEUTROPHILS NFR BLD: 75.8 %
PERFORMED ON: ABNORMAL
PERFORMED ON: ABNORMAL
PERFORMED ON: NORMAL
PLATELET # BLD AUTO: 368 K/UL (ref 135–450)
PMV BLD AUTO: 6.3 FL (ref 5–10.5)
POTASSIUM SERPL-SCNC: 2.6 MMOL/L (ref 3.5–5.1)
POTASSIUM SERPL-SCNC: 3.6 MMOL/L (ref 3.5–5.1)
PROT SERPL-MCNC: 7.7 G/DL (ref 6.4–8.2)
RBC # BLD AUTO: 4.53 M/UL (ref 4.2–5.9)
SODIUM SERPL-SCNC: 137 MMOL/L (ref 136–145)
SODIUM SERPL-SCNC: 141 MMOL/L (ref 136–145)
WBC # BLD AUTO: 11.9 K/UL (ref 4–11)

## 2023-04-16 PROCEDURE — 96365 THER/PROPH/DIAG IV INF INIT: CPT

## 2023-04-16 PROCEDURE — 96366 THER/PROPH/DIAG IV INF ADDON: CPT

## 2023-04-16 PROCEDURE — 80053 COMPREHEN METABOLIC PANEL: CPT

## 2023-04-16 PROCEDURE — 83735 ASSAY OF MAGNESIUM: CPT

## 2023-04-16 PROCEDURE — 6360000002 HC RX W HCPCS: Performed by: STUDENT IN AN ORGANIZED HEALTH CARE EDUCATION/TRAINING PROGRAM

## 2023-04-16 PROCEDURE — 85025 COMPLETE CBC W/AUTO DIFF WBC: CPT

## 2023-04-16 PROCEDURE — 36415 COLL VENOUS BLD VENIPUNCTURE: CPT

## 2023-04-16 PROCEDURE — 70450 CT HEAD/BRAIN W/O DYE: CPT

## 2023-04-16 PROCEDURE — 6370000000 HC RX 637 (ALT 250 FOR IP): Performed by: STUDENT IN AN ORGANIZED HEALTH CARE EDUCATION/TRAINING PROGRAM

## 2023-04-16 RX ORDER — POTASSIUM CHLORIDE 20 MEQ/1
40 TABLET, EXTENDED RELEASE ORAL ONCE
Status: COMPLETED | OUTPATIENT
Start: 2023-04-16 | End: 2023-04-16

## 2023-04-16 RX ORDER — POTASSIUM CHLORIDE 7.45 MG/ML
10 INJECTION INTRAVENOUS
Status: COMPLETED | OUTPATIENT
Start: 2023-04-16 | End: 2023-04-16

## 2023-04-16 RX ORDER — POTASSIUM CHLORIDE 750 MG/1
10 TABLET, EXTENDED RELEASE ORAL DAILY
Qty: 10 TABLET | Refills: 0 | Status: SHIPPED | OUTPATIENT
Start: 2023-04-16 | End: 2023-04-26

## 2023-04-16 RX ADMIN — POTASSIUM CHLORIDE 40 MEQ: 1500 TABLET, EXTENDED RELEASE ORAL at 01:31

## 2023-04-16 RX ADMIN — POTASSIUM CHLORIDE 10 MEQ: 7.46 INJECTION, SOLUTION INTRAVENOUS at 03:20

## 2023-04-16 RX ADMIN — POTASSIUM CHLORIDE 10 MEQ: 7.46 INJECTION, SOLUTION INTRAVENOUS at 02:05

## 2023-04-18 ENCOUNTER — CARE COORDINATION (OUTPATIENT)
Dept: CARE COORDINATION | Age: 48
End: 2023-04-18

## 2023-04-27 ENCOUNTER — OFFICE VISIT (OUTPATIENT)
Dept: PAIN MANAGEMENT | Age: 48
End: 2023-04-27
Payer: MEDICARE

## 2023-04-27 VITALS
SYSTOLIC BLOOD PRESSURE: 130 MMHG | HEART RATE: 89 BPM | BODY MASS INDEX: 27.48 KG/M2 | DIASTOLIC BLOOD PRESSURE: 69 MMHG | WEIGHT: 214 LBS

## 2023-04-27 DIAGNOSIS — M79.18 MYOFASCIAL PAIN: ICD-10-CM

## 2023-04-27 DIAGNOSIS — M79.2 NEUROPATHIC PAIN: ICD-10-CM

## 2023-04-27 DIAGNOSIS — G89.4 CHRONIC PAIN SYNDROME: ICD-10-CM

## 2023-04-27 DIAGNOSIS — L97.522 ULCER OF LEFT FOOT, WITH FAT LAYER EXPOSED (HCC): ICD-10-CM

## 2023-04-27 DIAGNOSIS — G62.9 PERIPHERAL POLYNEUROPATHY: ICD-10-CM

## 2023-04-27 DIAGNOSIS — M80.862S PATHOLOGICAL FRACTURE OF LEFT TIBIA DUE TO OTHER OSTEOPOROSIS, SEQUELA: ICD-10-CM

## 2023-04-27 DIAGNOSIS — G63 POLYNEUROPATHY ASSOCIATED WITH UNDERLYING DISEASE (HCC): ICD-10-CM

## 2023-04-27 PROCEDURE — 3074F SYST BP LT 130 MM HG: CPT | Performed by: INTERNAL MEDICINE

## 2023-04-27 PROCEDURE — 99213 OFFICE O/P EST LOW 20 MIN: CPT | Performed by: INTERNAL MEDICINE

## 2023-04-27 PROCEDURE — 3078F DIAST BP <80 MM HG: CPT | Performed by: INTERNAL MEDICINE

## 2023-04-27 RX ORDER — SUMATRIPTAN 50 MG/1
TABLET, FILM COATED ORAL
Qty: 9 TABLET | Refills: 1 | Status: SHIPPED | OUTPATIENT
Start: 2023-04-27

## 2023-04-27 RX ORDER — GABAPENTIN 400 MG/1
400 CAPSULE ORAL 2 TIMES DAILY
Qty: 60 CAPSULE | Refills: 1 | Status: SHIPPED | OUTPATIENT
Start: 2023-04-27 | End: 2023-05-27

## 2023-04-27 RX ORDER — DULOXETIN HYDROCHLORIDE 60 MG/1
60 CAPSULE, DELAYED RELEASE ORAL DAILY
Qty: 30 CAPSULE | Refills: 1 | Status: SHIPPED | OUTPATIENT
Start: 2023-04-27

## 2023-04-27 RX ORDER — RIMEGEPANT SULFATE 75 MG/75MG
TABLET, ORALLY DISINTEGRATING ORAL
Qty: 16 TABLET | Refills: 1 | Status: SHIPPED | OUTPATIENT
Start: 2023-04-27

## 2023-04-27 RX ORDER — TRAMADOL HYDROCHLORIDE 50 MG/1
50 TABLET ORAL 2 TIMES DAILY PRN
Qty: 56 TABLET | Refills: 0 | Status: SHIPPED | OUTPATIENT
Start: 2023-04-27 | End: 2023-05-25

## 2023-04-27 RX ORDER — ERENUMAB-AOOE 70 MG/ML
INJECTION SUBCUTANEOUS
Qty: 1 ADJUSTABLE DOSE PRE-FILLED PEN SYRINGE | Refills: 1 | Status: SHIPPED | OUTPATIENT
Start: 2023-04-27

## 2023-04-27 RX ORDER — TOPIRAMATE 100 MG/1
100 TABLET, FILM COATED ORAL 2 TIMES DAILY
Qty: 60 TABLET | Refills: 1 | Status: SHIPPED | OUTPATIENT
Start: 2023-04-27

## 2023-04-27 NOTE — PROGRESS NOTES
Jl Groves  1975  [id number]      HISTORY OF PRESENT ILLNESS:  Mr. Juan Peters is a 41 y.o. male returns for a follow up visit for pain management  He has a diagnosis of   1. Chronic pain syndrome    2. Peripheral polyneuropathy    3. Myofascial pain    4. Constipation due to opioid therapy    5. Chronic migraine without aura, with intractable migraine, so stated, with status migrainosus    6. Neuropathic pain    7. Ulcer of left foot, with fat layer exposed ([address]    8. Primary insomnia    9. Pathological fracture of left tibia due to other osteoporosis, sequela    10. Polyneuropathy associated with underlying disease ([address]    11. Recurrent major depressive disorder, in partial remission ([address]    . He complains of generalize pain all over  He rates the pain 10/10 and describes it as sharp, aching. Current treatment regimen has helped relieve about 10% of the pain. He denies any side effects from the current pain regimen. Patient reports that since the last follow up visit the physical functioning is unchanged, family/social relationships are unchanged, mood is unchanged sleep patterns are unchanged, and that the overall functioning is unchanged. Patient denies misusing/abusing his narcotic pain medications or using any illegal drugs. There are No indicators for possible drug abuse, addiction or diversion problems. Patient states he has been doing about the same. Mr. Juna Peters states his mother passed away recently, he states he has been upset and is living with his sister now. Patient states she has been compliant with his medications. He mentions he is using Ultram 2 per day along with the other adjuvants. Patient reports he is managing light house chores. He states his blood sugar has been high. ALLERGIES: Patients list of allergies were reviewed     MEDICATIONS: Mr. Juan Peters list of medications were reviewed. His current medications are   Outpatient Medications Prior to Visit   Medication Sig Dispense

## 2023-04-28 ENCOUNTER — TELEPHONE (OUTPATIENT)
Dept: PAIN MANAGEMENT | Age: 48
End: 2023-04-28

## 2023-05-04 RX ORDER — INSULIN GLULISINE 100 [IU]/ML
INJECTION, SOLUTION SUBCUTANEOUS
Qty: 10 ML | Refills: 3 | Status: SHIPPED | OUTPATIENT
Start: 2023-05-04

## 2023-05-04 NOTE — TELEPHONE ENCOUNTER
Medication:   Requested Prescriptions     Pending Prescriptions Disp Refills    APIDRA 100 UNIT/ML injection [Pharmacy Med Name: Ramiro Santos 100 UNIT/ML VIAL]       Sig: INJECT 8 TO 12 UNITS UNDER THE SKIN THREE TIMES A DAY WITH MEALS PER SLIDING SCALE       Last Filled:      Patient Phone Number: [phone] (home)     Last appt: 3/21/2023   Next appt: 7/10/2023    Last Labs DM:   Lab Results   Component Value Date/Time    LABA1C 9.4 03/21/2023 03:03 PM

## 2023-05-07 DIAGNOSIS — E10.22 TYPE 1 DIABETES MELLITUS WITH STAGE 3 CHRONIC KIDNEY DISEASE (HCC): ICD-10-CM

## 2023-05-07 DIAGNOSIS — N18.30 TYPE 1 DIABETES MELLITUS WITH STAGE 3 CHRONIC KIDNEY DISEASE (HCC): ICD-10-CM

## 2023-05-08 RX ORDER — BLOOD SUGAR DIAGNOSTIC
STRIP MISCELLANEOUS
Qty: 120 EACH | Refills: 9 | Status: SHIPPED | OUTPATIENT
Start: 2023-05-08

## 2023-05-08 NOTE — TELEPHONE ENCOUNTER
Medication:   Requested Prescriptions     Pending Prescriptions Disp Refills    Insulin Syringe-Needle U-100 (BD INSULIN SYRINGE U/F) 31G X 5/16\" 0.3 ML MISC [Pharmacy Med Name: BD INS SYRNG UF 0.3 ML [id number]] 120 each 9     Sig: USE ONE SYRINGE FOUR TIMES A DAY BEFORE MEALS AND NIGHTLY       Last Filled:      Patient Phone Number: [phone] (home)     Last appt: 3/21/2023   Next appt: 7/10/2023    Last Labs DM:   Lab Results   Component Value Date/Time    LABA1C 9.4 03/21/2023 03:03 PM

## 2023-05-18 ENCOUNTER — TELEPHONE (OUTPATIENT)
Dept: ENDOCRINOLOGY | Age: 48
End: 2023-05-18

## 2023-05-18 ENCOUNTER — OFFICE VISIT (OUTPATIENT)
Dept: PAIN MANAGEMENT | Age: 48
End: 2023-05-18
Payer: MEDICARE

## 2023-05-18 VITALS
SYSTOLIC BLOOD PRESSURE: 97 MMHG | DIASTOLIC BLOOD PRESSURE: 64 MMHG | HEART RATE: 100 BPM | WEIGHT: 207 LBS | OXYGEN SATURATION: 96 % | BODY MASS INDEX: 26.58 KG/M2

## 2023-05-18 DIAGNOSIS — M79.2 NEUROPATHIC PAIN: ICD-10-CM

## 2023-05-18 DIAGNOSIS — T40.2X5A CONSTIPATION DUE TO OPIOID THERAPY: ICD-10-CM

## 2023-05-18 DIAGNOSIS — K59.03 CONSTIPATION DUE TO OPIOID THERAPY: ICD-10-CM

## 2023-05-18 DIAGNOSIS — G62.9 PERIPHERAL POLYNEUROPATHY: ICD-10-CM

## 2023-05-18 DIAGNOSIS — M80.862S PATHOLOGICAL FRACTURE OF LEFT TIBIA DUE TO OTHER OSTEOPOROSIS, SEQUELA: ICD-10-CM

## 2023-05-18 DIAGNOSIS — F33.41 RECURRENT MAJOR DEPRESSIVE DISORDER, IN PARTIAL REMISSION (HCC): ICD-10-CM

## 2023-05-18 DIAGNOSIS — F51.01 PRIMARY INSOMNIA: ICD-10-CM

## 2023-05-18 DIAGNOSIS — M79.18 MYOFASCIAL PAIN: ICD-10-CM

## 2023-05-18 DIAGNOSIS — G63 POLYNEUROPATHY ASSOCIATED WITH UNDERLYING DISEASE (HCC): ICD-10-CM

## 2023-05-18 DIAGNOSIS — L97.522 ULCER OF LEFT FOOT, WITH FAT LAYER EXPOSED (HCC): ICD-10-CM

## 2023-05-18 DIAGNOSIS — G43.711 CHRONIC MIGRAINE WITHOUT AURA, WITH INTRACTABLE MIGRAINE, SO STATED, WITH STATUS MIGRAINOSUS: ICD-10-CM

## 2023-05-18 DIAGNOSIS — G89.4 CHRONIC PAIN SYNDROME: ICD-10-CM

## 2023-05-18 PROCEDURE — 3078F DIAST BP <80 MM HG: CPT | Performed by: INTERNAL MEDICINE

## 2023-05-18 PROCEDURE — 3074F SYST BP LT 130 MM HG: CPT | Performed by: INTERNAL MEDICINE

## 2023-05-18 PROCEDURE — 99214 OFFICE O/P EST MOD 30 MIN: CPT | Performed by: INTERNAL MEDICINE

## 2023-05-18 RX ORDER — QUETIAPINE FUMARATE 25 MG/1
25-50 TABLET, FILM COATED ORAL NIGHTLY
Qty: 60 TABLET | Refills: 1 | Status: SHIPPED | OUTPATIENT
Start: 2023-05-18

## 2023-05-18 RX ORDER — DULOXETIN HYDROCHLORIDE 60 MG/1
60 CAPSULE, DELAYED RELEASE ORAL DAILY
Qty: 30 CAPSULE | Refills: 1 | Status: SHIPPED | OUTPATIENT
Start: 2023-05-18

## 2023-05-18 RX ORDER — TRAMADOL HYDROCHLORIDE 50 MG/1
50 TABLET ORAL 2 TIMES DAILY PRN
Qty: 56 TABLET | Refills: 0 | Status: SHIPPED | OUTPATIENT
Start: 2023-05-18 | End: 2023-06-15

## 2023-05-18 RX ORDER — RIMEGEPANT SULFATE 75 MG/75MG
TABLET, ORALLY DISINTEGRATING ORAL
Qty: 16 TABLET | Refills: 1 | Status: SHIPPED | OUTPATIENT
Start: 2023-05-18

## 2023-05-18 RX ORDER — GABAPENTIN 400 MG/1
400 CAPSULE ORAL 2 TIMES DAILY
Qty: 60 CAPSULE | Refills: 1 | Status: SHIPPED | OUTPATIENT
Start: 2023-05-18 | End: 2023-06-17

## 2023-05-18 RX ORDER — ERENUMAB-AOOE 70 MG/ML
INJECTION SUBCUTANEOUS
Qty: 1 ADJUSTABLE DOSE PRE-FILLED PEN SYRINGE | Refills: 1 | Status: SHIPPED | OUTPATIENT
Start: 2023-05-18

## 2023-05-18 RX ORDER — TOPIRAMATE 100 MG/1
100 TABLET, FILM COATED ORAL 2 TIMES DAILY
Qty: 60 TABLET | Refills: 1 | Status: SHIPPED | OUTPATIENT
Start: 2023-05-18

## 2023-05-18 RX ORDER — SUMATRIPTAN 50 MG/1
TABLET, FILM COATED ORAL
Qty: 9 TABLET | Refills: 1 | Status: SHIPPED | OUTPATIENT
Start: 2023-05-18

## 2023-05-18 NOTE — TELEPHONE ENCOUNTER
Please send all scripts for patient to the [facility] in [facility] from here on out.     Please advise    [address] [place] [address], [facility] [id number]  [facility]  [phone]

## 2023-05-26 RX ORDER — GLUCAGON INJECTION, SOLUTION 1 MG/.2ML
INJECTION, SOLUTION SUBCUTANEOUS
Qty: 0.4 ML | Refills: 3 | Status: SHIPPED | OUTPATIENT
Start: 2023-05-26

## 2023-05-26 NOTE — TELEPHONE ENCOUNTER
Medication:   Requested Prescriptions     Pending Prescriptions Disp Refills    Evita Mood 2-PACK 1 MG/0.2ML Moniquefort [Pharmacy Med Name: Flormanju Mood 2-PK 1 MG/0.2 ML] 0.4 mL 3     Sig: INJECT AS NEEDED FOR LOW BLOOD SUGAR       Last Filled:      Patient Phone Number: [phone] (home)     Last appt: 3/21/2023   Next appt: 7/10/2023    Last Labs DM:   Lab Results   Component Value Date/Time    LABA1C 9.4 03/21/2023 03:03 PM

## 2023-06-29 ENCOUNTER — OFFICE VISIT (OUTPATIENT)
Dept: PAIN MANAGEMENT | Age: 48
End: 2023-06-29
Payer: MEDICARE

## 2023-06-29 VITALS
SYSTOLIC BLOOD PRESSURE: 151 MMHG | DIASTOLIC BLOOD PRESSURE: 74 MMHG | WEIGHT: 207 LBS | BODY MASS INDEX: 26.58 KG/M2 | HEART RATE: 87 BPM | OXYGEN SATURATION: 98 %

## 2023-06-29 DIAGNOSIS — G63 POLYNEUROPATHY ASSOCIATED WITH UNDERLYING DISEASE (HCC): ICD-10-CM

## 2023-06-29 DIAGNOSIS — M79.2 NEUROPATHIC PAIN: ICD-10-CM

## 2023-06-29 DIAGNOSIS — G62.9 PERIPHERAL POLYNEUROPATHY: ICD-10-CM

## 2023-06-29 DIAGNOSIS — M79.18 MYOFASCIAL PAIN: ICD-10-CM

## 2023-06-29 DIAGNOSIS — L97.522 ULCER OF LEFT FOOT, WITH FAT LAYER EXPOSED (HCC): ICD-10-CM

## 2023-06-29 DIAGNOSIS — M80.862S PATHOLOGICAL FRACTURE OF LEFT TIBIA DUE TO OTHER OSTEOPOROSIS, SEQUELA: ICD-10-CM

## 2023-06-29 DIAGNOSIS — G89.4 CHRONIC PAIN SYNDROME: ICD-10-CM

## 2023-06-29 PROCEDURE — 3074F SYST BP LT 130 MM HG: CPT | Performed by: INTERNAL MEDICINE

## 2023-06-29 PROCEDURE — 99213 OFFICE O/P EST LOW 20 MIN: CPT | Performed by: INTERNAL MEDICINE

## 2023-06-29 PROCEDURE — 3078F DIAST BP <80 MM HG: CPT | Performed by: INTERNAL MEDICINE

## 2023-06-29 RX ORDER — DULOXETIN HYDROCHLORIDE 60 MG/1
60 CAPSULE, DELAYED RELEASE ORAL DAILY
Qty: 30 CAPSULE | Refills: 1 | Status: SHIPPED | OUTPATIENT
Start: 2023-06-29

## 2023-06-29 RX ORDER — SUMATRIPTAN 50 MG/1
TABLET, FILM COATED ORAL
Qty: 9 TABLET | Refills: 1 | Status: SHIPPED | OUTPATIENT
Start: 2023-06-29

## 2023-06-29 RX ORDER — ERENUMAB-AOOE 70 MG/ML
INJECTION SUBCUTANEOUS
Qty: 1 ADJUSTABLE DOSE PRE-FILLED PEN SYRINGE | Refills: 1 | Status: SHIPPED | OUTPATIENT
Start: 2023-06-29

## 2023-06-29 RX ORDER — QUETIAPINE FUMARATE 25 MG/1
25-50 TABLET, FILM COATED ORAL NIGHTLY
Qty: 60 TABLET | Refills: 1 | Status: SHIPPED | OUTPATIENT
Start: 2023-06-29

## 2023-06-29 RX ORDER — RIMEGEPANT SULFATE 75 MG/75MG
TABLET, ORALLY DISINTEGRATING ORAL
Qty: 16 TABLET | Refills: 1 | Status: SHIPPED | OUTPATIENT
Start: 2023-06-29

## 2023-06-29 RX ORDER — TRAMADOL HYDROCHLORIDE 50 MG/1
50 TABLET ORAL 2 TIMES DAILY PRN
Qty: 56 TABLET | Refills: 0 | Status: SHIPPED | OUTPATIENT
Start: 2023-06-29 | End: 2023-07-27

## 2023-06-29 RX ORDER — GABAPENTIN 400 MG/1
400 CAPSULE ORAL 2 TIMES DAILY
Qty: 60 CAPSULE | Refills: 1 | Status: SHIPPED | OUTPATIENT
Start: 2023-06-29 | End: 2023-08-28

## 2023-06-30 ENCOUNTER — TELEPHONE (OUTPATIENT)
Dept: PAIN MANAGEMENT | Age: 48
End: 2023-06-30

## 2023-08-10 ENCOUNTER — OFFICE VISIT (OUTPATIENT)
Dept: PAIN MANAGEMENT | Age: 48
End: 2023-08-10
Payer: MEDICARE

## 2023-08-10 VITALS
BODY MASS INDEX: 27.22 KG/M2 | OXYGEN SATURATION: 99 % | DIASTOLIC BLOOD PRESSURE: 69 MMHG | HEART RATE: 86 BPM | SYSTOLIC BLOOD PRESSURE: 132 MMHG | WEIGHT: 212 LBS

## 2023-08-10 DIAGNOSIS — G62.9 PERIPHERAL POLYNEUROPATHY: ICD-10-CM

## 2023-08-10 DIAGNOSIS — M79.18 MYOFASCIAL PAIN: ICD-10-CM

## 2023-08-10 DIAGNOSIS — G63 POLYNEUROPATHY ASSOCIATED WITH UNDERLYING DISEASE (HCC): ICD-10-CM

## 2023-08-10 DIAGNOSIS — F33.41 RECURRENT MAJOR DEPRESSIVE DISORDER, IN PARTIAL REMISSION (HCC): ICD-10-CM

## 2023-08-10 DIAGNOSIS — G43.711 CHRONIC MIGRAINE WITHOUT AURA, WITH INTRACTABLE MIGRAINE, SO STATED, WITH STATUS MIGRAINOSUS: ICD-10-CM

## 2023-08-10 DIAGNOSIS — G89.4 CHRONIC PAIN SYNDROME: ICD-10-CM

## 2023-08-10 DIAGNOSIS — F51.01 PRIMARY INSOMNIA: ICD-10-CM

## 2023-08-10 DIAGNOSIS — M80.862S PATHOLOGICAL FRACTURE OF LEFT TIBIA DUE TO OTHER OSTEOPOROSIS, SEQUELA: ICD-10-CM

## 2023-08-10 DIAGNOSIS — M79.2 NEUROPATHIC PAIN: ICD-10-CM

## 2023-08-10 DIAGNOSIS — L97.522 ULCER OF LEFT FOOT, WITH FAT LAYER EXPOSED (HCC): ICD-10-CM

## 2023-08-10 PROCEDURE — 3075F SYST BP GE 130 - 139MM HG: CPT | Performed by: INTERNAL MEDICINE

## 2023-08-10 PROCEDURE — 3078F DIAST BP <80 MM HG: CPT | Performed by: INTERNAL MEDICINE

## 2023-08-10 PROCEDURE — 99213 OFFICE O/P EST LOW 20 MIN: CPT | Performed by: INTERNAL MEDICINE

## 2023-08-10 RX ORDER — RIMEGEPANT SULFATE 75 MG/75MG
TABLET, ORALLY DISINTEGRATING ORAL
Qty: 16 TABLET | Refills: 1 | Status: SHIPPED | OUTPATIENT
Start: 2023-08-10

## 2023-08-10 RX ORDER — QUETIAPINE FUMARATE 25 MG/1
25-50 TABLET, FILM COATED ORAL NIGHTLY
Qty: 60 TABLET | Refills: 1 | Status: SHIPPED | OUTPATIENT
Start: 2023-08-10

## 2023-08-10 RX ORDER — DULOXETIN HYDROCHLORIDE 60 MG/1
60 CAPSULE, DELAYED RELEASE ORAL DAILY
Qty: 30 CAPSULE | Refills: 1 | Status: SHIPPED | OUTPATIENT
Start: 2023-08-10

## 2023-08-10 RX ORDER — TRAMADOL HYDROCHLORIDE 50 MG/1
50 TABLET ORAL 2 TIMES DAILY PRN
Qty: 56 TABLET | Refills: 0 | Status: SHIPPED | OUTPATIENT
Start: 2023-08-10 | End: 2023-09-07

## 2023-08-10 RX ORDER — TOPIRAMATE 100 MG/1
100 TABLET, FILM COATED ORAL 2 TIMES DAILY
Qty: 60 TABLET | Refills: 1 | Status: SHIPPED | OUTPATIENT
Start: 2023-08-10

## 2023-08-10 RX ORDER — ERENUMAB-AOOE 70 MG/ML
INJECTION SUBCUTANEOUS
Qty: 1 ADJUSTABLE DOSE PRE-FILLED PEN SYRINGE | Refills: 1 | Status: SHIPPED | OUTPATIENT
Start: 2023-08-10

## 2023-08-10 RX ORDER — SUMATRIPTAN 50 MG/1
TABLET, FILM COATED ORAL
Qty: 9 TABLET | Refills: 1 | Status: SHIPPED | OUTPATIENT
Start: 2023-08-10

## 2023-08-10 RX ORDER — GABAPENTIN 400 MG/1
400 CAPSULE ORAL 2 TIMES DAILY
Qty: 60 CAPSULE | Refills: 1 | Status: SHIPPED | OUTPATIENT
Start: 2023-08-10 | End: 2023-10-09

## 2023-08-10 NOTE — PROGRESS NOTES
GLUCAGON EMERGENCY 1 MG injection INJECT 1MG INTO THE MUSCLE AS NEEDED 1 kit 3    Multiple Vitamin (DAILY KITTY) TABS TAKE ONE TABLET BY MOUTH DAILY 30 tablet 5    Cholecalciferol (VITAMIN D3) 5000 units CAPS Take 1 capsule by mouth Daily 30 capsule 3    Dextromethorphan-Guaifenesin (MUCINEX DM)  MG TB12 Cough and congestion. (Patient taking differently: as needed Cough and congestion.) 60 tablet 1    omega-3 acid ethyl esters (LOVAZA) 1 G capsule TAKE TWO CAPSULES BY MOUTH TWICE DAILY 120 capsule 5    MUCUS RELIEF DM  MG TABS TAKE ONE BY MOUTH DAILY AS NEEDED 30 tablet 1    Alcohol Swabs PADS Use as needed for insulin injection. 100 each 5    Probiotic Product (ACIDOPHILUS PROBIOTIC) CAPS capsule TAKE ONE CAPSULE BY MOUTH DAILY 28 capsule 4    polyvinyl alcohol (LIQUIFILM TEARS) 1.4 % ophthalmic solution 1 drop as needed      propranolol (INDERAL) 80 MG tablet Take 0.5 tablets by mouth 2 times daily For migraines      Flaxseed, Linseed, (FLAX PO) Take 14 g by mouth daily as needed       traMADol (ULTRAM) 50 MG tablet Take 1 tablet by mouth 2 times daily as needed for Pain for up to 28 days. 56 tablet 0    potassium chloride (KLOR-CON M) 10 MEQ extended release tablet Take 1 tablet by mouth daily for 10 days 10 tablet 0     No current facility-administered medications for this visit. I will continue his current medication regimen  which is part of the above treatment schedule.  It has been helping with Mr. Emma Wood chronic  medical problems which for this visit include:   Diagnoses of Chronic pain syndrome, Polyneuropathy associated with underlying disease ([address]), Neuropathic pain, Peripheral polyneuropathy, Ulcer of left foot, with fat layer exposed ([address]), Myofascial pain, Pathological fracture of left tibia due to other osteoporosis, sequela, Primary insomnia, Chronic migraine without aura, with intractable migraine, so stated, with status migrainosus, and Recurrent major depressive disorder, in

## 2023-09-07 ENCOUNTER — OFFICE VISIT (OUTPATIENT)
Dept: PAIN MANAGEMENT | Age: 48
End: 2023-09-07

## 2023-09-07 VITALS
OXYGEN SATURATION: 99 % | BODY MASS INDEX: 26.96 KG/M2 | WEIGHT: 210 LBS | SYSTOLIC BLOOD PRESSURE: 138 MMHG | HEART RATE: 79 BPM | DIASTOLIC BLOOD PRESSURE: 60 MMHG

## 2023-09-07 DIAGNOSIS — M80.862S PATHOLOGICAL FRACTURE OF LEFT TIBIA DUE TO OTHER OSTEOPOROSIS, SEQUELA: ICD-10-CM

## 2023-09-07 DIAGNOSIS — T40.2X5A CONSTIPATION DUE TO OPIOID THERAPY: ICD-10-CM

## 2023-09-07 DIAGNOSIS — K59.03 CONSTIPATION DUE TO OPIOID THERAPY: ICD-10-CM

## 2023-09-07 DIAGNOSIS — G89.4 CHRONIC PAIN SYNDROME: ICD-10-CM

## 2023-09-07 DIAGNOSIS — G63 POLYNEUROPATHY ASSOCIATED WITH UNDERLYING DISEASE (HCC): ICD-10-CM

## 2023-09-07 DIAGNOSIS — F51.01 PRIMARY INSOMNIA: ICD-10-CM

## 2023-09-07 DIAGNOSIS — M79.18 MYOFASCIAL PAIN: ICD-10-CM

## 2023-09-07 DIAGNOSIS — G62.9 PERIPHERAL POLYNEUROPATHY: ICD-10-CM

## 2023-09-07 DIAGNOSIS — M79.2 NEUROPATHIC PAIN: ICD-10-CM

## 2023-09-07 DIAGNOSIS — G43.711 CHRONIC MIGRAINE WITHOUT AURA, WITH INTRACTABLE MIGRAINE, SO STATED, WITH STATUS MIGRAINOSUS: ICD-10-CM

## 2023-09-07 DIAGNOSIS — F33.41 RECURRENT MAJOR DEPRESSIVE DISORDER, IN PARTIAL REMISSION (HCC): ICD-10-CM

## 2023-09-07 DIAGNOSIS — L97.522 ULCER OF LEFT FOOT, WITH FAT LAYER EXPOSED (HCC): ICD-10-CM

## 2023-09-07 RX ORDER — TRAMADOL HYDROCHLORIDE 50 MG/1
50 TABLET ORAL 2 TIMES DAILY PRN
Qty: 56 TABLET | Refills: 0 | Status: SHIPPED | OUTPATIENT
Start: 2023-09-07 | End: 2023-10-05

## 2023-09-07 RX ORDER — DULOXETIN HYDROCHLORIDE 60 MG/1
60 CAPSULE, DELAYED RELEASE ORAL DAILY
Qty: 30 CAPSULE | Refills: 1 | Status: SHIPPED | OUTPATIENT
Start: 2023-09-07

## 2023-09-07 RX ORDER — NALOXONE HYDROCHLORIDE 4 MG/.1ML
1 SPRAY NASAL PRN
Qty: 1 EACH | Refills: 0 | Status: SHIPPED | OUTPATIENT
Start: 2023-09-07

## 2023-09-07 RX ORDER — RIMEGEPANT SULFATE 75 MG/75MG
TABLET, ORALLY DISINTEGRATING ORAL
Qty: 16 TABLET | Refills: 1 | Status: SHIPPED | OUTPATIENT
Start: 2023-09-07

## 2023-09-07 RX ORDER — ERENUMAB-AOOE 70 MG/ML
INJECTION SUBCUTANEOUS
Qty: 1 ADJUSTABLE DOSE PRE-FILLED PEN SYRINGE | Refills: 1 | Status: SHIPPED | OUTPATIENT
Start: 2023-09-07

## 2023-09-07 RX ORDER — QUETIAPINE FUMARATE 25 MG/1
25-50 TABLET, FILM COATED ORAL NIGHTLY
Qty: 60 TABLET | Refills: 1 | Status: SHIPPED | OUTPATIENT
Start: 2023-09-07

## 2023-09-07 RX ORDER — GABAPENTIN 400 MG/1
400 CAPSULE ORAL 2 TIMES DAILY
Qty: 60 CAPSULE | Refills: 1 | Status: SHIPPED | OUTPATIENT
Start: 2023-09-07 | End: 2023-11-06

## 2023-09-07 RX ORDER — TOPIRAMATE 100 MG/1
100 TABLET, FILM COATED ORAL 2 TIMES DAILY
Qty: 60 TABLET | Refills: 1 | Status: SHIPPED | OUTPATIENT
Start: 2023-09-07

## 2023-09-07 RX ORDER — SUMATRIPTAN 50 MG/1
TABLET, FILM COATED ORAL
Qty: 9 TABLET | Refills: 1 | Status: SHIPPED | OUTPATIENT
Start: 2023-09-07

## 2023-09-07 NOTE — PROGRESS NOTES
Denise Garcia  1975  [id number]    HISTORY OF PRESENT ILLNESS:  Mr. Gladys Nayak is a 57 y.o. male returns for a follow up visit for multiple medical problems. His  presenting problems are   1. Chronic pain syndrome    2. Peripheral polyneuropathy    3. Pathological fracture of left tibia due to other osteoporosis, sequela    4. Recurrent major depressive disorder, in partial remission ([address])    5. Polyneuropathy associated with underlying disease ([address])    6. Ulcer of left foot, with fat layer exposed ([address])    7. Primary insomnia    8. Constipation due to opioid therapy    9. Neuropathic pain    10. Myofascial pain    11. Chronic migraine without aura, with intractable migraine, so stated, with status migrainosus    12. At risk for respiratory depression due to opioid    . As per information/history obtained from the PADT(patient assessment and documentation tool) -  He complains of pain in the head, neck, shoulders Bilateral, elbows Bilateral, upper back, mid back, lower back, and knees Bilateral with radiation to the hands Bilateral, buttocks, hips Bilateral, lower leg Bilateral, ankles Bilateral, and feet Bilateral He rates the pain 10/10 and describes it as sharp, aching, burning, pins and needles. Pain is made worse by: movement, walking. Current treatment regimen has helped relieve about 20% of the pain. He denies side effects from the current pain regimen. Patient reports that since starting the current treatment regimen the physical functioning is better, family/social relationships are better, mood is better and sleep patterns are better, and that the overall functioning is better. Patient denies neurological bowel or bladder. Patient denies misusing/abusing his narcotic pain medications or using any illegal drugs. There are No indicators for possible drug abuse, addiction or diversion problems.    Upon obtaining the medical history from Mr. Gladys Nayak regarding today's office visit for his presenting

## 2023-10-05 ENCOUNTER — OFFICE VISIT (OUTPATIENT)
Dept: PAIN MANAGEMENT | Age: 48
End: 2023-10-05

## 2023-10-05 VITALS
OXYGEN SATURATION: 98 % | DIASTOLIC BLOOD PRESSURE: 77 MMHG | HEART RATE: 96 BPM | WEIGHT: 222 LBS | SYSTOLIC BLOOD PRESSURE: 144 MMHG | BODY MASS INDEX: 28.5 KG/M2

## 2023-10-05 DIAGNOSIS — G62.9 PERIPHERAL POLYNEUROPATHY: ICD-10-CM

## 2023-10-05 DIAGNOSIS — M79.18 MYOFASCIAL PAIN: ICD-10-CM

## 2023-10-05 DIAGNOSIS — M80.862S PATHOLOGICAL FRACTURE OF LEFT TIBIA DUE TO OTHER OSTEOPOROSIS, SEQUELA: ICD-10-CM

## 2023-10-05 DIAGNOSIS — G63 POLYNEUROPATHY ASSOCIATED WITH UNDERLYING DISEASE (HCC): ICD-10-CM

## 2023-10-05 DIAGNOSIS — G89.4 CHRONIC PAIN SYNDROME: ICD-10-CM

## 2023-10-05 DIAGNOSIS — M79.2 NEUROPATHIC PAIN: ICD-10-CM

## 2023-10-05 DIAGNOSIS — L97.522 ULCER OF LEFT FOOT, WITH FAT LAYER EXPOSED (HCC): ICD-10-CM

## 2023-10-05 RX ORDER — GABAPENTIN 400 MG/1
400 CAPSULE ORAL 2 TIMES DAILY
Qty: 60 CAPSULE | Refills: 1 | Status: SHIPPED | OUTPATIENT
Start: 2023-10-05 | End: 2023-12-04

## 2023-10-05 RX ORDER — TRAMADOL HYDROCHLORIDE 50 MG/1
50 TABLET ORAL 2 TIMES DAILY PRN
Qty: 56 TABLET | Refills: 0 | Status: SHIPPED | OUTPATIENT
Start: 2023-10-05 | End: 2023-11-02

## 2023-10-05 RX ORDER — SUMATRIPTAN 50 MG/1
TABLET, FILM COATED ORAL
Qty: 9 TABLET | Refills: 1 | Status: SHIPPED | OUTPATIENT
Start: 2023-10-05

## 2023-10-05 RX ORDER — TOPIRAMATE 100 MG/1
100 TABLET, FILM COATED ORAL 2 TIMES DAILY
Qty: 60 TABLET | Refills: 1 | Status: SHIPPED | OUTPATIENT
Start: 2023-10-05

## 2023-10-05 RX ORDER — QUETIAPINE FUMARATE 25 MG/1
25-50 TABLET, FILM COATED ORAL NIGHTLY
Qty: 60 TABLET | Refills: 1 | Status: SHIPPED | OUTPATIENT
Start: 2023-10-05

## 2023-10-05 RX ORDER — ERENUMAB-AOOE 140 MG/ML
140 INJECTION, SOLUTION SUBCUTANEOUS
Qty: 1 ADJUSTABLE DOSE PRE-FILLED PEN SYRINGE | Refills: 1 | Status: SHIPPED | OUTPATIENT
Start: 2023-10-05

## 2023-10-05 RX ORDER — RIMEGEPANT SULFATE 75 MG/75MG
TABLET, ORALLY DISINTEGRATING ORAL
Qty: 16 TABLET | Refills: 1 | Status: SHIPPED | OUTPATIENT
Start: 2023-10-05

## 2023-10-05 RX ORDER — DULOXETIN HYDROCHLORIDE 60 MG/1
60 CAPSULE, DELAYED RELEASE ORAL DAILY
Qty: 30 CAPSULE | Refills: 1 | Status: SHIPPED | OUTPATIENT
Start: 2023-10-05

## 2023-10-05 NOTE — PROGRESS NOTES
Sal [facility]  1975  [id number]      HISTORY OF PRESENT ILLNESS:  Mr. Jazz Hoang is a 57 y.o. male returns for a follow up visit for pain management  He has a diagnosis of   1. Chronic pain syndrome    2. Recurrent major depressive disorder, in partial remission ([address])    3. Primary insomnia    4. Myofascial pain    5. Chronic migraine without aura, with intractable migraine, so stated, with status migrainosus    6. Constipation due to opioid therapy    7. Polyneuropathy associated with underlying disease ([address])    8. Peripheral polyneuropathy    9. Pathological fracture of left tibia due to other osteoporosis, sequela    10. Ulcer of left foot, with fat layer exposed ([address])    11. Neuropathic pain    . As per information/history obtained from the PADT(patient assessment and documentation tool) -  He complains of pain in the head, neck, shoulders Bilateral, upper back, mid back, lower back, and knees Left with radiation to the hands Bilateral, buttocks, hips Left, lower leg Left, ankles Left, and feet Left He rates the pain 10/10 and describes it as sharp, aching, burning, numbness, pins and needles. Pain is made worse by: movement, walking, standing, sitting, bending, lifting. He denies any side effects from the current pain regimen. Patient reports that since starting the current regimen under my care the physical functioning is better, family/social relationships are unchanged, mood is unchanged sleep patterns are unchanged, and that the overall functioning is better. Patient denies misusing/abusing his narcotic pain medications or using any illegal drugs. There are No indicators for possible drug abuse, addiction or diversion problems. Patient states he is dealing with sinus issues. Mr. Jazz Hoang states his shoulders are tensing up. He states he is using all the medications. Patient states he has been compliant with the adjuvants. He mentions he is using Ultram 2 per day.  Patient reports he is managing his

## 2023-11-02 ENCOUNTER — OFFICE VISIT (OUTPATIENT)
Dept: PAIN MANAGEMENT | Age: 48
End: 2023-11-02

## 2023-11-02 VITALS
BODY MASS INDEX: 27.35 KG/M2 | SYSTOLIC BLOOD PRESSURE: 138 MMHG | DIASTOLIC BLOOD PRESSURE: 75 MMHG | HEART RATE: 79 BPM | OXYGEN SATURATION: 100 % | WEIGHT: 213 LBS

## 2023-11-02 DIAGNOSIS — M79.18 MYOFASCIAL PAIN: ICD-10-CM

## 2023-11-02 DIAGNOSIS — G63 POLYNEUROPATHY ASSOCIATED WITH UNDERLYING DISEASE (HCC): ICD-10-CM

## 2023-11-02 DIAGNOSIS — M80.862S PATHOLOGICAL FRACTURE OF LEFT TIBIA DUE TO OTHER OSTEOPOROSIS, SEQUELA: ICD-10-CM

## 2023-11-02 DIAGNOSIS — G62.9 PERIPHERAL POLYNEUROPATHY: ICD-10-CM

## 2023-11-02 DIAGNOSIS — M79.2 NEUROPATHIC PAIN: ICD-10-CM

## 2023-11-02 DIAGNOSIS — L97.522 ULCER OF LEFT FOOT, WITH FAT LAYER EXPOSED (HCC): ICD-10-CM

## 2023-11-02 DIAGNOSIS — G89.4 CHRONIC PAIN SYNDROME: ICD-10-CM

## 2023-11-02 RX ORDER — GABAPENTIN 400 MG/1
400 CAPSULE ORAL 2 TIMES DAILY
Qty: 60 CAPSULE | Refills: 1 | Status: SHIPPED | OUTPATIENT
Start: 2023-11-02 | End: 2024-01-01

## 2023-11-02 RX ORDER — TRAMADOL HYDROCHLORIDE 50 MG/1
50 TABLET ORAL 2 TIMES DAILY PRN
Qty: 56 TABLET | Refills: 0 | Status: SHIPPED | OUTPATIENT
Start: 2023-11-02 | End: 2023-11-30

## 2023-11-02 RX ORDER — QUETIAPINE FUMARATE 25 MG/1
25-50 TABLET, FILM COATED ORAL NIGHTLY
Qty: 60 TABLET | Refills: 1 | Status: SHIPPED | OUTPATIENT
Start: 2023-11-02

## 2023-11-02 RX ORDER — DULOXETIN HYDROCHLORIDE 60 MG/1
60 CAPSULE, DELAYED RELEASE ORAL DAILY
Qty: 30 CAPSULE | Refills: 1 | Status: SHIPPED | OUTPATIENT
Start: 2023-11-02

## 2023-11-02 RX ORDER — RIMEGEPANT SULFATE 75 MG/75MG
TABLET, ORALLY DISINTEGRATING ORAL
Qty: 16 TABLET | Refills: 1 | Status: SHIPPED | OUTPATIENT
Start: 2023-11-02

## 2023-11-02 RX ORDER — SUMATRIPTAN 50 MG/1
TABLET, FILM COATED ORAL
Qty: 9 TABLET | Refills: 1 | Status: SHIPPED | OUTPATIENT
Start: 2023-11-02

## 2023-11-02 RX ORDER — TOPIRAMATE 100 MG/1
100 TABLET, FILM COATED ORAL 2 TIMES DAILY
Qty: 60 TABLET | Refills: 1 | Status: SHIPPED | OUTPATIENT
Start: 2023-11-02

## 2023-11-02 RX ORDER — ERENUMAB-AOOE 140 MG/ML
140 INJECTION, SOLUTION SUBCUTANEOUS
Qty: 1 ADJUSTABLE DOSE PRE-FILLED PEN SYRINGE | Refills: 1 | Status: SHIPPED | OUTPATIENT
Start: 2023-11-02

## 2023-11-02 NOTE — PROGRESS NOTES
Carrillo Johnson  1975  [id number]      HISTORY OF PRESENT ILLNESS:  Mr. Rosalie Almonte is a 57 y.o. male returns for a follow up visit for pain management  He has a diagnosis of   1. Chronic pain syndrome    2. Peripheral polyneuropathy    3. Neuropathic pain    4. Constipation due to opioid therapy    5. Myofascial pain    6. Pathological fracture of left tibia due to other osteoporosis, sequela    7. Recurrent major depressive disorder, in partial remission ([address])    8. Chronic migraine without aura, with intractable migraine, so stated, with status migrainosus    9. Polyneuropathy associated with underlying disease ([address])    10. Ulcer of left foot, with fat layer exposed ([address])    11. Primary insomnia    . As per information/history obtained from the PADT(patient assessment and documentation tool) -  He complains of pain in the head, neck, and lower back with radiation to the shoulders Bilateral, elbows Bilateral, hands Bilateral, lower leg Bilateral, ankles Bilateral, and feet Bilateral He rates the pain 10/10 and describes it as sharp, aching. Pain is made worse by: movement, walking, standing, sitting, bending, lifting. He denies any side effects from the current pain regimen. Patient reports that since last follow up visit the physical functioning is unchanged, family/social relationships are unchanged, mood is unchanged sleep patterns are better. Mr. Rosalie Almonte states that since starting the treatment with the current regimen the  overall functioning  in the above aspects is  better, Patient denies misusing/abusing his narcotic pain medications or using any illegal drugs. There are No indicators for possible drug abuse, addiction or diversion problems. Upon obtaining the medical history from Mr. Rosalie Almonte regarding today's office visit for his presenting problems, Patient reports he thinks he had a episode of bradycardia, as per his smart watch, but not have any symptoms.  He says he has been complaint with his

## 2023-11-07 ENCOUNTER — OFFICE VISIT (OUTPATIENT)
Dept: ENDOCRINOLOGY | Age: 48
End: 2023-11-07
Payer: MEDICARE

## 2023-11-07 VITALS
OXYGEN SATURATION: 98 % | HEART RATE: 76 BPM | HEIGHT: 74 IN | TEMPERATURE: 98 F | WEIGHT: 211.4 LBS | BODY MASS INDEX: 27.13 KG/M2 | RESPIRATION RATE: 15 BRPM | DIASTOLIC BLOOD PRESSURE: 82 MMHG | SYSTOLIC BLOOD PRESSURE: 132 MMHG

## 2023-11-07 DIAGNOSIS — E10.22 TYPE 1 DIABETES MELLITUS WITH STAGE 3A CHRONIC KIDNEY DISEASE (HCC): Primary | ICD-10-CM

## 2023-11-07 DIAGNOSIS — N18.31 TYPE 1 DIABETES MELLITUS WITH STAGE 3A CHRONIC KIDNEY DISEASE (HCC): Primary | ICD-10-CM

## 2023-11-07 LAB — HBA1C MFR BLD: 9.3 %

## 2023-11-07 PROCEDURE — 3046F HEMOGLOBIN A1C LEVEL >9.0%: CPT | Performed by: INTERNAL MEDICINE

## 2023-11-07 PROCEDURE — 3079F DIAST BP 80-89 MM HG: CPT | Performed by: INTERNAL MEDICINE

## 2023-11-07 PROCEDURE — 3075F SYST BP GE 130 - 139MM HG: CPT | Performed by: INTERNAL MEDICINE

## 2023-11-07 PROCEDURE — 83036 HEMOGLOBIN GLYCOSYLATED A1C: CPT | Performed by: INTERNAL MEDICINE

## 2023-11-07 PROCEDURE — 99214 OFFICE O/P EST MOD 30 MIN: CPT | Performed by: INTERNAL MEDICINE

## 2023-11-07 RX ORDER — INSULIN GLARGINE 100 [IU]/ML
INJECTION, SOLUTION SUBCUTANEOUS
Qty: 30 ML | Refills: 3 | Status: SHIPPED | OUTPATIENT
Start: 2023-11-07

## 2023-11-07 RX ORDER — INSULIN GLULISINE 100 [IU]/ML
INJECTION, SOLUTION SUBCUTANEOUS
Qty: 10 ML | Refills: 3 | Status: SHIPPED | OUTPATIENT
Start: 2023-11-07

## 2023-11-30 ENCOUNTER — OFFICE VISIT (OUTPATIENT)
Dept: PAIN MANAGEMENT | Age: 48
End: 2023-11-30
Payer: MEDICARE

## 2023-11-30 VITALS
WEIGHT: 218 LBS | BODY MASS INDEX: 27.99 KG/M2 | DIASTOLIC BLOOD PRESSURE: 67 MMHG | OXYGEN SATURATION: 100 % | SYSTOLIC BLOOD PRESSURE: 122 MMHG | HEART RATE: 78 BPM

## 2023-11-30 DIAGNOSIS — M79.18 MYOFASCIAL PAIN: ICD-10-CM

## 2023-11-30 DIAGNOSIS — M79.2 NEUROPATHIC PAIN: ICD-10-CM

## 2023-11-30 DIAGNOSIS — G63 POLYNEUROPATHY ASSOCIATED WITH UNDERLYING DISEASE (HCC): ICD-10-CM

## 2023-11-30 DIAGNOSIS — M80.862S PATHOLOGICAL FRACTURE OF LEFT TIBIA DUE TO OTHER OSTEOPOROSIS, SEQUELA: ICD-10-CM

## 2023-11-30 DIAGNOSIS — G62.9 PERIPHERAL POLYNEUROPATHY: ICD-10-CM

## 2023-11-30 DIAGNOSIS — L97.522 ULCER OF LEFT FOOT, WITH FAT LAYER EXPOSED (HCC): ICD-10-CM

## 2023-11-30 DIAGNOSIS — G89.4 CHRONIC PAIN SYNDROME: ICD-10-CM

## 2023-11-30 PROCEDURE — 3074F SYST BP LT 130 MM HG: CPT | Performed by: INTERNAL MEDICINE

## 2023-11-30 PROCEDURE — 99213 OFFICE O/P EST LOW 20 MIN: CPT | Performed by: INTERNAL MEDICINE

## 2023-11-30 PROCEDURE — 3078F DIAST BP <80 MM HG: CPT | Performed by: INTERNAL MEDICINE

## 2023-11-30 RX ORDER — TRAMADOL HYDROCHLORIDE 50 MG/1
50 TABLET ORAL 2 TIMES DAILY PRN
Qty: 56 TABLET | Refills: 0 | Status: SHIPPED | OUTPATIENT
Start: 2023-11-30 | End: 2023-12-28

## 2023-11-30 RX ORDER — ERENUMAB-AOOE 140 MG/ML
140 INJECTION, SOLUTION SUBCUTANEOUS
Qty: 1 ADJUSTABLE DOSE PRE-FILLED PEN SYRINGE | Refills: 1 | Status: SHIPPED | OUTPATIENT
Start: 2023-11-30

## 2023-11-30 RX ORDER — GABAPENTIN 400 MG/1
400 CAPSULE ORAL 2 TIMES DAILY
Qty: 60 CAPSULE | Refills: 1 | Status: SHIPPED | OUTPATIENT
Start: 2023-11-30 | End: 2024-01-29

## 2023-11-30 RX ORDER — DULOXETIN HYDROCHLORIDE 60 MG/1
60 CAPSULE, DELAYED RELEASE ORAL DAILY
Qty: 30 CAPSULE | Refills: 1 | Status: SHIPPED | OUTPATIENT
Start: 2023-11-30

## 2023-11-30 RX ORDER — SUMATRIPTAN 50 MG/1
TABLET, FILM COATED ORAL
Qty: 9 TABLET | Refills: 1 | Status: SHIPPED | OUTPATIENT
Start: 2023-11-30

## 2023-11-30 RX ORDER — RIMEGEPANT SULFATE 75 MG/75MG
TABLET, ORALLY DISINTEGRATING ORAL
Qty: 16 TABLET | Refills: 1 | Status: SHIPPED | OUTPATIENT
Start: 2023-11-30

## 2023-11-30 RX ORDER — QUETIAPINE FUMARATE 25 MG/1
25-50 TABLET, FILM COATED ORAL NIGHTLY
Qty: 60 TABLET | Refills: 1 | Status: SHIPPED | OUTPATIENT
Start: 2023-11-30

## 2023-11-30 RX ORDER — TOPIRAMATE 100 MG/1
100 TABLET, FILM COATED ORAL 2 TIMES DAILY
Qty: 60 TABLET | Refills: 1 | Status: SHIPPED | OUTPATIENT
Start: 2023-11-30

## 2023-12-01 NOTE — PROGRESS NOTES
daily for 10 days 10 tablet 0     No current facility-administered medications for this visit. General Goals of current treatment regimen include improvement in pain, restoration of functioning- with focus on improvement in physical performance, general activity, work or disability,emotional distress, health care utilization and  decreased medication consumption. Will continue to monitor progress towards achieving/maintaining therapeutic goals with special emphasis on  1. Improvement in perceived interfernce  of pain with ADL's. Ability to do home exercises independently. Ability to do household chores indoor and/or outdoor work and social and leisure activities. Improve psychosocial and physical functioning. he is showing progression towards this treatment goal with the current regimen. He was advised against drinking alcohol with the narcotic pain medicines, advised against driving or handling machinery while adjusting the dose of medicines or if having cognitive  issues related to the current medications. Risk of overdose and death, if medicines not taken as prescribed, were also discussed. If the patient develops new symptoms or if the symptoms worsen, the patient should call the office. While transcribing every attempt was made to maintain the accuracy of the note in terms of it's contents,there may have been some errors made inadvertently. Thank you for allowing me to participate in the care of this patient.     Trevon Leos MD.    Joy Neil MD

## 2023-12-07 ENCOUNTER — OFFICE VISIT (OUTPATIENT)
Dept: INFECTIOUS DISEASES | Age: 48
End: 2023-12-07
Payer: MEDICARE

## 2023-12-07 VITALS
OXYGEN SATURATION: 94 % | DIASTOLIC BLOOD PRESSURE: 80 MMHG | TEMPERATURE: 97.7 F | HEART RATE: 88 BPM | WEIGHT: 212.2 LBS | SYSTOLIC BLOOD PRESSURE: 124 MMHG | HEIGHT: 74 IN | BODY MASS INDEX: 27.23 KG/M2

## 2023-12-07 DIAGNOSIS — M86.662 CHRONIC OSTEOMYELITIS OF LEFT LOWER LEG (HCC): Primary | ICD-10-CM

## 2023-12-07 PROCEDURE — 3074F SYST BP LT 130 MM HG: CPT | Performed by: INTERNAL MEDICINE

## 2023-12-07 PROCEDURE — 3079F DIAST BP 80-89 MM HG: CPT | Performed by: INTERNAL MEDICINE

## 2023-12-07 PROCEDURE — 99214 OFFICE O/P EST MOD 30 MIN: CPT | Performed by: INTERNAL MEDICINE

## 2023-12-07 NOTE — PROGRESS NOTES
disease. PHYSICAL EXAM:    Vitals:  See intake vitals including weight    GENERAL: No apparent distress. HEENT: Membranes moist, no conjunctival changes, no oral lesion  NECK:  Supple, no masses  LYMPH: No adenopathy   LUNGS: Clear b/l, no rales, no dullness  CARDIAC: RRR, no murmur appreciated  ABD:  + BS, soft / NT, no masses  EXT:  LLE with healed scars, mild pedal edema. No erythema;   NEURO: No focal neurologic findings  PSYCH: Orientation, sensorium, mood normal  Wound:  R foot     DATA:    7/2/14   ESR 36, CRP 6.8  10/9/14 ESR 42, CRP 10.4  1/22/15 ESR 61, CRP 9.5  5/2/16  ESR 33, CRP 16.8  10/21/16 ESR 67, CRP 37.3    10/7/2014 CT scan: fracture line in the tibial shaft visible, moderate bridging bone [per Dr Ray Sing note]. IMPRESSION    # DM, neuropathy, CKD, LLE trauma - 1/2013 bimalleolar fracture. # L LE hardware-assoc infection, s/p removal hardware 8/28, cult + MSSA. Pt had iv ceftriaxone to mid October. He has been on Cephalexin 500 po tid since that time. Course complicated by keanu-prosthetic fracture 3/2014. Pt has chronic tibia osteomyelitis and should remain on antibiotic, especial given limb threaten problem with non-union. Pt had fibula fracture requiring ORIF 3/31. He now has ext fixator off, has bone stimulator. # R DM foot infection, managed by [facility] / [address] / ID - wound healed 6/4/20 per pt     # GI side effects with keflex 6/4/20 - changed to dicloxacillin    RECOMMENDATIONS:      1. Cont Dicloxacillin 500 bid    2. Call in 1-2 weeks to inform us if med change helped     Other providers Pain (Doc), Podiatry Arlen Garcia), PCP Raul Meyer), DM (Dr Sunday Unger) - reviewed upcoming appointments (will see Thirza Less as needed)    Return in 1 year    - Spent over 30 minutes on visit (including history, physical exam, review of data, development and implementation of treatment plan and coordination of care.    - Over 50% of time spent with pt counseling and

## 2024-01-04 ENCOUNTER — OFFICE VISIT (OUTPATIENT)
Dept: PAIN MANAGEMENT | Age: 49
End: 2024-01-04

## 2024-01-04 VITALS
DIASTOLIC BLOOD PRESSURE: 67 MMHG | SYSTOLIC BLOOD PRESSURE: 143 MMHG | OXYGEN SATURATION: 96 % | BODY MASS INDEX: 27.99 KG/M2 | HEART RATE: 74 BPM | WEIGHT: 218 LBS

## 2024-01-04 DIAGNOSIS — G63 POLYNEUROPATHY ASSOCIATED WITH UNDERLYING DISEASE (HCC): ICD-10-CM

## 2024-01-04 DIAGNOSIS — G89.4 CHRONIC PAIN SYNDROME: ICD-10-CM

## 2024-01-04 DIAGNOSIS — G62.9 PERIPHERAL POLYNEUROPATHY: ICD-10-CM

## 2024-01-04 DIAGNOSIS — M79.18 MYOFASCIAL PAIN: ICD-10-CM

## 2024-01-04 DIAGNOSIS — M79.2 NEUROPATHIC PAIN: ICD-10-CM

## 2024-01-04 DIAGNOSIS — L97.522 ULCER OF LEFT FOOT, WITH FAT LAYER EXPOSED (HCC): ICD-10-CM

## 2024-01-04 DIAGNOSIS — M80.862S PATHOLOGICAL FRACTURE OF LEFT TIBIA DUE TO OTHER OSTEOPOROSIS, SEQUELA: ICD-10-CM

## 2024-01-04 RX ORDER — TOPIRAMATE 100 MG/1
100 TABLET, FILM COATED ORAL 2 TIMES DAILY
Qty: 60 TABLET | Refills: 1 | Status: SHIPPED | OUTPATIENT
Start: 2024-01-04

## 2024-01-04 RX ORDER — TRAMADOL HYDROCHLORIDE 50 MG/1
50 TABLET ORAL 2 TIMES DAILY PRN
Qty: 56 TABLET | Refills: 0 | Status: SHIPPED | OUTPATIENT
Start: 2024-01-04 | End: 2024-02-01

## 2024-01-04 RX ORDER — ERENUMAB-AOOE 140 MG/ML
140 INJECTION, SOLUTION SUBCUTANEOUS
Qty: 1 ADJUSTABLE DOSE PRE-FILLED PEN SYRINGE | Refills: 1 | Status: SHIPPED | OUTPATIENT
Start: 2024-01-04

## 2024-01-04 RX ORDER — RIMEGEPANT SULFATE 75 MG/75MG
TABLET, ORALLY DISINTEGRATING ORAL
Qty: 16 TABLET | Refills: 1 | Status: SHIPPED | OUTPATIENT
Start: 2024-01-04

## 2024-01-04 RX ORDER — QUETIAPINE FUMARATE 25 MG/1
25-50 TABLET, FILM COATED ORAL NIGHTLY
Qty: 60 TABLET | Refills: 1 | Status: SHIPPED | OUTPATIENT
Start: 2024-01-04

## 2024-01-04 RX ORDER — DULOXETIN HYDROCHLORIDE 60 MG/1
60 CAPSULE, DELAYED RELEASE ORAL DAILY
Qty: 30 CAPSULE | Refills: 1 | Status: SHIPPED | OUTPATIENT
Start: 2024-01-04

## 2024-01-04 RX ORDER — GABAPENTIN 400 MG/1
400 CAPSULE ORAL 2 TIMES DAILY
Qty: 60 CAPSULE | Refills: 1 | Status: SHIPPED | OUTPATIENT
Start: 2024-01-04 | End: 2024-03-04

## 2024-01-04 RX ORDER — SUMATRIPTAN 50 MG/1
TABLET, FILM COATED ORAL
Qty: 9 TABLET | Refills: 1 | Status: SHIPPED | OUTPATIENT
Start: 2024-01-04

## 2024-01-05 NOTE — PROGRESS NOTES
is using Ultram 1-2 per day. He denies any side effects or constipation symptoms. He mentions the headache symptoms are manageable.       ALLERGIES: Patients list of allergies were reviewed     MEDICATIONS: Mr. Carson list of medications were reviewed.His current medications are   Outpatient Medications Prior to Visit   Medication Sig Dispense Refill    dicloxacillin (DYNAPEN) 500 MG capsule Take 1 capsule by mouth 2 times daily 180 capsule 11    insulin glulisine (APIDRA) 100 UNIT/ML injection 7-12 units AC TID 10 mL 3    LANTUS 100 UNIT/ML injection vial INJECT 15 UNITS UNDER THE SKIN twice daily before breakfast and nightly. Hold nightly dose if PM BG<90 30 mL 3    naloxone (NARCAN) 4 MG/0.1ML LIQD nasal spray 1 spray by Nasal route as needed for Opioid Reversal 1 each 0    GVOKE HYPOPEN 2-PACK 1 MG/0.2ML SOAJ INJECT AS NEEDED FOR LOW BLOOD SUGAR 0.4 mL 3    Insulin Syringe-Needle U-100 (BD INSULIN SYRINGE U/F) 31G X 5/16\" 0.3 ML MISC USE ONE SYRINGE FOUR TIMES A DAY BEFORE MEALS AND NIGHTLY 120 each 9    Handicap Placard MISC by Does not apply route Diagnosis: type 1 diabetes    Expires: 2/28/26 1 each 0    Handicap Placard MISC by Does not apply route Diagnosis: Type 1 diabetes.    Expires: 1/23/26. 1 each 0    Insulin Syringe-Needle U-100 (B-D INS SYR ULTRAFINE 1CC/31G) 31G X 5/16\" 1 ML MISC INJECT USING INSULIN ONCE DAILY 30 each 10    Continuous Blood Gluc Sensor (DEXCOM G6 SENSOR) MISC USE ONE SENSOR AND CHANGE EVERY 10 DAYS 2 each 3    Diabetic Shoe MISC by Does not apply route 1 each 0    Continuous Blood Gluc  (DEXCOM G6 ) LINDA USE AS NEEDED TO CHECK BLOOD SUGAR 1 each 2    aspirin 81 MG chewable tablet Take 1 tablet by mouth daily 30 tablet 0    pantoprazole (PROTONIX) 40 MG tablet Take 1 tablet by mouth 2 times daily 60 tablet 1    prednisoLONE sodium phosphate (INFLAMASE FORTE) 1 % ophthalmic solution 1 drop 4 times daily      prednisoLONE acetate (PRED FORTE) 1 % ophthalmic suspension

## 2024-01-24 ENCOUNTER — OFFICE VISIT (OUTPATIENT)
Dept: INTERNAL MEDICINE CLINIC | Age: 49
End: 2024-01-24
Payer: MEDICARE

## 2024-01-24 ENCOUNTER — TELEPHONE (OUTPATIENT)
Dept: PAIN MANAGEMENT | Age: 49
End: 2024-01-24

## 2024-01-24 ENCOUNTER — TELEPHONE (OUTPATIENT)
Dept: INTERNAL MEDICINE CLINIC | Age: 49
End: 2024-01-24

## 2024-01-24 VITALS
OXYGEN SATURATION: 97 % | DIASTOLIC BLOOD PRESSURE: 68 MMHG | SYSTOLIC BLOOD PRESSURE: 138 MMHG | WEIGHT: 206 LBS | BODY MASS INDEX: 26.45 KG/M2 | HEART RATE: 102 BPM

## 2024-01-24 DIAGNOSIS — E78.2 MIXED HYPERLIPIDEMIA: ICD-10-CM

## 2024-01-24 DIAGNOSIS — E10.22 TYPE 1 DIABETES MELLITUS WITH STAGE 3A CHRONIC KIDNEY DISEASE (HCC): ICD-10-CM

## 2024-01-24 DIAGNOSIS — E10.3599 PROLIFERATIVE DIABETIC RETINOPATHY ASSOCIATED WITH TYPE 1 DIABETES MELLITUS, UNSPECIFIED LATERALITY, UNSPECIFIED PROLIFERATIVE RETINOPATHY TYPE (HCC): ICD-10-CM

## 2024-01-24 DIAGNOSIS — Z12.5 PROSTATE CANCER SCREENING: ICD-10-CM

## 2024-01-24 DIAGNOSIS — Z23 FLU VACCINE NEED: ICD-10-CM

## 2024-01-24 DIAGNOSIS — E55.9 VITAMIN D DEFICIENCY: ICD-10-CM

## 2024-01-24 DIAGNOSIS — I10 PRIMARY HYPERTENSION: ICD-10-CM

## 2024-01-24 DIAGNOSIS — N18.31 TYPE 1 DIABETES MELLITUS WITH STAGE 3A CHRONIC KIDNEY DISEASE (HCC): ICD-10-CM

## 2024-01-24 DIAGNOSIS — R11.2 NAUSEA AND VOMITING, UNSPECIFIED VOMITING TYPE: ICD-10-CM

## 2024-01-24 DIAGNOSIS — E10.10 DIABETIC KETOACIDOSIS WITHOUT COMA ASSOCIATED WITH TYPE 1 DIABETES MELLITUS (HCC): Primary | ICD-10-CM

## 2024-01-24 PROBLEM — F33.41 RECURRENT MAJOR DEPRESSIVE DISORDER, IN PARTIAL REMISSION (HCC): Status: ACTIVE | Noted: 2024-01-24

## 2024-01-24 PROCEDURE — 3046F HEMOGLOBIN A1C LEVEL >9.0%: CPT | Performed by: INTERNAL MEDICINE

## 2024-01-24 PROCEDURE — 90674 CCIIV4 VAC NO PRSV 0.5 ML IM: CPT | Performed by: INTERNAL MEDICINE

## 2024-01-24 PROCEDURE — 3078F DIAST BP <80 MM HG: CPT | Performed by: INTERNAL MEDICINE

## 2024-01-24 PROCEDURE — 99215 OFFICE O/P EST HI 40 MIN: CPT | Performed by: INTERNAL MEDICINE

## 2024-01-24 PROCEDURE — G0008 ADMIN INFLUENZA VIRUS VAC: HCPCS | Performed by: INTERNAL MEDICINE

## 2024-01-24 PROCEDURE — 3075F SYST BP GE 130 - 139MM HG: CPT | Performed by: INTERNAL MEDICINE

## 2024-01-24 PROCEDURE — 36415 COLL VENOUS BLD VENIPUNCTURE: CPT | Performed by: INTERNAL MEDICINE

## 2024-01-24 RX ORDER — PROCHLORPERAZINE 25 MG/1
SUPPOSITORY RECTAL
COMMUNITY

## 2024-01-24 RX ORDER — PROMETHAZINE HYDROCHLORIDE 12.5 MG/1
12.5 TABLET ORAL 3 TIMES DAILY PRN
Qty: 18 TABLET | Refills: 0 | Status: ON HOLD | OUTPATIENT
Start: 2024-01-24 | End: 2024-02-05 | Stop reason: HOSPADM

## 2024-01-24 SDOH — ECONOMIC STABILITY: INCOME INSECURITY: HOW HARD IS IT FOR YOU TO PAY FOR THE VERY BASICS LIKE FOOD, HOUSING, MEDICAL CARE, AND HEATING?: NOT HARD AT ALL

## 2024-01-24 SDOH — ECONOMIC STABILITY: FOOD INSECURITY: WITHIN THE PAST 12 MONTHS, YOU WORRIED THAT YOUR FOOD WOULD RUN OUT BEFORE YOU GOT MONEY TO BUY MORE.: NEVER TRUE

## 2024-01-24 SDOH — ECONOMIC STABILITY: HOUSING INSECURITY
IN THE LAST 12 MONTHS, WAS THERE A TIME WHEN YOU DID NOT HAVE A STEADY PLACE TO SLEEP OR SLEPT IN A SHELTER (INCLUDING NOW)?: NO

## 2024-01-24 SDOH — ECONOMIC STABILITY: FOOD INSECURITY: WITHIN THE PAST 12 MONTHS, THE FOOD YOU BOUGHT JUST DIDN'T LAST AND YOU DIDN'T HAVE MONEY TO GET MORE.: NEVER TRUE

## 2024-01-24 NOTE — PATIENT INSTRUCTIONS
[facility] Locations - No appointment necessary.  ? indicates the location is open Saturdays in addition to Monday through Friday.   Call your preferred location for test preparation, business hours and other information you need.   [facility] accepts all insurances.  [place]  [place]  [place]    ? Noreen   [address]   [address]   [place] [address]    Ph: [phone]  [facility]   [address] [place] [address]    Ph: [phone]   ? Stephan   [address]    [place] [address]    Ph: [phone]     [facility]   [address] [place] [address]    Ph: [phone] ? [address] [place] [address]   Ph: [phone]  ? [facility]   [address] [place] [address]    Ph: [phone]      [place]   [address] [place] [address]   Ph: [phone]    [place]TH    ? [facility]   [address] [place] [address]    Ph: [phone]  [facility]   [address] [place] [address]   Ph: [phone]  [place]   [address], [address]    PH: [phone]    [facility]   [address] [place] [address]    Ph: [phone]  [place]  [address] [place] [address]  Ph: [phone]  [facility]   [address] [place] [address]    Ph: [phone]

## 2024-01-24 NOTE — TELEPHONE ENCOUNTER
Received Renewal PA Request.  Submitted PA for [employer] [facility] Key: [id number] STATUS: \"Available without authorization.\"

## 2024-01-24 NOTE — TELEPHONE ENCOUNTER
Pt was seen earlier needs tessalon pearls and phenergan called topharmacy pt waiting per MA will call right now

## 2024-01-24 NOTE — PROGRESS NOTES
Patient: Era Carson is a 48 y.o. male who presents today with the following Chief Complaint(s):    Chief Complaint   Patient presents with    Abdominal Pain     HOT FLASHES AND THEN GETS REAL COLD     Nausea & Vomiting     HACKING UP A LOT OF MUCUS, PT SD NO FEVERS          HPI taking antibiotic now. Dicloxacillin. Has N/V perhaps 2ndary to antibiotic. Add phenergan. Ginger tea and beer, still threw up. 9.3 A1c 11/23. Current bloos sugar 1,378!    Current Outpatient Medications   Medication Sig Dispense Refill    Continuous Blood Gluc  (DEXCOM G6 ) LINDA Dexcom G6       Tdap (ADACEL) 5-2-15.5 LF-MCG/0.5 injection       DULoxetine (CYMBALTA) 60 MG extended release capsule Take 1 capsule by mouth daily 30 capsule 1    Erenumab-aooe (AIMOVIG) 140 MG/ML SOAJ Inject 140 mg into the skin every 30 days 1 Adjustable Dose Pre-filled Pen Syringe 1    gabapentin (NEURONTIN) 400 MG capsule Take 1 capsule by mouth 2 times daily for 60 days. 60 capsule 1    QUEtiapine (SEROQUEL) 25 MG tablet Take 1-2 tablets by mouth nightly 60 tablet 1    Rimegepant Sulfate (NURTEC) 75 MG TBDP Take 1 tablet daily, may repeat in 24 hours PRN 16 tablet 1    SUMAtriptan (IMITREX) 50 MG tablet Take one tab po at the start of migraine PRN max 1 every 24 hours 9 tablet 1    topiramate (TOPAMAX) 100 MG tablet Take 1 tablet by mouth 2 times daily 60 tablet 1    traMADol (ULTRAM) 50 MG tablet Take 1 tablet by mouth 2 times daily as needed for Pain for up to 28 days. 56 tablet 0    dicloxacillin (DYNAPEN) 500 MG capsule Take 1 capsule by mouth 2 times daily 180 capsule 11    insulin glulisine (APIDRA) 100 UNIT/ML injection 7-12 units AC TID 10 mL 3    LANTUS 100 UNIT/ML injection vial INJECT 15 UNITS UNDER THE SKIN twice daily before breakfast and nightly. Hold nightly dose if PM BG<90 30 mL 3    naloxone (NARCAN) 4 MG/0.1ML LIQD nasal spray 1 spray by Nasal route as needed for Opioid Reversal 1 each 0    GVOKE HYPOPEN 2-PACK 1

## 2024-01-25 ENCOUNTER — APPOINTMENT (OUTPATIENT)
Dept: GENERAL RADIOLOGY | Age: 49
DRG: 637 | End: 2024-01-25
Payer: MEDICARE

## 2024-01-25 ENCOUNTER — APPOINTMENT (OUTPATIENT)
Dept: CT IMAGING | Age: 49
DRG: 637 | End: 2024-01-25
Payer: MEDICARE

## 2024-01-25 ENCOUNTER — HOSPITAL ENCOUNTER (INPATIENT)
Age: 49
LOS: 12 days | Discharge: HOME | DRG: 637 | End: 2024-02-06
Attending: EMERGENCY MEDICINE | Admitting: INTERNAL MEDICINE
Payer: MEDICARE

## 2024-01-25 ENCOUNTER — TELEPHONE (OUTPATIENT)
Dept: INTERNAL MEDICINE CLINIC | Age: 49
End: 2024-01-25

## 2024-01-25 DIAGNOSIS — L97.522 ULCER OF LEFT FOOT, WITH FAT LAYER EXPOSED (HCC): ICD-10-CM

## 2024-01-25 DIAGNOSIS — G62.9 PERIPHERAL POLYNEUROPATHY: ICD-10-CM

## 2024-01-25 DIAGNOSIS — M80.862S PATHOLOGICAL FRACTURE OF LEFT TIBIA DUE TO OTHER OSTEOPOROSIS, SEQUELA: ICD-10-CM

## 2024-01-25 DIAGNOSIS — N17.9 ACUTE RENAL FAILURE, UNSPECIFIED ACUTE RENAL FAILURE TYPE (HCC): Primary | ICD-10-CM

## 2024-01-25 DIAGNOSIS — G63 POLYNEUROPATHY ASSOCIATED WITH UNDERLYING DISEASE (HCC): ICD-10-CM

## 2024-01-25 DIAGNOSIS — M79.18 MYOFASCIAL PAIN: ICD-10-CM

## 2024-01-25 DIAGNOSIS — J96.01 ACUTE RESPIRATORY FAILURE WITH HYPOXIA (HCC): ICD-10-CM

## 2024-01-25 DIAGNOSIS — E08.11 DIABETIC KETOACIDOSIS WITH COMA ASSOCIATED WITH DIABETES MELLITUS DUE TO UNDERLYING CONDITION (HCC): ICD-10-CM

## 2024-01-25 DIAGNOSIS — G89.4 CHRONIC PAIN SYNDROME: ICD-10-CM

## 2024-01-25 DIAGNOSIS — M79.2 NEUROPATHIC PAIN: ICD-10-CM

## 2024-01-25 PROBLEM — E10.10 DKA, TYPE 1, NOT AT GOAL (HCC): Status: ACTIVE | Noted: 2024-01-25

## 2024-01-25 PROBLEM — G93.41 METABOLIC ENCEPHALOPATHY: Status: ACTIVE | Noted: 2024-01-25

## 2024-01-25 PROBLEM — I95.9 HYPOTENSION: Status: ACTIVE | Noted: 2024-01-25

## 2024-01-25 LAB
25(OH)D3 SERPL-MCNC: 67.6 NG/ML
ALBUMIN SERPL-MCNC: 3.5 G/DL (ref 3.4–5)
ALBUMIN SERPL-MCNC: 4.1 G/DL (ref 3.4–5)
ALBUMIN/GLOB SERPL: 1.1 {RATIO} (ref 1.1–2.2)
ALBUMIN/GLOB SERPL: 1.1 {RATIO} (ref 1.1–2.2)
ALP SERPL-CCNC: 137 U/L (ref 40–129)
ALP SERPL-CCNC: 145 U/L (ref 40–129)
ALT SERPL-CCNC: 13 U/L (ref 10–40)
ALT SERPL-CCNC: 17 U/L (ref 10–40)
AMORPH SED URNS QL MICRO: ABNORMAL /HPF
AMPHETAMINES UR QL SCN>1000 NG/ML: NORMAL
ANION GAP SERPL CALCULATED.3IONS-SCNC: 23 MMOL/L (ref 3–16)
ANION GAP SERPL CALCULATED.3IONS-SCNC: 25 MMOL/L (ref 3–16)
ANION GAP SERPL CALCULATED.3IONS-SCNC: 26 MMOL/L (ref 3–16)
ANION GAP SERPL CALCULATED.3IONS-SCNC: 27 MMOL/L (ref 3–16)
ANION GAP SERPL CALCULATED.3IONS-SCNC: 30 MMOL/L (ref 3–16)
ANION GAP SERPL CALCULATED.3IONS-SCNC: 31 MMOL/L (ref 3–16)
ANION GAP SERPL CALCULATED.3IONS-SCNC: 32 MMOL/L (ref 3–16)
ANION GAP SERPL CALCULATED.3IONS-SCNC: 32 MMOL/L (ref 3–16)
ANION GAP SERPL CALCULATED.3IONS-SCNC: 35 MMOL/L (ref 3–16)
ANISOCYTOSIS BLD QL SMEAR: ABNORMAL
AST SERPL-CCNC: 16 U/L (ref 15–37)
AST SERPL-CCNC: 36 U/L (ref 15–37)
BACTERIA URNS QL MICRO: ABNORMAL /HPF
BARBITURATES UR QL SCN>200 NG/ML: NORMAL
BASE EXCESS BLDV CALC-SCNC: -25.6 MMOL/L (ref -3–3)
BASOPHILS # BLD: 0 K/UL (ref 0–0.2)
BASOPHILS # BLD: 0.1 K/UL (ref 0–0.2)
BASOPHILS NFR BLD: 0.2 %
BASOPHILS NFR BLD: 0.5 %
BENZODIAZ UR QL SCN>200 NG/ML: NORMAL
BETA-HYDROXYBUTYRATE: >8 MMOL/L (ref 0–0.27)
BILIRUB SERPL-MCNC: 0.6 MG/DL (ref 0–1)
BILIRUB SERPL-MCNC: 1.2 MG/DL (ref 0–1)
BILIRUB UR QL STRIP.AUTO: NEGATIVE
BUN SERPL-MCNC: 31 MG/DL (ref 7–20)
BUN SERPL-MCNC: 61 MG/DL (ref 7–20)
BUN SERPL-MCNC: 61 MG/DL (ref 7–20)
BUN SERPL-MCNC: 62 MG/DL (ref 7–20)
BUN SERPL-MCNC: 63 MG/DL (ref 7–20)
BUN SERPL-MCNC: 64 MG/DL (ref 7–20)
BUN SERPL-MCNC: 65 MG/DL (ref 7–20)
CALCIUM SERPL-MCNC: 6.1 MG/DL (ref 8.3–10.6)
CALCIUM SERPL-MCNC: 6.5 MG/DL (ref 8.3–10.6)
CALCIUM SERPL-MCNC: 6.5 MG/DL (ref 8.3–10.6)
CALCIUM SERPL-MCNC: 6.6 MG/DL (ref 8.3–10.6)
CALCIUM SERPL-MCNC: 6.7 MG/DL (ref 8.3–10.6)
CALCIUM SERPL-MCNC: 6.7 MG/DL (ref 8.3–10.6)
CALCIUM SERPL-MCNC: 6.8 MG/DL (ref 8.3–10.6)
CALCIUM SERPL-MCNC: 7.6 MG/DL (ref 8.3–10.6)
CALCIUM SERPL-MCNC: 8.2 MG/DL (ref 8.3–10.6)
CANNABINOIDS UR QL SCN>50 NG/ML: NORMAL
CHLORIDE SERPL-SCNC: 64 MMOL/L (ref 99–110)
CHLORIDE SERPL-SCNC: 73 MMOL/L (ref 99–110)
CHLORIDE SERPL-SCNC: 75 MMOL/L (ref 99–110)
CHLORIDE SERPL-SCNC: 77 MMOL/L (ref 99–110)
CHLORIDE SERPL-SCNC: 78 MMOL/L (ref 99–110)
CHLORIDE SERPL-SCNC: 81 MMOL/L (ref 99–110)
CHLORIDE SERPL-SCNC: 83 MMOL/L (ref 99–110)
CHLORIDE SERPL-SCNC: 84 MMOL/L (ref 99–110)
CHLORIDE SERPL-SCNC: 87 MMOL/L (ref 99–110)
CHOLEST SERPL-MCNC: 224 MG/DL (ref 0–199)
CLARITY UR: ABNORMAL
CO2 BLDV-SCNC: 6 MMOL/L
CO2 SERPL-SCNC: 13 MMOL/L (ref 21–32)
CO2 SERPL-SCNC: 5 MMOL/L (ref 21–32)
CO2 SERPL-SCNC: 6 MMOL/L (ref 21–32)
CO2 SERPL-SCNC: 8 MMOL/L (ref 21–32)
COCAINE UR QL SCN: NORMAL
COHGB MFR BLDV: 1 % (ref 0–1.5)
COLOR UR: ABNORMAL
CREAT SERPL-MCNC: 2 MG/DL (ref 0.9–1.3)
CREAT SERPL-MCNC: 4.2 MG/DL (ref 0.9–1.3)
CREAT SERPL-MCNC: 4.2 MG/DL (ref 0.9–1.3)
CREAT SERPL-MCNC: 4.4 MG/DL (ref 0.9–1.3)
CREAT SERPL-MCNC: 4.5 MG/DL (ref 0.9–1.3)
CREAT UR-MCNC: 19.3 MG/DL (ref 39–259)
DEPRECATED RDW RBC AUTO: 15.7 % (ref 12.4–15.4)
DEPRECATED RDW RBC AUTO: 16 % (ref 12.4–15.4)
DRUG SCREEN COMMENT UR-IMP: NORMAL
EKG ATRIAL RATE: 67 BPM
EKG DIAGNOSIS: NORMAL
EKG P AXIS: 74 DEGREES
EKG P-R INTERVAL: 202 MS
EKG Q-T INTERVAL: 446 MS
EKG QRS DURATION: 122 MS
EKG QTC CALCULATION (BAZETT): 471 MS
EKG R AXIS: 5 DEGREES
EKG T AXIS: -17 DEGREES
EKG VENTRICULAR RATE: 67 BPM
EOSINOPHIL # BLD: 0 K/UL (ref 0–0.6)
EOSINOPHIL # BLD: 0 K/UL (ref 0–0.6)
EOSINOPHIL NFR BLD: 0.1 %
EOSINOPHIL NFR BLD: 0.3 %
EPI CELLS #/AREA URNS HPF: ABNORMAL /HPF (ref 0–5)
ERYTHROCYTE [SEDIMENTATION RATE] IN BLOOD BY WESTERGREN METHOD: 52 MM/HR (ref 0–15)
ETHANOLAMINE SERPL-MCNC: NORMAL MG/DL (ref 0–0.08)
FENTANYL SCREEN, URINE: NORMAL
FINE GRAN CASTS #/AREA URNS HPF: ABNORMAL /LPF (ref 0–2)
FLUAV RNA RESP QL NAA+PROBE: NOT DETECTED
FLUBV RNA RESP QL NAA+PROBE: NOT DETECTED
GFR SERPLBLD CREATININE-BSD FMLA CKD-EPI: 15 ML/MIN/{1.73_M2}
GFR SERPLBLD CREATININE-BSD FMLA CKD-EPI: 16 ML/MIN/{1.73_M2}
GFR SERPLBLD CREATININE-BSD FMLA CKD-EPI: 17 ML/MIN/{1.73_M2}
GFR SERPLBLD CREATININE-BSD FMLA CKD-EPI: 17 ML/MIN/{1.73_M2}
GFR SERPLBLD CREATININE-BSD FMLA CKD-EPI: 41 ML/MIN/{1.73_M2}
GLUCOSE BLD-MCNC: >600 MG/DL (ref 70–99)
GLUCOSE SERPL-MCNC: 1378 MG/DL (ref 70–99)
GLUCOSE SERPL-MCNC: 1425 MG/DL (ref 70–99)
GLUCOSE SERPL-MCNC: 1540 MG/DL (ref 70–99)
GLUCOSE SERPL-MCNC: 1675 MG/DL (ref 70–99)
GLUCOSE SERPL-MCNC: 1755 MG/DL (ref 70–99)
GLUCOSE SERPL-MCNC: 1890 MG/DL (ref 70–99)
GLUCOSE SERPL-MCNC: 2125 MG/DL (ref 70–99)
GLUCOSE SERPL-MCNC: 2340 MG/DL (ref 70–99)
GLUCOSE SERPL-MCNC: 2355 MG/DL (ref 70–99)
GLUCOSE SERPL-MCNC: 2470 MG/DL (ref 70–99)
GLUCOSE SERPL-MCNC: 2525 MG/DL (ref 70–99)
GLUCOSE UR STRIP.AUTO-MCNC: >=1000 MG/DL
HCO3 BLDV-SCNC: 5.3 MMOL/L (ref 23–29)
HCT VFR BLD AUTO: 39.3 % (ref 40.5–52.5)
HCT VFR BLD AUTO: 45.3 % (ref 40.5–52.5)
HDLC SERPL-MCNC: 49 MG/DL (ref 40–60)
HGB BLD-MCNC: 11 G/DL (ref 13.5–17.5)
HGB BLD-MCNC: 12.1 G/DL (ref 13.5–17.5)
HGB UR QL STRIP.AUTO: ABNORMAL
HYALINE CASTS #/AREA URNS LPF: ABNORMAL /LPF (ref 0–2)
KETONES UR STRIP.AUTO-MCNC: NEGATIVE MG/DL
LACTATE BLDV-SCNC: 2.4 MMOL/L (ref 0.4–2)
LACTATE BLDV-SCNC: 2.6 MMOL/L (ref 0.4–1.9)
LDLC SERPL CALC-MCNC: 156 MG/DL
LEUKOCYTE ESTERASE UR QL STRIP.AUTO: NEGATIVE
LYMPHOCYTES # BLD: 0.5 K/UL (ref 1–5.1)
LYMPHOCYTES # BLD: 0.9 K/UL (ref 1–5.1)
LYMPHOCYTES NFR BLD: 5 %
LYMPHOCYTES NFR BLD: 6.4 %
MAGNESIUM SERPL-MCNC: 2.2 MG/DL (ref 1.8–2.4)
MAGNESIUM SERPL-MCNC: 2.5 MG/DL (ref 1.8–2.4)
MCH RBC QN AUTO: 27.3 PG (ref 26–34)
MCH RBC QN AUTO: 27.9 PG (ref 26–34)
MCHC RBC AUTO-ENTMCNC: 24.3 G/DL (ref 31–36)
MCHC RBC AUTO-ENTMCNC: 30.8 G/DL (ref 31–36)
MCV RBC AUTO: 112.2 FL (ref 80–100)
MCV RBC AUTO: 90.4 FL (ref 80–100)
METHADONE UR QL SCN>300 NG/ML: NORMAL
METHGB MFR BLDV: 0.4 %
MICROALBUMIN UR DL<=1MG/L-MCNC: 6.9 MG/DL
MICROALBUMIN/CREAT UR: 357.5 MG/G (ref 0–30)
MONOCYTES # BLD: 0.6 K/UL (ref 0–1.3)
MONOCYTES # BLD: 1.1 K/UL (ref 0–1.3)
MONOCYTES NFR BLD: 5.4 %
MONOCYTES NFR BLD: 7.3 %
MUCOUS THREADS #/AREA URNS LPF: ABNORMAL /LPF
NEUTROPHILS # BLD: 12.5 K/UL (ref 1.7–7.7)
NEUTROPHILS # BLD: 9.6 K/UL (ref 1.7–7.7)
NEUTROPHILS NFR BLD: 85.8 %
NEUTROPHILS NFR BLD: 89 %
NITRITE UR QL STRIP.AUTO: NEGATIVE
O2 CT VFR BLDV CALC: 15 VOL %
O2 THERAPY: ABNORMAL
OPIATES UR QL SCN>300 NG/ML: NORMAL
OXYCODONE UR QL SCN: NORMAL
PATH INTERP BLD-IMP: YES
PCO2 BLDV: 25 MMHG (ref 40–50)
PCP UR QL SCN>25 NG/ML: NORMAL
PERFORMED ON: ABNORMAL
PH BLDV: 6.95 [PH] (ref 7.35–7.45)
PH UR STRIP.AUTO: 5.5 [PH] (ref 5–8)
PH UR STRIP: 5.5 [PH]
PHOSPHATE SERPL-MCNC: 4.7 MG/DL (ref 2.5–4.9)
PHOSPHATE SERPL-MCNC: 6 MG/DL (ref 2.5–4.9)
PLATELET # BLD AUTO: 385 K/UL (ref 135–450)
PLATELET # BLD AUTO: 394 K/UL (ref 135–450)
PLATELET BLD QL SMEAR: ABNORMAL
PMV BLD AUTO: 7.8 FL (ref 5–10.5)
PMV BLD AUTO: 8 FL (ref 5–10.5)
PO2 BLDV: 69 MMHG (ref 25–40)
POIKILOCYTOSIS BLD QL SMEAR: ABNORMAL
POTASSIUM SERPL-SCNC: 2.7 MMOL/L (ref 3.5–5.1)
POTASSIUM SERPL-SCNC: 2.9 MMOL/L (ref 3.5–5.1)
POTASSIUM SERPL-SCNC: 3 MMOL/L (ref 3.5–5.1)
POTASSIUM SERPL-SCNC: 3.2 MMOL/L (ref 3.5–5.1)
POTASSIUM SERPL-SCNC: 4.2 MMOL/L (ref 3.5–5.1)
POTASSIUM SERPL-SCNC: 4.5 MMOL/L (ref 3.5–5.1)
POTASSIUM SERPL-SCNC: 4.7 MMOL/L (ref 3.5–5.1)
PROT SERPL-MCNC: 6.8 G/DL (ref 6.4–8.2)
PROT SERPL-MCNC: 8 G/DL (ref 6.4–8.2)
PROT UR STRIP.AUTO-MCNC: 100 MG/DL
PSA SERPL DL<=0.01 NG/ML-MCNC: 0.18 NG/ML (ref 0–4)
RBC # BLD AUTO: 4.04 M/UL (ref 4.2–5.9)
RBC # BLD AUTO: 4.34 M/UL (ref 4.2–5.9)
RBC #/AREA URNS HPF: ABNORMAL /HPF (ref 0–4)
SAO2 % BLDV: 81 %
SARS-COV-2 RNA RESP QL NAA+PROBE: NOT DETECTED
SLIDE REVIEW: ABNORMAL
SODIUM SERPL-SCNC: 104 MMOL/L (ref 136–145)
SODIUM SERPL-SCNC: 110 MMOL/L (ref 136–145)
SODIUM SERPL-SCNC: 113 MMOL/L (ref 136–145)
SODIUM SERPL-SCNC: 115 MMOL/L (ref 136–145)
SODIUM SERPL-SCNC: 115 MMOL/L (ref 136–145)
SODIUM SERPL-SCNC: 116 MMOL/L (ref 136–145)
SODIUM SERPL-SCNC: 117 MMOL/L (ref 136–145)
SODIUM SERPL-SCNC: 118 MMOL/L (ref 136–145)
SODIUM SERPL-SCNC: 118 MMOL/L (ref 136–145)
SP GR UR STRIP.AUTO: 1.01 (ref 1–1.03)
TRIGL SERPL-MCNC: 96 MG/DL (ref 0–150)
TROPONIN, HIGH SENSITIVITY: 391 NG/L (ref 0–22)
TROPONIN, HIGH SENSITIVITY: 396 NG/L (ref 0–22)
UA COMPLETE W REFLEX CULTURE PNL UR: ABNORMAL
UA DIPSTICK W REFLEX MICRO PNL UR: YES
URN SPEC COLLECT METH UR: ABNORMAL
UROBILINOGEN UR STRIP-ACNC: 0.2 E.U./DL
VLDLC SERPL CALC-MCNC: 19 MG/DL
WBC # BLD AUTO: 10.8 K/UL (ref 4–11)
WBC # BLD AUTO: 14.5 K/UL (ref 4–11)
WBC #/AREA URNS HPF: ABNORMAL /HPF (ref 0–5)

## 2024-01-25 PROCEDURE — 2000000000 HC ICU R&B

## 2024-01-25 PROCEDURE — 5A1955Z RESPIRATORY VENTILATION, GREATER THAN 96 CONSECUTIVE HOURS: ICD-10-PCS | Performed by: INTERNAL MEDICINE

## 2024-01-25 PROCEDURE — 2500000003 HC RX 250 WO HCPCS

## 2024-01-25 PROCEDURE — 84100 ASSAY OF PHOSPHORUS: CPT

## 2024-01-25 PROCEDURE — 80307 DRUG TEST PRSMV CHEM ANLYZR: CPT

## 2024-01-25 PROCEDURE — 6360000002 HC RX W HCPCS

## 2024-01-25 PROCEDURE — 83735 ASSAY OF MAGNESIUM: CPT

## 2024-01-25 PROCEDURE — 84484 ASSAY OF TROPONIN QUANT: CPT

## 2024-01-25 PROCEDURE — 36415 COLL VENOUS BLD VENIPUNCTURE: CPT

## 2024-01-25 PROCEDURE — 93005 ELECTROCARDIOGRAM TRACING: CPT | Performed by: EMERGENCY MEDICINE

## 2024-01-25 PROCEDURE — 82803 BLOOD GASES ANY COMBINATION: CPT

## 2024-01-25 PROCEDURE — 87636 SARSCOV2 & INF A&B AMP PRB: CPT

## 2024-01-25 PROCEDURE — 99285 EMERGENCY DEPT VISIT HI MDM: CPT

## 2024-01-25 PROCEDURE — 6370000000 HC RX 637 (ALT 250 FOR IP)

## 2024-01-25 PROCEDURE — 6360000002 HC RX W HCPCS: Performed by: INTERNAL MEDICINE

## 2024-01-25 PROCEDURE — 2500000003 HC RX 250 WO HCPCS: Performed by: EMERGENCY MEDICINE

## 2024-01-25 PROCEDURE — 94002 VENT MGMT INPAT INIT DAY: CPT

## 2024-01-25 PROCEDURE — 71045 X-RAY EXAM CHEST 1 VIEW: CPT

## 2024-01-25 PROCEDURE — 2580000003 HC RX 258

## 2024-01-25 PROCEDURE — 87040 BLOOD CULTURE FOR BACTERIA: CPT

## 2024-01-25 PROCEDURE — 81001 URINALYSIS AUTO W/SCOPE: CPT

## 2024-01-25 PROCEDURE — 99291 CRITICAL CARE FIRST HOUR: CPT | Performed by: INTERNAL MEDICINE

## 2024-01-25 PROCEDURE — 82010 KETONE BODYS QUAN: CPT

## 2024-01-25 PROCEDURE — 36556 INSERT NON-TUNNEL CV CATH: CPT

## 2024-01-25 PROCEDURE — 82947 ASSAY GLUCOSE BLOOD QUANT: CPT

## 2024-01-25 PROCEDURE — 6370000000 HC RX 637 (ALT 250 FOR IP): Performed by: EMERGENCY MEDICINE

## 2024-01-25 PROCEDURE — 94761 N-INVAS EAR/PLS OXIMETRY MLT: CPT

## 2024-01-25 PROCEDURE — 96367 TX/PROPH/DG ADDL SEQ IV INF: CPT

## 2024-01-25 PROCEDURE — 2580000003 HC RX 258: Performed by: EMERGENCY MEDICINE

## 2024-01-25 PROCEDURE — 83605 ASSAY OF LACTIC ACID: CPT

## 2024-01-25 PROCEDURE — 93010 ELECTROCARDIOGRAM REPORT: CPT | Performed by: INTERNAL MEDICINE

## 2024-01-25 PROCEDURE — 80053 COMPREHEN METABOLIC PANEL: CPT

## 2024-01-25 PROCEDURE — 83036 HEMOGLOBIN GLYCOSYLATED A1C: CPT

## 2024-01-25 PROCEDURE — 0BH18EZ INSERTION OF ENDOTRACHEAL AIRWAY INTO TRACHEA, VIA NATURAL OR ARTIFICIAL OPENING ENDOSCOPIC: ICD-10-PCS | Performed by: INTERNAL MEDICINE

## 2024-01-25 PROCEDURE — 02HV33Z INSERTION OF INFUSION DEVICE INTO SUPERIOR VENA CAVA, PERCUTANEOUS APPROACH: ICD-10-PCS | Performed by: INTERNAL MEDICINE

## 2024-01-25 PROCEDURE — 6360000002 HC RX W HCPCS: Performed by: EMERGENCY MEDICINE

## 2024-01-25 PROCEDURE — 82077 ASSAY SPEC XCP UR&BREATH IA: CPT

## 2024-01-25 PROCEDURE — 2700000000 HC OXYGEN THERAPY PER DAY

## 2024-01-25 PROCEDURE — 85025 COMPLETE CBC W/AUTO DIFF WBC: CPT

## 2024-01-25 PROCEDURE — 96374 THER/PROPH/DIAG INJ IV PUSH: CPT

## 2024-01-25 PROCEDURE — 96365 THER/PROPH/DIAG IV INF INIT: CPT

## 2024-01-25 PROCEDURE — 70450 CT HEAD/BRAIN W/O DYE: CPT

## 2024-01-25 PROCEDURE — 2580000003 HC RX 258: Performed by: INTERNAL MEDICINE

## 2024-01-25 RX ORDER — ETOMIDATE 2 MG/ML
30 INJECTION INTRAVENOUS ONCE
Status: COMPLETED | OUTPATIENT
Start: 2024-01-25 | End: 2024-01-25

## 2024-01-25 RX ORDER — SUCCINYLCHOLINE CHLORIDE 20 MG/ML
INJECTION INTRAMUSCULAR; INTRAVENOUS
Status: COMPLETED
Start: 2024-01-25 | End: 2024-01-25

## 2024-01-25 RX ORDER — DEXTROSE AND SODIUM CHLORIDE 5; .45 G/100ML; G/100ML
INJECTION, SOLUTION INTRAVENOUS CONTINUOUS PRN
Status: DISCONTINUED | OUTPATIENT
Start: 2024-01-25 | End: 2024-02-06 | Stop reason: HOSPADM

## 2024-01-25 RX ORDER — 0.9 % SODIUM CHLORIDE 0.9 %
1000 INTRAVENOUS SOLUTION INTRAVENOUS ONCE
Status: COMPLETED | OUTPATIENT
Start: 2024-01-25 | End: 2024-01-25

## 2024-01-25 RX ORDER — POTASSIUM CHLORIDE 7.45 MG/ML
10 INJECTION INTRAVENOUS PRN
Status: DISCONTINUED | OUTPATIENT
Start: 2024-01-25 | End: 2024-01-26

## 2024-01-25 RX ORDER — SODIUM CHLORIDE 9 MG/ML
INJECTION, SOLUTION INTRAVENOUS CONTINUOUS
Status: DISCONTINUED | OUTPATIENT
Start: 2024-01-25 | End: 2024-01-29

## 2024-01-25 RX ORDER — MAGNESIUM SULFATE 1 G/100ML
1000 INJECTION INTRAVENOUS PRN
Status: DISCONTINUED | OUTPATIENT
Start: 2024-01-25 | End: 2024-02-06 | Stop reason: HOSPADM

## 2024-01-25 RX ORDER — DEXTROSE AND SODIUM CHLORIDE 5; .45 G/100ML; G/100ML
INJECTION, SOLUTION INTRAVENOUS CONTINUOUS PRN
Status: DISCONTINUED | OUTPATIENT
Start: 2024-01-25 | End: 2024-01-25

## 2024-01-25 RX ORDER — MIDAZOLAM HYDROCHLORIDE 5 MG/ML
5 INJECTION INTRAMUSCULAR; INTRAVENOUS ONCE
Status: COMPLETED | OUTPATIENT
Start: 2024-01-25 | End: 2024-01-25

## 2024-01-25 RX ORDER — ETOMIDATE 2 MG/ML
INJECTION INTRAVENOUS
Status: COMPLETED
Start: 2024-01-25 | End: 2024-01-25

## 2024-01-25 RX ORDER — SODIUM CHLORIDE 9 MG/ML
1000 INJECTION, SOLUTION INTRAVENOUS ONCE
Status: COMPLETED | OUTPATIENT
Start: 2024-01-25 | End: 2024-01-25

## 2024-01-25 RX ORDER — POLYETHYLENE GLYCOL 3350 17 G/17G
17 POWDER, FOR SOLUTION ORAL DAILY PRN
Status: DISCONTINUED | OUTPATIENT
Start: 2024-01-25 | End: 2024-02-06 | Stop reason: HOSPADM

## 2024-01-25 RX ORDER — SODIUM CHLORIDE 9 MG/ML
INJECTION, SOLUTION INTRAVENOUS CONTINUOUS
Status: DISCONTINUED | OUTPATIENT
Start: 2024-01-25 | End: 2024-01-26

## 2024-01-25 RX ORDER — ENOXAPARIN SODIUM 100 MG/ML
30 INJECTION SUBCUTANEOUS DAILY
Status: DISCONTINUED | OUTPATIENT
Start: 2024-01-25 | End: 2024-02-01

## 2024-01-25 RX ORDER — SUCCINYLCHOLINE CHLORIDE 20 MG/ML
120 INJECTION INTRAMUSCULAR; INTRAVENOUS ONCE
Status: COMPLETED | OUTPATIENT
Start: 2024-01-25 | End: 2024-01-25

## 2024-01-25 RX ORDER — POTASSIUM CHLORIDE 7.45 MG/ML
10 INJECTION INTRAVENOUS PRN
Status: DISCONTINUED | OUTPATIENT
Start: 2024-01-25 | End: 2024-01-25

## 2024-01-25 RX ORDER — MIDAZOLAM HYDROCHLORIDE 5 MG/ML
INJECTION INTRAMUSCULAR; INTRAVENOUS
Status: COMPLETED
Start: 2024-01-25 | End: 2024-01-25

## 2024-01-25 RX ORDER — MAGNESIUM SULFATE IN WATER 40 MG/ML
2000 INJECTION, SOLUTION INTRAVENOUS PRN
Status: DISCONTINUED | OUTPATIENT
Start: 2024-01-25 | End: 2024-01-25

## 2024-01-25 RX ORDER — 0.9 % SODIUM CHLORIDE 0.9 %
15 INTRAVENOUS SOLUTION INTRAVENOUS ONCE
Status: COMPLETED | OUTPATIENT
Start: 2024-01-25 | End: 2024-01-25

## 2024-01-25 RX ORDER — LORAZEPAM 2 MG/ML
2 INJECTION INTRAMUSCULAR ONCE
Status: DISCONTINUED | OUTPATIENT
Start: 2024-01-25 | End: 2024-01-25

## 2024-01-25 RX ORDER — FENTANYL CITRATE-0.9 % NACL/PF 10 MCG/ML
25-200 PLASTIC BAG, INJECTION (ML) INTRAVENOUS CONTINUOUS
Status: DISCONTINUED | OUTPATIENT
Start: 2024-01-25 | End: 2024-01-29

## 2024-01-25 RX ORDER — 0.9 % SODIUM CHLORIDE 0.9 %
1000 INTRAVENOUS SOLUTION INTRAVENOUS ONCE
Status: DISCONTINUED | OUTPATIENT
Start: 2024-01-25 | End: 2024-01-25

## 2024-01-25 RX ADMIN — ETOMIDATE 30 MG: 2 INJECTION INTRAVENOUS at 15:57

## 2024-01-25 RX ADMIN — POTASSIUM CHLORIDE 10 MEQ: 7.46 INJECTION, SOLUTION INTRAVENOUS at 18:22

## 2024-01-25 RX ADMIN — SODIUM CHLORIDE 1401 ML: 9 INJECTION, SOLUTION INTRAVENOUS at 11:44

## 2024-01-25 RX ADMIN — POTASSIUM CHLORIDE 10 MEQ: 7.46 INJECTION, SOLUTION INTRAVENOUS at 16:46

## 2024-01-25 RX ADMIN — VANCOMYCIN HYDROCHLORIDE 1000 MG: 1 INJECTION, POWDER, LYOPHILIZED, FOR SOLUTION INTRAVENOUS at 12:48

## 2024-01-25 RX ADMIN — SODIUM CHLORIDE 1000 ML: 9 INJECTION, SOLUTION INTRAVENOUS at 15:27

## 2024-01-25 RX ADMIN — SODIUM CHLORIDE: 9 INJECTION, SOLUTION INTRAVENOUS at 12:38

## 2024-01-25 RX ADMIN — SODIUM BICARBONATE 50 MEQ: 84 INJECTION INTRAVENOUS at 12:42

## 2024-01-25 RX ADMIN — POTASSIUM CHLORIDE 10 MEQ: 7.46 INJECTION, SOLUTION INTRAVENOUS at 23:31

## 2024-01-25 RX ADMIN — POTASSIUM CHLORIDE 10 MEQ: 7.46 INJECTION, SOLUTION INTRAVENOUS at 21:35

## 2024-01-25 RX ADMIN — PIPERACILLIN AND TAZOBACTAM 3375 MG: 3; .375 INJECTION, POWDER, FOR SOLUTION INTRAVENOUS at 19:06

## 2024-01-25 RX ADMIN — MIDAZOLAM HYDROCHLORIDE 5 MG: 5 INJECTION, SOLUTION INTRAMUSCULAR; INTRAVENOUS at 16:25

## 2024-01-25 RX ADMIN — ETOMIDATE 30 MG: 2 INJECTION, SOLUTION INTRAVENOUS at 15:57

## 2024-01-25 RX ADMIN — SODIUM CHLORIDE: 9 INJECTION, SOLUTION INTRAVENOUS at 16:41

## 2024-01-25 RX ADMIN — SODIUM CHLORIDE 32.5 UNITS/HR: 9 INJECTION, SOLUTION INTRAVENOUS at 18:12

## 2024-01-25 RX ADMIN — SUCCINYLCHOLINE CHLORIDE 120 MG: 20 INJECTION, SOLUTION INTRAMUSCULAR; INTRAVENOUS at 15:57

## 2024-01-25 RX ADMIN — NOREPINEPHRINE BITARTRATE 5 MCG/MIN: 1 SOLUTION INTRAVENOUS at 15:02

## 2024-01-25 RX ADMIN — POTASSIUM CHLORIDE 10 MEQ: 7.46 INJECTION, SOLUTION INTRAVENOUS at 20:38

## 2024-01-25 RX ADMIN — Medication 150 MCG/HR: at 22:48

## 2024-01-25 RX ADMIN — POTASSIUM CHLORIDE 10 MEQ: 7.46 INJECTION, SOLUTION INTRAVENOUS at 16:16

## 2024-01-25 RX ADMIN — MIDAZOLAM HYDROCHLORIDE 5 MG: 5 INJECTION INTRAMUSCULAR; INTRAVENOUS at 16:25

## 2024-01-25 RX ADMIN — SODIUM CHLORIDE 10.82 UNITS/HR: 9 INJECTION, SOLUTION INTRAVENOUS at 12:27

## 2024-01-25 RX ADMIN — Medication 50 MCG/HR: at 16:11

## 2024-01-25 RX ADMIN — SODIUM CHLORIDE 1000 ML: 9 INJECTION, SOLUTION INTRAVENOUS at 11:11

## 2024-01-25 RX ADMIN — ENOXAPARIN SODIUM 30 MG: 100 INJECTION SUBCUTANEOUS at 17:55

## 2024-01-25 RX ADMIN — POTASSIUM CHLORIDE 10 MEQ: 7.46 INJECTION, SOLUTION INTRAVENOUS at 22:46

## 2024-01-25 RX ADMIN — SUCCINYLCHOLINE CHLORIDE 120 MG: 20 INJECTION INTRAMUSCULAR; INTRAVENOUS at 15:57

## 2024-01-25 RX ADMIN — POTASSIUM CHLORIDE 10 MEQ: 7.46 INJECTION, SOLUTION INTRAVENOUS at 15:44

## 2024-01-25 RX ADMIN — Medication 2 MG/HR: at 16:14

## 2024-01-25 RX ADMIN — POTASSIUM CHLORIDE 10 MEQ: 7.46 INJECTION, SOLUTION INTRAVENOUS at 19:36

## 2024-01-25 RX ADMIN — SODIUM CHLORIDE 1000 ML: 9 INJECTION, SOLUTION INTRAVENOUS at 11:42

## 2024-01-25 RX ADMIN — POTASSIUM CHLORIDE 10 MEQ: 7.46 INJECTION, SOLUTION INTRAVENOUS at 15:12

## 2024-01-25 RX ADMIN — SODIUM CHLORIDE: 9 INJECTION, SOLUTION INTRAVENOUS at 17:44

## 2024-01-25 RX ADMIN — SODIUM CHLORIDE: 9 INJECTION, SOLUTION INTRAVENOUS at 21:32

## 2024-01-25 RX ADMIN — SODIUM CHLORIDE 1000 ML: 9 INJECTION, SOLUTION INTRAVENOUS at 13:05

## 2024-01-25 RX ADMIN — PIPERACILLIN AND TAZOBACTAM 3375 MG: 3; .375 INJECTION, POWDER, FOR SOLUTION INTRAVENOUS at 12:16

## 2024-01-25 ASSESSMENT — PAIN - FUNCTIONAL ASSESSMENT: PAIN_FUNCTIONAL_ASSESSMENT: NONE - DENIES PAIN

## 2024-01-25 ASSESSMENT — PULMONARY FUNCTION TESTS
PIF_VALUE: 19
PIF_VALUE: 22
PIF_VALUE: 17

## 2024-01-25 NOTE — ED NOTES
1426-Perfect Serve sent by Dr. Whelan to Dr. Worthington Hospitalist for consult.  1429-Call to Pulmonology Critical care  for consult placed by Dr. Whelan.  1431-Call back received from Dr. Olivas Pulmonology spoke with Dr. Whelan regarding consult.

## 2024-01-25 NOTE — ED NOTES
Provider informed pt hypotensive despite saline bolus 3400 mL.  Pt appearing increasingly lethargic.  Provider at bedside for assessment.

## 2024-01-25 NOTE — ED PROVIDER NOTES
Holes in skin  From Tegaderm Adhesive  Blister       REVIEW OF SYSTEMS    Unless otherwise stated in this report, this patient's positive and negative responses for review of systems (constitutional, eyes, ENT, cardiovascular, respiratory, gastrointestinal, neurological, genitourinary, musculoskeletal, integument systems and systems related to the presenting problem) are either stated in the preceding paragraph, were not pertinent or were negative for the symptoms and/or complaints related to the medical problem.    PHYSICAL EXAM  BP (!) 104/59   Pulse 83   Temp (!) 93.9 °F (34.4 °C) (Bladder)   Resp 26   Wt 93.4 kg (206 lb)   SpO2 100%   BMI 26.45 kg/m²   GENERAL APPEARANCE: Somnolent, arouses to voice.  HEAD: Normocephalic. Atraumatic.  EYES: PERRL. EOM's grossly intact.  ENT: Mucous membranes are dry  NECK: Supple, trachea midline.   HEART: RRR.   LUNGS: Tachypneic, no adventitious respiratory sounds.  ABDOMEN:  Soft. Non-distended. Non-tender. No guarding or rebound.  EXTREMITIES: No peripheral edema.  No acute deformities.  SKIN: Warm, dry and intact. No acute rashes.   NEUROLOGICAL: Somnolent, arouses to voice.  Intermittently follows commands.  Moves all extremities equally.    LABS  I have reviewed all labs for this visit.   Results for orders placed or performed during the hospital encounter of 01/25/24   COVID-19 & Influenza Combo    Specimen: Nasopharyngeal Swab   Result Value Ref Range    SARS-CoV-2 RNA, RT PCR NOT DETECTED NOT DETECTED    INFLUENZA A NOT DETECTED NOT DETECTED    INFLUENZA B NOT DETECTED NOT DETECTED   Comprehensive Metabolic Panel w/ Reflex to MG   Result Value Ref Range    Sodium 104 (LL) 136 - 145 mmol/L    Potassium reflex Magnesium 4.5 3.5 - 5.1 mmol/L    Chloride 64 (L) 99 - 110 mmol/L    CO2 5 (LL) 21 - 32 mmol/L    Anion Gap 35 (H) 3 - 16    Glucose 2,470 (HH) 70 - 99 mg/dL    BUN 65 (H) 7 - 20 mg/dL    Creatinine 4.5 (H) 0.9 - 1.3 mg/dL    Est, Glom Filt Rate 15 (A)  consultants but excluding time spent performing procedures, treating other patients and teaching time.                                                                                                            Procedures:     Patient Name: Era Carson  Medical Record Number: mitral regurgitation          Central Line Placement Procedure Note  Indication: centrally administered medications    Consent: The family members were counseled regarding the procedure, its indications, risks, potential complications and alternatives, and any questions were answered. Consent was obtained to proceed.    Procedure: The patient was positioned appropriately and the skin over the right internal jugular vein was prepped with chlorhexidine. Local anesthesia was obtained by infiltration using 1% Lidocaine without epinephrine.  A large bore needle was used with ultrasound guidance, to identify the vein.  A guide wire was then inserted into the vein through the needle. A triple lumen catheter was then inserted into the vessel over the guide wire using the Seldinger technique.  All ports showed good, free flowing blood return and were flushed with saline solution.  The catheter was then securely fastened to the skin with sutures and covered with a sterile dressing.  A post procedure X-ray was ordered and showed good line position.      The patient tolerated the procedure well.    Complications: None      Procedure: Rapid sequence intubation.    The procedure was performed by myself.   Indications:  Airway protection      Procedure Description: preoxygenation with nasal cannula oxygen with passive oxygenation used throughout via nasal cannula, sedation with Etomidate 30mg and paralytic with Succhinycholine 120mg.   The patient was intubated via video laryngoscopy with glidescope, with addison #4 blade, a 8.0 ETT was inserted and visualized passing through the vocal cords. This was attempted 11 time with no

## 2024-01-25 NOTE — ED NOTES
Pt with continuous attempts at removing powers catheter.   Provider aware, verbal order for soft wrist restraints obtained.

## 2024-01-25 NOTE — PROGRESS NOTES
Patient intubated with 8.0 ETT at the 23.5cm lip with good color change and bilateral breath sounds auscultated with good chest rise.

## 2024-01-25 NOTE — ED NOTES
Spoke with Dr. Whelan regarding pt hourly BG checks.  Dr. Whelan states OK to draw BMP hourly until low enough for POC.   BMP order placed for 1300 per Verbal order.

## 2024-01-25 NOTE — CONSULTS
[facility]                                 Pulmonary/Critical care  Consult /Progress Note :                                                                  CC : Altered mental status with high blood     patient is being seen at the request of Dr. SEE and Dr. Torres for a consultation for DKA and acute respiratory failure    HISTORY OF PRESENT ILLNESS:     Patient is a 48-year-old male with history of diabetes who presented to the emergency with family obtunded with altered mental status with a blood sugar in the 2300 to this 2500    He was found to have high anion gap metabolic acidosis and also was found to be HERB    The patient was very poor historian with altered mental status lethargy I required intubation in the emergency    That the family he did not have any fever or chills however he has been having nausea vomiting and not able to use anything or put anything in his stomach    Also there is concern about compliance with his insulin    In the emergency he was getting 4-1/2 L of fluid and also he was started on Levophed as well as central line placed by ED    He was also placed on the mechanical vent    PAST MEDICAL HISTORY:  Past Medical History:   Diagnosis Date    Acute respiratory failure (HCC) 8/18/2014    Asthma     Blood pressure instability     Chronic pain syndrome     Detached retina, bilateral     laser tx right eye    Diabetes mellitus (HCC)     uncontrolled     Insomnia     Meningitis August 2014    admission [facility]    Neuropathic pain     Osteomyelitis (HCC)     Osteomyelitis of tibia (HCC)     left    Pain of left lower extremity     Peripheral neuropathy      PAST SURGICAL HISTORY:  Past Surgical History:   Procedure Laterality Date    ANKLE FRACTURE SURGERY Left 1/28/13    Dr. Coe    BONE INCISION AND DRAINAGE  August 2013    left tibia with external fixation device    EYE SURGERY      retina

## 2024-01-25 NOTE — CONSULTS
Vancomycin Day: 1    Dx: Aspiration pneumonia   Ordering provider: Kzrysztof Brito MD    Current Regimen: intermittent pulse dosing    Loading dose given in ED of 1000 mg at 1200 on 01/25/24    Vancomycin Random level order to be drawn at 01/26/24 @ 0600    CrCl: 26mL.min    Will continue to monitor and dose per hospital protocol.    Thank you for the consult.    Carlene Chaudhary, RyanD, [facility] 1/25/2024 6:45 PM

## 2024-01-25 NOTE — H&P
History and Physical        HISTORY OF PRESENT ILLNESS: A 48 year old wit ha h/o DM, presented to ED in an obtunded state and a blood sugar reading above 2000.  Found to be in severe DKA,hypotension and HERB.  He has been feeling poorly for the past week per her sister who lives with him.No fever,chills.no chest pain.Yesterday he was seen by his PCP where he had blood tests done.  He was advised to come to ER for extremely elevated blood sugars.    Patient is allergic to tegaderm ag mesh 2\"x2\" [wound dressings], codeine, tape [adhesive tape], and other.    Past Medical History:   Diagnosis Date    Acute respiratory failure (HCC) 8/18/2014    Asthma     Blood pressure instability     Chronic pain syndrome     Detached retina, bilateral     laser tx right eye    Diabetes mellitus (HCC)     uncontrolled     Insomnia     Meningitis August 2014    admission [facility]    Neuropathic pain     Osteomyelitis ([facility])     Osteomyelitis of tibia ([facility])     left    Pain of left lower extremity     Peripheral neuropathy        Past Surgical History:   Procedure Laterality Date    ANKLE FRACTURE SURGERY Left 1/28/13    Dr. Coe    BONE INCISION AND DRAINAGE  August 2013    left tibia with external fixation device    EYE SURGERY      retina reattachment left eye x 3 holes repairs    EYE SURGERY Right 9-27-13    retal detachment    LEG SURGERY Left 3/31/14    ORIF left fibula and tibia    [place] EGD FLEXIBLE FOREIGN BODY REMOVAL N/A 9/21/2018    EGD FOREIGN BODY REMOVAL performed by Grady Vilchis MD at [facility] ENDOSCOPY    TIBIA FRACTURE SURGERY      with external device inplace    UPPER GASTROINTESTINAL ENDOSCOPY N/A 9/21/2018    EGD BIOPSY performed by Grady Vilchis MD at [facility] ENDOSCOPY    UPPER GASTROINTESTINAL ENDOSCOPY N/A 9/25/2021    EGD BIOPSY performed [facility] Chen Marshall [facility] ENDOSCOPY       Scheduled Meds:      Continuous Infusions:   insulin 27.05 Units/hr (01/25/24 1642)    sodium chloride 250 mL/hr at

## 2024-01-25 NOTE — PROGRESS NOTES
Pt admitted into ICU 5 from ED. Intubated with a 8.0 ET.  AC 24 / 500 / 30/ 5. SR , /61 (73), SpO2 98 %. Bilateral lung sounds diminished. OG in place at 55, clamped. RIJ CVC, WNL, with versed 5 mg/hr, fentanyl 100 mcg/hr, levophed 15 mcg/min,  ml/hr, insulin per DKA protocol, potassium replacement,  infusing.     Call light within reach. Bed locked in lowest position. Bed alarm turned on.     Patient is not able to demonstrated the ability to move from a reclining position to an upright position within the recliner. Patient is confused, demented and /or unable to follow instruction.         4 Eyes Skin Assessment     The patient is being assess for   Admission    I agree that 2 RN's have performed a thorough Head to Toe Skin Assessment on the patient. ALL assessment sites listed below have been assessed.      Areas assessed by both nurses:   [x]   Head, Face, and Ears   [x]   Shoulders, Back, and Chest, Abdomen  [x]   Arms, Elbows, and Hands   [x]   Coccyx, Sacrum, and Ischium  [x]   Legs, Feet, and Heels            **SHARE this note so that the co-signing nurse is able to place an eSignature**    Co-signer eSignature: Electronically signed by Ambreen Ravi RN on 1/25/24 at 5:43 PM EST    Does the Patient have Skin Breakdown?  No          Harpreet Prevention initiated:  Yes   Wound Care Orders initiated:  No      WOC nurse consulted for Pressure Injury (Stage 3,4, Unstageable, DTI, NWPT, Complex wounds)and New or Established Ostomies:  NA      Primary Nurse eSignature: Electronically signed by Nolan Mendiola RN on 1/25/24 at 8:00 PM EST

## 2024-01-25 NOTE — TELEPHONE ENCOUNTER
CALLED PT SISTER AGAIN, AND SHE ANSWERED. TOLD HER THAT DUE TO HER BROTHER LABS RESULTS HE NEEDS TO GO TO THE ER RIGHT NOW. SHE SD THAT SHE HAS BEEN WORRIED BECAUSE HE HAS BEEN VOMITING SINCE THEY LEFT HERE YESTERDAY AND HE HAS BEEN ON THE FLOOR LAYING DOWN WITH NO ENERGY. SHE STATED THAT SHE TOLD HIM TO GO LAST NIGHT AND HE WOULDN'T. TOLD HER TO EXPLAIN THAT THE PROVIDER STATED HE NEEDS TO GO. I TOLD HER THAT IF HE CAN NOT MOVE, TO CALL 911 AND HAVE THEM COME AND GET HIM. TOLD SISTER THAT I WILL CALL HER BACK AND MAKE SURE THEY WERE ABLE TO GO.

## 2024-01-25 NOTE — TELEPHONE ENCOUNTER
[facility] CALLED IN, STATED THEY HAD CRITICAL LABS. PT RESULTS WERE     SODIUM: 115  CO2: 13   GLUCOSE: 1378      CALLED PT NUMBER, NO ANSWER.    CALLED PT SISTER , NO ANSWER     SENDING A MY CHART MESSAGE TO PT

## 2024-01-26 ENCOUNTER — APPOINTMENT (OUTPATIENT)
Dept: CT IMAGING | Age: 49
DRG: 637 | End: 2024-01-26
Payer: MEDICARE

## 2024-01-26 LAB
ALBUMIN SERPL-MCNC: 2.6 G/DL (ref 3.4–5)
ALBUMIN/GLOB SERPL: 1 {RATIO} (ref 1.1–2.2)
ALP SERPL-CCNC: 104 U/L (ref 40–129)
ALT SERPL-CCNC: 53 U/L (ref 10–40)
ANION GAP SERPL CALCULATED.3IONS-SCNC: 13 MMOL/L (ref 3–16)
ANION GAP SERPL CALCULATED.3IONS-SCNC: 14 MMOL/L (ref 3–16)
ANION GAP SERPL CALCULATED.3IONS-SCNC: 17 MMOL/L (ref 3–16)
ANION GAP SERPL CALCULATED.3IONS-SCNC: 18 MMOL/L (ref 3–16)
ANION GAP SERPL CALCULATED.3IONS-SCNC: 18 MMOL/L (ref 3–16)
ANION GAP SERPL CALCULATED.3IONS-SCNC: 19 MMOL/L (ref 3–16)
ANION GAP SERPL CALCULATED.3IONS-SCNC: 20 MMOL/L (ref 3–16)
AST SERPL-CCNC: 147 U/L (ref 15–37)
BASE EXCESS BLDA CALC-SCNC: -17.6 MMOL/L (ref -3–3)
BASE EXCESS BLDV CALC-SCNC: -11.5 MMOL/L (ref -3–3)
BILIRUB SERPL-MCNC: 0.3 MG/DL (ref 0–1)
BUN SERPL-MCNC: 46 MG/DL (ref 7–20)
BUN SERPL-MCNC: 50 MG/DL (ref 7–20)
BUN SERPL-MCNC: 50 MG/DL (ref 7–20)
BUN SERPL-MCNC: 54 MG/DL (ref 7–20)
BUN SERPL-MCNC: 59 MG/DL (ref 7–20)
BUN SERPL-MCNC: 61 MG/DL (ref 7–20)
BUN SERPL-MCNC: 61 MG/DL (ref 7–20)
CALCIUM SERPL-MCNC: 5.8 MG/DL (ref 8.3–10.6)
CALCIUM SERPL-MCNC: 6.3 MG/DL (ref 8.3–10.6)
CALCIUM SERPL-MCNC: 6.4 MG/DL (ref 8.3–10.6)
CALCIUM SERPL-MCNC: 6.4 MG/DL (ref 8.3–10.6)
CALCIUM SERPL-MCNC: 6.7 MG/DL (ref 8.3–10.6)
CALCIUM SERPL-MCNC: 7 MG/DL (ref 8.3–10.6)
CALCIUM SERPL-MCNC: 7 MG/DL (ref 8.3–10.6)
CHLORIDE SERPL-SCNC: 105 MMOL/L (ref 99–110)
CHLORIDE SERPL-SCNC: 107 MMOL/L (ref 99–110)
CHLORIDE SERPL-SCNC: 108 MMOL/L (ref 99–110)
CHLORIDE SERPL-SCNC: 109 MMOL/L (ref 99–110)
CHLORIDE SERPL-SCNC: 93 MMOL/L (ref 99–110)
CHLORIDE SERPL-SCNC: 96 MMOL/L (ref 99–110)
CHLORIDE SERPL-SCNC: 98 MMOL/L (ref 99–110)
CO2 BLDA-SCNC: 9.5 MMOL/L
CO2 BLDV-SCNC: 15 MMOL/L
CO2 SERPL-SCNC: 11 MMOL/L (ref 21–32)
CO2 SERPL-SCNC: 11 MMOL/L (ref 21–32)
CO2 SERPL-SCNC: 12 MMOL/L (ref 21–32)
CO2 SERPL-SCNC: 12 MMOL/L (ref 21–32)
CO2 SERPL-SCNC: 13 MMOL/L (ref 21–32)
CO2 SERPL-SCNC: 14 MMOL/L (ref 21–32)
CO2 SERPL-SCNC: 9 MMOL/L (ref 21–32)
COHGB MFR BLDA: 0.3 % (ref 0–1.5)
COHGB MFR BLDV: 0.4 % (ref 0–1.5)
CREAT SERPL-MCNC: 4.4 MG/DL (ref 0.9–1.3)
CREAT SERPL-MCNC: 4.5 MG/DL (ref 0.9–1.3)
CREAT SERPL-MCNC: 4.6 MG/DL (ref 0.9–1.3)
CREAT SERPL-MCNC: 4.7 MG/DL (ref 0.9–1.3)
CREAT SERPL-MCNC: 4.8 MG/DL (ref 0.9–1.3)
DEPRECATED RDW RBC AUTO: 15.1 % (ref 12.4–15.4)
GFR SERPLBLD CREATININE-BSD FMLA CKD-EPI: 14 ML/MIN/{1.73_M2}
GFR SERPLBLD CREATININE-BSD FMLA CKD-EPI: 15 ML/MIN/{1.73_M2}
GFR SERPLBLD CREATININE-BSD FMLA CKD-EPI: 16 ML/MIN/{1.73_M2}
GLUCOSE BLD-MCNC: 103 MG/DL (ref 70–99)
GLUCOSE BLD-MCNC: 106 MG/DL (ref 70–99)
GLUCOSE BLD-MCNC: 141 MG/DL (ref 70–99)
GLUCOSE BLD-MCNC: 215 MG/DL (ref 70–99)
GLUCOSE BLD-MCNC: 234 MG/DL (ref 70–99)
GLUCOSE BLD-MCNC: 245 MG/DL (ref 70–99)
GLUCOSE BLD-MCNC: 251 MG/DL (ref 70–99)
GLUCOSE BLD-MCNC: 308 MG/DL (ref 70–99)
GLUCOSE BLD-MCNC: 333 MG/DL (ref 70–99)
GLUCOSE BLD-MCNC: 418 MG/DL (ref 70–99)
GLUCOSE BLD-MCNC: 45 MG/DL (ref 70–99)
GLUCOSE BLD-MCNC: 53 MG/DL (ref 70–99)
GLUCOSE BLD-MCNC: 554 MG/DL (ref 70–99)
GLUCOSE BLD-MCNC: 561 MG/DL (ref 70–99)
GLUCOSE BLD-MCNC: 58 MG/DL (ref 70–99)
GLUCOSE BLD-MCNC: 60 MG/DL (ref 70–99)
GLUCOSE BLD-MCNC: 72 MG/DL (ref 70–99)
GLUCOSE BLD-MCNC: 80 MG/DL (ref 70–99)
GLUCOSE BLD-MCNC: 81 MG/DL (ref 70–99)
GLUCOSE BLD-MCNC: 89 MG/DL (ref 70–99)
GLUCOSE BLD-MCNC: 93 MG/DL (ref 70–99)
GLUCOSE BLD-MCNC: 97 MG/DL (ref 70–99)
GLUCOSE BLD-MCNC: >600 MG/DL (ref 70–99)
GLUCOSE SERPL-MCNC: 1026 MG/DL (ref 70–99)
GLUCOSE SERPL-MCNC: 1194 MG/DL (ref 70–99)
GLUCOSE SERPL-MCNC: 151 MG/DL (ref 70–99)
GLUCOSE SERPL-MCNC: 359 MG/DL (ref 70–99)
GLUCOSE SERPL-MCNC: 47 MG/DL (ref 70–99)
GLUCOSE SERPL-MCNC: 670 MG/DL (ref 70–99)
GLUCOSE SERPL-MCNC: 937 MG/DL (ref 70–99)
GLUCOSE SERPL-MCNC: 972 MG/DL (ref 70–99)
GLUCOSE SERPL-MCNC: 98 MG/DL (ref 70–99)
HCO3 BLDA-SCNC: 8.7 MMOL/L (ref 21–29)
HCO3 BLDV-SCNC: 13.6 MMOL/L (ref 23–29)
HCT VFR BLD AUTO: 36.3 % (ref 40.5–52.5)
HGB BLD-MCNC: 12.1 G/DL (ref 13.5–17.5)
HGB BLDA-MCNC: 12.1 G/DL (ref 13.5–17.5)
LACTATE BLDV-SCNC: 2.3 MMOL/L (ref 0.4–2)
MAGNESIUM SERPL-MCNC: 1.5 MG/DL (ref 1.8–2.4)
MAGNESIUM SERPL-MCNC: 1.6 MG/DL (ref 1.8–2.4)
MAGNESIUM SERPL-MCNC: 1.8 MG/DL (ref 1.8–2.4)
MAGNESIUM SERPL-MCNC: 1.8 MG/DL (ref 1.8–2.4)
MAGNESIUM SERPL-MCNC: 2 MG/DL (ref 1.8–2.4)
MCH RBC QN AUTO: 27.5 PG (ref 26–34)
MCHC RBC AUTO-ENTMCNC: 33.2 G/DL (ref 31–36)
MCV RBC AUTO: 82.9 FL (ref 80–100)
METHGB MFR BLDA: 0.2 %
METHGB MFR BLDV: 0.1 %
O2 CT VFR BLDV CALC: 15 VOL %
O2 THERAPY: ABNORMAL
O2 THERAPY: ABNORMAL
PATH INTERP BLD-IMP: NORMAL
PCO2 BLDA: 23.1 MMHG (ref 35–45)
PCO2 BLDV: 28.5 MMHG (ref 40–50)
PERFORMED ON: ABNORMAL
PERFORMED ON: NORMAL
PH BLDA: 7.2 [PH] (ref 7.35–7.45)
PH BLDV: 7.3 [PH] (ref 7.35–7.45)
PHOSPHATE SERPL-MCNC: 1.8 MG/DL (ref 2.5–4.9)
PHOSPHATE SERPL-MCNC: 1.8 MG/DL (ref 2.5–4.9)
PHOSPHATE SERPL-MCNC: 2.2 MG/DL (ref 2.5–4.9)
PHOSPHATE SERPL-MCNC: 2.2 MG/DL (ref 2.5–4.9)
PHOSPHATE SERPL-MCNC: 2.7 MG/DL (ref 2.5–4.9)
PLATELET # BLD AUTO: 368 K/UL (ref 135–450)
PMV BLD AUTO: 6.9 FL (ref 5–10.5)
PO2 BLDA: 107.2 MMHG (ref 75–108)
PO2 BLDV: 70.3 MMHG (ref 25–40)
POC SAMPLE TYPE: ABNORMAL
POC SAMPLE TYPE: ABNORMAL
POTASSIUM SERPL-SCNC: 3 MMOL/L (ref 3.5–5.1)
POTASSIUM SERPL-SCNC: 3.1 MMOL/L (ref 3.5–5.1)
POTASSIUM SERPL-SCNC: 3.2 MMOL/L (ref 3.5–5.1)
POTASSIUM SERPL-SCNC: 3.3 MMOL/L (ref 3.5–5.1)
POTASSIUM SERPL-SCNC: 3.6 MMOL/L (ref 3.5–5.1)
PROT SERPL-MCNC: 5.2 G/DL (ref 6.4–8.2)
RBC # BLD AUTO: 4.38 M/UL (ref 4.2–5.9)
SAO2 % BLDA: 96.9 %
SAO2 % BLDV: 93 %
SODIUM SERPL-SCNC: 122 MMOL/L (ref 136–145)
SODIUM SERPL-SCNC: 125 MMOL/L (ref 136–145)
SODIUM SERPL-SCNC: 129 MMOL/L (ref 136–145)
SODIUM SERPL-SCNC: 134 MMOL/L (ref 136–145)
SODIUM SERPL-SCNC: 134 MMOL/L (ref 136–145)
SODIUM SERPL-SCNC: 135 MMOL/L (ref 136–145)
SODIUM SERPL-SCNC: 138 MMOL/L (ref 136–145)
VANCOMYCIN SERPL-MCNC: 8.1 UG/ML
WBC # BLD AUTO: 9.4 K/UL (ref 4–11)

## 2024-01-26 PROCEDURE — 2700000000 HC OXYGEN THERAPY PER DAY

## 2024-01-26 PROCEDURE — 6360000002 HC RX W HCPCS: Performed by: EMERGENCY MEDICINE

## 2024-01-26 PROCEDURE — 83605 ASSAY OF LACTIC ACID: CPT

## 2024-01-26 PROCEDURE — 85027 COMPLETE CBC AUTOMATED: CPT

## 2024-01-26 PROCEDURE — 2500000003 HC RX 250 WO HCPCS

## 2024-01-26 PROCEDURE — 99291 CRITICAL CARE FIRST HOUR: CPT | Performed by: INTERNAL MEDICINE

## 2024-01-26 PROCEDURE — 84100 ASSAY OF PHOSPHORUS: CPT

## 2024-01-26 PROCEDURE — 2580000003 HC RX 258

## 2024-01-26 PROCEDURE — 99233 SBSQ HOSP IP/OBS HIGH 50: CPT | Performed by: INTERNAL MEDICINE

## 2024-01-26 PROCEDURE — 2500000003 HC RX 250 WO HCPCS: Performed by: INTERNAL MEDICINE

## 2024-01-26 PROCEDURE — 2000000000 HC ICU R&B

## 2024-01-26 PROCEDURE — A4216 STERILE WATER/SALINE, 10 ML: HCPCS | Performed by: INTERNAL MEDICINE

## 2024-01-26 PROCEDURE — 94003 VENT MGMT INPAT SUBQ DAY: CPT

## 2024-01-26 PROCEDURE — 94761 N-INVAS EAR/PLS OXIMETRY MLT: CPT

## 2024-01-26 PROCEDURE — 36600 WITHDRAWAL OF ARTERIAL BLOOD: CPT

## 2024-01-26 PROCEDURE — 6370000000 HC RX 637 (ALT 250 FOR IP)

## 2024-01-26 PROCEDURE — 2580000003 HC RX 258: Performed by: INTERNAL MEDICINE

## 2024-01-26 PROCEDURE — 6360000002 HC RX W HCPCS

## 2024-01-26 PROCEDURE — 80202 ASSAY OF VANCOMYCIN: CPT

## 2024-01-26 PROCEDURE — 6360000002 HC RX W HCPCS: Performed by: INTERNAL MEDICINE

## 2024-01-26 PROCEDURE — 82947 ASSAY GLUCOSE BLOOD QUANT: CPT

## 2024-01-26 PROCEDURE — 2500000003 HC RX 250 WO HCPCS: Performed by: EMERGENCY MEDICINE

## 2024-01-26 PROCEDURE — 80053 COMPREHEN METABOLIC PANEL: CPT

## 2024-01-26 PROCEDURE — 2580000003 HC RX 258: Performed by: EMERGENCY MEDICINE

## 2024-01-26 PROCEDURE — 82803 BLOOD GASES ANY COMBINATION: CPT

## 2024-01-26 PROCEDURE — 83735 ASSAY OF MAGNESIUM: CPT

## 2024-01-26 RX ORDER — DEXTROSE MONOHYDRATE 100 MG/ML
INJECTION, SOLUTION INTRAVENOUS CONTINUOUS
Status: DISCONTINUED | OUTPATIENT
Start: 2024-01-26 | End: 2024-01-29

## 2024-01-26 RX ORDER — SODIUM CHLORIDE, SODIUM LACTATE, POTASSIUM CHLORIDE, AND CALCIUM CHLORIDE .6; .31; .03; .02 G/100ML; G/100ML; G/100ML; G/100ML
1000 INJECTION, SOLUTION INTRAVENOUS PRN
Status: DISCONTINUED | OUTPATIENT
Start: 2024-01-26 | End: 2024-01-31

## 2024-01-26 RX ORDER — PROPOFOL 10 MG/ML
5-50 INJECTION, EMULSION INTRAVENOUS CONTINUOUS
Status: DISCONTINUED | OUTPATIENT
Start: 2024-01-26 | End: 2024-01-30

## 2024-01-26 RX ORDER — 0.9 % SODIUM CHLORIDE 0.9 %
1000 INTRAVENOUS SOLUTION INTRAVENOUS PRN
Status: DISCONTINUED | OUTPATIENT
Start: 2024-01-26 | End: 2024-01-26

## 2024-01-26 RX ORDER — DEXTROSE MONOHYDRATE 50 MG/ML
INJECTION, SOLUTION INTRAVENOUS CONTINUOUS
Status: DISCONTINUED | OUTPATIENT
Start: 2024-01-26 | End: 2024-01-26

## 2024-01-26 RX ORDER — POTASSIUM CHLORIDE 29.8 MG/ML
20 INJECTION INTRAVENOUS PRN
Status: DISCONTINUED | OUTPATIENT
Start: 2024-01-26 | End: 2024-01-29

## 2024-01-26 RX ORDER — 0.9 % SODIUM CHLORIDE 0.9 %
1000 INTRAVENOUS SOLUTION INTRAVENOUS ONCE
Status: DISCONTINUED | OUTPATIENT
Start: 2024-01-26 | End: 2024-01-26

## 2024-01-26 RX ORDER — PROPOFOL 10 MG/ML
INJECTION, EMULSION INTRAVENOUS
Status: DISPENSED
Start: 2024-01-26 | End: 2024-01-27

## 2024-01-26 RX ADMIN — SODIUM CHLORIDE 50 UNITS/HR: 9 INJECTION, SOLUTION INTRAVENOUS at 05:59

## 2024-01-26 RX ADMIN — SODIUM CHLORIDE 29.58 UNITS/HR: 9 INJECTION, SOLUTION INTRAVENOUS at 14:02

## 2024-01-26 RX ADMIN — DEXTROSE MONOHYDRATE 125 ML: 100 INJECTION, SOLUTION INTRAVENOUS at 21:01

## 2024-01-26 RX ADMIN — Medication 150 MCG/HR: at 06:17

## 2024-01-26 RX ADMIN — SODIUM PHOSPHATE, MONOBASIC, MONOHYDRATE AND SODIUM PHOSPHATE, DIBASIC, ANHYDROUS 15 MMOL: 142; 276 INJECTION, SOLUTION INTRAVENOUS at 16:14

## 2024-01-26 RX ADMIN — SODIUM PHOSPHATE, MONOBASIC, MONOHYDRATE AND SODIUM PHOSPHATE, DIBASIC, ANHYDROUS 15 MMOL: 142; 276 INJECTION, SOLUTION INTRAVENOUS at 07:24

## 2024-01-26 RX ADMIN — SODIUM BICARBONATE: 84 INJECTION, SOLUTION INTRAVENOUS at 10:57

## 2024-01-26 RX ADMIN — CEFTRIAXONE SODIUM 2000 MG: 2 INJECTION, POWDER, FOR SOLUTION INTRAMUSCULAR; INTRAVENOUS at 10:25

## 2024-01-26 RX ADMIN — Medication 8 MG/HR: at 01:20

## 2024-01-26 RX ADMIN — POTASSIUM CHLORIDE 10 MEQ: 7.46 INJECTION, SOLUTION INTRAVENOUS at 09:54

## 2024-01-26 RX ADMIN — SODIUM CHLORIDE: 9 INJECTION, SOLUTION INTRAVENOUS at 10:18

## 2024-01-26 RX ADMIN — CALCIUM GLUCONATE 3000 MG: 98 INJECTION, SOLUTION INTRAVENOUS at 22:48

## 2024-01-26 RX ADMIN — POTASSIUM CHLORIDE 10 MEQ: 7.46 INJECTION, SOLUTION INTRAVENOUS at 12:43

## 2024-01-26 RX ADMIN — SODIUM CHLORIDE 5 UNITS/HR: 9 INJECTION, SOLUTION INTRAVENOUS at 15:18

## 2024-01-26 RX ADMIN — Medication 50 MCG/HR: at 23:59

## 2024-01-26 RX ADMIN — PIPERACILLIN AND TAZOBACTAM 3375 MG: 3; .375 INJECTION, POWDER, FOR SOLUTION INTRAVENOUS at 02:29

## 2024-01-26 RX ADMIN — POTASSIUM CHLORIDE 10 MEQ: 7.46 INJECTION, SOLUTION INTRAVENOUS at 07:54

## 2024-01-26 RX ADMIN — POTASSIUM CHLORIDE 20 MEQ: 400 INJECTION, SOLUTION INTRAVENOUS at 17:00

## 2024-01-26 RX ADMIN — POTASSIUM CHLORIDE 10 MEQ: 7.46 INJECTION, SOLUTION INTRAVENOUS at 00:33

## 2024-01-26 RX ADMIN — POTASSIUM CHLORIDE 10 MEQ: 7.46 INJECTION, SOLUTION INTRAVENOUS at 04:47

## 2024-01-26 RX ADMIN — POTASSIUM CHLORIDE 20 MEQ: 400 INJECTION, SOLUTION INTRAVENOUS at 15:58

## 2024-01-26 RX ADMIN — POTASSIUM CHLORIDE 20 MEQ: 400 INJECTION, SOLUTION INTRAVENOUS at 13:54

## 2024-01-26 RX ADMIN — DEXTROSE MONOHYDRATE: 100 INJECTION, SOLUTION INTRAVENOUS at 22:09

## 2024-01-26 RX ADMIN — POTASSIUM CHLORIDE 10 MEQ: 7.46 INJECTION, SOLUTION INTRAVENOUS at 03:42

## 2024-01-26 RX ADMIN — POTASSIUM CHLORIDE 10 MEQ: 7.46 INJECTION, SOLUTION INTRAVENOUS at 06:53

## 2024-01-26 RX ADMIN — SODIUM BICARBONATE: 84 INJECTION, SOLUTION INTRAVENOUS at 15:55

## 2024-01-26 RX ADMIN — SODIUM CHLORIDE: 9 INJECTION, SOLUTION INTRAVENOUS at 01:18

## 2024-01-26 RX ADMIN — NOREPINEPHRINE BITARTRATE 50 MCG/MIN: 1 SOLUTION INTRAVENOUS at 09:05

## 2024-01-26 RX ADMIN — POTASSIUM CHLORIDE 10 MEQ: 7.46 INJECTION, SOLUTION INTRAVENOUS at 02:37

## 2024-01-26 RX ADMIN — POTASSIUM CHLORIDE 20 MEQ: 400 INJECTION, SOLUTION INTRAVENOUS at 20:45

## 2024-01-26 RX ADMIN — SODIUM PHOSPHATE, MONOBASIC, MONOHYDRATE AND SODIUM PHOSPHATE, DIBASIC, ANHYDROUS 15 MMOL: 142; 276 INJECTION, SOLUTION INTRAVENOUS at 20:06

## 2024-01-26 RX ADMIN — MAGNESIUM SULFATE HEPTAHYDRATE 1000 MG: 1 INJECTION, SOLUTION INTRAVENOUS at 12:43

## 2024-01-26 RX ADMIN — SODIUM CHLORIDE 39.68 UNITS/HR: 9 INJECTION, SOLUTION INTRAVENOUS at 11:00

## 2024-01-26 RX ADMIN — NOREPINEPHRINE BITARTRATE 40 MCG/MIN: 1 SOLUTION INTRAVENOUS at 02:44

## 2024-01-26 RX ADMIN — SODIUM CHLORIDE 30.56 UNITS/HR: 9 INJECTION, SOLUTION INTRAVENOUS at 13:11

## 2024-01-26 RX ADMIN — MAGNESIUM SULFATE HEPTAHYDRATE 1000 MG: 1 INJECTION, SOLUTION INTRAVENOUS at 19:59

## 2024-01-26 RX ADMIN — SODIUM CHLORIDE 50 UNITS/HR: 9 INJECTION, SOLUTION INTRAVENOUS at 03:01

## 2024-01-26 RX ADMIN — DEXTROSE MONOHYDRATE: 50 INJECTION, SOLUTION INTRAVENOUS at 17:22

## 2024-01-26 RX ADMIN — SODIUM CHLORIDE 50 UNITS/HR: 9 INJECTION, SOLUTION INTRAVENOUS at 08:34

## 2024-01-26 RX ADMIN — SODIUM CHLORIDE 50 UNITS/HR: 9 INJECTION, SOLUTION INTRAVENOUS at 07:38

## 2024-01-26 RX ADMIN — SODIUM CHLORIDE, POTASSIUM CHLORIDE, SODIUM LACTATE AND CALCIUM CHLORIDE 1000 ML: 600; 310; 30; 20 INJECTION, SOLUTION INTRAVENOUS at 10:28

## 2024-01-26 RX ADMIN — POTASSIUM CHLORIDE 20 MEQ: 400 INJECTION, SOLUTION INTRAVENOUS at 22:14

## 2024-01-26 RX ADMIN — ENOXAPARIN SODIUM 30 MG: 100 INJECTION SUBCUTANEOUS at 09:01

## 2024-01-26 RX ADMIN — SODIUM CHLORIDE, POTASSIUM CHLORIDE, SODIUM LACTATE AND CALCIUM CHLORIDE 1000 ML: 600; 310; 30; 20 INJECTION, SOLUTION INTRAVENOUS at 11:33

## 2024-01-26 RX ADMIN — POTASSIUM CHLORIDE 20 MEQ: 400 INJECTION, SOLUTION INTRAVENOUS at 23:09

## 2024-01-26 RX ADMIN — PIPERACILLIN AND TAZOBACTAM 3375 MG: 3; .375 INJECTION, POWDER, FOR SOLUTION INTRAVENOUS at 09:04

## 2024-01-26 RX ADMIN — SODIUM CHLORIDE 1000 ML: 9 INJECTION, SOLUTION INTRAVENOUS at 09:36

## 2024-01-26 RX ADMIN — SODIUM CHLORIDE 50 UNITS/HR: 9 INJECTION, SOLUTION INTRAVENOUS at 10:13

## 2024-01-26 RX ADMIN — POTASSIUM CHLORIDE 10 MEQ: 7.46 INJECTION, SOLUTION INTRAVENOUS at 01:30

## 2024-01-26 RX ADMIN — SODIUM CHLORIDE 46.41 UNITS/HR: 9 INJECTION, SOLUTION INTRAVENOUS at 12:08

## 2024-01-26 RX ADMIN — MAGNESIUM SULFATE HEPTAHYDRATE 1000 MG: 1 INJECTION, SOLUTION INTRAVENOUS at 13:54

## 2024-01-26 RX ADMIN — SODIUM CHLORIDE 1000 ML: 9 INJECTION, SOLUTION INTRAVENOUS at 08:57

## 2024-01-26 RX ADMIN — MAGNESIUM SULFATE HEPTAHYDRATE 1000 MG: 1 INJECTION, SOLUTION INTRAVENOUS at 20:55

## 2024-01-26 RX ADMIN — POTASSIUM CHLORIDE 20 MEQ: 400 INJECTION, SOLUTION INTRAVENOUS at 19:42

## 2024-01-26 RX ADMIN — SODIUM CHLORIDE 50 UNITS/HR: 9 INJECTION, SOLUTION INTRAVENOUS at 04:15

## 2024-01-26 RX ADMIN — SODIUM CHLORIDE 50 UNITS/HR: 9 INJECTION, SOLUTION INTRAVENOUS at 09:04

## 2024-01-26 RX ADMIN — PROPOFOL 20 MCG/KG/MIN: 10 INJECTION, EMULSION INTRAVENOUS at 17:22

## 2024-01-26 RX ADMIN — SODIUM CHLORIDE: 9 INJECTION, SOLUTION INTRAVENOUS at 05:47

## 2024-01-26 RX ADMIN — DEXTROSE MONOHYDRATE 125 ML: 100 INJECTION, SOLUTION INTRAVENOUS at 19:05

## 2024-01-26 RX ADMIN — PROPOFOL 40 MCG/KG/MIN: 10 INJECTION, EMULSION INTRAVENOUS at 23:39

## 2024-01-26 RX ADMIN — SODIUM BICARBONATE: 84 INJECTION, SOLUTION INTRAVENOUS at 21:57

## 2024-01-26 RX ADMIN — NOREPINEPHRINE BITARTRATE 20 MCG/MIN: 1 SOLUTION INTRAVENOUS at 22:24

## 2024-01-26 RX ADMIN — SODIUM BICARBONATE: 84 INJECTION, SOLUTION INTRAVENOUS at 20:46

## 2024-01-26 RX ADMIN — POTASSIUM CHLORIDE 10 MEQ: 7.46 INJECTION, SOLUTION INTRAVENOUS at 05:44

## 2024-01-26 RX ADMIN — POTASSIUM CHLORIDE 10 MEQ: 7.46 INJECTION, SOLUTION INTRAVENOUS at 08:54

## 2024-01-26 RX ADMIN — FAMOTIDINE 20 MG: 10 INJECTION, SOLUTION INTRAVENOUS at 10:23

## 2024-01-26 ASSESSMENT — PULMONARY FUNCTION TESTS
PIF_VALUE: 22
PIF_VALUE: 24
PIF_VALUE: 20
PIF_VALUE: 21
PIF_VALUE: 17
PIF_VALUE: 21
PIF_VALUE: 20
PIF_VALUE: 18
PIF_VALUE: 21
PIF_VALUE: 17
PIF_VALUE: 22
PIF_VALUE: 17

## 2024-01-26 NOTE — CONSULTS
Thank you to requesting provider: Dr. Olivas , for asking us to see Era Carson  Reason for consultation:  Acute Kidney Injury   Chief Complaint:  AMS    History of Presenting Illness      49 y/o male with history of type 1 diabetes admitted to the ICU with diabetic ketoacidosis.  We have been asked to see him for acute kidney injury.  He has diabetic nephropathy with a baseline of 1.4 - 1.8.  Now with Scr of 4.8.  He is in the ICU in critical condition.  Anion gap is starting to close with insulin gtt.       Past Medical/Surgical History      Active Ambulatory Problems     Diagnosis Date Noted    Pilon fracture 02/14/2013    Type 1 diabetes mellitus with stage 3 chronic kidney disease (HCC) 04/01/2013    HTN (hypertension) 05/07/2013    Leg wound, left 07/18/2013    Pathological fracture of tibia, left 08/28/2013    Staph aureus infection 10/08/2013    Acute encephalopathy 08/18/2014    Toxic metabolic encephalopathy 08/18/2014    Hyponatremia 08/18/2014    Meningitis 08/19/2014    Encephalopathy     Chronic pain syndrome     Peripheral neuropathy     Neuropathic pain     Osteomyelitis (HCC)     Pain of left lower extremity     Insomnia     Depression 05/11/2015    Chronic migraine without aura, with intractable migraine, so stated, with status migrainosus 03/04/2016    Myofascial pain 07/13/2017    Proliferative diabetic retinopathy associated with type 1 diabetes mellitus (HCC) 11/01/2018    Polyneuropathy due to type 1 diabetes mellitus (HCC) 11/01/2018    Esophagitis 09/25/2021    Abnormal finding on EKG     Acute on chronic renal insufficiency     Acute renal failure (HCC)     Hypoglycemia 04/17/2022    Recurrent major depressive disorder, in partial remission ([facility]) 01/24/2024     Resolved Ambulatory Problems     Diagnosis Date Noted    Osteomyelitis of tibia, left 08/28/2013    Acute respiratory failure (HCC) 08/18/2014    Headache 11/20/2014    Gastro-esophageal reflux disease without esophagitis        Plan:    IV insulin   Agree with IV fluids boluses   Will change IV fluids to 1/2 NS with HCO3 to prevent the non-anion gap component related to chloride loading   Follow labs  Non-oliguric so hopefully urine output continues to  with IV fluids.  RRT not indicated at this time but will assess day to day for RRT needs     Critical care time = 40 minutes     Thank you for asking us to participate in the management of your patient, please do not hesitate to contact me for any concerns regarding my recommendations as outlined above.    -----------------------------  Ericka Brice M.D.   [facility]

## 2024-01-26 NOTE — PROGRESS NOTES
DATE:  01/26/24  MEETS for Devices with Indicators:    [x] Indwelling Cath: #2    Urinary Catheter 01/25/24 Sanon-Temperature      [x] Central Lines  A triple lumen catheterCentral venous: #7 & #9    CVC Triple Lumen 01/25/24 Right Internal jugular      []  Acute Urinary Retention or Bladder Outlet Obstruction or Irrigation    [] Urinary Retention :  []Bladder Scan volume: 250mls or more  []Medications used for Intubation:  -Anesthesia (Versed), Paralytics (Rocuronium)  []Neurogenic Bladder: Nerve Damage    [] Acute Bladder Outlet Obstruction:  []Prostate Enlargement  []Blood Clots, Hematuria  []Urethral Compression    [] Continuous Bladder Irrigation  [] TURP  []Other: Please Specify:     2. [x]  Need for Accurate Measurements of Urinary Output in Critically Ill  Patients  Anticipated to Require Large Volume Infusions or Diuretics or Vasopressors / Inotropes et al:    [x]Large Volume of boluses of fluid for resuscitation  [x]Hemodynamic Instability: receiving IV: Vasopressors & Inotropes EX: Dopamine, Epinephrine, Norepinephrine, Phenylephrine, Vasopressin, Digoxin  []High-dose Diuretics  []Hourly Urine studies to measure life threatening laboratory abnormalities  []Other: Please Specify     3.  [] Need for Treatment of Wounds:  Open Sacral and/or Perineal Wounds  Promote Healing for Incontinent Patient with the following wounds: (Please select a Stage and/or use 'Other'):    []Stage II: Loss of dermis, shallow open ulcer with a red/pink wound bed  []Stage III: Subcutaneous fat may be visible   []Stage IV: Exposed bone, tendon, or muscle  []Deep Tissue Injury: Non-blanchable, purple or maroon  []Unstageable: Base of wound is covered by dead tissue  []Lower Extremity Burns of an incontinent patient   []Enterostomal therapy   []Other: Please Specify     4.  [] Strict Prolonged Immobilization  Patient requires prolonged immobilization (e.g., potentially unstable thoracic or lumbar spine, multiple traumatic

## 2024-01-26 NOTE — PROGRESS NOTES
RT and this RN at bedside to take patient to CT. Patient connected to transport vent. Patient became very agitated, not tolerating transport ventilator well off of sedation. No PRN medication available to give to patient. Patient intermittently following commands at this time AEB squeezing hand and attempting to open eyes and moving legs upon request. Patient connected back to regular vent.Message to Dr. Olivas regarding agitation and unable to obtain head CT at this time r/t safety.

## 2024-01-26 NOTE — PROGRESS NOTES
AM assessment complete, see flowsheet. Medications given per MAR. RASS -1, vent settings minimal FiO2 30%/PEEP 5. Levo gtt at 50 mcg for BP control. Insulin gtt infusing at a set rate of 50 unit/hr per MD. Sugars remain in mid 500's. Temp 99.1, bear hugger placed on lower extremities due to poor circulation.     Dr SHAW at the bedside;    -set up CVP, 1L NS bolus for CVP <12. CVP 6, 1L bolus infusing at this time      No other needs noted, IV lines and monitors checked and tightened. Awaiting MD rounds.

## 2024-01-26 NOTE — PROGRESS NOTES
Handoff given to Gisella DMUONT. All questions answered.       Paged Dr SHAW to discuss mental status and DKA gtt;    -remain off all sedation until tomorrow morning. Use PRN's as need for agitation. Plans for CT tomorrow if no neuro changes  -BG < 250, insulin gtt ran at 5 un/hr. Check with Dr SHAW with 1600 BG results and assess for need of dextrose gtt.

## 2024-01-26 NOTE — PROGRESS NOTES
01/25/24 2321   Patient Observation   Pulse 80   Respirations (!) 0   SpO2 99 %   Breath Sounds   Right Upper Lobe Diminished   Right Middle Lobe Diminished   Right Lower Lobe Diminished   Left Upper Lobe Diminished   Left Lower Lobe Diminished   Vent Information   Vent Mode AC/VC   Ventilator Settings   FiO2  40 %   Vt (Set, mL) 500 mL   Resp Rate (Set) 24 bpm   PEEP/CPAP (cmH2O) 5   Vent Patient Data (Readings)   Vt (Measured) 511 mL   Peak Inspiratory Pressure (cmH2O) 19 cmH2O   Rate Measured 25 br/min   Minute Volume (L/min) 12.6 Liters   Mean Airway Pressure (cmH2O) 9.7 cmH20   Plateau Pressure (cm H2O) 0 cm H2O   Driving Pressure -5   I:E Ratio 1:2.30   Flow Sensitivity 3 L/min   Vent Alarm Settings   High Pressure (cmH2O) 40 cmH2O   Low Minute Volume (lpm) 4 L/min   Low Exhaled Vt (ml) 250 mL   RR High (bpm) 40 br/min   Apnea (secs) 20 secs   Additional Respiratoray Assessments   Humidification Source Heated wire   Humidification Temp 36.1   Circuit Condensation Drained   Ambu Bag With Mask At Bedside Yes   Non-Surgical Airway 01/25/24 Endotracheal tube   Placement Date/Time: 01/25/24 1619   Present on Admission/Arrival: No  Placed By: In ED  Placement Verified By: Auscultation;Chest X-ray  Mask Ventilation: Ventilated by mask (1)  Insertion attempts: 1  Location: Oral  Airway Device: Endotracheal tube...   Secured At 23 cm   Measured From Lips   Secured Location Right   Secured By Commercial tube mcmillan   Site Assessment Dry

## 2024-01-26 NOTE — ACP (ADVANCE CARE PLANNING)
Advance Care Planning     General Advance Care Planning (ACP) Conversation    Date of Conversation: 1/25/2024  Conducted with: Spoke with sister of patient. Sister is only family. Sister can speak for the patient if he cannot speak for himself.    Healthcare Decision Maker:    Primary Decision Maker: Neeru Carson - Brother/Sister - [phone]  Click here to complete Healthcare Decision Makers including selection of the Healthcare Decision Maker Relationship (ie \"Primary\").   Today we documented Decision Maker(s) consistent with Legal Next of Kin hierarchy.    Content/Action Overview:  Had conversation with sister of the patient.     Reviewed DNR/DNI and patient elects Full Code (Attempt Resuscitation)        Length of Voluntary ACP Conversation in minutes:  <16 minutes (Non-Billable)    Teresa Boeck, RN

## 2024-01-26 NOTE — PROGRESS NOTES
IM Progress Note    Admit Date:  1/25/2024  1    Interval history:  DKA with severe hyperglycemia above 2000 glucose   Encephalopathy , placed on vent     Subjective:  Mr. Carson seen sedated on vent   Remains hypotensive needing levophed  Sugars improved from 2500 to 600 today   UOP picking up       Objective:   BP (!) 99/59   Pulse (!) 102   Temp 98.6 °F (37 °C) (Bladder)   Resp 28   Ht 1.88 m (6' 2.02\")   Wt 102.2 kg (225 lb 5 oz)   SpO2 98%   BMI 28.92 kg/m²     Intake/Output Summary (Last 24 hours) at 1/26/2024 1418  Last data filed at 1/26/2024 1116  Gross per 24 hour   Intake 99709.35 ml   Output 745 ml   Net 9258.35 ml       Physical Exam:        General: middle aged male sedated on vent,   Oral ETT and OG noted Appears to be not in any distress  Mucous Membranes:  Pink , anicteric  Neck: No JVD, no carotid bruit, no thyromegaly  Chest:  Clear to auscultation bilaterally, no added sounds  Cardiovascular:  RRR S1S2 heard, no murmurs or gallops  Abdomen:  Soft, undistended, non tender, no organomegaly, BS present  Extremities: cold peripheries with diminished cap refill in both feet  No edema or cyanosis. Distal pulses feeble both feet  Neurological : sedated      Medications:   Scheduled Medications:    famotidine (PEPCID) injection  20 mg IntraVENous Daily    cefTRIAXone (ROCEPHIN) IV  2,000 mg IntraVENous Q24H    enoxaparin  30 mg SubCUTAneous Daily     I   sodium bicarbonate 75 mEq in sodium chloride 0.45 % 1,000 mL infusion 250 mL/hr at 01/26/24 1057    insulin 29.58 Units/hr (01/26/24 1402)    sodium chloride Stopped (01/25/24 1744)    norepinephrine 35 mcg/min (01/26/24 1116)    dextrose 5 % and 0.45 % NaCl      midazolam Stopped (01/26/24 0940)    fentaNYL Stopped (01/26/24 0940)     lactated ringers bolus, potassium chloride, dextrose bolus **OR** dextrose bolus, magnesium sulfate, sodium phosphate 15 mmol in sodium chloride 0.9 % 250 mL IVPB, polyethylene glycol, dextrose 5 % and 0.45 %

## 2024-01-26 NOTE — PROGRESS NOTES
Return message from Dr. Olivas. New order to restart sedation. Ok to use propofol and fentanyl. STOP sedation again in AM.     Propofol started at 20 mcg/kg/min.

## 2024-01-26 NOTE — PROGRESS NOTES
Comprehensive Nutrition Assessment    Type and Reason for Visit:  Initial, Positive Nutrition Screen (+ screen for MST = 3)    Nutrition Recommendations/Plan:   Continue NPO status until patient is medically cleared to receive nutrition therapy.   Monitor respiratory/vent status and plan of care.   Monitor nutrition-related labs, bowel function, and weight trends.      Malnutrition Assessment:  Malnutrition Status:  At risk for malnutrition (01/26/24 1400)    Context:  Acute Illness     Findings of the 6 clinical characteristics of malnutrition:  Energy Intake:  50% or less of estimated energy requirements for 5 or more days  Weight Loss:  No significant weight loss     Body Fat Loss:  No significant body fat loss     Muscle Mass Loss:  No significant muscle mass loss    Fluid Accumulation:  No significant fluid accumulation     Strength:  Not Performed    Nutrition Assessment:    patient is nutritionally compromised AEB patient presented with BS of 2470 mg/dl and lethargy + need for intubation and he is at risk for further compromise d/t ongoing elevated BS trends, N/V x 1 week PTA, and altered nutrition-related labs; will continue NPO status and monitor nutrition plan of care    Nutrition Related Findings:    patient is intubated; patient presented to the ED with elevated BS - as high as 2470 mg/dl initially; patient had N/V x 1 week PTA; no documented BM for this admission; patient was intubated on 1/25/24; patient lives with his sister at home; 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

## 2024-01-26 NOTE — PROGRESS NOTES
Nephrology consult called to office, spoke with Sara, Electronically signed by Floyd Nolasco on 1/26/2024 at 10:01 AM

## 2024-01-26 NOTE — PROGRESS NOTES
Patient's blood sugar for this hour 106, message to Dr. YOUSUF Sampson RN to message Dr. Brice and see if ok to start D5 125 mL/hr. Per Dr. Brice ok to start D5 infusion.

## 2024-01-26 NOTE — PROGRESS NOTES
01/25/24 1909   Patient Observation   Pulse 73   Respirations 26   SpO2 (!) 89 %   Breath Sounds   Right Upper Lobe Diminished   Right Middle Lobe Diminished   Right Lower Lobe Diminished   Left Upper Lobe Diminished   Left Lower Lobe Diminished   Vent Information   Vent Mode AC/VC   Ventilator Settings   FiO2  100 %   Vt (Set, mL) 500 mL   Resp Rate (Set) 24 bpm   PEEP/CPAP (cmH2O) 8   Vent Patient Data (Readings)   Vt (Measured) 535 mL   Peak Inspiratory Pressure (cmH2O) 17 cmH2O   Rate Measured 26 br/min   Minute Volume (L/min) 14.1 Liters   Mean Airway Pressure (cmH2O) 9.5 cmH20   Plateau Pressure (cm H2O) 0 cm H2O   Driving Pressure -8   I:E Ratio 1:2.00   Flow Sensitivity 3 L/min   Vent Alarm Settings   High Pressure (cmH2O) 40 cmH2O   Low Minute Volume (lpm) 4 L/min   Low Exhaled Vt (ml) 250 mL   RR High (bpm) 40 br/min   Apnea (secs) 20 secs   Additional Respiratoray Assessments   Humidification Source Heated wire   Humidification Temp 37   Circuit Condensation Drained   Ambu Bag With Mask At Bedside Yes   Airway Clearance   Suction ET Tube   Suction Device Inline suction catheter   Sputum Method Obtained Endotracheal   Sputum Amount Scant   Non-Surgical Airway 01/25/24 Endotracheal tube   Placement Date/Time: 01/25/24 1619   Present on Admission/Arrival: No  Placed By: In ED  Placement Verified By: Auscultation;Chest X-ray  Mask Ventilation: Ventilated by mask (1)  Insertion attempts: 1  Location: Oral  Airway Device: Endotracheal tube...   Secured At 23 cm   Measured From Lips   Secured Location Right   Secured By Commercial tube mcmillan   Site Assessment Dry

## 2024-01-26 NOTE — CARE COORDINATION
01/26/24 1305   Service Assessment   Patient Orientation Other (see comment)  (spoke with sister for assessment questions)   Cognition Other (see comment)  (spoke with sister for assessment questions)   History Provided By Child/Family   Primary Caregiver Self   Support Systems Family Members   Patient's Healthcare Decision Maker is: Legal Next of Kin   PCP Verified by CM Yes   Last Visit to PCP Within last 3 months   Prior Functional Level Assistance with the following:;Shopping;Housework   Current Functional Level Assistance with the following:;Shopping;Housework   Can patient return to prior living arrangement Yes   Ability to make needs known: Good   Family able to assist with home care needs: Yes   Would you like for me to discuss the discharge plan with any other family members/significant others, and if so, who? No   Financial Resources Medicaid   Community Resources None   Discharge Planning   Type of Residence Trailer/Mobile Home   Living Arrangements Family Members   Current Services Prior To Admission Durable Medical Equipment   Current DME Prior to Arrival Cane   Potential Assistance Needed N/A   DME Ordered? No   Potential Assistance Purchasing Medications No   Type of Home Care Services None   Patient expects to be discharged to: Trailer/mobile home   Follow Up Appointment: Best Day/Time    (self)   One/Two Story Residence One story   History of falls? 1     Case Management Assessment  Initial Evaluation    Date/Time of Evaluation: 1/26/2024 1:40 PM  Assessment Completed by: Teresa Boeck, RN    If patient is discharged prior to next notation, then this note serves as note for discharge by case management.    Patient Name: Era Carson                   YOB: 1975  Diagnosis: DKA, type 1, not at goal (HCC) [E10.10]  Acute respiratory failure with hypoxia (HCC) [J96.01]  Acute renal failure, unspecified acute renal failure type (HCC) [N17.9]  Diabetic ketoacidosis with coma associated

## 2024-01-26 NOTE — PROGRESS NOTES
Care rounds completed with Dr. Olivas and multidisciplinary team. Reviewed labs, meds, VS, assessment, & plan of care for today. See dictated note and new orders for details.     -remain off sedation to assess mental status   -switch abx to rocephin   -ABG  -LR bolus instead of NS, up to 4L total

## 2024-01-26 NOTE — PROGRESS NOTES
01/26/24 0306   Patient Observation   Pulse 85   Respirations 27   SpO2 99 %   Breath Sounds   Right Upper Lobe Diminished   Right Middle Lobe Diminished   Right Lower Lobe Diminished   Left Upper Lobe Diminished   Left Lower Lobe Diminished   Vent Information   Vent Mode AC/VC   $Ventilation $Subsequent Day   Ventilator Settings   FiO2  30 %   Vt (Set, mL) 500 mL   Resp Rate (Set) 24 bpm   PEEP/CPAP (cmH2O) 5   Vent Patient Data (Readings)   Vt (Measured) 489 mL   Peak Inspiratory Pressure (cmH2O) 17 cmH2O   Rate Measured 25 br/min   Minute Volume (L/min) 12.9 Liters   Mean Airway Pressure (cmH2O) 8.3 cmH20   Plateau Pressure (cm H2O) 0 cm H2O   Driving Pressure -5   I:E Ratio 1:2.30   Flow Sensitivity 3 L/min   Vent Alarm Settings   High Pressure (cmH2O) 40 cmH2O   Low Minute Volume (lpm) 4 L/min   Low Exhaled Vt (ml) 250 mL   RR High (bpm) 40 br/min   Apnea (secs) 20 secs   Additional Respiratoray Assessments   Humidification Source Heated wire   Humidification Temp 37   Circuit Condensation Drained   Ambu Bag With Mask At Bedside Yes   Non-Surgical Airway 01/25/24 Endotracheal tube   Placement Date/Time: 01/25/24 1619   Present on Admission/Arrival: No  Placed By: In ED  Placement Verified By: Auscultation;Chest X-ray  Mask Ventilation: Ventilated by mask (1)  Insertion attempts: 1  Location: Oral  Airway Device: Endotracheal tube...   Secured At 23 cm   Measured From Lips   Secured Location Right   Secured By Commercial tube mcmillan   Site Assessment Dry

## 2024-01-26 NOTE — PROGRESS NOTES
RASS 0/-1. Sedation remains off. Pt moves when stimulated but does not open eyes or follow any commands.     4L bolus transfused, (2L NS and 2L LR)    BG in the 300's, electrolytes being replaced per protocol

## 2024-01-26 NOTE — CONSULTS
[facility]                                 Pulmonary/Critical care  Consult /Progress Note :                                                                  CC : Altered mental status with high blood   Follow DKA    HISTORY OF PRESENT ILLNESS:   Still on vent  Undergoing mental status evaluation   Levophed at 50 later with fluid down 20  BS drop around 100/h    Got 7 L yesterday  No events   4 L will be given today     REVIEW OF SYSTEMS:  Cannot obtain secondary to the patient mental  PHYSICAL EXAM:  Blood pressure (!) 115/53, pulse 95, temperature 99.4 °F (37.4 °C), temperature source Bladder, resp. rate 26, height 1.88 m (6' 2\"), weight 102.2 kg (225 lb 5 oz), SpO2 97 %.' on RA  Gen: Obtunded  Eyes: PERRL. No sclera icterus. No conjunctival injection.   ENT: No discharge. Pharynx clear.   Neck: Trachea midline. No obvious mass.    Resp: Scattered rhonchi bilateral CV: Regular rate. Regular rhythm. No murmur or rub. No edema. Peripheral pulses are 2+.  Capillary refill is less than 3 seconds.  GI: Non-tender. Non-distended. No hernia.   Skin: Warm and dry. No nodule on exposed extremities.   Lymph: No cervical LAD. No supraclavicular LAD.   M/S: No cyanosis. No joint deformity. No clubbing.   Neuro: Awake.  But lethargic and go back to sleep s.   Psych: Awake x 1 at time    LABS:  CBC:   Recent Labs     01/24/24  1205 01/25/24  1109 01/26/24  0512   WBC 10.8 14.5* 9.4   HGB 12.1* 11.0* 12.1*   HCT 39.3* 45.3 36.3*   MCV 90.4 112.2* 82.9    394 368       BMP:   Recent Labs     01/25/24  1800 01/25/24  1932 01/26/24  0030 01/26/24  0330 01/26/24  0512   *   < > 122* 125* 129*   K 2.9*   < > 3.0* 3.3* 3.2*   CL 84*   < > 93* 96* 98*   CO2 8*   < > 11* 11* 12*   PHOS 4.7  --   --  2.2* 2.2*   BUN 62*   < > 61* 61* 59*   CREATININE 4.4*   < > 4.6* 4.7* 4.8*    < > = values in this interval not displayed.       LIVER

## 2024-01-27 ENCOUNTER — APPOINTMENT (OUTPATIENT)
Dept: GENERAL RADIOLOGY | Age: 49
DRG: 637 | End: 2024-01-27
Payer: MEDICARE

## 2024-01-27 ENCOUNTER — APPOINTMENT (OUTPATIENT)
Dept: CT IMAGING | Age: 49
DRG: 637 | End: 2024-01-27
Payer: MEDICARE

## 2024-01-27 LAB
ANION GAP SERPL CALCULATED.3IONS-SCNC: 13 MMOL/L (ref 3–16)
ANION GAP SERPL CALCULATED.3IONS-SCNC: 14 MMOL/L (ref 3–16)
ANION GAP SERPL CALCULATED.3IONS-SCNC: 15 MMOL/L (ref 3–16)
ANION GAP SERPL CALCULATED.3IONS-SCNC: 15 MMOL/L (ref 3–16)
BASE EXCESS BLDV CALC-SCNC: -11.6 MMOL/L (ref -3–3)
BASOPHILS # BLD: 0 K/UL (ref 0–0.2)
BASOPHILS NFR BLD: 0.1 %
BUN SERPL-MCNC: 41 MG/DL (ref 7–20)
BUN SERPL-MCNC: 42 MG/DL (ref 7–20)
BUN SERPL-MCNC: 42 MG/DL (ref 7–20)
BUN SERPL-MCNC: 44 MG/DL (ref 7–20)
BUN SERPL-MCNC: 44 MG/DL (ref 7–20)
BUN SERPL-MCNC: 45 MG/DL (ref 7–20)
CA-I BLD-SCNC: 1.07 MMOL/L (ref 1.12–1.32)
CALCIUM SERPL-MCNC: 7.1 MG/DL (ref 8.3–10.6)
CALCIUM SERPL-MCNC: 7.1 MG/DL (ref 8.3–10.6)
CALCIUM SERPL-MCNC: 7.2 MG/DL (ref 8.3–10.6)
CALCIUM SERPL-MCNC: 7.3 MG/DL (ref 8.3–10.6)
CALCIUM SERPL-MCNC: 7.6 MG/DL (ref 8.3–10.6)
CALCIUM SERPL-MCNC: 7.8 MG/DL (ref 8.3–10.6)
CHLORIDE SERPL-SCNC: 108 MMOL/L (ref 99–110)
CHLORIDE SERPL-SCNC: 109 MMOL/L (ref 99–110)
CHLORIDE SERPL-SCNC: 110 MMOL/L (ref 99–110)
CHLORIDE SERPL-SCNC: 110 MMOL/L (ref 99–110)
CO2 BLDV-SCNC: 14 MMOL/L
CO2 SERPL-SCNC: 13 MMOL/L (ref 21–32)
CO2 SERPL-SCNC: 15 MMOL/L (ref 21–32)
CO2 SERPL-SCNC: 16 MMOL/L (ref 21–32)
CO2 SERPL-SCNC: 16 MMOL/L (ref 21–32)
COHGB MFR BLDV: 0.3 % (ref 0–1.5)
CREAT SERPL-MCNC: 4.4 MG/DL (ref 0.9–1.3)
CREAT SERPL-MCNC: 4.5 MG/DL (ref 0.9–1.3)
CREAT SERPL-MCNC: 4.5 MG/DL (ref 0.9–1.3)
CREAT SERPL-MCNC: 4.7 MG/DL (ref 0.9–1.3)
DEPRECATED RDW RBC AUTO: 15.2 % (ref 12.4–15.4)
EOSINOPHIL # BLD: 0.2 K/UL (ref 0–0.6)
EOSINOPHIL NFR BLD: 2 %
GFR SERPLBLD CREATININE-BSD FMLA CKD-EPI: 15 ML/MIN/{1.73_M2}
GFR SERPLBLD CREATININE-BSD FMLA CKD-EPI: 16 ML/MIN/{1.73_M2}
GLUCOSE BLD-MCNC: 117 MG/DL (ref 70–99)
GLUCOSE BLD-MCNC: 122 MG/DL (ref 70–99)
GLUCOSE BLD-MCNC: 126 MG/DL (ref 70–99)
GLUCOSE BLD-MCNC: 127 MG/DL (ref 70–99)
GLUCOSE BLD-MCNC: 140 MG/DL (ref 70–99)
GLUCOSE BLD-MCNC: 144 MG/DL (ref 70–99)
GLUCOSE BLD-MCNC: 147 MG/DL (ref 70–99)
GLUCOSE BLD-MCNC: 148 MG/DL (ref 70–99)
GLUCOSE BLD-MCNC: 155 MG/DL (ref 70–99)
GLUCOSE BLD-MCNC: 155 MG/DL (ref 70–99)
GLUCOSE BLD-MCNC: 164 MG/DL (ref 70–99)
GLUCOSE BLD-MCNC: 189 MG/DL (ref 70–99)
GLUCOSE BLD-MCNC: 192 MG/DL (ref 70–99)
GLUCOSE BLD-MCNC: 193 MG/DL (ref 70–99)
GLUCOSE BLD-MCNC: 218 MG/DL (ref 70–99)
GLUCOSE BLD-MCNC: 219 MG/DL (ref 70–99)
GLUCOSE BLD-MCNC: 239 MG/DL (ref 70–99)
GLUCOSE BLD-MCNC: 240 MG/DL (ref 70–99)
GLUCOSE BLD-MCNC: 265 MG/DL (ref 70–99)
GLUCOSE BLD-MCNC: 265 MG/DL (ref 70–99)
GLUCOSE SERPL-MCNC: 142 MG/DL (ref 70–99)
GLUCOSE SERPL-MCNC: 153 MG/DL (ref 70–99)
GLUCOSE SERPL-MCNC: 154 MG/DL (ref 70–99)
GLUCOSE SERPL-MCNC: 175 MG/DL (ref 70–99)
GLUCOSE SERPL-MCNC: 224 MG/DL (ref 70–99)
GLUCOSE SERPL-MCNC: 280 MG/DL (ref 70–99)
HCO3 BLDV-SCNC: 13.6 MMOL/L (ref 23–29)
HCT VFR BLD AUTO: 31.5 % (ref 40.5–52.5)
HGB BLD-MCNC: 10.6 G/DL (ref 13.5–17.5)
LACTATE BLDV-SCNC: 2.5 MMOL/L (ref 0.4–2)
LYMPHOCYTES # BLD: 1.6 K/UL (ref 1–5.1)
LYMPHOCYTES NFR BLD: 17.1 %
MAGNESIUM SERPL-MCNC: 1.9 MG/DL (ref 1.8–2.4)
MAGNESIUM SERPL-MCNC: 2 MG/DL (ref 1.8–2.4)
MAGNESIUM SERPL-MCNC: 2 MG/DL (ref 1.8–2.4)
MCH RBC QN AUTO: 27.3 PG (ref 26–34)
MCHC RBC AUTO-ENTMCNC: 33.8 G/DL (ref 31–36)
MCV RBC AUTO: 80.8 FL (ref 80–100)
METHGB MFR BLDV: 0 %
MONOCYTES # BLD: 0.4 K/UL (ref 0–1.3)
MONOCYTES NFR BLD: 4.5 %
NEUTROPHILS # BLD: 7.1 K/UL (ref 1.7–7.7)
NEUTROPHILS NFR BLD: 76.3 %
O2 CT VFR BLDV CALC: 14 VOL %
O2 THERAPY: ABNORMAL
PCO2 BLDV: 28.5 MMHG (ref 40–50)
PERFORMED ON: ABNORMAL
PH BLDV: 7.27 [PH] (ref 7.35–7.45)
PH BLDV: 7.29 [PH] (ref 7.35–7.45)
PHOSPHATE SERPL-MCNC: 2.1 MG/DL (ref 2.5–4.9)
PHOSPHATE SERPL-MCNC: 2.7 MG/DL (ref 2.5–4.9)
PHOSPHATE SERPL-MCNC: 3.5 MG/DL (ref 2.5–4.9)
PHOSPHATE SERPL-MCNC: 3.6 MG/DL (ref 2.5–4.9)
PHOSPHATE SERPL-MCNC: 4.2 MG/DL (ref 2.5–4.9)
PHOSPHATE SERPL-MCNC: 4.4 MG/DL (ref 2.5–4.9)
PLATELET # BLD AUTO: 206 K/UL (ref 135–450)
PMV BLD AUTO: 6.4 FL (ref 5–10.5)
PO2 BLDV: 52.6 MMHG (ref 25–40)
POTASSIUM SERPL-SCNC: 3.5 MMOL/L (ref 3.5–5.1)
POTASSIUM SERPL-SCNC: 3.7 MMOL/L (ref 3.5–5.1)
POTASSIUM SERPL-SCNC: 3.9 MMOL/L (ref 3.5–5.1)
POTASSIUM SERPL-SCNC: 4 MMOL/L (ref 3.5–5.1)
POTASSIUM SERPL-SCNC: 4 MMOL/L (ref 3.5–5.1)
POTASSIUM SERPL-SCNC: 4.3 MMOL/L (ref 3.5–5.1)
RBC # BLD AUTO: 3.89 M/UL (ref 4.2–5.9)
SAO2 % BLDV: 84 %
SODIUM SERPL-SCNC: 136 MMOL/L (ref 136–145)
SODIUM SERPL-SCNC: 137 MMOL/L (ref 136–145)
SODIUM SERPL-SCNC: 137 MMOL/L (ref 136–145)
SODIUM SERPL-SCNC: 138 MMOL/L (ref 136–145)
SODIUM SERPL-SCNC: 139 MMOL/L (ref 136–145)
SODIUM SERPL-SCNC: 139 MMOL/L (ref 136–145)
WBC # BLD AUTO: 9.3 K/UL (ref 4–11)

## 2024-01-27 PROCEDURE — 6360000002 HC RX W HCPCS

## 2024-01-27 PROCEDURE — 99291 CRITICAL CARE FIRST HOUR: CPT | Performed by: INTERNAL MEDICINE

## 2024-01-27 PROCEDURE — 6360000002 HC RX W HCPCS: Performed by: INTERNAL MEDICINE

## 2024-01-27 PROCEDURE — 2000000000 HC ICU R&B

## 2024-01-27 PROCEDURE — 80048 BASIC METABOLIC PNL TOTAL CA: CPT

## 2024-01-27 PROCEDURE — 85025 COMPLETE CBC W/AUTO DIFF WBC: CPT

## 2024-01-27 PROCEDURE — 2580000003 HC RX 258

## 2024-01-27 PROCEDURE — 2700000000 HC OXYGEN THERAPY PER DAY

## 2024-01-27 PROCEDURE — 2580000003 HC RX 258: Performed by: INTERNAL MEDICINE

## 2024-01-27 PROCEDURE — A4216 STERILE WATER/SALINE, 10 ML: HCPCS | Performed by: INTERNAL MEDICINE

## 2024-01-27 PROCEDURE — 83735 ASSAY OF MAGNESIUM: CPT

## 2024-01-27 PROCEDURE — 82803 BLOOD GASES ANY COMBINATION: CPT

## 2024-01-27 PROCEDURE — 6370000000 HC RX 637 (ALT 250 FOR IP): Performed by: INTERNAL MEDICINE

## 2024-01-27 PROCEDURE — 2500000003 HC RX 250 WO HCPCS: Performed by: INTERNAL MEDICINE

## 2024-01-27 PROCEDURE — 6370000000 HC RX 637 (ALT 250 FOR IP)

## 2024-01-27 PROCEDURE — 87641 MR-STAPH DNA AMP PROBE: CPT

## 2024-01-27 PROCEDURE — 94761 N-INVAS EAR/PLS OXIMETRY MLT: CPT

## 2024-01-27 PROCEDURE — 2500000003 HC RX 250 WO HCPCS

## 2024-01-27 PROCEDURE — 82330 ASSAY OF CALCIUM: CPT

## 2024-01-27 PROCEDURE — 84100 ASSAY OF PHOSPHORUS: CPT

## 2024-01-27 PROCEDURE — 99233 SBSQ HOSP IP/OBS HIGH 50: CPT | Performed by: INTERNAL MEDICINE

## 2024-01-27 PROCEDURE — 71045 X-RAY EXAM CHEST 1 VIEW: CPT

## 2024-01-27 PROCEDURE — 2500000003 HC RX 250 WO HCPCS: Performed by: EMERGENCY MEDICINE

## 2024-01-27 PROCEDURE — 94003 VENT MGMT INPAT SUBQ DAY: CPT

## 2024-01-27 PROCEDURE — 83605 ASSAY OF LACTIC ACID: CPT

## 2024-01-27 PROCEDURE — 70450 CT HEAD/BRAIN W/O DYE: CPT

## 2024-01-27 RX ORDER — MIDAZOLAM HYDROCHLORIDE 1 MG/ML
2 INJECTION INTRAMUSCULAR; INTRAVENOUS
Status: DISCONTINUED | OUTPATIENT
Start: 2024-01-27 | End: 2024-01-31

## 2024-01-27 RX ORDER — FENTANYL CITRATE 50 UG/ML
50 INJECTION, SOLUTION INTRAMUSCULAR; INTRAVENOUS
Status: DISCONTINUED | OUTPATIENT
Start: 2024-01-27 | End: 2024-01-29

## 2024-01-27 RX ORDER — SODIUM BICARBONATE 650 MG/1
1300 TABLET ORAL ONCE
Status: COMPLETED | OUTPATIENT
Start: 2024-01-27 | End: 2024-01-27

## 2024-01-27 RX ORDER — ACETAMINOPHEN 325 MG/1
650 TABLET ORAL EVERY 6 HOURS PRN
Status: DISCONTINUED | OUTPATIENT
Start: 2024-01-27 | End: 2024-02-06 | Stop reason: HOSPADM

## 2024-01-27 RX ORDER — ACETAMINOPHEN 160 MG/5ML
650 LIQUID ORAL EVERY 4 HOURS PRN
Status: DISCONTINUED | OUTPATIENT
Start: 2024-01-27 | End: 2024-02-06 | Stop reason: HOSPADM

## 2024-01-27 RX ADMIN — DEXTROSE MONOHYDRATE: 100 INJECTION, SOLUTION INTRAVENOUS at 15:52

## 2024-01-27 RX ADMIN — POTASSIUM CHLORIDE 20 MEQ: 400 INJECTION, SOLUTION INTRAVENOUS at 23:26

## 2024-01-27 RX ADMIN — POTASSIUM CHLORIDE 20 MEQ: 400 INJECTION, SOLUTION INTRAVENOUS at 03:48

## 2024-01-27 RX ADMIN — PROPOFOL 50 MCG/KG/MIN: 10 INJECTION, EMULSION INTRAVENOUS at 21:41

## 2024-01-27 RX ADMIN — FENTANYL CITRATE 50 MCG: 50 INJECTION, SOLUTION INTRAMUSCULAR; INTRAVENOUS at 10:58

## 2024-01-27 RX ADMIN — FAMOTIDINE 20 MG: 10 INJECTION, SOLUTION INTRAVENOUS at 09:53

## 2024-01-27 RX ADMIN — CEFEPIME 1000 MG: 1 INJECTION, POWDER, FOR SOLUTION INTRAMUSCULAR; INTRAVENOUS at 11:18

## 2024-01-27 RX ADMIN — DEXTROSE MONOHYDRATE: 100 INJECTION, SOLUTION INTRAVENOUS at 22:42

## 2024-01-27 RX ADMIN — POTASSIUM CHLORIDE 20 MEQ: 400 INJECTION, SOLUTION INTRAVENOUS at 20:16

## 2024-01-27 RX ADMIN — VANCOMYCIN HYDROCHLORIDE 1500 MG: 10 INJECTION, POWDER, LYOPHILIZED, FOR SOLUTION INTRAVENOUS at 12:14

## 2024-01-27 RX ADMIN — MIDAZOLAM HYDROCHLORIDE 2 MG: 1 INJECTION, SOLUTION INTRAMUSCULAR; INTRAVENOUS at 09:52

## 2024-01-27 RX ADMIN — SODIUM BICARBONATE: 84 INJECTION, SOLUTION INTRAVENOUS at 11:20

## 2024-01-27 RX ADMIN — MIDAZOLAM HYDROCHLORIDE 2 MG: 1 INJECTION, SOLUTION INTRAMUSCULAR; INTRAVENOUS at 11:26

## 2024-01-27 RX ADMIN — POTASSIUM CHLORIDE 20 MEQ: 400 INJECTION, SOLUTION INTRAVENOUS at 08:19

## 2024-01-27 RX ADMIN — POTASSIUM CHLORIDE 20 MEQ: 400 INJECTION, SOLUTION INTRAVENOUS at 07:15

## 2024-01-27 RX ADMIN — Medication 75 MCG/HR: at 23:33

## 2024-01-27 RX ADMIN — POTASSIUM CHLORIDE 20 MEQ: 400 INJECTION, SOLUTION INTRAVENOUS at 19:05

## 2024-01-27 RX ADMIN — SODIUM CHLORIDE 1.48 UNITS/HR: 9 INJECTION, SOLUTION INTRAVENOUS at 22:50

## 2024-01-27 RX ADMIN — SODIUM PHOSPHATE, MONOBASIC, MONOHYDRATE AND SODIUM PHOSPHATE, DIBASIC, ANHYDROUS 15 MMOL: 142; 276 INJECTION, SOLUTION INTRAVENOUS at 08:09

## 2024-01-27 RX ADMIN — PROPOFOL 50 MCG/KG/MIN: 10 INJECTION, EMULSION INTRAVENOUS at 17:34

## 2024-01-27 RX ADMIN — CALCIUM GLUCONATE 3000 MG: 98 INJECTION, SOLUTION INTRAVENOUS at 05:02

## 2024-01-27 RX ADMIN — SODIUM PHOSPHATE, MONOBASIC, MONOHYDRATE AND SODIUM PHOSPHATE, DIBASIC, ANHYDROUS 15 MMOL: 142; 276 INJECTION, SOLUTION INTRAVENOUS at 05:01

## 2024-01-27 RX ADMIN — PROPOFOL 50 MCG/KG/MIN: 10 INJECTION, EMULSION INTRAVENOUS at 10:46

## 2024-01-27 RX ADMIN — SODIUM BICARBONATE 1300 MG: 650 TABLET ORAL at 04:26

## 2024-01-27 RX ADMIN — ENOXAPARIN SODIUM 30 MG: 100 INJECTION SUBCUTANEOUS at 09:53

## 2024-01-27 RX ADMIN — ACETAMINOPHEN 650 MG: 325 TABLET ORAL at 14:36

## 2024-01-27 RX ADMIN — POTASSIUM CHLORIDE 20 MEQ: 400 INJECTION, SOLUTION INTRAVENOUS at 22:47

## 2024-01-27 RX ADMIN — CEFTRIAXONE SODIUM 2000 MG: 2 INJECTION, POWDER, FOR SOLUTION INTRAMUSCULAR; INTRAVENOUS at 10:20

## 2024-01-27 RX ADMIN — DEXTROSE MONOHYDRATE: 100 INJECTION, SOLUTION INTRAVENOUS at 04:55

## 2024-01-27 RX ADMIN — DEXTROSE MONOHYDRATE: 100 INJECTION, SOLUTION INTRAVENOUS at 10:11

## 2024-01-27 RX ADMIN — Medication 100 MCG/HR: at 12:55

## 2024-01-27 RX ADMIN — MIDAZOLAM HYDROCHLORIDE 2 MG: 1 INJECTION, SOLUTION INTRAMUSCULAR; INTRAVENOUS at 12:41

## 2024-01-27 RX ADMIN — DEXTROSE MONOHYDRATE: 100 INJECTION, SOLUTION INTRAVENOUS at 00:03

## 2024-01-27 RX ADMIN — PROPOFOL 50 MCG/KG/MIN: 10 INJECTION, EMULSION INTRAVENOUS at 06:53

## 2024-01-27 RX ADMIN — PROPOFOL 50 MCG/KG/MIN: 10 INJECTION, EMULSION INTRAVENOUS at 14:33

## 2024-01-27 RX ADMIN — CEFEPIME 1000 MG: 1 INJECTION, POWDER, FOR SOLUTION INTRAMUSCULAR; INTRAVENOUS at 23:00

## 2024-01-27 RX ADMIN — PROPOFOL 40 MCG/KG/MIN: 10 INJECTION, EMULSION INTRAVENOUS at 03:01

## 2024-01-27 RX ADMIN — POTASSIUM CHLORIDE 20 MEQ: 400 INJECTION, SOLUTION INTRAVENOUS at 04:54

## 2024-01-27 ASSESSMENT — PULMONARY FUNCTION TESTS
PIF_VALUE: 29
PIF_VALUE: 17
PIF_VALUE: 25
PIF_VALUE: 21
PIF_VALUE: 18
PIF_VALUE: 24
PIF_VALUE: 18
PIF_VALUE: 25
PIF_VALUE: 18
PIF_VALUE: 19
PIF_VALUE: 19
PIF_VALUE: 18
PIF_VALUE: 17
PIF_VALUE: 23
PIF_VALUE: 19
PIF_VALUE: 21
PIF_VALUE: 19
PIF_VALUE: 26
PIF_VALUE: 19
PIF_VALUE: 20
PIF_VALUE: 19
PIF_VALUE: 24

## 2024-01-27 NOTE — PROGRESS NOTES
AM assessment done. Pt is sedated on the vent.  Fentanyl gtt titrated to effect.  Will continue to monitor.

## 2024-01-27 NOTE — PROGRESS NOTES
Page to Dr Akins informed 15 min recheck after D10 running for 15 minutes  glucose 58 orders received and rate increased on D10 and bicarb drip

## 2024-01-27 NOTE — PROGRESS NOTES
01/26/24 1903   Patient Observation   Pulse (!) 104   Respirations 24   SpO2 98 %   Observations 8ett 24 at lip   Breath Sounds   Right Upper Lobe Diminished   Right Middle Lobe Diminished   Right Lower Lobe Diminished   Left Upper Lobe Diminished   Left Lower Lobe Diminished   Vent Information   Vent Mode AC/VC   Ventilator Settings   FiO2  30 %   Vt (Set, mL) 500 mL   Resp Rate (Set) 24 bpm   PEEP/CPAP (cmH2O) 5   Vent Patient Data (Readings)   Vt (Measured) 511 mL   Peak Inspiratory Pressure (cmH2O) 21 cmH2O   Rate Measured 24 br/min   Minute Volume (L/min) 12.3 Liters   Peak Inspiratory Flow (lpm) 75 L/sec   Mean Airway Pressure (cmH2O) 9.6 cmH20   Plateau Pressure (cm H2O) 17 cm H2O   Driving Pressure 12   I:E Ratio 1:2.50   Flow Sensitivity 3 L/min   Static Compliance (L/cm H2O) 43   Dynamic Compliance (L/cm H2O) 34 L/cm H2O   Vent Alarm Settings   High Pressure (cmH2O) 40 cmH2O   Low Minute Volume (lpm) 4 L/min   Low Exhaled Vt (ml) 250 mL   RR High (bpm) 40 br/min   Apnea (secs) 20 secs   Additional Respiratoray Assessments   Humidification Source Heated wire   Humidification Temp 37   Circuit Condensation Drained   Ambu Bag With Mask At Bedside Yes   Airway Clearance   Suction ET Tube   Suction Device Inline suction catheter   Sputum Method Obtained Endotracheal   Sputum Amount Small   Sputum Color/Odor Yellow   Sputum Consistency Thick   Non-Surgical Airway 01/25/24 Endotracheal tube   Placement Date/Time: 01/25/24 1619   Present on Admission/Arrival: No  Placed By: In ED  Placement Verified By: Auscultation;Chest X-ray  Mask Ventilation: Ventilated by mask (1)  Insertion attempts: 1  Location: Oral  Airway Device: Endotracheal tube...   Secured At 24 cm   Measured From Lips   Secured Location Center   Secured By Commercial tube mcmillan

## 2024-01-27 NOTE — CONSULTS
[facility]   Pharmacy Pharmacokinetic Monitoring Service - Vancomycin     Era Carson is a 48 y.o. male starting on vancomycin therapy for CAP for a duration of 5 days. Pharmacy consulted by Dr. Olivas for monitoring and adjustment.    Target Concentration: Goal trough of 10-15 mg/L and AUC/SHAYNE <500 mg*hr/L    Additional Antimicrobials: Cefepime 1000 mg     Pertinent Laboratory Values:   Wt Readings from Last 1 Encounters:   01/27/24 103.7 kg (228 lb 9.9 oz)     Temp Readings from Last 1 Encounters:   01/27/24 (!) 101.5 °F (38.6 °C) (Bladder)     Estimated Creatinine Clearance: 25 mL/min (A) (based on SCr of 4.7 mg/dL (H)).  Recent Labs     01/26/24  0512 01/26/24  1050 01/27/24  0625 01/27/24  1005   CREATININE 4.8*   < > 4.7* 4.7*   BUN 59*   < > 44* 42*   WBC 9.4  --  9.3  --     < > = values in this interval not displayed.       Pertinent Cultures: No culture results  MRSA Nasal Swab: not ordered. Order placed by pharmacy.    Plan:  Concentration-guided dosing due to renal impairment/insufficiency  Loading dose of 1500 mg once, (~15 mg/kg)  Vancomycin place mcmillan added to medication list  Renal labs as indicated   Vancomycin random concentration ordered for 01/27/24 @ 1000   Pharmacy will continue to monitor patient and adjust therapy as indicated    Thank you for the consult,  Carlene Chaudhary RPH  1/27/2024 11:22 AM

## 2024-01-27 NOTE — PROGRESS NOTES
Dr. Olivas updated on 1400 lab results. GAP closed X2.  Dr. Olivas wants to continue Insulin gtt today and will DC tomorrow. Continue with D10 @ rate 75/hr.     Latest Reference Range & Units 01/27/24 14:05   Sodium 136 - 145 mmol/L 138   Potassium 3.5 - 5.1 mmol/L 4.3   Chloride 99 - 110 mmol/L 110   CO2 21 - 32 mmol/L 15 (L)   BUN,BUNPL 7 - 20 mg/dL 44 (H)   Creatinine 0.9 - 1.3 mg/dL 4.5 (H)   Anion Gap 3 - 16  13   Est, Glom Filt Rate >60  15 !   Magnesium 1.80 - 2.40 mg/dL 1.90   Glucose, Random 70 - 99 mg/dL 280 (H)   (L): Data is abnormally low  (H): Data is abnormally high  !: Data is abnormal

## 2024-01-27 NOTE — PROGRESS NOTES
[facility]                                 Pulmonary/Critical care  Consult /Progress Note :                                                                  CC : Altered mental status with high blood   Follow DKA    HISTORY OF PRESENT ILLNESS:   Still on vent  Very agitated when turn sedation off  Undergoing mental status evaluation   Levophed at 10 later   BS drop around 100/h.now o9n D10  Making urine  On Bicarb derip  On D 10   Got 10  L at least IVF   No events        REVIEW OF SYSTEMS:  Cannot obtain secondary to the patient mental  PHYSICAL EXAM:  Blood pressure 136/69, pulse (!) 105, temperature 99.8 °F (37.7 °C), temperature source Bladder, resp. rate 20, height 1.88 m (6' 2.02\"), weight 103.7 kg (228 lb 9.9 oz), SpO2 98 %.' on RA  Gen: sedated .intubated  Eyes: PERRL. No sclera icterus. No conjunctival injection.   ENT: No discharge. Pharynx clear.   Neck: Trachea midline. No obvious mass.    Resp: Scattered rhonchi bilateral CV: Regular rate. Regular rhythm. No murmur or rub. No edema. Peripheral pulses are 2+.  Capillary refill is less than 3 seconds.  GI: Non-tender. Non-distended. No hernia.   Skin: Warm and dry. No nodule on exposed extremities.   Lymph: No cervical LAD. No supraclavicular LAD.   M/S: No cyanosis. No joint deformity. No clubbing.   Neuro: Awake.  But lethargic and go back to sleep s.   Psych: intubated,sedated     LABS:  CBC:   Recent Labs     01/25/24  1109 01/26/24  0512 01/27/24  0625   WBC 14.5* 9.4 9.3   HGB 11.0* 12.1* 10.6*   HCT 45.3 36.3* 31.5*   .2* 82.9 80.8    368 206       BMP:   Recent Labs     01/26/24  1907 01/26/24  2122 01/27/24  0304 01/27/24  0625   * 135* 136 139   K 3.2* 3.6 3.5 3.7    108 108 109   CO2 13* 14* 13* 15*   PHOS 1.8*  --  2.1* 2.7   BUN 50* 46* 45* 44*   CREATININE 4.5* 4.5* 4.7* 4.7*       LIVER PROFILE:   Recent Labs      options and any end of life issues:      Critical care time spent reviewing labs/films, examining patient, collaborating with other physicians more than 35  Minutes  excluding procedures ..    Faby Olivas M.D.   1/27/2024  10:02 AM

## 2024-01-27 NOTE — PLAN OF CARE
Problem: Safety - Medical Restraint  Goal: Remains free of injury from restraints (Restraint for Interference with Medical Device)  Description: INTERVENTIONS:  1. Determine that other, less restrictive measures have been tried or would not be effective before applying the restraint  2. Evaluate the patient's condition at the time of restraint application  3. Inform patient/family regarding the reason for restraint  4. Q2H: Monitor safety, psychosocial status, comfort, nutrition and hydration  Outcome: Progressing  Flowsheets  Taken 1/26/2024 2000 by Rosalva Washington RN  Remains free of injury from restraints (restraint for interference with medical device): Every 2 hours: Monitor safety, psychosocial status, comfort, nutrition and hydration  Taken 1/26/2024 1800 by Gisella Goldberg RN  Remains free of injury from restraints (restraint for interference with medical device): Every 2 hours: Monitor safety, psychosocial status, comfort, nutrition and hydration  Taken 1/26/2024 1600 by Gisella Goldberg RN  Remains free of injury from restraints (restraint for interference with medical device): Every 2 hours: Monitor safety, psychosocial status, comfort, nutrition and hydration  Taken 1/26/2024 0800 by Nolan Mendiola RN  Remains free of injury from restraints (restraint for interference with medical device): Every 2 hours: Monitor safety, psychosocial status, comfort, nutrition and hydration     Problem: Discharge Planning  Goal: Discharge to home or other facility with appropriate resources  Outcome: Progressing     Problem: Confusion  Goal: Confusion, delirium, dementia, or psychosis is improved or at baseline  Description: INTERVENTIONS:  1. Assess for possible contributors to thought disturbance, including medications, impaired vision or hearing, underlying metabolic abnormalities, dehydration, psychiatric diagnoses, and notify attending LIP  2. [place] high risk fall precautions, as indicated  3. Provide frequent  short contacts to provide reality reorientation, refocusing and direction  4. Decrease environmental stimuli, including noise as appropriate  5. Monitor and intervene to maintain adequate nutrition, hydration, elimination, sleep and activity  6. If unable to ensure safety without constant attention obtain sitter and review sitter guidelines with assigned personnel  7. Initiate Psychosocial CNS and Spiritual Care consult, as indicated  Outcome: Progressing     Problem: Skin/Tissue Integrity  Goal: Absence of new skin breakdown  Description: 1.  Monitor for areas of redness and/or skin breakdown  2.  Assess vascular access sites hourly  3.  Every 4-6 hours minimum:  Change oxygen saturation probe site  4.  Every 4-6 hours:  If on nasal continuous positive airway pressure, respiratory therapy assess nares and determine need for appliance change or resting period.  Outcome: Progressing     Problem: Safety - Adult  Goal: Free from fall injury  Outcome: Progressing     Problem: ABCDS Injury Assessment  Goal: Absence of physical injury  Outcome: Progressing     Problem: Pain  Goal: Verbalizes/displays adequate comfort level or baseline comfort level  Outcome: Progressing     Problem: Chronic Conditions and Co-morbidities  Goal: Patient's chronic conditions and co-morbidity symptoms are monitored and maintained or improved  Outcome: Progressing     Problem: Nutrition Deficit:  Goal: Optimize nutritional status  Outcome: Progressing

## 2024-01-27 NOTE — PROGRESS NOTES
Pt to CT Scan. Pt tolerated transport well. Pt continue on Insulin & Levo gtt. Will continue to wean Levo gtt as tolerated.    Anion gap closed X1 & CO2 is w/in protocol. Nx lab due @ 1400     Latest Reference Range & Units 01/27/24 10:05   Sodium 136 - 145 mmol/L 137   Potassium 3.5 - 5.1 mmol/L 4.0   Chloride 99 - 110 mmol/L 108   CO2 21 - 32 mmol/L 16 (L)   BUN,BUNPL 7 - 20 mg/dL 42 (H)   Creatinine 0.9 - 1.3 mg/dL 4.7 (H)   Anion Gap 3 - 16  13   Est, Glom Filt Rate >60  15 !   (L): Data is abnormally low  (H): Data is abnormally high  !: Data is abnormal

## 2024-01-27 NOTE — PROGRESS NOTES
9pm glucose 60 D 10 given   sugar at 2115 72  insulin will remain off and stat labs drawn as per Dr Garay orders

## 2024-01-27 NOTE — SIGNIFICANT EVENT
Patient had a hypoglycemic episodes.    Patient is 8L positive and continue to have urine output >60cc/hour  Switched D5 to D10    Switched bicarb to higher meq w/ D5 solution to avoid hypoglycemia and noted worsening renal function with both NAG/AG acidosis      Throughout the night titrated D10 down to maintain BG >140

## 2024-01-27 NOTE — PROGRESS NOTES
Transported pt via portable ventilator to CT. No complications. All vitals remained stable. Will continue to monitor.

## 2024-01-27 NOTE — PROGRESS NOTES
sulfate, sodium phosphate 15 mmol in sodium chloride 0.9 % 250 mL IVPB, polyethylene glycol, dextrose 5 % and 0.45 % NaCl    Lab Data:  Recent Labs     01/25/24  1109 01/26/24  0512 01/27/24  0625   WBC 14.5* 9.4 9.3   HGB 11.0* 12.1* 10.6*   HCT 45.3 36.3* 31.5*   .2* 82.9 80.8    368 206       Recent Labs     01/26/24  1907 01/26/24  2122 01/27/24  0304 01/27/24  0625   * 135* 136 139   K 3.2* 3.6 3.5 3.7    108 108 109   CO2 13* 14* 13* 15*   PHOS 1.8*  --  2.1* 2.7   BUN 50* 46* 45* 44*   CREATININE 4.5* 4.5* 4.7* 4.7*       No results for input(s): \"CKTOTAL\", \"CKMB\", \"CKMBINDEX\", \"TROPONINI\" in the last 72 hours.    Coagulation:   Lab Results   Component Value Date/Time    APTT 30.9 08/19/2014 04:45 AM     Cardiac markers:   Lab Results   Component Value Date/Time    TROPONINI <0.01 05/14/2022 09:50 PM         Lab Results   Component Value Date    ALT 53 (H) 01/26/2024     (H) 01/26/2024    GGT 44 08/21/2014    ALKPHOS 104 01/26/2024    BILITOT 0.3 01/26/2024       No results found for: \"INR\", \"PROTIME\"    Radiology      XR CHEST PORTABLE   Final Result   Lines and tubes as detailed above.  Stable appearance of the lungs.         XR CHEST PORTABLE   Final Result   1.  Endotracheal tube tip is approximately 8.5 cm above the vik.      2.  The enteric tube tip is in the stomach.  The side port is near the GE   junction.  Recommend advancement by 3 cm.         XR CHEST PORTABLE   Final Result   Right central venous line terminates in the SVC      Left basilar opacity could represent subsegmental atelectasis or infection         CT HEAD WO CONTRAST   Final Result   No evidence of acute intracranial abnormality.         XR CHEST PORTABLE   Final Result   1.  No acute abnormality.         CT HEAD WO CONTRAST    (Results Pending)   XR CHEST PORTABLE    (Results Pending)     Hemoglobin A1C   Date Value Ref Range Status   11/07/2023 9.3 % Final     Wt Readings from Last 3 Encounters:    01/27/24 103.7 kg (228 lb 9.9 oz)   01/24/24 93.4 kg (206 lb)   01/04/24 98.9 kg (218 lb)     Cultures  Blood- NGTD  Sputum - pending      Assessment & Plan:      Severe DKA  Glucose at  2355 with severe acidosis and aniongap and metabolic derangements   Admitted to the ICU  DKA protocol initiated.  Aggressive IV fluid resuscitation given  Insulin drip initiated and sugars improving   Bicarb gtt given   Monitored electrolytes every 4 hours and repleted  Critical care consulted     Severe metabolic encephalopathy   sec to DKA  Ct head neg    Now on vent support     HERB   sec to severe DKA,dehydration and hypotension  IV fluids given  Nephrology involved  Sanon placed and good UOP noted  Creatinine high and acidosis remains on bicarb gtt      Pseudohyponatremia  2 to elevated sugars - improving      Hypokalemia in the setting of severe acidosis   Continue K replacement      Hypotension  Sec to above, and dehydration, remained hypotensive post 6 L NS boluses  Now on levo.   Cannot rule out infectious source - sepsis with new fevers     Hypothermia  sec to severe DKA  Active rewarming done    H/o migraines   On topamax and inderal at home- holding       Hold seroquel,gabapentin for now      NPO- can start TF  Full code  Heparin for DVT prophylaxis       Samuel Kwan MD, 1/27/2024 7:21 AM

## 2024-01-27 NOTE — PROGRESS NOTES
Progress Note    HISTORY     CC:   AMS              We are following for acute kidney injury      Subjective/   HPI:  Remains in the ICU in critical condition.  On vent.  Pressors requirement improved from 24 hours ago.  Urine output has picked up.  Scr is the same.  Anion gap has closed.    ROS:  Constitutional:  No fevers  Cardiovascular:  + edema  Respiratory: on vent   :  non-oliguric     Social Hx:  No Family at the bedside     Past Medical and Surgical History:  - Reviewed, no changes     EXAM       Objective/     Vitals:    01/27/24 0534 01/27/24 0600 01/27/24 0800 01/27/24 0900   BP: (!) 145/72 130/73 122/67 136/69   Pulse: 100 100 (!) 101 (!) 105   Resp: 22 21 24 20   Temp:  99.2 °F (37.3 °C) 99.8 °F (37.7 °C)    TempSrc:  Bladder Bladder    SpO2: 98% 98% 97% 98%   Weight:  103.7 kg (228 lb 9.9 oz)     Height:         24HR INTAKE/OUTPUT:    Intake/Output Summary (Last 24 hours) at 1/27/2024 0946  Last data filed at 1/27/2024 0559  Gross per 24 hour   Intake 4862.23 ml   Output 4325 ml   Net 537.23 ml     Constitutional:  critically ill, on vent   Eyes:  Pupils reactive, sclera clear   Neck:  Normal thyroid, no masses   Cardiovascular:  Regular, no rub  Respiratory:  On vent, no wheezing  Psychiatry:  sedated on vent, KERRY   Abdomen: +bs, soft, nt, no masses   Musculoskeletal: + LE edema, no clubbing   Lymphatics:  No LAD in neck, no supraclavicular nodes   :  powers placed       MEDICAL DECISION MAKING       Data/  Recent Labs     01/25/24  1109 01/26/24  0512 01/27/24  0625   WBC 14.5* 9.4 9.3   HGB 11.0* 12.1* 10.6*   HCT 45.3 36.3* 31.5*   .2* 82.9 80.8    368 206     Recent Labs     01/26/24  1907 01/26/24  2122 01/27/24  0304 01/27/24  0625   * 135* 136 139   K 3.2* 3.6 3.5 3.7    108 108 109   CO2 13* 14* 13* 15*   GLUCOSE 47* 98 224* 142*   PHOS 1.8*  --  2.1* 2.7   MG 1.60*  --  2.00 2.00   BUN 50* 46* 45* 44*   CREATININE 4.5* 4.5* 4.7* 4.7*   LABGLOM 15* 15*

## 2024-01-27 NOTE — PROGRESS NOTES
Assumed care of the pt from Gisella DUMONT  see flow sheet for full assessment  LEVOPHED infusing at a rate of 20 mcg/min.  Propofol infusing at 20 mcg/kg/min.     Sodiun biacrb 75meq/NS  Insulin drip titration per protocol

## 2024-01-27 NOTE — PROGRESS NOTES
01/27/24 1200   Patient Observation   Pulse (!) 113   Respirations 20   SpO2 96 %   Breath Sounds   Breath Sounds Bilateral Diminished   Right Upper Lobe Diminished   Right Middle Lobe Diminished   Right Lower Lobe Diminished   Left Upper Lobe Diminished   Left Lower Lobe Diminished   Vent Information   Vent Mode AC/VC   Ventilator Settings   FiO2  30 %   Vt (Set, mL) 500 mL   Resp Rate (Set) 24 bpm   PEEP/CPAP (cmH2O) 5   Vent Patient Data (Readings)   Vt Mandatory Exp (mL) 500 mL   Vt (Measured) 513 mL   Peak Inspiratory Pressure (cmH2O) 18 cmH2O   Rate Measured 26 br/min   Minute Volume (L/min) 15.3 Liters   Peak Inspiratory Flow (lpm) 70 L/sec   Mean Airway Pressure (cmH2O) 11 cmH20   I:E Ratio 1:2.5   Flow Sensitivity 3 L/min   Backup Apnea On   Vent Alarm Settings   High Pressure (cmH2O) 40 cmH2O   Low Minute Volume (lpm) 4 L/min   High Minute Volume (lpm) 20 L/min   Low Exhaled Vt (ml) 250 mL   High Exhaled Vt (ml) 1000 mL   RR High (bpm) 40 br/min   Apnea (secs) 20 secs   Additional Respiratoray Assessments   Humidification Source Heated wire   Humidification Temp 37.1   Circuit Condensation Drained   Ambu Bag With Mask At Bedside Yes   Airway Clearance   Suction ET Tube   Sputum Method Obtained Endotracheal   Sputum Amount Small   Sputum Color/Odor White   Sputum Consistency Thick   Non-Surgical Airway 01/25/24 Endotracheal tube   Placement Date/Time: 01/25/24 1619   Present on Admission/Arrival: No  Placed By: In ED  Placement Verified By: Auscultation;Chest X-ray  Mask Ventilation: Ventilated by mask (1)  Insertion attempts: 1  Location: Oral  Airway Device: Endotracheal tube...   Secured At 24 cm   Measured From Lips   Secured Location Center   Secured By Commercial tube [email or web address]   Site Assessment Dry   Ventilator Associated Pneumonia Bundle   Anti-Embolism Intervention Medication

## 2024-01-27 NOTE — PROGRESS NOTES
01/26/24 2252   Patient Observation   Pulse (!) 101   Respirations 24   SpO2 100 %   Observations 8ett 24 at lip   Breath Sounds   Right Upper Lobe Rhonchi   Right Middle Lobe Diminished   Right Lower Lobe Diminished   Left Upper Lobe Rhonchi   Left Lower Lobe Diminished   Vent Information   Vent Mode AC/VC   Ventilator Settings   FiO2  30 %   Vt (Set, mL) 500 mL   Resp Rate (Set) 24 bpm   PEEP/CPAP (cmH2O) 5   Vent Patient Data (Readings)   Vt (Measured) 438 mL   Peak Inspiratory Pressure (cmH2O) 24 cmH2O   Rate Measured 30 br/min   Minute Volume (L/min) 14.9 Liters   Mean Airway Pressure (cmH2O) 13 cmH20   Plateau Pressure (cm H2O) 17 cm H2O   Driving Pressure 12   I:E Ratio 1:2.50   Flow Sensitivity 3 L/min   Vent Alarm Settings   High Pressure (cmH2O) 40 cmH2O   Low Minute Volume (lpm) 4 L/min   Low Exhaled Vt (ml) 250 mL   RR High (bpm) 40 br/min   Additional Respiratoray Assessments   Humidification Source Heated wire   Humidification Temp 34.6   Circuit Condensation Drained   Ambu Bag With Mask At Bedside Yes   Airway Clearance   Suction ET Tube   Suction Device Inline suction catheter   Sputum Method Obtained Endotracheal   Sputum Amount Small   Sputum Color/Odor Yellow   Sputum Consistency Thick   Non-Surgical Airway 01/25/24 Endotracheal tube   Placement Date/Time: 01/25/24 1619   Present on Admission/Arrival: No  Placed By: In ED  Placement Verified By: Auscultation;Chest X-ray  Mask Ventilation: Ventilated by mask (1)  Insertion attempts: 1  Location: Oral  Airway Device: Endotracheal tube...   Secured At 24 cm   Measured From Lips   Secured Location Right   Secured By Commercial tube [email or web address]

## 2024-01-27 NOTE — PROGRESS NOTES
01/27/24 0330   Patient Observation   Pulse 95  (Simultaneous filing. User may not have seen previous data.)   Respirations 23  (Simultaneous filing. User may not have seen previous data.)   SpO2 97 %  (Simultaneous filing. User may not have seen previous data.)   Observations 8ett 24 at lip   Breath Sounds   Right Upper Lobe Rhonchi   Right Middle Lobe Diminished   Right Lower Lobe Diminished   Left Upper Lobe Rhonchi   Left Lower Lobe Diminished   Vent Information   Vent Mode AC/VC   Ventilator Settings   FiO2  30 %  (Simultaneous filing. User may not have seen previous data.)   Vt (Set, mL) 500 mL  (Simultaneous filing. User may not have seen previous data.)   Resp Rate (Set) 24 bpm  (Simultaneous filing. User may not have seen previous data.)   PEEP/CPAP (cmH2O) 5  (Simultaneous filing. User may not have seen previous data.)   Vent Patient Data (Readings)   Vt (Measured) 537 mL  (Simultaneous filing. User may not have seen previous data.)   Peak Inspiratory Pressure (cmH2O) 25 cmH2O  (Simultaneous filing. User may not have seen previous data.)   Rate Measured 24 br/min  (Simultaneous filing. User may not have seen previous data.)   Minute Volume (L/min) 12.1 Liters   Mean Airway Pressure (cmH2O) 10 cmH20   Plateau Pressure (cm H2O) 17 cm H2O   Driving Pressure 12   I:E Ratio 1:2.50   Flow Sensitivity 3 L/min   Vent Alarm Settings   High Pressure (cmH2O) 40 cmH2O   Low Minute Volume (lpm) 4 L/min   Low Exhaled Vt (ml) 250 mL   RR High (bpm) 40 br/min   Apnea (secs) 20 secs   Additional Respiratoray Assessments   Humidification Source Heated wire   Humidification Temp 36.8   Circuit Condensation Drained   Ambu Bag With Mask At Bedside Yes   Airway Clearance   Suction ET Tube   Suction Device Inline suction catheter   Sputum Method Obtained Endotracheal   Sputum Amount Small   Sputum Color/Odor Yellow   Sputum Consistency Thick   Non-Surgical Airway 01/25/24 Endotracheal tube   Placement Date/Time: 01/25/24 8782    Present on Admission/Arrival: No  Placed By: In ED  Placement Verified By: Auscultation;Chest X-ray  Mask Ventilation: Ventilated by mask (1)  Insertion attempts: 1  Location: Oral  Airway Device: Endotracheal tube...   Secured At 24 cm   Measured From Lips   Secured Location Left   Secured By Commercial tube [email or web address]

## 2024-01-28 ENCOUNTER — APPOINTMENT (OUTPATIENT)
Dept: GENERAL RADIOLOGY | Age: 49
DRG: 637 | End: 2024-01-28
Payer: MEDICARE

## 2024-01-28 LAB
ALBUMIN SERPL-MCNC: 2.5 G/DL (ref 3.4–5)
ALBUMIN/GLOB SERPL: 0.8 {RATIO} (ref 1.1–2.2)
ALP SERPL-CCNC: 148 U/L (ref 40–129)
ALT SERPL-CCNC: 48 U/L (ref 10–40)
ANION GAP SERPL CALCULATED.3IONS-SCNC: 11 MMOL/L (ref 3–16)
ANION GAP SERPL CALCULATED.3IONS-SCNC: 11 MMOL/L (ref 3–16)
ANION GAP SERPL CALCULATED.3IONS-SCNC: 12 MMOL/L (ref 3–16)
ANION GAP SERPL CALCULATED.3IONS-SCNC: 12 MMOL/L (ref 3–16)
ANION GAP SERPL CALCULATED.3IONS-SCNC: 13 MMOL/L (ref 3–16)
AST SERPL-CCNC: 55 U/L (ref 15–37)
BASE EXCESS BLDV CALC-SCNC: -6.8 MMOL/L (ref -3–3)
BASE EXCESS BLDV CALC-SCNC: -7 MMOL/L (ref -3–3)
BASOPHILS # BLD: 0 K/UL (ref 0–0.2)
BASOPHILS NFR BLD: 0.3 %
BILIRUB SERPL-MCNC: 1.2 MG/DL (ref 0–1)
BUN SERPL-MCNC: 42 MG/DL (ref 7–20)
BUN SERPL-MCNC: 43 MG/DL (ref 7–20)
CALCIUM SERPL-MCNC: 7.2 MG/DL (ref 8.3–10.6)
CALCIUM SERPL-MCNC: 7.3 MG/DL (ref 8.3–10.6)
CALCIUM SERPL-MCNC: 7.5 MG/DL (ref 8.3–10.6)
CALCIUM SERPL-MCNC: 7.6 MG/DL (ref 8.3–10.6)
CALCIUM SERPL-MCNC: 7.8 MG/DL (ref 8.3–10.6)
CHLORIDE SERPL-SCNC: 109 MMOL/L (ref 99–110)
CHLORIDE SERPL-SCNC: 111 MMOL/L (ref 99–110)
CHLORIDE SERPL-SCNC: 116 MMOL/L (ref 99–110)
CO2 BLDV-SCNC: 19 MMOL/L
CO2 BLDV-SCNC: 19 MMOL/L
CO2 SERPL-SCNC: 17 MMOL/L (ref 21–32)
CO2 SERPL-SCNC: 18 MMOL/L (ref 21–32)
CO2 SERPL-SCNC: 19 MMOL/L (ref 21–32)
CO2 SERPL-SCNC: 19 MMOL/L (ref 21–32)
CO2 SERPL-SCNC: 20 MMOL/L (ref 21–32)
COHGB MFR BLDV: 0.5 % (ref 0–1.5)
COHGB MFR BLDV: 3.3 % (ref 0–1.5)
CREAT SERPL-MCNC: 4.3 MG/DL (ref 0.9–1.3)
CREAT SERPL-MCNC: 4.3 MG/DL (ref 0.9–1.3)
CREAT SERPL-MCNC: 4.5 MG/DL (ref 0.9–1.3)
CREAT SERPL-MCNC: 4.5 MG/DL (ref 0.9–1.3)
CREAT SERPL-MCNC: 4.6 MG/DL (ref 0.9–1.3)
DEPRECATED RDW RBC AUTO: 15.8 % (ref 12.4–15.4)
EOSINOPHIL # BLD: 0.4 K/UL (ref 0–0.6)
EOSINOPHIL NFR BLD: 4.4 %
GFR SERPLBLD CREATININE-BSD FMLA CKD-EPI: 15 ML/MIN/{1.73_M2}
GFR SERPLBLD CREATININE-BSD FMLA CKD-EPI: 16 ML/MIN/{1.73_M2}
GFR SERPLBLD CREATININE-BSD FMLA CKD-EPI: 16 ML/MIN/{1.73_M2}
GLUCOSE BLD-MCNC: 128 MG/DL (ref 70–99)
GLUCOSE BLD-MCNC: 135 MG/DL (ref 70–99)
GLUCOSE BLD-MCNC: 142 MG/DL (ref 70–99)
GLUCOSE BLD-MCNC: 143 MG/DL (ref 70–99)
GLUCOSE BLD-MCNC: 145 MG/DL (ref 70–99)
GLUCOSE BLD-MCNC: 145 MG/DL (ref 70–99)
GLUCOSE BLD-MCNC: 147 MG/DL (ref 70–99)
GLUCOSE BLD-MCNC: 149 MG/DL (ref 70–99)
GLUCOSE BLD-MCNC: 151 MG/DL (ref 70–99)
GLUCOSE BLD-MCNC: 156 MG/DL (ref 70–99)
GLUCOSE BLD-MCNC: 159 MG/DL (ref 70–99)
GLUCOSE BLD-MCNC: 164 MG/DL (ref 70–99)
GLUCOSE BLD-MCNC: 165 MG/DL (ref 70–99)
GLUCOSE BLD-MCNC: 169 MG/DL (ref 70–99)
GLUCOSE BLD-MCNC: 170 MG/DL (ref 70–99)
GLUCOSE BLD-MCNC: 173 MG/DL (ref 70–99)
GLUCOSE BLD-MCNC: 185 MG/DL (ref 70–99)
GLUCOSE BLD-MCNC: 189 MG/DL (ref 70–99)
GLUCOSE BLD-MCNC: 202 MG/DL (ref 70–99)
GLUCOSE BLD-MCNC: 206 MG/DL (ref 70–99)
GLUCOSE BLD-MCNC: 208 MG/DL (ref 70–99)
GLUCOSE BLD-MCNC: 211 MG/DL (ref 70–99)
GLUCOSE BLD-MCNC: 212 MG/DL (ref 70–99)
GLUCOSE BLD-MCNC: 219 MG/DL (ref 70–99)
GLUCOSE BLD-MCNC: 226 MG/DL (ref 70–99)
GLUCOSE BLD-MCNC: 235 MG/DL (ref 70–99)
GLUCOSE BLD-MCNC: 238 MG/DL (ref 70–99)
GLUCOSE SERPL-MCNC: 155 MG/DL (ref 70–99)
GLUCOSE SERPL-MCNC: 171 MG/DL (ref 70–99)
GLUCOSE SERPL-MCNC: 193 MG/DL (ref 70–99)
GLUCOSE SERPL-MCNC: 251 MG/DL (ref 70–99)
GLUCOSE SERPL-MCNC: 251 MG/DL (ref 70–99)
HCO3 BLDV-SCNC: 17.6 MMOL/L (ref 23–29)
HCO3 BLDV-SCNC: 18 MMOL/L (ref 23–29)
HCT VFR BLD AUTO: 29.1 % (ref 40.5–52.5)
HGB BLD-MCNC: 9.9 G/DL (ref 13.5–17.5)
LYMPHOCYTES # BLD: 0.8 K/UL (ref 1–5.1)
LYMPHOCYTES NFR BLD: 8.6 %
MAGNESIUM SERPL-MCNC: 1.8 MG/DL (ref 1.8–2.4)
MAGNESIUM SERPL-MCNC: 1.8 MG/DL (ref 1.8–2.4)
MAGNESIUM SERPL-MCNC: 1.9 MG/DL (ref 1.8–2.4)
MAGNESIUM SERPL-MCNC: 1.9 MG/DL (ref 1.8–2.4)
MCH RBC QN AUTO: 27.4 PG (ref 26–34)
MCHC RBC AUTO-ENTMCNC: 34.1 G/DL (ref 31–36)
MCV RBC AUTO: 80.5 FL (ref 80–100)
METHGB MFR BLDV: 0.3 %
METHGB MFR BLDV: 0.3 %
MONOCYTES # BLD: 0.2 K/UL (ref 0–1.3)
MONOCYTES NFR BLD: 2.2 %
NEUTROPHILS # BLD: 8.1 K/UL (ref 1.7–7.7)
NEUTROPHILS NFR BLD: 84.5 %
O2 CT VFR BLDV CALC: 13 VOL %
O2 CT VFR BLDV CALC: 14 VOL %
O2 THERAPY: ABNORMAL
O2 THERAPY: ABNORMAL
PCO2 BLDV: 32 MMHG (ref 40–50)
PCO2 BLDV: 33.3 MMHG (ref 40–50)
PERFORMED ON: ABNORMAL
PH BLDV: 7.35 [PH] (ref 7.35–7.45)
PH BLDV: 7.36 [PH] (ref 7.35–7.45)
PHOSPHATE SERPL-MCNC: 2.9 MG/DL (ref 2.5–4.9)
PHOSPHATE SERPL-MCNC: 3.3 MG/DL (ref 2.5–4.9)
PHOSPHATE SERPL-MCNC: 3.5 MG/DL (ref 2.5–4.9)
PHOSPHATE SERPL-MCNC: 3.6 MG/DL (ref 2.5–4.9)
PHOSPHATE SERPL-MCNC: 3.7 MG/DL (ref 2.5–4.9)
PLATELET # BLD AUTO: 144 K/UL (ref 135–450)
PMV BLD AUTO: 7 FL (ref 5–10.5)
PO2 BLDV: 63.6 MMHG (ref 25–40)
PO2 BLDV: 77.7 MMHG (ref 25–40)
POTASSIUM SERPL-SCNC: 4.2 MMOL/L (ref 3.5–5.1)
POTASSIUM SERPL-SCNC: 4.9 MMOL/L (ref 3.5–5.1)
POTASSIUM SERPL-SCNC: 4.9 MMOL/L (ref 3.5–5.1)
POTASSIUM SERPL-SCNC: 5 MMOL/L (ref 3.5–5.1)
POTASSIUM SERPL-SCNC: 5.2 MMOL/L (ref 3.5–5.1)
PROT SERPL-MCNC: 5.6 G/DL (ref 6.4–8.2)
RBC # BLD AUTO: 3.62 M/UL (ref 4.2–5.9)
SAO2 % BLDV: 92 %
SAO2 % BLDV: 95 %
SODIUM SERPL-SCNC: 139 MMOL/L (ref 136–145)
SODIUM SERPL-SCNC: 140 MMOL/L (ref 136–145)
SODIUM SERPL-SCNC: 147 MMOL/L (ref 136–145)
VANCOMYCIN SERPL-MCNC: 15.1 UG/ML
WBC # BLD AUTO: 9.5 K/UL (ref 4–11)

## 2024-01-28 PROCEDURE — 6360000002 HC RX W HCPCS

## 2024-01-28 PROCEDURE — 2580000003 HC RX 258

## 2024-01-28 PROCEDURE — 94761 N-INVAS EAR/PLS OXIMETRY MLT: CPT

## 2024-01-28 PROCEDURE — 6370000000 HC RX 637 (ALT 250 FOR IP): Performed by: INTERNAL MEDICINE

## 2024-01-28 PROCEDURE — 2500000003 HC RX 250 WO HCPCS: Performed by: INTERNAL MEDICINE

## 2024-01-28 PROCEDURE — 2580000003 HC RX 258: Performed by: INTERNAL MEDICINE

## 2024-01-28 PROCEDURE — 2500000003 HC RX 250 WO HCPCS

## 2024-01-28 PROCEDURE — 71045 X-RAY EXAM CHEST 1 VIEW: CPT

## 2024-01-28 PROCEDURE — 99233 SBSQ HOSP IP/OBS HIGH 50: CPT | Performed by: INTERNAL MEDICINE

## 2024-01-28 PROCEDURE — 2000000000 HC ICU R&B

## 2024-01-28 PROCEDURE — 83735 ASSAY OF MAGNESIUM: CPT

## 2024-01-28 PROCEDURE — 80053 COMPREHEN METABOLIC PANEL: CPT

## 2024-01-28 PROCEDURE — 94003 VENT MGMT INPAT SUBQ DAY: CPT

## 2024-01-28 PROCEDURE — 84100 ASSAY OF PHOSPHORUS: CPT

## 2024-01-28 PROCEDURE — 80202 ASSAY OF VANCOMYCIN: CPT

## 2024-01-28 PROCEDURE — 6360000002 HC RX W HCPCS: Performed by: INTERNAL MEDICINE

## 2024-01-28 PROCEDURE — 99291 CRITICAL CARE FIRST HOUR: CPT | Performed by: INTERNAL MEDICINE

## 2024-01-28 PROCEDURE — A4216 STERILE WATER/SALINE, 10 ML: HCPCS | Performed by: INTERNAL MEDICINE

## 2024-01-28 PROCEDURE — 2700000000 HC OXYGEN THERAPY PER DAY

## 2024-01-28 PROCEDURE — 85025 COMPLETE CBC W/AUTO DIFF WBC: CPT

## 2024-01-28 PROCEDURE — 82803 BLOOD GASES ANY COMBINATION: CPT

## 2024-01-28 RX ORDER — MINERAL OIL AND WHITE PETROLATUM 150; 830 MG/G; MG/G
OINTMENT OPHTHALMIC PRN
Status: DISCONTINUED | OUTPATIENT
Start: 2024-01-28 | End: 2024-01-31

## 2024-01-28 RX ORDER — DEXMEDETOMIDINE HYDROCHLORIDE 4 UG/ML
.1-1.5 INJECTION, SOLUTION INTRAVENOUS CONTINUOUS
Status: DISCONTINUED | OUTPATIENT
Start: 2024-01-28 | End: 2024-01-31

## 2024-01-28 RX ORDER — DIMETHICONE, OXYBENZONE, AND PADIMATE O 2; 2.5; 6.6 G/100G; G/100G; G/100G
STICK TOPICAL PRN
Status: DISCONTINUED | OUTPATIENT
Start: 2024-01-28 | End: 2024-02-06 | Stop reason: HOSPADM

## 2024-01-28 RX ORDER — SODIUM BICARBONATE 650 MG/1
1300 TABLET ORAL ONCE
Status: COMPLETED | OUTPATIENT
Start: 2024-01-28 | End: 2024-01-28

## 2024-01-28 RX ADMIN — FENTANYL CITRATE 50 MCG: 50 INJECTION, SOLUTION INTRAMUSCULAR; INTRAVENOUS at 05:57

## 2024-01-28 RX ADMIN — PROPOFOL 35 MCG/KG/MIN: 10 INJECTION, EMULSION INTRAVENOUS at 12:11

## 2024-01-28 RX ADMIN — CEFEPIME 1000 MG: 1 INJECTION, POWDER, FOR SOLUTION INTRAMUSCULAR; INTRAVENOUS at 23:20

## 2024-01-28 RX ADMIN — WHITE PETROLATUM 57.7 %-MINERAL OIL 31.9 % EYE OINTMENT: at 23:45

## 2024-01-28 RX ADMIN — SODIUM BICARBONATE 1300 MG: 650 TABLET ORAL at 02:42

## 2024-01-28 RX ADMIN — POTASSIUM CHLORIDE 20 MEQ: 400 INJECTION, SOLUTION INTRAVENOUS at 11:43

## 2024-01-28 RX ADMIN — SODIUM BICARBONATE: 84 INJECTION, SOLUTION INTRAVENOUS at 00:56

## 2024-01-28 RX ADMIN — POTASSIUM CHLORIDE 20 MEQ: 400 INJECTION, SOLUTION INTRAVENOUS at 22:11

## 2024-01-28 RX ADMIN — Medication 0.4 MCG/KG/HR: at 10:50

## 2024-01-28 RX ADMIN — ENOXAPARIN SODIUM 30 MG: 100 INJECTION SUBCUTANEOUS at 09:32

## 2024-01-28 RX ADMIN — PROPOFOL 40 MCG/KG/MIN: 10 INJECTION, EMULSION INTRAVENOUS at 00:51

## 2024-01-28 RX ADMIN — Medication 0.6 MCG/KG/HR: at 23:18

## 2024-01-28 RX ADMIN — Medication 0.6 MCG/KG/HR: at 16:53

## 2024-01-28 RX ADMIN — POTASSIUM CHLORIDE 20 MEQ: 400 INJECTION, SOLUTION INTRAVENOUS at 03:49

## 2024-01-28 RX ADMIN — NOREPINEPHRINE BITARTRATE 5 MCG/MIN: 1 SOLUTION INTRAVENOUS at 17:06

## 2024-01-28 RX ADMIN — POTASSIUM CHLORIDE 20 MEQ: 400 INJECTION, SOLUTION INTRAVENOUS at 02:41

## 2024-01-28 RX ADMIN — FAMOTIDINE 20 MG: 10 INJECTION, SOLUTION INTRAVENOUS at 09:32

## 2024-01-28 RX ADMIN — PROPOFOL 20 MCG/KG/MIN: 10 INJECTION, EMULSION INTRAVENOUS at 04:49

## 2024-01-28 RX ADMIN — SODIUM BICARBONATE: 84 INJECTION, SOLUTION INTRAVENOUS at 12:20

## 2024-01-28 RX ADMIN — NOREPINEPHRINE BITARTRATE 5 MCG/MIN: 1 SOLUTION INTRAVENOUS at 15:16

## 2024-01-28 RX ADMIN — CEFEPIME 1000 MG: 1 INJECTION, POWDER, FOR SOLUTION INTRAMUSCULAR; INTRAVENOUS at 10:43

## 2024-01-28 RX ADMIN — FENTANYL CITRATE 50 MCG: 50 INJECTION, SOLUTION INTRAMUSCULAR; INTRAVENOUS at 10:28

## 2024-01-28 RX ADMIN — VANCOMYCIN HYDROCHLORIDE 750 MG: 750 INJECTION, POWDER, LYOPHILIZED, FOR SOLUTION INTRAVENOUS at 16:33

## 2024-01-28 RX ADMIN — POTASSIUM CHLORIDE 20 MEQ: 400 INJECTION, SOLUTION INTRAVENOUS at 06:57

## 2024-01-28 RX ADMIN — DEXTROSE MONOHYDRATE: 100 INJECTION, SOLUTION INTRAVENOUS at 10:33

## 2024-01-28 RX ADMIN — SODIUM BICARBONATE: 84 INJECTION, SOLUTION INTRAVENOUS at 20:50

## 2024-01-28 ASSESSMENT — PULMONARY FUNCTION TESTS
PIF_VALUE: 19
PIF_VALUE: 19
PIF_VALUE: 18
PIF_VALUE: 21
PIF_VALUE: 19
PIF_VALUE: 20
PIF_VALUE: 21
PIF_VALUE: 22
PIF_VALUE: 20
PIF_VALUE: 20
PIF_VALUE: 19
PIF_VALUE: 11
PIF_VALUE: 19
PIF_VALUE: 20
PIF_VALUE: 22
PIF_VALUE: 21
PIF_VALUE: 20
PIF_VALUE: 24
PIF_VALUE: 19
PIF_VALUE: 20
PIF_VALUE: 19
PIF_VALUE: 19

## 2024-01-28 NOTE — PROGRESS NOTES
AM assessment done. Pt is sedated, but opens eyes to verbal stimuli. Pt remains afebrile.  He is resting w/out evidence of distress @ this time.

## 2024-01-28 NOTE — PROGRESS NOTES
Spontaneous breathing trial started  PS 5/5 28%  tv 1241, rr 18, rsbi 21  pt is not able to open eyes or follow commands

## 2024-01-28 NOTE — PROGRESS NOTES
CM-SR, Levo gtt restarted this PM. Fentanyl gtt held. Precedex gtt started & Propofol gtt weaned.  Pt continues on Insulin gtt per Dr. Olivas.

## 2024-01-28 NOTE — PLAN OF CARE
Problem: Safety - Medical Restraint  Goal: Remains free of injury from restraints (Restraint for Interference with Medical Device)  Description: INTERVENTIONS:  1. Determine that other, less restrictive measures have been tried or would not be effective before applying the restraint  2. Evaluate the patient's condition at the time of restraint application  3. Inform patient/family regarding the reason for restraint  4. Q2H: Monitor safety, psychosocial status, comfort, nutrition and hydration  Outcome: Progressing  Flowsheets  Taken 1/27/2024 2200 by Rosalva Washington RN  Remains free of injury from restraints (restraint for interference with medical device): Every 2 hours: Monitor safety, psychosocial status, comfort, nutrition and hydration  Taken 1/27/2024 2000 by Rosalva Washington RN  Remains free of injury from restraints (restraint for interference with medical device): Every 2 hours: Monitor safety, psychosocial status, comfort, nutrition and hydration  Taken 1/27/2024 1800 by Mirta Enciso RN  Remains free of injury from restraints (restraint for interference with medical device): Every 2 hours: Monitor safety, psychosocial status, comfort, nutrition and hydration  Taken 1/27/2024 1600 by Mirta Enciso RN  Remains free of injury from restraints (restraint for interference with medical device): Every 2 hours: Monitor safety, psychosocial status, comfort, nutrition and hydration  Taken 1/27/2024 1400 by Mirta Enciso RN  Remains free of injury from restraints (restraint for interference with medical device): Every 2 hours: Monitor safety, psychosocial status, comfort, nutrition and hydration  Taken 1/27/2024 1200 by Mirta Enciso RN  Remains free of injury from restraints (restraint for interference with medical device): Every 2 hours: Monitor safety, psychosocial status, comfort, nutrition and hydration  Taken 1/27/2024 1000 by Mirta Enciso RN  Remains free of injury from restraints (restraint for  symptoms are monitored and maintained or improved  Outcome: Progressing

## 2024-01-28 NOTE — PROGRESS NOTES
Dr Akins here to see pt discusesed sedation and pt levels of rass -3-4  will monitor  changes made in IVFs

## 2024-01-28 NOTE — PROGRESS NOTES
1000 lab  results noted. K+ replaced per protocol.     Latest Reference Range & Units 01/28/24 10:00   Sodium 136 - 145 mmol/L 140   Potassium 3.5 - 5.1 mmol/L 5.0   Chloride 99 - 110 mmol/L 109   CO2 21 - 32 mmol/L 18 (L)   BUN,BUNPL 7 - 20 mg/dL 43 (H)   Creatinine 0.9 - 1.3 mg/dL 4.3 (H)   Anion Gap 3 - 16  13   Est, Glom Filt Rate >60  16 !   Magnesium 1.80 - 2.40 mg/dL 1.80   Glucose, Random 70 - 99 mg/dL 251 (H)   CALCIUM, SERUM, 518918 8.3 - 10.6 mg/dL 7.5 (L)   Phosphorus 2.5 - 4.9 mg/dL 3.5   (L): Data is abnormally low  (H): Data is abnormally high  !: Data is abnormal

## 2024-01-28 NOTE — PROGRESS NOTES
[facility]                                 Pulmonary/Critical care  Consult /Progress Note :                                                                  CC : Altered mental status with high blood   Follow DKA    HISTORY OF PRESENT ILLNESS:   Still on vent  Very agitated when turn sedation off  Still not following commands to me   -1250  Afebrile  Levophed off  Feb 100.8   BS drop around 100/h.now o9n D10  Making urine  On Bicarb derip  On D 10   Got 10  L at least IVF        REVIEW OF SYSTEMS:  Cannot obtain secondary to the patient mental  PHYSICAL EXAM:  Blood pressure (!) 144/78, pulse (!) 115, temperature 97.6 °F (36.4 °C), temperature source Bladder, resp. rate 17, height 1.88 m (6' 2.02\"), weight 116 kg (255 lb 11.7 oz), SpO2 98 %.' on RA  Gen:no follow commands when off sedation  .intubated  Eyes: PERRL. No sclera icterus. No conjunctival injection.   ENT: No discharge. Pharynx clear.   Neck: Trachea midline. No obvious mass.    Resp: Scattered rhonchi bilateral CV: Regular rate. Regular rhythm. No murmur or rub. No edema. Peripheral pulses are 2+.  Capillary refill is less than 3 seconds.  GI: Non-tender. Non-distended. No hernia.   Skin: Warm and dry. No nodule on exposed extremities.   Lymph: No cervical LAD. No supraclavicular LAD.   M/S: No cyanosis. No joint deformity. No clubbing.   Neuro: Awake.  But lethargic and go back to sleep s.   Psych: intubated,sedated     LABS:  CBC:   Recent Labs     01/26/24  0512 01/27/24  0625 01/28/24  0600   WBC 9.4 9.3 9.5   HGB 12.1* 10.6* 9.9*   HCT 36.3* 31.5* 29.1*   MCV 82.9 80.8 80.5    206 144       BMP:   Recent Labs     01/27/24  2157 01/28/24  0153 01/28/24  0600    140 147*   K 4.0 4.2 4.9    111* 116*   CO2 16* 17* 19*   PHOS 4.4 3.7 3.6   BUN 41* 42* 42*   CREATININE 4.4* 4.6* 4.5*       LIVER PROFILE:   Recent Labs     01/25/24  1105

## 2024-01-28 NOTE — PROGRESS NOTES
01/28/24 0256   Patient Observation   Pulse 80   Respirations 24   Observations 8ett 24 at lip   Breath Sounds   Right Upper Lobe Diminished   Right Middle Lobe Diminished   Right Lower Lobe Diminished   Left Upper Lobe Diminished   Left Lower Lobe Diminished   Vent Information   Vent Mode AC/VC   Ventilator Settings   FiO2  30 %   Vt (Set, mL) 500 mL   Resp Rate (Set) 24 bpm   PEEP/CPAP (cmH2O) 5   Vent Patient Data (Readings)   Vt (Measured) 513 mL   Peak Inspiratory Pressure (cmH2O) 20 cmH2O   Rate Measured 24 br/min   Minute Volume (L/min) 12.3 Liters   Mean Airway Pressure (cmH2O) 9.9 cmH20   Plateau Pressure (cm H2O) 18 cm H2O   Driving Pressure 13   I:E Ratio 1:2.20   Vent Alarm Settings   High Pressure (cmH2O) 40 cmH2O   Low Minute Volume (lpm) 4 L/min   Low Exhaled Vt (ml) 250 mL   RR High (bpm) 40 br/min   Apnea (secs) 20 secs   Additional Respiratoray Assessments   Humidification Source Heated wire   Humidification Temp 37   Circuit Condensation Drained   Ambu Bag With Mask At Bedside Yes   Airway Clearance   Suction ET Tube   Suction Device Inline suction catheter   Sputum Method Obtained Endotracheal   Sputum Amount Small   Sputum Color/Odor Brown;Yellow   Sputum Consistency Thick   Non-Surgical Airway 01/25/24 Endotracheal tube   Placement Date/Time: 01/25/24 1619   Present on Admission/Arrival: No  Placed By: In ED  Placement Verified By: Auscultation;Chest X-ray  Mask Ventilation: Ventilated by mask (1)  Insertion attempts: 1  Location: Oral  Airway Device: Endotracheal tube...   Secured At 24 cm   Measured From Lips   Secured Location Right   Secured By Commercial tube [email or web address]

## 2024-01-28 NOTE — PROGRESS NOTES
01/27/24 2310   Patient Observation   Pulse 89   Respirations 24   SpO2 98 %   Observations 8ett 24 at lip   Breath Sounds   Right Upper Lobe Diminished   Right Middle Lobe Diminished   Right Lower Lobe Diminished   Left Upper Lobe Diminished   Left Lower Lobe Diminished   Vent Information   Vent Mode AC/VC   Ventilator Settings   FiO2  30 %   Vt (Set, mL) 500 mL   Resp Rate (Set) 24 bpm   PEEP/CPAP (cmH2O) 5   Vent Patient Data (Readings)   Vt (Measured) 515 mL   Peak Inspiratory Pressure (cmH2O) 18 cmH2O   Rate Measured 24 br/min   Minute Volume (L/min) 12.3 Liters   Mean Airway Pressure (cmH2O) 9.8 cmH20   Plateau Pressure (cm H2O) 18 cm H2O   Driving Pressure 13   I:E Ratio 1:2.20   Vent Alarm Settings   High Pressure (cmH2O) 40 cmH2O   Low Minute Volume (lpm) 4 L/min   Low Exhaled Vt (ml) 250 mL   RR High (bpm) 40 br/min   Apnea (secs) 20 secs   Additional Respiratoray Assessments   Humidification Source Heated wire   Humidification Temp 37   Circuit Condensation Drained   Ambu Bag With Mask At Bedside Yes   Airway Clearance   Suction ET Tube   Suction Device Inline suction catheter   Sputum Method Obtained Endotracheal   Sputum Amount Small   Sputum Color/Odor Brown;Yellow   Sputum Consistency Thick   Non-Surgical Airway 01/25/24 Endotracheal tube   Placement Date/Time: 01/25/24 1619   Present on Admission/Arrival: No  Placed By: In ED  Placement Verified By: Auscultation;Chest X-ray  Mask Ventilation: Ventilated by mask (1)  Insertion attempts: 1  Location: Oral  Airway Device: Endotracheal tube...   Secured At 24 cm   Measured From Lips   Secured Location Center   Secured By Commercial tube [email or web address]

## 2024-01-28 NOTE — PROGRESS NOTES
Attempted vent weaning this AM. Sedation held. Pt is very restless but will not open his eyes or follow any commands. Pt placed back on sedation & prev vent settings per Dr. Olivas. Will start Precedex and attempt to wean Propofol.

## 2024-01-28 NOTE — PROGRESS NOTES
Pharmacy Vancomycin Consult     Vancomycin Day: 2  Current Dosing: pulse  Current indication: CAP      Recent Labs     01/27/24  0625 01/27/24  1005 01/28/24  0600 01/28/24  1000   BUN 44*   < > 42* 43*   CREATININE 4.7*   < > 4.5* 4.3*   WBC 9.3  --  9.5  --     < > = values in this interval not displayed.       Intake/Output Summary (Last 24 hours) at 1/28/2024 1536  Last data filed at 1/28/2024 0625  Gross per 24 hour   Intake 6190.31 ml   Output 3675 ml   Net 2515.31 ml     Culture Date      Source                       Results      Ht Readings from Last 1 Encounters:   01/26/24 1.88 m (6' 2.02\")        Wt Readings from Last 1 Encounters:   01/28/24 116 kg (255 lb 11.7 oz)       Body mass index is 32.82 kg/m².    Estimated Creatinine Clearance: 29 mL/min (A) (based on SCr of 4.3 mg/dL (H)).    random: 15.1    Assessment/Plan:  Will give 750mg dose x1 repeat random tomorrow morning.

## 2024-01-28 NOTE — PROGRESS NOTES
IM Progress Note    Admit Date:  1/25/2024  3    Interval history:  DKA with severe hyperglycemia above 2000 glucose   Encephalopathy , placed on vent   Uop picked up but creatinine and acidosis not corrected    Subjective:    Mr. Carson seen sedated on vent   Fevers noted  Weaned off levo   Ongoing weaning trials   UOP improved        Objective:   BP (!) 95/58   Pulse 89   Temp 97.6 °F (36.4 °C) (Bladder)   Resp 24   Ht 1.88 m (6' 2.02\")   Wt 116 kg (255 lb 11.7 oz)   SpO2 98%   BMI 32.82 kg/m²     Intake/Output Summary (Last 24 hours) at 1/28/2024 0740  Last data filed at 1/28/2024 0625  Gross per 24 hour   Intake 6190.31 ml   Output 4525 ml   Net 1665.31 ml         Physical Exam:        General: middle aged male sedated on vent,   Oral ETT and OG noted Appears to be not in any distress  Mucous Membranes:  Pink , anicteric  Neck: No JVD, no carotid bruit, no thyromegaly  Chest:  Clear to auscultation bilaterally, mild left sided crackles  Cardiovascular:  RRR S1S2 heard, ASM   Abdomen:  Soft, undistended, non tender, no organomegaly, BS present  Extremities: developing edema to all ext   Distal pulses feeble both feet  Neurological : sedated      Medications:   Scheduled Medications:    cefepime  1,000 mg IntraVENous Q12H    vancomycin (VANCOCIN) intermittent dosing (placeholder)   Other RX Placeholder    famotidine (PEPCID) injection  20 mg IntraVENous Daily    enoxaparin  30 mg SubCUTAneous Daily     I   propofol 40 mcg/kg/min (01/28/24 0625)    dextrose 30 mL/hr at 01/28/24 0625    sodium bicarbonate 150 mEq in dextrose 5 % 1,000 mL infusion 125 mL/hr at 01/28/24 0625    insulin 3.67 Units/hr (01/28/24 0709)    sodium chloride Stopped (01/25/24 1744)    norepinephrine Stopped (01/28/24 0122)    dextrose 5 % and 0.45 % NaCl      midazolam Stopped (01/26/24 0940)    fentaNYL Stopped (01/28/24 0155)     midazolam, fentanNYL, acetaminophen, acetaminophen, lactated ringers bolus, potassium chloride, dextrose

## 2024-01-28 NOTE — PROGRESS NOTES
Assumed care of the pt form Mirta DUMONT   see flow sheet for full assessment.  Vent 24/500/+5/30 ETT 8  24@ lip  OG capped @ 55 cm at lip  Fentanyl infusing at 125 mcg//h.    Propofol infusing at 50 mcg/kg/min.   D10 @ 75 ml/hr  NA bicarbonate 150 meq/D5W @ 100 ml/hr  Insulin drip titration hourly    Sister at bedside visiting  questions answered

## 2024-01-28 NOTE — PROGRESS NOTES
Progress Note    HISTORY     CC:   AMS              We are following for acute kidney injury      Subjective/   HPI:  Remains in the ICU in critical condition.  On vent.  Pressors requirement improved from 24 hours ago and now off pressors.  Urine output has picked up.  Scr is the same.  Anion gap has closed.    ROS:  Constitutional:  + fevers  Cardiovascular:  + edema  Respiratory: on vent   :  non-oliguric     Social Hx:  No Family at the bedside     Past Medical and Surgical History:  - Reviewed, no changes     EXAM       Objective/     Vitals:    01/28/24 0700 01/28/24 0800 01/28/24 0818 01/28/24 0900   BP: (!) 95/58 102/61 108/62 (!) 144/78   Pulse: 89 90 94 (!) 115   Resp: 24 24 25 17   Temp:       TempSrc:       SpO2: 98%      Weight:       Height:         24HR INTAKE/OUTPUT:    Intake/Output Summary (Last 24 hours) at 1/28/2024 1038  Last data filed at 1/28/2024 0625  Gross per 24 hour   Intake 6190.31 ml   Output 4525 ml   Net 1665.31 ml       Constitutional:  critically ill, on vent   Eyes:  Pupils reactive, sclera clear   Neck:  Normal thyroid, no masses   Cardiovascular:  Regular, no rub  Respiratory:  On vent, no wheezing  Psychiatry:  sedated on vent, KERRY   Abdomen: +bs, soft, nt, no masses   Musculoskeletal: + LE edema, no clubbing   Lymphatics:  No LAD in neck, no supraclavicular nodes   :  powers placed       MEDICAL DECISION MAKING       Data/  Recent Labs     01/26/24  0512 01/27/24  0625 01/28/24  0600   WBC 9.4 9.3 9.5   HGB 12.1* 10.6* 9.9*   HCT 36.3* 31.5* 29.1*   MCV 82.9 80.8 80.5    206 144       Recent Labs     01/27/24  2157 01/28/24  0153 01/28/24  0600    140 147*   K 4.0 4.2 4.9    111* 116*   CO2 16* 17* 19*   GLUCOSE 154* 193* 171*   PHOS 4.4 3.7 3.6   MG 1.90 1.90 1.90   BUN 41* 42* 42*   CREATININE 4.4* 4.6* 4.5*   LABGLOM 16* 15* 15*         Assessment/     Acute Kidney Injury:  KDIGO stage 3  Etiology:  Pre-renal due to DKA with profound volume

## 2024-01-28 NOTE — PROGRESS NOTES
01/27/24 1910   Patient Observation   Pulse 93   Respirations (!) 34   SpO2 98 %   Observations 8ett 24 at lip   Breath Sounds   Right Upper Lobe Diminished   Right Middle Lobe Diminished   Right Lower Lobe Diminished   Left Upper Lobe Diminished   Left Lower Lobe Diminished   Vent Information   Vent Mode AC/VC   Ventilator Settings   FiO2  30 %   Vt (Set, mL) 500 mL   Resp Rate (Set) 24 bpm   PEEP/CPAP (cmH2O) 5   Vent Patient Data (Readings)   Vt (Measured) 519 mL   Peak Inspiratory Pressure (cmH2O) 17 cmH2O   Rate Measured 25 br/min   Minute Volume (L/min) 13 Liters   Mean Airway Pressure (cmH2O) 10 cmH20   Plateau Pressure (cm H2O) 0 cm H2O   Driving Pressure -5   I:E Ratio 1:2.20   Flow Sensitivity 3 L/min   Static Compliance (L/cm H2O) 38   Dynamic Compliance (L/cm H2O) 29 L/cm H2O   Vent Alarm Settings   High Pressure (cmH2O) 40 cmH2O   Low Minute Volume (lpm) 4 L/min   Low Exhaled Vt (ml) 250 mL   RR High (bpm) 40 br/min   Apnea (secs) 20 secs   Additional Respiratoray Assessments   Humidification Source Heated wire   Humidification Temp 37   Circuit Condensation Drained   Ambu Bag With Mask At Bedside Yes   Airway Clearance   Suction ET Tube   Suction Device Inline suction catheter   Sputum Method Obtained Endotracheal   Sputum Amount Small   Sputum Color/Odor Brown;Yellow   Sputum Consistency Thick   Non-Surgical Airway 01/25/24 Endotracheal tube   Placement Date/Time: 01/25/24 1619   Present on Admission/Arrival: No  Placed By: In ED  Placement Verified By: Auscultation;Chest X-ray  Mask Ventilation: Ventilated by mask (1)  Insertion attempts: 1  Location: Oral  Airway Device: Endotracheal tube...   Secured At 24 cm   Measured From Lips   Secured Location Right   Secured By Commercial tube [email or web address]

## 2024-01-29 ENCOUNTER — APPOINTMENT (OUTPATIENT)
Dept: GENERAL RADIOLOGY | Age: 49
DRG: 637 | End: 2024-01-29
Payer: MEDICARE

## 2024-01-29 ENCOUNTER — TELEPHONE (OUTPATIENT)
Dept: INTERNAL MEDICINE CLINIC | Age: 49
End: 2024-01-29

## 2024-01-29 ENCOUNTER — APPOINTMENT (OUTPATIENT)
Dept: CT IMAGING | Age: 49
DRG: 637 | End: 2024-01-29
Payer: MEDICARE

## 2024-01-29 LAB
ALBUMIN SERPL-MCNC: 2.1 G/DL (ref 3.4–5)
ALBUMIN SERPL-MCNC: 2.2 G/DL (ref 3.4–5)
ALBUMIN SERPL-MCNC: 2.4 G/DL (ref 3.4–5)
ALBUMIN/GLOB SERPL: 0.7 {RATIO} (ref 1.1–2.2)
ALP SERPL-CCNC: 148 U/L (ref 40–129)
ALT SERPL-CCNC: 43 U/L (ref 10–40)
ANION GAP SERPL CALCULATED.3IONS-SCNC: 10 MMOL/L (ref 3–16)
ANION GAP SERPL CALCULATED.3IONS-SCNC: 11 MMOL/L (ref 3–16)
ANION GAP SERPL CALCULATED.3IONS-SCNC: 11 MMOL/L (ref 3–16)
AST SERPL-CCNC: 46 U/L (ref 15–37)
BACTERIA BLD CULT ORG #2: NORMAL
BACTERIA BLD CULT: NORMAL
BASE EXCESS BLDA CALC-SCNC: -5.6 MMOL/L (ref -3–3)
BASE EXCESS BLDV CALC-SCNC: -5.2 MMOL/L (ref -3–3)
BILIRUB SERPL-MCNC: 1 MG/DL (ref 0–1)
BUN SERPL-MCNC: 42 MG/DL (ref 7–20)
BUN SERPL-MCNC: 42 MG/DL (ref 7–20)
BUN SERPL-MCNC: 44 MG/DL (ref 7–20)
CA-I BLD-SCNC: 1.1 MMOL/L (ref 1.12–1.32)
CALCIUM SERPL-MCNC: 7.4 MG/DL (ref 8.3–10.6)
CALCIUM SERPL-MCNC: 7.6 MG/DL (ref 8.3–10.6)
CALCIUM SERPL-MCNC: 8.3 MG/DL (ref 8.3–10.6)
CHLORIDE SERPL-SCNC: 108 MMOL/L (ref 99–110)
CHLORIDE SERPL-SCNC: 110 MMOL/L (ref 99–110)
CHLORIDE SERPL-SCNC: 110 MMOL/L (ref 99–110)
CO2 BLDA-SCNC: 18.9 MMOL/L
CO2 BLDV-SCNC: 19 MMOL/L
CO2 SERPL-SCNC: 20 MMOL/L (ref 21–32)
COHGB MFR BLDA: 0.5 % (ref 0–1.5)
COHGB MFR BLDV: 1.1 % (ref 0–1.5)
CREAT SERPL-MCNC: 4.2 MG/DL (ref 0.9–1.3)
CREAT SERPL-MCNC: 4.4 MG/DL (ref 0.9–1.3)
CREAT SERPL-MCNC: 4.6 MG/DL (ref 0.9–1.3)
DEPRECATED RDW RBC AUTO: 15.7 % (ref 12.4–15.4)
GFR SERPLBLD CREATININE-BSD FMLA CKD-EPI: 15 ML/MIN/{1.73_M2}
GFR SERPLBLD CREATININE-BSD FMLA CKD-EPI: 16 ML/MIN/{1.73_M2}
GFR SERPLBLD CREATININE-BSD FMLA CKD-EPI: 17 ML/MIN/{1.73_M2}
GLUCOSE BLD-MCNC: 124 MG/DL (ref 70–99)
GLUCOSE BLD-MCNC: 137 MG/DL (ref 70–99)
GLUCOSE BLD-MCNC: 138 MG/DL (ref 70–99)
GLUCOSE BLD-MCNC: 157 MG/DL (ref 70–99)
GLUCOSE BLD-MCNC: 184 MG/DL (ref 70–99)
GLUCOSE BLD-MCNC: 240 MG/DL (ref 70–99)
GLUCOSE BLD-MCNC: 243 MG/DL (ref 70–99)
GLUCOSE BLD-MCNC: 249 MG/DL (ref 70–99)
GLUCOSE BLD-MCNC: 261 MG/DL (ref 70–99)
GLUCOSE BLD-MCNC: 292 MG/DL (ref 70–99)
GLUCOSE BLD-MCNC: 357 MG/DL (ref 70–99)
GLUCOSE SERPL-MCNC: 167 MG/DL (ref 70–99)
GLUCOSE SERPL-MCNC: 267 MG/DL (ref 70–99)
GLUCOSE SERPL-MCNC: 385 MG/DL (ref 70–99)
HCO3 BLDA-SCNC: 18.1 MMOL/L (ref 21–29)
HCO3 BLDV-SCNC: 18.2 MMOL/L (ref 23–29)
HCT VFR BLD AUTO: 30 % (ref 40.5–52.5)
HGB BLD-MCNC: 10.4 G/DL (ref 13.5–17.5)
HGB BLDA-MCNC: 9.9 G/DL (ref 13.5–17.5)
MAGNESIUM SERPL-MCNC: 1.9 MG/DL (ref 1.8–2.4)
MAGNESIUM SERPL-MCNC: 2.3 MG/DL (ref 1.8–2.4)
MCH RBC QN AUTO: 27.6 PG (ref 26–34)
MCHC RBC AUTO-ENTMCNC: 34.6 G/DL (ref 31–36)
MCV RBC AUTO: 79.5 FL (ref 80–100)
METHGB MFR BLDA: 0.3 %
METHGB MFR BLDV: 0.3 %
MISCELLANEOUS LAB TEST ORDER: ABNORMAL
MRSA DNA SPEC QL NAA+PROBE: NORMAL
O2 CT VFR BLDV CALC: 14 VOL %
O2 THERAPY: ABNORMAL
O2 THERAPY: ABNORMAL
PCO2 BLDA: 29 MMHG (ref 35–45)
PCO2 BLDV: 28.7 MMHG (ref 40–50)
PERFORMED ON: ABNORMAL
PH BLDA: 7.41 [PH] (ref 7.35–7.45)
PH BLDV: 7.41 [PH] (ref 7.35–7.45)
PH BLDV: 7.42 [PH] (ref 7.35–7.45)
PHOSPHATE SERPL-MCNC: 2.9 MG/DL (ref 2.5–4.9)
PHOSPHATE SERPL-MCNC: 3 MG/DL (ref 2.5–4.9)
PHOSPHATE SERPL-MCNC: 3.2 MG/DL (ref 2.5–4.9)
PLATELET # BLD AUTO: 132 K/UL (ref 135–450)
PMV BLD AUTO: 7.7 FL (ref 5–10.5)
PO2 BLDA: 45.4 MMHG (ref 75–108)
PO2 BLDV: 65.3 MMHG (ref 25–40)
POTASSIUM SERPL-SCNC: 4.5 MMOL/L (ref 3.5–5.1)
POTASSIUM SERPL-SCNC: 5.8 MMOL/L (ref 3.5–5.1)
POTASSIUM SERPL-SCNC: 5.9 MMOL/L (ref 3.5–5.1)
PROT SERPL-MCNC: 5.3 G/DL (ref 6.4–8.2)
RBC # BLD AUTO: 3.77 M/UL (ref 4.2–5.9)
SAO2 % BLDA: 82.8 %
SAO2 % BLDV: 94 %
SODIUM SERPL-SCNC: 138 MMOL/L (ref 136–145)
SODIUM SERPL-SCNC: 141 MMOL/L (ref 136–145)
SODIUM SERPL-SCNC: 141 MMOL/L (ref 136–145)
VANCOMYCIN SERPL-MCNC: 15.7 UG/ML
WBC # BLD AUTO: 9.2 K/UL (ref 4–11)

## 2024-01-29 PROCEDURE — 2580000003 HC RX 258: Performed by: INTERNAL MEDICINE

## 2024-01-29 PROCEDURE — 6370000000 HC RX 637 (ALT 250 FOR IP): Performed by: INTERNAL MEDICINE

## 2024-01-29 PROCEDURE — 85027 COMPLETE CBC AUTOMATED: CPT

## 2024-01-29 PROCEDURE — 80202 ASSAY OF VANCOMYCIN: CPT

## 2024-01-29 PROCEDURE — 6360000002 HC RX W HCPCS: Performed by: INTERNAL MEDICINE

## 2024-01-29 PROCEDURE — 2500000003 HC RX 250 WO HCPCS: Performed by: INTERNAL MEDICINE

## 2024-01-29 PROCEDURE — 80053 COMPREHEN METABOLIC PANEL: CPT

## 2024-01-29 PROCEDURE — 3E033XZ INTRODUCTION OF VASOPRESSOR INTO PERIPHERAL VEIN, PERCUTANEOUS APPROACH: ICD-10-PCS | Performed by: INTERNAL MEDICINE

## 2024-01-29 PROCEDURE — 84100 ASSAY OF PHOSPHORUS: CPT

## 2024-01-29 PROCEDURE — 82803 BLOOD GASES ANY COMBINATION: CPT

## 2024-01-29 PROCEDURE — 2700000000 HC OXYGEN THERAPY PER DAY

## 2024-01-29 PROCEDURE — 70450 CT HEAD/BRAIN W/O DYE: CPT

## 2024-01-29 PROCEDURE — 94761 N-INVAS EAR/PLS OXIMETRY MLT: CPT

## 2024-01-29 PROCEDURE — 94003 VENT MGMT INPAT SUBQ DAY: CPT

## 2024-01-29 PROCEDURE — 2000000000 HC ICU R&B

## 2024-01-29 PROCEDURE — 99291 CRITICAL CARE FIRST HOUR: CPT | Performed by: INTERNAL MEDICINE

## 2024-01-29 PROCEDURE — 99233 SBSQ HOSP IP/OBS HIGH 50: CPT | Performed by: INTERNAL MEDICINE

## 2024-01-29 PROCEDURE — 6360000002 HC RX W HCPCS

## 2024-01-29 PROCEDURE — A4216 STERILE WATER/SALINE, 10 ML: HCPCS | Performed by: INTERNAL MEDICINE

## 2024-01-29 PROCEDURE — 71045 X-RAY EXAM CHEST 1 VIEW: CPT

## 2024-01-29 PROCEDURE — 83735 ASSAY OF MAGNESIUM: CPT

## 2024-01-29 PROCEDURE — 82330 ASSAY OF CALCIUM: CPT

## 2024-01-29 RX ORDER — FENTANYL CITRATE-0.9 % NACL/PF 10 MCG/ML
25-200 PLASTIC BAG, INJECTION (ML) INTRAVENOUS CONTINUOUS PRN
Status: DISCONTINUED | OUTPATIENT
Start: 2024-01-29 | End: 2024-01-30

## 2024-01-29 RX ORDER — FENTANYL CITRATE-0.9 % NACL/PF 10 MCG/ML
25-200 PLASTIC BAG, INJECTION (ML) INTRAVENOUS CONTINUOUS
Status: DISCONTINUED | OUTPATIENT
Start: 2024-01-29 | End: 2024-01-29

## 2024-01-29 RX ORDER — INSULIN GLARGINE 100 [IU]/ML
15 INJECTION, SOLUTION SUBCUTANEOUS 2 TIMES DAILY
Status: DISCONTINUED | OUTPATIENT
Start: 2024-01-29 | End: 2024-02-02

## 2024-01-29 RX ORDER — FENTANYL CITRATE 50 UG/ML
50 INJECTION, SOLUTION INTRAMUSCULAR; INTRAVENOUS
Status: DISCONTINUED | OUTPATIENT
Start: 2024-01-29 | End: 2024-01-31

## 2024-01-29 RX ORDER — INSULIN LISPRO 100 [IU]/ML
0-8 INJECTION, SOLUTION INTRAVENOUS; SUBCUTANEOUS EVERY 4 HOURS
Status: DISCONTINUED | OUTPATIENT
Start: 2024-01-29 | End: 2024-01-30

## 2024-01-29 RX ORDER — SODIUM BICARBONATE 650 MG/1
1300 TABLET ORAL ONCE
Status: COMPLETED | OUTPATIENT
Start: 2024-01-29 | End: 2024-01-29

## 2024-01-29 RX ORDER — INSULIN LISPRO 100 [IU]/ML
0-4 INJECTION, SOLUTION INTRAVENOUS; SUBCUTANEOUS EVERY 4 HOURS
Status: DISCONTINUED | OUTPATIENT
Start: 2024-01-29 | End: 2024-01-29

## 2024-01-29 RX ORDER — DEXTROSE MONOHYDRATE 100 MG/ML
INJECTION, SOLUTION INTRAVENOUS CONTINUOUS PRN
Status: DISCONTINUED | OUTPATIENT
Start: 2024-01-29 | End: 2024-02-06 | Stop reason: HOSPADM

## 2024-01-29 RX ORDER — CALCIUM GLUCONATE 20 MG/ML
2000 INJECTION, SOLUTION INTRAVENOUS ONCE
Status: COMPLETED | OUTPATIENT
Start: 2024-01-29 | End: 2024-01-29

## 2024-01-29 RX ORDER — GLUCAGON 1 MG/ML
1 KIT INJECTION PRN
Status: DISCONTINUED | OUTPATIENT
Start: 2024-01-29 | End: 2024-02-06 | Stop reason: HOSPADM

## 2024-01-29 RX ORDER — INSULIN GLARGINE 100 [IU]/ML
5 INJECTION, SOLUTION SUBCUTANEOUS 2 TIMES DAILY
Status: DISCONTINUED | OUTPATIENT
Start: 2024-01-29 | End: 2024-01-29

## 2024-01-29 RX ADMIN — INSULIN LISPRO 2 UNITS: 100 INJECTION, SOLUTION INTRAVENOUS; SUBCUTANEOUS at 23:58

## 2024-01-29 RX ADMIN — SODIUM ZIRCONIUM CYCLOSILICATE 5 G: 5 POWDER, FOR SUSPENSION ORAL at 16:23

## 2024-01-29 RX ADMIN — POTASSIUM CHLORIDE 20 MEQ: 400 INJECTION, SOLUTION INTRAVENOUS at 02:32

## 2024-01-29 RX ADMIN — INSULIN GLARGINE 15 UNITS: 100 INJECTION, SOLUTION SUBCUTANEOUS at 19:52

## 2024-01-29 RX ADMIN — INSULIN LISPRO 4 UNITS: 100 INJECTION, SOLUTION INTRAVENOUS; SUBCUTANEOUS at 16:23

## 2024-01-29 RX ADMIN — INSULIN LISPRO 2 UNITS: 100 INJECTION, SOLUTION INTRAVENOUS; SUBCUTANEOUS at 19:52

## 2024-01-29 RX ADMIN — SODIUM BICARBONATE: 84 INJECTION, SOLUTION INTRAVENOUS at 23:02

## 2024-01-29 RX ADMIN — Medication 0.6 MCG/KG/HR: at 14:33

## 2024-01-29 RX ADMIN — FAMOTIDINE 20 MG: 10 INJECTION, SOLUTION INTRAVENOUS at 08:03

## 2024-01-29 RX ADMIN — INSULIN GLARGINE 15 UNITS: 100 INJECTION, SOLUTION SUBCUTANEOUS at 10:12

## 2024-01-29 RX ADMIN — INSULIN GLARGINE 5 UNITS: 100 INJECTION, SOLUTION SUBCUTANEOUS at 03:40

## 2024-01-29 RX ADMIN — FENTANYL CITRATE 50 MCG: 50 INJECTION, SOLUTION INTRAMUSCULAR; INTRAVENOUS at 13:56

## 2024-01-29 RX ADMIN — Medication 50 MCG/HR: at 13:39

## 2024-01-29 RX ADMIN — SODIUM BICARBONATE 1300 MG: 650 TABLET ORAL at 03:40

## 2024-01-29 RX ADMIN — INSULIN LISPRO 8 UNITS: 100 INJECTION, SOLUTION INTRAVENOUS; SUBCUTANEOUS at 11:20

## 2024-01-29 RX ADMIN — CEFEPIME 1000 MG: 1 INJECTION, POWDER, FOR SOLUTION INTRAMUSCULAR; INTRAVENOUS at 12:34

## 2024-01-29 RX ADMIN — SODIUM BICARBONATE: 84 INJECTION, SOLUTION INTRAVENOUS at 14:33

## 2024-01-29 RX ADMIN — POTASSIUM CHLORIDE 20 MEQ: 400 INJECTION, SOLUTION INTRAVENOUS at 01:29

## 2024-01-29 RX ADMIN — MIDAZOLAM HYDROCHLORIDE 2 MG: 1 INJECTION, SOLUTION INTRAMUSCULAR; INTRAVENOUS at 13:55

## 2024-01-29 RX ADMIN — VANCOMYCIN HYDROCHLORIDE 750 MG: 750 INJECTION, POWDER, LYOPHILIZED, FOR SOLUTION INTRAVENOUS at 08:06

## 2024-01-29 RX ADMIN — Medication 0.6 MCG/KG/HR: at 04:24

## 2024-01-29 RX ADMIN — CEFEPIME 2000 MG: 2 INJECTION, POWDER, FOR SOLUTION INTRAVENOUS at 23:55

## 2024-01-29 RX ADMIN — CALCIUM GLUCONATE 2000 MG: 20 INJECTION, SOLUTION INTRAVENOUS at 03:52

## 2024-01-29 RX ADMIN — ENOXAPARIN SODIUM 30 MG: 100 INJECTION SUBCUTANEOUS at 08:03

## 2024-01-29 RX ADMIN — SODIUM BICARBONATE: 84 INJECTION, SOLUTION INTRAVENOUS at 05:02

## 2024-01-29 RX ADMIN — DEXTROSE MONOHYDRATE: 100 INJECTION, SOLUTION INTRAVENOUS at 00:46

## 2024-01-29 RX ADMIN — INSULIN LISPRO 2 UNITS: 100 INJECTION, SOLUTION INTRAVENOUS; SUBCUTANEOUS at 08:10

## 2024-01-29 ASSESSMENT — PULMONARY FUNCTION TESTS
PIF_VALUE: 16
PIF_VALUE: 21
PIF_VALUE: 22
PIF_VALUE: 19
PIF_VALUE: 19
PIF_VALUE: 20
PIF_VALUE: 19
PIF_VALUE: 20
PIF_VALUE: 20
PIF_VALUE: 19
PIF_VALUE: 25

## 2024-01-29 NOTE — PROGRESS NOTES
Pulmonary & Critical Care Medicine ICU Progress Note    CC: DKA    Events of Last 24 hours: insulin gtt is off  FC with right hand, moves all 4 extremities     Vascular lines: IV: 24 RIJ    MV:  24  Vent Mode: AC/VC Resp Rate (Set): 24 bpm/Vt (Set, mL): 500 mL/ /FiO2 : 28 %  Recent Labs     24  1001   PHART 7.196*   [id number] 23.1*   PO2ART 107.2       IV:   dextrose      dexmedeTOMIDine HCl in NaCl 0.6 mcg/kg/hr (24 0424)    sodium bicarbonate 50 mEq in dextrose 5 % 1,000 mL infusion 125 mL/hr at 24 0502    propofol Stopped (24)    dextrose 5 % and 0.45 % NaCl      fentaNYL Stopped (24 1529)       Vitals:  Blood pressure 132/75, pulse 64, temperature 98.9 °F (37.2 °C), temperature source Bladder, resp. rate 24, height 1.88 m (6' 2.02\"), weight 116 kg (255 lb 11.7 oz), SpO2 97 %.  on VENT  Temp  Av.8 °F (37.7 °C)  Min: 98.9 °F (37.2 °C)  Max: 100.6 °F (38.1 °C)    Intake/Output Summary (Last 24 hours) at 2024 0623  Last data filed at 2024 2307  Gross per 24 hour   Intake 3799 ml   Output 2725 ml   Net 1074 ml     PE:  General:  ill appearing    Resp: No crackles. No wheezing.   CV: S1, S2. Trace edema  GI: NT, ND, +BS  Skin: Warm and dry.    Neuro: PERRL.Awake but not FC     Scheduled Meds:   insulin lispro  0-4 Units SubCUTAneous Q4H    insulin glargine  5 Units SubCUTAneous BID    cefepime  1,000 mg IntraVENous Q12H    vancomycin (VANCOCIN) intermittent dosing (placeholder)   Other RX Placeholder    famotidine (PEPCID) injection  20 mg IntraVENous Daily    enoxaparin  30 mg SubCUTAneous Daily       Data:  CBC:   Recent Labs     24  0625 24  0600   WBC 9.3 9.5   HGB 10.6* 9.9*   HCT 31.5* 29.1*   MCV 80.8 80.5    144     BMP:   Recent Labs     24  1515 24  2134 24  0050    140 141   K 5.2* 4.9 4.5    109 110   CO2 19* 20* 20*   PHOS 2.9 3.3 3.2   BUN 42* 42* 42*   CREATININE 4.3* 4.5* 4.6*     LIVER PROFILE:    Recent Labs     01/26/24 2122 01/28/24 2134 01/29/24  0050   * 55* 46*   ALT 53* 48* 43*   BILITOT 0.3 1.2* 1.0   ALKPHOS 104 148* 148*     Microbiology:      1/25/2024 SARS-CoV-2 and influenza are negative  1/25/2024 blood NGTD  1/27/2024 MRSA DNA probe negative    Imaging:  Chest imaging was reviewed by me and showed:   CXR 1/29/2024 bibasilar atelectasis    ASSESSMENT:  Acute metabolic encephalopathy   DKA  Fever  Acute Respiratory Failure, failure to protect airway   Acute metabolic encephalopathy   Acute on chronic kidney disease, baseline creatinine 1.4-1.8  Hyponatremia    PLAN:  Mechanical ventilation as per my orders. The ventilator was adjusted by me at the bedside for unstable, life threatening respiratory failure  IV precedex for sedation, target RASS -2, with daily SAT  Fentanyl and Versed PRN, gtt as needed  Daily SBT per protocol   Inhaled bronchodilators  Continue dextrose containing IV fluids    Lantus 5 BID with SSI - change to home dose lantus 15 BID with medium SSI   Head CT today unless extubated and appropriate  ABX D#5, now Cefepime and vancomycin, DC vancomycin  Nutrition: Tube feeding if not extubated   Prophylaxis: Lovenox    Total critical care time caring for this patient with life threatening, unstable organ failure, including direct patient contact, management of life support systems, review of data including imaging and labs, discussions with other team members and physicians is 33 minutes so far today, excluding procedures.

## 2024-01-29 NOTE — PROGRESS NOTES
AM assessment complete, see flowsheet. Medications given per MAR. RASS -1/-2. Precedex gtt titrated down for SBT. RT at the bedside and attempted SBT but patient was apneic. Sister at the bedside and updated on plan of care. Will continue to assess mental status and titrate precedex gtt in order to reattempt SBT

## 2024-01-29 NOTE — PROGRESS NOTES
Progress Note    HISTORY     CC:   AMS              We are following for acute kidney injury      Subjective/     HPI:    Remains in the ICU in critical condition.   Renal function remains poor though stable with IVF's, non-oliguric.   Pressors requirement improved from 24 hours ago and now off pressors.       ROS:  Remains on ventilator, +weak.    EXAM       Objective/     Vitals:    01/29/24 0530 01/29/24 0600 01/29/24 0630 01/29/24 0800   BP: 135/72 (!) 159/85 (!) 148/84 (!) 156/80   Pulse: 61 66 69 67   Resp: 24 24 24 24   Temp:    97.6 °F (36.4 °C)   TempSrc:    Bladder   SpO2: 97% 98% 99% 97%   Weight:  116.7 kg (257 lb 4.4 oz)     Height:         24HR INTAKE/OUTPUT:    Intake/Output Summary (Last 24 hours) at 1/29/2024 1005  Last data filed at 1/29/2024 0811  Gross per 24 hour   Intake 2750.84 ml   Output 3700 ml   Net -949.16 ml       Constitutional:  critically ill, on vent   Eyes:  Pupils reactive, sclera clear   Neck:  Normal thyroid, no masses   Cardiovascular:  Regular, no rub  Respiratory:  On vent, no wheezing  Psychiatry:  sedated on vent, KERRY   Abdomen: +bs, soft, nt, no masses   Musculoskeletal: + LE edema, no clubbing   Lymphatics:  No LAD in neck, no supraclavicular nodes   :  powers placed       MEDICAL DECISION MAKING       Data/  Recent Labs     01/27/24  0625 01/28/24  0600 01/29/24  0620   WBC 9.3 9.5 9.2   HGB 10.6* 9.9* 10.4*   HCT 31.5* 29.1* 30.0*   MCV 80.8 80.5 79.5*    144 132*       Recent Labs     01/28/24  1515 01/28/24  2134 01/29/24  0050 01/29/24  0620    140 141 141   K 5.2* 4.9 4.5 5.9*    109 110 110   CO2 19* 20* 20* 20*   GLUCOSE 155* 251* 167* 267*   PHOS 2.9 3.3 3.2 2.9   MG 1.80  --  2.30 1.90   BUN 42* 42* 42* 42*   CREATININE 4.3* 4.5* 4.6* 4.4*   LABGLOM 16* 15* 15* 16*         Assessment/     Acute Kidney Injury:  KDIGO stage 3  Etiology:  Pre-renal due to DKA with profound volume depletion and hypotension -> seems to have progressed to  ATN   Non-oliguric / getting volume resuscitation.  Plateau phase and urine volume has improved.  Off pressors   Chronic Kidney Disease:  Stage 3a  Baseline:  1.4 - 1.8  Etiology:  Diabetic Nephropathy with proteinuria  Follows with Dr. Stevenson in the office   DKA:  anion gap of 35 with severe elevation in glucose  Now in the ICU and improving, anion gap closed   Metabolic Acidosis:  Anion gap acidosis + non-gap metabolic acidosis ( related to HERB and impaired net acid excretion with chloride loading )   Stable, improving on HCO3 gtt   Hyponatremia:  Largely pseudohyponatremia / resolved   Hypokalemia:  Significant total body K depletion with DKA / adequate replacement, now on high side  Shock:  Hypovolemic   Respiratory Failure:  On vent, intubated for airway protection as obtunded with DKA  Hypernatremia:  Free water deficit with increased urine volume and free water loss.  Solute post ATN diuresis       Plan/     -Continue hypotonic HCO3 gtt  -No indication for RRT at this time, hopefully he starts recovery phase in the next 48 to 72 hours  -Trend labs, bp's, weights, & urine output

## 2024-01-29 NOTE — PROGRESS NOTES
Called BS to Dr Akins   orders obtained  DKA protocol stopped , lantus 5 units given  D10 stopped and bicab 50meq/D5w running at 125 per orders

## 2024-01-29 NOTE — PROGRESS NOTES
Comprehensive Nutrition Assessment    Type and Reason for Visit:  Reassess    Nutrition Recommendations/Plan:   Continue NPO status until patient is medically cleared to receive nutrition therapy.   TF recommendations, if needed - ADULT TUBE FEEDING; Orogastric; Diabetic formula - Glucerna 1.5 with a goal rate of 65 ml/hr x 20 hours. Start with 20 ml/hr and increase by 20 ml every 4 hours, as tolerated by patient, until goal rate can be achieved and maintained. Water flushes, 30 ml every 4 hours for tube patency.   Monitor respiratory/vent status, whether TF is started, and plan of care.   Monitor nutrition-related labs, bowel function, mental status, and weight trends.      Malnutrition Assessment:  Malnutrition Status:  At risk for malnutrition (01/29/24 1145)    Context:  Acute Illness     Findings of the 6 clinical characteristics of malnutrition:  Energy Intake:  50% or less of estimated energy requirements for 5 or more days  Weight Loss:  Unable to assess (patient is + 9.6L fluid since admission)     Body Fat Loss:  No significant body fat loss     Muscle Mass Loss:  Mild muscle mass loss Clavicles (pectoralis & deltoids)  Fluid Accumulation:  Moderate to Severe Extremities (bilateral arms/hands are swollen)   Strength:  Not Performed    Nutrition Assessment:    patient is declining from a nutritional standpoint AEB patient has been NPO x 4 days admission and he was not consuming adequate po intake PTA (since ~ beginning of Jan, per patient's sister) and he is at risk for further compromise d/t ongoing NPO status, altered nutrition-related labs, and endocrine dysfunction; will continue NPO status and provide TF recommendations    Nutrition Related Findings:    patient remains intubated - he was on an SBT this am during rounds; TF was not started; Na Bicarb 50 mEq in D5 at 125 ml/hr infusing at this time; on med-dose SSI and 5 units Lantus BID at this time; patient's sister was present in patient's room

## 2024-01-29 NOTE — TELEPHONE ENCOUNTER
Patients sister called to give an update on her brother, she says that she did take him to the ER and his sugar was 2,470 when he got there.  She says that he is in the ICU at [facility] and since has been there since 1/25.  ARNOLDO

## 2024-01-29 NOTE — PROGRESS NOTES
01/29/24 0306   Patient Observation   Pulse 64   Respirations 24   SpO2 97 %   Observations 8ett 24 at lip   Breath Sounds   Right Upper Lobe Diminished   Right Middle Lobe Diminished   Right Lower Lobe Diminished   Left Upper Lobe Diminished   Left Lower Lobe Diminished   Vent Information   Vent Mode AC/VC   Ventilator Settings   FiO2  28 %   Vt (Set, mL) 500 mL   Resp Rate (Set) 24 bpm   PEEP/CPAP (cmH2O) 5   Vent Patient Data (Readings)   Vt (Measured) 512 mL   Peak Inspiratory Pressure (cmH2O) 19 cmH2O   Rate Measured 24 br/min   Minute Volume (L/min) 12.3 Liters   Mean Airway Pressure (cmH2O) 10 cmH20   Plateau Pressure (cm H2O) 17 cm H2O   Driving Pressure 12   I:E Ratio 1:2.20   Vent Alarm Settings   High Pressure (cmH2O) 40 cmH2O   Low Minute Volume (lpm) 4 L/min   Low Exhaled Vt (ml) 250 mL   RR High (bpm) 40 br/min   Apnea (secs) 20 secs   Additional Respiratoray Assessments   Humidification Source Heated wire   Humidification Temp 37   Circuit Condensation Drained   Ambu Bag With Mask At Bedside Yes   Airway Clearance   Suction ET Tube   Suction Device Inline suction catheter   Sputum Method Obtained Endotracheal   Sputum Amount Small   Sputum Color/Odor Yellow   Sputum Consistency Thick   Non-Surgical Airway 01/25/24 Endotracheal tube   Placement Date/Time: 01/25/24 1619   Present on Admission/Arrival: No  Placed By: In ED  Placement Verified By: Auscultation;Chest X-ray  Mask Ventilation: Ventilated by mask (1)  Insertion attempts: 1  Location: Oral  Airway Device: Endotracheal tube...   Secured At 24 cm   Measured From Lips   Secured Location Left   Secured By Commercial tube [email or web address]

## 2024-01-29 NOTE — PROGRESS NOTES
4 Eyes Skin Assessment     NAME:  Era Carson  YOB: 1975  MEDICAL RECORD NUMBER:  [id number]    The patient is being assessed for  Shift Handoff    I agree that at least one RN has performed a thorough Head to Toe Skin Assessment on the patient. ALL assessment sites listed below have been assessed.      Areas assessed by both nurses:    Head, Face, Ears, Shoulders, Back, Chest, Arms, Elbows, Hands, Sacrum. Buttock, Coccyx, Ischium, Legs. Feet and Heels, and Under Medical Devices    DTI on bilat butt checks       Does the Patient have a Wound? Yes wound(s) were present on assessment. LDA wound assessment was Initiated and completed by RN             Harpreet Prevention initiated by RN: Yes  Wound Care Orders initiated by RN: Yes    Pressure Injury (Stage 3,4, Unstageable, DTI, NWPT, and Complex wounds) if present, place Wound referral order by RN under : No    New Ostomies, if present place, Ostomy referral order under : No     Nurse 1 eSignature: Electronically signed by Rosalva Washington RN on 1/29/24 at 1:21 AM EST    **SHARE this note so that the co-signing nurse can place an eSignature**    Nurse 2 eSignature: Electronically signed by Nolan Mendiola RN on 1/31/24 at 12:43 PM EST

## 2024-01-29 NOTE — SIGNIFICANT EVENT
Paged about patient's anion gap closed x 2.      Patient was transition to insulin sign scale every 4 hours.  Noted patient is on bicarb receiving D5.  Dietitian consulted for tube feed order.  Start patient on Levemir 5 units twice daily

## 2024-01-29 NOTE — PROGRESS NOTES
1/29  Vanc random = 15.7 mcg/mL at 0620.  Give vancomycin 750 mg x1.  Continue to check vanc levels daily.  Irineo Giles, PharmD  1/29/2024 7:29 AM

## 2024-01-29 NOTE — PROGRESS NOTES
01/29/24 1112   Encounter Summary   Encounter Overview/Reason  Follow-up   Service Provided For: Family   Support System Friends/neighbors   Last Encounter  01/29/24  (Supported sister at bedside.  Patient on vent.)   Complexity of Encounter Low   Begin Time 1045   End Time  1113   Total Time Calculated 28 min   Assessment/Intervention/Outcome   Assessment Calm   Intervention Active listening;Discussed illness injury and it’s impact;Explored/Affirmed feelings, thoughts, concerns;Prayer (assurance of)/[place]   Outcome Receptive   Plan and Referrals   Plan/Referrals Continue Support (comment)  (Continue prn spiritual support)      offered support to sister at bedside.  She states there are strained family dynamics and patient had requested not to notify other family members of anything around his health.  She states she is following this wish.  She has some friend support and is hopeful.  Patient and sister lost there mother within the past year.  This has been difficult as they were all caring for the patient.  Sister states any prayer are appreciated.  Continue prn support.

## 2024-01-29 NOTE — PROGRESS NOTES
Care rounds completed with Dr. Oliver and multidisciplinary team. Reviewed labs, meds, VS, assessment, & plan of care for today. See dictated note and new orders for details.     -SBT  -CT head if no change in neuro response   -Add lantus BID, and Q4 SS  -Start TF if unable to extubate

## 2024-01-29 NOTE — PROGRESS NOTES
Wasted fentanyl with Susanna DURAN Rn 75 mls    Electronically signed by Erica Vaughn RN on 1/29/2024 at 7:10 AM

## 2024-01-29 NOTE — PROGRESS NOTES
Pt transported to CT for head scan. Prior to trip patient became agitated with portable vent. PRN versed and fentanyl given x1. Fentanyl gtt started. Tolerated transport well.    Will maintain sedation but titrate off as patient allows.

## 2024-01-29 NOTE — PROGRESS NOTES
01/28/24 1903   Patient Observation   Pulse 72   Respirations 24   SpO2 95 %   Observations 8ett 24 at lip   Breath Sounds   Right Upper Lobe Rhonchi   Right Middle Lobe Diminished   Right Lower Lobe Rhonchi   Left Upper Lobe Rhonchi   Left Lower Lobe Diminished   Vent Information   Vent Mode AC/VC   Ventilator Settings   FiO2  28 %   Vt (Set, mL) 500 mL   Resp Rate (Set) 24 bpm   PEEP/CPAP (cmH2O) 5   Vent Patient Data (Readings)   Vt (Measured) 515 mL   Peak Inspiratory Pressure (cmH2O) 19 cmH2O   Rate Measured 24 br/min   Minute Volume (L/min) 12.4 Liters   Mean Airway Pressure (cmH2O) 9.9 cmH20   Plateau Pressure (cm H2O) 18 cm H2O   Driving Pressure 13   I:E Ratio 1:2.20   Flow Sensitivity 3 L/min   Static Compliance (L/cm H2O) 42   Dynamic Compliance (L/cm H2O) 36 L/cm H2O   Vent Alarm Settings   High Pressure (cmH2O) 40 cmH2O   Low Minute Volume (lpm) 4 L/min   Low Exhaled Vt (ml) 250 mL   RR High (bpm) 40 br/min   Apnea (secs) 20 secs   Additional Respiratoray Assessments   Humidification Source Heated wire   Humidification Temp 37   Circuit Condensation Drained   Ambu Bag With Mask At Bedside Yes   Airway Clearance   Suction ET Tube   Suction Device Inline suction catheter   Sputum Method Obtained Endotracheal   Sputum Amount Small   Sputum Color/Odor Yellow   Sputum Consistency Thick   Non-Surgical Airway 01/25/24 Endotracheal tube   Placement Date/Time: 01/25/24 1619   Present on Admission/Arrival: No  Placed By: In ED  Placement Verified By: Auscultation;Chest X-ray  Mask Ventilation: Ventilated by mask (1)  Insertion attempts: 1  Location: Oral  Airway Device: Endotracheal tube...   Secured At 24 cm   Measured From Lips   Secured Location Right   Secured By Commercial tube [email or web address]

## 2024-01-29 NOTE — PROGRESS NOTES
IM Progress Note    Admit Date:  1/25/2024  4    Interval history:  DKA with severe hyperglycemia above 2000 glucose   Encephalopathy , placed on vent   Uop picked up but creatinine and acidosis not corrected    Subjective:    Mr. Carson seen  on vent -on precedex only   No fevers   Weaned off levo   Ongoing weaning trials -not waking up         Objective:   BP (!) 148/84   Pulse 69   Temp 98.9 °F (37.2 °C) (Bladder)   Resp 24   Ht 1.88 m (6' 2.02\")   Wt 116.7 kg (257 lb 4.4 oz)   SpO2 99%   BMI 33.02 kg/m²     Intake/Output Summary (Last 24 hours) at 1/29/2024 0732  Last data filed at 1/29/2024 0637  Gross per 24 hour   Intake 2750.84 ml   Output 3250 ml   Net -499.16 ml         Physical Exam:        General: middle aged male, on vent,   Oral ETT and OG noted Appears to be not in any distress  Mucous Membranes:  Pink , anicteric  Neck: No JVD, no carotid bruit, no thyromegaly  Chest:  Clear to auscultation bilaterally, mild left sided crackles  Cardiovascular:  RRR S1S2 heard, ASM   Abdomen:  Soft, undistended, non tender, no organomegaly, BS present  Extremities: developing edema to all ext   Distal pulses feeble both feet  Neurological : on the vent, on precedex, unable to follow commands,not awake       Medications:   Scheduled Medications:    insulin lispro  0-4 Units SubCUTAneous Q4H    insulin glargine  5 Units SubCUTAneous BID    cefepime  1,000 mg IntraVENous Q12H    vancomycin (VANCOCIN) intermittent dosing (placeholder)   Other RX Placeholder    famotidine (PEPCID) injection  20 mg IntraVENous Daily    enoxaparin  30 mg SubCUTAneous Daily     I   dextrose      dexmedeTOMIDine HCl in NaCl 0.6 mcg/kg/hr (01/29/24 0637)    sodium bicarbonate 50 mEq in dextrose 5 % 1,000 mL infusion 125 mL/hr at 01/29/24 0502    propofol Stopped (01/28/24 2027)    dextrose 5 % and 0.45 % NaCl      fentaNYL Stopped (01/28/24 1529)     glucose, dextrose bolus **OR** dextrose bolus, glucagon (rDNA), dextrose, medicated  lip balm, artificial tears, midazolam, fentanNYL, acetaminophen, acetaminophen, lactated ringers bolus, potassium chloride, magnesium sulfate, sodium phosphate 15 mmol in sodium chloride 0.9 % 250 mL IVPB, polyethylene glycol, dextrose 5 % and 0.45 % NaCl    Lab Data:  Recent Labs     01/27/24  0625 01/28/24  0600 01/29/24  0620   WBC 9.3 9.5 9.2   HGB 10.6* 9.9* 10.4*   HCT 31.5* 29.1* 30.0*   MCV 80.8 80.5 79.5*    144 132*       Recent Labs     01/28/24  2134 01/29/24  0050 01/29/24  0620    141 141   K 4.9 4.5 5.9*    110 110   CO2 20* 20* 20*   PHOS 3.3 3.2 2.9   BUN 42* 42* 42*   CREATININE 4.5* 4.6* 4.4*       No results for input(s): \"CKTOTAL\", \"CKMB\", \"CKMBINDEX\", \"TROPONINI\" in the last 72 hours.    Coagulation:   Lab Results   Component Value Date/Time    APTT 30.9 08/19/2014 04:45 AM     Cardiac markers:   Lab Results   Component Value Date/Time    TROPONINI <0.01 05/14/2022 09:50 PM         Lab Results   Component Value Date    ALT 43 (H) 01/29/2024    AST 46 (H) 01/29/2024    GGT 44 08/21/2014    ALKPHOS 148 (H) 01/29/2024    BILITOT 1.0 01/29/2024       No results found for: \"INR\", \"PROTIME\"    Radiology      XR CHEST PORTABLE   Preliminary Result   1. Stable lines and tubes.   2. Stable basilar opacities and small left effusion.         XR CHEST PORTABLE   Final Result   Increasing bibasilar atelectasis or airspace disease.         CT HEAD WO CONTRAST   Final Result   No acute intracranial process.         XR CHEST PORTABLE   Final Result   Lines and tubes as detailed above.  Stable appearance of the lungs.         XR CHEST PORTABLE   Final Result   1.  Endotracheal tube tip is approximately 8.5 cm above the vik.      2.  The enteric tube tip is in the stomach.  The side port is near the GE   junction.  Recommend advancement by 3 cm.         XR CHEST PORTABLE   Final Result   Right central venous line terminates in the SVC      Left basilar opacity could represent subsegmental

## 2024-01-29 NOTE — PLAN OF CARE
Problem: Safety - Medical Restraint  Goal: Remains free of injury from restraints (Restraint for Interference with Medical Device)  Description: INTERVENTIONS:  1. Determine that other, less restrictive measures have been tried or would not be effective before applying the restraint  2. Evaluate the patient's condition at the time of restraint application  3. Inform patient/family regarding the reason for restraint  4. Q2H: Monitor safety, psychosocial status, comfort, nutrition and hydration  Outcome: Progressing  Flowsheets  Taken 1/29/2024 0000 by Rosalva Washington RN  Remains free of injury from restraints (restraint for interference with medical device): Every 2 hours: Monitor safety, psychosocial status, comfort, nutrition and hydration  Taken 1/28/2024 2200 by Rosalva Washington RN  Remains free of injury from restraints (restraint for interference with medical device): Every 2 hours: Monitor safety, psychosocial status, comfort, nutrition and hydration  Taken 1/28/2024 2000 by Rosalva Washington RN  Remains free of injury from restraints (restraint for interference with medical device): Every 2 hours: Monitor safety, psychosocial status, comfort, nutrition and hydration  Taken 1/28/2024 1600 by Mirta Enciso RN  Remains free of injury from restraints (restraint for interference with medical device): Every 2 hours: Monitor safety, psychosocial status, comfort, nutrition and hydration  Taken 1/28/2024 1400 by Mirta Enciso RN  Remains free of injury from restraints (restraint for interference with medical device): Every 2 hours: Monitor safety, psychosocial status, comfort, nutrition and hydration  Taken 1/28/2024 1236 by Mirta Enciso RN  Remains free of injury from restraints (restraint for interference with medical device): Every 2 hours: Monitor safety, psychosocial status, comfort, nutrition and hydration  Taken 1/28/2024 1200 by Mirta Enciso RN  Remains free of injury from restraints (restraint for

## 2024-01-29 NOTE — PROGRESS NOTES
01/28/24 2339   Patient Observation   Pulse 73   Respirations 26   Observations 8ett 24 at lip   Ventilator Settings   FiO2  28 %   Vt (Set, mL) 500 mL   Resp Rate (Set) 24 bpm   PEEP/CPAP (cmH2O) 5   Vent Patient Data (Readings)   Vt (Measured) 582 mL   Peak Inspiratory Pressure (cmH2O) 20 cmH2O   Rate Measured 25 br/min   Minute Volume (L/min) 12.8 Liters   Mean Airway Pressure (cmH2O) 9.6 cmH20   Plateau Pressure (cm H2O) 17 cm H2O   Driving Pressure 12   I:E Ratio 1:2.20   Vent Alarm Settings   High Pressure (cmH2O) 40 cmH2O   Low Minute Volume (lpm) 4 L/min   Low Exhaled Vt (ml) 250 mL   RR High (bpm) 40 br/min   Apnea (secs) 20 secs   Additional Respiratoray Assessments   Humidification Source Heated wire   Humidification Temp 37   Circuit Condensation Drained   Ambu Bag With Mask At Bedside Yes   Airway Clearance   Suction ET Tube   Suction Device Inline suction catheter   Sputum Method Obtained Endotracheal   Sputum Amount Small   Sputum Color/Odor Tan   Sputum Consistency Thick   Non-Surgical Airway 01/25/24 Endotracheal tube   Placement Date/Time: 01/25/24 1619   Present on Admission/Arrival: No  Placed By: In ED  Placement Verified By: Auscultation;Chest X-ray  Mask Ventilation: Ventilated by mask (1)  Insertion attempts: 1  Location: Oral  Airway Device: Endotracheal tube...   Secured At 24 cm   Measured From Lips   Secured Location Center   Secured By Commercial tube mcmillan

## 2024-01-30 ENCOUNTER — APPOINTMENT (OUTPATIENT)
Dept: GENERAL RADIOLOGY | Age: 49
DRG: 637 | End: 2024-01-30
Payer: MEDICARE

## 2024-01-30 LAB
ALBUMIN SERPL-MCNC: 2.2 G/DL (ref 3.4–5)
ANION GAP SERPL CALCULATED.3IONS-SCNC: 8 MMOL/L (ref 3–16)
BASE EXCESS BLDV CALC-SCNC: -1.6 MMOL/L (ref -3–3)
BUN SERPL-MCNC: 43 MG/DL (ref 7–20)
CALCIUM SERPL-MCNC: 8 MG/DL (ref 8.3–10.6)
CHLORIDE SERPL-SCNC: 110 MMOL/L (ref 99–110)
CO2 BLDV-SCNC: 23 MMOL/L
CO2 SERPL-SCNC: 24 MMOL/L (ref 21–32)
COHGB MFR BLDV: 0.6 % (ref 0–1.5)
CREAT SERPL-MCNC: 4.1 MG/DL (ref 0.9–1.3)
DEPRECATED RDW RBC AUTO: 16.3 % (ref 12.4–15.4)
GFR SERPLBLD CREATININE-BSD FMLA CKD-EPI: 17 ML/MIN/{1.73_M2}
GLUCOSE BLD-MCNC: 137 MG/DL (ref 70–99)
GLUCOSE BLD-MCNC: 161 MG/DL (ref 70–99)
GLUCOSE BLD-MCNC: 169 MG/DL (ref 70–99)
GLUCOSE BLD-MCNC: 186 MG/DL (ref 70–99)
GLUCOSE BLD-MCNC: 223 MG/DL (ref 70–99)
GLUCOSE BLD-MCNC: 248 MG/DL (ref 70–99)
GLUCOSE SERPL-MCNC: 263 MG/DL (ref 70–99)
HCO3 BLDV-SCNC: 22.1 MMOL/L (ref 23–29)
HCT VFR BLD AUTO: 29.1 % (ref 40.5–52.5)
HGB BLD-MCNC: 9.9 G/DL (ref 13.5–17.5)
MAGNESIUM SERPL-MCNC: 1.8 MG/DL (ref 1.8–2.4)
MCH RBC QN AUTO: 27.4 PG (ref 26–34)
MCHC RBC AUTO-ENTMCNC: 34.1 G/DL (ref 31–36)
MCV RBC AUTO: 80.5 FL (ref 80–100)
METHGB MFR BLDV: 0.3 %
O2 CT VFR BLDV CALC: 12 VOL %
O2 THERAPY: ABNORMAL
PCO2 BLDV: 33.6 MMHG (ref 40–50)
PERFORMED ON: ABNORMAL
PH BLDV: 7.43 [PH] (ref 7.35–7.45)
PHOSPHATE SERPL-MCNC: 2.5 MG/DL (ref 2.5–4.9)
PLATELET # BLD AUTO: 148 K/UL (ref 135–450)
PMV BLD AUTO: 8 FL (ref 5–10.5)
PO2 BLDV: 44.1 MMHG (ref 25–40)
POTASSIUM SERPL-SCNC: 4 MMOL/L (ref 3.5–5.1)
RBC # BLD AUTO: 3.62 M/UL (ref 4.2–5.9)
SAO2 % BLDV: 82 %
SODIUM SERPL-SCNC: 142 MMOL/L (ref 136–145)
WBC # BLD AUTO: 6.2 K/UL (ref 4–11)

## 2024-01-30 PROCEDURE — 2500000003 HC RX 250 WO HCPCS: Performed by: INTERNAL MEDICINE

## 2024-01-30 PROCEDURE — 6360000002 HC RX W HCPCS

## 2024-01-30 PROCEDURE — 94761 N-INVAS EAR/PLS OXIMETRY MLT: CPT

## 2024-01-30 PROCEDURE — 71045 X-RAY EXAM CHEST 1 VIEW: CPT

## 2024-01-30 PROCEDURE — 83036 HEMOGLOBIN GLYCOSYLATED A1C: CPT

## 2024-01-30 PROCEDURE — 80069 RENAL FUNCTION PANEL: CPT

## 2024-01-30 PROCEDURE — 2000000000 HC ICU R&B

## 2024-01-30 PROCEDURE — 2700000000 HC OXYGEN THERAPY PER DAY

## 2024-01-30 PROCEDURE — 92610 EVALUATE SWALLOWING FUNCTION: CPT

## 2024-01-30 PROCEDURE — 97530 THERAPEUTIC ACTIVITIES: CPT

## 2024-01-30 PROCEDURE — 97166 OT EVAL MOD COMPLEX 45 MIN: CPT

## 2024-01-30 PROCEDURE — 99291 CRITICAL CARE FIRST HOUR: CPT | Performed by: INTERNAL MEDICINE

## 2024-01-30 PROCEDURE — 99233 SBSQ HOSP IP/OBS HIGH 50: CPT | Performed by: INTERNAL MEDICINE

## 2024-01-30 PROCEDURE — 92611 MOTION FLUOROSCOPY/SWALLOW: CPT

## 2024-01-30 PROCEDURE — 85027 COMPLETE CBC AUTOMATED: CPT

## 2024-01-30 PROCEDURE — 74230 X-RAY XM SWLNG FUNCJ C+: CPT

## 2024-01-30 PROCEDURE — A4216 STERILE WATER/SALINE, 10 ML: HCPCS | Performed by: INTERNAL MEDICINE

## 2024-01-30 PROCEDURE — 6360000002 HC RX W HCPCS: Performed by: INTERNAL MEDICINE

## 2024-01-30 PROCEDURE — 82803 BLOOD GASES ANY COMBINATION: CPT

## 2024-01-30 PROCEDURE — 97162 PT EVAL MOD COMPLEX 30 MIN: CPT

## 2024-01-30 PROCEDURE — 83735 ASSAY OF MAGNESIUM: CPT

## 2024-01-30 PROCEDURE — 2580000003 HC RX 258: Performed by: INTERNAL MEDICINE

## 2024-01-30 PROCEDURE — 6370000000 HC RX 637 (ALT 250 FOR IP): Performed by: INTERNAL MEDICINE

## 2024-01-30 PROCEDURE — 94003 VENT MGMT INPAT SUBQ DAY: CPT

## 2024-01-30 PROCEDURE — 92526 ORAL FUNCTION THERAPY: CPT

## 2024-01-30 RX ORDER — INSULIN LISPRO 100 [IU]/ML
0-16 INJECTION, SOLUTION INTRAVENOUS; SUBCUTANEOUS EVERY 4 HOURS
Status: DISCONTINUED | OUTPATIENT
Start: 2024-01-30 | End: 2024-02-06 | Stop reason: HOSPADM

## 2024-01-30 RX ADMIN — ENOXAPARIN SODIUM 30 MG: 100 INJECTION SUBCUTANEOUS at 07:31

## 2024-01-30 RX ADMIN — Medication 0.4 MCG/KG/HR: at 09:50

## 2024-01-30 RX ADMIN — INSULIN LISPRO 2 UNITS: 100 INJECTION, SOLUTION INTRAVENOUS; SUBCUTANEOUS at 04:24

## 2024-01-30 RX ADMIN — Medication 0.4 MCG/KG/HR: at 00:34

## 2024-01-30 RX ADMIN — FAMOTIDINE 20 MG: 10 INJECTION, SOLUTION INTRAVENOUS at 07:31

## 2024-01-30 RX ADMIN — Medication 0.6 MCG/KG/HR: at 16:18

## 2024-01-30 RX ADMIN — INSULIN GLARGINE 15 UNITS: 100 INJECTION, SOLUTION SUBCUTANEOUS at 21:05

## 2024-01-30 RX ADMIN — INSULIN LISPRO 4 UNITS: 100 INJECTION, SOLUTION INTRAVENOUS; SUBCUTANEOUS at 07:31

## 2024-01-30 RX ADMIN — CEFEPIME 2000 MG: 2 INJECTION, POWDER, FOR SOLUTION INTRAVENOUS at 11:26

## 2024-01-30 RX ADMIN — CEFEPIME 2000 MG: 2 INJECTION, POWDER, FOR SOLUTION INTRAVENOUS at 23:27

## 2024-01-30 RX ADMIN — INSULIN GLARGINE 15 UNITS: 100 INJECTION, SOLUTION SUBCUTANEOUS at 07:31

## 2024-01-30 ASSESSMENT — PULMONARY FUNCTION TESTS
PIF_VALUE: 19
PIF_VALUE: 12
PIF_VALUE: 21
PIF_VALUE: 14
PIF_VALUE: 21
PIF_VALUE: 19
PIF_VALUE: 18
PIF_VALUE: 11
PIF_VALUE: 23

## 2024-01-30 NOTE — PROGRESS NOTES
Extubated patient to RA, suctioned patient prior extubation. Pt alert at this time. SpO2 96% on RA. 2 lpm at bedside. Pt stable at this time.

## 2024-01-30 NOTE — PROGRESS NOTES
Speech Language Pathology  Swallowing Disorders and Dysphagia  Clinical Bedside Swallow Assessment  Facility/Department: [facility] [place] ICU    Instrumentation: Yes. FEES recommended to further evaluate pharyngeal phase and mechanisms (I.e. vocal folds, UES, reflux, LPR), however, pt adamantly declines FEES. Deferred to MBS. Ordered at this time  Diet recommendation: NPO;  Meds via alt means of nutrition pending MBS results- to be completed this afternoon  Risk management: Control risk factors for aspiration PNA by completing oral care 3-4x/day and increasing physical mobility as is medically feasible    NAME:Era Carson  : 1975 (48 y.o.)   MRN: [id number]  ROOM: [address] [place] [address]  ADMISSION DATE: 2024  PATIENT DIAGNOSIS(ES): DKA, type 1, not at goal ([facility]) [E10.10]  Acute respiratory failure with hypoxia ([facility]) [J96.01]  Acute renal failure, unspecified acute renal failure type ([facility]) [N17.9]  Diabetic ketoacidosis with coma associated with diabetes mellitus due to underlying condition ([facility]) [E08.11]  Chief Complaint   Patient presents with    Hyperglycemia     Pt arrives via EMS for AMS. Pt. Alert to verbal stimuli, oriented to self. Pt known diabetic, family reported to EMS BG 1300 yesterday.      Patient Active Problem List    Diagnosis Date Noted    DKA, type 1, not at goal ([facility]) 2024    Diabetic ketoacidosis with coma associated with diabetes mellitus due to underlying condition ([facility]) 2024    Metabolic encephalopathy 2024    Hypotension 2024    Recurrent major depressive disorder, in partial remission ([facility]) 2024    Hypoglycemia 2022    Abnormal finding on EKG     Acute on chronic renal insufficiency     Acute renal failure ([facility])     Esophagitis 2021    Proliferative diabetic retinopathy associated with type 1 diabetes mellitus ([facility]) 2018    Polyneuropathy due to type 1 diabetes mellitus ([facility]) 2018    Myofascial pain 2017    Chronic migraine without  TRANSMITTER) MISC USE TO CHECK BLOOD SUGAR 2 each 3    blood glucose monitor kit and supplies Dispense sufficient amount for indicated testing frequency plus additional to accommodate PRN testing needs. Dispense all needed supplies to include: monitor, strips, lancing device, lancets, control solutions, alcohol swabs. 1 kit 0    blood glucose test strips (ACCU-CHEK CAPO PLUS) strip 4 times a day As needed. 150 each 3    Handicap Placard MISC by Does not apply route Diagnosis: Type 1 diabetes.     Expires 4/13/23. 1 each 0    blood glucose test strips (ACCU-CHEK CAPO PLUS) strip 1 each by In Vitro route daily Test blood glucose 10 times daily Dx Code E10.8 300 each 10    blood glucose test strips (ACCU-CHEK CAPO PLUS) strip USE ONE STRIP TO TEST THREE TIMES A  strip 4    Accu-Chek FastClix Lancets MISC 1 each by Does not apply route daily Test blood glucose 10 times daily Dx Code E 10.8 900 each 2    triamcinolone (KENALOG) 0.1 % ointment Apply to thickened affected areas PRN sparingly for flares no longer than 2 weeks at one time, do not apply to cleared skin 80 g 0    Insulin Syringe-Needle U-100 (BD INSULIN SYRINGE U/F) 31G X 5/16\" 0.5 ML MISC Inject 1 each into the skin 4 times daily DX CODE E 10.22 120 each 9    fluticasone (FLONASE) 50 MCG/ACT nasal spray SPRAY TWO SPRAYS IN EACH NOSTRIL DAILY (Patient taking differently: daily as needed) 16 g 4    hydrocortisone 2.5 % cream Apply topically 2 times daily. 30 g 0    Blood Glucose Monitoring Suppl (ACCU-CHEK CAPO PLUS) w/Device KIT 1 each by Does not apply route daily Test blood sugar 10 times daily Dx code E 10.8 1 kit 10    GLUCAGON EMERGENCY 1 MG injection INJECT 1MG INTO THE MUSCLE AS NEEDED 1 kit 3    Multiple Vitamin (DAILY KITTY) TABS TAKE ONE TABLET BY MOUTH DAILY 30 tablet 5    Cholecalciferol (VITAMIN D3) 5000 units CAPS Take 1 capsule by mouth Daily 30 capsule 3    Dextromethorphan-Guaifenesin (MUCINEX DM)  MG TB12 Cough and congestion.

## 2024-01-30 NOTE — PROGRESS NOTES
40 ml of Fentanyl gtt wasted. Danis DUMONT via witness.     Baljeet Mendiola RN, Electronically signed by Danis Bain RN on 1/30/2024 at 5:18 PM

## 2024-01-30 NOTE — PROGRESS NOTES
Progress Note    HISTORY     CC:   AMS              We are following for acute kidney injury      Subjective/     HPI:    Remains in the ICU in critical condition.   Renal function remains poor though stable with IVF's, non-oliguric.   Pressors requirement improved from 24 hours ago and now off pressors.       ROS:  Extubated this AM, +weak.    EXAM       Objective/     Vitals:    01/30/24 0624 01/30/24 0700 01/30/24 0728 01/30/24 0800   BP:  (!) 145/69  115/64   Pulse: 81 82 79 87   Resp: 23 15 17 17   Temp:  99.7 °F (37.6 °C)     TempSrc:  Bladder     SpO2:   97% 94%   Weight:       Height:         24HR INTAKE/OUTPUT:    Intake/Output Summary (Last 24 hours) at 1/30/2024 0915  Last data filed at 1/30/2024 0735  Gross per 24 hour   Intake 906.23 ml   Output 5450 ml   Net -4543.77 ml       Constitutional:  critically ill, on vent   Eyes:  Pupils reactive, sclera clear   Neck:  Normal thyroid, no masses   Cardiovascular:  Regular, no rub  Respiratory:  On vent, no wheezing  Psychiatry:  sedated on vent, KERRY   Abdomen: +bs, soft, nt, no masses   Musculoskeletal: + LE edema, no clubbing   Lymphatics:  No LAD in neck, no supraclavicular nodes   :  powers placed       MEDICAL DECISION MAKING       Data/  Recent Labs     01/28/24  0600 01/29/24  0620 01/30/24  0525   WBC 9.5 9.2 6.2   HGB 9.9* 10.4* 9.9*   HCT 29.1* 30.0* 29.1*   MCV 80.5 79.5* 80.5    132* 148       Recent Labs     01/29/24  0050 01/29/24  0620 01/29/24  1115 01/30/24  0525    141 138 142   K 4.5 5.9* 5.8* 4.0    110 108 110   CO2 20* 20* 20* 24   GLUCOSE 167* 267* 385* 263*   PHOS 3.2 2.9 3.0 2.5   MG 2.30 1.90  --  1.80   BUN 42* 42* 44* 43*   CREATININE 4.6* 4.4* 4.2* 4.1*   LABGLOM 15* 16* 17* 17*         Assessment/     Acute Kidney Injury:  KDIGO stage 3  Etiology:  Pre-renal due to DKA with profound volume depletion and hypotension -> seems to have progressed to ATN   Non-oliguric / getting volume resuscitation.

## 2024-01-30 NOTE — PROGRESS NOTES
4 Eyes Skin Assessment     NAME:  Era Carson  YOB: 1975  MEDICAL RECORD NUMBER:  [id number]    The patient is being assessed for  Shift Handoff    I agree that at least one RN has performed a thorough Head to Toe Skin Assessment on the patient. ALL assessment sites listed below have been assessed.      Areas assessed by both nurses:    Head, Face, Ears, Shoulders, Back, Chest, Arms, Elbows, Hands, Sacrum. Buttock, Coccyx, Ischium, Legs. Feet and Heels, and Under Medical Devices         Does the Patient have a Wound? Yes wound(s) were present on assessment. LDA wound assessment was Initiated and completed by RN             Harpreet Prevention initiated by RN: Yes  Wound Care Orders initiated by RN: Yes    Pressure Injury (Stage 3,4, Unstageable, DTI, NWPT, and Complex wounds) if present, place Wound referral order by RN under : No    New Ostomies, if present place, Ostomy referral order under : No     Nurse 1 eSignature: Electronically signed by Rosalva Washington RN on 1/29/24 at 9:59 PM EST    **SHARE this note so that the co-signing nurse can place an eSignature**    Nurse 2 eSignature: Electronically signed by Elvira Richard RN on 1/30/24 at 4:57 AM EST

## 2024-01-30 NOTE — CARE COORDINATION
OT/PT advised they are recommending ARU with co-treating.    Family aware of ARU and in agreement per OT/PT.    Spoke to Anjana/ERIKA who advised they will review the patient.

## 2024-01-30 NOTE — PLAN OF CARE
Problem: SLP Adult - Impaired Swallowing  Goal: By Discharge: Advance to least restrictive diet without signs or symptoms of aspiration for planned discharge setting.  See evaluation for individualized goals.  Note: SLP completed evaluation. Please refer to notes in EMR.    Tasia Mcdonnell M.A., [facility]-SLP #[id number]  Speech-Language Pathologist  [employer] Phone (inpatient): [phone]  Speech Desk (outpatient)- [phone]    Certified to provide:  FEES, MBS, Vital Stim, and [facility]ysphagia [facility] Program (MDTP)

## 2024-01-30 NOTE — PROGRESS NOTES
AM assessment complete, see flowsheet. Medications given per MAR. Pt drowsy but able to follow commands. Remains on SBT and tolerating well. VSS. No other needs noted, awaiting MD rounds.

## 2024-01-30 NOTE — PROGRESS NOTES
01/30/24 0728   Patient Observation   Pulse 79   Respirations 17   SpO2 97 %   Observations 8 ett 24@ the lip   Breath Sounds   Breath Sounds Bilateral Diminished   Right Upper Lobe Diminished   Right Middle Lobe Diminished   Right Lower Lobe Diminished   Left Upper Lobe Diminished   Left Lower Lobe Diminished   Vent Information   Vent Mode CPAP/PS   Ventilator Settings   FiO2  28 %   PEEP/CPAP (cmH2O) 5   Pressure Support (cm H2O) 5 cm H2O   Vent Patient Data (Readings)   Vt (Measured) 745 mL   Peak Inspiratory Pressure (cmH2O) 12 cmH2O   Rate Measured 12 br/min   Minute Volume (L/min) 9.75 Liters   Mean Airway Pressure (cmH2O) 7 cmH20   Plateau Pressure (cm H2O) 0 cm H2O   Driving Pressure -5   I:E Ratio 1:1.30   Vent Alarm Settings   High Pressure (cmH2O) 40 cmH2O   Low Minute Volume (lpm) 4 L/min   High Minute Volume (lpm) 20 L/min   Low Exhaled Vt (ml) 250 mL   High Exhaled Vt (ml) 55541 mL   RR High (bpm) 40 br/min   Apnea (secs) 20 secs   Additional Respiratoray Assessments   Humidification Source Heated wire   Humidification Temp 37   Circuit Condensation Drained   Ambu Bag With Mask At Bedside Yes   Non-Surgical Airway 01/25/24 Endotracheal tube   Placement Date/Time: 01/25/24 1619   Present on Admission/Arrival: No  Placed By: In ED  Placement Verified By: Auscultation;Chest X-ray  Mask Ventilation: Ventilated by mask (1)  Insertion attempts: 1  Location: Oral  Airway Device: Endotracheal tube...   Secured At 24 cm   Measured From Lips   Secured Location Center   Secured By Commercial tube mcmillan   Site Assessment Dry   Skin Assessment   Skin Assessment Clean, dry, & intact

## 2024-01-30 NOTE — PROGRESS NOTES
IM Progress Note    Admit Date:  1/25/2024  5    Interval history:  DKA with severe hyperglycemia above 2000 glucose   Encephalopathy , placed on vent   Uop picked up but creatinine and acidosis not corrected    Subjective:    Mr. Carson seen  on vent -on precedex only   No fevers   Weaned off levo   Ongoing weaning trials -more awake today  Moving all 4 extremities         Objective:   BP (!) 145/69   Pulse 79   Temp 99.7 °F (37.6 °C) (Bladder)   Resp 17   Ht 1.88 m (6' 2.02\")   Wt 111.6 kg (246 lb 0.5 oz)   SpO2 97%   BMI 31.58 kg/m²     Intake/Output Summary (Last 24 hours) at 1/30/2024 0806  Last data filed at 1/30/2024 0735  Gross per 24 hour   Intake 906.23 ml   Output 5900 ml   Net -4993.77 ml         Physical Exam:        General: middle aged male, on vent,   Oral ETT and OG noted Appears to be not in any distress  Mucous Membranes:  Pink , anicteric  Neck: No JVD, no carotid bruit, no thyromegaly  Chest:  Clear to auscultation bilaterally, mild left sided crackles  Cardiovascular:  RRR S1S2 heard, ASM   Abdomen:  Soft, undistended, non tender, no organomegaly, BS present  Extremities: developing edema to all ext   Distal pulses feeble both feet  Neurological : on the vent, on precedex, unable to follow commands,not awake       Medications:   Scheduled Medications:    insulin glargine  15 Units SubCUTAneous BID    insulin lispro  0-8 Units SubCUTAneous Q4H    cefepime  2,000 mg IntraVENous Q12H    famotidine (PEPCID) injection  20 mg IntraVENous Daily    enoxaparin  30 mg SubCUTAneous Daily     I   dextrose      dexmedeTOMIDine HCl in NaCl 0.4 mcg/kg/hr (01/30/24 0421)    sodium bicarbonate 50 mEq in dextrose 5 % 1,000 mL infusion 125 mL/hr at 01/29/24 2302    dextrose 5 % and 0.45 % NaCl       glucose, dextrose bolus **OR** dextrose bolus, glucagon (rDNA), dextrose, fentanNYL, medicated lip balm, artificial tears, midazolam, acetaminophen, acetaminophen, lactated ringers bolus, magnesium sulfate,  polyethylene glycol, dextrose 5 % and 0.45 % NaCl    Lab Data:  Recent Labs     01/28/24  0600 01/29/24  0620 01/30/24  0525   WBC 9.5 9.2 6.2   HGB 9.9* 10.4* 9.9*   HCT 29.1* 30.0* 29.1*   MCV 80.5 79.5* 80.5    132* 148       Recent Labs     01/29/24  0620 01/29/24  1115 01/30/24  0525    138 142   K 5.9* 5.8* 4.0    108 110   CO2 20* 20* 24   PHOS 2.9 3.0 2.5   BUN 42* 44* 43*   CREATININE 4.4* 4.2* 4.1*       No results for input(s): \"CKTOTAL\", \"CKMB\", \"CKMBINDEX\", \"TROPONINI\" in the last 72 hours.    Coagulation:   Lab Results   Component Value Date/Time    APTT 30.9 08/19/2014 04:45 AM     Cardiac markers:   Lab Results   Component Value Date/Time    TROPONINI <0.01 05/14/2022 09:50 PM         Lab Results   Component Value Date    ALT 43 (H) 01/29/2024    AST 46 (H) 01/29/2024    GGT 44 08/21/2014    ALKPHOS 148 (H) 01/29/2024    BILITOT 1.0 01/29/2024       No results found for: \"INR\", \"PROTIME\"    Radiology      XR CHEST PORTABLE   Final Result   1. No significant change.         CT HEAD WO CONTRAST   Final Result   No acute intracranial abnormality.         XR CHEST PORTABLE   Final Result   1. Stable lines and tubes.   2. Stable basilar opacities and small left effusion.         XR CHEST PORTABLE   Final Result   Increasing bibasilar atelectasis or airspace disease.         CT HEAD WO CONTRAST   Final Result   No acute intracranial process.         XR CHEST PORTABLE   Final Result   Lines and tubes as detailed above.  Stable appearance of the lungs.         XR CHEST PORTABLE   Final Result   1.  Endotracheal tube tip is approximately 8.5 cm above the vik.      2.  The enteric tube tip is in the stomach.  The side port is near the GE   junction.  Recommend advancement by 3 cm.         XR CHEST PORTABLE   Final Result   Right central venous line terminates in the SVC      Left basilar opacity could represent subsegmental atelectasis or infection         CT HEAD WO CONTRAST   Final

## 2024-01-30 NOTE — PROGRESS NOTES
Inpatient Occupational Therapy Evaluation and Treatment    Unit: ICU  Date:  1/30/2024  Patient Name:    Era Carson  Admitting diagnosis:  DKA, type 1, not at goal ([facility]) [E10.10]  Acute respiratory failure with hypoxia ([facility]) [J96.01]  Acute renal failure, unspecified acute renal failure type ([facility]) [N17.9]  Diabetic ketoacidosis with coma associated with diabetes mellitus due to underlying condition ([facility]) [E08.11]  Admit Date:  1/25/2024  Precautions/Restrictions/WB Status/ Lines/ Wounds/ Oxygen: Fall risk, Lines (IV, internal catheter, and IJ right), Impaired vision, Telemetry, Continuous pulse oximetry, and Telesitter    Pt seen for cotreatment this date due to patient safety, patient endurance, and limited functional status information    Treatment Time:  9272-0825  Treatment Number:  1  Timed Code Treatment Minutes: 40 minutes  Total Treatment Minutes:  50  minutes    Patient Goals for Therapy: \"none stated \"          Discharge Recommendations: Acute Rehab (ARU)/ Inpatient Rehab Facility (IRF)  DME needs for discharge: Defer to facility       Therapy recommendations for staff:   Assist of 2 for sitting EOB    History of Present Illness: [facility] ED notes:  \"48 y.o. male with a history of diabetes presents with altered mental status and hyperglycemia.  His sister said he has been ill for the past week.  He has had nausea and vomiting and has not been taking his insulin like he should.  She finally convinced him to go to his doctor yesterday and he had blood work done.  She states he was very confused last night but was refusing to come to the hospital.  The PCP called this morning and that his blood work showed a glucose of 1300 and they needed to come to the ER immediately.  She said this morning he was very altered.  He slept on the floor last night and was difficult to arouse this morning.\"  Found to have blood sugar reading above 2000. Diagnosed with severe DKA, hypotension, HERB.  Required intubation,

## 2024-01-30 NOTE — CARE COORDINATION
[facility] - Acute Rehab Unit   After review, this patient is felt to be:       []  Appropriate for Acute Inpatient Rehab    []  Appropriate for Acute Inpatient Rehab Pending Insurance Authorization    []  Not appropriate for Acute Inpatient Rehab    [x]  Referral received and ARU reviewing patient; Evaluation ongoing.      Patient remains in critical condition. Will continue to monitor for medical improvement and will await therapy evaluations. D/w BEE, Hannah.     Thank you.   Anjana Davies M.A, CCC-SLP  Clinical Liaison

## 2024-01-30 NOTE — PLAN OF CARE
Problem: Safety - Medical Restraint  Goal: Remains free of injury from restraints (Restraint for Interference with Medical Device)  Description: INTERVENTIONS:  1. Determine that other, less restrictive measures have been tried or would not be effective before applying the restraint  2. Evaluate the patient's condition at the time of restraint application  3. Inform patient/family regarding the reason for restraint  4. Q2H: Monitor safety, psychosocial status, comfort, nutrition and hydration  Outcome: Progressing  Flowsheets  Taken 1/29/2024 2000 by Rosalva Washington RN  Remains free of injury from restraints (restraint for interference with medical device): Every 2 hours: Monitor safety, psychosocial status, comfort, nutrition and hydration  Taken 1/29/2024 0800 by Nolan Mendiola RN  Remains free of injury from restraints (restraint for interference with medical device): Every 2 hours: Monitor safety, psychosocial status, comfort, nutrition and hydration     Problem: Discharge Planning  Goal: Discharge to home or other facility with appropriate resources  Outcome: Progressing     Problem: Confusion  Goal: Confusion, delirium, dementia, or psychosis is improved or at baseline  Description: INTERVENTIONS:  1. Assess for possible contributors to thought disturbance, including medications, impaired vision or hearing, underlying metabolic abnormalities, dehydration, psychiatric diagnoses, and notify attending LIP  2. [place] high risk fall precautions, as indicated  3. Provide frequent short contacts to provide reality reorientation, refocusing and direction  4. Decrease environmental stimuli, including noise as appropriate  5. Monitor and intervene to maintain adequate nutrition, hydration, elimination, sleep and activity  6. If unable to ensure safety without constant attention obtain sitter and review sitter guidelines with assigned personnel  7. Initiate Psychosocial CNS and Spiritual Care consult, as

## 2024-01-30 NOTE — PROGRESS NOTES
None    Objective  Does this pt have an acute or acute on chronic diagnosis of CHF? No    Upper Extremity ROM/Strength  Please see OT evaluation.      Lower Extremity ROM / Strength   AROM WFL: No  ROM limitations: WFL AAROM    Strength Assessment (measured on a 0-5 scale): limited by cognition  R LE   Quad   3-   Ant Tib  3-   Hamstring 3-   Iliopsoas 3-  L LE  Quad   3-   Ant Tib  3-   Hamstring 3-   Iliopsoas 3-    Lower Extremity Sensation    NT due to decreased cognition    Coordination  Impaired    Tone  Not Tested    Balance  Static Sitting:  Poor; Max A  x 1 posteriorly and intermittent Min A for safety in front of pt due to impulsivity in attempting to scoot and stand this date.   Dynamic Sitting:  Poor; Max A  x 1 posteriorly and intermittent Min A for safety in front of pt due to impulsivity in attempting to scoot and stand this date.   Comments: EOB    Static Standing: Not tested; Not Tested  Dynamic Standing: Not tested; Not Tested  Comments: Pt extubated this date, RN only wants pt to sit EOB this date.     Posture  Seated: Forward head and neck and Posterior lean  Standing: Not Tested    Bed Mobility   Supine to Sit:    Max A  x 2  Sit to Supine:   Max A  x 2  Rolling:   Not Tested   Scooting in sitting: Max A  x 2  Scooting in supine:  Total A x hercules sheet  Bridging:  Not Tested    Transfer Training     Sit to stand:   Not Tested   Stand to sit:   Not Tested   Bed to/from Chair:  Not Tested with use of N/A    Gait gait deferred due to not appropriate at this time, Per RN only sitting EOB this date; pt ambulated 0 ft.   Distance:       ft  Deviations (firm surface/linoleum):  N/A  Assistive Device Used:    N/A  Level of Assist:    Not Tested   Comment:     Stair Training deferred, pt unsafe/ not appropriate to complete stairs at this time  # of Steps:   N/A  Level of Assist:  Not Tested   UE Support:  NA  Assistive Device:  N/A  Pattern:   N/A  Comments:      Therapeutic Exercises Initiated   negotiate stairs to enter home/bedroom/bathroom, burden of care beyond caregiver ability, home environment not conducive to patient recovery, and limited safety awareness.    Goals :   To be met in 3 visits:  1). Independent with LE Ex x 10 reps  2). Sit to/from stand:  ability to assess   3). Bed to chair:  ability to assess       To be met in 6 visits:  1).  Supine to/from sit: Mod A  x 1  2).  Sit to/from stand: Mod A  x 1  3).  Bed to chair: Mod A  x 1  4).  Gait: Ambulate  10 ft.   with Mod A  and use of LRAD (least restrictive assistive device)  5).  Tolerate B LE exercises 3 sets of 10-15 reps      Rehabilitation Potential: Good  Strengths for achieving goals include:   Pt motivated, PLOF, Family Support, and Pt cooperative   Barriers to achieving goals include:    Complexity of condition, Pain, Weakness, and Impaired cognition    Plan    To be seen 3-5 x / week  while in acute care setting for therapeutic exercises, bed mobility, transfers, progressive gait training, balance training, and family/patient education.    Signature: Lynette Sellers PT     If patient discharges from this facility prior to next visit, this note will serve as the Discharge Summary.    CHF Education  N/A

## 2024-01-30 NOTE — PROGRESS NOTES
Pulmonary & Critical Care Medicine ICU Progress Note    CC: DKA    Events of Last 24 hours:   SBT, not fully awake yesterday but better today   Head CT    Vascular lines: IV: 24 RIJ    MV:  24  Vent Mode: AC/VC Resp Rate (Set): 16 bpm/Vt (Set, mL): 500 mL/ /FiO2 : 28 %  Recent Labs     24  1115   PHART 7.412   [id number] 29.0*   PO2ART 45.4*       IV:   dextrose      fentaNYL Stopped (24)    dexmedeTOMIDine HCl in NaCl 0.4 mcg/kg/hr (24)    sodium bicarbonate 50 mEq in dextrose 5 % 1,000 mL infusion 125 mL/hr at 24    propofol Stopped (24)    dextrose 5 % and 0.45 % NaCl         Vitals:  Blood pressure 135/70, pulse 83, temperature 99.7 °F (37.6 °C), temperature source Bladder, resp. rate 17, height 1.88 m (6' 2.02\"), weight 111.6 kg (246 lb 0.5 oz), SpO2 97 %.  on VENT  Temp  Av.9 °F (36.6 °C)  Min: 96.3 °F (35.7 °C)  Max: 99.7 °F (37.6 °C)    Intake/Output Summary (Last 24 hours) at 2024 06  Last data filed at 2024 0421  Gross per 24 hour   Intake 1503.2 ml   Output 6900 ml   Net -5396.8 ml     PE:  General:  ill appearing    Resp: No crackles. No wheezing.   CV: S1, S2. Trace edema  GI: NT, ND, +BS  Skin: Warm and dry.    Neuro: PERRL.Awake & FC     Scheduled Meds:   insulin glargine  15 Units SubCUTAneous BID    insulin lispro  0-8 Units SubCUTAneous Q4H    cefepime  2,000 mg IntraVENous Q12H    famotidine (PEPCID) injection  20 mg IntraVENous Daily    enoxaparin  30 mg SubCUTAneous Daily       Data:  CBC:   Recent Labs     24  0600 24  0620 24  0525   WBC 9.5 9.2 6.2   HGB 9.9* 10.4* 9.9*   HCT 29.1* 30.0* 29.1*   MCV 80.5 79.5* 80.5    132* 148     BMP:   Recent Labs     24  0620 24  1115 24  0525    138 142   K 5.9* 5.8* 4.0    108 110   CO2 20* 20* 24   PHOS 2.9 3.0 2.5   BUN 42* 44* 43*   CREATININE 4.4* 4.2* 4.1*     LIVER PROFILE:   Recent Labs     24  2134 24  0050    AST 55* 46*   ALT 48* 43*   BILITOT 1.2* 1.0   ALKPHOS 148* 148*     Microbiology:      1/25/2024 SARS-CoV-2 and influenza are negative  1/25/2024 blood NG    1/27/2024 MRSA DNA probe negative    Imaging:  Chest imaging was reviewed by me and showed:   CXR 1/30/2024 bibasilar airspace disease     ASSESSMENT:  Acute metabolic encephalopathy   DKA  Fever  Acute Respiratory Failure, failure to protect airway   Acute metabolic encephalopathy   Acute on chronic kidney disease, baseline creatinine 1.4-1.8  Hyponatremia    PLAN:  Mechanical ventilation as per my orders. The ventilator was adjusted by me at the bedside for unstable, life threatening respiratory failure  IV precedex for sedation, target RASS -2, with daily SAT  Fentanyl and Versed PRN, gtt as needed  Daily SBT per protocol   Inhaled bronchodilators  Continue dextrose containing IV fluids    Lantus 15 BID, increase to H-SSI   ABX D#6, now Cefepime; change to ancef or dicloxicillin once abx course complete   Nutrition: Tube feeding if not extubated    Prophylaxis: Lovenox    Total critical care time caring for this patient with life threatening, unstable organ failure, including direct patient contact, management of life support systems, review of data including imaging and labs, discussions with other team members and physicians is 32 minutes so far today, excluding procedures.

## 2024-01-30 NOTE — PROGRESS NOTES
Pt responding to yes no questions   during bath lifting arms and legs when asked  and lifting head when asked

## 2024-01-30 NOTE — PROGRESS NOTES
Care rounds completed with Dr. Oliver and multidisciplinary team. Reviewed labs, meds, VS, assessment, & plan of care for today. See dictated note and new orders for details.     -PT/OT/SPL  -increase SS insulin to high dose

## 2024-01-30 NOTE — PROGRESS NOTES
Pt oriented to self only, intermittently oriented to place and time. VSS, remains on RA. Precedex gtt infusing at 0.2 mcg. Sister at the bedside and updated on plan of care. No other needs noted. Will continue to monitor.

## 2024-01-30 NOTE — PROCEDURES
SPEECH/LANGUAGE PATHOLOGY  Swallowing Disorders and Dysphagia  VIDEOFLUOROSCOPIC STUDY OF SWALLOWING (MBS)  [place] [facility] [place]    Diet Recommendation: NPO ; Ice chips with SLP/RN only (oral care must be completed prior); Meds via alt means of nutrition  Risk Management: Control risk factors for aspiration PNA by completing oral care 3-4x/day and increasing physical mobility as is medically feasible  Specialist Referrals: ENT and Pulmonology    PATIENT NAME:  Era Carson      :  1975    Room: 3005/3005-   TODAY'S DATE:  2024    [x]The admitting diagnosis and active problem list, as listed below have been reviewed prior to initiation of this evaluation.     ADMITTING DIAGNOSIS: DKA, type 1, not at goal (HCC) [E10.10]  Acute respiratory failure with hypoxia (HCC) [J96.01]  Acute renal failure, unspecified acute renal failure type (HCC) [N17.9]  Diabetic ketoacidosis with coma associated with diabetes mellitus due to underlying condition (HCC) [E08.11]     ACTIVE PROBLEM LIST:   Patient Active Problem List   Diagnosis    Pilon fracture    Type 1 diabetes mellitus with stage 3 chronic kidney disease (HCC)    HTN (hypertension)    Leg wound, left    Pathological fracture of tibia, left    Staph aureus infection    Acute encephalopathy    Toxic metabolic encephalopathy    Acute respiratory failure with hypoxia (HCC)    Hyponatremia    Meningitis    Encephalopathy    Chronic pain syndrome    Peripheral neuropathy    Neuropathic pain    Osteomyelitis (HCC)    Pain of left lower extremity    Insomnia    Depression    Chronic migraine without aura, with intractable migraine, so stated, with status migrainosus    Myofascial pain    Proliferative diabetic retinopathy associated with type 1 diabetes mellitus (HCC)    Polyneuropathy due to type 1 diabetes mellitus (HCC)    Esophagitis    Abnormal finding on EKG    Acute on chronic renal insufficiency    Acute renal failure (HCC)    Hypoglycemia     Referring provider will be notified of results and recommendations. Please don't hesitate to contact me at [phone] with questions or concerns.      Therapy Time:    Individual   Time In  1255   Time Out  1323   Minutes  28 mins / 2 units      Signature:  Tasia Mcdonnell M.A., [facility]-SLP #[id number]  Speech-Language Pathologist  [facility] Phone (inpatient): [phone]  Speech Desk (outpatient)- [phone]    Certified to provide:  FEES, MBS, Vital Stim, and [facility]ysph[facility]ia [facility] Program (MDTP)

## 2024-01-31 ENCOUNTER — APPOINTMENT (OUTPATIENT)
Dept: GENERAL RADIOLOGY | Age: 49
DRG: 637 | End: 2024-01-31
Payer: MEDICARE

## 2024-01-31 LAB
ALBUMIN SERPL-MCNC: 2.1 G/DL (ref 3.4–5)
ANION GAP SERPL CALCULATED.3IONS-SCNC: 12 MMOL/L (ref 3–16)
BUN SERPL-MCNC: 33 MG/DL (ref 7–20)
CALCIUM SERPL-MCNC: 7.6 MG/DL (ref 8.3–10.6)
CHLORIDE SERPL-SCNC: 111 MMOL/L (ref 99–110)
CO2 SERPL-SCNC: 20 MMOL/L (ref 21–32)
CREAT SERPL-MCNC: 3.3 MG/DL (ref 0.9–1.3)
DEPRECATED RDW RBC AUTO: 15.8 % (ref 12.4–15.4)
EST. AVERAGE GLUCOSE BLD GHB EST-MCNC: 234.6 MG/DL
GFR SERPLBLD CREATININE-BSD FMLA CKD-EPI: 22 ML/MIN/{1.73_M2}
GLUCOSE BLD-MCNC: 104 MG/DL (ref 70–99)
GLUCOSE BLD-MCNC: 106 MG/DL (ref 70–99)
GLUCOSE BLD-MCNC: 110 MG/DL (ref 70–99)
GLUCOSE BLD-MCNC: 140 MG/DL (ref 70–99)
GLUCOSE BLD-MCNC: 173 MG/DL (ref 70–99)
GLUCOSE BLD-MCNC: 59 MG/DL (ref 70–99)
GLUCOSE BLD-MCNC: 67 MG/DL (ref 70–99)
GLUCOSE BLD-MCNC: 77 MG/DL (ref 70–99)
GLUCOSE BLD-MCNC: 85 MG/DL (ref 70–99)
GLUCOSE BLD-MCNC: 87 MG/DL (ref 70–99)
GLUCOSE BLD-MCNC: 90 MG/DL (ref 70–99)
GLUCOSE BLD-MCNC: 94 MG/DL (ref 70–99)
GLUCOSE SERPL-MCNC: 141 MG/DL (ref 70–99)
HBA1C MFR BLD: 9.8 %
HCT VFR BLD AUTO: 29.7 % (ref 40.5–52.5)
HGB BLD-MCNC: 9.8 G/DL (ref 13.5–17.5)
MAGNESIUM SERPL-MCNC: 1.8 MG/DL (ref 1.8–2.4)
MCH RBC QN AUTO: 26.9 PG (ref 26–34)
MCHC RBC AUTO-ENTMCNC: 33.1 G/DL (ref 31–36)
MCV RBC AUTO: 81.3 FL (ref 80–100)
PERFORMED ON: ABNORMAL
PERFORMED ON: NORMAL
PHOSPHATE SERPL-MCNC: 2.3 MG/DL (ref 2.5–4.9)
PLATELET # BLD AUTO: 246 K/UL (ref 135–450)
PMV BLD AUTO: 7.9 FL (ref 5–10.5)
POTASSIUM SERPL-SCNC: 3.5 MMOL/L (ref 3.5–5.1)
RBC # BLD AUTO: 3.65 M/UL (ref 4.2–5.9)
SODIUM SERPL-SCNC: 143 MMOL/L (ref 136–145)
WBC # BLD AUTO: 7.3 K/UL (ref 4–11)

## 2024-01-31 PROCEDURE — 6360000002 HC RX W HCPCS: Performed by: INTERNAL MEDICINE

## 2024-01-31 PROCEDURE — 36415 COLL VENOUS BLD VENIPUNCTURE: CPT

## 2024-01-31 PROCEDURE — 6370000000 HC RX 637 (ALT 250 FOR IP): Performed by: INTERNAL MEDICINE

## 2024-01-31 PROCEDURE — 2500000003 HC RX 250 WO HCPCS: Performed by: INTERNAL MEDICINE

## 2024-01-31 PROCEDURE — 2580000003 HC RX 258: Performed by: INTERNAL MEDICINE

## 2024-01-31 PROCEDURE — 85027 COMPLETE CBC AUTOMATED: CPT

## 2024-01-31 PROCEDURE — 73070 X-RAY EXAM OF ELBOW: CPT

## 2024-01-31 PROCEDURE — 92526 ORAL FUNCTION THERAPY: CPT

## 2024-01-31 PROCEDURE — 6360000002 HC RX W HCPCS

## 2024-01-31 PROCEDURE — 92612 ENDOSCOPY SWALLOW (FEES) VID: CPT

## 2024-01-31 PROCEDURE — A4216 STERILE WATER/SALINE, 10 ML: HCPCS | Performed by: INTERNAL MEDICINE

## 2024-01-31 PROCEDURE — 80069 RENAL FUNCTION PANEL: CPT

## 2024-01-31 PROCEDURE — 99233 SBSQ HOSP IP/OBS HIGH 50: CPT | Performed by: INTERNAL MEDICINE

## 2024-01-31 PROCEDURE — 99232 SBSQ HOSP IP/OBS MODERATE 35: CPT | Performed by: INTERNAL MEDICINE

## 2024-01-31 PROCEDURE — 83735 ASSAY OF MAGNESIUM: CPT

## 2024-01-31 PROCEDURE — 1200000000 HC SEMI PRIVATE

## 2024-01-31 RX ORDER — GABAPENTIN 400 MG/1
400 CAPSULE ORAL 2 TIMES DAILY
Status: DISCONTINUED | OUTPATIENT
Start: 2024-01-31 | End: 2024-02-05

## 2024-01-31 RX ORDER — ONDANSETRON 2 MG/ML
4 INJECTION INTRAMUSCULAR; INTRAVENOUS EVERY 8 HOURS PRN
Status: DISCONTINUED | OUTPATIENT
Start: 2024-01-31 | End: 2024-02-06 | Stop reason: HOSPADM

## 2024-01-31 RX ORDER — CASTOR OIL AND BALSAM, PERU 788; 87 MG/G; MG/G
OINTMENT TOPICAL 2 TIMES DAILY
Status: DISCONTINUED | OUTPATIENT
Start: 2024-01-31 | End: 2024-02-06 | Stop reason: HOSPADM

## 2024-01-31 RX ORDER — MAGNESIUM SULFATE 1 G/100ML
1000 INJECTION INTRAVENOUS ONCE
Status: COMPLETED | OUTPATIENT
Start: 2024-01-31 | End: 2024-01-31

## 2024-01-31 RX ORDER — DULOXETIN HYDROCHLORIDE 60 MG/1
60 CAPSULE, DELAYED RELEASE ORAL DAILY
Status: DISCONTINUED | OUTPATIENT
Start: 2024-01-31 | End: 2024-02-01

## 2024-01-31 RX ADMIN — CEFEPIME 2000 MG: 2 INJECTION, POWDER, FOR SOLUTION INTRAVENOUS at 11:05

## 2024-01-31 RX ADMIN — Medication: at 20:43

## 2024-01-31 RX ADMIN — MAGNESIUM SULFATE HEPTAHYDRATE 1000 MG: 1 INJECTION, SOLUTION INTRAVENOUS at 09:54

## 2024-01-31 RX ADMIN — ONDANSETRON 4 MG: 2 INJECTION INTRAMUSCULAR; INTRAVENOUS at 12:07

## 2024-01-31 RX ADMIN — ACETAMINOPHEN 650 MG: 650 SOLUTION ORAL at 06:26

## 2024-01-31 RX ADMIN — FAMOTIDINE 20 MG: 10 INJECTION, SOLUTION INTRAVENOUS at 08:02

## 2024-01-31 RX ADMIN — CEFEPIME 2000 MG: 2 INJECTION, POWDER, FOR SOLUTION INTRAVENOUS at 23:18

## 2024-01-31 RX ADMIN — ENOXAPARIN SODIUM 30 MG: 100 INJECTION SUBCUTANEOUS at 08:02

## 2024-01-31 RX ADMIN — INSULIN GLARGINE 15 UNITS: 100 INJECTION, SOLUTION SUBCUTANEOUS at 08:02

## 2024-01-31 RX ADMIN — SODIUM BICARBONATE: 84 INJECTION, SOLUTION INTRAVENOUS at 01:23

## 2024-01-31 RX ADMIN — ACETAMINOPHEN 650 MG: 650 SOLUTION ORAL at 01:35

## 2024-01-31 NOTE — PLAN OF CARE
Problem: Safety - Medical Restraint  Goal: Remains free of injury from restraints (Restraint for Interference with Medical Device)  Description: INTERVENTIONS:  1. Determine that other, less restrictive measures have been tried or would not be effective before applying the restraint  2. Evaluate the patient's condition at the time of restraint application  3. Inform patient/family regarding the reason for restraint  4. Q2H: Monitor safety, psychosocial status, comfort, nutrition and hydration  Outcome: Progressing  Flowsheets (Taken 1/30/2024 0813 by Nolan Mendiola RN)  Remains free of injury from restraints (restraint for interference with medical device): Every 2 hours: Monitor safety, psychosocial status, comfort, nutrition and hydration     Problem: Discharge Planning  Goal: Discharge to home or other facility with appropriate resources  Outcome: Progressing     Problem: Confusion  Goal: Confusion, delirium, dementia, or psychosis is improved or at baseline  Description: INTERVENTIONS:  1. Assess for possible contributors to thought disturbance, including medications, impaired vision or hearing, underlying metabolic abnormalities, dehydration, psychiatric diagnoses, and notify attending LIP  2. [place] high risk fall precautions, as indicated  3. Provide frequent short contacts to provide reality reorientation, refocusing and direction  4. Decrease environmental stimuli, including noise as appropriate  5. Monitor and intervene to maintain adequate nutrition, hydration, elimination, sleep and activity  6. If unable to ensure safety without constant attention obtain sitter and review sitter guidelines with assigned personnel  7. Initiate Psychosocial CNS and Spiritual Care consult, as indicated  Outcome: Progressing     Problem: Skin/Tissue Integrity  Goal: Absence of new skin breakdown  Description: 1.  Monitor for areas of redness and/or skin breakdown  2.  Assess vascular access sites hourly  3.  Every  4-6 hours minimum:  Change oxygen saturation probe site  4.  Every 4-6 hours:  If on nasal continuous positive airway pressure, respiratory therapy assess nares and determine need for appliance change or resting period.  Outcome: Progressing     Problem: Safety - Adult  Goal: Free from fall injury  Outcome: Progressing     Problem: Safety - Adult  Goal: Free from fall injury  Outcome: Progressing     Problem: ABCDS Injury Assessment  Goal: Absence of physical injury  Outcome: Progressing     Problem: Pain  Goal: Verbalizes/displays adequate comfort level or baseline comfort level  Outcome: Progressing

## 2024-01-31 NOTE — CARE COORDINATION
Pt moved from ICU. Spoke with Marika at [facility] who states they are waiting on a feeding plan. Updated her that he now has a diet. She will review with MD tomorrow and call CM. Will continue to monitor.

## 2024-01-31 NOTE — PROGRESS NOTES
Progress Note    HISTORY     CC:   AMS              We are following for Acute kidney injury      Subjective/     HPI:    Renal function improving, remains on IVF's, non-oliguric.   BP's better, now off pressors.       ROS:  Intake reduced, +weak.    EXAM       Objective/     Vitals:    01/31/24 0900 01/31/24 1000 01/31/24 1100 01/31/24 1145   BP: (!) 152/65 (!) 157/63 (!) 155/81    Pulse: (!) 101 (!) 102 (!) 108 (!) 108   Resp: 20 22 17 (!) 35   Temp:   (!) 100.8 °F (38.2 °C)    TempSrc:   Bladder    SpO2: 96% 96% 95% 92%   Weight:       Height:         24HR INTAKE/OUTPUT:    Intake/Output Summary (Last 24 hours) at 1/31/2024 1159  Last data filed at 1/31/2024 0808  Gross per 24 hour   Intake 556.31 ml   Output 5300 ml   Net -4743.69 ml       Constitutional:  critically ill, on vent   Eyes:  Pupils reactive, sclera clear   Neck:  Normal thyroid, no masses   Cardiovascular:  Regular, no rub  Respiratory:  On vent, no wheezing  Psychiatry:  sedated on vent, KERRY   Abdomen: +bs, soft, nt, no masses   Musculoskeletal: + LE edema, no clubbing   Lymphatics:  No LAD in neck, no supraclavicular nodes   :  powers placed       MEDICAL DECISION MAKING       Data/  Recent Labs     01/29/24  0620 01/30/24  0525 01/31/24  0653   WBC 9.2 6.2 7.3   HGB 10.4* 9.9* 9.8*   HCT 30.0* 29.1* 29.7*   MCV 79.5* 80.5 81.3   * 148 246       Recent Labs     01/29/24  0620 01/29/24  1115 01/30/24  0525 01/31/24  0653    138 142 143   K 5.9* 5.8* 4.0 3.5    108 110 111*   CO2 20* 20* 24 20*   GLUCOSE 267* 385* 263* 141*   PHOS 2.9 3.0 2.5 2.3*   MG 1.90  --  1.80 1.80   BUN 42* 44* 43* 33*   CREATININE 4.4* 4.2* 4.1* 3.3*   LABGLOM 16* 17* 17* 22*         Assessment/     Acute Kidney Injury:  KDIGO stage 3  Etiology:  Pre-renal due to DKA with profound volume depletion and hypotension -> seems to have progressed to ATN   Non-oliguric / getting volume resuscitation.  Plateau phase and urine volume has improved.

## 2024-01-31 NOTE — PROGRESS NOTES
IM Progress Note    Admit Date:  1/25/2024  6    Interval history:  DKA with severe hyperglycemia above 2000 glucose   Encephalopathy , placed on vent   Uop picked up but creatinine and acidosis not corrected    Subjective:    Mr. Carson was extubated yesterday  Now on RA     Intermittently confused   Fevers +        Objective:   BP (!) 143/52   Pulse 99   Temp (!) 100.9 °F (38.3 °C) (Bladder)   Resp 18   Ht 1.88 m (6' 2.02\")   Wt 109.6 kg (241 lb 10 oz)   SpO2 98%   BMI 31.01 kg/m²     Intake/Output Summary (Last 24 hours) at 1/31/2024 0752  Last data filed at 1/31/2024 0600  Gross per 24 hour   Intake 556.31 ml   Output 4900 ml   Net -4343.69 ml         Physical Exam:        General: awake  Mucous Membranes:  Pink , anicteric  Neck: No JVD, no carotid bruit, no thyromegaly  Chest:  Clear to auscultation bilaterally, mild left sided crackles  Cardiovascular:  RRR S1S2 heard, ASM   Abdomen:  Soft, undistended, non tender, no organomegaly, BS present  Extremities: developing edema to all ext   Distal pulses feeble both feet  Neurological : awake, disoriented. Speech -hesitant   ? Right UE weakness     Medications:   Scheduled Medications:    insulin lispro  0-16 Units SubCUTAneous Q4H    insulin glargine  15 Units SubCUTAneous BID    cefepime  2,000 mg IntraVENous Q12H    famotidine (PEPCID) injection  20 mg IntraVENous Daily    enoxaparin  30 mg SubCUTAneous Daily     I   dextrose      dexmedeTOMIDine HCl in NaCl Stopped (01/31/24 0431)    sodium bicarbonate 50 mEq in dextrose 5 % 1,000 mL infusion 50 mL/hr at 01/31/24 0123    dextrose 5 % and 0.45 % NaCl       glucose, dextrose bolus **OR** dextrose bolus, glucagon (rDNA), dextrose, medicated lip balm, acetaminophen, acetaminophen, magnesium sulfate, polyethylene glycol, dextrose 5 % and 0.45 % NaCl    Lab Data:  Recent Labs     01/29/24  0620 01/30/24  0525 01/31/24  0653   WBC 9.2 6.2 7.3   HGB 10.4* 9.9* 9.8*   HCT 30.0* 29.1* 29.7*   MCV 79.5* 80.5 81.3

## 2024-01-31 NOTE — PROGRESS NOTES
Pulmonary & Critical Care Medicine ICU Progress Note    CC: DKA    Events of Last 24 hours:   Visual hallucinations on Precedex  Fever 101  Right elbow pain    Vascular lines: IV: 24 RIJ    MV:    Vent Mode: CPAP/PS Resp Rate (Set): 16 bpm/Vt (Set, mL): 500 mL/ /FiO2 : 28 %  Recent Labs     24  1115   PHART 7.412   FHD2CSA 29.0*   PO2ART 45.4*       IV:   dextrose      dexmedeTOMIDine HCl in NaCl Stopped (24 0431)    sodium bicarbonate 50 mEq in dextrose 5 % 1,000 mL infusion 50 mL/hr at 24 0123    dextrose 5 % and 0.45 % NaCl         Vitals:  Blood pressure (!) 146/60, pulse 99, temperature (!) 101 °F (38.3 °C), temperature source Bladder, resp. rate 20, height 1.88 m (6' 2.02\"), weight 109.6 kg (241 lb 10 oz), SpO2 97 %.  on room air  Temp  Av.2 °F (37.9 °C)  Min: 99.4 °F (37.4 °C)  Max: 101 °F (38.3 °C)    Intake/Output Summary (Last 24 hours) at 2024 0653  Last data filed at 2024 0600  Gross per 24 hour   Intake 556.31 ml   Output 5350 ml   Net -4793.69 ml     PE:  General:  ill appearing    Resp: No crackles. No wheezing.   CV: S1, S2. Trace edema  GI: NT, ND, +BS  Skin: Warm and dry.    Neuro: PERRL.Awake & FC   MSK: R elbow warm, red and edematous     Scheduled Meds:   insulin lispro  0-16 Units SubCUTAneous Q4H    insulin glargine  15 Units SubCUTAneous BID    cefepime  2,000 mg IntraVENous Q12H    famotidine (PEPCID) injection  20 mg IntraVENous Daily    enoxaparin  30 mg SubCUTAneous Daily       Data:  CBC:   Recent Labs     24  0620 24  0525   WBC 9.2 6.2   HGB 10.4* 9.9*   HCT 30.0* 29.1*   MCV 79.5* 80.5   * 148     BMP:   Recent Labs     24  0620 24  1115 24  0525    138 142   K 5.9* 5.8* 4.0    108 110   CO2 20* 20* 24   PHOS 2.9 3.0 2.5   BUN 42* 44* 43*   CREATININE 4.4* 4.2* 4.1*     LIVER PROFILE:   Recent Labs     24  2134 24  0050   AST 55* 46*   ALT 48* 43*   BILITOT 1.2* 1.0

## 2024-01-31 NOTE — PROCEDURES
Speech Language Pathology  [facility]  Swallowing Disorders and Dysphagia  Fiberoptic Endoscopic Evaluation of Swallowing  (FEES)  RECOMMENDATIONS:  Diet Recommendation: IDDSI 4 Puree Solids ; IDDSI 2 Mildly Thick Liquids; Meds crushed in puree as able  Risk Management: upright for all intake, stay upright for at least 30 mins after intake, small bites/sips, distant supervision with intake, oral care q4 hrs to reduce adverse affects in the event of aspiration, slow rate of intake, general GERD precautions, STRICT aspiration precautions, and hold PO and contact SLP if s/s of aspiration or worsening respiratory status develop.      NAME:Era Carson  : 1975 (48 y.o.)   MRN: [id number]  ROOM: [id number]-  ADMISSION DATE: 2024  PATIENT DIAGNOSIS(ES): DKA, type 1, not at goal ([facility]) [E10.10]  Acute respiratory failure with hypoxia ([facility]) [J96.01]  Acute renal failure, unspecified acute renal failure type ([facility]) [N17.9]  Diabetic ketoacidosis with coma associated with diabetes mellitus due to underlying condition ([facility]) [E08.11]  Chief Complaint   Patient presents with    Hyperglycemia     Pt arrives via EMS for AMS. Pt. Alert to verbal stimuli, oriented to self. Pt known diabetic, family reported to EMS BG 1300 yesterday.      Patient Active Problem List    Diagnosis Date Noted    DKA, type 1, not at goal (HCC) 2024    Diabetic ketoacidosis with coma associated with diabetes mellitus due to underlying condition ([facility]) 2024    Metabolic encephalopathy 2024    Hypotension 2024    Recurrent major depressive disorder, in partial remission ([facility]) 2024    Hypoglycemia 2022    Abnormal finding on EKG     Acute on chronic renal insufficiency     Acute renal failure ([facility])     Esophagitis 2021    Proliferative diabetic retinopathy associated with type 1 diabetes mellitus ([facility]) 2018    Polyneuropathy due to type 1 diabetes mellitus ([facility]) 2018     Swallow  Results: Premature spillage to pharynx, shallow penetration during the swallow, residue in the valleculae, b/I lateral channels, and pyriforms post-swallow. Trace residue at interarytenoid space. No aspiration    Trial: IDDSI 0 Thin Cup  Posture: Head Neutral  Strategy: N/A-Normal Swallow  Results: Premature spillage to pharynx, deep penetration with subsequent trace subglottic microaspiration during the swallow with immediate cough, residue in the valleculae, b/I lateral channels, and pyriforms post-swallow. Trace residue at interarytenoid space. No aspiration    Trial: IDDSI 2 Mildly Thick Cup  Posture: Head Neutral  Strategy: N/A-Normal Swallow  Results: Premature spillage to the valleculae, no penetration or aspiration    Trial: IDDSI 4 Puree/Pudding  Posture: Head Neutral  Strategy: N/A-Normal Swallow  Results: premature spillage to the pharynx, no penetration or aspiration, residue in b/l pyriforms and along PPW post-swallow.    Trial: IDDSI 0 Thin Cup- Cued small sip  Posture: Head Neutral  Strategy: N/A-Normal Swallow  Results: Premature spillage to the pharynx, delayed epiglottic retraction, deep penetration during the swallow that does not clear, no aspiration            Backflow into pharynx noted: [] Yes  [x] No     Comments:   No bolus colored backflow noted.         Oral Phase:  Poor oral bolus control   Reduced oral sensation / awareness  Premature spillage to pharynx    Pharyngeal Phase:  Reduced lingual base retraction   Impaired / Inadequate anterior hyoid movement  Impaired / inadequate epiglottic inversion  Impaired laryngeal vestibule closure  Impaired laryngeal elevation  Impaired hyolaryngeal excursion  Impaired duration and extent of UES opening  Impaired pharyngeal constriction    Summary:  Oropharyngeal phase of swallowing characterized by the following:  Reduced oral bolus control / oral sensation and awareness resulting in premature spillage to the pharynx  Reduced lingual base

## 2024-01-31 NOTE — PROGRESS NOTES
4 Eyes Skin Assessment     NAME:  Era Carson  YOB: 1975  MEDICAL RECORD NUMBER:  [id number]    The patient is being assessed for  Other Shift assessment    I agree that at least one RN has performed a thorough Head to Toe Skin Assessment on the patient. ALL assessment sites listed below have been assessed.      Areas assessed by both nurses:    Head, Face, Ears, Shoulders, Back, Chest, Arms, Elbows, Hands, Sacrum. Buttock, Coccyx, Ischium, Legs. Feet and Heels, and Under Medical Devices               Does the Patient have a Wound? Yes wound(s) were present on assessment. LDA wound assessment was Initiated and completed by RN       Harpreet Prevention initiated by RN: Yes  Wound Care Orders initiated by RN: Yes    Pressure Injury (Stage 3,4, Unstageable, DTI, NWPT, and Complex wounds) if present, place Wound referral order by RN under : No    New Ostomies, if present place, Ostomy referral order under : No     Nurse 1 eSignature: Electronically signed by Nolan Mendiola RN on 1/31/24 at 12:49 PM EST    **SHARE this note so that the co-signing nurse can place an eSignature**    Nurse 2 eSignature: Electronically signed by Ginny Lu RN on 1/31/24 at 12:53 PM EST

## 2024-01-31 NOTE — PROGRESS NOTES
Per jose fox v to D/C MRI. Patient has good  strength and is able to raise right arm up above head. Arm is swollen though due to the Tegaderm causing MARSI, and likely a cellulitis    Also jose to d/c [place] and right neck IJ/CVC. Orders to be placed for PT/OT

## 2024-01-31 NOTE — PROGRESS NOTES
Speech-Language Pathology  Swallowing Disorders and Dysphagia     Fiberoptic Endoscopic Evaluation of Swallowing (FEES) Brief       Name: Era Carson  : 1975  Medical Diagnosis: DKA, type 1, not at goal (HCC) [E10.10]  Acute respiratory failure with hypoxia (HCC) [J96.01]  Acute renal failure, unspecified acute renal failure type (HCC) [N17.9]  Diabetic ketoacidosis with coma associated with diabetes mellitus due to underlying condition (HCC) [E08.11]      FEES completed at this time. Preliminary rec's include advance to puree solids and level 2 mildly thick liquids, pills crushed/whole in puree as tolerated . Formal report to follow pending detailed review of study. Call with questions or concerns. Thank you,      Tasia Mcdonnell M.A., CCC-SLP #[id number]  Speech-Language Pathologist  Phone: [phone], [phone]

## 2024-01-31 NOTE — PROGRESS NOTES
PICC and IJ removed per order. Patient tolerated well. Pressure held to IJ spot for 7 minutes, no bleeding. Sanon removed, patient educated on S&S and urinary retention. Urinal provided

## 2024-01-31 NOTE — PROGRESS NOTES
Care rounds completed with Dr. Oliver and multidisciplinary team. Reviewed labs, meds, VS, assessment, & plan of care for today. See dictated note and new orders for details.     -1g Mag  -resume home meds   -xray of elbow

## 2024-01-31 NOTE — PROGRESS NOTES
Speech Language Pathology  Swallowing Disorders and Dysphagia    Dysphagia Treatment/Follow-Up Note  [place] [facility] [place]    Recommendations: SLP recommends to continue with NPO; Ice chips with SLP/RN only (oral care must be completed prior); Meds via alt means of nutrition pending FEES  Risk Management: Control risk factors for aspiration PNA by completing oral care 3-4x/day and increasing physical mobility as is medically feasible.     NAME:Era Carson  : 1975 (48 y.o.)   MRN: [id number]  ROOM: [address] [place] [address]  ADMISSION DATE: 2024  PATIENT DIAGNOSIS(ES): DKA, type 1, not at goal (HCC) [E10.10]  Acute respiratory failure with hypoxia (HCC) [J96.01]  Acute renal failure, unspecified acute renal failure type (HCC) [N17.9]  Diabetic ketoacidosis with coma associated with diabetes mellitus due to underlying condition (HCC) [E08.11]  Allergies   Allergen Reactions    Tegaderm Ag Mesh 2\"X2\" [Wound Dressings]      \"Holes in skin from Tegaderm Adhesive\"    Codeine Other (See Comments)     hallucinates    Tape [Adhesive Tape]      Blister      Other Other (See Comments)     Holes in skin  From Tegaderm Adhesive  Blister       DATE ONSET: 2024    Pain: The patient does not complain of pain       Current Diet: Diet NPO    Diet Tolerance:  Pt currently NPO.      Dysphagia Treatment and Impressions:  Subjective: Pt seen in room at bedside with RN (Baljeet) permission  RN Report/Chart Review: Pt was given water by nightshift RN. Pt is a silent aspirator.   Patient tolerance to current diet and treatment:NPO. Physical tolerance and stamina improved  Noteworthy events since last seen: Was given water by nightshift RN despite strict NPO orders from [facility] [place] yesterday with silent aspiration.    Respiratory Status: Pt with SPO2% of 97 on RA  with RR of 22  Oral Care Status: Oral care completed with oral suction swab and chlorhexidine rinse    Liquid PO Trials:   Ice Chips: No anterior bolus loss,  and increasing physical mobility as is medically feasible.     Dysphagia Goals:  Short Term Goals:  Timeframe for Short Term Goals: (5 days 02/04/2024)  Goal 1: The patient will tolerate recommended diet with no clinical s/s of aspiration 5/5 1/31/2024 : Goal addressed, see above. Ongoing, progressing.    Goal 2: The patient will tolerate therapeutic diet upgrade trials with no clinical s/s of aspiration 5/5 1/31/2024 : Goal addressed, see above. Ongoing, progressing.    Goal 3: The patient/caregiver will demonstrate understanding of compensatory swallow strategies, for improved swallow safety  1/31/2024 : Goal addressed, see above. Ongoing, progressing.    Goal 4: The patient will tolerate instrumental assessment when able   1/31/2024 : Goal met. Repeat instrumental today          Long Term Goals:   Timeframe for Long Term Goals: (7 days 02/06/2024)  Goal 1: The patient will tolerate least restrictive diet with no clinical s/s of aspiration or worsening respiratory/pulmonary status  1/31/2024 : Ongoing, progressing.           Speech/Language/Cog Goals: N/A         Recommendations:  Solid Consistency: NPO  Liquid Consistency: Ice chips with SLP/RN only (oral care must be completed prior)  Medication: Meds via alt means of nutrition    Plan:    Continued Dysphagia treatment with goals per plan of care.    Discharge Recommendations: SLP at discharge is pending clinical progression    If pt discharges from hospital prior to Speech/Swallowing discharge, this note serves as tx and discharge summary.     Total Treatment Time / Charges     Time in Time out Total Time / units   Cognitive Tx         Speech Tx      Dysphagia Tx 0948 0989 20 mins / 1 unit     Signature:  Tasia Mcdonnell M.A., [facility]-SLP #[id number]  Speech-Language Pathologist  [facility]ov[facility] Phone (inpatient): [phone]  Speech Desk (outpatient)- [phone]    Certified to provide:  FEES, MBS, Vital Stim, and [facility] Dysphagia Therapy Program (MDTP)

## 2024-01-31 NOTE — PROGRESS NOTES
AM assessment complete, see flowsheet. Medications given per MAR. Pt alert to self and place only, intermittent confusion with hallucinations. Pt able to follow simple commands. Large BM, 4eyeskin assessment completed at this time.     VSS, remains on RA. Precedex gtt turned off early this morning. BG running in mid 140's, remains on bicarb/D5 gtt.     No other needs noted, awaiting MD rounds.

## 2024-01-31 NOTE — PROGRESS NOTES
4 Eyes Skin Assessment     NAME:  Era Carson  YOB: 1975  MEDICAL RECORD NUMBER:  [id number]    The patient is being assessed for  Shift Handoff    I agree that at least one RN has performed a thorough Head to Toe Skin Assessment on the patient. ALL assessment sites listed below have been assessed.      Areas assessed by both nurses:    Head, Face, Ears, Shoulders, Back, Chest, Arms, Elbows, Hands, Sacrum. Buttock, Coccyx, Ischium, Legs. Feet and Heels, and Under Medical Devices               Does the Patient have a Wound? Yes wound(s) were present on assessment. LDA wound assessment was Initiated and completed by RN       Harpreet Prevention initiated by RN: Yes  Wound Care Orders initiated by RN: Yes    Pressure Injury (Stage 3,4, Unstageable, DTI, NWPT, and Complex wounds) if present, place Wound referral order by RN under : No    New Ostomies, if present place, Ostomy referral order under : No     Nurse 1 eSignature: Electronically signed by Rosalva Washington RN on 1/30/24 at 9:22 PM EST    **SHARE this note so that the co-signing nurse can place an eSignature**    Nurse 2 eSignature: Electronically signed by Ilana Daniels RN on 1/30/24 at 9:58 PM EST

## 2024-01-31 NOTE — PROGRESS NOTES
4 Eyes Skin Assessment     NAME:  Era Carson  YOB: 1975  MEDICAL RECORD NUMBER:  [id number]    The patient is being assessed for  Transfer to New Unit    I agree that at least one RN has performed a thorough Head to Toe Skin Assessment on the patient. ALL assessment sites listed below have been assessed.      Areas assessed by both nurses:    Head, Face, Ears, Shoulders, Back, Chest, Arms, Elbows, Hands, Sacrum. Buttock, Coccyx, Ischium, Legs. Feet and Heels, and Under Medical Devices     DTI to bilateral buttocks. DTI to left ear.  Wound to right dorsolateral ankle, right 5th metatarsal head, ditstal tip of right great toe. Linear laceration to left buttock                                    Does the Patient have a Wound? Yes wound(s) were present on assessment. LDA wound assessment was Initiated and completed by RN       Harpreet Prevention initiated by RN: Yes  Wound Care Orders initiated by RN: Yes    Pressure Injury (Stage 3,4, Unstageable, DTI, NWPT, and Complex wounds) if present, place Wound referral order by RN under : Yes    New Ostomies, if present place, Ostomy referral order under : No     Nurse 1 eSignature: Electronically signed by Duane Vazquez RN on 1/31/24 at 6:50 PM EST    **SHARE this note so that the co-signing nurse can place an eSignature**    Nurse 2 eSignature: {Esignature:[id number]}

## 2024-01-31 NOTE — CONSULTS
device related or linen fold? Attends? Unstageable pressure injury.  No soi    Sacrum:  area measures 9x6.5cm, dark nonblanchable purple discoloration, deep tissue injury.  No soi.  May evolve to stage 3 or 4 pressure injury even with optimal treatment        Response to treatment:  Well tolerated by patient.     Pain Assessment:  Severity:  0 / 10  Quality of pain: N/A  Wound Pain Timing/Severity: none  Premedicated: N/A    Plan  Left buttock:  Apply Triad paste to wound bed twice daily and prn.    Sacrum and left ear:  Venelex ointment 2-3 times daily and prn.  Encourage side lying position and change sides every 2 hours and prn.  No attends          Specialty Bed Required : Yes currently on ICU surface, extubated, moving around in bed  [] Low Air Loss   [] Pressure Redistribution  [] Fluid Immersion  [] Bariatric  [] Total Pressure Relief  [] Other:     Current Diet: ADULT DIET; Dysphagia - Pureed; 4 carb choices (60 gm/meal); Mildly Thick (Nectar)  Dietician consult:  Yes    Discharge Plan:  Placement for patient upon discharge: skilled nursing    Patient appropriate for Outpatient Wound Care Center: Yes    Referrals:  [x]   [] Home Health Care  [] Supplies  [] Other    Patient/Caregiver Teaching:  Level of patient/caregiver understanding able to:   [x] Indicates understanding       [x] Needs reinforcement  [] Unsuccessful      [] Verbal Understanding  [] Demonstrated understanding       [] No evidence of learning  [] Refused teaching         [] N/A       Electronically signed by Radha Gonzalez RN, CWOCN on 1/31/2024 at 4:52 PM

## 2024-02-01 ENCOUNTER — APPOINTMENT (OUTPATIENT)
Dept: VASCULAR LAB | Age: 49
DRG: 637 | End: 2024-02-01
Payer: MEDICARE

## 2024-02-01 PROBLEM — E11.10 TYPE 2 DIABETES MELLITUS WITH KETOACIDOSIS WITHOUT COMA (HCC): Status: ACTIVE | Noted: 2024-02-01

## 2024-02-01 LAB
ALBUMIN SERPL-MCNC: 2.6 G/DL (ref 3.4–5)
ANION GAP SERPL CALCULATED.3IONS-SCNC: 14 MMOL/L (ref 3–16)
BUN SERPL-MCNC: 27 MG/DL (ref 7–20)
CALCIUM SERPL-MCNC: 8.1 MG/DL (ref 8.3–10.6)
CHLORIDE SERPL-SCNC: 114 MMOL/L (ref 99–110)
CO2 SERPL-SCNC: 22 MMOL/L (ref 21–32)
CREAT SERPL-MCNC: 3 MG/DL (ref 0.9–1.3)
DEPRECATED RDW RBC AUTO: 16.3 % (ref 12.4–15.4)
GFR SERPLBLD CREATININE-BSD FMLA CKD-EPI: 25 ML/MIN/{1.73_M2}
GLUCOSE BLD-MCNC: 121 MG/DL (ref 70–99)
GLUCOSE BLD-MCNC: 186 MG/DL (ref 70–99)
GLUCOSE BLD-MCNC: 222 MG/DL (ref 70–99)
GLUCOSE BLD-MCNC: 24 MG/DL (ref 70–99)
GLUCOSE BLD-MCNC: 270 MG/DL (ref 70–99)
GLUCOSE BLD-MCNC: 313 MG/DL (ref 70–99)
GLUCOSE BLD-MCNC: 36 MG/DL (ref 70–99)
GLUCOSE BLD-MCNC: 452 MG/DL (ref 70–99)
GLUCOSE BLD-MCNC: 58 MG/DL (ref 70–99)
GLUCOSE SERPL-MCNC: 129 MG/DL (ref 70–99)
HCT VFR BLD AUTO: 30.3 % (ref 40.5–52.5)
HGB BLD-MCNC: 9.8 G/DL (ref 13.5–17.5)
MAGNESIUM SERPL-MCNC: 1.9 MG/DL (ref 1.8–2.4)
MCH RBC QN AUTO: 27 PG (ref 26–34)
MCHC RBC AUTO-ENTMCNC: 32.5 G/DL (ref 31–36)
MCV RBC AUTO: 83 FL (ref 80–100)
PERFORMED ON: ABNORMAL
PHOSPHATE SERPL-MCNC: 3 MG/DL (ref 2.5–4.9)
PLATELET # BLD AUTO: 311 K/UL (ref 135–450)
PMV BLD AUTO: 7.3 FL (ref 5–10.5)
POTASSIUM SERPL-SCNC: 3.5 MMOL/L (ref 3.5–5.1)
RBC # BLD AUTO: 3.65 M/UL (ref 4.2–5.9)
SODIUM SERPL-SCNC: 150 MMOL/L (ref 136–145)
WBC # BLD AUTO: 12.1 K/UL (ref 4–11)

## 2024-02-01 PROCEDURE — 83735 ASSAY OF MAGNESIUM: CPT

## 2024-02-01 PROCEDURE — 99232 SBSQ HOSP IP/OBS MODERATE 35: CPT

## 2024-02-01 PROCEDURE — 2580000003 HC RX 258: Performed by: INTERNAL MEDICINE

## 2024-02-01 PROCEDURE — 6360000002 HC RX W HCPCS: Performed by: INTERNAL MEDICINE

## 2024-02-01 PROCEDURE — 93971 EXTREMITY STUDY: CPT

## 2024-02-01 PROCEDURE — 99233 SBSQ HOSP IP/OBS HIGH 50: CPT | Performed by: INTERNAL MEDICINE

## 2024-02-01 PROCEDURE — 97535 SELF CARE MNGMENT TRAINING: CPT

## 2024-02-01 PROCEDURE — 80069 RENAL FUNCTION PANEL: CPT

## 2024-02-01 PROCEDURE — 85027 COMPLETE CBC AUTOMATED: CPT

## 2024-02-01 PROCEDURE — 6370000000 HC RX 637 (ALT 250 FOR IP): Performed by: INTERNAL MEDICINE

## 2024-02-01 PROCEDURE — 36415 COLL VENOUS BLD VENIPUNCTURE: CPT

## 2024-02-01 PROCEDURE — 97110 THERAPEUTIC EXERCISES: CPT

## 2024-02-01 PROCEDURE — 97530 THERAPEUTIC ACTIVITIES: CPT

## 2024-02-01 PROCEDURE — 1200000000 HC SEMI PRIVATE

## 2024-02-01 PROCEDURE — 92526 ORAL FUNCTION THERAPY: CPT

## 2024-02-01 PROCEDURE — 97112 NEUROMUSCULAR REEDUCATION: CPT

## 2024-02-01 PROCEDURE — 6370000000 HC RX 637 (ALT 250 FOR IP)

## 2024-02-01 RX ORDER — DULOXETIN HYDROCHLORIDE 30 MG/1
30 CAPSULE, DELAYED RELEASE ORAL DAILY
Status: DISCONTINUED | OUTPATIENT
Start: 2024-02-02 | End: 2024-02-06 | Stop reason: HOSPADM

## 2024-02-01 RX ORDER — DEXTROSE MONOHYDRATE 50 MG/ML
INJECTION, SOLUTION INTRAVENOUS CONTINUOUS
Status: DISCONTINUED | OUTPATIENT
Start: 2024-02-01 | End: 2024-02-02

## 2024-02-01 RX ORDER — ENOXAPARIN SODIUM 100 MG/ML
40 INJECTION SUBCUTANEOUS DAILY
Status: DISCONTINUED | OUTPATIENT
Start: 2024-02-01 | End: 2024-02-01

## 2024-02-01 RX ORDER — TOPIRAMATE 25 MG/1
50 TABLET ORAL 2 TIMES DAILY
Status: DISCONTINUED | OUTPATIENT
Start: 2024-02-01 | End: 2024-02-06 | Stop reason: HOSPADM

## 2024-02-01 RX ORDER — FUROSEMIDE 10 MG/ML
20 INJECTION INTRAMUSCULAR; INTRAVENOUS ONCE
Status: COMPLETED | OUTPATIENT
Start: 2024-02-01 | End: 2024-02-01

## 2024-02-01 RX ORDER — PANTOPRAZOLE SODIUM 40 MG/1
40 TABLET, DELAYED RELEASE ORAL
Status: DISCONTINUED | OUTPATIENT
Start: 2024-02-02 | End: 2024-02-06 | Stop reason: HOSPADM

## 2024-02-01 RX ADMIN — DEXTROSE MONOHYDRATE: 50 INJECTION, SOLUTION INTRAVENOUS at 17:26

## 2024-02-01 RX ADMIN — INSULIN LISPRO 12 UNITS: 100 INJECTION, SOLUTION INTRAVENOUS; SUBCUTANEOUS at 03:50

## 2024-02-01 RX ADMIN — INSULIN LISPRO 4 UNITS: 100 INJECTION, SOLUTION INTRAVENOUS; SUBCUTANEOUS at 17:26

## 2024-02-01 RX ADMIN — INSULIN GLARGINE 15 UNITS: 100 INJECTION, SOLUTION SUBCUTANEOUS at 22:23

## 2024-02-01 RX ADMIN — CEFEPIME 2000 MG: 2 INJECTION, POWDER, FOR SOLUTION INTRAVENOUS at 23:47

## 2024-02-01 RX ADMIN — ENOXAPARIN SODIUM 40 MG: 100 INJECTION SUBCUTANEOUS at 09:40

## 2024-02-01 RX ADMIN — INSULIN LISPRO 16 UNITS: 100 INJECTION, SOLUTION INTRAVENOUS; SUBCUTANEOUS at 22:23

## 2024-02-01 RX ADMIN — APIXABAN 10 MG: 5 TABLET, FILM COATED ORAL at 22:22

## 2024-02-01 RX ADMIN — FUROSEMIDE 20 MG: 10 INJECTION, SOLUTION INTRAMUSCULAR; INTRAVENOUS at 09:40

## 2024-02-01 RX ADMIN — DEXTROSE MONOHYDRATE 250 ML: 100 INJECTION, SOLUTION INTRAVENOUS at 06:31

## 2024-02-01 RX ADMIN — CEFEPIME 2000 MG: 2 INJECTION, POWDER, FOR SOLUTION INTRAVENOUS at 12:17

## 2024-02-01 RX ADMIN — Medication: at 09:44

## 2024-02-01 RX ADMIN — TOPIRAMATE 50 MG: 25 TABLET, FILM COATED ORAL at 09:43

## 2024-02-01 RX ADMIN — TOPIRAMATE 50 MG: 25 TABLET, FILM COATED ORAL at 22:24

## 2024-02-01 RX ADMIN — Medication: at 23:33

## 2024-02-01 RX ADMIN — INSULIN LISPRO 8 UNITS: 100 INJECTION, SOLUTION INTRAVENOUS; SUBCUTANEOUS at 12:17

## 2024-02-01 NOTE — PROGRESS NOTES
Call from Vascular,pt positive for DVT internal jugular vein. Dr Kwan notified,report will be in shortly.

## 2024-02-01 NOTE — PROGRESS NOTES
indicated  Supine:  Ankle Pumps x 15 reps  Toe scrunches x 15 reps  Quad sets x 10 reps  Double knee rocks x 15 reps  Hip abduction in hooklying with manual resistance x 10 reps  Sidelying clamshells x 10 reps  Bridges x 3 reps    Seated:  Shldr shrugs x 10 reps  Trunk extension x 5 reps with sustained upright posture   Hand/finger ROM at OT direction    Standing:  N/A - unsafe/poor standing balance and tolerance    Activity Tolerance   During therapy session noted pt with no adverse symptoms to activity    Pt Position BP (mmHg) HR (bpm) SpO2 (%) on 3L  Comments   Supine at rest       Seated at /64 106 92% Dizzy sitting EOB, requesting return to supine.    Standing 133/69 115 96%    End of session, supine Similar to above, value not captured. 119 94%      Positioning Needs   Pt in bed, alarm set, positioned in proper neutral alignment and pressure relief provided.   Call light provided and all needs within reach  RN aware of pt position/status  Low pressure bulb alarm provided with confirmation of adequate pt strength/coordination.     Other Activities  RN providing total assist to change brief and wet sheets upon this writer entrance.    Patient/Family Education   Pt educated on role of inpatient PT, POC, importance of continued activity, DC recommendations, safety awareness, transfer techniques, and calling for assist with mobility.    Assessment  Pt demonstrating improved alertness, cognition, and orientation today. Able to actively participate in lengthy therapy session although very fatigued and requiring frequent rest breaks. Pt demonstrates impaired gross and fine motor strength. He is functioning well below his baseline and will benefit from cont skilled PT at discharge. Expect he will tolerate 3 hr/daily therapy.     Recommending ARU/IRF/Inpatient Rehab Facility upon discharge as patient functioning well below baseline, demonstrates good rehab potential and unable to return home due to inability to  negotiate stairs to enter home/bedroom/bathroom, burden of care beyond caregiver ability, home environment not conducive to patient recovery, and limited safety awareness.    Goals :   To be met in 3 visits:  1). Independent with LE Ex x 10 reps  2). Sit to/from stand:  ability to assess   3). Bed to chair:  ability to assess       To be met in 6 visits:  1).  Supine to/from sit: Mod A  x 1 - goal met 2/1, upgrade to IND  2).  Sit to/from stand: Mod A  x 1   3).  Bed to chair: Mod A  x 1  4).  Gait: Ambulate  10 ft.   with Mod A  and use of LRAD (least restrictive assistive device)  5).  Tolerate B LE exercises 3 sets of 10-15 reps    Rehabilitation Potential: Good  Strengths for achieving goals include:   Pt motivated, PLOF, Family Support, and Pt cooperative   Barriers to achieving goals include:    Complexity of condition, Pain, Weakness, and Impaired cognition    Plan    To be seen 3-5 x / week  while in acute care setting for therapeutic exercises, bed mobility, transfers, progressive gait training, balance training, and family/patient education.    Signature: Danielle Gaspar, PT, DPT    If patient discharges from this facility prior to next visit, this note will serve as the Discharge Summary.

## 2024-02-01 NOTE — PROGRESS NOTES
Progress Note    HISTORY     CC:   AMS              We are following for Acute kidney injury      Subjective/     HPI:    Renal function improving, off IVF's, non-oliguric.   BP's better, now off pressors.       ROS:  Intake reduced, +weak.    EXAM       Objective/     Vitals:    01/31/24 2030 02/01/24 0323 02/01/24 0815 02/01/24 0825   BP: 133/60  (!) 153/63    Pulse: (!) 109  100    Resp: 18  18    Temp: 98.5 °F (36.9 °C)  98.3 °F (36.8 °C)    TempSrc: Oral  Oral    SpO2: 94%  (!) 89% 93%   Weight:  106.9 kg (235 lb 9.6 oz)     Height:         24HR INTAKE/OUTPUT:    Intake/Output Summary (Last 24 hours) at 2/1/2024 1151  Last data filed at 2/1/2024 0323  Gross per 24 hour   Intake 120 ml   Output 1375 ml   Net -1255 ml       Constitutional:  critically ill, on vent   Eyes:  Pupils reactive, sclera clear   Neck:  Normal thyroid, no masses   Cardiovascular:  Regular, no rub  Respiratory:  On vent, no wheezing  Psychiatry:  sedated on vent, KERRY   Abdomen: +bs, soft, nt, no masses   Musculoskeletal: + LE edema, no clubbing   Lymphatics:  No LAD in neck, no supraclavicular nodes   :  powers placed       MEDICAL DECISION MAKING       Data/  Recent Labs     01/30/24  0525 01/31/24  0653 02/01/24  0759   WBC 6.2 7.3 12.1*   HGB 9.9* 9.8* 9.8*   HCT 29.1* 29.7* 30.3*   MCV 80.5 81.3 83.0    246 311       Recent Labs     01/30/24  0525 01/31/24  0653 02/01/24  0759    143 150*   K 4.0 3.5 3.5    111* 114*   CO2 24 20* 22   GLUCOSE 263* 141* 129*   PHOS 2.5 2.3* 3.0   MG 1.80 1.80 1.90   BUN 43* 33* 27*   CREATININE 4.1* 3.3* 3.0*   LABGLOM 17* 22* 25*         Assessment/     Acute Kidney Injury:  KDIGO stage 3  Etiology:  Pre-renal due to DKA with profound volume depletion and hypotension -> seems to have progressed to ATN   Non-oliguric / getting volume resuscitation.  Plateau phase and urine volume has improved.  Off pressors   Chronic Kidney Disease:  Stage 3a  Baseline:  1.4 -

## 2024-02-01 NOTE — PROGRESS NOTES
NT    Upper Extremity Proprioception:  NT    Coordination:  Impaired, weak gross grasp bilaterally    Tone:  NT    Balance:  Sitting:    Min A x1 to maintain balance, with additional assist x1 for safety due to pt leaning forward,  pt tolerated approx 10 mins sitting at EOB this date, SBA at times   Standing:    Min A  and 2 person, pt very impulsive    Bed Mobility:   Supine to Sit:    Mod A   Sit to Supine:   Min A   Rolling:   Not Tested  Scooting in sitting: CGA pt very impulsive  Scooting in supine:  Not Tested     Therapist walked back in room to check on pt call light and pt was sitting up Ethiopian style in bed, pt was left lying flat in bed when exited room previously     Transfers:   Sit to stand:    Min A  and 2 person pt impulsive  Stand to sit:    Min A  and 2 person  Bed to chair:     Not Tested  Bed/ chair to standard toilet:  Not Tested  Bed/chair to BSC:   Min A  and 2 person very impulsive, impaired gross motor control   Functional Mobility:   Not Tested    See PT note for gait analysis.    ADLs:  Dressing:      UE:   Mod A   LE:    Total A don socks, pt attempted however due to pt poor fine motor control pt was unable to grasp socks to don over toes.  Therapist put sock over toes and pt attempted to pull over heel and over ankle however pt was unable to complete this task as well due to poor fine motor control     Bathing:    UE:  Not Tested  LE:  Not Tested    Eating:   Not Tested    Toileting:  Not Tested (pt currently utilizing powers catheter)    Grooming/hygiene: Max A Therapist put comb in pt hand and he attempted to bring to head however was unable to hold on to comb due to poor fine motor control.      Activity Tolerance:   Pt completed therapy session with fatigue  dizziness/lightheadedness at end of session     BP (mmHg) HR (bpm) SpO2 (%) on RA Comments   Supine at rest       Seated at /64 106 92%    Standing 133/69 115 96%    End of session Similar to above        Positioning

## 2024-02-01 NOTE — PROGRESS NOTES
Pulmonary & Critical Care Medicine Progress Note    CC: DKA    Subjective:   Transferred out of ICU.  Diet resumed.  Fever curve improved.  Conversing, no new complaints.     IV: RIJ 1/25/24 - 1/31/24, peripheral     MV:  1/25/24-1/30/2024    Vitals:  Blood pressure (!) 153/63, pulse 100, temperature 98.3 °F (36.8 °C), temperature source Oral, resp. rate 18, height 1.88 m (6' 2.02\"), weight 106.9 kg (235 lb 9.6 oz), SpO2 93 %.  on 3 L  General:  ill appearing    Resp: No crackles. No wheezing.   CV: S1, S2. Trace edema  GI: NT, ND, +BS  Skin: Warm and dry.    Neuro: PERRL. Awake & FC A&O x3  MSK: R elbow warm, red and edematous     Scheduled Meds:   [START ON 2/2/2024] DULoxetine  30 mg Oral Daily    enoxaparin  40 mg SubCUTAneous Daily    topiramate  50 mg Oral BID    [START ON 2/2/2024] pantoprazole  40 mg Oral QAM AC    [Held by provider] gabapentin  400 mg Oral BID    balsum peru-castor oil   Topical BID    insulin lispro  0-16 Units SubCUTAneous Q4H    insulin glargine  15 Units SubCUTAneous BID    cefepime  2,000 mg IntraVENous Q12H     Data:  CBC:   Recent Labs     01/30/24  0525 01/31/24  0653 02/01/24  0759   WBC 6.2 7.3 12.1*   HGB 9.9* 9.8* 9.8*   HCT 29.1* 29.7* 30.3*   MCV 80.5 81.3 83.0    246 311     BMP:   Recent Labs     01/30/24  0525 01/31/24  0653 02/01/24  0759    143 150*   K 4.0 3.5 3.5    111* 114*   CO2 24 20* 22   PHOS 2.5 2.3* 3.0   BUN 43* 33* 27*   CREATININE 4.1* 3.3* 3.0*     LIVER PROFILE:   No results for input(s): \"AST\", \"ALT\", \"LIPASE\", \"AMYLASE\", \"ALB\", \"BILIDIR\", \"BILITOT\", \"ALKPHOS\" in the last 72 hours.    Microbiology:      1/25/2024 SARS-CoV-2 and influenza are negative  1/25/2024 blood NG    1/27/2024 MRSA DNA probe negative    Imaging:  CXR 1/30/2024 bibasilar airspace disease     X-ray right elbow 1/31/2024  No acute findings.  No evidence of osteomyelitis or joint effusion.     ASSESSMENT:  DKA  Acute metabolic encephalopathy, required MV   HERB on CKD,  baseline Scr 1.4-1.8 - improving   Fever    Elbow hot and red      PLAN:  Supplemental O2 to maintain SpO2 >92%; wean as tolerated    SLP has seen.  Dextrose containing IV fluids until taking adequate PO     ABX D#8, now Cefepime (started 1/27/24); can change to ancef or dicloxicillin once abx course complete   Nephrology is following   PT/OT  Prophylaxis: Lovenox  Will sign off, please call with questions     I discussed pertinent aspects of patient's HPI, physical exam, assessment and plan with my attending physician, Dr. Pablo Oliver.

## 2024-02-01 NOTE — PROGRESS NOTES
Perfect serve sent at 0641     PT is here with DKA. Was on DKA protocol in ICU. He came to our floor yesterday. His BS yesterday was 59 in the afternoon, when I came on at 1930 it was 86. Notified NP and received orders to hold his scheduled lantus. At 0400, his blood sugar was 313 and I gave the scheduled 12u lispro. His blood sugar at 0630 was 58. We gave him some pepsi and pudding and it dropped to 36. He is currently receiving a 250 bolus of D5. Throughout the night his baseline mental status has been confused. Wanted to make you aware of situation     please notify primary doctor at 7am. he is very brittle diabetic     During report, made day RN aware of pt status, dayshift RN said she would let MD Kwan know.

## 2024-02-01 NOTE — PROGRESS NOTES
Speech Language Pathology  Swallowing Disorders and Dysphagia    Dysphagia Treatment/Follow-Up Note  Facility/Department: [facility] [place] [facility]    Recommendations: SLP recommends to advance to IDDSI 6 Soft and Bite Sized Solids; IDDSI 2 Mildly Thick Liquids via cup only;Lucero Free Water Protocol allowed: handout provided; nurse aware; Meds crushed in puree as able   Risk Management: upright for all intake, stay upright for at least 30 mins after intake, small bites/sips, assist feed, no straws, close supervision, oral care 2-3x/day to reduce adverse affects in the event of aspiration, increase physical mobility as able, alternate bites/sips, slow rate of intake, STRICT aspiration precautions, hold PO and contact SLP if s/s of aspiration or worsening respiratory status develop., and Control risk factors for aspiration PNA by completing oral care 3-4x/day and increasing physical mobility as is medically feasible.     NAME:Era Carson  : 1975 (48 y.o.)   MRN: [id number]  ROOM: [address] [place] [address]  ADMISSION DATE: 2024  PATIENT DIAGNOSIS(ES): DKA, type 1, not at goal (HCC) [E10.10]  Acute respiratory failure with hypoxia (HCC) [J96.01]  Acute renal failure, unspecified acute renal failure type (HCC) [N17.9]  Diabetic ketoacidosis with coma associated with diabetes mellitus due to underlying condition (HCC) [E08.11]  Allergies   Allergen Reactions    Tegaderm Ag Mesh 2\"X2\" [Wound Dressings]      \"Holes in skin from Tegaderm Adhesive\"    Codeine Other (See Comments)     hallucinates    Tape [Adhesive Tape]      Blister      Other Other (See Comments)     Holes in skin  From Tegaderm Adhesive  Blister       DATE ONSET: 2024    Pain: The patient does not complain of pain       Current Diet: ADULT DIET; Dysphagia - Pureed; 4 carb choices (60 gm/meal); Mildly Thick (Nectar)    Diet Tolerance:  Pt currently NPO.      Dysphagia Treatment and Impressions:  Subjective: Pt seen in room at bedside with RN Liliya) permission  RN  treatment with goals per plan of care.    Discharge Recommendations: Recommend SLP tx at ARU    If pt discharges from hospital prior to Speech/Swallowing discharge, this note serves as tx and discharge summary.     Total Treatment Time / Charges     Time in Time out Total Time / units   Cognitive Tx         Speech Tx      Dysphagia Tx 0900 0912 20 mins / 1 unit     Signature:  Eloina Agarwal  SLP  Clinician     Supervisor: Tasia Mcdonnell MA, CCC-SLP      Taisa Mcdonnell M.A., [facility]-SLP #[id number]  Speech-Language Pathologist  [place] Phone (inpatient): [phone]  Speech Desk (outpatient)- [phone]    Certified to provide:  FEES, MBS, Vital Stim, and [facility] Dysphagia Therapy Program (MDTP)

## 2024-02-01 NOTE — PLAN OF CARE
Problem: Safety - Medical Restraint  Goal: Remains free of injury from restraints (Restraint for Interference with Medical Device)  Description: INTERVENTIONS:  1. Determine that other, less restrictive measures have been tried or would not be effective before applying the restraint  2. Evaluate the patient's condition at the time of restraint application  3. Inform patient/family regarding the reason for restraint  4. Q2H: Monitor safety, psychosocial status, comfort, nutrition and hydration  Outcome: Progressing     Problem: Discharge Planning  Goal: Discharge to home or other facility with appropriate resources  Outcome: Progressing     Problem: Confusion  Goal: Confusion, delirium, dementia, or psychosis is improved or at baseline  Description: INTERVENTIONS:  1. Assess for possible contributors to thought disturbance, including medications, impaired vision or hearing, underlying metabolic abnormalities, dehydration, psychiatric diagnoses, and notify attending LIP  2. [place] high risk fall precautions, as indicated  3. Provide frequent short contacts to provide reality reorientation, refocusing and direction  4. Decrease environmental stimuli, including noise as appropriate  5. Monitor and intervene to maintain adequate nutrition, hydration, elimination, sleep and activity  6. If unable to ensure safety without constant attention obtain sitter and review sitter guidelines with assigned personnel  7. Initiate Psychosocial CNS and Spiritual Care consult, as indicated  Outcome: Progressing     Problem: Skin/Tissue Integrity  Goal: Absence of new skin breakdown  Description: 1.  Monitor for areas of redness and/or skin breakdown  2.  Assess vascular access sites hourly  3.  Every 4-6 hours minimum:  Change oxygen saturation probe site  4.  Every 4-6 hours:  If on nasal continuous positive airway pressure, respiratory therapy assess nares and determine need for appliance change or resting period.  Outcome:  Progressing     Problem: Safety - Adult  Goal: Free from fall injury  Outcome: Progressing     Problem: ABCDS Injury Assessment  Goal: Absence of physical injury  Outcome: Progressing     Problem: Pain  Goal: Verbalizes/displays adequate comfort level or baseline comfort level  Outcome: Progressing     Problem: Chronic Conditions and Co-morbidities  Goal: Patient's chronic conditions and co-morbidity symptoms are monitored and maintained or improved  Outcome: Progressing     Problem: Nutrition Deficit:  Goal: Optimize nutritional status  Outcome: Progressing

## 2024-02-01 NOTE — PROGRESS NOTES
IM Progress Note    Admit Date:  1/25/2024  7    Interval history:  DKA with severe hyperglycemia above 2000 glucose   Encephalopathy , placed on vent     Off vent, improving renal function   Resumed diet     Issues with hypoglycemia overnight noted  Fevers resolving    Subjective:    Mr. Carson seen up in bed, fully oriented but fatigued  Reports pain in right elbow from IV infiltration , able to use it better today  ON 3 L this am            Objective:   /60   Pulse (!) 109   Temp 98.5 °F (36.9 °C) (Oral)   Resp 18   Ht 1.88 m (6' 2.02\")   Wt 106.9 kg (235 lb 9.6 oz)   SpO2 94%   BMI 30.24 kg/m²     Intake/Output Summary (Last 24 hours) at 2/1/2024 0802  Last data filed at 2/1/2024 0323  Gross per 24 hour   Intake 120 ml   Output 1775 ml   Net -1655 ml         Physical Exam:        General: awake alert and fairly oriented  Mucous Membranes:  Pink , anicteric  Neck: No JVD, no carotid bruit, no thyromegaly  Chest:  Clear to auscultation bilaterally, mild left sided crackles  Cardiovascular:  RRR S1S2 heard, ASM   Abdomen:  Soft, undistended, non tender, no organomegaly, BS present  Extremities: developing edema to all ext  Diffuse erythema in right antecubtial region with IV infiltration    Distal pulses feeble both feet  Neurological : awake, alert and better oriented  Following commands  Mild weakness in right EXt with pain     Medications:   Scheduled Medications:    DULoxetine  60 mg Oral Daily    gabapentin  400 mg Oral BID    balsum peru-castor oil   Topical BID    insulin lispro  0-16 Units SubCUTAneous Q4H    insulin glargine  15 Units SubCUTAneous BID    cefepime  2,000 mg IntraVENous Q12H    enoxaparin  30 mg SubCUTAneous Daily     I   dextrose      dextrose 5 % and 0.45 % NaCl       ondansetron, glucose, dextrose bolus **OR** dextrose bolus, glucagon (rDNA), dextrose, medicated lip balm, acetaminophen, acetaminophen, magnesium sulfate, polyethylene glycol, dextrose 5 % and 0.45 % NaCl    Lab  advancement by 3 cm.         XR CHEST PORTABLE   Final Result   Right central venous line terminates in the SVC      Left basilar opacity could represent subsegmental atelectasis or infection         CT HEAD WO CONTRAST   Final Result   No evidence of acute intracranial abnormality.         XR CHEST PORTABLE   Final Result   1.  No acute abnormality.           Hemoglobin A1C   Date Value Ref Range Status   01/30/2024 9.8 See comment % Final     Comment:     Comment:  Diagnosis of Diabetes: > or = 6.5%  Increased risk of diabetes (Prediabetes): 5.7-6.4%  Glycemic Control: Nonpregnant Adults: <7.0%                    Pregnant: <6.0%         Wt Readings from Last 3 Encounters:   02/01/24 106.9 kg (235 lb 9.6 oz)   01/24/24 93.4 kg (206 lb)   01/04/24 98.9 kg (218 lb)     Cultures  Blood- NGTD  Sputum - no growth      Assessment & Plan:      Severe DKA  Glucose at  2355 with severe acidosis and aniongap and metabolic derangements   Admitted to the ICU and placed on vent for AMS  DKA protocol initiated.  Aggressive IV fluid resuscitation given  Insulin drip initiated and sugars improved  Bicarb gtt given for severe acidosis   Monitored electrolytes every 4 hours and repleted  Critical care consulted   Off vent now  Resumed diet   transitioned  to lantus and ssi - adjust to avoid low sugars     Severe metabolic encephalopathy   sec to DKA  Ct head neg   Was on vent support   - repeat head CT negative  Extubated and mentation improved slowly        HERB with CKD 3    sec to severe DKA,dehydration and hypotension  IV fluids and bicarb fluids given   Nephrology involved  Sanon placed and good UOP noted  Improving creatinine now leveled around 3    Hypotension  Sec to above, and dehydration, remained hypotensive post 6 L NS boluses  Was on levo and now weaned off   Cannot rule out infectious source - sepsis with new fevers  Started cefepime and vanc  Cx pending-negative so far   And abx stopped     Hypothermia  sec to severe

## 2024-02-01 NOTE — PROGRESS NOTES
Perfect serve sent at 2013    PT is here with DKA. Came to our floor from the ICU today. His blood sugar has been between . BS at 1919 was 85, and pt was acting more confused/agitated. We gave him some pepsi to drink and it came up to 110. Would you like me to hold his 15 units of lantus this evening?     yes hold

## 2024-02-01 NOTE — CARE COORDINATION
[facility] - Acute Rehab Unit   After review, this patient is felt to be:       [x]  Appropriate for Acute Inpatient Rehab when medically ready    []  Appropriate for Acute Inpatient Rehab Pending Insurance Authorization    []  Not appropriate for Acute Inpatient Rehab    []  Referral received and ARU reviewing patient; Evaluation ongoing.        Will notify DCP with further updates. Thank you for the referral.  Marika Glez RN

## 2024-02-01 NOTE — PLAN OF CARE
Problem: Safety - Medical Restraint  Goal: Remains free of injury from restraints (Restraint for Interference with Medical Device)  Description: INTERVENTIONS:  1. Determine that other, less restrictive measures have been tried or would not be effective before applying the restraint  2. Evaluate the patient's condition at the time of restraint application  3. Inform patient/family regarding the reason for restraint  4. Q2H: Monitor safety, psychosocial status, comfort, nutrition and hydration  Outcome: Progressing     Problem: Discharge Planning  Goal: Discharge to home or other facility with appropriate resources  Outcome: Progressing     Problem: Confusion  Goal: Confusion, delirium, dementia, or psychosis is improved or at baseline  Description: INTERVENTIONS:  1. Assess for possible contributors to thought disturbance, including medications, impaired vision or hearing, underlying metabolic abnormalities, dehydration, psychiatric diagnoses, and notify attending LIP  2. [place] high risk fall precautions, as indicated  3. Provide frequent short contacts to provide reality reorientation, refocusing and direction  4. Decrease environmental stimuli, including noise as appropriate  5. Monitor and intervene to maintain adequate nutrition, hydration, elimination, sleep and activity  6. If unable to ensure safety without constant attention obtain sitter and review sitter guidelines with assigned personnel  7. Initiate Psychosocial CNS and Spiritual Care consult, as indicated  Outcome: Progressing     Problem: Skin/Tissue Integrity  Goal: Absence of new skin breakdown  Description: 1.  Monitor for areas of redness and/or skin breakdown  2.  Assess vascular access sites hourly  3.  Every 4-6 hours minimum:  Change oxygen saturation probe site  4.  Every 4-6 hours:  If on nasal continuous positive airway pressure, respiratory therapy assess nares and determine need for appliance change or resting period.  Outcome:

## 2024-02-01 NOTE — CARE COORDINATION
ACCEPTED for admission to [place] [facility] when glucose is stable. Anticipate Dc to rehab tomorrow (Friday 2/2). Per ARU admissions pt will need rapid COVID test prior to DC to ARU.  CM will follow and re-evaluate in am.

## 2024-02-01 NOTE — CARE COORDINATION
ARU is continuing to follow for therapy progress, VM left for CM to request updates with therapy with improved transfers/ gait.Marika Glez RN

## 2024-02-02 PROBLEM — E44.1 MILD PROTEIN-CALORIE MALNUTRITION (HCC): Status: ACTIVE | Noted: 2024-02-02

## 2024-02-02 LAB
ALBUMIN SERPL-MCNC: 2.3 G/DL (ref 3.4–5)
ANION GAP SERPL CALCULATED.3IONS-SCNC: 13 MMOL/L (ref 3–16)
BUN SERPL-MCNC: 33 MG/DL (ref 7–20)
CALCIUM SERPL-MCNC: 7.8 MG/DL (ref 8.3–10.6)
CHLORIDE SERPL-SCNC: 111 MMOL/L (ref 99–110)
CO2 SERPL-SCNC: 21 MMOL/L (ref 21–32)
CREAT SERPL-MCNC: 3 MG/DL (ref 0.9–1.3)
DEPRECATED RDW RBC AUTO: 16.1 % (ref 12.4–15.4)
GFR SERPLBLD CREATININE-BSD FMLA CKD-EPI: 25 ML/MIN/{1.73_M2}
GLUCOSE BLD-MCNC: 174 MG/DL (ref 70–99)
GLUCOSE BLD-MCNC: 189 MG/DL (ref 70–99)
GLUCOSE BLD-MCNC: 220 MG/DL (ref 70–99)
GLUCOSE BLD-MCNC: 241 MG/DL (ref 70–99)
GLUCOSE BLD-MCNC: 287 MG/DL (ref 70–99)
GLUCOSE BLD-MCNC: 294 MG/DL (ref 70–99)
GLUCOSE BLD-MCNC: 375 MG/DL (ref 70–99)
GLUCOSE SERPL-MCNC: 182 MG/DL (ref 70–99)
HCT VFR BLD AUTO: 26.7 % (ref 40.5–52.5)
HGB BLD-MCNC: 8.8 G/DL (ref 13.5–17.5)
MAGNESIUM SERPL-MCNC: 2 MG/DL (ref 1.8–2.4)
MCH RBC QN AUTO: 26.9 PG (ref 26–34)
MCHC RBC AUTO-ENTMCNC: 32.9 G/DL (ref 31–36)
MCV RBC AUTO: 81.9 FL (ref 80–100)
PERFORMED ON: ABNORMAL
PHOSPHATE SERPL-MCNC: 2.7 MG/DL (ref 2.5–4.9)
PLATELET # BLD AUTO: 342 K/UL (ref 135–450)
PMV BLD AUTO: 7.6 FL (ref 5–10.5)
POTASSIUM SERPL-SCNC: 3.1 MMOL/L (ref 3.5–5.1)
RBC # BLD AUTO: 3.27 M/UL (ref 4.2–5.9)
SODIUM SERPL-SCNC: 145 MMOL/L (ref 136–145)
WBC # BLD AUTO: 13.2 K/UL (ref 4–11)

## 2024-02-02 PROCEDURE — 6370000000 HC RX 637 (ALT 250 FOR IP): Performed by: INTERNAL MEDICINE

## 2024-02-02 PROCEDURE — 97530 THERAPEUTIC ACTIVITIES: CPT

## 2024-02-02 PROCEDURE — 85027 COMPLETE CBC AUTOMATED: CPT

## 2024-02-02 PROCEDURE — 83735 ASSAY OF MAGNESIUM: CPT

## 2024-02-02 PROCEDURE — 6370000000 HC RX 637 (ALT 250 FOR IP)

## 2024-02-02 PROCEDURE — 99233 SBSQ HOSP IP/OBS HIGH 50: CPT | Performed by: INTERNAL MEDICINE

## 2024-02-02 PROCEDURE — 2580000003 HC RX 258: Performed by: INTERNAL MEDICINE

## 2024-02-02 PROCEDURE — 36415 COLL VENOUS BLD VENIPUNCTURE: CPT

## 2024-02-02 PROCEDURE — 6360000002 HC RX W HCPCS: Performed by: INTERNAL MEDICINE

## 2024-02-02 PROCEDURE — 80069 RENAL FUNCTION PANEL: CPT

## 2024-02-02 PROCEDURE — 92526 ORAL FUNCTION THERAPY: CPT

## 2024-02-02 PROCEDURE — 1200000000 HC SEMI PRIVATE

## 2024-02-02 RX ORDER — INSULIN GLARGINE 100 [IU]/ML
15 INJECTION, SOLUTION SUBCUTANEOUS NIGHTLY
Status: DISCONTINUED | OUTPATIENT
Start: 2024-02-02 | End: 2024-02-06 | Stop reason: HOSPADM

## 2024-02-02 RX ORDER — POTASSIUM CHLORIDE 20 MEQ/1
40 TABLET, EXTENDED RELEASE ORAL ONCE
Status: COMPLETED | OUTPATIENT
Start: 2024-02-02 | End: 2024-02-02

## 2024-02-02 RX ORDER — INSULIN LISPRO 100 [IU]/ML
8 INJECTION, SOLUTION INTRAVENOUS; SUBCUTANEOUS ONCE
Status: DISCONTINUED | OUTPATIENT
Start: 2024-02-02 | End: 2024-02-02

## 2024-02-02 RX ORDER — INSULIN GLARGINE 100 [IU]/ML
10 INJECTION, SOLUTION SUBCUTANEOUS ONCE
Status: COMPLETED | OUTPATIENT
Start: 2024-02-02 | End: 2024-02-02

## 2024-02-02 RX ADMIN — PANTOPRAZOLE SODIUM 40 MG: 40 TABLET, DELAYED RELEASE ORAL at 05:58

## 2024-02-02 RX ADMIN — APIXABAN 10 MG: 5 TABLET, FILM COATED ORAL at 20:45

## 2024-02-02 RX ADMIN — INSULIN LISPRO 14 UNITS: 100 INJECTION, SOLUTION INTRAVENOUS; SUBCUTANEOUS at 01:09

## 2024-02-02 RX ADMIN — TOPIRAMATE 50 MG: 25 TABLET, FILM COATED ORAL at 08:42

## 2024-02-02 RX ADMIN — POTASSIUM CHLORIDE 40 MEQ: 1500 TABLET, EXTENDED RELEASE ORAL at 08:42

## 2024-02-02 RX ADMIN — DEXTROSE MONOHYDRATE: 50 INJECTION, SOLUTION INTRAVENOUS at 05:58

## 2024-02-02 RX ADMIN — APIXABAN 10 MG: 5 TABLET, FILM COATED ORAL at 08:42

## 2024-02-02 RX ADMIN — DULOXETINE HYDROCHLORIDE 30 MG: 30 CAPSULE, DELAYED RELEASE ORAL at 08:42

## 2024-02-02 RX ADMIN — INSULIN LISPRO 8 UNITS: 100 INJECTION, SOLUTION INTRAVENOUS; SUBCUTANEOUS at 16:45

## 2024-02-02 RX ADMIN — Medication: at 23:29

## 2024-02-02 RX ADMIN — ONDANSETRON 4 MG: 2 INJECTION INTRAMUSCULAR; INTRAVENOUS at 11:18

## 2024-02-02 RX ADMIN — INSULIN GLARGINE 10 UNITS: 100 INJECTION, SOLUTION SUBCUTANEOUS at 01:10

## 2024-02-02 RX ADMIN — INSULIN LISPRO 4 UNITS: 100 INJECTION, SOLUTION INTRAVENOUS; SUBCUTANEOUS at 20:45

## 2024-02-02 RX ADMIN — INSULIN GLARGINE 15 UNITS: 100 INJECTION, SOLUTION SUBCUTANEOUS at 20:46

## 2024-02-02 RX ADMIN — CEFEPIME 2000 MG: 2 INJECTION, POWDER, FOR SOLUTION INTRAVENOUS at 11:18

## 2024-02-02 RX ADMIN — Medication: at 08:49

## 2024-02-02 RX ADMIN — INSULIN LISPRO 8 UNITS: 100 INJECTION, SOLUTION INTRAVENOUS; SUBCUTANEOUS at 11:24

## 2024-02-02 RX ADMIN — TOPIRAMATE 50 MG: 25 TABLET, FILM COATED ORAL at 20:45

## 2024-02-02 NOTE — PROGRESS NOTES
Progress Note    HISTORY     CC:   AMS              We are following for Acute kidney injury      Subjective/     HPI:    Renal function, Sodium improving withIVF's, non-oliguric.   BP's better, now off pressors.       ROS:  Intake improving, +weak.    EXAM       Objective/     Vitals:    02/01/24 1445 02/01/24 2021 02/02/24 0229 02/02/24 0815   BP: (!) 120/56 (!) 104/45 123/66 (!) 166/69   Pulse: (!) 104 (!) 104 (!) 101 (!) 105   Resp: 18 18 19 20   Temp: 99.9 °F (37.7 °C) 99.1 °F (37.3 °C) 99 °F (37.2 °C) 98.9 °F (37.2 °C)   TempSrc: Oral Oral Oral Oral   SpO2: 92% 94% 97% 96%   Weight:   107 kg (236 lb)    Height:         24HR INTAKE/OUTPUT:    Intake/Output Summary (Last 24 hours) at 2/2/2024 1331  Last data filed at 2/2/2024 1222  Gross per 24 hour   Intake 560 ml   Output 975 ml   Net -415 ml       Constitutional:  critically ill, on vent   Eyes:  Pupils reactive, sclera clear   Neck:  Normal thyroid, no masses   Cardiovascular:  Regular, no rub  Respiratory:  On vent, no wheezing  Psychiatry:  sedated on vent, KERRY   Abdomen: +bs, soft, nt, no masses   Musculoskeletal: + LE edema, no clubbing   Lymphatics:  No LAD in neck, no supraclavicular nodes   :  powers placed       MEDICAL DECISION MAKING       Data/  Recent Labs     01/31/24  0653 02/01/24  0759 02/02/24  0557   WBC 7.3 12.1* 13.2*   HGB 9.8* 9.8* 8.8*   HCT 29.7* 30.3* 26.7*   MCV 81.3 83.0 81.9    311 342       Recent Labs     01/31/24  0653 02/01/24  0759 02/02/24  0557    150* 145   K 3.5 3.5 3.1*   * 114* 111*   CO2 20* 22 21   GLUCOSE 141* 129* 182*   PHOS 2.3* 3.0 2.7   MG 1.80 1.90 2.00   BUN 33* 27* 33*   CREATININE 3.3* 3.0* 3.0*   LABGLOM 22* 25* 25*         Assessment/     Acute Kidney Injury:  KDIGO stage 3  Etiology:  Pre-renal due to DKA with profound volume depletion and hypotension -> seems to have progressed to ATN   Non-oliguric / getting volume resuscitation.  Plateau phase and urine volume has

## 2024-02-02 NOTE — CARE COORDINATION
INTERDISCIPLINARY PLAN OF CARE CONFERENCE    Date/Time: 2/2/2024 8:46 AM  Completed by: Danielle Lyon RN, Case Management      Patient Name:  Era Carson  YOB: 1975  Admitting Diagnosis: DKA, type 1, not at goal (HCC) [E10.10]  Acute respiratory failure with hypoxia (HCC) [J96.01]  Acute renal failure, unspecified acute renal failure type (HCC) [N17.9]  Diabetic ketoacidosis with coma associated with diabetes mellitus due to underlying condition (HCC) [E08.11]     Admit Date/Time:  1/25/2024 11:00 AM    Chart reviewed. Interdisciplinary team contacted or reviewed plan related to patient progress and discharge plans.   Disciplines included Case Management, Nursing, and Dietitian.    Current Status:01/25/2024  PT/OT recommendation for discharge plan of care: [facility]  Discharge Recommendations: ARU/IRF (inpatient rehab facility)   DME needs for discharge: Defer to facility  Expected D/C Disposition:  Rehab    Discharge Plan Comments:     Chart reviewed. Met with pt at bedside and explained the role of the CM.    Accepted for admission to [facility] once medically cleared for rehab and able to participate in therapy. Per ARU admission they cannot accept today due to ongoing confusion and labile blood glucose levels. [facility] are [facility]ating his to be ready Sunday. He will need a rapid covid prior to transfer to ARU.   [facility] following. CM following.    ADDENDUM 11:59  Call received from [facility] admissions, Insurance information updated and pt now showing as active with [facility] and will require a pre-cert. She will submit pre-cert today for an anticipated DC to Rehab on Sunday.     Home O2 in place on admit: No  Pt informed of need to bring portable home O2 tank on day of discharge for nursing to connect prior to leaving:  No  Verbalized agreement/Understanding:  No

## 2024-02-02 NOTE — PROGRESS NOTES
Comprehensive Nutrition Assessment    Type and Reason for Visit:  Reassess    Nutrition Recommendations/Plan:   Continue ADULT DIET; Dysphagia - Soft and Bite-Sized; 4 carb choices per meal diet order + Mildly Thick liquids - consistency changes, per SLP guidance.   Monitor appetite and po intake.   Monitor mental status and plan of care.   Monitor nutrition-related labs, bowel function + GI status, and weight trends.      Malnutrition Assessment:  Malnutrition Status:  Mild malnutrition (02/02/24 1541)    Context:  Acute Illness     Findings of the 6 clinical characteristics of malnutrition:  Energy Intake:  50% or less of estimated energy requirements for 5 or more days  Weight Loss:  No significant weight loss     Body Fat Loss:  No significant body fat loss     Muscle Mass Loss:  Mild muscle mass loss Clavicles (pectoralis & deltoids)  Fluid Accumulation:  No significant fluid accumulation Extremities (bilateral arms/hands are swollen)   Strength:  Not Performed    Nutrition Assessment:    patient is slightly improved from a nutritional standpoint AEB his diet was advanced and he has consumed a little bit of po intake, however, he remains at risk for further compromise d/t need for altered po consistencies, emesis, poor po intake, and altered mental status; will continue ADULT DIET; Dysphagia - Soft and Bite-Sized; 4 carb choices per meal diet order + Mildly Thick Liquids and will monitor nutrition status    Nutrition Related Findings:    patient was transferred from ICU to ; he is A & O to self at this time; patient was seen by SLP today and SLP recommended that Soft and Bite-Sized solids + Mildly Thick liquids continue and Free [place] Water Protocol is in place at this time as well; patient consumed 1-25% x 1 meal today and 1-25% x 1 apple juice today; + BM on 1/31/24; + emesis x 2 today Wound Type: None       Current Nutrition Intake & Therapies:    Average Meal Intake: 1-25%  Average Supplements  Focused Physical Findings, Skin, Weight    Discharge Planning:    Too soon to determine     Marlene Reeves RD, LD  Contact: [phone]

## 2024-02-02 NOTE — FLOWSHEET NOTE
02/02/24 0815   Vital Signs   Temp 98.9 °F (37.2 °C)   Temp Source Oral   Pulse (!) 105   Heart Rate Source Monitor   Respirations 20   BP (!) 166/69   MAP (Calculated) 101   Pain Assessment   Pain Assessment None - Denies Pain   Care Plan - Pain Goals   Verbalizes/displays adequate comfort level or baseline comfort level Encourage patient to monitor pain and request assistance;Assess pain using appropriate pain scale;Administer analgesics based on type and severity of pain and evaluate response;Implement non-pharmacological measures as appropriate and evaluate response;Consider cultural and social influences on pain and pain management;Notify Licensed Independent Practitioner if interventions unsuccessful or patient reports new pain   Oxygen Therapy   SpO2 96 %   O2 Device Nasal cannula   O2 Flow Rate (L/min) 2 L/min     Patient sitting ip in bed, weak. Edema remains LUE and BLEs. Dr. Kwan here and examined patient. VSS. Skin pink warm and dry. Spoon fed took in pudding and jello. Will continue to monitor.

## 2024-02-02 NOTE — PROGRESS NOTES
BP (!) 104/45   Pulse (!) 104   Temp 99.1 °F (37.3 °C) (Oral)   Resp 18   Ht 1.88 m (6' 2.02\")   Wt 106.9 kg (235 lb 9.6 oz)   SpO2 94%   BMI 30.24 kg/m²     Pt awake in bed. Pt oriented to person and place only. Assessment complete. Meds passed. Pt denies needs at this time.        Bedside Mobility Assessment Tool (BMAT):     Assessment Level 1- Sit and Shake    1. From a semi-reclined position, ask patient to sit up and rotate to a seated position at the side of the bed. Can use the bedrail.    2. Ask patient to reach out and grab your hand and shake making sure patient reaches across his/her midline.   Fail- Patient is unable to perform tasks, patient is MOBILITY LEVEL 1.    Assessment Level 2- Stretch and Point   1. With patient in seated position at the side of the bed, have patient place both feet on the floor (or stool) with knees no higher than hips.    2. Ask patient to stretch one leg and straighten the knee, then bend the ankle/flex and point the toes. If appropriate, repeat with the other leg.   Fail- Patient is unable to complete task. Patient is MOBILITY LEVEL 2.     Assessment Level 3- Stand   1. Ask patient to elevate off the bed or chair (seated to standing) using an assistive device (cane, bedrail).    2. Patient should be able to raise buttocks off be and hold for a count of five. May repeat once.   Fail- Patient unable to demonstrate standing stability. Patient is MOBILITY LEVEL 3.     Assessment Level 4- Walk   1. Ask patient to march in place at bedside.    2. Then ask patient to advance step and return each foot. Some medical conditions may render a patient from stepping backwards, use your best clinical judgement.   Fail- Patient not able to complete tasks OR requires use of assistive device. Patient is MOBILITY LEVEL 3.       Mobility Level- 1

## 2024-02-02 NOTE — CARE COORDINATION
Patient's demographic sheet was updated and now lists a different insurance as primary.   Precert initiated for ARU. Discussed with BEE Barbosa RN

## 2024-02-02 NOTE — PROGRESS NOTES
Bedside Mobility Assessment Tool (BMAT):     Assessment Level 1- Sit and Shake    1. From a semi-reclined position, ask patient to sit up and rotate to a seated position at the side of the bed. Can use the bedrail.    2. Ask patient to reach out and grab your hand and shake making sure patient reaches across his/her midline.   Fail- Patient is unable to perform tasks, patient is MOBILITY LEVEL 1.    Assessment Level 2- Stretch and Point   1. With patient in seated position at the side of the bed, have patient place both feet on the floor (or stool) with knees no higher than hips.    2. Ask patient to stretch one leg and straighten the knee, then bend the ankle/flex and point the toes. If appropriate, repeat with the other leg.   Fail- Patient is unable to complete task. Patient is MOBILITY LEVEL 2.     Assessment Level 3- Stand   1. Ask patient to elevate off the bed or chair (seated to standing) using an assistive device (cane, bedrail).    2. Patient should be able to raise buttocks off be and hold for a count of five. May repeat once.   Fail- Patient unable to demonstrate standing stability. Patient is MOBILITY LEVEL 3.     Assessment Level 4- Walk   1. Ask patient to march in place at bedside.    2. Then ask patient to advance step and return each foot. Some medical conditions may render a patient from stepping backwards, use your best clinical judgement.   Fail- Patient not able to complete tasks OR requires use of assistive device. Patient is MOBILITY LEVEL 3.       Mobility Level- 3

## 2024-02-02 NOTE — FLOWSHEET NOTE
02/02/24 0229   Vital Signs   Temp 99 °F (37.2 °C)   Temp Source Oral   Pulse (!) 101   Heart Rate Source Monitor   Respirations 19   /66   MAP (Calculated) 85   BP Location Right upper arm   BP Method Automatic   Patient Position Right side   Opioid-Induced Sedation   POSS Score 1   Oxygen Therapy   SpO2 97 %   O2 Device Nasal cannula   O2 Flow Rate (L/min) 2 L/min   Height and Weight   Weight - Scale 107 kg (236 lb)   Weight Method Bed scale   BMI (Calculated) 30.4     Pt VS as shown above.

## 2024-02-02 NOTE — PROGRESS NOTES
Progress Note    Admit Date:  1/25/2024    Interval history:  DKA with severe hyperglycemia above 2000 glucose   Encephalopathy , placed on vent      Off vent, improving renal function   Resumed diet      Issues with hypoglycemia overnight noted  Fevers resolving    Acute right internal jugular DVT - now on Eliquis    Subjective:  Mr. Carson seen up in bed, feels ok and wishes for water  Sugars better   Given lasix and D5 fluids   Now on 1 L o2  No fevers      Objective:   No data found.         Intake/Output Summary (Last 24 hours) at 2/2/2024 1246  Last data filed at 2/2/2024 1222  Gross per 24 hour   Intake 560 ml   Output 975 ml   Net -415 ml       Physical Exam:  General: awake alert and fairly oriented  Mucous Membranes:  Pink , anicteric  Neck: No JVD, no carotid bruit, no thyromegaly  Chest:  Clear to auscultation bilaterally, mild left sided crackles  Cardiovascular:  RRR S1S2 heard, ASM   Abdomen:  Soft, undistended, non tender, no organomegaly, BS present  Extremities: developing edema to all ext  Diffuse erythema in right antecubtial region with IV infiltration    Distal pulses feeble both feet  Neurological : awake, alert and better oriented  Following commands  Mild weakness in right EXt with pain      Medications:  insulin glargine, 15 Units, Nightly  DULoxetine, 30 mg, Daily  topiramate, 50 mg, BID  pantoprazole, 40 mg, QAM AC  apixaban, 10 mg, BID   Followed by  [START ON 2/8/2024] apixaban, 5 mg, BID  [Held by provider] gabapentin, 400 mg, BID  balsum peru-castor oil, , BID  insulin lispro, 0-16 Units, Q4H      PRN Medications:  ondansetron, 4 mg, Q8H PRN  glucose, 4 tablet, PRN  dextrose bolus, 125 mL, PRN   Or  dextrose bolus, 250 mL, PRN  glucagon (rDNA), 1 mg, PRN  dextrose, , Continuous PRN  medicated lip balm, , PRN  acetaminophen, 650 mg, Q6H PRN  acetaminophen, 650 mg, Q4H PRN  magnesium sulfate, 1,000 mg, PRN  polyethylene glycol, 17 g, Daily PRN  dextrose 5 % and 0.45 % NaCl, , Continuous  aniongap and metabolic derangements   - Admitted to the ICU and placed on vent for AMS  - DKA protocol initiated.  - Aggressive IV fluid resuscitation given  - Insulin drip initiated and sugars improved  - Bicarb gtt given for severe acidosis   - Monitored electrolytes every 4 hours and repleted  - Critical care consulted   - Off vent now  - Resumed diet   - transitioned  to lantus and ssi - adjust to avoid low sugars     Severe metabolic encephalopathy  - sec to DKA  - CT head neg   - Was on vent support   - repeat head CT negative  - Extubated and mentation improved slowly   - Hold seroquel,gabapentin for now, resume at lower doses on DC     HERB with CKD 3   - sec to severe DKA,dehydration and hypotension  - IV fluids and bicarb fluids given   - Nephrology involved  - Sanon placed and good UOP noted  - Improving creatinine now leveled around 3     Hypotension  - Sec to above, and dehydration, remained hypotensive post 6 L NS boluses  - Was on levo and now weaned off   - Cannot rule out infectious source - sepsis with new fevers  - Started cefepime and vanc   - Cx neg so far. Stop abx today   Fevers could be related to DVT      Hypothermia  - sec to severe DKA  - Active rewarming done and improved     Hyperkalemia  - Calcium gluconate 2 g x 1 given   - later Bicarb drip, resolved     H/o migraines   - On topamax and inderal at home- resume as tolerated     Generalized weakness -   Critical care myopathy   - PT recommends ARU  - dc planning in AM    Acute right internal jugular DVT  - venous doppler as above  - started on eliquis 10 mg bid       DVT Prophylaxis: Eliquis   Diet: ADULT DIET; Dysphagia - Soft and Bite Sized; 4 carb choices (60 gm/meal); Mildly Thick (Nectar)  Code Status: Full Code    ARU can take him on Sunday  Adjust home meds at dc - lower sedative meds    Samuel Kwan MD, 2/2/2024 12:49 PM

## 2024-02-02 NOTE — PROGRESS NOTES
Inpatient Physical Therapy Treatment    Unit: ICU  Date:  2/2/2024  Patient Name:    Era Carson  Admitting diagnosis:  DKA, type 1, not at goal ([facility]) [E10.10]  Acute respiratory failure with hypoxia ([facility]) [J96.01]  Acute renal failure, unspecified acute renal failure type ([facility]) [N17.9]  Diabetic ketoacidosis with coma associated with diabetes mellitus due to underlying condition ([facility]) [E08.11]  Admit Date:  1/25/2024  Precautions/Restrictions/WB Status/ Lines/ Wounds/ Oxygen: Fall risk, Bed/chair alarm, Lines (Supplemental O2 (3L) and external catheter), Confusion, Impaired vision, Telemetry, Continuous pulse oximetry, and Telesitter Blind in R eye     Pt seen for cotreatment this date due to patient safety, staff recommendation, and need for the assistance of 2 skilled therapists    Treatment Time:  13:15 - 13:41  Treatment Number:  3   Timed Code Treatment Minutes: 26 minutes  Total Treatment Minutes:  26 minutes    Patient Stated Goals for Therapy: \" to go to rehab\"           Discharge Recommendations: ARU/IRF (inpatient rehab facility)   DME needs for discharge: Defer to facility       Therapy recommendation for EMS Transport: requires transport by cot due to pt needs A x 2 for safe transfers    Therapy recommendations for staff:   Assist of 2 for sitting EOB    History of Present Illness:   [facility] ED notes:  \"48 y.o. male with a history of diabetes presents with altered mental status and hyperglycemia.  His sister said he has been ill for the past week.  He has had nausea and vomiting and has not been taking his insulin like he should.  She finally convinced him to go to his doctor yesterday and he had blood work done.  She states he was very confused last night but was refusing to come to the hospital.  The PCP called this morning and that his blood work showed a glucose of 1300 and they needed to come to the ER immediately.  She said this morning he was very altered.  He slept on the floor last  Standing: Fair ; Min A   Dynamic Standing: Fair -; Min A   Comments: With RW, side steps toward HOB    Posture  Seated: Forward head and neck and Posterior lean  Standing: Forward head and neck and Thoracic kyphosis    Bed Mobility   Supine to Sit:    Mod A   Sit to Supine:   Min A   Rolling:   SBA   Scooting in sitting: CGA   Scooting in supine:  Not Tested   Bridging:  SBA with VC's    Transfer Training     Sit to stand:   Min A x 2 (impulsive/unpredictable)   Stand to sit:   Min A x 2 (impulsive/unpredictable)   Bed to/from [facility]:  Not Tested with use of N/A     Gait gait completed as indicated below  Distance:      2 side steps toward HOB  Deviations (firm surface/linoleum):  decreased anna, increased PADMINI, step to pattern, forward flexed posture, decreased foot clearance bilaterally, and B foot drag/drop  Assistive Device Used:    rolling walker (RW)  Level of Assist:    Min A  x 2   Comment:     Stair Training deferred, pt unsafe/ not appropriate to complete stairs at this time    Therapeutic Exercises Initiated  deferred secondary to pt fatigued  Supine:  N/A    Seated:  N/A    Standing:  N/A     Activity Tolerance   During therapy session noted pt with no adverse symptoms to activity    Pt Position BP (mmHg) HR (bpm) SpO2 (%) on 3L  Comments   Supine at rest       Seated at /59      Standing 117/61      End of session, supine         Positioning Needs   Pt in bed, alarm set, positioned in proper neutral alignment and pressure relief provided.   Call light provided and all needs within reach  RN aware of pt position/status    Other Activities  None.    Patient/Family Education   Pt educated on role of inpatient PT, POC, importance of continued activity, DC recommendations, safety awareness, transfer techniques, and calling for assist with mobility.    Assessment  Pt fatigued and tired this date, however able to participate in therapy session with frequent rest breaks 2/2 fatigue and dizziness.

## 2024-02-02 NOTE — PROGRESS NOTES
Speech Language Pathology  Swallowing Disorders and Dysphagia    Dysphagia Treatment/Follow-Up Note  [place] [facility]    Recommendations: SLP recommends to continue IDDSI 6 Soft and Bite Sized Solids; IDDSI 2 Mildly Thick Liquids via cup only; [place] Free Water Protocol allowed with RN/ST only (Wait 30 min after PO intake is completed before allowing thin water, complete oral care prior to thin water/ice chips, ensure mouth is clean and clear, thin liquid (WATER ONLY) between meals, NO FOOD with the water (not even a snack), Follow strict aspiration precautions with thin water intake), Meds crushed in puree as able   Risk Management: upright for all intake, stay upright for at least 30 mins after intake, small bites/sips, assist feed, no straws, close supervision, oral care 2-3x/day to reduce adverse affects in the event of aspiration, increase physical mobility as able, alternate bites/sips, slow rate of intake, STRICT aspiration precautions, hold PO and contact SLP if s/s of aspiration or worsening respiratory status develop., and Control risk factors for aspiration PNA by completing oral care 3-4x/day and increasing physical mobility as is medically feasible.     NAME:Era Carson  : 1975 (48 y.o.)   MRN: [id number]  ROOM: 0228/0228-01  ADMISSION DATE: 2024  PATIENT DIAGNOSIS(ES): DKA, type 1, not at goal (HCC) [E10.10]  Acute respiratory failure with hypoxia (HCC) [J96.01]  Acute renal failure, unspecified acute renal failure type (HCC) [N17.9]  Diabetic ketoacidosis with coma associated with diabetes mellitus due to underlying condition ([facility]) [E08.11]  Allergies   Allergen Reactions    Tegaderm Ag Mesh 2\"X2\" [Wound Dressings]      \"Holes in skin from Tegaderm Adhesive\"    Codeine Other (See Comments)     hallucinates    Tape [Adhesive Tape]      Blister      Other Other (See Comments)     Holes in skin  From Tegaderm Adhesive  Blister       DATE ONSET: 2024    Pain: The  3-4x/day and increasing physical mobility as is medically feasible.     Dysphagia Goals:  Short Term Goals:  Timeframe for Short Term Goals: (5 days 02/04/2024)  Goal 1: The patient will tolerate recommended diet with no clinical s/s of aspiration 5/5 2/2/2024 : Goal addressed, see above. Ongoing, progressing.    Goal 2: The patient will tolerate therapeutic diet upgrade trials with no clinical s/s of aspiration 5/5 2/2/2024 : Goal addressed, see above. Ongoing, progressing.    Goal 3: The patient/caregiver will demonstrate understanding of compensatory swallow strategies, for improved swallow safety  2/2/2024 : Goal addressed, see above. Ongoing, progressing.    Goal 4: The patient will tolerate instrumental assessment when able   2/2/2024 : Goal met.           Long Term Goals:   Timeframe for Long Term Goals: (7 days 02/06/2024)  Goal 1: The patient will tolerate least restrictive diet with no clinical s/s of aspiration or worsening respiratory/pulmonary status  2/2/2024 : Ongoing, progressing.        Speech/Language/Cog Goals: N/A         Recommendations:  Solid Consistency: IDDSI 6 Soft and Bite Sized Solids  Liquid Consistency: IDDSI 2 Mildly Thick Liquids via cup only   - [place] Free Water Protocol allowed with RN/ST only  Medication: Meds crushed in puree as able    Plan:    Continued Dysphagia treatment with goals per plan of care.    Discharge Recommendations: Recommend SLP tx at ARU    If pt discharges from hospital prior to Speech/Swallowing discharge, this note serves as tx and discharge summary.     Total Treatment Time / Charges     Time in Time out Total Time / units   Cognitive Tx         Speech Tx      Dysphagia Tx 1127 1157 30 min / 1 unit (DT)     Signature:  Danielle Roy M.S. CCC-SLP  Speech-language pathologist  [phone]

## 2024-02-02 NOTE — PROGRESS NOTES
Inpatient Occupational Therapy Treatment    Unit: [address]  Date:  2/2/2024  Patient Name:    Era Carson  Admitting diagnosis:  DKA, type 1, not at goal [facility] [E10.10]  Acute respiratory failure with hypoxia ([facility]) [J96.01]  Acute renal failure, unspecified acute renal failure type ([facility]) [N17.9]  Diabetic ketoacidosis with coma associated with diabetes mellitus due to underlying condition ([facility]) [E08.11]  Admit Date:  1/25/2024  Precautions/Restrictions/WB Status/ Lines/ Wounds/ Oxygen: Fall risk, Lines (IV, internal catheter, and IJ right), Impaired vision, Telemetry, Continuous pulse oximetry, and Telesitter      Treatment Time:  13:15-13:41  Treatment Number:  3  Timed Code Treatment Minutes: 26 minutes  Total Treatment Minutes: 26  minutes    Patient Goals for Therapy: \"I feel weak\"          Discharge Recommendations: Acute Rehab (ARU)/ Inpatient Rehab Facility (IRF)  DME needs for discharge: Defer to facility       Therapy recommendations for staff:   Assist of 2 for sitting EOB    History of Present Illness: [facility] notes:  \"48 y.o. male with a history of diabetes presents with altered mental status and hyperglycemia.  His sister said he has been ill for the past week.  He has had nausea and vomiting and has not been taking his insulin like he should.  She finally convinced him to go to his doctor yesterday and he had blood work done.  She states he was very confused last night but was refusing to come to the hospital.  The PCP called this morning and that his blood work showed a glucose of 1300 and they needed to come to the ER immediately.  She said this morning he was very altered.  He slept on the floor last night and was difficult to arouse this morning.\"  Found to have blood sugar reading above 2000. Diagnosed with severe DKA, hypotension, HERB.  Required intubation, extubated on 1/30/24. Modified barium swallow study pending.    Home Health S4 Level Recommendation:  NA    AM-PAC Score: AM-PAC

## 2024-02-02 NOTE — PLAN OF CARE
Problem: Safety - Medical Restraint  Goal: Remains free of injury from restraints (Restraint for Interference with Medical Device)  Description: INTERVENTIONS:  1. Determine that other, less restrictive measures have been tried or would not be effective before applying the restraint  2. Evaluate the patient's condition at the time of restraint application  3. Inform patient/family regarding the reason for restraint  4. Q2H: Monitor safety, psychosocial status, comfort, nutrition and hydration  2/2/2024 0217 by TANNER SALEEM  Outcome: Progressing  2/1/2024 1519 by Margo Vicente RN  Outcome: Progressing  2/1/2024 1517 by Margo Vicente RN  Outcome: Progressing     Problem: Discharge Planning  Goal: Discharge to home or other facility with appropriate resources  2/2/2024 0217 by TANNER SALEEM  Outcome: Progressing  2/1/2024 1519 by Margo Vicente RN  Outcome: Progressing  2/1/2024 1517 by Margo Vicente RN  Outcome: Progressing     Problem: Confusion  Goal: Confusion, delirium, dementia, or psychosis is improved or at baseline  Description: INTERVENTIONS:  1. Assess for possible contributors to thought disturbance, including medications, impaired vision or hearing, underlying metabolic abnormalities, dehydration, psychiatric diagnoses, and notify attending LIP  2. [place] high risk fall precautions, as indicated  3. Provide frequent short contacts to provide reality reorientation, refocusing and direction  4. Decrease environmental stimuli, including noise as appropriate  5. Monitor and intervene to maintain adequate nutrition, hydration, elimination, sleep and activity  6. If unable to ensure safety without constant attention obtain sitter and review sitter guidelines with assigned personnel  7. Initiate Psychosocial CNS and Spiritual Care consult, as indicated  2/2/2024 0217 by TANNER SALEEM  Outcome: Progressing  2/1/2024 1519 by Margo Vicente RN  Outcome: Progressing  2/1/2024 1517 by Margo Vicente RN  Outcome:

## 2024-02-03 LAB
ALBUMIN SERPL-MCNC: 2.5 G/DL (ref 3.4–5)
ANION GAP SERPL CALCULATED.3IONS-SCNC: 9 MMOL/L (ref 3–16)
BUN SERPL-MCNC: 27 MG/DL (ref 7–20)
CALCIUM SERPL-MCNC: 8 MG/DL (ref 8.3–10.6)
CHLORIDE SERPL-SCNC: 113 MMOL/L (ref 99–110)
CO2 SERPL-SCNC: 23 MMOL/L (ref 21–32)
CREAT SERPL-MCNC: 2.4 MG/DL (ref 0.9–1.3)
GFR SERPLBLD CREATININE-BSD FMLA CKD-EPI: 33 ML/MIN/{1.73_M2}
GLUCOSE BLD-MCNC: 128 MG/DL (ref 70–99)
GLUCOSE BLD-MCNC: 165 MG/DL (ref 70–99)
GLUCOSE BLD-MCNC: 176 MG/DL (ref 70–99)
GLUCOSE BLD-MCNC: 184 MG/DL (ref 70–99)
GLUCOSE BLD-MCNC: 227 MG/DL (ref 70–99)
GLUCOSE BLD-MCNC: 239 MG/DL (ref 70–99)
GLUCOSE BLD-MCNC: 316 MG/DL (ref 70–99)
GLUCOSE SERPL-MCNC: 216 MG/DL (ref 70–99)
PERFORMED ON: ABNORMAL
PHOSPHATE SERPL-MCNC: 2.2 MG/DL (ref 2.5–4.9)
POTASSIUM SERPL-SCNC: 2.9 MMOL/L (ref 3.5–5.1)
SODIUM SERPL-SCNC: 145 MMOL/L (ref 136–145)

## 2024-02-03 PROCEDURE — 80069 RENAL FUNCTION PANEL: CPT

## 2024-02-03 PROCEDURE — 1200000000 HC SEMI PRIVATE

## 2024-02-03 PROCEDURE — 6370000000 HC RX 637 (ALT 250 FOR IP): Performed by: INTERNAL MEDICINE

## 2024-02-03 PROCEDURE — 36415 COLL VENOUS BLD VENIPUNCTURE: CPT

## 2024-02-03 PROCEDURE — 99232 SBSQ HOSP IP/OBS MODERATE 35: CPT | Performed by: INTERNAL MEDICINE

## 2024-02-03 PROCEDURE — 6360000002 HC RX W HCPCS: Performed by: INTERNAL MEDICINE

## 2024-02-03 PROCEDURE — 6370000000 HC RX 637 (ALT 250 FOR IP)

## 2024-02-03 RX ORDER — POTASSIUM CHLORIDE 20 MEQ/1
40 TABLET, EXTENDED RELEASE ORAL ONCE
Status: COMPLETED | OUTPATIENT
Start: 2024-02-03 | End: 2024-02-03

## 2024-02-03 RX ORDER — POTASSIUM CHLORIDE 20 MEQ/1
20 TABLET, EXTENDED RELEASE ORAL ONCE
Status: COMPLETED | OUTPATIENT
Start: 2024-02-03 | End: 2024-02-03

## 2024-02-03 RX ORDER — LANOLIN ALCOHOL/MO/W.PET/CERES
6 CREAM (GRAM) TOPICAL NIGHTLY PRN
Status: DISCONTINUED | OUTPATIENT
Start: 2024-02-03 | End: 2024-02-06 | Stop reason: HOSPADM

## 2024-02-03 RX ORDER — POTASSIUM CHLORIDE 20 MEQ/1
20 TABLET, EXTENDED RELEASE ORAL ONCE
Status: DISCONTINUED | OUTPATIENT
Start: 2024-02-03 | End: 2024-02-03

## 2024-02-03 RX ADMIN — POTASSIUM CHLORIDE 20 MEQ: 1500 TABLET, EXTENDED RELEASE ORAL at 10:41

## 2024-02-03 RX ADMIN — APIXABAN 10 MG: 5 TABLET, FILM COATED ORAL at 20:42

## 2024-02-03 RX ADMIN — PANTOPRAZOLE SODIUM 40 MG: 40 TABLET, DELAYED RELEASE ORAL at 05:41

## 2024-02-03 RX ADMIN — INSULIN LISPRO 12 UNITS: 100 INJECTION, SOLUTION INTRAVENOUS; SUBCUTANEOUS at 11:52

## 2024-02-03 RX ADMIN — Medication: at 20:42

## 2024-02-03 RX ADMIN — DULOXETINE HYDROCHLORIDE 30 MG: 30 CAPSULE, DELAYED RELEASE ORAL at 10:41

## 2024-02-03 RX ADMIN — DIBASIC SODIUM PHOSPHATE, MONOBASIC POTASSIUM PHOSPHATE AND MONOBASIC SODIUM PHOSPHATE 2 TABLET: 852; 155; 130 TABLET ORAL at 20:42

## 2024-02-03 RX ADMIN — POTASSIUM CHLORIDE 40 MEQ: 1500 TABLET, EXTENDED RELEASE ORAL at 08:19

## 2024-02-03 RX ADMIN — DIBASIC SODIUM PHOSPHATE, MONOBASIC POTASSIUM PHOSPHATE AND MONOBASIC SODIUM PHOSPHATE 2 TABLET: 852; 155; 130 TABLET ORAL at 16:48

## 2024-02-03 RX ADMIN — INSULIN LISPRO 4 UNITS: 100 INJECTION, SOLUTION INTRAVENOUS; SUBCUTANEOUS at 04:36

## 2024-02-03 RX ADMIN — Medication: at 10:42

## 2024-02-03 RX ADMIN — ONDANSETRON 4 MG: 2 INJECTION INTRAMUSCULAR; INTRAVENOUS at 20:54

## 2024-02-03 RX ADMIN — INSULIN GLARGINE 15 UNITS: 100 INJECTION, SOLUTION SUBCUTANEOUS at 20:43

## 2024-02-03 RX ADMIN — TOPIRAMATE 50 MG: 25 TABLET, FILM COATED ORAL at 10:46

## 2024-02-03 RX ADMIN — INSULIN LISPRO 4 UNITS: 100 INJECTION, SOLUTION INTRAVENOUS; SUBCUTANEOUS at 00:32

## 2024-02-03 RX ADMIN — TOPIRAMATE 50 MG: 25 TABLET, FILM COATED ORAL at 20:43

## 2024-02-03 RX ADMIN — APIXABAN 10 MG: 5 TABLET, FILM COATED ORAL at 10:40

## 2024-02-03 RX ADMIN — Medication 6 MG: at 20:43

## 2024-02-03 NOTE — PLAN OF CARE
and intervene to maintain adequate nutrition, hydration, elimination, sleep and activity  6. If unable to ensure safety without constant attention obtain sitter and review sitter guidelines with assigned personnel  7. Initiate Psychosocial CNS and Spiritual Care consult, as indicated  2/2/2024 2124 by TANNER SALEEM  Outcome: Progressing  2/2/2024 1023 by Ml Butcher, RN  Outcome: Progressing  Flowsheets (Taken 2/2/2024 0834)  Effect of thought disturbance (confusion, delirium, dementia, or psychosis) are managed with adequate functional status:   Assess for contributors to thought disturbance, including medications, impaired vision or hearing, underlying metabolic abnormalities, dehydration, psychiatric diagnoses, notify [facility]   [place] high risk fall precautions, as indicated   Provide frequent short contacts to provide reality reorientation, refocusing and direction   Decrease environmental stimuli, including noise as appropriate   Monitor and intervene to maintain adequate nutrition, hydration, elimination, sleep and activity   If unable to ensure safety without constant attention obtain sitter and review sitter guidelines with assigned personnel   Initiate Psychosocial Clinical Nurse Specialist and Spiritual Care consult, as indicated     Problem: Skin/Tissue Integrity  Goal: Absence of new skin breakdown  Description: 1.  Monitor for areas of redness and/or skin breakdown  2.  Assess vascular access sites hourly  3.  Every 4-6 hours minimum:  Change oxygen saturation probe site  4.  Every 4-6 hours:  If on nasal continuous positive airway pressure, respiratory therapy assess nares and determine need for appliance change or resting period.  2/2/2024 2124 by TANNER SALEEM  Outcome: Progressing  2/2/2024 1023 by Ml Buthcer, RN  Outcome: Progressing     Problem: Safety - Adult  Goal: Free from fall injury  2/2/2024 2124 by TANNER SALEEM  Outcome: Progressing  2/2/2024 1023 by Ok Gonzalez  are exacerbated and prevent overall improvement and discharge     Problem: Nutrition Deficit:  Goal: Optimize nutritional status  2/2/2024 2124 by TANNER SALEEM  Outcome: Progressing  Flowsheets (Taken 2/2/2024 1531 by Marlene Reeves, RD, LD)  Nutrient intake appropriate for improving, restoring, or maintaining nutritional needs:   Assess nutritional status and recommend course of action   Monitor oral intake, labs, and treatment plans   Recommend appropriate diets, oral nutritional supplements, and vitamin/mineral supplements  2/2/2024 1023 by Ml Butcher, RN  Outcome: Progressing

## 2024-02-03 NOTE — PROGRESS NOTES
Progress Note    HISTORY     CC:   AMS              We are following for Acute kidney injury      Subjective/     HPI:    Renal function, Sodium improving now off IVF's, non-oliguric.   BP's better, now off pressors.       ROS:  Intake improving, +weak.    EXAM       Objective/     Vitals:    02/02/24 2019 02/03/24 0255 02/03/24 0547 02/03/24 0900   BP: 137/62 (!) 129/54  (!) 144/65   Pulse: 97 94  95   Resp: 18 18  18   Temp: 97.7 °F (36.5 °C) 98.6 °F (37 °C)  98.2 °F (36.8 °C)   TempSrc: Oral Axillary  Oral   SpO2: 97% 94%  98%   Weight:   105.2 kg (231 lb 14.4 oz)    Height:         24HR INTAKE/OUTPUT:    Intake/Output Summary (Last 24 hours) at 2/3/2024 1503  Last data filed at 2/3/2024 1337  Gross per 24 hour   Intake 720 ml   Output 2600 ml   Net -1880 ml       Constitutional:  critically ill, on vent   Eyes:  Pupils reactive, sclera clear   Neck:  Normal thyroid, no masses   Cardiovascular:  Regular, no rub  Respiratory:  On vent, no wheezing  Psychiatry:  sedated on vent, KERRY   Abdomen: +bs, soft, nt, no masses   Musculoskeletal: + LE edema, no clubbing   Lymphatics:  No LAD in neck, no supraclavicular nodes   :  powers placed       MEDICAL DECISION MAKING       Data/  Recent Labs     02/01/24  0759 02/02/24  0557   WBC 12.1* 13.2*   HGB 9.8* 8.8*   HCT 30.3* 26.7*   MCV 83.0 81.9    342       Recent Labs     02/01/24  0759 02/02/24  0557 02/03/24  0542   * 145 145   K 3.5 3.1* 2.9*   * 111* 113*   CO2 22 21 23   GLUCOSE 129* 182* 216*   PHOS 3.0 2.7 2.2*   MG 1.90 2.00  --    BUN 27* 33* 27*   CREATININE 3.0* 3.0* 2.4*   LABGLOM 25* 25* 33*         Assessment/     Acute Kidney Injury:  KDIGO stage 3  Etiology:  Pre-renal due to DKA with profound volume depletion and hypotension -> seems to have progressed to ATN   Non-oliguric / getting volume resuscitation.  Plateau phase and urine volume has improved.  Off pressors   Chronic Kidney Disease:  Stage 3a  Baseline:  1.4 -  1.8  Etiology:  Diabetic Nephropathy with proteinuria  Follows with Dr. Stevenson in the office   DKA:  anion gap of 35 with severe elevation in glucose  Now in the ICU and improving, anion gap closed   Metabolic Acidosis:  Anion gap acidosis + non-gap metabolic acidosis ( related to HERB and impaired net acid excretion with chloride loading )   Stable, improving on HCO3 gtt   Hyponatremia:  Largely pseudohyponatremia / resolved, now running hypernatremic  Hypokalemia:  Significant total body K depletion with DKA - prn replacement  Shock:  Hypovolemic   Respiratory Failure:  On vent, intubated for airway protection as obtunded with DKA  Hypernatremia:  Free water deficit with increased urine volume and free water loss.  Solute post ATN diuresis     Hypophosphatemia: Nutritional, prn replacement    Plan/     -Trial off IVF's from 2/2, monitor with oral intake  -Trend labs, bp's, weights, & urine output

## 2024-02-03 NOTE — PROGRESS NOTES
Progress Note    Admit Date:  1/25/2024    Interval history:  DKA with severe hyperglycemia above 2000 glucose   Encephalopathy , placed on vent      Off vent, improving renal function   Resumed diet      Issues with hypoglycemia overnight noted  Fevers resolving    Acute right internal jugular DVT - now on Eliquis    Subjective:  Mr. Carson seen up in bed, feels   Sugars better         Objective:   Patient Vitals for the past 4 hrs:   BP Temp Temp src Pulse Resp SpO2 Weight   02/03/24 0900 (!) 144/65 98.2 °F (36.8 °C) Oral 95 18 98 % --   02/03/24 0547 -- -- -- -- -- -- 105.2 kg (231 lb 14.4 oz)            Intake/Output Summary (Last 24 hours) at 2/3/2024 0941  Last data filed at 2/3/2024 0420  Gross per 24 hour   Intake 440 ml   Output 900 ml   Net -460 ml         Physical Exam:  General: awake alert and fairly oriented  Mucous Membranes:  Pink , anicteric  Neck: No JVD, no carotid bruit, no thyromegaly  Chest:  Clear to auscultation bilaterally, mild left sided crackles  Cardiovascular:  RRR S1S2 heard, ASM   Abdomen:  Soft, undistended, non tender, no organomegaly, BS present  Extremities: developing edema to all ext  Diffuse erythema in right antecubtial region with IV infiltration    Distal pulses feeble both feet  Neurological : awake, alert and better oriented  Following commands  Mild weakness in right EXt with pain      Medications:  potassium chloride, 20 mEq, Once  insulin glargine, 15 Units, Nightly  DULoxetine, 30 mg, Daily  topiramate, 50 mg, BID  pantoprazole, 40 mg, QAM AC  apixaban, 10 mg, BID   Followed by  [START ON 2/8/2024] apixaban, 5 mg, BID  [Held by provider] gabapentin, 400 mg, BID  balsum peru-castor oil, , BID  insulin lispro, 0-16 Units, Q4H      PRN Medications:  melatonin, 6 mg, Nightly PRN  ondansetron, 4 mg, Q8H PRN  glucose, 4 tablet, PRN  dextrose bolus, 125 mL, PRN   Or  dextrose bolus, 250 mL, PRN  glucagon (rDNA), 1 mg, PRN  dextrose, , Continuous PRN  medicated lip balm, ,  thrombosis   involving the right upper extremity or left subclavian vein.        Assessment/Plan:    Severe DKA  Glucose at  2355 with severe acidosis and aniongap and metabolic derangements   - Admitted to the ICU and placed on vent for AMS  - DKA protocol initiated.  - Aggressive IV fluid resuscitation given  - Insulin drip initiated and sugars improved  - Bicarb gtt given for severe acidosis   - Monitored electrolytes every 4 hours and repleted  - Critical care consulted   - Off vent now  - Resumed diet   - transitioned  to lantus and ssi - adjust to avoid low sugars     Severe metabolic encephalopathy  - sec to DKA  - CT head neg   - Was on vent support   - repeat head CT negative  - Extubated and mentation improved slowly   - Hold seroquel,gabapentin for now, resume at lower doses on DC  -resolved     HERB with CKD 3   - sec to severe DKA,dehydration and hypotension  - IV fluids and bicarb fluids given   - Nephrology involved  - Sanon placed and good UOP noted  - Improving creatinine      Hypotension  - Sec to above, and dehydration, remained hypotensive post 6 L NS boluses  - Was on levo and now weaned off   - Cannot rule out infectious source - sepsis with new fevers  - Started cefepime and vanc   - Cx neg so far. Abx stopped      Hypothermia  - sec to severe DKA  - Active rewarming done and improved     Hyperkalemia  - Calcium gluconate 2 g x 1 given   - later Bicarb drip, resolved     H/o migraines   - On topamax and inderal at home- resume as tolerated     Generalized weakness -   Critical care myopathy   - PT recommends ARU  - dc planning in AM    Acute right internal jugular DVT  - venous doppler as above  - started on eliquis 10 mg bid       DVT Prophylaxis: Eliquis   Diet: ADULT DIET; Dysphagia - Soft and Bite Sized; 4 carb choices (60 gm/meal); Mildly Thick (Nectar)  Code Status: Full Code    ARU can take him on Sunday    STEPHEN DIXON MD, 2/3/2024 9:41 AM

## 2024-02-03 NOTE — FLOWSHEET NOTE
02/03/24 0255   Vital Signs   Temp 98.6 °F (37 °C)   Temp Source Axillary   Pulse 94   Heart Rate Source Monitor   Respirations 18   BP (!) 129/54   MAP (Calculated) 79   BP Location Left upper arm   BP Method Automatic   Patient Position Semi sarahwlers   Opioid-Induced Sedation   POSS Score 1   Oxygen Therapy   SpO2 94 %   O2 Device Nasal cannula   O2 Flow Rate (L/min) 1.5 L/min     Pt VS as shown above.

## 2024-02-03 NOTE — PLAN OF CARE
Problem: Confusion  Goal: Confusion, delirium, dementia, or psychosis is improved or at baseline  Description: INTERVENTIONS:  1. Assess for possible contributors to thought disturbance, including medications, impaired vision or hearing, underlying metabolic abnormalities, dehydration, psychiatric diagnoses, and notify attending LIP  2. [place] high risk fall precautions, as indicated  3. Provide frequent short contacts to provide reality reorientation, refocusing and direction  4. Decrease environmental stimuli, including noise as appropriate  5. Monitor and intervene to maintain adequate nutrition, hydration, elimination, sleep and activity  6. If unable to ensure safety without constant attention obtain sitter and review sitter guidelines with assigned personnel  7. Initiate Psychosocial CNS and Spiritual Care consult, as indicated  Outcome: Progressing   Confusion remains at baseline, eating without difficulty.

## 2024-02-03 NOTE — PROGRESS NOTES
Pt resting in bed with eyes closed. Pt resp even and unlabored. Pt shows no s/s of distress. Pt bed is in the lowest position, bed alarm is on, and call light is within reach.

## 2024-02-03 NOTE — PROGRESS NOTES
Pt a/o, confused to situation. Am assessment completed see flow sheet. Pt denies any pain/ needs at this time. Call light within reach.   Vitals:    02/03/24 0900   BP: (!) 144/65   Pulse: 95   Resp: 18   Temp: 98.2 °F (36.8 °C)   SpO2: 98%

## 2024-02-03 NOTE — PLAN OF CARE
Problem: Nutrition Deficit:  Goal: Optimize nutritional status  Outcome: Progressing   Patient eating, and blood glucose monitored AC & HS.  Insulin given as needed.

## 2024-02-03 NOTE — PROGRESS NOTES
/62   Pulse 97   Temp 97.7 °F (36.5 °C) (Oral)   Resp 18   Ht 1.88 m (6' 2.02\")   Wt 107 kg (236 lb)   SpO2 97%   BMI 30.29 kg/m²     Pt awake in bed. Pt alert and oriented to person and place only. Assessment complete. Meds passed. Pt denies needs at this time.        Bedside Mobility Assessment Tool (BMAT):     Assessment Level 1- Sit and Shake    1. From a semi-reclined position, ask patient to sit up and rotate to a seated position at the side of the bed. Can use the bedrail.    2. Ask patient to reach out and grab your hand and shake making sure patient reaches across his/her midline.   Fail- Patient is unable to perform tasks, patient is MOBILITY LEVEL 1.    Assessment Level 2- Stretch and Point   1. With patient in seated position at the side of the bed, have patient place both feet on the floor (or stool) with knees no higher than hips.    2. Ask patient to stretch one leg and straighten the knee, then bend the ankle/flex and point the toes. If appropriate, repeat with the other leg.   Fail- Patient is unable to complete task. Patient is MOBILITY LEVEL 2.     Assessment Level 3- Stand   1. Ask patient to elevate off the bed or chair (seated to standing) using an assistive device (cane, bedrail).    2. Patient should be able to raise buttocks off be and hold for a count of five. May repeat once.   Fail- Patient unable to demonstrate standing stability. Patient is MOBILITY LEVEL 3.     Assessment Level 4- Walk   1. Ask patient to march in place at bedside.    2. Then ask patient to advance step and return each foot. Some medical conditions may render a patient from stepping backwards, use your best clinical judgement.   Fail- Patient not able to complete tasks OR requires use of assistive device. Patient is MOBILITY LEVEL 3.       Mobility Level- 1

## 2024-02-04 LAB
ALBUMIN SERPL-MCNC: 2.5 G/DL (ref 3.4–5)
ANION GAP SERPL CALCULATED.3IONS-SCNC: 10 MMOL/L (ref 3–16)
BUN SERPL-MCNC: 21 MG/DL (ref 7–20)
CALCIUM SERPL-MCNC: 7.8 MG/DL (ref 8.3–10.6)
CHLORIDE SERPL-SCNC: 116 MMOL/L (ref 99–110)
CO2 SERPL-SCNC: 23 MMOL/L (ref 21–32)
CREAT SERPL-MCNC: 1.8 MG/DL (ref 0.9–1.3)
GFR SERPLBLD CREATININE-BSD FMLA CKD-EPI: 46 ML/MIN/{1.73_M2}
GLUCOSE BLD-MCNC: 136 MG/DL (ref 70–99)
GLUCOSE BLD-MCNC: 164 MG/DL (ref 70–99)
GLUCOSE BLD-MCNC: 164 MG/DL (ref 70–99)
GLUCOSE BLD-MCNC: 246 MG/DL (ref 70–99)
GLUCOSE BLD-MCNC: 280 MG/DL (ref 70–99)
GLUCOSE SERPL-MCNC: 145 MG/DL (ref 70–99)
PERFORMED ON: ABNORMAL
PHOSPHATE SERPL-MCNC: 2.7 MG/DL (ref 2.5–4.9)
POTASSIUM SERPL-SCNC: 3.3 MMOL/L (ref 3.5–5.1)
SODIUM SERPL-SCNC: 149 MMOL/L (ref 136–145)

## 2024-02-04 PROCEDURE — 92526 ORAL FUNCTION THERAPY: CPT

## 2024-02-04 PROCEDURE — 97530 THERAPEUTIC ACTIVITIES: CPT

## 2024-02-04 PROCEDURE — 6370000000 HC RX 637 (ALT 250 FOR IP)

## 2024-02-04 PROCEDURE — 6370000000 HC RX 637 (ALT 250 FOR IP): Performed by: INTERNAL MEDICINE

## 2024-02-04 PROCEDURE — 2580000003 HC RX 258: Performed by: INTERNAL MEDICINE

## 2024-02-04 PROCEDURE — 97110 THERAPEUTIC EXERCISES: CPT

## 2024-02-04 PROCEDURE — 80069 RENAL FUNCTION PANEL: CPT

## 2024-02-04 PROCEDURE — 36415 COLL VENOUS BLD VENIPUNCTURE: CPT

## 2024-02-04 PROCEDURE — 99232 SBSQ HOSP IP/OBS MODERATE 35: CPT | Performed by: INTERNAL MEDICINE

## 2024-02-04 PROCEDURE — 1200000000 HC SEMI PRIVATE

## 2024-02-04 RX ORDER — POTASSIUM CHLORIDE 20 MEQ/1
20 TABLET, EXTENDED RELEASE ORAL ONCE
Status: COMPLETED | OUTPATIENT
Start: 2024-02-04 | End: 2024-02-04

## 2024-02-04 RX ORDER — DEXTROSE MONOHYDRATE 50 MG/ML
INJECTION, SOLUTION INTRAVENOUS CONTINUOUS
Status: DISCONTINUED | OUTPATIENT
Start: 2024-02-04 | End: 2024-02-05

## 2024-02-04 RX ADMIN — Medication: at 08:28

## 2024-02-04 RX ADMIN — APIXABAN 10 MG: 5 TABLET, FILM COATED ORAL at 08:23

## 2024-02-04 RX ADMIN — DULOXETINE HYDROCHLORIDE 30 MG: 30 CAPSULE, DELAYED RELEASE ORAL at 08:23

## 2024-02-04 RX ADMIN — INSULIN GLARGINE 15 UNITS: 100 INJECTION, SOLUTION SUBCUTANEOUS at 21:11

## 2024-02-04 RX ADMIN — INSULIN LISPRO 4 UNITS: 100 INJECTION, SOLUTION INTRAVENOUS; SUBCUTANEOUS at 21:11

## 2024-02-04 RX ADMIN — Medication: at 21:12

## 2024-02-04 RX ADMIN — POTASSIUM CHLORIDE 20 MEQ: 1500 TABLET, EXTENDED RELEASE ORAL at 08:23

## 2024-02-04 RX ADMIN — INSULIN LISPRO 8 UNITS: 100 INJECTION, SOLUTION INTRAVENOUS; SUBCUTANEOUS at 12:06

## 2024-02-04 RX ADMIN — DIBASIC SODIUM PHOSPHATE, MONOBASIC POTASSIUM PHOSPHATE AND MONOBASIC SODIUM PHOSPHATE 2 TABLET: 852; 155; 130 TABLET ORAL at 08:23

## 2024-02-04 RX ADMIN — TOPIRAMATE 50 MG: 25 TABLET, FILM COATED ORAL at 08:23

## 2024-02-04 RX ADMIN — TOPIRAMATE 50 MG: 25 TABLET, FILM COATED ORAL at 21:11

## 2024-02-04 RX ADMIN — APIXABAN 10 MG: 5 TABLET, FILM COATED ORAL at 21:11

## 2024-02-04 RX ADMIN — PANTOPRAZOLE SODIUM 40 MG: 40 TABLET, DELAYED RELEASE ORAL at 06:53

## 2024-02-04 RX ADMIN — DEXTROSE MONOHYDRATE: 50 INJECTION, SOLUTION INTRAVENOUS at 17:16

## 2024-02-04 NOTE — PROGRESS NOTES
BP (!) 161/80   Pulse 86   Temp 98.3 °F (36.8 °C) (Oral)   Resp 18   Ht 1.88 m (6' 2.02\")   Wt 105.2 kg (231 lb 14.4 oz)   SpO2 95%   BMI 29.76 kg/m²     Pt awake in bed. Pt alert and oriented to person, place and time only. Assessment complete. Meds passed. Pt  tolerated normal meds but when given the phosphorus tablets which were cut in half and given in pudding, pt threw up.  Pt given PRN Zofran. The tablet was not seen in the thow up. Pt denies needs at this time.        Bedside Mobility Assessment Tool (BMAT):     Assessment Level 1- Sit and Shake    1. From a semi-reclined position, ask patient to sit up and rotate to a seated position at the side of the bed. Can use the bedrail.    2. Ask patient to reach out and grab your hand and shake making sure patient reaches across his/her midline.   Fail- Patient is unable to perform tasks, patient is MOBILITY LEVEL 1.    Assessment Level 2- Stretch and Point   1. With patient in seated position at the side of the bed, have patient place both feet on the floor (or stool) with knees no higher than hips.    2. Ask patient to stretch one leg and straighten the knee, then bend the ankle/flex and point the toes. If appropriate, repeat with the other leg.   Fail- Patient is unable to complete task. Patient is MOBILITY LEVEL 2.     Assessment Level 3- Stand   1. Ask patient to elevate off the bed or chair (seated to standing) using an assistive device (cane, bedrail).    2. Patient should be able to raise buttocks off be and hold for a count of five. May repeat once.   Fail- Patient unable to demonstrate standing stability. Patient is MOBILITY LEVEL 3.     Assessment Level 4- Walk   1. Ask patient to march in place at bedside.    2. Then ask patient to advance step and return each foot. Some medical conditions may render a patient from stepping backwards, use your best clinical judgement.   Fail- Patient not able to complete tasks OR requires use of assistive device.  Patient is MOBILITY LEVEL 3.       Mobility Level- 1

## 2024-02-04 NOTE — PROGRESS NOTES
Potassium 3.3 this am. New orders noted from Dr. RAE KCL 20 meq PO given per one time dose at 0823.

## 2024-02-04 NOTE — FLOWSHEET NOTE
02/03/24 1936   Handoff   Communication Given Shift Handoff   Handoff Given To Florinda   Handoff Received From Han   Handoff Communication Face to Face;At bedside   Time Handoff Given 1938   End of Shift Check Performed Yes

## 2024-02-04 NOTE — PROGRESS NOTES
Pt alert, confused. Am assessment completed see flow sheet. Pt encouraged to increase fluids, fluids moderately thick drinking without difficulty at this time. Will monitor fluid intake. Call light within reach.   Vitals:    02/04/24 0934   BP: (!) 166/76   Pulse: 92   Resp: 18   Temp: 97.6 °F (36.4 °C)   SpO2: 97%    Meds given as ordered.  Potassium 3.3 this am, and 20 meq PO given as ordered.   None

## 2024-02-04 NOTE — PROGRESS NOTES
Progress Note    HISTORY     CC:   AMS              We are following for Acute kidney injury      Subjective/     HPI:    Renal function back near baseline levels with fluids, non-oliguric.   Denies any shortness of breath, on RA.       ROS:  Intake improving - on thickened, dysphagia diet, sodium levels rising.  +weak.    EXAM       Objective/     Vitals:    02/03/24 2037 02/04/24 0316 02/04/24 0600 02/04/24 0934   BP: (!) 164/76 (!) 161/80  (!) 166/76   Pulse: 93 86  92   Resp: 18 18  18   Temp: 98.4 °F (36.9 °C) 98.3 °F (36.8 °C)  97.6 °F (36.4 °C)   TempSrc: Oral Oral  Oral   SpO2: 95% 95%  97%   Weight:   105.2 kg (231 lb 14.8 oz)    Height:         24HR INTAKE/OUTPUT:    Intake/Output Summary (Last 24 hours) at 2/4/2024 1407  Last data filed at 2/4/2024 1313  Gross per 24 hour   Intake 480 ml   Output 1900 ml   Net -1420 ml       Constitutional:  critically ill, on vent   Eyes:  Pupils reactive, sclera clear   Neck:  Normal thyroid, no masses   Cardiovascular:  Regular, no rub  Respiratory:  On vent, no wheezing  Psychiatry:  sedated on vent, KERRY   Abdomen: +bs, soft, nt, no masses   Musculoskeletal: + LE edema, no clubbing   Lymphatics:  No LAD in neck, no supraclavicular nodes   :  powers placed       MEDICAL DECISION MAKING       Data/  Recent Labs     02/02/24  0557   WBC 13.2*   HGB 8.8*   HCT 26.7*   MCV 81.9          Recent Labs     02/02/24  0557 02/03/24  0542 02/04/24  0615    145 149*   K 3.1* 2.9* 3.3*   * 113* 116*   CO2 21 23 23   GLUCOSE 182* 216* 145*   PHOS 2.7 2.2* 2.7   MG 2.00  --   --    BUN 33* 27* 21*   CREATININE 3.0* 2.4* 1.8*   LABGLOM 25* 33* 46*         Assessment/     Acute Kidney Injury:  KDIGO stage 3  Etiology:  Pre-renal due to DKA with profound volume depletion and hypotension -> seems to have progressed to ATN   Non-oliguric / getting volume resuscitation.  Plateau phase and urine volume has improved.  Off pressors   Chronic Kidney Disease:   Stage 3a  Baseline:  1.4 - 1.8  Etiology:  Diabetic Nephropathy with proteinuria  Follows with Dr. Stevenson in the office   DKA:  anion gap of 35 with severe elevation in glucose  Now in the ICU and improving, anion gap closed   Metabolic Acidosis:  Anion gap acidosis + non-gap metabolic acidosis ( related to HERB and impaired net acid excretion with chloride loading )   Stable, improving on HCO3 gtt   Hyponatremia:  Largely pseudohyponatremia / resolved, now running hypernatremic  Hypokalemia:  Significant total body K depletion with DKA - prn replacement  Shock:  Hypovolemic   Respiratory Failure:  On vent, intubated for airway protection as obtunded with DKA  Hypernatremia:  Free water deficit with increased urine volume and free water loss.  Solute post ATN diuresis     Hypophosphatemia: Nutritional, prn replacement    Plan/     -One liter of D5W to help with sodium levels given dietary restrictions of liquids with dysphagia diet  -Trend labs, bp's, weights, & urine output

## 2024-02-04 NOTE — PLAN OF CARE
Problem: Safety - Medical Restraint  Goal: Remains free of injury from restraints (Restraint for Interference with Medical Device)  Description: INTERVENTIONS:  1. Determine that other, less restrictive measures have been tried or would not be effective before applying the restraint  2. Evaluate the patient's condition at the time of restraint application  3. Inform patient/family regarding the reason for restraint  4. Q2H: Monitor safety, psychosocial status, comfort, nutrition and hydration  Outcome: Progressing     Problem: Discharge Planning  Goal: Discharge to home or other facility with appropriate resources  Outcome: Progressing     Problem: Confusion  Goal: Confusion, delirium, dementia, or psychosis is improved or at baseline  Description: INTERVENTIONS:  1. Assess for possible contributors to thought disturbance, including medications, impaired vision or hearing, underlying metabolic abnormalities, dehydration, psychiatric diagnoses, and notify attending LIP  2. [place] high risk fall precautions, as indicated  3. Provide frequent short contacts to provide reality reorientation, refocusing and direction  4. Decrease environmental stimuli, including noise as appropriate  5. Monitor and intervene to maintain adequate nutrition, hydration, elimination, sleep and activity  6. If unable to ensure safety without constant attention obtain sitter and review sitter guidelines with assigned personnel  7. Initiate Psychosocial CNS and Spiritual Care consult, as indicated  Outcome: Progressing     Problem: Skin/Tissue Integrity  Goal: Absence of new skin breakdown  Description: 1.  Monitor for areas of redness and/or skin breakdown  2.  Assess vascular access sites hourly  3.  Every 4-6 hours minimum:  Change oxygen saturation probe site  4.  Every 4-6 hours:  If on nasal continuous positive airway pressure, respiratory therapy assess nares and determine need for appliance change or resting period.  Outcome:  Progressing     Problem: Safety - Adult  Goal: Free from fall injury  Outcome: Progressing     Problem: ABCDS Injury Assessment  Goal: Absence of physical injury  Outcome: Progressing     Problem: Pain  Goal: Verbalizes/displays adequate comfort level or baseline comfort level  Outcome: Progressing     Problem: Chronic Conditions and Co-morbidities  Goal: Patient's chronic conditions and co-morbidity symptoms are monitored and maintained or improved  Outcome: Progressing     Problem: Nutrition Deficit:  Goal: Optimize nutritional status  Outcome: Progressing

## 2024-02-04 NOTE — PLAN OF CARE
Problem: Safety - Adult  Goal: Free from fall injury  2/4/2024 1606 by Gayle Taylor, RN  Outcome: Progressing   Bed alarm on for safety, patient with no distress noted. Call light in reach.

## 2024-02-04 NOTE — PROGRESS NOTES
Speech Language Pathology  Swallowing Disorders and Dysphagia    Dysphagia Treatment/Follow-Up Note  Facility/Department: [facility]    Recommendations: SLP recommends to continue IDDSI 6 Soft and Bite Sized Solids; IDDSI 2 Mildly Thick Liquids via cup only; [place] Free Water Protocol allowed with RN/ST only (Wait 30 min after PO intake is completed before allowing thin water, complete oral care prior to thin water/ice chips, ensure mouth is clean and clear, thin liquid (WATER ONLY) between meals, NO FOOD with the water (not even a snack), Follow strict aspiration precautions with thin water intake), Meds crushed in puree as able     Risk Management: upright for all intake, stay upright for at least 30 mins after intake, small bites/sips, assist feed, no straws, close supervision, oral care 2-3x/day to reduce adverse affects in the event of aspiration, increase physical mobility as able, alternate bites/sips, slow rate of intake, STRICT aspiration precautions, hold PO and contact SLP if s/s of aspiration or worsening respiratory status develop., and Control risk factors for aspiration PNA by completing oral care 3-4x/day and increasing physical mobility as is medically feasible.     NAME:Era Carson  : 1975 (48 y.o.)   MRN: [id number]  ROOM: 0228/0228-01  ADMISSION DATE: 2024  PATIENT DIAGNOSIS(ES): DKA, type 1, not at goal (HCC) [E10.10]  Acute respiratory failure with hypoxia (HCC) [J96.01]  Acute renal failure, unspecified acute renal failure type (HCC) [N17.9]  Diabetic ketoacidosis with coma associated with diabetes mellitus due to underlying condition ([facility]) [E08.11]  Allergies   Allergen Reactions    Tegaderm Ag Mesh 2\"X2\" [Wound Dressings]      \"Holes in skin from Tegaderm Adhesive\"    Codeine Other (See Comments)     hallucinates    Tape [Adhesive Tape]      Blister      Other Other (See Comments)     Holes in skin  From Tegaderm Adhesive  Blister       DATE ONSET: 2024    Pain: The  Anatomical components of swallow structures as they pertain to airway protection, and Importance of oral care to reduce adverse affects in the event of aspiration. SLP also provided edu re: guidelines of FFWP. Pt verbalized understanding although would benefit from ongoing reinforcement and RN aware of recommendations    Assessment: Pt grossly tolerating observed PO trials (however, pt is a silent aspiration per recent instrumental swallow studies; will likely benefit from repeat instrumental swallow study in the future). Good carryover of recommended compensatory strategies. Per chart and RN, pt is tolerating recommended diet of soft and bite sized solids, NTL.  Pt needs ongoing education/review of FFWP.  Pt able to recall that he needs to brush his teeth and \"swish and spit\" before ice chips. Pt also able to recall that he cannot have it with food. Despite being able to recall these guidelines, pt will benefit from the ongoing edu.  Pt oriented to self, place (hospital - not name), year. Pt required min cues for month. Pt would benefit from further assessment of cognitive-linguistic abilities.     Recommendations: SLP recommends to continue IDDSI 6 Soft and Bite Sized Solids; IDDSI 2 Mildly Thick Liquids via cup only; [place] Free Water Protocol allowed with RN/ST only (Wait 30 min after PO intake is completed before allowing thin water, complete oral care prior to thin water/ice chips, ensure mouth is clean and clear, thin liquid (WATER ONLY) between meals, NO FOOD with the water (not even a snack), Follow strict aspiration precautions with thin water intake), Meds crushed in puree as able   Risk Management: upright for all intake, stay upright for at least 30 mins after intake, small bites/sips, assist feed, no straws, close supervision, oral care 2-3x/day to reduce adverse affects in the event of aspiration, increase physical mobility as able, alternate bites/sips, slow rate of intake, STRICT aspiration

## 2024-02-04 NOTE — PROGRESS NOTES
Inpatient Physical Therapy Treatment    Unit: ICU  Date:  2/4/2024  Patient Name:    Era Carson  Admitting diagnosis:  DKA, type 1, not at goal ([facility]) [E10.10]  Acute respiratory failure with hypoxia ([facility]) [J96.01]  Acute renal failure, unspecified acute renal failure type ([facility]) [N17.9]  Diabetic ketoacidosis with coma associated with diabetes mellitus due to underlying condition ([facility]) [E08.11]  Admit Date:  1/25/2024  Precautions/Restrictions/WB Status/ Lines/ Wounds/ Oxygen: Fall risk, Bed/chair alarm, Lines (Supplemental O2 (3L) and external catheter), Confusion, Impaired vision, Telemetry, Continuous pulse oximetry, and Telesitter Blind in R eye     Pt seen for cotreatment this date due to patient safety, staff recommendation, and need for the assistance of 2 skilled therapists    Treatment Time:  5842 - 3417  Treatment Number:  4   Timed Code Treatment Minutes: 23 minutes  Total Treatment Minutes:  23 minutes    Patient Stated Goals for Therapy: \" to go to rehab\"           Discharge Recommendations: ARU/IRF (inpatient rehab facility)   DME needs for discharge: Defer to facility       Therapy recommendation for EMS Transport: requires transport by cot due to pt needs A x 2 for safe transfers    Therapy recommendations for staff:   Assist of 2 for sitting EOB    History of Present Illness:   [facility] ED notes:  \"48 y.o. male with a history of diabetes presents with altered mental status and hyperglycemia.  His sister said he has been ill for the past week.  He has had nausea and vomiting and has not been taking his insulin like he should.  She finally convinced him to go to his doctor yesterday and he had blood work done.  She states he was very confused last night but was refusing to come to the hospital.  The PCP called this morning and that his blood work showed a glucose of 1300 and they needed to come to the ER immediately.  She said this morning he was very altered.  He slept on the floor last night  and was difficult to arouse this morning.\"  Found to have blood sugar reading above 2000. Diagnosed with severe DKA, hypotension, HERB.  Required intubation, extubated on 1/30/24. Modified barium swallow study pending.    Home Health S4 Level Recommendation:  NA        AM-PAC Mobility Score       AM-PAC Inpatient Mobility without Stair Climbing Raw Score : 16    Subjective  Patient lying reclined in bed with no family present.  Pt agreeable to this PT session.     Cognition    A&O Person, Time, and Situation    Able to follow 1 step commands    Pain   No  Location:   Rating: NA /10  Pain Medicine Status: Denies need    Preadmission Environment:   Pt. Lives                                              with family (sister)  Home environment:                            mobile home/trailer  Steps to enter first floor:                     5 steps to enter, 1 HR  Steps to second floor/basement:        N/A  Laundry:                                              1st floor  Bathroom:                                           tub/shower unit, sits down in tub, no grab bars, standard toilet  Pt sleeps in a:                                     Adjustable bed, couch, floor  Equipment owned:                              manual WC, \"staff\"     Preadmission Status:  Pt. Able to drive:                                 No  Pt. Fully independent with ADLs:       Yes  Pt. Required assistance for:               Cleaning, Cooking, Laundry , and grocery shopping; pt assists with dishes and his own laundry  Pt. independent for functional transfers and utilized  \"staff\"  for mobility in home and Manual W/C out in community; uses \"staff\" to assist with sit to stand, tapping for vision  History of falls:                                    Yes, unsure of exact number  Home Health Services:                       None    Objective    Balance  Static Sitting:  Fair +; SBA /CGA  Dynamic Sitting:  Fair ; Min A   Comments: EOB     Static Standing: Fair

## 2024-02-04 NOTE — PROGRESS NOTES
Progress Note    Admit Date:  1/25/2024    Interval history:  DKA with severe hyperglycemia above 2000 glucose   Encephalopathy , placed on vent      Off vent, improving renal function   Resumed diet      Issues with hypoglycemia overnight noted  Fevers resolving    Acute right internal jugular DVT - now on Eliquis    Subjective:  Mr. Carson seen up in bed  No complaints        Objective:   Patient Vitals for the past 4 hrs:   BP Temp Temp src Pulse Resp SpO2   02/04/24 0934 (!) 166/76 97.6 °F (36.4 °C) Oral 92 18 97 %              Intake/Output Summary (Last 24 hours) at 2/4/2024 1022  Last data filed at 2/4/2024 0312  Gross per 24 hour   Intake 240 ml   Output 2700 ml   Net -2460 ml         Physical Exam:  General: awake alert and fairly oriented  Mucous Membranes:  Pink , anicteric  Neck: No JVD, no carotid bruit, no thyromegaly  Chest:  Clear to auscultation bilaterally, mild left sided crackles  Cardiovascular:  RRR S1S2 heard, ASM   Abdomen:  Soft, undistended, non tender, no organomegaly, BS present  Extremities: developing edema to all ext  Diffuse erythema in right antecubtial region with IV infiltration    Distal pulses feeble both feet  Neurological : awake, alert and better oriented  Following commands  Mild weakness in right EXt with pain      Medications:  phosphorus, 500 mg, BID  insulin glargine, 15 Units, Nightly  DULoxetine, 30 mg, Daily  topiramate, 50 mg, BID  pantoprazole, 40 mg, QAM AC  apixaban, 10 mg, BID   Followed by  [START ON 2/8/2024] apixaban, 5 mg, BID  [Held by provider] gabapentin, 400 mg, BID  balsum peru-castor oil, , BID  insulin lispro, 0-16 Units, Q4H      PRN Medications:  melatonin, 6 mg, Nightly PRN  ondansetron, 4 mg, Q8H PRN  glucose, 4 tablet, PRN  dextrose bolus, 125 mL, PRN   Or  dextrose bolus, 250 mL, PRN  glucagon (rDNA), 1 mg, PRN  dextrose, , Continuous PRN  medicated lip balm, , PRN  acetaminophen, 650 mg, Q6H PRN  acetaminophen, 650 mg, Q4H PRN  magnesium sulfate,  acidosis and aniongap and metabolic derangements   - Admitted to the ICU and placed on vent for AMS  - DKA protocol initiated.  - Aggressive IV fluid resuscitation given  - Insulin drip initiated and sugars improved  - Bicarb gtt given for severe acidosis   - Monitored electrolytes every 4 hours and repleted  - Critical care consulted   - Off vent now  - Resumed diet   - transitioned  to lantus and ssi - adjust to avoid low sugars     Severe metabolic encephalopathy  - sec to DKA  - CT head neg   - Was on vent support   - repeat head CT negative  - Extubated and mentation improved slowly   - Hold seroquel,gabapentin for now, resume at lower doses on DC  -resolved     HERB with CKD 3   - sec to severe DKA,dehydration and hypotension  - IV fluids and bicarb fluids given   - Nephrology involved  - Sanon placed and good UOP noted  - Improving creatinine   Sodium elevated today  Increase oral fluid intake     Hypotension  - Sec to above, and dehydration, remained hypotensive post 6 L NS boluses  - Was on levo and now weaned off   - Cannot rule out infectious source - sepsis with new fevers  - Started cefepime and vanc   - Cx neg so far. Abx stopped      Hypothermia  - sec to severe DKA  - Active rewarming done and improved     Hyperkalemia  - Calcium gluconate 2 g x 1 given   - later Bicarb drip, resolved     H/o migraines   - On topamax and inderal at home- resume as tolerated     Generalized weakness -   Critical care myopathy   - PT recommends ARU  - dc planning in AM    Acute right internal jugular DVT  - venous doppler as above  - started on eliquis 10 mg bid       DVT Prophylaxis: Eliquis   Diet: ADULT DIET; Dysphagia - Soft and Bite Sized; 4 carb choices (60 gm/meal); Mildly Thick (Nectar)  Code Status: Full Code    Needs CHRISTOPHER DIXON MD, 2/4/2024 10:22 AM

## 2024-02-05 LAB
ANION GAP SERPL CALCULATED.3IONS-SCNC: 17 MMOL/L (ref 3–16)
BUN SERPL-MCNC: 17 MG/DL (ref 7–20)
CALCIUM SERPL-MCNC: 7.7 MG/DL (ref 8.3–10.6)
CHLORIDE SERPL-SCNC: 106 MMOL/L (ref 99–110)
CO2 SERPL-SCNC: 21 MMOL/L (ref 21–32)
CREAT SERPL-MCNC: 1.6 MG/DL (ref 0.9–1.3)
GFR SERPLBLD CREATININE-BSD FMLA CKD-EPI: 53 ML/MIN/{1.73_M2}
GLUCOSE BLD-MCNC: 157 MG/DL (ref 70–99)
GLUCOSE BLD-MCNC: 222 MG/DL (ref 70–99)
GLUCOSE BLD-MCNC: 224 MG/DL (ref 70–99)
GLUCOSE BLD-MCNC: 251 MG/DL (ref 70–99)
GLUCOSE BLD-MCNC: 261 MG/DL (ref 70–99)
GLUCOSE BLD-MCNC: 273 MG/DL (ref 70–99)
GLUCOSE SERPL-MCNC: 178 MG/DL (ref 70–99)
MAGNESIUM SERPL-MCNC: 1.9 MG/DL (ref 1.8–2.4)
PERFORMED ON: ABNORMAL
POTASSIUM SERPL-SCNC: 3 MMOL/L (ref 3.5–5.1)
SODIUM SERPL-SCNC: 144 MMOL/L (ref 136–145)

## 2024-02-05 PROCEDURE — 97110 THERAPEUTIC EXERCISES: CPT

## 2024-02-05 PROCEDURE — 6370000000 HC RX 637 (ALT 250 FOR IP): Performed by: INTERNAL MEDICINE

## 2024-02-05 PROCEDURE — 92612 ENDOSCOPY SWALLOW (FEES) VID: CPT

## 2024-02-05 PROCEDURE — 97535 SELF CARE MNGMENT TRAINING: CPT

## 2024-02-05 PROCEDURE — 6370000000 HC RX 637 (ALT 250 FOR IP)

## 2024-02-05 PROCEDURE — 1200000000 HC SEMI PRIVATE

## 2024-02-05 PROCEDURE — 83735 ASSAY OF MAGNESIUM: CPT

## 2024-02-05 PROCEDURE — 99233 SBSQ HOSP IP/OBS HIGH 50: CPT | Performed by: INTERNAL MEDICINE

## 2024-02-05 PROCEDURE — 92526 ORAL FUNCTION THERAPY: CPT

## 2024-02-05 PROCEDURE — 36415 COLL VENOUS BLD VENIPUNCTURE: CPT

## 2024-02-05 PROCEDURE — 97530 THERAPEUTIC ACTIVITIES: CPT

## 2024-02-05 PROCEDURE — 80048 BASIC METABOLIC PNL TOTAL CA: CPT

## 2024-02-05 RX ORDER — TRAMADOL HYDROCHLORIDE 50 MG/1
50 TABLET ORAL 2 TIMES DAILY PRN
Qty: 6 TABLET | Refills: 0 | Status: SHIPPED | OUTPATIENT
Start: 2024-02-05 | End: 2024-02-12 | Stop reason: SDUPTHER

## 2024-02-05 RX ORDER — POTASSIUM CHLORIDE 20 MEQ/1
40 TABLET, EXTENDED RELEASE ORAL ONCE
Status: COMPLETED | OUTPATIENT
Start: 2024-02-05 | End: 2024-02-05

## 2024-02-05 RX ORDER — DULOXETIN HYDROCHLORIDE 30 MG/1
30 CAPSULE, DELAYED RELEASE ORAL DAILY
Qty: 30 CAPSULE | Refills: 0
Start: 2024-02-05

## 2024-02-05 RX ORDER — INSULIN GLARGINE 100 [IU]/ML
15 INJECTION, SOLUTION SUBCUTANEOUS NIGHTLY
Qty: 30 ML | Refills: 3
Start: 2024-02-05

## 2024-02-05 RX ORDER — TRAMADOL HYDROCHLORIDE 50 MG/1
50 TABLET ORAL 2 TIMES DAILY PRN
Status: DISCONTINUED | OUTPATIENT
Start: 2024-02-05 | End: 2024-02-06 | Stop reason: HOSPADM

## 2024-02-05 RX ORDER — GABAPENTIN 100 MG/1
200 CAPSULE ORAL 2 TIMES DAILY
Status: DISCONTINUED | OUTPATIENT
Start: 2024-02-05 | End: 2024-02-06 | Stop reason: HOSPADM

## 2024-02-05 RX ADMIN — Medication 6 MG: at 22:37

## 2024-02-05 RX ADMIN — INSULIN LISPRO 8 UNITS: 100 INJECTION, SOLUTION INTRAVENOUS; SUBCUTANEOUS at 03:49

## 2024-02-05 RX ADMIN — DULOXETINE HYDROCHLORIDE 30 MG: 30 CAPSULE, DELAYED RELEASE ORAL at 09:00

## 2024-02-05 RX ADMIN — Medication: at 09:03

## 2024-02-05 RX ADMIN — Medication: at 22:39

## 2024-02-05 RX ADMIN — GABAPENTIN 200 MG: 100 CAPSULE ORAL at 22:37

## 2024-02-05 RX ADMIN — TOPIRAMATE 50 MG: 25 TABLET, FILM COATED ORAL at 22:37

## 2024-02-05 RX ADMIN — INSULIN LISPRO 4 UNITS: 100 INJECTION, SOLUTION INTRAVENOUS; SUBCUTANEOUS at 17:00

## 2024-02-05 RX ADMIN — POTASSIUM CHLORIDE 40 MEQ: 1500 TABLET, EXTENDED RELEASE ORAL at 09:00

## 2024-02-05 RX ADMIN — TOPIRAMATE 50 MG: 25 TABLET, FILM COATED ORAL at 09:00

## 2024-02-05 RX ADMIN — INSULIN LISPRO 8 UNITS: 100 INJECTION, SOLUTION INTRAVENOUS; SUBCUTANEOUS at 11:57

## 2024-02-05 RX ADMIN — APIXABAN 10 MG: 5 TABLET, FILM COATED ORAL at 22:37

## 2024-02-05 RX ADMIN — INSULIN LISPRO 4 UNITS: 100 INJECTION, SOLUTION INTRAVENOUS; SUBCUTANEOUS at 22:38

## 2024-02-05 RX ADMIN — ACETAMINOPHEN 650 MG: 325 TABLET ORAL at 09:00

## 2024-02-05 RX ADMIN — PANTOPRAZOLE SODIUM 40 MG: 40 TABLET, DELAYED RELEASE ORAL at 05:43

## 2024-02-05 RX ADMIN — INSULIN GLARGINE 15 UNITS: 100 INJECTION, SOLUTION SUBCUTANEOUS at 22:37

## 2024-02-05 RX ADMIN — TRAMADOL HYDROCHLORIDE 50 MG: 50 TABLET ORAL at 09:00

## 2024-02-05 RX ADMIN — GABAPENTIN 200 MG: 100 CAPSULE ORAL at 09:00

## 2024-02-05 RX ADMIN — APIXABAN 10 MG: 5 TABLET, FILM COATED ORAL at 09:00

## 2024-02-05 NOTE — DISCHARGE INSTR - COC
Pneumococcal, PPSV23, PNEUMOVAX 23, (age 2y+), SC/IM, 0.5mL 06/22/2017    TDaP, ADACEL (age 10y-64y), BOOSTRIX (age 10y+), IM, 0.5mL 08/03/2021       Active Problems:  Patient Active Problem List   Diagnosis Code    Pilon fracture S82.873A    Type 1 diabetes mellitus with stage 3 chronic kidney disease (HCC) E10.22, N18.30    HTN (hypertension) I10    Leg wound, left S81.802A    Pathological fracture of tibia, left M84.469A    Staph aureus infection A49.01    Acute encephalopathy G93.40    Toxic metabolic encephalopathy [id number]    Acute respiratory failure with hypoxia ([facility]) J96.01    Hyponatremia E87.1    Meningitis G03.9    Encephalopathy G93.40    Chronic pain syndrome G89.4    Peripheral neuropathy G62.9    Neuropathic pain M79.2    Osteomyelitis ([facility]) M86.9    Pain of left lower extremity M79.605    Insomnia G47.00    Depression F32.A    Chronic migraine without aura, with intractable migraine, so stated, with status migrainosus G43.711    Myofascial pain M79.18    Proliferative diabetic retinopathy associated with type 1 diabetes mellitus ([facility]) E10.3599    Polyneuropathy due to type 1 diabetes mellitus ([facility]) E10.42    Esophagitis K20.90    Abnormal finding on EKG R94.31    Acute on chronic renal insufficiency N28.9, N18.9    Acute renal failure (HCC) N17.9    Hypoglycemia E16.2    Recurrent major depressive disorder, in partial remission ([facility]) F33.41    DKA, type 1, not at goal ([facility]) E10.10    Diabetic ketoacidosis with coma associated with diabetes mellitus due to underlying condition ([facility]) E08.11    Metabolic encephalopathy G93.41    Hypotension I95.9    Type 2 diabetes mellitus with ketoacidosis without coma ([facility]) E11.10    Mild protein-calorie malnutrition ([facility]) E44.1       Isolation/Infection:   Isolation            No Isolation          Patient Infection Status       None to display                     Nurse Assessment:  Last Vital Signs: BP (!) 140/72   Pulse 89   Temp 98.7 °F (37.1 °C) (Oral)   Resp  16   Ht 1.88 m (6' 2.02\")   Wt 105.6 kg (232 lb 12.8 oz)   SpO2 97%   BMI 29.88 kg/m²     Last documented pain score (0-10 scale):    Last Weight:   Wt Readings from Last 1 Encounters:   02/05/24 105.6 kg (232 lb 12.8 oz)     Mental Status:  oriented, alert, and confused at times.    IV Access:  - None    Nursing Mobility/ADLs:  Walking   Assisted  Transfer  Assisted  Bathing assisted  Dressing  Assisted  Toileting  Assisted  Feeding  Assisted  Med Admin  Dependent  Med Delivery   whole    Wound Care Documentation and Therapy:  Wound Diabetic Ulcer Toe (Comment  which one) Right (Active)   Number of days:        Wound Wrist Right (Active)   Number of days:        Wound Wrist Left (Active)   Number of days:        Diabetic Ulcer Buttocks Left (Active)   Number of days:        Diabetic Ulcer Knee Left (Active)   Number of days:        Diabetic Ulcer Knee Right (Active)   Number of days:         Elimination:  Continence:   Bowel: No  Bladder: No  Urinary Catheter: None   Colostomy/Ileostomy/Ileal Conduit: No       Date of Last BM: 2/3    Intake/Output Summary (Last 24 hours) at 2/5/2024 1331  Last data filed at 2/5/2024 1231  Gross per 24 hour   Intake 1180 ml   Output 2500 ml   Net -1320 ml     I/O last 3 completed shifts:  In: 1660 [P.O.:1660]  Out: 3950 [Urine:3950]    Safety Concerns:     History of Falls (last 30 days) and At Risk for Falls    Impairments/Disabilities:      Fall risk, history of     Nutrition Therapy:  Current Nutrition Therapy:   - Oral Diet:  Carb Control 4 carbs/meal (1800kcals/day)    Routes of Feeding: Oral  Liquids: Thin Liquids  Daily Fluid Restriction: no    Rehab Therapies: Physical Therapy and Occupational Therapy  Weight Bearing Status/Restrictions: No weight bearing restrictions  Other Medical Equipment (for information only, NOT a DME order):  walker    Patient's personal belongings (please select all that are sent with patient):  All belonging sent with patient, sister taking

## 2024-02-05 NOTE — CONSULTS
[facility] Nurse  Follow-up Progress Note       NAME:  Era Carson  MEDICAL RECORD NUMBER:  [id number]  AGE:  48 y.o.   GENDER:  male  :  1975  TODAY'S DATE:  2024    Subjective:  Pt following commands, inappropriate at times.     Wound Identification:  Wound Type: diabetic, pressure, and traumatic  Contributing Factors: diabetes, poor glucose control, chronic pressure, decreased mobility, and shear force        Patient Goal of Care:  [x] Wound Healing  [] Odor Control  [] Palliative Care  [] Pain Control   [] Other:     Pt seen for follow up for wound care.  Pt awaiting precert to ARU.  He is moving around well in bed.  Wounds showing signs of improvement.  Left buttock evolved to stage 3 pressure injury.      Objective:    BP (!) 147/71   Pulse 85   Temp 98.3 °F (36.8 °C) (Oral)   Resp 18   Ht 1.88 m (6' 2.02\")   Wt 105.6 kg (232 lb 12.8 oz)   SpO2 98%   BMI 29.88 kg/m²   Harpreet Risk Score: Harpreet Scale Score: 14  Assessment:   Measurements:  Wound Diabetic Ulcer Toe (Comment  which one) Right (Active)   Number of days:        Wound Wrist Right (Active)   Number of days:        Wound Wrist Left (Active)   Number of days:        Diabetic Ulcer Buttocks Left (Active)   Number of days:        Diabetic Ulcer Knee Left (Active)   Number of days:        Diabetic Ulcer Knee Right (Active)   Number of days:    Left medial thigh:  MARSI related to stat lock    Left ear:   Unstageable pressure injury, 100% black, edges demarcated and defined.         Right arm:  MARSI injury from IV dressing, 100% dry, brown scabs in a cluster.  No soi    Response to treatment: Complains of tenderness on buttock and sacrum       Plan:  Continue Venelex to sacrum and left ear and Triad to left buttock.  Right arm and right medial thigh:  Protect surrounding skin with skin prep wipe, then apply Hydrocolloid, change every 3-5 days and prn.  Right distal great toe and  lateral 5th toe-Paint with Betadine x1.  Seat cushion to chair           Specialty Bed Required : No Pt is moving himself in bed, gets OOB to chair  [] Low Air Loss   [] Pressure Redistribution  [] Fluid Immersion  [] Bariatric  [] Total Pressure Relief  [x] Other: Seat cushion    Current Diet: ADULT DIET; Dysphagia - Soft and Bite Sized; 4 carb choices (60 gm/meal); Mildly Thick (Nectar)  Dietician consult:  Yes    Discharge Plan:  Placement for patient upon discharge: skilled nursing / ARU  Patient appropriate for Outpatient Wound Care Center: Yes    Referrals:  [x]   [] Home Health Care  [] Supplies  [] Other    Patient/Caregiver Teaching:  Level of patient/caregiver understanding able to:   [] Indicates understanding       [x] Needs reinforcement  [] Unsuccessful      [] Verbal Understanding  [x] Demonstrated understanding       [] No evidence of learning  [] Refused teaching         [] N/A       Electronically signed by Radha Gonzalez RN, CWOCN on 2/5/2024 at 3:46 PM

## 2024-02-05 NOTE — PLAN OF CARE
Problem: Discharge Planning  Goal: Discharge to home or other facility with appropriate resources  Outcome: Progressing     Problem: Confusion  Goal: Confusion, delirium, dementia, or psychosis is improved or at baseline  Description: INTERVENTIONS:  1. Assess for possible contributors to thought disturbance, including medications, impaired vision or hearing, underlying metabolic abnormalities, dehydration, psychiatric diagnoses, and notify attending LIP  2. [place] high risk fall precautions, as indicated  3. Provide frequent short contacts to provide reality reorientation, refocusing and direction  4. Decrease environmental stimuli, including noise as appropriate  5. Monitor and intervene to maintain adequate nutrition, hydration, elimination, sleep and activity  6. If unable to ensure safety without constant attention obtain sitter and review sitter guidelines with assigned personnel  7. Initiate Psychosocial CNS and Spiritual Care consult, as indicated  Outcome: Progressing     Problem: Skin/Tissue Integrity  Goal: Absence of new skin breakdown  Description: 1.  Monitor for areas of redness and/or skin breakdown  2.  Assess vascular access sites hourly  3.  Every 4-6 hours minimum:  Change oxygen saturation probe site  4.  Every 4-6 hours:  If on nasal continuous positive airway pressure, respiratory therapy assess nares and determine need for appliance change or resting period.  Outcome: Progressing     Problem: Safety - Adult  Goal: Free from fall injury  2/4/2024 1606 by Gayle Taylor, RN  Outcome: Progressing

## 2024-02-05 NOTE — FLOWSHEET NOTE
02/04/24 1926   Handoff   Communication Given Shift Handoff   Handoff Given To Ml FLORES   Handoff Received From Han   Handoff Communication Face to Face;At bedside   Time Handoff Given 1910   End of Shift Check Performed Yes

## 2024-02-05 NOTE — PROGRESS NOTES
Progress Note    HISTORY     CC:   AMS              We are following for Acute kidney injury        Subjective/     HPI:    The patient was seen and examined; he feels well today with no CP, SOB, nausea or vomiting.    ROS: No fever or chills.  Social: Family at bedside.    EXAM       Objective/     Vitals:    02/05/24 0330 02/05/24 0510 02/05/24 0720 02/05/24 0930   BP: (!) 154/80  (!) 168/84    Pulse: 86  86    Resp: 16  16 16   Temp: 99.1 °F (37.3 °C)  98.9 °F (37.2 °C)    TempSrc: Oral  Oral    SpO2: 95%  95%    Weight:  105.6 kg (232 lb 12.8 oz)     Height:         24HR INTAKE/OUTPUT:    Intake/Output Summary (Last 24 hours) at 2/5/2024 1040  Last data filed at 2/5/2024 0720  Gross per 24 hour   Intake 1300 ml   Output 2950 ml   Net -1650 ml       Constitutional:  critically ill, on vent   Neck:  Normal thyroid, no masses   Cardiovascular:  Regular, no rub  Respiratory:  On vent, no wheezing  Psychiatry:  sedated on vent, KERRY   Abdomen: +bs, soft, nt, no masses   Musculoskeletal: + LE edema, no clubbing   :  powers placed     MEDICAL DECISION MAKING       Data/  No results for input(s): \"WBC\", \"HGB\", \"HCT\", \"MCV\", \"PLT\" in the last 72 hours.    Recent Labs     02/03/24  0542 02/04/24  0615 02/05/24  0551    149* 144   K 2.9* 3.3* 3.0*   * 116* 106   CO2 23 23 21   GLUCOSE 216* 145* 178*   PHOS 2.2* 2.7  --    MG  --   --  1.90   BUN 27* 21* 17   CREATININE 2.4* 1.8* 1.6*   LABGLOM 33* 46* 53*         Assessment/     Acute Kidney Injury:  KDIGO stage 3  Etiology:  Pre-renal due to DKA with profound volume depletion and hypotension -> seems to have progressed to ATN   Non-oliguric / getting volume resuscitation.  Plateau phase and urine volume has improved.  Off pressors   Chronic Kidney Disease:  Stage 3a  Baseline:  1.4 - 1.8  Etiology:  Diabetic Nephropathy with proteinuria  Follows with Dr. Stevenson in the office   DKA:  anion gap of 35 with severe elevation in glucose  Now in the ICU and

## 2024-02-05 NOTE — CARE COORDINATION
Update 24: Precert denied. P2P offered. Direct line to call is [phone]. MD will need Member name, , and insurance ID #. P2P due today 24 by noon.    Evie Felix, RN, BSN.  ARU Clinical Liaison  [facility]  Phone: [phone]  Fax: [phone]      [facility] - Acute Rehab Unit   Pre-cert pending and  under clinical review.    Thank you.   Anjana Davies M.A, CCC-SLP  Clinical Liaison

## 2024-02-05 NOTE — CARE COORDINATION
CM update    Plan: Acute Rehab    DC order noted.   Call placed to [facility] admissions to check progress on pre-cert. Cert still in process as of 3 PM today.

## 2024-02-05 NOTE — PROGRESS NOTES
Progress Note    Admit Date:  1/25/2024    Interval history:  DKA with severe hyperglycemia above 2000 glucose   Encephalopathy , placed on vent      Off vent, improving renal function   Resumed diet      Issues with hypoglycemia overnight noted  Fevers resolving    Acute right internal jugular DVT - now on Eliquis    Subjective:  Mr. Carson seen up in bed , feels ok today   Awaiting placement  No complaints        Objective:   Patient Vitals for the past 4 hrs:   BP Temp Temp src Pulse Resp SpO2 Weight   02/05/24 0720 (!) 168/84 98.9 °F (37.2 °C) Oral 86 16 95 % --   02/05/24 0510 -- -- -- -- -- -- 105.6 kg (232 lb 12.8 oz)              Intake/Output Summary (Last 24 hours) at 2/5/2024 0749  Last data filed at 2/5/2024 0720  Gross per 24 hour   Intake 1780 ml   Output 2950 ml   Net -1170 ml         Physical Exam:-  General: awake alert and fairly oriented  Mucous Membranes:  Pink , anicteric  Neck: No JVD, no carotid bruit, no thyromegaly  Chest:  Clear to auscultation bilaterally, resolvd crackles  Cardiovascular:  RRR S1S2 heard, ASM   Abdomen:  Soft, undistended, non tender, no organomegaly, BS present  Extremities: developing edema to all ext  Diffuse erythema in right antecubtial region with IV infiltration improved now    Distal pulses feeble both feet  Neurological : awake, alert and better oriented  Following commands  Mild weakness in right EXt with pain      Medications:  insulin glargine, 15 Units, Nightly  DULoxetine, 30 mg, Daily  topiramate, 50 mg, BID  pantoprazole, 40 mg, QAM AC  apixaban, 10 mg, BID   Followed by  [START ON 2/8/2024] apixaban, 5 mg, BID  [Held by provider] gabapentin, 400 mg, BID  balsum peru-castor oil, , BID  insulin lispro, 0-16 Units, Q4H      PRN Medications:  melatonin, 6 mg, Nightly PRN  ondansetron, 4 mg, Q8H PRN  glucose, 4 tablet, PRN  dextrose bolus, 125 mL, PRN   Or  dextrose bolus, 250 mL, PRN  glucagon (rDNA), 1 mg, PRN  dextrose, , Continuous PRN  medicated lip balm,  subclavian vein.        Assessment/Plan:    Severe DKA  Glucose at  2355 with severe acidosis and aniongap and metabolic derangements   - Admitted to the ICU and placed on vent for AMS  - DKA protocol initiated.  - Aggressive IV fluid resuscitation given  - Insulin drip initiated and sugars improved  - Bicarb gtt given for severe acidosis   - Monitored electrolytes every 4 hours and repleted  - Critical care consulted   - Off vent now  - Resumed diet   - transitioned  to lantus and ssi - adjust to avoid low sugars     Severe metabolic encephalopathy  - sec to DKA  - CT head neg   - Was on vent support   - repeat head CT negative  - Extubated and mentation improved slowly   - Held seroquel,gabapentin for now, resumed at lower doses  -resolved mentation issues      HERB with CKD 3   - sec to severe DKA,dehydration and hypotension  - IV fluids and bicarb fluids given   - Nephrology involved  - Sanon placed and good UOP noted  - Improving creatinine      Hypotension  - Sec to above, and dehydration, remained hypotensive post 6 L NS boluses  - Was on levo and now weaned off   - Cannot rule out infectious source - sepsis with new fevers  - Started cefepime and vanc   - Cx neg so far. Abx stopped       Acute right internal jugular DVT  - venous doppler as above  - started on eliquis 10 mg bid      Hypothermia  - sec to severe DKA  - Active rewarming done and improved     Hyperkalemia  - Calcium gluconate 2 g x 1 given   - later Bicarb drip, resolved     H/o migraines   - On topamax and inderal at home- resume as tolerated     Generalized weakness -   Critical care myopathy   - PT recommends ARU  - dc planning       DVT Prophylaxis: Eliquis   Diet: ADULT DIET; Dysphagia - Soft and Bite Sized; 4 carb choices (60 gm/meal); Mildly Thick (Nectar)  Code Status: Full Code    Needs ARU vs snf    Samuel Kwan MD, 2/5/2024 7:49 AM

## 2024-02-05 NOTE — PROGRESS NOTES
BP (!) 168/84   Pulse 86   Temp 98.9 °F (37.2 °C) (Oral)   Resp 16   Ht 1.88 m (6' 2.02\")   Wt 105.6 kg (232 lb 12.8 oz)   SpO2 95%   BMI 29.88 kg/m²     Assessment complete. Meds passed. Pt denies needs at this time. Patient states 10/10 pain, PO KCL given as ordered. X1 assist with GB and RW per  PT/OT. Patient tolerated fairly well, has some posterior lean. Patient somewhat forgetful, seems to be alert to self and somewhat to place and time. Patient states he is at [facility] in [place], and that it is \" 2- 25-24\". Patient was reoriented. Venelex applied to ordered areas        Bedside Mobility Assessment Tool (BMAT):     Assessment Level 1- Sit and Shake    1. From a semi-reclined position, ask patient to sit up and rotate to a seated position at the side of the bed. Can use the bedrail.    2. Ask patient to reach out and grab your hand and shake making sure patient reaches across his/her midline.   Pass- Patient is able to come to a seated position, maintain core strength. Maintains seated balance while reaching across midline. Move on to Assessment Level 2.     Assessment Level 2- Stretch and Point   1. With patient in seated position at the side of the bed, have patient place both feet on the floor (or stool) with knees no higher than hips.    2. Ask patient to stretch one leg and straighten the knee, then bend the ankle/flex and point the toes. If appropriate, repeat with the other leg.   Pass- Patient is able to demonstrate appropriate quad strength on intended weight bearing limb(s). Move onto Assessment Level 3.     Assessment Level 3- Stand   1. Ask patient to elevate off the bed or chair (seated to standing) using an assistive device (cane, bedrail).    2. Patient should be able to raise buttocks off be and hold for a count of five. May repeat once.   Pass- Patient maintains standing stability for at least 5 seconds, proceed to assessment level 4.    Assessment Level 4- Walk   1. Ask patient

## 2024-02-05 NOTE — PROGRESS NOTES
Inpatient Physical Therapy Treatment    Unit: ICU  Date:  2/5/2024  Patient Name:    Era Carson  Admitting diagnosis:  DKA, type 1, not at goal ([facility]) [E10.10]  Acute respiratory failure with hypoxia ([facility]) [J96.01]  Acute renal failure, unspecified acute renal failure type ([facility]) [N17.9]  Diabetic ketoacidosis with coma associated with diabetes mellitus due to underlying condition ([facility]) [E08.11]  Admit Date:  1/25/2024  Precautions/Restrictions/WB Status/ Lines/ Wounds/ Oxygen: Fall risk, Bed/chair alarm, Lines (internal catheter), Confusion, Impaired vision, and Telesitter, Blind in R eye     Pt seen for cotreatment this date due to patient safety, staff recommendation, and need for the assistance of 2 skilled therapists    Treatment Time:  08:20-08:47  Treatment Number:  5   Timed Code Treatment Minutes: 27 minutes  Total Treatment Minutes:  27 minutes    Patient Stated Goals for Therapy: \" to go to rehab\"           Discharge Recommendations: ARU/IRF (inpatient rehab facility)   DME needs for discharge: Defer to facility       Therapy recommendation for EMS Transport: requires transport by cot due to pt needs A x 2 for safe transfers    Therapy recommendations for staff:   Assist of 2 for sitting EOB    History of Present Illness:   Per [facility] ED notes:  \"48 y.o. male with a history of diabetes presents with altered mental status and hyperglycemia.  His sister said he has been ill for the past week.  He has had nausea and vomiting and has not been taking his insulin like he should.  She finally convinced him to go to his doctor yesterday and he had blood work done.  She states he was very confused last night but was refusing to come to the hospital.  The PCP called this morning and that his blood work showed a glucose of 1300 and they needed to come to the ER immediately.  She said this morning he was very altered.  He slept on the floor last night and was difficult to arouse this morning.\"  Found to have    with Mod A  and use of LRAD (least restrictive assistive device)  5).  Tolerate B LE exercises 3 sets of 10-15 reps    Rehabilitation Potential: Good  Strengths for achieving goals include:   Pt motivated, PLOF, Family Support, and Pt cooperative   Barriers to achieving goals include:    Complexity of condition, Pain, Weakness, and Impaired cognition    Plan    To be seen 3-5 x / week  while in acute care setting for therapeutic exercises, bed mobility, transfers, progressive gait training, balance training, and family/patient education.    Signature: Danielle Gaspar, PT, DPT    If patient discharges from this facility prior to next visit, this note will serve as the Discharge Summary.

## 2024-02-05 NOTE — PROGRESS NOTES
Inpatient Occupational Therapy Treatment    Unit: 2 [place]  Date:  2/5/2024  Patient Name:    Era Carson  Admitting diagnosis:  DKA, type 1, not at goal ([facility]) [E10.10]  Acute respiratory failure with hypoxia ([facility]) [J96.01]  Acute renal failure, unspecified acute renal failure type ([facility]) [N17.9]  Diabetic ketoacidosis with coma associated with diabetes mellitus due to underlying condition ([facility]) [E08.11]  Admit Date:  1/25/2024  Precautions/Restrictions/WB Status/ Lines/ Wounds/ Oxygen: Fall risk, Lines (IV, internal catheter, and IJ right), Impaired vision, Telemetry, Continuous pulse oximetry, and Telesitter      Treatment Time:  820-850  Treatment Number:  4  Timed Code Treatment Minutes: 30 minutes  Total Treatment Minutes: 30  minutes    Patient Goals for Therapy: \"pt willing to get out of bed \"          Discharge Recommendations: Acute Rehab (ARU)/ Inpatient Rehab Facility (IRF)  DME needs for discharge: Defer to facility       Therapy recommendations for staff:   Assist of 2 for sitting EOB    History of Present Illness: [facility] ED notes:  \"48 y.o. male with a history of diabetes presents with altered mental status and hyperglycemia.  His sister said he has been ill for the past week.  He has had nausea and vomiting and has not been taking his insulin like he should.  She finally convinced him to go to his doctor yesterday and he had blood work done.  She states he was very confused last night but was refusing to come to the hospital.  The PCP called this morning and that his blood work showed a glucose of 1300 and they needed to come to the ER immediately.  She said this morning he was very altered.  He slept on the floor last night and was difficult to arouse this morning.\"  Found to have blood sugar reading above 2000. Diagnosed with severe DKA, hypotension, HERB.  Required intubation, extubated on 1/30/24. Modified barium swallow study pending.    Home Health S4 Level Recommendation:  NA    AM-PAC  Tested  Bed/chair to BSC:   Not tested   Functional Mobility:   Min assist of two with RW and gait belt to chair     See PT note for gait analysis.    ADLs:  Dressing:      UE:   NT  LE:    SBA to don socks when sitting in bed - decreased coordination bilaterally and pt reports numbness in hands     Bathing:    UE:  Not Tested  LE:  Not Tested    Eating:   NT    Toileting:  Not Tested (pt currently utilizing powers catheter)    Grooming/hygiene: Max A Therapist put comb in pt hand and he attempted to bring to head however was unable to hold on to comb due to poor fine motor control.      Activity Tolerance:   Pt completed therapy session with fatigue  dizziness/lightheadedness at end of session     Pt Position BP (mmHg) HR (bpm) SpO2 (%) on room air  Comments   Supine at rest           Seated at EOB  94   98% SOB after standing and some dizziness noted when standing     Standing          End of session, supine                Positioning Needs:   Pt in bed, alarm set, call light provided and all needs within reach .     Ther Ex / Activities Initiated:   N/A    Patient/Family Education:   Pt educated on role of inpatient OT, plan of care, importance of continued activity, DC recommendations and Calling for assist with mobility obtained pt different call light (bulb type) and practiced pushing call light. Pt Ind with call light     CHF Education  N/A    Assessment:  Pt seen for Occupational therapy treatment in acute care setting.  Pt demonstrated decreased Activity tolerance, ADLs, Balance , Bathing, Bed mobility, Dressing, ROM, Safety Awareness, Strength, Transfers, and Cognition. Pt functioning below baseline and will likely benefit from skilled occupational therapy services to maximize safety and independence.    Pt has great potential for ARU as pt is more alert and agreeable to sitting up and completing transfer.  Pt PLOF is independent with mobility and ADLs.    Recommending ARU/IRF/Inpatient Rehab Facility upon

## 2024-02-06 VITALS
SYSTOLIC BLOOD PRESSURE: 168 MMHG | DIASTOLIC BLOOD PRESSURE: 78 MMHG | OXYGEN SATURATION: 96 % | HEART RATE: 83 BPM | HEIGHT: 74 IN | WEIGHT: 222.9 LBS | BODY MASS INDEX: 28.61 KG/M2 | RESPIRATION RATE: 16 BRPM | TEMPERATURE: 97.5 F

## 2024-02-06 LAB
GLUCOSE BLD-MCNC: 158 MG/DL (ref 70–99)
GLUCOSE BLD-MCNC: 160 MG/DL (ref 70–99)
GLUCOSE BLD-MCNC: 170 MG/DL (ref 70–99)
GLUCOSE BLD-MCNC: 221 MG/DL (ref 70–99)
PERFORMED ON: ABNORMAL

## 2024-02-06 PROCEDURE — 92526 ORAL FUNCTION THERAPY: CPT

## 2024-02-06 PROCEDURE — 92612 ENDOSCOPY SWALLOW (FEES) VID: CPT

## 2024-02-06 PROCEDURE — 99239 HOSP IP/OBS DSCHRG MGMT >30: CPT | Performed by: INTERNAL MEDICINE

## 2024-02-06 PROCEDURE — 6370000000 HC RX 637 (ALT 250 FOR IP)

## 2024-02-06 PROCEDURE — 6370000000 HC RX 637 (ALT 250 FOR IP): Performed by: INTERNAL MEDICINE

## 2024-02-06 PROCEDURE — 97535 SELF CARE MNGMENT TRAINING: CPT

## 2024-02-06 RX ORDER — LACTOBACILLUS RHAMNOSUS GG 10B CELL
1 CAPSULE ORAL 2 TIMES DAILY WITH MEALS
Status: DISCONTINUED | OUTPATIENT
Start: 2024-02-06 | End: 2024-02-06 | Stop reason: HOSPADM

## 2024-02-06 RX ADMIN — Medication: at 09:44

## 2024-02-06 RX ADMIN — APIXABAN 10 MG: 5 TABLET, FILM COATED ORAL at 09:43

## 2024-02-06 RX ADMIN — PANTOPRAZOLE SODIUM 40 MG: 40 TABLET, DELAYED RELEASE ORAL at 05:46

## 2024-02-06 RX ADMIN — Medication 1 CAPSULE: at 10:26

## 2024-02-06 RX ADMIN — TOPIRAMATE 50 MG: 25 TABLET, FILM COATED ORAL at 09:43

## 2024-02-06 RX ADMIN — GABAPENTIN 200 MG: 100 CAPSULE ORAL at 09:43

## 2024-02-06 RX ADMIN — DULOXETINE HYDROCHLORIDE 30 MG: 30 CAPSULE, DELAYED RELEASE ORAL at 09:43

## 2024-02-06 NOTE — PROCEDURES
Speech Language Pathology  [facility]  Swallowing Disorders and Dysphagia  Fiberoptic Endoscopic Evaluation of Swallowing  (FEES)    RECOMMENDATIONS:  Diet Recommendation: IDDSI 7 Regular Solids ; IDDSI 0 Thin Liquids; Meds whole with thin liquids  Risk Management: upright for all intake, stay upright for at least 30 mins after intake, small bites/sips, oral care 2-3x/day to reduce adverse affects in the event of aspiration, alternate bites/sips, slow rate of intake, general GERD precautions, general aspiration precautions, and hold PO and contact SLP if s/s of aspiration or worsening respiratory status develop.    NAME:Era Carson  : 1975 (48 y.o.)   MRN: [id number]  ROOM: [facility]8/0228-01  ADMISSION DATE: 2024  PATIENT DIAGNOSIS(ES): DKA, type 1, not at goal (HCC) [E10.10]  Acute respiratory failure with hypoxia ([facility]) [J96.01]  Acute renal failure, unspecified acute renal failure type ([facility]) [N17.9]  Diabetic ketoacidosis with coma associated with diabetes mellitus due to underlying condition ([facility]) [E08.11]  Chief Complaint   Patient presents with    Hyperglycemia     Pt arrives via EMS for AMS. Pt. Alert to verbal stimuli, oriented to self. Pt known diabetic, family reported to EMS BG 1300 yesterday.      Patient Active Problem List    Diagnosis Date Noted    Mild protein-calorie malnutrition (HCC) 2024    Type 2 diabetes mellitus with ketoacidosis without coma (HCC) 2024    DKA, type 1, not at goal (HCC) 2024    Diabetic ketoacidosis with coma associated with diabetes mellitus due to underlying condition ([facility]) 2024    Metabolic encephalopathy 2024    Hypotension 2024    Recurrent major depressive disorder, in partial remission ([facility]) 2024    Hypoglycemia 2022    Abnormal finding on EKG     Acute on chronic renal insufficiency     Acute renal failure ([facility])     Esophagitis 2021    Proliferative diabetic retinopathy associated with type 1  bites/sips      Penetration/Aspiration Scores across consistencies (Jody et. al. 1996)    CONSISTENCY  Pen/Asp rating Description    Thin   1) Material does not enter the airway     Mildly (nectar) thick   1) Material does not enter the airway     Moderately (honey) thick   N/A - consistency not provided     Puree   N/A - consistency not provided     Soft Solid   N/A - consistency not provided     Regular Solid   1) Material does not enter the airway            The [place] Pharyngeal Residue Severity Rating Scale (Eliot et. al. 2015)     CONSISTENCY  Vallecular residue  Pyriform residue    Thin   Trace (1-5%, Trace coating of mucosa)  Trace (1-5%, trace coating of mucosa)   Mildly (nectar) thick   Mild (5-25%, Epiglottic ligament visible)  Mild (5-25%, Up wall to quarter full)    Moderately (honey) thick   N/A, not provided   N/A, not provided    Puree   N/A, not provided   N/A, not provided    Soft Solid   N/A, not provided   N/A, not provided    Regular Solid   None (0%, No residue)  None (0%, No residue)            IMPRESSION:  Patient presents with mild oropharyngeal dysphagia, likely subacute, complicated by encephalopathy and intubation. No observed aspiration noted. Swallow safety is preserved ; swallow efficiency is preserved .     Patient presents as low aspiration risk and low PNA / adverse pulmonary event risk given the following factors:  poor overall health / frail    Swallow prognosis is good given lack of aspiration noted on instrumental assessment. Pt appears to be a good candidate for swallow rehabilitation.          Dysphagia Treatment Goals  Cont goals outlined in BSE.         ENDOSCOPE REMOVAL: Endoscope was removed without incident, no adverse reactions. No bleeding   PRE TEST HR:20  PRE TEST O2: 100%  POST TEST HR: 20  POST TEST O2: 97%    RECOMMENDATIONS:  Diet Recommendation: IDDSI 7 Regular Solids ; IDDSI 0 Thin Liquids; Meds whole with thin liquids  Risk Management: upright for all

## 2024-02-06 NOTE — DISCHARGE SUMMARY
Name:  Era Carson  Room:  0228/0228-01  MRN:    [id number]    IM Discharge Summary    Discharging Physician:  Samuel robleor MD    Admit: 1/25/2024    Discharge:      PCP      Brandon Gan MD    Diagnoses and hospital course  this Admission        Severe DKA  Glucose at  2355 with severe acidosis and aniongap and metabolic derangements   - Admitted to the ICU and placed on vent for AMS  - DKA protocol initiated.  - Aggressive IV fluid resuscitation given  - Insulin drip initiated and sugars improved  - Bicarb gtt given for severe acidosis   - Monitored electrolytes every 4 hours and repleted  - Critical care consulted   - Off vent now  - Resumed diet   - transitioned  to lantus and ssi -doing well , can adjust as outpt     Severe metabolic encephalopathy  - sec to DKA  - CT head neg   - Was on vent support   - repeat head CT negative  - Extubated and mentation improved slowly   - Held seroquel,gabapentin for now, resumed at lower doses  -resolved mentation issues      HERB with CKD 3   - sec to severe DKA,dehydration and hypotension  - IV fluids and bicarb fluids given   - Nephrology involved  - Powers placed and good UOP noted- off powers now   - Improving creatinine in post ATN diuresis phase   - can consider ARB as outpt     Hypotension  - Sec to above, and dehydration, remained hypotensive post 6 L NS boluses  - Was on levo and now weaned off   - Cannot rule out infectious source - sepsis with new fevers  - Started cefepime and vanc   - Cx neg so far. Abx stopped         Acute right internal jugular DVT  - venous doppler as above  - started on eliquis 10 mg bid - need AC for 3 months      Hypothermia  - sec to severe DKA  - Active rewarming done and improved     Hyperkalemia  - Calcium gluconate 2 g x 1 given   - later Bicarb drip, resolved     H/o migraines   - On topamax and inderal at home- resume as tolerated     Generalized weakness -   Critical care myopathy   - PT recommends ARU but insurance refused  -

## 2024-02-06 NOTE — PROGRESS NOTES
Speech Language Pathology  Swallowing Disorders and Dysphagia    Dysphagia Treatment/Follow-Up Note  Facility/Department: [facility] ICU    Recommendations: SLP recommends to continue with IDDSI 6 Soft and Bite Sized Solids; IDDSI 2 Mildly Thick Liquids via cup only;[place] Free Water Protocol allowed: handout provided; nurse aware; Meds crushed in puree as able - Until instrumental is performed  Risk Management: upright for all intake, stay upright for at least 30 mins after intake, small bites/sips, assist feed, no straws, close supervision, oral care 2-3x/day to reduce adverse affects in the event of aspiration, increase physical mobility as able, alternate bites/sips, slow rate of intake, STRICT aspiration precautions, hold PO and contact SLP if s/s of aspiration or worsening respiratory status develop., and Control risk factors for aspiration PNA by completing oral care 3-4x/day and increasing physical mobility as is medically feasible.  FEES ORDERED      NAME:Era Carson  : 1975 (48 y.o.)   MRN: [id number]  ROOM: [address] [place] [address]  ADMISSION DATE: 2024  PATIENT DIAGNOSIS(ES): DKA, type 1, not at goal (HCC) [E10.10]  Acute respiratory failure with hypoxia (HCC) [J96.01]  Acute renal failure, unspecified acute renal failure type (HCC) [N17.9]  Diabetic ketoacidosis with coma associated with diabetes mellitus due to underlying condition (HCC) [E08.11]  Allergies   Allergen Reactions    Tegaderm Ag Mesh 2\"X2\" [Wound Dressings]      \"Holes in skin from Tegaderm Adhesive\"    Codeine Other (See Comments)     hallucinates    Tape [Adhesive Tape]      Blister      Other Other (See Comments)     Holes in skin  From Tegaderm Adhesive  Blister       DATE ONSET: 2024    Pain: The patient complain of chronic pain.       Current Diet: ADULT DIET; Dysphagia - Soft and Bite Sized; 4 carb choices (60 gm/meal); Mildly Thick (Nectar)    Diet Tolerance:  Tolerating per report      Dysphagia Treatment and

## 2024-02-06 NOTE — PROGRESS NOTES
Speech-Language Pathology  Swallowing Disorders and Dysphagia     Fiberoptic Endoscopic Evaluation of Swallowing (FEES) Brief       Name: Era Carson  : 1975  Medical Diagnosis: DKA, type 1, not at goal (HCC) [E10.10]  Acute respiratory failure with hypoxia (HCC) [J96.01]  Acute renal failure, unspecified acute renal failure type (HCC) [N17.9]  Diabetic ketoacidosis with coma associated with diabetes mellitus due to underlying condition (HCC) [E08.11]      FEES completed at this time. Preliminary rec's include advance to regular solids and thin liquids. Meds PO as tolerated. Formal report to follow pending detailed review of study. Call with questions or concerns. Thank you,    Tasia Mcdonnell M.A., CCC-SLP #[id number]  Speech-Language Pathologist  Phone: [phone]

## 2024-02-06 NOTE — PROGRESS NOTES
Progress Note    HISTORY     CC:   AMS              We are following for Acute kidney injury        Subjective/     HPI:    The patient was seen and examined; he feels well today with no CP, SOB, nausea or vomiting.    ROS: No fever or chills.  Social: No family at bedside.    EXAM       Objective/     Vitals:    02/05/24 2001 02/06/24 0342 02/06/24 0345 02/06/24 0838   BP: (!) 169/81 (!) 152/73 (!) 157/80 (!) 168/78   Pulse: 83 77 83 83   Resp: 16 16 16 16   Temp: 98.7 °F (37.1 °C) 97.2 °F (36.2 °C) 98.2 °F (36.8 °C) 97.5 °F (36.4 °C)   TempSrc: Oral Oral Oral Oral   SpO2: 96% 97% 97% 96%   Weight:  101.1 kg (222 lb 14.4 oz)     Height:         24HR INTAKE/OUTPUT:    Intake/Output Summary (Last 24 hours) at 2/6/2024 1102  Last data filed at 2/6/2024 0342  Gross per 24 hour   Intake 120 ml   Output 2350 ml   Net -2230 ml       Constitutional: Alert and oriented   Neck:  Normal thyroid, no masses   Cardiovascular:  Regular, no rub  Respiratory:  On vent, no wheezing  Abdomen: +bs, soft, nt, no masses   Musculoskeletal: + LE edema, no clubbing     MEDICAL DECISION MAKING       Data/  No results for input(s): \"WBC\", \"HGB\", \"HCT\", \"MCV\", \"PLT\" in the last 72 hours.    Recent Labs     02/04/24  0615 02/05/24  0551   * 144   K 3.3* 3.0*   * 106   CO2 23 21   GLUCOSE 145* 178*   PHOS 2.7  --    MG  --  1.90   BUN 21* 17   CREATININE 1.8* 1.6*   LABGLOM 46* 53*         Assessment/     Acute Kidney Injury:  KDIGO stage 3  Etiology:  Pre-renal due to DKA with profound volume depletion and hypotension -> seems to have progressed to ATN   Non-oliguric / getting volume resuscitation.  Plateau phase and urine volume has improved.  Off pressors   Chronic Kidney Disease:  Stage 3a  Baseline:  1.4 - 1.8  Etiology:  Diabetic Nephropathy with proteinuria  Follows with Dr. Stevenson in the office   DKA:  anion gap of 35 with severe elevation in glucose  Now in the ICU and improving, anion gap closed   Metabolic

## 2024-02-06 NOTE — PLAN OF CARE
Problem: Confusion  Goal: Confusion, delirium, dementia, or psychosis is improved or at baseline  Description: INTERVENTIONS:  1. Assess for possible contributors to thought disturbance, including medications, impaired vision or hearing, underlying metabolic abnormalities, dehydration, psychiatric diagnoses, and notify attending LIP  2. [place] high risk fall precautions, as indicated  3. Provide frequent short contacts to provide reality reorientation, refocusing and direction  4. Decrease environmental stimuli, including noise as appropriate  5. Monitor and intervene to maintain adequate nutrition, hydration, elimination, sleep and activity  6. If unable to ensure safety without constant attention obtain sitter and review sitter guidelines with assigned personnel  7. Initiate Psychosocial CNS and Spiritual Care consult, as indicated  Outcome: Progressing     Problem: Safety - Adult  Goal: Free from fall injury  Outcome: Progressing

## 2024-02-06 NOTE — PROGRESS NOTES
Inpatient Occupational Therapy Treatment    Unit: 2 [place]  Date:  2/6/2024  Patient Name:    Era Carson  Admitting diagnosis:  DKA, type 1, not at goal ([facility]) [E10.10]  Acute respiratory failure with hypoxia ([facility]) [J96.01]  Acute renal failure, unspecified acute renal failure type ([facility]) [N17.9]  Diabetic ketoacidosis with coma associated with diabetes mellitus due to underlying condition ([facility]) [E08.11]  Admit Date:  1/25/2024  Precautions/Restrictions/WB Status/ Lines/ Wounds/ Oxygen: Fall risk, Lines (IV, internal catheter, and IJ right), Impaired vision, Telemetry, Continuous pulse oximetry, and Telesitter      Treatment Time: 11:32-11:58  Treatment Number:  5  Timed Code Treatment Minutes:  26minutes  Total Treatment Minutes:  26 minutes    Patient Goals for Therapy: \"pt willing to get out of bed \"          Discharge Recommendations: Acute Rehab (ARU)/ Inpatient Rehab Facility (IRF), PT D/C home due to Ins company denying ARU, pt refused SNF   DME needs for discharge: RW and Shower Chair       Therapy recommendations for staff:   Assist of 2 for sitting EOB    History of Present Illness: Per [facility] ED notes:  \"48 y.o. male with a history of diabetes presents with altered mental status and hyperglycemia.  His sister said he has been ill for the past week.  He has had nausea and vomiting and has not been taking his insulin like he should.  She finally convinced him to go to his doctor yesterday and he had blood work done.  She states he was very confused last night but was refusing to come to the hospital.  The PCP called this morning and that his blood work showed a glucose of 1300 and they needed to come to the ER immediately.  She said this morning he was very altered.  He slept on the floor last night and was difficult to arouse this morning.\"  Found to have blood sugar reading above 2000. Diagnosed with severe DKA, hypotension, HERB.  Required intubation, extubated on 1/30/24. Modified barium swallow study

## 2024-02-06 NOTE — CARE COORDINATION
DISCHARGE ORDER  Date/Time 2024 9:05 AM  Completed by: Danielle Lyon RN, Case Management    Patient Name: Era Carson      : 1975  Admitting Diagnosis: DKA, type 1, not at goal (HCC) [E10.10]  Acute respiratory failure with hypoxia (HCC) [J96.01]  Acute renal failure, unspecified acute renal failure type (HCC) [N17.9]  Diabetic ketoacidosis with coma associated with diabetes mellitus due to underlying condition (HCC) [E08.11]      Admit order Date and Status: 2024  (verify MD's last order for status of admission)      Noted discharge order.   If applicable PT/OT recommendation at Discharge:   Discharge Recommendations: ARU/IRF (inpatient rehab facility)   DME needs for discharge: Defer to facility    Confirmed discharge plan   Discharge Plan: HOME  Chart reviewed. Met with pt at bedside and explained the role of the CM. CM explained that insurance has denied ARU level but can try SNF. HE refuses SNF stating : I will not go to a nursing home, I would rather go home and do it myself.\" He is agreeable to [facility]. He has no preference of provider. Referral made to [employer] for assistance to find in network provider. Orders and DURAN/AVS in EPIC. Walker provided by [employer], no shower chair available. Pt will need to purchase SC at his DME provider of choice.    Date of Last IMM Given: 2024    Has Home O2 in place on admit:  No  Informed of need to bring portable home O2 tank on day of discharge for nursing to connect prior to leaving:   No  Verbalized agreement/Understanding:   No  Pt is being d/c'd to home today. Pt's O2 sats are 96% on RA.    Discharge timeout done with Gayle DUMONT. All discharge needs and concerns addressed.

## 2024-02-06 NOTE — CARE COORDINATION
[facility]  Received referral regarding HC services from Chelsey GAMBOA. Sent request to [facility] for SN SOC coverage.    [facility] is unable to cover a SN SOC in pt's zipcode within a timely manor due to volume in zipcode.    [facility]- spoke with [place]. [facility] is able to service for SN, PT/OT. Aware of DC today.    [facility] has epic access and can pull HC orders.    Collaborated with [facility] to arrange HC.    Electronically signed by Gayle Durham RN on 2/6/2024 at 9:02 AM

## 2024-02-06 NOTE — PROGRESS NOTES
Prescriptions and discharge instructions given. Pt  and patient sister Neeru verbalized understanding/ denies questions/ needs at this time. Patient sister transported to patient to family vehicle in patient personal wheelchair.

## 2024-02-06 NOTE — CARE COORDINATION
02/06/24 1028   IMM Letter   IMM Letter given to Patient/Family/Significant other/Guardian/POA/by: KENYA Lyon RN   IMM Letter date given: 02/06/24   IMM Letter time given: 0915      CM delivered 2nd IMM delivered within 4 hours for DC, verbal explanation of patient rights at bedside. Pt voiced understanding of discharge MCR rights and is agreeable to discharge.

## 2024-02-06 NOTE — DISCHARGE INSTRUCTIONS
Check sugars three times daily and note down. ( fasting and 2 hr post prandial)  No alcohol  Diabetic and low fat diet    You have a blood clot in right upper extremity - you are started on new blood thinners for 3 months  Take ELiquis 2 tabs twice daily x 2 more days and then 1 tab twice daily x 3 months      F.w PCP in 1- 2 weeks

## 2024-02-06 NOTE — PROGRESS NOTES
Shift assessment complete. Patient alert to person. Patient denies any needs at this time. Bed in lowest position. Side rails up x2. Call light in reach.

## 2024-02-06 NOTE — PROGRESS NOTES
Pt a/o. Am assessment completed see flow sheet. Pt denies any pain/ needs at this time. Call light within reach. Speech therapy advanced diet, patient aware.   Vitals:    02/06/24 0838   BP: (!) 168/78   Pulse: 83   Resp: 16   Temp: 97.5 °F (36.4 °C)   SpO2: 96%    Medications given as ordered.

## 2024-02-06 NOTE — PROGRESS NOTES
02/06/24 1507   Encounter Summary   Encounter Overview/Reason  Initial Encounter;Spiritual/Emotional Needs   Service Provided For: Patient   Referral/Consult From: Rounding   Last Encounter  02/06/24   Spiritual/Emotional needs   Type Spiritual Support   Assessment/Intervention/Outcome   Assessment Concerns with suffering;Hopeful;Peaceful  (in pain but feeling at peace)   Intervention Active listening;Confronted/Challenged;Discussed illness injury and it’s impact;Explored/Affirmed feelings, thoughts, concerns;Life review/Legacy;Nurtured Hope   Outcome Engaged in conversation;Encouraged;Expressed Gratitude

## 2024-02-07 ENCOUNTER — CARE COORDINATION (OUTPATIENT)
Dept: CASE MANAGEMENT | Age: 49
End: 2024-02-07

## 2024-02-07 NOTE — CARE COORDINATION
Care Transitions Initial Follow Up Call    Call within 2 business days of discharge: Yes      Patient: Era Carson Patient : 1975   MRN: [id number]  Reason for Admission: 12 days -> severe DKA, glucose 2355 w/ severe acidosis and anion gap, metabolic derangements, severe metabolic encephalopathy, HERB with CKD3, hypotension, acute right internal jugular DVT, hypothermia, hyperK, hx migraines, generalized weakness, critical care myopathy -> [facility] with [facility] PT OT, insurance denied ARU, patient declined SNF, AM-PAC PT-16 OT-11  Discharge Date: 24 RARS: Readmission Risk Score: 15.7      Last Discharge Facility       Date Complaint Diagnosis Description Type Department Provider    24 Hyperglycemia Acute renal failure, unspecified acute renal failure type (HCC) ... ED to Hosp-Admission (Discharged) (ADMITTED) [address] Samuel Kwan MD; Tip...     Unable to reach patient or leave message at only number listed because voice mailbox is full. Confirmed sister Neeru Carson is listed on SHAZIA and made outreach to her number. Message left on her number with CTN call back number. CTN contacted [facility] . Left message for Lyn to return call to confirm referral received.     Follow Up  Future Appointments   Date Time Provider Department Center   2024 11:00 AM Bo Mane MD [facility]   2024 11:20 AM Brandon Gan MD [facility]   2024  3:40 PM Jose Drake MD [facility]     Daysi Yan, RN  Care Transition Nurse  [phone] mobile

## 2024-02-07 NOTE — ADT AUTH CERT
Patient Demographics    Name Patient ID SSN Gender Identity Birth Date   Era Murray [id number]  Male [birth year] (48 yrs)     Address Phone Email Employer    [address] [place] [address] [phone] (M)    (H) [email or web address]      County Race Occupation Emp Status    [place] White (non-) -- Disabled      Reg Status PCP Date Last Verified Next Review Date    Verified Brandon Gan MD  [phone] 24      Admission Date Discharge Date Admitting Provider     24 Black, Gisella, DO       Marital Status Mandaeism      Single [employer]        Emergency Contact 1   Neeru Murray (6)   [place] [address]   [phone] (H)   [phone] (W)   [phone] (M)     Subscriber Details  Hospital Account #[id number]  CVG Subscriber Name/Sex/Relation Subscriber  Subscriber Address/Phone Subscriber Emp/Emp Phone   1. [facility] MEDICARE   [id number] ERA MURRAY - Male   (Self) 1975 [address] [place]  [address]   (H) DISABLED    2. MEDICAID [place]   [id number] ERA MURRAY - Male   (Self) 1975 [address] [place]  [address]   [phone](H) DISABLED      Utilization Reviews         by Nicky Chu RN  Last Updated by Nicky Chu RN on 2024 1213     Review Status Created By   In Primary Nicky Chu RN       Review Type   --      Criteria Review   DATE:        PERTINENT UPDATES:  Issues with hypoglycemia overnight noted  Fevers resolving  Acute right internal jugular DVT - now on Eliquis        VITALS:  98.4 (36.9)  18  93  166/76  95%  None (Room air)         ABNL/PERTINENT LABS/RADIOLOGY/DIAGNOSTIC STUDIES:  24 04:21  POC Glucose: 164 (H)     24 06:15  Sodium: 149 (H)  Potassium: 3.3 (L)  Chloride: 116 (H)  BUN,BUNPL: 21 (H)  Creatinine: 1.8 (H)  Est, Glom Filt Rate: 46 !  Glucose, Random: 145 (H)  CALCIUM, SERUM, [id number]: 7.8 (L)  Albumin: 2.5 (L)     24  07:31  POC Glucose: 136 (H)     02/04/24 11:48  POC Glucose: 280 (H)     02/04/24 16:13  POC Glucose: 164 (H)     02/04/24 19:24  POC Glucose: 246 (H)        PHYSICAL EXAM:  General: awake alert and fairly oriented  Mucous Membranes:  Pink , anicteric  Neck: No JVD, no carotid bruit, no thyromegaly  Chest:  Clear to auscultation bilaterally, mild left sided crackles  Cardiovascular:  RRR S1S2 heard, ASM   Abdomen:  Soft, undistended, non tender, no organomegaly, BS present  Extremities: developing edema to all ext  Diffuse erythema in right antecubtial region with IV infiltration    Distal pulses feeble both feet  Neurological : awake, alert and better oriented  Following commands  Mild weakness in right EXt with pain        MD CONSULTS/ASSESSMENT AND PLAN:  **IM  Severe DKA  Glucose at  2355 with severe acidosis and aniongap and metabolic derangements   - Admitted to the ICU and placed on vent for AMS  - DKA protocol initiated.  - Aggressive IV fluid resuscitation given  - Insulin drip initiated and sugars improved  - Bicarb gtt given for severe acidosis   - Monitored electrolytes every 4 hours and repleted  - Critical care consulted   - Off vent now  - Resumed diet   - transitioned  to lantus and ssi - adjust to avoid low sugars     Severe metabolic encephalopathy  - sec to DKA  - CT head neg   - Was on vent support   - repeat head CT negative  - Extubated and mentation improved slowly   - Hold seroquel,gabapentin for now, resume at lower doses on DC  -resolved     HERB with CKD 3   - sec to severe DKA,dehydration and hypotension  - IV fluids and bicarb fluids given   - Nephrology involved  - Sanon placed and good UOP noted  - Improving creatinine   Sodium elevated today  Increase oral fluid intake     Hypotension  - Sec to above, and dehydration, remained hypotensive post 6 L NS boluses  - Was on levo and now weaned off   - Cannot rule out infectious source - sepsis with new fevers  - Started cefepime and vanc   -

## 2024-02-08 ENCOUNTER — CARE COORDINATION (OUTPATIENT)
Dept: CASE MANAGEMENT | Age: 49
End: 2024-02-08

## 2024-02-08 NOTE — CARE COORDINATION
Care Transitions Initial Follow Up Call    Call within 2 business days of discharge: Yes    Patient: Era Carson Patient : 1975   MRN: [id number]  Reason for Admission: 12 days -> severe DKA, glucose 2355 w/ severe acidosis and anion gap, metabolic derangements, severe metabolic encephalopathy, HERB with CKD3, hypotension, acute right internal jugular DVT, hypothermia, hyperK, hx migraines, generalized weakness, critical care myopathy -> home with [facility] SN PT OT, insurance denied ARU, patient declined SNF, AM-PAC PT-16 OT-11  Discharge Date: 24 RARS: Readmission Risk Score: 15.7    Last Discharge Facility       Date Complaint Diagnosis Description Type Department Provider    24 Hyperglycemia Acute renal failure, unspecified acute renal failure type (HCC) ... ED to Hosp-Admission (Discharged) (ADMITTED) [address] Samuel Kwan MD; Tip...     CTN attempted follow-up outreach to patient. Mailbox full and unable to leave message. Previously left message on sister's voice mail also. No further CTN outreach scheduled. Per Lyn at [facility], patient is scheduled for SOC today. Routing to PCP office to notify them patient needs HFU.     Care transition program closed.    Follow Up  Future Appointments   Date Time Provider Department Center   2024 11:00 AM Bo Mane MD [facility]   2024 11:20 AM Brandon Gan MD [facility] [place] [facility]   2024  3:40 PM Jose Drake MD [facility]     Daysi Yan, RN  Care Transition Nurse  [phone] mobile

## 2024-02-12 ENCOUNTER — TELEPHONE (OUTPATIENT)
Dept: PAIN MANAGEMENT | Age: 49
End: 2024-02-12

## 2024-02-12 ENCOUNTER — OFFICE VISIT (OUTPATIENT)
Dept: PAIN MANAGEMENT | Age: 49
End: 2024-02-12

## 2024-02-12 VITALS — HEART RATE: 80 BPM | OXYGEN SATURATION: 100 % | SYSTOLIC BLOOD PRESSURE: 158 MMHG | DIASTOLIC BLOOD PRESSURE: 86 MMHG

## 2024-02-12 DIAGNOSIS — M79.2 NEUROPATHIC PAIN: ICD-10-CM

## 2024-02-12 DIAGNOSIS — G62.9 PERIPHERAL POLYNEUROPATHY: ICD-10-CM

## 2024-02-12 DIAGNOSIS — L97.522 ULCER OF LEFT FOOT, WITH FAT LAYER EXPOSED (HCC): ICD-10-CM

## 2024-02-12 DIAGNOSIS — M80.862S PATHOLOGICAL FRACTURE OF LEFT TIBIA DUE TO OTHER OSTEOPOROSIS, SEQUELA: ICD-10-CM

## 2024-02-12 DIAGNOSIS — M79.18 MYOFASCIAL PAIN: ICD-10-CM

## 2024-02-12 DIAGNOSIS — G89.4 CHRONIC PAIN SYNDROME: ICD-10-CM

## 2024-02-12 DIAGNOSIS — G63 POLYNEUROPATHY ASSOCIATED WITH UNDERLYING DISEASE (HCC): ICD-10-CM

## 2024-02-12 RX ORDER — RIMEGEPANT SULFATE 75 MG/75MG
TABLET, ORALLY DISINTEGRATING ORAL
Qty: 16 TABLET | Refills: 1 | Status: SHIPPED | OUTPATIENT
Start: 2024-02-12

## 2024-02-12 RX ORDER — TOPIRAMATE 100 MG/1
100 TABLET, FILM COATED ORAL 2 TIMES DAILY
Qty: 60 TABLET | Refills: 1 | Status: SHIPPED | OUTPATIENT
Start: 2024-02-12

## 2024-02-12 RX ORDER — GABAPENTIN 400 MG/1
400 CAPSULE ORAL 2 TIMES DAILY
Qty: 60 CAPSULE | Refills: 1 | Status: SHIPPED | OUTPATIENT
Start: 2024-02-12 | End: 2024-04-12

## 2024-02-12 RX ORDER — APIXABAN 5 MG (74)
KIT ORAL
COMMUNITY
Start: 2024-02-06

## 2024-02-12 RX ORDER — TRAMADOL HYDROCHLORIDE 50 MG/1
50 TABLET ORAL 2 TIMES DAILY PRN
Qty: 56 TABLET | Refills: 0 | Status: SHIPPED | OUTPATIENT
Start: 2024-02-12 | End: 2024-03-11

## 2024-02-12 RX ORDER — ERENUMAB-AOOE 140 MG/ML
140 INJECTION, SOLUTION SUBCUTANEOUS
Qty: 1 ADJUSTABLE DOSE PRE-FILLED PEN SYRINGE | Refills: 1 | Status: SHIPPED | OUTPATIENT
Start: 2024-02-12

## 2024-02-12 NOTE — PROGRESS NOTES
2+, and no tenderness.   Neurological/Psychiatric:He is alert and oriented to person, place, and time. Coordination is  normal.  His mood isAppropriate and affect is Neutral/Euthymic(normal) .    IMPRESSION:   1. Chronic pain syndrome    2. Ulcer of left foot, with fat layer exposed (HCC)    3. Myofascial pain    4. Peripheral polyneuropathy    5. Pathological fracture of left tibia due to other osteoporosis, sequela    6. Polyneuropathy associated with underlying disease (HCC)    7. Neuropathic pain        PLAN:  Informed verbal consent was obtained.  Risks and benefits of the medications and other alternative treatments  including no treatment have been discussed with the patient. Any questions related to these were addressed. The common side effects of these medications were also explained to the patient.    -ROM/Stretching exercises as advised   -Interm history reviewed    -Continue with Ultram 50 mg BID PRN   -he was advised  to avoid using too many OTC analgesics to control the headaches, avoid chocolates, increased caffeine, cheeses and MSG nitrite containing foods, cigarette smoking. To avoid bright lights, strong smells and skipping meals.   -Continue with all adjuvants   -Labs reviewed; baseline, okay   Current Outpatient Medications   Medication Sig Dispense Refill    ELIQUIS DVT/PE STARTER PACK 5 MG TBPK tablet       traMADol (ULTRAM) 50 MG tablet Take 1 tablet by mouth 2 times daily as needed for Pain for up to 28 days. Max Daily Amount: 100 mg 56 tablet 0    Erenumab-aooe (AIMOVIG) 140 MG/ML SOAJ Inject 140 mg into the skin every 30 days 1 Adjustable Dose Pre-filled Pen Syringe 1    gabapentin (NEURONTIN) 400 MG capsule Take 1 capsule by mouth 2 times daily for 60 days. 60 capsule 1    Rimegepant Sulfate (NURTEC) 75 MG TBDP Take 1 tablet daily, may repeat in 24 hours PRN 16 tablet 1    topiramate (TOPAMAX) 100 MG tablet Take 1 tablet by mouth 2 times daily 60 tablet 1    apixaban (ELIQUIS) 5 MG TABS

## 2024-02-12 NOTE — TELEPHONE ENCOUNTER
Submitted PA for [facility] Via [facility] Key: [id number] STATUS: \"Available without authorization. The member is able to fill the requested drug at the pharmacy.\"

## 2024-02-16 RX ORDER — PROMETHAZINE HYDROCHLORIDE 12.5 MG/1
TABLET ORAL
Qty: 18 TABLET | Refills: 0 | Status: SHIPPED | OUTPATIENT
Start: 2024-02-16

## 2024-02-16 NOTE — TELEPHONE ENCOUNTER
Medication:   Requested Prescriptions     Pending Prescriptions Disp Refills    promethazine (PHENERGAN) 12.5 MG tablet [Pharmacy Med Name: PROMETHAZINE 12.5 MG TABLET] 18 tablet      Sig: TAKE ONE TABLET BY MOUTH THREE TIMES A DAY BEFORE A MEAL AS NEEDED FOR NAUSEA        Last Filled:      Last appt: 1/24/2024   Next appt: 4/24/2024    Last OARRS:       3/21/2019     4:43 PM   RX Monitoring   Attestation The Prescription Monitoring Report for this patient was reviewed today.

## 2024-02-23 ASSESSMENT — ENCOUNTER SYMPTOMS
VOMITING: 1
NAUSEA: 1
RESPIRATORY NEGATIVE: 1

## 2024-03-11 RX ORDER — GLUCAGON INJECTION, SOLUTION 1 MG/.2ML
INJECTION, SOLUTION SUBCUTANEOUS
Qty: 0.4 ML | Refills: 3 | Status: SHIPPED | OUTPATIENT
Start: 2024-03-11

## 2024-03-11 NOTE — TELEPHONE ENCOUNTER
Requested Prescriptions     Pending Prescriptions Disp Refills    GVOKE HYPOPEN 2-PACK 1 MG/0.2ML SOAJ [Pharmacy Med Name: GVOKE HYPOPEN 2-PK 1 MG/0.2 ML] 0.4 mL 3     Sig: INJECT AS NEEDED FOR LOW BLOOD SUGAR       [facility] [id number] - [place], [place] - [address] - P [phone] - F [phone]  [address] [place] [address] [address]  [facility] [address]  Phone: [phone] Fax: [phone]    Last OV 11/07/2023  Next OV None On File

## 2024-03-14 ENCOUNTER — OFFICE VISIT (OUTPATIENT)
Dept: PAIN MANAGEMENT | Age: 49
End: 2024-03-14
Payer: MEDICARE

## 2024-03-14 VITALS
SYSTOLIC BLOOD PRESSURE: 140 MMHG | WEIGHT: 202 LBS | OXYGEN SATURATION: 100 % | DIASTOLIC BLOOD PRESSURE: 81 MMHG | BODY MASS INDEX: 25.92 KG/M2 | HEART RATE: 86 BPM

## 2024-03-14 DIAGNOSIS — M79.18 MYOFASCIAL PAIN: ICD-10-CM

## 2024-03-14 DIAGNOSIS — G89.4 CHRONIC PAIN SYNDROME: ICD-10-CM

## 2024-03-14 DIAGNOSIS — G62.9 PERIPHERAL POLYNEUROPATHY: ICD-10-CM

## 2024-03-14 DIAGNOSIS — G63 POLYNEUROPATHY ASSOCIATED WITH UNDERLYING DISEASE (HCC): ICD-10-CM

## 2024-03-14 DIAGNOSIS — M80.862S PATHOLOGICAL FRACTURE OF LEFT TIBIA DUE TO OTHER OSTEOPOROSIS, SEQUELA: ICD-10-CM

## 2024-03-14 DIAGNOSIS — M79.2 NEUROPATHIC PAIN: ICD-10-CM

## 2024-03-14 DIAGNOSIS — L97.522 ULCER OF LEFT FOOT, WITH FAT LAYER EXPOSED (HCC): ICD-10-CM

## 2024-03-14 PROCEDURE — 3079F DIAST BP 80-89 MM HG: CPT | Performed by: INTERNAL MEDICINE

## 2024-03-14 PROCEDURE — 99213 OFFICE O/P EST LOW 20 MIN: CPT | Performed by: INTERNAL MEDICINE

## 2024-03-14 PROCEDURE — 3077F SYST BP >= 140 MM HG: CPT | Performed by: INTERNAL MEDICINE

## 2024-03-14 RX ORDER — ERENUMAB-AOOE 140 MG/ML
140 INJECTION, SOLUTION SUBCUTANEOUS
Qty: 1 ADJUSTABLE DOSE PRE-FILLED PEN SYRINGE | Refills: 1 | Status: SHIPPED | OUTPATIENT
Start: 2024-03-14

## 2024-03-14 RX ORDER — GABAPENTIN 400 MG/1
400 CAPSULE ORAL 2 TIMES DAILY
Qty: 60 CAPSULE | Refills: 1 | Status: SHIPPED | OUTPATIENT
Start: 2024-03-14 | End: 2024-05-13

## 2024-03-14 RX ORDER — RIMEGEPANT SULFATE 75 MG/75MG
TABLET, ORALLY DISINTEGRATING ORAL
Qty: 16 TABLET | Refills: 1 | Status: SHIPPED | OUTPATIENT
Start: 2024-03-14

## 2024-03-14 RX ORDER — TOPIRAMATE 100 MG/1
100 TABLET, FILM COATED ORAL 2 TIMES DAILY
Qty: 60 TABLET | Refills: 1 | Status: SHIPPED | OUTPATIENT
Start: 2024-03-14

## 2024-03-14 RX ORDER — TRAMADOL HYDROCHLORIDE 50 MG/1
50 TABLET ORAL 2 TIMES DAILY PRN
Qty: 56 TABLET | Refills: 0 | Status: SHIPPED | OUTPATIENT
Start: 2024-03-14 | End: 2024-04-11

## 2024-03-14 NOTE — PROGRESS NOTES
each 5    Probiotic Product (ACIDOPHILUS PROBIOTIC) CAPS capsule TAKE ONE CAPSULE BY MOUTH DAILY 28 capsule 4    polyvinyl alcohol (LIQUIFILM TEARS) 1.4 % ophthalmic solution 1 drop as needed      propranolol (INDERAL) 80 MG tablet Take 0.5 tablets by mouth 2 times daily For migraines      Flaxseed, Linseed, (FLAX PO) Take 14 g by mouth daily as needed       traMADol (ULTRAM) 50 MG tablet Take 1 tablet by mouth 2 times daily as needed for Pain for up to 28 days. Max Daily Amount: 100 mg 56 tablet 0    potassium chloride (KLOR-CON M) 10 MEQ extended release tablet Take 1 tablet by mouth daily for 10 days 10 tablet 0     No current facility-administered medications for this visit.       General Goals of current treatment regimen include improvement in pain, restoration of functioning- with focus on improvement in physical performance, general activity, work or disability,emotional distress, health care utilization and  decreased medication consumption.   Will continue to monitor progress towards achieving/maintaining therapeutic goals with special emphasis on  1. Improvement in perceived interfernce  of pain with ADL's. Ability to do home exercises independently. Ability to do household chores indoor and/or outdoor work and social and leisure activities.Improve psychosocial and physical functioning. - he is maintaining his treatment goal with the current regimen.    He was advised against drinking alcohol with the narcotic pain medicines, advised against driving or handling machinery while adjusting the dose of medicines or if having cognitive  issues related to the current medications.Risk of overdose and death, if medicines not taken as prescribed, were also discussed. If the patient develops new symptoms or if the symptoms worsen, the patient should call the office.    While transcribing every attempt was made to maintain the accuracy of the note in terms of it's contents,there may have been some errors made

## 2024-03-15 NOTE — TELEPHONE ENCOUNTER
Received PA from [employer].  Submitted PA for [facility] Via [facility] Key: [id number] STATUS: \"Available without authorization.\"

## 2024-03-21 ENCOUNTER — TELEPHONE (OUTPATIENT)
Dept: INTERNAL MEDICINE CLINIC | Age: 49
End: 2024-03-21

## 2024-03-21 NOTE — TELEPHONE ENCOUNTER
Patient says that he wants to get lab orders and lab orders for a kidney test before he comes in to his visit.  I informed him that labs are normally ordered the day of his appt.  He says that he still want to know what the provider says.  Please advise

## 2024-04-01 ENCOUNTER — TELEPHONE (OUTPATIENT)
Dept: ENDOCRINOLOGY | Age: 49
End: 2024-04-01

## 2024-04-01 NOTE — TELEPHONE ENCOUNTER
Submitted PA for [facility]  [facility]ia [facility] Key: [id number]    STATUS: Approved today  PA Case: [id number], Status: Approved, Coverage Starts on: 1/1/2024 12:00:00 AM, Coverage Ends on: 4/1/2025

## 2024-04-01 NOTE — TELEPHONE ENCOUNTER
Received RENEWAL PA from [place].  Submitted PA for [facility] Via [facility] Key: [id number] STATUS: \"Available without authorization. The member is able to fill the requested drug at the pharmacy.\"

## 2024-04-09 NOTE — ED NOTES
Bed: 09  Expected date:   Expected time:   Means of arrival:   Comments:  Pt in [address], RN  09/25/21 8152 Yes

## 2024-04-18 ENCOUNTER — OFFICE VISIT (OUTPATIENT)
Dept: PAIN MANAGEMENT | Age: 49
End: 2024-04-18
Payer: MEDICARE

## 2024-04-18 VITALS
SYSTOLIC BLOOD PRESSURE: 127 MMHG | DIASTOLIC BLOOD PRESSURE: 70 MMHG | BODY MASS INDEX: 26.05 KG/M2 | HEART RATE: 89 BPM | OXYGEN SATURATION: 100 % | WEIGHT: 203 LBS

## 2024-04-18 DIAGNOSIS — G89.4 CHRONIC PAIN SYNDROME: ICD-10-CM

## 2024-04-18 DIAGNOSIS — M79.2 NEUROPATHIC PAIN: ICD-10-CM

## 2024-04-18 DIAGNOSIS — G43.711 CHRONIC MIGRAINE WITHOUT AURA, WITH INTRACTABLE MIGRAINE, SO STATED, WITH STATUS MIGRAINOSUS: ICD-10-CM

## 2024-04-18 DIAGNOSIS — M80.862S PATHOLOGICAL FRACTURE OF LEFT TIBIA DUE TO OTHER OSTEOPOROSIS, SEQUELA: ICD-10-CM

## 2024-04-18 DIAGNOSIS — G63 POLYNEUROPATHY ASSOCIATED WITH UNDERLYING DISEASE (HCC): ICD-10-CM

## 2024-04-18 DIAGNOSIS — M79.18 MYOFASCIAL PAIN: ICD-10-CM

## 2024-04-18 DIAGNOSIS — F33.41 RECURRENT MAJOR DEPRESSIVE DISORDER, IN PARTIAL REMISSION (HCC): ICD-10-CM

## 2024-04-18 DIAGNOSIS — K59.03 CONSTIPATION DUE TO OPIOID THERAPY: ICD-10-CM

## 2024-04-18 DIAGNOSIS — T40.2X5A CONSTIPATION DUE TO OPIOID THERAPY: ICD-10-CM

## 2024-04-18 DIAGNOSIS — G62.9 PERIPHERAL POLYNEUROPATHY: ICD-10-CM

## 2024-04-18 DIAGNOSIS — F51.01 PRIMARY INSOMNIA: ICD-10-CM

## 2024-04-18 DIAGNOSIS — L97.522 ULCER OF LEFT FOOT, WITH FAT LAYER EXPOSED (HCC): ICD-10-CM

## 2024-04-18 PROCEDURE — 3074F SYST BP LT 130 MM HG: CPT | Performed by: INTERNAL MEDICINE

## 2024-04-18 PROCEDURE — 99214 OFFICE O/P EST MOD 30 MIN: CPT | Performed by: INTERNAL MEDICINE

## 2024-04-18 PROCEDURE — 3078F DIAST BP <80 MM HG: CPT | Performed by: INTERNAL MEDICINE

## 2024-04-18 RX ORDER — ERENUMAB-AOOE 140 MG/ML
140 INJECTION, SOLUTION SUBCUTANEOUS
Qty: 1 ADJUSTABLE DOSE PRE-FILLED PEN SYRINGE | Refills: 1 | Status: SHIPPED | OUTPATIENT
Start: 2024-04-18

## 2024-04-18 RX ORDER — GABAPENTIN 400 MG/1
400 CAPSULE ORAL 2 TIMES DAILY
Qty: 60 CAPSULE | Refills: 1 | Status: SHIPPED | OUTPATIENT
Start: 2024-04-18 | End: 2024-06-17

## 2024-04-18 RX ORDER — RIMEGEPANT SULFATE 75 MG/75MG
TABLET, ORALLY DISINTEGRATING ORAL
Qty: 16 TABLET | Refills: 1 | Status: SHIPPED | OUTPATIENT
Start: 2024-04-18

## 2024-04-18 RX ORDER — TRAMADOL HYDROCHLORIDE 50 MG/1
50 TABLET ORAL 2 TIMES DAILY PRN
Qty: 56 TABLET | Refills: 0 | Status: SHIPPED | OUTPATIENT
Start: 2024-04-18 | End: 2024-05-16

## 2024-04-18 RX ORDER — TOPIRAMATE 100 MG/1
100 TABLET, FILM COATED ORAL 2 TIMES DAILY
Qty: 60 TABLET | Refills: 1 | Status: SHIPPED | OUTPATIENT
Start: 2024-04-18

## 2024-04-18 RX ORDER — DULOXETIN HYDROCHLORIDE 30 MG/1
30 CAPSULE, DELAYED RELEASE ORAL DAILY
Qty: 30 CAPSULE | Refills: 0 | Status: SHIPPED | OUTPATIENT
Start: 2024-04-18

## 2024-04-18 NOTE — PROGRESS NOTES
Era Carson  1975  [id number]    HISTORY OF PRESENT ILLNESS:  Mr. Carson is a 48 y.o. male returns for a follow up visit for multiple medical problems.  His  presenting problems are   1. Chronic pain syndrome    2. Peripheral polyneuropathy    3. Neuropathic pain    4. Primary insomnia    5. Recurrent major depressive disorder, in partial remission (HCC)    6. Chronic migraine without aura, with intractable migraine, so stated, with status migrainosus    7. Myofascial pain    8. Constipation due to opioid therapy    .    As per information/history obtained from the PADT(patient assessment and documentation tool) -  He complains of pain in the head, neck, upper back, mid back, and lower back with radiation to the shoulders Bilateral, arms Bilateral, elbows Bilateral, hands Bilateral, knees Bilateral, lower leg Bilateral, ankles Bilateral, and feet Bilateral He rates the pain 10/10 and describes it as sharp, aching, burning, numbness, pins and needles.  Pain is made worse by: movement, walking, standing, sitting, bending, lifting.  Current treatment regimen has helped relieve about 10% of the pain.  He denies side effects from the current pain regimen.   Patient reports that since last follow up visit the physical functioning is better, family/social relationships are better, mood is better sleep patterns are better.  Mr. Carson states that since starting the treatment with the current regimen the  overall functioning  in the above aspects is  better,Patient denies neurological bowel or bladder. Patient denies misusing/abusing his narcotic pain medications or using any illegal drugs.  There are No indicators for possible drug abuse, addiction or diversion problems.     Upon obtaining the medical history from Mr. Carson regarding today's office visit for his presenting problems, patient states he has been having pain, numbness and swelling in the fingers. He mentions he's using some infrared gloves to help. He

## 2024-04-24 ENCOUNTER — OFFICE VISIT (OUTPATIENT)
Dept: INTERNAL MEDICINE CLINIC | Age: 49
End: 2024-04-24
Payer: MEDICARE

## 2024-04-24 VITALS
HEART RATE: 78 BPM | DIASTOLIC BLOOD PRESSURE: 78 MMHG | BODY MASS INDEX: 26.05 KG/M2 | WEIGHT: 203 LBS | SYSTOLIC BLOOD PRESSURE: 138 MMHG | OXYGEN SATURATION: 98 %

## 2024-04-24 DIAGNOSIS — E10.3593 PROLIFERATIVE DIABETIC RETINOPATHY OF BOTH EYES ASSOCIATED WITH TYPE 1 DIABETES MELLITUS, UNSPECIFIED PROLIFERATIVE RETINOPATHY TYPE (HCC): ICD-10-CM

## 2024-04-24 DIAGNOSIS — E10.22 TYPE 1 DIABETES MELLITUS WITH STAGE 3A CHRONIC KIDNEY DISEASE (HCC): ICD-10-CM

## 2024-04-24 DIAGNOSIS — N18.31 TYPE 1 DIABETES MELLITUS WITH STAGE 3A CHRONIC KIDNEY DISEASE (HCC): ICD-10-CM

## 2024-04-24 DIAGNOSIS — I10 PRIMARY HYPERTENSION: ICD-10-CM

## 2024-04-24 DIAGNOSIS — E78.2 MIXED HYPERLIPIDEMIA: ICD-10-CM

## 2024-04-24 DIAGNOSIS — N18.31 STAGE 3A CHRONIC KIDNEY DISEASE (HCC): ICD-10-CM

## 2024-04-24 LAB — HBA1C MFR BLD: 8.3 %

## 2024-04-24 PROCEDURE — 99214 OFFICE O/P EST MOD 30 MIN: CPT | Performed by: INTERNAL MEDICINE

## 2024-04-24 PROCEDURE — 3052F HG A1C>EQUAL 8.0%<EQUAL 9.0%: CPT | Performed by: INTERNAL MEDICINE

## 2024-04-24 PROCEDURE — G2211 COMPLEX E/M VISIT ADD ON: HCPCS | Performed by: INTERNAL MEDICINE

## 2024-04-24 PROCEDURE — 3075F SYST BP GE 130 - 139MM HG: CPT | Performed by: INTERNAL MEDICINE

## 2024-04-24 PROCEDURE — 3078F DIAST BP <80 MM HG: CPT | Performed by: INTERNAL MEDICINE

## 2024-04-24 SDOH — ECONOMIC STABILITY: FOOD INSECURITY: WITHIN THE PAST 12 MONTHS, THE FOOD YOU BOUGHT JUST DIDN'T LAST AND YOU DIDN'T HAVE MONEY TO GET MORE.: NEVER TRUE

## 2024-04-24 SDOH — ECONOMIC STABILITY: FOOD INSECURITY: WITHIN THE PAST 12 MONTHS, YOU WORRIED THAT YOUR FOOD WOULD RUN OUT BEFORE YOU GOT MONEY TO BUY MORE.: NEVER TRUE

## 2024-04-24 SDOH — ECONOMIC STABILITY: INCOME INSECURITY: HOW HARD IS IT FOR YOU TO PAY FOR THE VERY BASICS LIKE FOOD, HOUSING, MEDICAL CARE, AND HEATING?: NOT HARD AT ALL

## 2024-04-24 ASSESSMENT — PATIENT HEALTH QUESTIONNAIRE - PHQ9
10. IF YOU CHECKED OFF ANY PROBLEMS, HOW DIFFICULT HAVE THESE PROBLEMS MADE IT FOR YOU TO DO YOUR WORK, TAKE CARE OF THINGS AT HOME, OR GET ALONG WITH OTHER PEOPLE: NOT DIFFICULT AT ALL
SUM OF ALL RESPONSES TO PHQ QUESTIONS 1-9: 1
1. LITTLE INTEREST OR PLEASURE IN DOING THINGS: SEVERAL DAYS
4. FEELING TIRED OR HAVING LITTLE ENERGY: NOT AT ALL
8. MOVING OR SPEAKING SO SLOWLY THAT OTHER PEOPLE COULD HAVE NOTICED. OR THE OPPOSITE, BEING SO FIGETY OR RESTLESS THAT YOU HAVE BEEN MOVING AROUND A LOT MORE THAN USUAL: NOT AT ALL
3. TROUBLE FALLING OR STAYING ASLEEP: NOT AT ALL
5. POOR APPETITE OR OVEREATING: NOT AT ALL
SUM OF ALL RESPONSES TO PHQ QUESTIONS 1-9: 1
2. FEELING DOWN, DEPRESSED OR HOPELESS: NOT AT ALL
SUM OF ALL RESPONSES TO PHQ QUESTIONS 1-9: 1
7. TROUBLE CONCENTRATING ON THINGS, SUCH AS READING THE NEWSPAPER OR WATCHING TELEVISION: NOT AT ALL
6. FEELING BAD ABOUT YOURSELF - OR THAT YOU ARE A FAILURE OR HAVE LET YOURSELF OR YOUR FAMILY DOWN: NOT AT ALL
SUM OF ALL RESPONSES TO PHQ9 QUESTIONS 1 & 2: 1
SUM OF ALL RESPONSES TO PHQ QUESTIONS 1-9: 1
9. THOUGHTS THAT YOU WOULD BE BETTER OFF DEAD, OR OF HURTING YOURSELF: NOT AT ALL

## 2024-05-06 ASSESSMENT — ENCOUNTER SYMPTOMS
GASTROINTESTINAL NEGATIVE: 1
RESPIRATORY NEGATIVE: 1
EYES NEGATIVE: 1

## 2024-05-06 NOTE — PROGRESS NOTES
Patient: Era Carson is a 48 y.o. male who presents today with the following Chief Complaint(s):    Chief Complaint   Patient presents with    3 Month Follow-Up    Diabetes         HPIhas watch for monitoring blood sugar.   Swelling in hands. Uses infralamp. For hands and full body.   Current Outpatient Medications   Medication Sig Dispense Refill    DULoxetine (CYMBALTA) 30 MG extended release capsule Take 1 capsule by mouth daily 30 capsule 0    Erenumab-aooe (AIMOVIG) 140 MG/ML SOAJ Inject 140 mg into the skin every 30 days 1 Adjustable Dose Pre-filled Pen Syringe 1    gabapentin (NEURONTIN) 400 MG capsule Take 1 capsule by mouth 2 times daily for 60 days. 60 capsule 1    rimegepant sulfate (NURTEC) 75 MG TBDP Take 1 tablet daily, may repeat in 24 hours PRN 16 tablet 1    topiramate (TOPAMAX) 100 MG tablet Take 1 tablet by mouth 2 times daily 60 tablet 1    traMADol (ULTRAM) 50 MG tablet Take 1 tablet by mouth 2 times daily as needed for Pain for up to 28 days. Max Daily Amount: 100 mg 56 tablet 0    GVOKE HYPOPEN 2-PACK 1 MG/0.2ML SOAJ INJECT AS NEEDED FOR LOW BLOOD SUGAR 0.4 mL 3    promethazine (PHENERGAN) 12.5 MG tablet TAKE ONE TABLET BY MOUTH THREE TIMES A DAY BEFORE A MEAL AS NEEDED FOR NAUSEA 18 tablet 0    ELIQUIS DVT/PE STARTER PACK 5 MG TBPK tablet       apixaban (ELIQUIS) 5 MG TABS tablet Take 1 tablet by mouth 2 times daily Take 2 tabs twice daily x 2 days, then 1 tab twice daily x 3 months 180 tablet 1    DULoxetine (CYMBALTA) 30 MG extended release capsule Take 1 capsule by mouth daily 30 capsule 0    LANTUS 100 UNIT/ML injection vial Inject 15 Units into the skin nightly INJECT 15 UNITS UNDER THE SKIN twice daily before breakfast and nightly. Hold nightly dose if PM BG<90 30 mL 3    Continuous Blood Gluc  (DEXCOM G6 ) LINDA Dexcom G6       insulin glulisine (APIDRA) 100 UNIT/ML injection 7-12 units AC TID 10 mL 3    Insulin Syringe-Needle U-100 (BD INSULIN SYRINGE U/F) 
kit 0    Handicap Placard MISC by Does not apply route Diagnosis: Type 1 diabetes.     Expires 4/13/23. 1 each 0    blood glucose test strips (ACCU-CHEK CAPO PLUS) strip USE ONE STRIP TO TEST THREE TIMES A  strip 4    Accu-Chek FastClix Lancets MISC 1 each by Does not apply route daily Test blood glucose 10 times daily Dx Code E 10.8 900 each 2    Insulin Syringe-Needle U-100 (BD INSULIN SYRINGE U/F) 31G X 5/16\" 0.5 ML MISC Inject 1 each into the skin 4 times daily DX CODE E 10.22 120 each 9    fluticasone (FLONASE) 50 MCG/ACT nasal spray SPRAY TWO SPRAYS IN EACH NOSTRIL DAILY (Patient taking differently: daily as needed) 16 g 4    Blood Glucose Monitoring Suppl (ACCU-CHEK CAPO PLUS) w/Device KIT 1 each by Does not apply route daily Test blood sugar 10 times daily Dx code E 10.8 1 kit 10    GLUCAGON EMERGENCY 1 MG injection INJECT 1MG INTO THE MUSCLE AS NEEDED 1 kit 3    Multiple Vitamin (DAILY KITTY) TABS TAKE ONE TABLET BY MOUTH DAILY 30 tablet 5    Cholecalciferol (VITAMIN D3) 5000 units CAPS Take 1 capsule by mouth Daily 30 capsule 3    omega-3 acid ethyl esters (LOVAZA) 1 G capsule TAKE TWO CAPSULES BY MOUTH TWICE DAILY 120 capsule 5    Alcohol Swabs PADS Use as needed for insulin injection. 100 each 5    Probiotic Product (ACIDOPHILUS PROBIOTIC) CAPS capsule TAKE ONE CAPSULE BY MOUTH DAILY 28 capsule 4    polyvinyl alcohol (LIQUIFILM TEARS) 1.4 % ophthalmic solution 1 drop as needed      propranolol (INDERAL) 80 MG tablet Take 0.5 tablets by mouth 2 times daily For migraines      Flaxseed, Linseed, (FLAX PO) Take 14 g by mouth daily as needed        No current facility-administered medications for this visit.       Patient's past medical history, surgical history, family history, medications,and allergies  were all reviewed and updated as appropriate today.      Review of Systems   Constitutional: Negative.    HENT: Negative.     Eyes: Negative.         Diabetic retinopathy. F/U ophthalmology.

## 2024-05-16 ENCOUNTER — OFFICE VISIT (OUTPATIENT)
Dept: PAIN MANAGEMENT | Age: 49
End: 2024-05-16
Payer: MEDICARE

## 2024-05-16 VITALS
DIASTOLIC BLOOD PRESSURE: 78 MMHG | OXYGEN SATURATION: 100 % | WEIGHT: 203 LBS | BODY MASS INDEX: 26.05 KG/M2 | HEART RATE: 81 BPM | SYSTOLIC BLOOD PRESSURE: 148 MMHG

## 2024-05-16 DIAGNOSIS — M80.862S PATHOLOGICAL FRACTURE OF LEFT TIBIA DUE TO OTHER OSTEOPOROSIS, SEQUELA: ICD-10-CM

## 2024-05-16 DIAGNOSIS — G63 POLYNEUROPATHY ASSOCIATED WITH UNDERLYING DISEASE (HCC): ICD-10-CM

## 2024-05-16 DIAGNOSIS — M79.18 MYOFASCIAL PAIN: ICD-10-CM

## 2024-05-16 DIAGNOSIS — M79.2 NEUROPATHIC PAIN: ICD-10-CM

## 2024-05-16 DIAGNOSIS — G62.9 PERIPHERAL POLYNEUROPATHY: ICD-10-CM

## 2024-05-16 DIAGNOSIS — L97.522 ULCER OF LEFT FOOT, WITH FAT LAYER EXPOSED (HCC): ICD-10-CM

## 2024-05-16 DIAGNOSIS — G89.4 CHRONIC PAIN SYNDROME: ICD-10-CM

## 2024-05-16 PROCEDURE — 3077F SYST BP >= 140 MM HG: CPT | Performed by: INTERNAL MEDICINE

## 2024-05-16 PROCEDURE — 99213 OFFICE O/P EST LOW 20 MIN: CPT | Performed by: INTERNAL MEDICINE

## 2024-05-16 PROCEDURE — 3078F DIAST BP <80 MM HG: CPT | Performed by: INTERNAL MEDICINE

## 2024-05-16 RX ORDER — ERENUMAB-AOOE 140 MG/ML
140 INJECTION, SOLUTION SUBCUTANEOUS
Qty: 1 ADJUSTABLE DOSE PRE-FILLED PEN SYRINGE | Refills: 1 | Status: SHIPPED | OUTPATIENT
Start: 2024-05-16

## 2024-05-16 RX ORDER — TRAMADOL HYDROCHLORIDE 50 MG/1
50 TABLET ORAL 2 TIMES DAILY PRN
Qty: 70 TABLET | Refills: 0 | Status: SHIPPED | OUTPATIENT
Start: 2024-05-16 | End: 2024-06-20

## 2024-05-16 RX ORDER — RIMEGEPANT SULFATE 75 MG/75MG
TABLET, ORALLY DISINTEGRATING ORAL
Qty: 16 TABLET | Refills: 1 | Status: SHIPPED | OUTPATIENT
Start: 2024-05-16

## 2024-05-16 RX ORDER — TOPIRAMATE 100 MG/1
100 TABLET, FILM COATED ORAL 2 TIMES DAILY
Qty: 60 TABLET | Refills: 1 | Status: SHIPPED | OUTPATIENT
Start: 2024-05-16

## 2024-05-16 NOTE — PROGRESS NOTES
Era Carson  1975  [id number]      HISTORY OF PRESENT ILLNESS:  Mr. Carson is a 48 y.o. male returns for a follow up visit for pain management  He has a diagnosis of   1. Chronic pain syndrome    2. Chronic migraine without aura, with intractable migraine, so stated, with status migrainosus    3. Peripheral polyneuropathy    4. Primary insomnia    5. Recurrent major depressive disorder, in partial remission (HCC)    6. Neuropathic pain    7. Myofascial pain    8. Pathological fracture of left tibia due to other osteoporosis, sequela    9. Polyneuropathy associated with underlying disease (HCC)    10. Ulcer of left foot, with fat layer exposed (HCC)    .      As per information/history obtained from the PADT(patient assessment and documentation tool) -  He complains of pain in the head, neck, and lower back with radiation to the shoulders Bilateral, hands Bilateral, lower leg Left, ankles Left, and feet Left He rates the pain 10/10 and describes it as sharp, aching.  Pain is made worse by: movement, walking, lifting. He denies any side effects from the current pain regimen. Patient reports that since last follow up visit the physical functioning is better, family/social relationships are better, mood is better sleep patterns are better. Mr. Carson states that since starting the treatment with the current regimen the  overall functioning  in the above aspects is  better, Patient denies misusing/abusing his narcotic pain medications or using any illegal drugs.  There are No indicators for possible drug abuse, addiction or diversion problems.   Upon obtaining the medical history from Mr. Carson regarding today's office visit for his presenting problems, Patient reports he has been having problems with his blood sugar running low at times, states it was 46 this morning. He mentions his sister brought him in today. He says he has been complaint with his medications. he states that the medications are helping with

## 2024-05-29 RX ORDER — DULOXETIN HYDROCHLORIDE 30 MG/1
30 CAPSULE, DELAYED RELEASE ORAL DAILY
Qty: 30 CAPSULE | Refills: 0 | OUTPATIENT
Start: 2024-05-29

## 2024-06-07 ENCOUNTER — TELEPHONE (OUTPATIENT)
Dept: ENDOCRINOLOGY | Age: 49
End: 2024-06-07

## 2024-06-07 RX ORDER — INSULIN GLULISINE 100 [IU]/ML
INJECTION, SOLUTION SUBCUTANEOUS
Qty: 10 ML | Refills: 3 | Status: SHIPPED | OUTPATIENT
Start: 2024-06-07

## 2024-06-07 NOTE — TELEPHONE ENCOUNTER
Pt asking for a refill pt states it may need a PA       insulin glulisine (APIDRA) 100 UNIT/ML injection     [facility] [id number] - [place], [place] - [address] - P [phone] - F [phone]  [address] [address]  Phone: [phone]  Fax: [phone]     NOV 10/1/24

## 2024-06-20 ENCOUNTER — OFFICE VISIT (OUTPATIENT)
Dept: PAIN MANAGEMENT | Age: 49
End: 2024-06-20
Payer: MEDICARE

## 2024-06-20 VITALS
DIASTOLIC BLOOD PRESSURE: 73 MMHG | BODY MASS INDEX: 26.31 KG/M2 | OXYGEN SATURATION: 100 % | WEIGHT: 205 LBS | SYSTOLIC BLOOD PRESSURE: 136 MMHG | HEART RATE: 75 BPM

## 2024-06-20 DIAGNOSIS — G89.4 CHRONIC PAIN SYNDROME: ICD-10-CM

## 2024-06-20 DIAGNOSIS — L97.522 ULCER OF LEFT FOOT, WITH FAT LAYER EXPOSED (HCC): ICD-10-CM

## 2024-06-20 DIAGNOSIS — G63 POLYNEUROPATHY ASSOCIATED WITH UNDERLYING DISEASE (HCC): ICD-10-CM

## 2024-06-20 DIAGNOSIS — M80.862S PATHOLOGICAL FRACTURE OF LEFT TIBIA DUE TO OTHER OSTEOPOROSIS, SEQUELA: ICD-10-CM

## 2024-06-20 DIAGNOSIS — G62.9 PERIPHERAL POLYNEUROPATHY: ICD-10-CM

## 2024-06-20 DIAGNOSIS — M79.2 NEUROPATHIC PAIN: ICD-10-CM

## 2024-06-20 DIAGNOSIS — M79.18 MYOFASCIAL PAIN: ICD-10-CM

## 2024-06-20 PROCEDURE — 99213 OFFICE O/P EST LOW 20 MIN: CPT | Performed by: INTERNAL MEDICINE

## 2024-06-20 PROCEDURE — 3078F DIAST BP <80 MM HG: CPT | Performed by: INTERNAL MEDICINE

## 2024-06-20 PROCEDURE — 3075F SYST BP GE 130 - 139MM HG: CPT | Performed by: INTERNAL MEDICINE

## 2024-06-20 RX ORDER — GABAPENTIN 400 MG/1
400 CAPSULE ORAL 2 TIMES DAILY
Qty: 60 CAPSULE | Refills: 1 | Status: SHIPPED | OUTPATIENT
Start: 2024-06-20 | End: 2024-08-19

## 2024-06-20 RX ORDER — ERENUMAB-AOOE 140 MG/ML
140 INJECTION, SOLUTION SUBCUTANEOUS
Qty: 1 ADJUSTABLE DOSE PRE-FILLED PEN SYRINGE | Refills: 1 | Status: SHIPPED | OUTPATIENT
Start: 2024-06-20

## 2024-06-20 RX ORDER — TOPIRAMATE 100 MG/1
100 TABLET, FILM COATED ORAL 2 TIMES DAILY
Qty: 60 TABLET | Refills: 1 | Status: SHIPPED | OUTPATIENT
Start: 2024-06-20

## 2024-06-20 RX ORDER — DULOXETIN HYDROCHLORIDE 30 MG/1
30 CAPSULE, DELAYED RELEASE ORAL DAILY
Qty: 30 CAPSULE | Refills: 0 | Status: SHIPPED | OUTPATIENT
Start: 2024-06-20

## 2024-06-20 RX ORDER — TRAMADOL HYDROCHLORIDE 50 MG/1
50 TABLET ORAL 2 TIMES DAILY PRN
Qty: 56 TABLET | Refills: 0 | Status: SHIPPED | OUTPATIENT
Start: 2024-06-20 | End: 2024-07-18

## 2024-06-20 NOTE — PROGRESS NOTES
on improvement in physical performance, general activity, work or disability,emotional distress, health care utilization and  decreased medication consumption.   Will continue to monitor progress towards achieving/maintaining therapeutic goals with special emphasis on  1. Improvement in perceived interfernce  of pain with ADL's. Ability to do home exercises independently. Ability to do household chores indoor and/or outdoor work and social and leisure activities.Improve psychosocial and physical functioning. - he is maintaining his treatment goal with the current regimen.     He was advised against drinking alcohol with the narcotic pain medicines, advised against driving or handling machinery while adjusting the dose of medicines or if having cognitive  issues related to the current medications.Risk of overdose and death, if medicines not taken as prescribed, were also discussed. If the patient develops new symptoms or if the symptoms worsen, the patient should call the office.    Thank you for allowing me to participate in the care of this patient.      Cc: Brandon Duarte MD

## 2024-07-18 ENCOUNTER — OFFICE VISIT (OUTPATIENT)
Dept: PAIN MANAGEMENT | Age: 49
End: 2024-07-18
Payer: MEDICARE

## 2024-07-18 VITALS
OXYGEN SATURATION: 100 % | WEIGHT: 195 LBS | HEART RATE: 79 BPM | BODY MASS INDEX: 25.03 KG/M2 | SYSTOLIC BLOOD PRESSURE: 103 MMHG | DIASTOLIC BLOOD PRESSURE: 67 MMHG

## 2024-07-18 DIAGNOSIS — M79.18 MYOFASCIAL PAIN: ICD-10-CM

## 2024-07-18 DIAGNOSIS — M80.862S PATHOLOGICAL FRACTURE OF LEFT TIBIA DUE TO OTHER OSTEOPOROSIS, SEQUELA: ICD-10-CM

## 2024-07-18 DIAGNOSIS — L97.522 ULCER OF LEFT FOOT, WITH FAT LAYER EXPOSED (HCC): ICD-10-CM

## 2024-07-18 DIAGNOSIS — N18.30 TYPE 1 DIABETES MELLITUS WITH STAGE 3 CHRONIC KIDNEY DISEASE (HCC): ICD-10-CM

## 2024-07-18 DIAGNOSIS — G63 POLYNEUROPATHY ASSOCIATED WITH UNDERLYING DISEASE (HCC): ICD-10-CM

## 2024-07-18 DIAGNOSIS — G89.4 CHRONIC PAIN SYNDROME: ICD-10-CM

## 2024-07-18 DIAGNOSIS — G62.9 PERIPHERAL POLYNEUROPATHY: ICD-10-CM

## 2024-07-18 DIAGNOSIS — E10.22 TYPE 1 DIABETES MELLITUS WITH STAGE 3 CHRONIC KIDNEY DISEASE (HCC): ICD-10-CM

## 2024-07-18 DIAGNOSIS — M79.2 NEUROPATHIC PAIN: ICD-10-CM

## 2024-07-18 PROCEDURE — 3074F SYST BP LT 130 MM HG: CPT | Performed by: INTERNAL MEDICINE

## 2024-07-18 PROCEDURE — 3078F DIAST BP <80 MM HG: CPT | Performed by: INTERNAL MEDICINE

## 2024-07-18 PROCEDURE — 99213 OFFICE O/P EST LOW 20 MIN: CPT | Performed by: INTERNAL MEDICINE

## 2024-07-18 RX ORDER — DULOXETIN HYDROCHLORIDE 30 MG/1
30 CAPSULE, DELAYED RELEASE ORAL DAILY
Qty: 30 CAPSULE | Refills: 1 | Status: SHIPPED | OUTPATIENT
Start: 2024-07-18

## 2024-07-18 RX ORDER — RIMEGEPANT SULFATE 75 MG/75MG
TABLET, ORALLY DISINTEGRATING ORAL
Qty: 16 TABLET | Refills: 1 | Status: SHIPPED | OUTPATIENT
Start: 2024-07-18

## 2024-07-18 RX ORDER — GABAPENTIN 400 MG/1
400 CAPSULE ORAL 2 TIMES DAILY
Qty: 60 CAPSULE | Refills: 1 | Status: SHIPPED | OUTPATIENT
Start: 2024-07-18 | End: 2024-09-16

## 2024-07-18 RX ORDER — TOPIRAMATE 100 MG/1
100 TABLET, FILM COATED ORAL 2 TIMES DAILY
Qty: 60 TABLET | Refills: 1 | Status: SHIPPED | OUTPATIENT
Start: 2024-07-18

## 2024-07-18 RX ORDER — BLOOD SUGAR DIAGNOSTIC
STRIP MISCELLANEOUS
Qty: 120 EACH | Refills: 9 | Status: SHIPPED | OUTPATIENT
Start: 2024-07-18

## 2024-07-18 RX ORDER — ERENUMAB-AOOE 140 MG/ML
140 INJECTION, SOLUTION SUBCUTANEOUS
Qty: 1 ADJUSTABLE DOSE PRE-FILLED PEN SYRINGE | Refills: 1 | Status: SHIPPED | OUTPATIENT
Start: 2024-07-18

## 2024-07-18 RX ORDER — TRAMADOL HYDROCHLORIDE 50 MG/1
50 TABLET ORAL 2 TIMES DAILY PRN
Qty: 56 TABLET | Refills: 0 | Status: SHIPPED | OUTPATIENT
Start: 2024-07-18 | End: 2024-08-15

## 2024-07-18 NOTE — TELEPHONE ENCOUNTER
Medication:   Requested Prescriptions     Signed Prescriptions Disp Refills    Insulin Syringe-Needle U-100 (BD INSULIN SYRINGE U/F) 31G X 5/16\" 0.3 ML MISC 120 each 9     Sig: USE 1 SYRINGE FOUR TIMES A DAY BEFORE MEALS AND ONCE NIGHTLY     Authorizing Provider: ROJELIO BENJAMIN     Ordering User: SARAH APARICIO       Last Filled:      Patient Phone Number: [phone] (home)     Last appt: 11/7/2023   Next appt: 10/1/2024    Last Labs DM:   Lab Results   Component Value Date/Time    LABA1C 8.3 04/24/2024 12:08 PM

## 2024-07-18 NOTE — PROGRESS NOTES
Era Carson  1975  [id number]      HISTORY OF PRESENT ILLNESS:  Mr. Carson is a 48 y.o. male returns for a follow up visit for pain management  He has a diagnosis of   1. Chronic pain syndrome    2. Myofascial pain    3. Pathological fracture of left tibia due to other osteoporosis, sequela    4. Chronic migraine without aura, with intractable migraine, so stated, with status migrainosus    5. Peripheral polyneuropathy    6. Polyneuropathy associated with underlying disease (HCC)    7. Primary insomnia    8. Neuropathic pain    9. Ulcer of left foot, with fat layer exposed (HCC)    10. Recurrent major depressive disorder, in partial remission (HCC)    .      As per information/history obtained from the PADT(patient assessment and documentation tool) -  He complains of pain in the neck, upper back, mid back, and lower back with radiation to the shoulders Bilateral, arms Bilateral, elbows Bilateral, hands Bilateral, buttocks, hips Bilateral, upper leg Bilateral, knees Bilateral, lower leg Bilateral, ankles Bilateral, and feet Bilateral He rates the pain 10/10 and describes it as sharp, aching, burning, numbness, pins and needles.  Pain is made worse by: movement, walking, standing, sitting, bending, lifting. He denies any side effects from the current pain regimen. Patient reports that since last follow up visit the physical functioning is better, family/social relationships are better, mood is better sleep patterns are better. Mr. Carson states that since starting the treatment with the current regimen the  overall functioning  in the above aspects is  better, Patient denies misusing/abusing his narcotic pain medications or using any illegal drugs.  There are No indicators for possible drug abuse, addiction or diversion problems.  Upon obtaining the medical history from Mr. Carson regarding today's office visit for his presenting problems, Patient reports he has been doing fair, states the pain has been

## 2024-07-23 RX ORDER — DULOXETIN HYDROCHLORIDE 30 MG/1
30 CAPSULE, DELAYED RELEASE ORAL DAILY
Qty: 30 CAPSULE | Refills: 0 | OUTPATIENT
Start: 2024-07-23

## 2024-08-03 ENCOUNTER — APPOINTMENT (OUTPATIENT)
Dept: GENERAL RADIOLOGY | Age: 49
End: 2024-08-03
Payer: MEDICARE

## 2024-08-03 ENCOUNTER — HOSPITAL ENCOUNTER (INPATIENT)
Age: 49
LOS: 5 days | Discharge: HOME | End: 2024-08-08
Attending: STUDENT IN AN ORGANIZED HEALTH CARE EDUCATION/TRAINING PROGRAM | Admitting: INTERNAL MEDICINE
Payer: MEDICARE

## 2024-08-03 ENCOUNTER — APPOINTMENT (OUTPATIENT)
Dept: CT IMAGING | Age: 49
End: 2024-08-03
Payer: MEDICARE

## 2024-08-03 DIAGNOSIS — A41.9 SEPSIS WITH ENCEPHALOPATHY WITHOUT SEPTIC SHOCK, DUE TO UNSPECIFIED ORGANISM (HCC): ICD-10-CM

## 2024-08-03 DIAGNOSIS — R65.20 SEPSIS WITH ENCEPHALOPATHY WITHOUT SEPTIC SHOCK, DUE TO UNSPECIFIED ORGANISM (HCC): ICD-10-CM

## 2024-08-03 DIAGNOSIS — E87.6 HYPOKALEMIA: ICD-10-CM

## 2024-08-03 DIAGNOSIS — G93.41 SEPSIS WITH ENCEPHALOPATHY WITHOUT SEPTIC SHOCK, DUE TO UNSPECIFIED ORGANISM (HCC): ICD-10-CM

## 2024-08-03 DIAGNOSIS — G93.41 ACUTE METABOLIC ENCEPHALOPATHY: Primary | ICD-10-CM

## 2024-08-03 LAB
ALBUMIN SERPL-MCNC: 3.4 G/DL (ref 3.4–5)
ALBUMIN SERPL-MCNC: 3.9 G/DL (ref 3.4–5)
ALBUMIN/GLOB SERPL: 1 {RATIO} (ref 1.1–2.2)
ALBUMIN/GLOB SERPL: 1.2 {RATIO} (ref 1.1–2.2)
ALP SERPL-CCNC: 73 U/L (ref 40–129)
ALP SERPL-CCNC: 88 U/L (ref 40–129)
ALT SERPL-CCNC: 12 U/L (ref 10–40)
ALT SERPL-CCNC: <5 U/L (ref 10–40)
AMPHETAMINES UR QL SCN>1000 NG/ML: ABNORMAL
ANION GAP SERPL CALCULATED.3IONS-SCNC: 12 MMOL/L (ref 3–16)
ANION GAP SERPL CALCULATED.3IONS-SCNC: 13 MMOL/L (ref 3–16)
APAP SERPL-MCNC: <5 UG/ML (ref 10–30)
AST SERPL-CCNC: 15 U/L (ref 15–37)
AST SERPL-CCNC: 15 U/L (ref 15–37)
BARBITURATES UR QL SCN>200 NG/ML: ABNORMAL
BASOPHILS # BLD: 0 K/UL (ref 0–0.2)
BASOPHILS NFR BLD: 0.3 %
BENZODIAZ UR QL SCN>200 NG/ML: POSITIVE
BILIRUB SERPL-MCNC: <0.2 MG/DL (ref 0–1)
BILIRUB SERPL-MCNC: <0.2 MG/DL (ref 0–1)
BILIRUB UR QL STRIP.AUTO: NEGATIVE
BUN SERPL-MCNC: 14 MG/DL (ref 7–20)
BUN SERPL-MCNC: 16 MG/DL (ref 7–20)
CALCIUM SERPL-MCNC: 8.2 MG/DL (ref 8.3–10.6)
CALCIUM SERPL-MCNC: 8.8 MG/DL (ref 8.3–10.6)
CANNABINOIDS UR QL SCN>50 NG/ML: ABNORMAL
CHLORIDE SERPL-SCNC: 100 MMOL/L (ref 99–110)
CHLORIDE SERPL-SCNC: 107 MMOL/L (ref 99–110)
CK SERPL-CCNC: 133 U/L (ref 39–308)
CLARITY UR: CLEAR
CO2 SERPL-SCNC: 19 MMOL/L (ref 21–32)
CO2 SERPL-SCNC: 22 MMOL/L (ref 21–32)
COCAINE UR QL SCN: ABNORMAL
COLOR UR: YELLOW
CREAT SERPL-MCNC: 1.3 MG/DL (ref 0.9–1.3)
CREAT SERPL-MCNC: 1.4 MG/DL (ref 0.9–1.3)
DEPRECATED RDW RBC AUTO: 18 % (ref 12.4–15.4)
DRUG SCREEN COMMENT UR-IMP: ABNORMAL
EKG ATRIAL RATE: 113 BPM
EKG DIAGNOSIS: NORMAL
EKG P AXIS: 76 DEGREES
EKG P-R INTERVAL: 162 MS
EKG Q-T INTERVAL: 334 MS
EKG QRS DURATION: 90 MS
EKG QTC CALCULATION (BAZETT): 458 MS
EKG R AXIS: -19 DEGREES
EKG T AXIS: 73 DEGREES
EKG VENTRICULAR RATE: 113 BPM
EOSINOPHIL # BLD: 0.1 K/UL (ref 0–0.6)
EOSINOPHIL NFR BLD: 0.8 %
ETHANOLAMINE SERPL-MCNC: NORMAL MG/DL (ref 0–0.08)
FENTANYL SCREEN, URINE: ABNORMAL
FLUAV RNA RESP QL NAA+PROBE: NOT DETECTED
FLUBV RNA RESP QL NAA+PROBE: NOT DETECTED
GFR SERPLBLD CREATININE-BSD FMLA CKD-EPI: 62 ML/MIN/{1.73_M2}
GFR SERPLBLD CREATININE-BSD FMLA CKD-EPI: 68 ML/MIN/{1.73_M2}
GLUCOSE BLD-MCNC: 112 MG/DL (ref 70–99)
GLUCOSE BLD-MCNC: 113 MG/DL (ref 70–99)
GLUCOSE BLD-MCNC: 117 MG/DL (ref 70–99)
GLUCOSE BLD-MCNC: 147 MG/DL (ref 70–99)
GLUCOSE BLD-MCNC: 192 MG/DL (ref 70–99)
GLUCOSE BLD-MCNC: 245 MG/DL (ref 70–99)
GLUCOSE SERPL-MCNC: 116 MG/DL (ref 70–99)
GLUCOSE SERPL-MCNC: 163 MG/DL (ref 70–99)
GLUCOSE UR STRIP.AUTO-MCNC: 250 MG/DL
HCT VFR BLD AUTO: 38 % (ref 40.5–52.5)
HGB BLD-MCNC: 12.1 G/DL (ref 13.5–17.5)
HGB UR QL STRIP.AUTO: ABNORMAL
KETONES UR STRIP.AUTO-MCNC: NEGATIVE MG/DL
LACTATE BLDV-SCNC: 1.1 MMOL/L (ref 0.4–2)
LACTATE BLDV-SCNC: 1.9 MMOL/L (ref 0.4–1.9)
LEUKOCYTE ESTERASE UR QL STRIP.AUTO: NEGATIVE
LYMPHOCYTES # BLD: 0.7 K/UL (ref 1–5.1)
LYMPHOCYTES NFR BLD: 6.9 %
MAGNESIUM SERPL-MCNC: 1.5 MG/DL (ref 1.8–2.4)
MAGNESIUM SERPL-MCNC: 1.7 MG/DL (ref 1.8–2.4)
MAGNESIUM SERPL-MCNC: 1.9 MG/DL (ref 1.8–2.4)
MCH RBC QN AUTO: 25.1 PG (ref 26–34)
MCHC RBC AUTO-ENTMCNC: 31.9 G/DL (ref 31–36)
MCV RBC AUTO: 78.5 FL (ref 80–100)
METHADONE UR QL SCN>300 NG/ML: ABNORMAL
MONOCYTES # BLD: 0.5 K/UL (ref 0–1.3)
MONOCYTES NFR BLD: 4.2 %
MUCOUS THREADS #/AREA URNS LPF: ABNORMAL /LPF
NEUTROPHILS # BLD: 9.4 K/UL (ref 1.7–7.7)
NEUTROPHILS NFR BLD: 87.8 %
NITRITE UR QL STRIP.AUTO: NEGATIVE
OPIATES UR QL SCN>300 NG/ML: ABNORMAL
OXYCODONE UR QL SCN: ABNORMAL
PCP UR QL SCN>25 NG/ML: ABNORMAL
PERFORMED ON: ABNORMAL
PH UR STRIP.AUTO: 7 [PH] (ref 5–8)
PH UR STRIP: 7 [PH]
PHOSPHATE SERPL-MCNC: 4 MG/DL (ref 2.5–4.9)
PLATELET # BLD AUTO: 396 K/UL (ref 135–450)
PMV BLD AUTO: 6.8 FL (ref 5–10.5)
POTASSIUM SERPL-SCNC: 3 MMOL/L (ref 3.5–5.1)
POTASSIUM SERPL-SCNC: 3 MMOL/L (ref 3.5–5.1)
POTASSIUM SERPL-SCNC: 3.6 MMOL/L (ref 3.5–5.1)
PROCALCITONIN SERPL IA-MCNC: 0.08 NG/ML (ref 0–0.15)
PROT SERPL-MCNC: 6.3 G/DL (ref 6.4–8.2)
PROT SERPL-MCNC: 7.7 G/DL (ref 6.4–8.2)
PROT UR STRIP.AUTO-MCNC: ABNORMAL MG/DL
RBC # BLD AUTO: 4.84 M/UL (ref 4.2–5.9)
RBC #/AREA URNS HPF: ABNORMAL /HPF (ref 0–4)
SALICYLATES SERPL-MCNC: <0.3 MG/DL (ref 15–30)
SARS-COV-2 RNA RESP QL NAA+PROBE: NOT DETECTED
SODIUM SERPL-SCNC: 134 MMOL/L (ref 136–145)
SODIUM SERPL-SCNC: 139 MMOL/L (ref 136–145)
SP GR UR STRIP.AUTO: 1.01 (ref 1–1.03)
TROPONIN, HIGH SENSITIVITY: 25 NG/L (ref 0–22)
TROPONIN, HIGH SENSITIVITY: 26 NG/L (ref 0–22)
UA DIPSTICK W REFLEX MICRO PNL UR: YES
URN SPEC COLLECT METH UR: ABNORMAL
UROBILINOGEN UR STRIP-ACNC: 0.2 E.U./DL
WBC # BLD AUTO: 10.8 K/UL (ref 4–11)
WBC #/AREA URNS HPF: ABNORMAL /HPF (ref 0–5)

## 2024-08-03 PROCEDURE — 93005 ELECTROCARDIOGRAM TRACING: CPT | Performed by: STUDENT IN AN ORGANIZED HEALTH CARE EDUCATION/TRAINING PROGRAM

## 2024-08-03 PROCEDURE — 80307 DRUG TEST PRSMV CHEM ANLYZR: CPT

## 2024-08-03 PROCEDURE — 2580000003 HC RX 258: Performed by: INTERNAL MEDICINE

## 2024-08-03 PROCEDURE — 6370000000 HC RX 637 (ALT 250 FOR IP): Performed by: STUDENT IN AN ORGANIZED HEALTH CARE EDUCATION/TRAINING PROGRAM

## 2024-08-03 PROCEDURE — 82550 ASSAY OF CK (CPK): CPT

## 2024-08-03 PROCEDURE — 80143 DRUG ASSAY ACETAMINOPHEN: CPT

## 2024-08-03 PROCEDURE — 96375 TX/PRO/DX INJ NEW DRUG ADDON: CPT

## 2024-08-03 PROCEDURE — 84145 PROCALCITONIN (PCT): CPT

## 2024-08-03 PROCEDURE — 83036 HEMOGLOBIN GLYCOSYLATED A1C: CPT

## 2024-08-03 PROCEDURE — 99285 EMERGENCY DEPT VISIT HI MDM: CPT

## 2024-08-03 PROCEDURE — 6360000002 HC RX W HCPCS: Performed by: INTERNAL MEDICINE

## 2024-08-03 PROCEDURE — 84100 ASSAY OF PHOSPHORUS: CPT

## 2024-08-03 PROCEDURE — 84132 ASSAY OF SERUM POTASSIUM: CPT

## 2024-08-03 PROCEDURE — 84484 ASSAY OF TROPONIN QUANT: CPT

## 2024-08-03 PROCEDURE — 81001 URINALYSIS AUTO W/SCOPE: CPT

## 2024-08-03 PROCEDURE — 6360000002 HC RX W HCPCS: Performed by: STUDENT IN AN ORGANIZED HEALTH CARE EDUCATION/TRAINING PROGRAM

## 2024-08-03 PROCEDURE — 80179 DRUG ASSAY SALICYLATE: CPT

## 2024-08-03 PROCEDURE — 2580000003 HC RX 258: Performed by: STUDENT IN AN ORGANIZED HEALTH CARE EDUCATION/TRAINING PROGRAM

## 2024-08-03 PROCEDURE — 71045 X-RAY EXAM CHEST 1 VIEW: CPT

## 2024-08-03 PROCEDURE — 96365 THER/PROPH/DIAG IV INF INIT: CPT

## 2024-08-03 PROCEDURE — 87636 SARSCOV2 & INF A&B AMP PRB: CPT

## 2024-08-03 PROCEDURE — 2000000000 HC ICU R&B

## 2024-08-03 PROCEDURE — 99291 CRITICAL CARE FIRST HOUR: CPT

## 2024-08-03 PROCEDURE — 80053 COMPREHEN METABOLIC PANEL: CPT

## 2024-08-03 PROCEDURE — 2500000003 HC RX 250 WO HCPCS: Performed by: INTERNAL MEDICINE

## 2024-08-03 PROCEDURE — 82077 ASSAY SPEC XCP UR&BREATH IA: CPT

## 2024-08-03 PROCEDURE — 83735 ASSAY OF MAGNESIUM: CPT

## 2024-08-03 PROCEDURE — 83605 ASSAY OF LACTIC ACID: CPT

## 2024-08-03 PROCEDURE — 85025 COMPLETE CBC W/AUTO DIFF WBC: CPT

## 2024-08-03 PROCEDURE — 87040 BLOOD CULTURE FOR BACTERIA: CPT

## 2024-08-03 PROCEDURE — 36415 COLL VENOUS BLD VENIPUNCTURE: CPT

## 2024-08-03 PROCEDURE — 93010 ELECTROCARDIOGRAM REPORT: CPT | Performed by: INTERNAL MEDICINE

## 2024-08-03 PROCEDURE — 70450 CT HEAD/BRAIN W/O DYE: CPT

## 2024-08-03 PROCEDURE — 6370000000 HC RX 637 (ALT 250 FOR IP): Performed by: INTERNAL MEDICINE

## 2024-08-03 RX ORDER — ACETAMINOPHEN 500 MG
1000 TABLET ORAL ONCE
Status: DISCONTINUED | OUTPATIENT
Start: 2024-08-03 | End: 2024-08-03

## 2024-08-03 RX ORDER — SODIUM CHLORIDE 9 MG/ML
INJECTION, SOLUTION INTRAVENOUS PRN
Status: DISCONTINUED | OUTPATIENT
Start: 2024-08-03 | End: 2024-08-08 | Stop reason: HOSPADM

## 2024-08-03 RX ORDER — 0.9 % SODIUM CHLORIDE 0.9 %
1655 INTRAVENOUS SOLUTION INTRAVENOUS ONCE
Status: COMPLETED | OUTPATIENT
Start: 2024-08-03 | End: 2024-08-03

## 2024-08-03 RX ORDER — ACETAMINOPHEN 650 MG/1
650 SUPPOSITORY RECTAL ONCE
Status: COMPLETED | OUTPATIENT
Start: 2024-08-03 | End: 2024-08-03

## 2024-08-03 RX ORDER — SODIUM CHLORIDE 0.9 % (FLUSH) 0.9 %
5-40 SYRINGE (ML) INJECTION EVERY 12 HOURS SCHEDULED
Status: DISCONTINUED | OUTPATIENT
Start: 2024-08-03 | End: 2024-08-08 | Stop reason: HOSPADM

## 2024-08-03 RX ORDER — HALOPERIDOL 5 MG/ML
2 INJECTION INTRAMUSCULAR ONCE
Status: COMPLETED | OUTPATIENT
Start: 2024-08-03 | End: 2024-08-03

## 2024-08-03 RX ORDER — GABAPENTIN 400 MG/1
400 CAPSULE ORAL 2 TIMES DAILY
Status: DISCONTINUED | OUTPATIENT
Start: 2024-08-03 | End: 2024-08-08 | Stop reason: HOSPADM

## 2024-08-03 RX ORDER — 0.9 % SODIUM CHLORIDE 0.9 %
1000 INTRAVENOUS SOLUTION INTRAVENOUS ONCE
Status: COMPLETED | OUTPATIENT
Start: 2024-08-03 | End: 2024-08-03

## 2024-08-03 RX ORDER — GLUCAGON 1 MG/ML
1 KIT INJECTION PRN
Status: DISCONTINUED | OUTPATIENT
Start: 2024-08-03 | End: 2024-08-08 | Stop reason: HOSPADM

## 2024-08-03 RX ORDER — SUMATRIPTAN 50 MG/1
50 TABLET, FILM COATED ORAL
COMMUNITY

## 2024-08-03 RX ORDER — DEXTROSE MONOHYDRATE 100 MG/ML
INJECTION, SOLUTION INTRAVENOUS CONTINUOUS PRN
Status: DISCONTINUED | OUTPATIENT
Start: 2024-08-03 | End: 2024-08-08 | Stop reason: HOSPADM

## 2024-08-03 RX ORDER — LEVOCETIRIZINE DIHYDROCHLORIDE 5 MG/1
5 TABLET, FILM COATED ORAL DAILY
COMMUNITY

## 2024-08-03 RX ORDER — SODIUM CHLORIDE 9 MG/ML
INJECTION, SOLUTION INTRAVENOUS CONTINUOUS
Status: DISCONTINUED | OUTPATIENT
Start: 2024-08-03 | End: 2024-08-03

## 2024-08-03 RX ORDER — TOPIRAMATE 100 MG/1
100 TABLET, FILM COATED ORAL 2 TIMES DAILY
Status: DISCONTINUED | OUTPATIENT
Start: 2024-08-03 | End: 2024-08-08

## 2024-08-03 RX ORDER — POTASSIUM CHLORIDE 7.45 MG/ML
10 INJECTION INTRAVENOUS
Status: COMPLETED | OUTPATIENT
Start: 2024-08-03 | End: 2024-08-03

## 2024-08-03 RX ORDER — ENOXAPARIN SODIUM 100 MG/ML
40 INJECTION SUBCUTANEOUS DAILY
Status: DISCONTINUED | OUTPATIENT
Start: 2024-08-03 | End: 2024-08-08 | Stop reason: HOSPADM

## 2024-08-03 RX ORDER — MAGNESIUM SULFATE IN WATER 40 MG/ML
2000 INJECTION, SOLUTION INTRAVENOUS PRN
Status: DISCONTINUED | OUTPATIENT
Start: 2024-08-03 | End: 2024-08-08 | Stop reason: HOSPADM

## 2024-08-03 RX ORDER — DIPHENHYDRAMINE HYDROCHLORIDE 50 MG/ML
25 INJECTION INTRAMUSCULAR; INTRAVENOUS ONCE
Status: COMPLETED | OUTPATIENT
Start: 2024-08-03 | End: 2024-08-03

## 2024-08-03 RX ORDER — ACETAMINOPHEN 650 MG/1
650 SUPPOSITORY RECTAL EVERY 6 HOURS PRN
Status: DISCONTINUED | OUTPATIENT
Start: 2024-08-03 | End: 2024-08-08 | Stop reason: HOSPADM

## 2024-08-03 RX ORDER — PROMETHAZINE HYDROCHLORIDE 25 MG/1
12.5 TABLET ORAL EVERY 6 HOURS PRN
Status: DISCONTINUED | OUTPATIENT
Start: 2024-08-03 | End: 2024-08-08 | Stop reason: HOSPADM

## 2024-08-03 RX ORDER — POTASSIUM CHLORIDE 7.45 MG/ML
10 INJECTION INTRAVENOUS PRN
Status: DISCONTINUED | OUTPATIENT
Start: 2024-08-03 | End: 2024-08-08 | Stop reason: HOSPADM

## 2024-08-03 RX ORDER — SUMATRIPTAN 25 MG/1
50 TABLET, FILM COATED ORAL
Status: DISPENSED | OUTPATIENT
Start: 2024-08-03 | End: 2024-08-04

## 2024-08-03 RX ORDER — DEXMEDETOMIDINE HYDROCHLORIDE 4 UG/ML
.1-1.5 INJECTION, SOLUTION INTRAVENOUS CONTINUOUS
Status: DISCONTINUED | OUTPATIENT
Start: 2024-08-03 | End: 2024-08-05

## 2024-08-03 RX ORDER — TRAMADOL HYDROCHLORIDE 50 MG/1
50 TABLET ORAL EVERY 6 HOURS PRN
Status: DISCONTINUED | OUTPATIENT
Start: 2024-08-03 | End: 2024-08-08 | Stop reason: HOSPADM

## 2024-08-03 RX ORDER — INSULIN GLARGINE 100 [IU]/ML
15 INJECTION, SOLUTION SUBCUTANEOUS NIGHTLY
Status: DISCONTINUED | OUTPATIENT
Start: 2024-08-03 | End: 2024-08-07

## 2024-08-03 RX ORDER — DULOXETIN HYDROCHLORIDE 30 MG/1
30 CAPSULE, DELAYED RELEASE ORAL DAILY
Status: DISCONTINUED | OUTPATIENT
Start: 2024-08-03 | End: 2024-08-08 | Stop reason: HOSPADM

## 2024-08-03 RX ORDER — SODIUM CHLORIDE 9 MG/ML
1000 INJECTION, SOLUTION INTRAVENOUS CONTINUOUS
Status: DISCONTINUED | OUTPATIENT
Start: 2024-08-03 | End: 2024-08-05

## 2024-08-03 RX ORDER — SODIUM CHLORIDE 0.9 % (FLUSH) 0.9 %
5-40 SYRINGE (ML) INJECTION PRN
Status: DISCONTINUED | OUTPATIENT
Start: 2024-08-03 | End: 2024-08-08 | Stop reason: HOSPADM

## 2024-08-03 RX ORDER — POTASSIUM CHLORIDE 20 MEQ/1
40 TABLET, EXTENDED RELEASE ORAL PRN
Status: DISCONTINUED | OUTPATIENT
Start: 2024-08-03 | End: 2024-08-08 | Stop reason: HOSPADM

## 2024-08-03 RX ORDER — ACETAMINOPHEN 325 MG/1
650 TABLET ORAL EVERY 6 HOURS PRN
Status: DISCONTINUED | OUTPATIENT
Start: 2024-08-03 | End: 2024-08-08 | Stop reason: HOSPADM

## 2024-08-03 RX ORDER — INSULIN LISPRO 100 [IU]/ML
0-4 INJECTION, SOLUTION INTRAVENOUS; SUBCUTANEOUS EVERY 4 HOURS
Status: DISCONTINUED | OUTPATIENT
Start: 2024-08-03 | End: 2024-08-06

## 2024-08-03 RX ORDER — LORAZEPAM 2 MG/ML
2 INJECTION INTRAMUSCULAR ONCE
Status: DISCONTINUED | OUTPATIENT
Start: 2024-08-03 | End: 2024-08-03

## 2024-08-03 RX ORDER — PROMETHAZINE HYDROCHLORIDE 25 MG/1
25 TABLET ORAL EVERY 6 HOURS PRN
Status: DISCONTINUED | OUTPATIENT
Start: 2024-08-03 | End: 2024-08-03

## 2024-08-03 RX ORDER — MIDAZOLAM HYDROCHLORIDE 1 MG/ML
5 INJECTION INTRAMUSCULAR; INTRAVENOUS ONCE
Status: COMPLETED | OUTPATIENT
Start: 2024-08-03 | End: 2024-08-03

## 2024-08-03 RX ADMIN — MIDAZOLAM HYDROCHLORIDE 5 MG: 1 INJECTION, SOLUTION INTRAMUSCULAR; INTRAVENOUS at 03:30

## 2024-08-03 RX ADMIN — INSULIN LISPRO 1 UNITS: 100 INJECTION, SOLUTION INTRAVENOUS; SUBCUTANEOUS at 19:22

## 2024-08-03 RX ADMIN — Medication 10 ML: at 21:11

## 2024-08-03 RX ADMIN — INSULIN GLARGINE 15 UNITS: 100 INJECTION, SOLUTION SUBCUTANEOUS at 21:12

## 2024-08-03 RX ADMIN — Medication 0.2 MCG/KG/HR: at 14:08

## 2024-08-03 RX ADMIN — ACETAMINOPHEN 650 MG: 650 SUPPOSITORY RECTAL at 05:22

## 2024-08-03 RX ADMIN — POTASSIUM CHLORIDE 10 MEQ: 7.46 INJECTION, SOLUTION INTRAVENOUS at 07:37

## 2024-08-03 RX ADMIN — SODIUM CHLORIDE 1000 ML: 9 INJECTION, SOLUTION INTRAVENOUS at 10:58

## 2024-08-03 RX ADMIN — ENOXAPARIN SODIUM 40 MG: 100 INJECTION SUBCUTANEOUS at 13:44

## 2024-08-03 RX ADMIN — CEFEPIME 2000 MG: 2 INJECTION, POWDER, FOR SOLUTION INTRAVENOUS at 13:41

## 2024-08-03 RX ADMIN — SODIUM CHLORIDE: 9 INJECTION, SOLUTION INTRAVENOUS at 13:40

## 2024-08-03 RX ADMIN — PANTOPRAZOLE SODIUM 40 MG: 40 INJECTION, POWDER, FOR SOLUTION INTRAVENOUS at 23:52

## 2024-08-03 RX ADMIN — DIPHENHYDRAMINE HYDROCHLORIDE 25 MG: 50 INJECTION INTRAMUSCULAR; INTRAVENOUS at 02:30

## 2024-08-03 RX ADMIN — POTASSIUM CHLORIDE 10 MEQ: 7.46 INJECTION, SOLUTION INTRAVENOUS at 05:25

## 2024-08-03 RX ADMIN — Medication 1 MCG/KG/HR: at 19:45

## 2024-08-03 RX ADMIN — SODIUM CHLORIDE 1000 ML: 9 INJECTION, SOLUTION INTRAVENOUS at 02:29

## 2024-08-03 RX ADMIN — Medication 1 MCG/KG/HR: at 23:56

## 2024-08-03 RX ADMIN — POTASSIUM CHLORIDE 10 MEQ: 7.46 INJECTION, SOLUTION INTRAVENOUS at 16:21

## 2024-08-03 RX ADMIN — POTASSIUM CHLORIDE 10 MEQ: 7.46 INJECTION, SOLUTION INTRAVENOUS at 12:06

## 2024-08-03 RX ADMIN — HALOPERIDOL LACTATE 2 MG: 5 INJECTION, SOLUTION INTRAMUSCULAR at 02:29

## 2024-08-03 RX ADMIN — CEFEPIME 2000 MG: 2 INJECTION, POWDER, FOR SOLUTION INTRAVENOUS at 05:24

## 2024-08-03 RX ADMIN — VANCOMYCIN HYDROCHLORIDE 2000 MG: 10 INJECTION, POWDER, LYOPHILIZED, FOR SOLUTION INTRAVENOUS at 05:59

## 2024-08-03 RX ADMIN — POTASSIUM CHLORIDE 10 MEQ: 7.46 INJECTION, SOLUTION INTRAVENOUS at 06:44

## 2024-08-03 RX ADMIN — POTASSIUM CHLORIDE 10 MEQ: 7.46 INJECTION, SOLUTION INTRAVENOUS at 13:09

## 2024-08-03 RX ADMIN — POTASSIUM CHLORIDE 10 MEQ: 7.46 INJECTION, SOLUTION INTRAVENOUS at 14:17

## 2024-08-03 RX ADMIN — PANTOPRAZOLE SODIUM 40 MG: 40 INJECTION, POWDER, FOR SOLUTION INTRAVENOUS at 13:33

## 2024-08-03 RX ADMIN — POTASSIUM CHLORIDE 10 MEQ: 7.46 INJECTION, SOLUTION INTRAVENOUS at 15:21

## 2024-08-03 RX ADMIN — POTASSIUM CHLORIDE 10 MEQ: 7.46 INJECTION, SOLUTION INTRAVENOUS at 11:08

## 2024-08-03 RX ADMIN — SODIUM CHLORIDE 1655 ML: 9 INJECTION, SOLUTION INTRAVENOUS at 05:27

## 2024-08-03 RX ADMIN — POTASSIUM CHLORIDE 10 MEQ: 7.46 INJECTION, SOLUTION INTRAVENOUS at 10:06

## 2024-08-03 RX ADMIN — CEFEPIME 2000 MG: 2 INJECTION, POWDER, FOR SOLUTION INTRAVENOUS at 21:11

## 2024-08-03 RX ADMIN — SODIUM CHLORIDE 1000 ML: 9 INJECTION, SOLUTION INTRAVENOUS at 21:10

## 2024-08-03 RX ADMIN — MAGNESIUM SULFATE HEPTAHYDRATE 2000 MG: 40 INJECTION, SOLUTION INTRAVENOUS at 10:54

## 2024-08-03 RX ADMIN — SODIUM CHLORIDE: 9 INJECTION, SOLUTION INTRAVENOUS at 08:30

## 2024-08-03 RX ADMIN — POTASSIUM CHLORIDE 10 MEQ: 7.46 INJECTION, SOLUTION INTRAVENOUS at 08:54

## 2024-08-03 RX ADMIN — VANCOMYCIN HYDROCHLORIDE 1000 MG: 1 INJECTION, POWDER, LYOPHILIZED, FOR SOLUTION INTRAVENOUS at 18:07

## 2024-08-03 ASSESSMENT — PAIN SCALES - WONG BAKER
WONGBAKER_NUMERICALRESPONSE: NO HURT

## 2024-08-03 NOTE — ED NOTES
Patient hawking up mucous and spit it on the wall/floor. Emesis bag was on bed at this time in reach of patient.

## 2024-08-03 NOTE — H&P
History and Physical        HISTORY OF PRESENT ILLNESS:      49 yo old male with diabetes mellitus type 2, history of DVT, on neuropathy, chronic pain, chronic migraine, recent admission 1/25/2024 for DKA, encephalopathy was brought to ED for altered mental status.  Patient was found on the floor at home covered in emesis and urine.  Random blood sugar was 80 EMS.  Febrile with Tmax 102.1     Admitting for sepsis-unclear source at this time  UA not suggestive of infection  Chest x-ray with no acute process   Blood cultures ordered in ER  Ordered broad-spectrum antibiotics-vancomycin, cefepime  Patient received Benadryl, Haldol, Versed in ED as he was restless        -   Patient seen on arrival to the floor; he is mostly sleepy and resting in bed.  Does not want to be bothered much.   History obtained on reviewing the chart and speaking to patient's nurse at bedside.   Patient's nurse just finished talking to patient's sister.     Patient with known history of insulin-dependent diabetes mellitus with history of DKA in the past.   Lives with a sister.  His sister found him on the floor, facedown, he had vomited on himself.      CT head on arrival to the ED was negative .  He is not in DKA. .  Patient was agitated in the ED, was given Benadryl and Haldol by prior to CT.     Currently on the floor patient is mostly sleeping; he does awaken turnaround in bed ; there is no focal deficits ; he is in no distress;  no pain behaviors noted; no nausea or vomiting.  - still n.p.o. as he is lethargic and getting IV fluids.    -Patient's home medications have now been verified and I have reconciled       Past Medical History:   Diagnosis Date    Acute respiratory failure (HCC) 8/18/2014    Asthma     Blood pressure instability     Chronic pain syndrome     Detached retina, bilateral     laser tx right eye    Diabetes mellitus (HCC)     uncontrolled     Insomnia     Meningitis August 2014    admission [facility]     Pharynx clear.   Neck: Trachea midline. Normal thyroid.   Resp: No accessory muscle use. No crackles. No wheezes. No rhonchi. No dullness on percussion.  CV: Regular rate. Regular rhythm. No murmur or rub. No edema.   GI: Non-tender. Non-distended. No masses. No organomegaly. Normal bowel sounds. No hernia.   Skin: Warm and dry. No nodule on exposed extremities. No rash on exposed extremities.   Lymph: No cervical LAD. No supraclavicular LAD.   M/S: No cyanosis. No joint deformity. No clubbing.   Intact peripheral pulses. Brisk cap refill, < 2 secs  Neuro: grossly non focal. Moving all 4 extremities spontaneously. Cranial nerves intact   Psych:  not agitated now     CBC:   Recent Labs     08/03/24 0228   WBC 10.8   HGB 12.1*   HCT 38.0*   MCV 78.5*        BMP:   Recent Labs     08/03/24 0228 08/03/24 0835   * 139   K 3.0* 3.0*    107   CO2 22 19*   BUN 14 16   CREATININE 1.3 1.4*     LIVER PROFILE:   Recent Labs     08/03/24 0228 08/03/24  0835   AST 15 15   ALT 12 <5*   BILITOT <0.2 <0.2   ALKPHOS 88 73     PT/INR: No results for input(s): \"PROTIME\", \"INR\" in the last 72 hours.  APTT: No results for input(s): \"APTT\" in the last 72 hours.  UA:  Recent Labs     08/03/24 0455   COLORU Yellow   PHUR 7.0  7.0   WBCUA 0-2   RBCUA 0-2   MUCUS Rare*   CLARITYU Clear   LEUKOCYTESUR Negative   UROBILINOGEN 0.2   BILIRUBINUR Negative   BLOODU TRACE-INTACT*   GLUCOSEU 250*          Cultures   Results       Procedure Component Value Units Date/Time    Culture, Blood 1 [[id number]] Collected: 08/03/24 0511    Order Status: Sent Specimen: Blood Updated: 08/03/24 0532    Culture, Blood 2 [[id number]] Collected: 08/03/24 0511    Order Status: Sent Specimen: Blood Updated: 08/03/24 0532    COVID-19 & Influenza Combo [[id number]] Collected: 08/03/24 0455    Order Status: Completed Specimen: Nasopharyngeal Swab Updated: 08/03/24 0522     SARS-CoV-2 RNA, RT PCR NOT DETECTED     Comment: Not Detected results do

## 2024-08-03 NOTE — ED NOTES
Care transferred to inpatient unit. Report given to Danielle DUMONT. Patient remains uncooperative with care, not able to successfully redirect. Vitals stable.

## 2024-08-03 NOTE — PROGRESS NOTES
Pt very restless and starting to try and get out of bed, despite having bilateral wrist restraints in place.  Pt will shake head no to some questions and at times will answer with the word \"no\", but does not give any further information.  Pt made aware that he is in ICU and we are trying to help him.   Staff assist x2 to get pts feet back in bed.  Pt yelling out \"stop it\".  Precedex gtt titrated up. Telesitter in place for pt safety.  Bed alarm on, for pt safety.

## 2024-08-03 NOTE — ED NOTES
Writer noticed patient felt warm to touch during EKG. Axillary temp taken and reported to Dr. Pérez.

## 2024-08-03 NOTE — CONSULTS
Pharmacy Note  Vancomycin Consult    Pharmacy to dose Vanco x 1    Dx: sepsis  Consult requested from: Dr. Pérez    Please give 2000 mg IVPB x1 now to provide Gram+ organism coverage to include MRSA.     Thank you for this consult. Pharmacy will provide additional doses if consulted upon admission.     Deja Duke, RyanD, [facility], 8/3/2024 5:07 AM

## 2024-08-03 NOTE — ED NOTES
Patient climbed out of bed without clothes on standing at the end of the bed pulling on monitor cords. Patient escorted back in bed and writer attempted to put patient back on monitor but was unsuccessful d/t patient being uncooperative and resisting. Dr. Beto sharpe. Clinical notified of patient restlessness and possible need for  on the unit

## 2024-08-03 NOTE — ED NOTES
Patient continues to be uncooperative and not following instruction. Patient is resistant for any attempt to get vitals or start IV and draw blood. Patient clothing covered in urine and shirt in vomit. Writer offered to change patient out of wet clothing and patient did not respond. Hospital paints left on bed for patient to change clothing if he chooses. Patient attempting to climb out of bed placing his legs between the rail bars trapping his leg. Patient not able to follow commands to help get his legs out of the bars and back in bed. Security called to bedside to assist in putting patient back in bed. Dr. Pérez notified.

## 2024-08-03 NOTE — PROGRESS NOTES
Patient set off bed alarm and telesitter/virtual observer alarm set off. Patient found standing at the end of the bed with IV lines and external catheter line stretched to the limit. Patient would not listen/comply with nursing request for safety and the patient was helped  to the bed. He continued to pull at the IV lies and telemetry.  The patient continued to resist nursing requests for safety. Bilateral wrist restraints were applied and Dr. Lagunas was notifed for request restraint order.

## 2024-08-03 NOTE — ED PROVIDER NOTES
[facility] ED     EMERGENCY DEPARTMENT ENCOUNTER            Pt Name: Era Carson   MRN: [id number]   Birthdate 1975   Date of evaluation: 8/3/2024   Provider: Diana Pérez MD   PCP: Brandon Gan MD   Note Started: 2:31 AM EDT 8/3/24          CHIEF COMPLAINT     Chief Complaint   Patient presents with    Hyperglycemia     Patient brought in with reports of hypoglycemia. Patient found on floor at home covered in emesis and urine. Family gave glucagon gel and when EMS arrived fsbs was in 80's. Enroute fsbs 170. Patient is uncooperative with assessment and vitals during triage.       HISTORY OF PRESENT ILLNESS:   History from : EMS   Limitations to history : Altered Mental Status     Era Carson is a 48 y.o. male who presents with concerns for altered mental status, possible hypoglycemia.  Patient was reportedly found on the floor at home covered in emesis and urine.  Family did not check his blood sugar but gave him glucagon gel and when EMS arrived his blood sugar was in the 80s.  It increased to the 170s prior to arrival in the ED.  Patient is moving all extremities but is refusing to speak, either refusing or unable to answer any orientation questions.  Unable to obtain further history at this point in time.    Nursing Notes were all reviewed and agreed with, or any disagreements were addressed in the HPI.     REVIEW OF SYSTEMS :    Positives and Pertinent negatives as per HPI.      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    Past Surgical History:   Procedure Laterality Date    ANKLE FRACTURE SURGERY Left 1/28/13    Dr. Coe    BONE INCISION AND DRAINAGE   August 2013    left tibia with external fixation device    EYE SURGERY      retina reattachment left eye x 3 holes repairs    EYE SURGERY Right 9-27-13    retal detachment    LEG SURGERY Left 3/31/14    ORIF left fibula and tibia    [place] EGD FLEXIBLE FOREIGN BODY REMOVAL N/A 9/21/2018    EGD FOREIGN BODY REMOVAL performed by Grady Vilchis MD at [facility] ENDOSCOPY    TIBIA FRACTURE SURGERY      with external device inplace    UPPER GASTROINTESTINAL ENDOSCOPY N/A 9/21/2018    EGD BIOPSY performed by Grady Vilchis MD at [facility] ENDOSCOPY    UPPER GASTROINTESTINAL ENDOSCOPY N/A 9/25/2021    EGD BIOPSY performed by Chen Marshall MD at [facility] ENDOSCOPY      CURRENTMEDICATIONS       Previous Medications    APIXABAN (ELIQUIS) 5 MG TABS TABLET    Take 1 tablet by mouth 2 times daily Take 2 tabs twice daily x 2 days, then 1 tab twice daily x 3 months    ASPIRIN 81 MG CHEWABLE TABLET    Take 1 tablet by mouth daily    CHOLECALCIFEROL (VITAMIN D3) 5000 UNITS CAPS    Take 1 capsule by mouth Daily    DULOXETINE (CYMBALTA) 30 MG EXTENDED RELEASE CAPSULE    Take 1 capsule by mouth daily    ERENUMAB-AOOE (AIMOVIG) 140 MG/ML SOAJ    Inject 140 mg into the skin every 30 days    FLAXSEED, LINSEED, (FLAX PO)    Take 14 g by mouth daily as needed     GABAPENTIN (NEURONTIN) 400 MG CAPSULE    Take 1 capsule by mouth 2 times daily for 60 days.    GVOKE HYPOPEN 2-PACK 1 MG/0.2ML SOAJ    INJECT AS NEEDED FOR LOW BLOOD SUGAR    INSULIN GLULISINE (APIDRA) 100 UNIT/ML INJECTION    7-12 units AC TID    KETOROLAC (ACULAR) 0.5 % OPHTHALMIC SOLUTION    Place 1 drop into the right eye 3 times daily 3 times daily x 1week, then 2 x daily x 2 weeks, then 1 daily thereafter    LANTUS 100 UNIT/ML INJECTION VIAL    Inject 15 Units into the skin nightly INJECT 15 UNITS UNDER THE SKIN twice daily before breakfast and nightly. Hold nightly dose if PM BG<90    MULTIPLE VITAMIN (DAILY KITTY) TABS    TAKE ONE TABLET BY MOUTH DAILY    OMEGA-3 ACID ETHYL

## 2024-08-03 NOTE — PROGRESS NOTES
Consult has been called to Dr. Huerta on 8/3/24. Spoke with Veronica. 1:49 PM    Tiffanie Self  8/3/2024

## 2024-08-03 NOTE — PLAN OF CARE
48-year-old male with diabetes mellitus type 2, history of DVT, on neuropathy, chronic pain, chronic migraine, recent admission 1/25/2024 for DKA, encephalopathy was brought to ED for altered mental status.  Patient was found on the floor at home covered in emesis and urine.  Random blood sugar was 80 EMS.  Briefly saw the patient.  Restless, not answering any questions.  Vitals seems to be stable  Febrile with Tmax 102.1    Admitting for sepsis-unclear source at this time  UA not suggestive of infection  Chest x-ray with no acute process   blood cultures ordered in ER  Ordered broad-spectrum antibiotics-vancomycin, cefepime  -Check procalcitonin    Hypokalemia-replacement rate in ER    Metabolic encephalopathy  -Patient received Benadryl, Haldol, Versed in ED    Home medications to be verified and ordered.

## 2024-08-03 NOTE — PROGRESS NOTES
Patient admitted to room 305 from ER. Patient oriented to room, call light,bed rails, phone, lights and bathroom. Patient instructed about the schedule of the day including: vital sign frequency, lab draws, possible tests, frequency of MD and staff rounds, daily weights, I &O's and prescribed diet.  Telemetry box in place, patient aware of placement and reason. Bed locked, in lowest position, side rails up 2/4, call light within reach.        Recliner Assessment  Patient is not able to demonstrate the ability to move from a reclining position to an upright position within the recliner due to weakness and uncooperativeness.       4 Eyes Skin Assessment     NAME:  Era Carson  YOB: 1975  MEDICAL RECORD NUMBER:  [id number]    The patient is being assessed for  Admission    I agree that at least one RN has performed a thorough Head to Toe Skin Assessment on the patient. ALL assessment sites listed below have been assessed.      Areas assessed by both nurses:    Head, Face, Ears, Shoulders, Back, Chest, Arms, Elbows, Hands, Sacrum. Buttock, Coccyx, Ischium, Legs. Feet and Heels, and Under Medical Devices         Does the Patient have a Wound? No noted wound(s)       Harpreet Prevention initiated by RN: Yes  Wound Care Orders initiated by RN: No    Pressure Injury (Stage 3,4, Unstageable, DTI, NWPT, and Complex wounds) if present, place Wound referral order by RN under : No    New Ostomies, if present place, Ostomy referral order under : No     Nurse 1 eSignature: Electronically signed by Danielle Reyes RN on 8/3/24 at 8:01 AM EDT    **SHARE this note so that the co-signing nurse can place an eSignature**    Nurse 2 eSignature: Electronically signed by Itz Saleem RN on 8/3/24 at 3:45 PM EDT

## 2024-08-03 NOTE — PROGRESS NOTES
Call to Neeru Carson [phone] for update, restraint placement and patient being moved to ICU-11.

## 2024-08-03 NOTE — ED NOTES
Patient restless in bed, wrapping monitor wires causing leads to come off and pulse ox to unplug. Multiple attempts to calm patient and redirect unsuccessful. Dr. éPrez aware

## 2024-08-03 NOTE — PROGRESS NOTES
Pt transferred here from PCU.  Connected to ICU monitoring.  Pt opens eyes to name being called, but not answering questions at this time.  Pt restless in bed and remains in bilateral wrist restraints.  Precedex gtt initiated. External catheter in place. All needs and call light within reach.    Tele sitter in place for patient safety.

## 2024-08-03 NOTE — PROGRESS NOTES
[facility]   Pharmacy Pharmacokinetic Monitoring Service - Vancomycin     Era Carson is a 48 y.o. male starting on vancomycin therapy for sepsis x 7 days. Pharmacy consulted by Dr. Arce for monitoring and adjustment.    Target Concentration: Goal AUC/SHAYNE 400-600 mg*hr/L    Additional Antimicrobials: cefepime    Pertinent Laboratory Values:   Wt Readings from Last 1 Encounters:   08/03/24 88.5 kg (195 lb)     Temp Readings from Last 1 Encounters:   08/03/24 100.3 °F (37.9 °C) (Axillary)     Estimated Creatinine Clearance: 83 mL/min (based on SCr of 1.3 mg/dL).  Recent Labs     08/03/24  0228   CREATININE 1.3   BUN 14   WBC 10.8       Plan:  Dosing recommendations based on Bayesian software  Start vancomycin 1000mg q12h  Anticipated AUC of 526 and trough concentration of 15.8 at steady state  Renal labs as indicated   Vancomycin concentration ordered for 8/4 @ 0500   Pharmacy will continue to monitor patient and adjust therapy as indicated    Thank you for the consult,  Cecille Correa RPH  8/3/2024 9:00 AM

## 2024-08-03 NOTE — PROGRESS NOTES
Nursing spoke with Neeru Carson (sister/he lives with her) [phone] by telephone. Admission information obtained from her for him. Neeru stated that he gets all of his medications from [facility] pharmacy. She was updated on the patient's status.

## 2024-08-04 ENCOUNTER — APPOINTMENT (OUTPATIENT)
Dept: GENERAL RADIOLOGY | Age: 49
End: 2024-08-04
Payer: MEDICARE

## 2024-08-04 ENCOUNTER — APPOINTMENT (OUTPATIENT)
Dept: CT IMAGING | Age: 49
End: 2024-08-04
Payer: MEDICARE

## 2024-08-04 PROBLEM — R10.9 ABDOMINAL PAIN: Status: ACTIVE | Noted: 2024-08-04

## 2024-08-04 PROBLEM — E87.8 ELECTROLYTE DISORDER: Status: ACTIVE | Noted: 2024-08-04

## 2024-08-04 PROBLEM — R45.1 AGITATION: Status: ACTIVE | Noted: 2024-08-04

## 2024-08-04 PROBLEM — E11.10 TYPE 2 DIABETES MELLITUS WITH KETOACIDOSIS WITHOUT COMA (HCC): Status: RESOLVED | Noted: 2024-02-01 | Resolved: 2024-08-04

## 2024-08-04 PROBLEM — E87.6 HYPOKALEMIA: Status: ACTIVE | Noted: 2024-08-04

## 2024-08-04 LAB
ALBUMIN SERPL-MCNC: 3.3 G/DL (ref 3.4–5)
ALBUMIN/GLOB SERPL: 0.9 {RATIO} (ref 1.1–2.2)
ALP SERPL-CCNC: 82 U/L (ref 40–129)
ALT SERPL-CCNC: 10 U/L (ref 10–40)
ANION GAP SERPL CALCULATED.3IONS-SCNC: 12 MMOL/L (ref 3–16)
AST SERPL-CCNC: 13 U/L (ref 15–37)
BASOPHILS # BLD: 0.1 K/UL (ref 0–0.2)
BASOPHILS NFR BLD: 0.8 %
BILIRUB SERPL-MCNC: 0.6 MG/DL (ref 0–1)
BILIRUB UR QL STRIP.AUTO: NEGATIVE
BUN SERPL-MCNC: 17 MG/DL (ref 7–20)
CALCIUM SERPL-MCNC: 8 MG/DL (ref 8.3–10.6)
CHLORIDE SERPL-SCNC: 109 MMOL/L (ref 99–110)
CLARITY UR: CLEAR
CO2 SERPL-SCNC: 17 MMOL/L (ref 21–32)
COLOR UR: ABNORMAL
CREAT SERPL-MCNC: 1.1 MG/DL (ref 0.9–1.3)
DEPRECATED RDW RBC AUTO: 17.9 % (ref 12.4–15.4)
EOSINOPHIL # BLD: 0.3 K/UL (ref 0–0.6)
EOSINOPHIL NFR BLD: 3.6 %
EST. AVERAGE GLUCOSE BLD GHB EST-MCNC: 203 MG/DL
GFR SERPLBLD CREATININE-BSD FMLA CKD-EPI: 83 ML/MIN/{1.73_M2}
GLUCOSE BLD-MCNC: 168 MG/DL (ref 70–99)
GLUCOSE BLD-MCNC: 172 MG/DL (ref 70–99)
GLUCOSE BLD-MCNC: 194 MG/DL (ref 70–99)
GLUCOSE BLD-MCNC: 226 MG/DL (ref 70–99)
GLUCOSE BLD-MCNC: 251 MG/DL (ref 70–99)
GLUCOSE BLD-MCNC: 268 MG/DL (ref 70–99)
GLUCOSE SERPL-MCNC: 293 MG/DL (ref 70–99)
GLUCOSE UR STRIP.AUTO-MCNC: 250 MG/DL
HBA1C MFR BLD: 8.7 %
HCT VFR BLD AUTO: 35.3 % (ref 40.5–52.5)
HGB BLD-MCNC: 11.7 G/DL (ref 13.5–17.5)
HGB UR QL STRIP.AUTO: NEGATIVE
KETONES UR STRIP.AUTO-MCNC: 15 MG/DL
LEUKOCYTE ESTERASE UR QL STRIP.AUTO: NEGATIVE
LYMPHOCYTES # BLD: 1.6 K/UL (ref 1–5.1)
LYMPHOCYTES NFR BLD: 21 %
MCH RBC QN AUTO: 25.7 PG (ref 26–34)
MCHC RBC AUTO-ENTMCNC: 33.2 G/DL (ref 31–36)
MCV RBC AUTO: 77.4 FL (ref 80–100)
MONOCYTES # BLD: 0.5 K/UL (ref 0–1.3)
MONOCYTES NFR BLD: 7 %
NEUTROPHILS # BLD: 5.1 K/UL (ref 1.7–7.7)
NEUTROPHILS NFR BLD: 67.6 %
NITRITE UR QL STRIP.AUTO: NEGATIVE
PERFORMED ON: ABNORMAL
PH UR STRIP.AUTO: 6 [PH] (ref 5–8)
PLATELET # BLD AUTO: 306 K/UL (ref 135–450)
PMV BLD AUTO: 7 FL (ref 5–10.5)
POTASSIUM SERPL-SCNC: 3.8 MMOL/L (ref 3.5–5.1)
PROT SERPL-MCNC: 6.8 G/DL (ref 6.4–8.2)
PROT UR STRIP.AUTO-MCNC: NEGATIVE MG/DL
RBC # BLD AUTO: 4.56 M/UL (ref 4.2–5.9)
SODIUM SERPL-SCNC: 138 MMOL/L (ref 136–145)
SP GR UR STRIP.AUTO: 1.01 (ref 1–1.03)
UA COMPLETE W REFLEX CULTURE PNL UR: ABNORMAL
UA DIPSTICK W REFLEX MICRO PNL UR: ABNORMAL
URN SPEC COLLECT METH UR: ABNORMAL
UROBILINOGEN UR STRIP-ACNC: 0.2 E.U./DL
VANCOMYCIN TROUGH SERPL-MCNC: 10.2 UG/ML (ref 10–20)
WBC # BLD AUTO: 7.6 K/UL (ref 4–11)

## 2024-08-04 PROCEDURE — 6360000002 HC RX W HCPCS: Performed by: INTERNAL MEDICINE

## 2024-08-04 PROCEDURE — 80202 ASSAY OF VANCOMYCIN: CPT

## 2024-08-04 PROCEDURE — 6360000004 HC RX CONTRAST MEDICATION: Performed by: INTERNAL MEDICINE

## 2024-08-04 PROCEDURE — 2580000003 HC RX 258: Performed by: INTERNAL MEDICINE

## 2024-08-04 PROCEDURE — 74176 CT ABD & PELVIS W/O CONTRAST: CPT

## 2024-08-04 PROCEDURE — 71045 X-RAY EXAM CHEST 1 VIEW: CPT

## 2024-08-04 PROCEDURE — 2500000003 HC RX 250 WO HCPCS: Performed by: INTERNAL MEDICINE

## 2024-08-04 PROCEDURE — 6370000000 HC RX 637 (ALT 250 FOR IP): Performed by: INTERNAL MEDICINE

## 2024-08-04 PROCEDURE — 80053 COMPREHEN METABOLIC PANEL: CPT

## 2024-08-04 PROCEDURE — 99233 SBSQ HOSP IP/OBS HIGH 50: CPT | Performed by: INTERNAL MEDICINE

## 2024-08-04 PROCEDURE — 2000000000 HC ICU R&B

## 2024-08-04 PROCEDURE — 36415 COLL VENOUS BLD VENIPUNCTURE: CPT

## 2024-08-04 PROCEDURE — 85025 COMPLETE CBC W/AUTO DIFF WBC: CPT

## 2024-08-04 PROCEDURE — 81003 URINALYSIS AUTO W/O SCOPE: CPT

## 2024-08-04 PROCEDURE — 99223 1ST HOSP IP/OBS HIGH 75: CPT | Performed by: INTERNAL MEDICINE

## 2024-08-04 PROCEDURE — 51798 US URINE CAPACITY MEASURE: CPT

## 2024-08-04 RX ORDER — ONDANSETRON 2 MG/ML
4 INJECTION INTRAMUSCULAR; INTRAVENOUS EVERY 6 HOURS PRN
Status: DISCONTINUED | OUTPATIENT
Start: 2024-08-04 | End: 2024-08-08 | Stop reason: HOSPADM

## 2024-08-04 RX ORDER — QUETIAPINE FUMARATE 25 MG/1
25 TABLET, FILM COATED ORAL 2 TIMES DAILY
Status: DISCONTINUED | OUTPATIENT
Start: 2024-08-04 | End: 2024-08-08 | Stop reason: HOSPADM

## 2024-08-04 RX ORDER — METRONIDAZOLE 500 MG/100ML
500 INJECTION, SOLUTION INTRAVENOUS EVERY 8 HOURS
Status: DISCONTINUED | OUTPATIENT
Start: 2024-08-04 | End: 2024-08-05

## 2024-08-04 RX ADMIN — GABAPENTIN 400 MG: 400 CAPSULE ORAL at 11:42

## 2024-08-04 RX ADMIN — VANCOMYCIN HYDROCHLORIDE 1250 MG: 10 INJECTION, POWDER, LYOPHILIZED, FOR SOLUTION INTRAVENOUS at 06:38

## 2024-08-04 RX ADMIN — METRONIDAZOLE 500 MG: 500 INJECTION, SOLUTION INTRAVENOUS at 18:56

## 2024-08-04 RX ADMIN — PANTOPRAZOLE SODIUM 40 MG: 40 INJECTION, POWDER, FOR SOLUTION INTRAVENOUS at 11:42

## 2024-08-04 RX ADMIN — DULOXETINE HYDROCHLORIDE 30 MG: 30 CAPSULE, DELAYED RELEASE ORAL at 11:41

## 2024-08-04 RX ADMIN — INSULIN GLARGINE 15 UNITS: 100 INJECTION, SOLUTION SUBCUTANEOUS at 20:32

## 2024-08-04 RX ADMIN — METRONIDAZOLE 500 MG: 500 INJECTION, SOLUTION INTRAVENOUS at 11:41

## 2024-08-04 RX ADMIN — QUETIAPINE FUMARATE 25 MG: 25 TABLET ORAL at 20:32

## 2024-08-04 RX ADMIN — PROMETHAZINE HYDROCHLORIDE 12.5 MG: 25 TABLET ORAL at 22:33

## 2024-08-04 RX ADMIN — TOPIRAMATE 100 MG: 100 TABLET, FILM COATED ORAL at 20:32

## 2024-08-04 RX ADMIN — SODIUM CHLORIDE 1000 ML: 9 INJECTION, SOLUTION INTRAVENOUS at 06:41

## 2024-08-04 RX ADMIN — CEFEPIME 2000 MG: 2 INJECTION, POWDER, FOR SOLUTION INTRAVENOUS at 20:39

## 2024-08-04 RX ADMIN — INSULIN LISPRO 2 UNITS: 100 INJECTION, SOLUTION INTRAVENOUS; SUBCUTANEOUS at 04:34

## 2024-08-04 RX ADMIN — TRAMADOL HYDROCHLORIDE 50 MG: 50 TABLET, COATED ORAL at 22:32

## 2024-08-04 RX ADMIN — PANTOPRAZOLE SODIUM 40 MG: 40 INJECTION, POWDER, FOR SOLUTION INTRAVENOUS at 22:33

## 2024-08-04 RX ADMIN — TOPIRAMATE 100 MG: 100 TABLET, FILM COATED ORAL at 11:41

## 2024-08-04 RX ADMIN — INSULIN LISPRO 1 UNITS: 100 INJECTION, SOLUTION INTRAVENOUS; SUBCUTANEOUS at 17:18

## 2024-08-04 RX ADMIN — CEFEPIME 2000 MG: 2 INJECTION, POWDER, FOR SOLUTION INTRAVENOUS at 14:29

## 2024-08-04 RX ADMIN — IOHEXOL 30 ML: 240 INJECTION, SOLUTION INTRATHECAL; INTRAVASCULAR; INTRAVENOUS; ORAL at 11:47

## 2024-08-04 RX ADMIN — SODIUM CHLORIDE 1000 ML: 9 INJECTION, SOLUTION INTRAVENOUS at 18:05

## 2024-08-04 RX ADMIN — Medication 0.9 MCG/KG/HR: at 05:13

## 2024-08-04 RX ADMIN — ENOXAPARIN SODIUM 40 MG: 100 INJECTION SUBCUTANEOUS at 11:41

## 2024-08-04 RX ADMIN — GABAPENTIN 400 MG: 400 CAPSULE ORAL at 20:31

## 2024-08-04 RX ADMIN — CEFEPIME 2000 MG: 2 INJECTION, POWDER, FOR SOLUTION INTRAVENOUS at 04:39

## 2024-08-04 RX ADMIN — Medication 5 ML: at 20:37

## 2024-08-04 ASSESSMENT — PAIN SCALES - WONG BAKER
WONGBAKER_NUMERICALRESPONSE: NO HURT
WONGBAKER_NUMERICALRESPONSE: HURTS WHOLE LOT

## 2024-08-04 ASSESSMENT — PAIN DESCRIPTION - LOCATION: LOCATION: ABDOMEN

## 2024-08-04 ASSESSMENT — PAIN - FUNCTIONAL ASSESSMENT: PAIN_FUNCTIONAL_ASSESSMENT: PREVENTS OR INTERFERES SOME ACTIVE ACTIVITIES AND ADLS

## 2024-08-04 ASSESSMENT — PAIN DESCRIPTION - ORIENTATION: ORIENTATION: LOWER

## 2024-08-04 NOTE — PROGRESS NOTES
4 Eyes Skin Assessment     NAME:  Era Carson  YOB: 1975  MEDICAL RECORD NUMBER:  [id number]    The patient is being assessed for  Shift Handoff    I agree that at least one RN has performed a thorough Head to Toe Skin Assessment on the patient. ALL assessment sites listed below have been assessed.      Areas assessed by both nurses:    Head, Face, Ears, Shoulders, Back, Chest, Arms, Elbows, Hands, Sacrum. Buttock, Coccyx, Ischium, Legs. Feet and Heels, and Under Medical Devices         Does the Patient have a Wound? No noted wound(s)       Harpreet Prevention initiated by RN: Yes  Wound Care Orders initiated by RN: No    Pressure Injury (Stage 3,4, Unstageable, DTI, NWPT, and Complex wounds) if present, place Wound referral order by RN under : No    New Ostomies, if present place, Ostomy referral order under : No     Nurse 1 eSignature: Electronically signed by Rosalva Washington RN on 8/3/24 at 8:35 PM EDT    **SHARE this note so that the co-signing nurse can place an eSignature**    Nurse 2 eSignature: Electronically signed by Angel Wood RN on 8/3/24 at 8:36 PM EDT

## 2024-08-04 NOTE — PROGRESS NOTES
Pharmacy Vancomycin Consult     Vancomycin Day:   Current Dosin mg q12h   Current indication: sepsis    Recent Labs     24  0228 24  0835 24  0430   BUN 14 16 17   CREATININE 1.3 1.4* 1.1   WBC 10.8  --  7.6       Estimated Creatinine Clearance: 98 mL/min (based on SCr of 1.1 mg/dL).    Trough: 10.2  AUC: 404  Procal: 0.08 (8/3)    Assessment/Plan:  Will increase to 1250 mg q12h. Expected AUC of 487 (517 at steady state) with a trough of 14.2.     Next trough:  @ 0530    Deja Duke PharmD, [facility], 2024 6:15 AM

## 2024-08-04 NOTE — PLAN OF CARE
Problem: Discharge Planning  Goal: Discharge to home or other facility with appropriate resources  Outcome: Progressing     Problem: Pain  Goal: Verbalizes/displays adequate comfort level or baseline comfort level  Outcome: Progressing     Problem: Safety - Adult  Goal: Free from fall injury  Outcome: Progressing     Problem: Safety - Medical Restraint  Goal: Remains free of injury from restraints (Restraint for Interference with Medical Device)  Description: INTERVENTIONS:  1. Determine that other, less restrictive measures have been tried or would not be effective before applying the restraint  2. Evaluate the patient's condition at the time of restraint application  3. Inform patient/family regarding the reason for restraint  4. Q2H: Monitor safety, psychosocial status, comfort, nutrition and hydration  Outcome: Progressing  Flowsheets  Taken 8/3/2024 2000 by Rosalva Washington RN  Remains free of injury from restraints (restraint for interference with medical device): Every 2 hours: Monitor safety, psychosocial status, comfort, nutrition and hydration  Taken 8/3/2024 1800 by Shara Cooper RN  Remains free of injury from restraints (restraint for interference with medical device):   Determine that other, less restrictive measures have been tried or would not be effective before applying the restraint   Evaluate the patient's condition at the time of restraint application   Inform patient/family regarding the reason for restraint   Every 2 hours: Monitor safety, psychosocial status, comfort, nutrition and hydration  Taken 8/3/2024 1600 by Shara Cooper RN  Remains free of injury from restraints (restraint for interference with medical device):   Determine that other, less restrictive measures have been tried or would not be effective before applying the restraint   Evaluate the patient's condition at the time of restraint application   Inform patient/family regarding the reason for restraint   Every 2  hours: Monitor safety, psychosocial status, comfort, nutrition and hydration  Taken 8/3/2024 1412 by Shara Cooper RN  Remains free of injury from restraints (restraint for interference with medical device):   Determine that other, less restrictive measures have been tried or would not be effective before applying the restraint   Evaluate the patient's condition at the time of restraint application   Inform patient/family regarding the reason for restraint   Every 2 hours: Monitor safety, psychosocial status, comfort, nutrition and hydration  Taken 8/3/2024 1331 by Danielle Reyes RN  Remains free of injury from restraints (restraint for interference with medical device):   Evaluate the patient's condition at the time of restraint application   Determine that other, less restrictive measures have been tried or would not be effective before applying the restraint   Inform patient/family regarding the reason for restraint   Every 2 hours: Monitor safety, psychosocial status, comfort, nutrition and hydration

## 2024-08-04 NOTE — PROGRESS NOTES
Perfect serve sent to Dr. Motta for consult Electronically signed by Nisa Guzman on 8/4/2024 at 10:20 AM    Pt has been placed on list to be seen by psych Electronically signed by Nisa Guzman on 8/4/2024 at 10:21 AM

## 2024-08-04 NOTE — PROGRESS NOTES
Internal Medicine ICU Progress Note      Events of Last 24 hours:     Patient is a 48-year-old white male who is not able to give much history.  He was that she admitted to medical floor.  He has a history of diabetes, DVT, neuropathy, chronic pain, chronic migraine.  He was last admitted for DKA.  He had encephalopathy at that time.      The patient was admitted for sepsis of unknown origin.  He was sent to the ICU for behavior issues as he was spitting and trying to bite the nurses.  He started on Precedex.  He is presently off Precedex.  He is still somewhat confused but he knows he is in the hospital and his mentation is slightly improved.  He lives with his sister.  In the ED did receive Benadryl and Haldol.      Invasive Lines: PIV        MV:  n/a    No results for input(s): \"PHART\", \"NFE7SWN\", \"PO2ART\" in the last 72 hours.    MV Settings:     / / /     IV:   sodium chloride 1,000 mL (24 0641)    sodium chloride Stopped (24 1341)    dextrose      dexmedeTOMIDine HCl in NaCl 0.7 mcg/kg/hr (24 0640)       Vitals:  Temp  Av.9 °F (36.6 °C)  Min: 96.7 °F (35.9 °C)  Max: 99.6 °F (37.6 °C)  Pulse  Av.4  Min: 45  Max: 88  BP  Min: 121/65  Max: 161/78  SpO2  Av.4 %  Min: 97 %  Max: 100 %  Patient Vitals for the past 4 hrs:   BP Temp Temp src Pulse Resp SpO2   24 1100 134/68 96.8 °F (36 °C) Temporal 54 15 --   24 1000 134/82 -- -- 66 24 100 %   24 0900 135/69 -- -- (!) 46 12 99 %       CVP:        Intake/Output Summary (Last 24 hours) at 2024 1213  Last data filed at 2024 0651  Gross per 24 hour   Intake 4864.59 ml   Output 2800 ml   Net 2064.59 ml       EXAM:  General: ill appearing  Eyes: PERRL. No sclera icterus. No conjunctiva injected.  ENT: No discharge. Pharynx clear.  Neck: Trachea midline. Normal thyroid.  Resp: No accessory muscle use. No crackles. No wheezing. No rhonchi. No dullness on percussion.   CV: Regular rate. Regular rhythm. No mumur or

## 2024-08-04 NOTE — PROGRESS NOTES
Shift assessment completed as documented. VSS  Pt opens eyes to verbal stimuli. Malewick in place. BS x4. Call light within reach. Monitoring closely

## 2024-08-04 NOTE — CONSULTS
pulse (!) 49, temperature 97.8 °F (36.6 °C), temperature source Axillary, resp. rate 11, weight 88.4 kg (194 lb 12.8 oz), SpO2 99 %.' on RA  Gen: No distress.   Eyes: PERRL. No sclera icterus. No conjunctival injection.   ENT: No discharge. Pharynx clear.   Neck: Trachea midline. No obvious mass.    Resp: No accessory muscle use. No crackles. No wheezes. No rhonchi. No dullness on percussion.  CV: Regular rate. Regular rhythm. No murmur or rub. No edema.   GI: + tender. Mildly distended. No hernia.   Skin: Warm and dry. No nodule on exposed extremities.   Lymph: No cervical LAD. No supraclavicular LAD.   M/S: No cyanosis. No joint deformity. No clubbing.   Neuro: Awake. Alert. Moves all four extremities.   Psych: Oriented x 1. No anxiety.     LABS:  CBC:   Recent Labs     08/03/24 0228 08/04/24  0430   WBC 10.8 7.6   HGB 12.1* 11.7*   HCT 38.0* 35.3*   MCV 78.5* 77.4*    306     BMP:   Recent Labs     08/03/24 0228 08/03/24  0835 08/03/24  1803 08/04/24  0430   * 139  --  138   K 3.0* 3.0* 3.6 3.8    107  --  109   CO2 22 19*  --  17*   PHOS  --  4.0  --   --    BUN 14 16  --  17   CREATININE 1.3 1.4*  --  1.1     LIVER PROFILE:   Recent Labs     08/03/24 0228 08/03/24  0835 08/04/24  0430   AST 15 15 13*   ALT 12 <5* 10   BILITOT <0.2 <0.2 0.6   ALKPHOS 88 73 82     PT/INR: No results for input(s): \"PROTIME\", \"INR\" in the last 72 hours.  APTT: No results for input(s): \"APTT\" in the last 72 hours.  UA:  Recent Labs     08/03/24  0455   COLORU Yellow   PHUR 7.0  7.0   WBCUA 0-2   RBCUA 0-2   MUCUS Rare*   CLARITYU Clear   LEUKOCYTESUR Negative   UROBILINOGEN 0.2   BILIRUBINUR Negative   BLOODU TRACE-INTACT*   GLUCOSEU 250*     No results for input(s): \"PHART\", \"GNV3LSY\", \"PO2ART\" in the last 72 hours.    Microbiology:  8/3 BC  8/3 COVID-19 and influenza not detected    Imaging:  Chest x-ray 8/3 imaging was reviewed by me and showed   No acute cardiopulmonary disease    CT head 8/3  No acute  intracranial abnormalities    ASSESSMENT:  Acute encephalopathy/metabolic encephalopathy ?  Underlying uncontrolled psych condition ?  Hypoglycemia ?  Sepsis  Sepsis  Electrolyte disorder  Hypoglycemia   Agitation   Abdominal tenderness and pain   DM with history of DKA January 2024  CKD stage III-baseline creatinine 1.4-1.8  Chronic pain syndrome on Tramadol   History of right IJ DVT    PLAN:  Supplemental oxygen to maintain SaO2 >92%; wean as tolerated  Closely monitory airways, clinical status, cardiac rhythm, vital signs, urine output   Precedex drip   Trial of Seroquel - QTc 465   cc/hr   Cefepime and Flagyl for now   CT abdomen and Pelvis   BC, UC and sputum GS&C  Procalcitonin level  Lactic acid - normal   Neurology and psych consult   Blood sugar control, with goal 140-180  DVT prophylaxis: Lovenox  MRSA prophylaxis: Bactroban  Code status: Full code   D/W daughter at bedside

## 2024-08-04 NOTE — PROGRESS NOTES
Pt CT scan stated pt had an enlarged bladder even though pt had a large amount of urine output. Post void bladder scan stated 348. Straight cath was completed and received 500 ml urine out put. UA sent to lab.  Updated IM. No new orders at this time.

## 2024-08-05 ENCOUNTER — APPOINTMENT (OUTPATIENT)
Dept: MRI IMAGING | Age: 49
End: 2024-08-05
Payer: MEDICARE

## 2024-08-05 LAB
ALBUMIN SERPL-MCNC: 3.2 G/DL (ref 3.4–5)
ALBUMIN/GLOB SERPL: 0.9 {RATIO} (ref 1.1–2.2)
ALP SERPL-CCNC: 78 U/L (ref 40–129)
ALT SERPL-CCNC: 10 U/L (ref 10–40)
ANION GAP SERPL CALCULATED.3IONS-SCNC: 10 MMOL/L (ref 3–16)
AST SERPL-CCNC: 12 U/L (ref 15–37)
BILIRUB SERPL-MCNC: 0.5 MG/DL (ref 0–1)
BUN SERPL-MCNC: 16 MG/DL (ref 7–20)
CALCIUM SERPL-MCNC: 8 MG/DL (ref 8.3–10.6)
CHLORIDE SERPL-SCNC: 114 MMOL/L (ref 99–110)
CO2 SERPL-SCNC: 19 MMOL/L (ref 21–32)
CREAT SERPL-MCNC: 1.3 MG/DL (ref 0.9–1.3)
DEPRECATED RDW RBC AUTO: 17.8 % (ref 12.4–15.4)
GFR SERPLBLD CREATININE-BSD FMLA CKD-EPI: 68 ML/MIN/{1.73_M2}
GLUCOSE BLD-MCNC: 106 MG/DL (ref 70–99)
GLUCOSE BLD-MCNC: 106 MG/DL (ref 70–99)
GLUCOSE BLD-MCNC: 129 MG/DL (ref 70–99)
GLUCOSE BLD-MCNC: 131 MG/DL (ref 70–99)
GLUCOSE BLD-MCNC: 63 MG/DL (ref 70–99)
GLUCOSE BLD-MCNC: 78 MG/DL (ref 70–99)
GLUCOSE BLD-MCNC: 87 MG/DL (ref 70–99)
GLUCOSE SERPL-MCNC: 90 MG/DL (ref 70–99)
HCT VFR BLD AUTO: 34.1 % (ref 40.5–52.5)
HGB BLD-MCNC: 11.2 G/DL (ref 13.5–17.5)
MAGNESIUM SERPL-MCNC: 1.8 MG/DL (ref 1.8–2.4)
MCH RBC QN AUTO: 25.8 PG (ref 26–34)
MCHC RBC AUTO-ENTMCNC: 32.9 G/DL (ref 31–36)
MCV RBC AUTO: 78.3 FL (ref 80–100)
PERFORMED ON: ABNORMAL
PERFORMED ON: NORMAL
PERFORMED ON: NORMAL
PLATELET # BLD AUTO: 368 K/UL (ref 135–450)
PMV BLD AUTO: 7 FL (ref 5–10.5)
POTASSIUM SERPL-SCNC: 3.1 MMOL/L (ref 3.5–5.1)
PROT SERPL-MCNC: 6.6 G/DL (ref 6.4–8.2)
RBC # BLD AUTO: 4.36 M/UL (ref 4.2–5.9)
SODIUM SERPL-SCNC: 143 MMOL/L (ref 136–145)
VANCOMYCIN TROUGH SERPL-MCNC: 11.5 UG/ML (ref 10–20)
WBC # BLD AUTO: 9.2 K/UL (ref 4–11)

## 2024-08-05 PROCEDURE — 36415 COLL VENOUS BLD VENIPUNCTURE: CPT

## 2024-08-05 PROCEDURE — 6360000002 HC RX W HCPCS: Performed by: INTERNAL MEDICINE

## 2024-08-05 PROCEDURE — 99233 SBSQ HOSP IP/OBS HIGH 50: CPT | Performed by: INTERNAL MEDICINE

## 2024-08-05 PROCEDURE — 2580000003 HC RX 258: Performed by: INTERNAL MEDICINE

## 2024-08-05 PROCEDURE — 95816 EEG AWAKE AND DROWSY: CPT | Performed by: PSYCHIATRY & NEUROLOGY

## 2024-08-05 PROCEDURE — 80053 COMPREHEN METABOLIC PANEL: CPT

## 2024-08-05 PROCEDURE — 83735 ASSAY OF MAGNESIUM: CPT

## 2024-08-05 PROCEDURE — 51798 US URINE CAPACITY MEASURE: CPT

## 2024-08-05 PROCEDURE — 99232 SBSQ HOSP IP/OBS MODERATE 35: CPT | Performed by: INTERNAL MEDICINE

## 2024-08-05 PROCEDURE — 99223 1ST HOSP IP/OBS HIGH 75: CPT

## 2024-08-05 PROCEDURE — 51702 INSERT TEMP BLADDER CATH: CPT

## 2024-08-05 PROCEDURE — 95816 EEG AWAKE AND DROWSY: CPT

## 2024-08-05 PROCEDURE — 6370000000 HC RX 637 (ALT 250 FOR IP): Performed by: INTERNAL MEDICINE

## 2024-08-05 PROCEDURE — 80202 ASSAY OF VANCOMYCIN: CPT

## 2024-08-05 PROCEDURE — 85027 COMPLETE CBC AUTOMATED: CPT

## 2024-08-05 PROCEDURE — 2000000000 HC ICU R&B

## 2024-08-05 RX ORDER — HYDRALAZINE HYDROCHLORIDE 20 MG/ML
10 INJECTION INTRAMUSCULAR; INTRAVENOUS EVERY 6 HOURS PRN
Status: DISCONTINUED | OUTPATIENT
Start: 2024-08-05 | End: 2024-08-08 | Stop reason: HOSPADM

## 2024-08-05 RX ADMIN — PANTOPRAZOLE SODIUM 40 MG: 40 INJECTION, POWDER, FOR SOLUTION INTRAVENOUS at 23:47

## 2024-08-05 RX ADMIN — TOPIRAMATE 100 MG: 100 TABLET, FILM COATED ORAL at 22:02

## 2024-08-05 RX ADMIN — ENOXAPARIN SODIUM 40 MG: 100 INJECTION SUBCUTANEOUS at 08:05

## 2024-08-05 RX ADMIN — Medication 5 ML: at 08:06

## 2024-08-05 RX ADMIN — TOPIRAMATE 100 MG: 100 TABLET, FILM COATED ORAL at 08:05

## 2024-08-05 RX ADMIN — DEXTROSE MONOHYDRATE 125 ML: 100 INJECTION, SOLUTION INTRAVENOUS at 11:18

## 2024-08-05 RX ADMIN — GABAPENTIN 400 MG: 400 CAPSULE ORAL at 22:02

## 2024-08-05 RX ADMIN — DULOXETINE HYDROCHLORIDE 30 MG: 30 CAPSULE, DELAYED RELEASE ORAL at 08:05

## 2024-08-05 RX ADMIN — CEFEPIME 2000 MG: 2 INJECTION, POWDER, FOR SOLUTION INTRAVENOUS at 04:48

## 2024-08-05 RX ADMIN — Medication 10 ML: at 22:08

## 2024-08-05 RX ADMIN — MUPIROCIN: 20 OINTMENT TOPICAL at 22:12

## 2024-08-05 RX ADMIN — GABAPENTIN 400 MG: 400 CAPSULE ORAL at 08:05

## 2024-08-05 RX ADMIN — PANTOPRAZOLE SODIUM 40 MG: 40 INJECTION, POWDER, FOR SOLUTION INTRAVENOUS at 11:57

## 2024-08-05 RX ADMIN — POTASSIUM BICARBONATE 40 MEQ: 782 TABLET, EFFERVESCENT ORAL at 06:51

## 2024-08-05 RX ADMIN — QUETIAPINE FUMARATE 25 MG: 25 TABLET ORAL at 22:02

## 2024-08-05 RX ADMIN — HYDRALAZINE HYDROCHLORIDE 10 MG: 20 INJECTION INTRAMUSCULAR; INTRAVENOUS at 22:36

## 2024-08-05 RX ADMIN — METRONIDAZOLE 500 MG: 500 INJECTION, SOLUTION INTRAVENOUS at 02:49

## 2024-08-05 RX ADMIN — SODIUM CHLORIDE 1000 ML: 9 INJECTION, SOLUTION INTRAVENOUS at 02:51

## 2024-08-05 RX ADMIN — QUETIAPINE FUMARATE 25 MG: 25 TABLET ORAL at 08:05

## 2024-08-05 ASSESSMENT — PAIN SCALES - WONG BAKER

## 2024-08-05 ASSESSMENT — PAIN SCALES - GENERAL
PAINLEVEL_OUTOF10: 0
PAINLEVEL_OUTOF10: 0

## 2024-08-05 NOTE — CARE COORDINATION
Case Management Assessment  Initial Evaluation    Date/Time of Evaluation: 8/5/2024 2:47 PM  Assessment Completed by: Teresa Boeck, RN    If patient is discharged prior to next notation, then this note serves as note for discharge by case management.    Patient Name: Era Carson                   YOB: 1975  Diagnosis: Hypokalemia [E87.6]  Sepsis (HCC) [A41.9]  Acute metabolic encephalopathy [G93.41]  Sepsis with encephalopathy without septic shock, due to unspecified organism (HCC) [A41.9, R65.20, G93.41]                   Date / Time: 8/3/2024  2:15 AM    Patient Admission Status: Inpatient   Readmission Risk (Low < 19, Mod (19-27), High > 27): Readmission Risk Score: 16.6    Current PCP: Brandon Gan MD  PCP verified by CM? (P) Yes    Chart Reviewed: Yes      History Provided by: (P) Other (see comment) (sister)  Patient Orientation: (P)  (Spoke with sister for assessment questions.)    Patient Cognition: (P)  (Spoke with sister for assessment questions.)    Hospitalization in the last 30 days (Readmission):  No    If yes, Readmission Assessment in CM Navigator will be completed.    Advance Directives:      Code Status: Full Code   Patient's Primary Decision Maker is: (P) Legal Next of Kin (sister)    Primary Decision Maker: Neeru Carson - Brother/Sister - [phone]    Discharge Planning:    Patient lives with: (P) Family Members Type of Home: (P) Trailer/Mobile Home  Primary Care Giver: (P) Self  Patient Support Systems include: (P) Family Members   Current Financial resources: (P) Medicare  Current community resources: (P) None  Current services prior to admission: (P) Durable Medical Equipment            Current DME: (P) Cane (Patient actually uses a staff not an actual cane)            Type of Home Care services:  (P) None    ADLS  Prior functional level: (P) Independent in ADLs/IADLs  Current functional level: (P) Independent in ADLs/IADLs    PT AM-PAC:   /24  OT AM-PAC:    /24    Family can provide assistance at DC: (P) Yes  Would you like Case Management to discuss the discharge plan with any other family members/significant others, and if so, who? (P) No  Plans to Return to Present Housing: (P) Yes  Other Identified Issues/Barriers to RETURNING to current housing: none  Potential Assistance needed at discharge: (P) N/A            Potential DME:    Patient expects to discharge to: (P) Trailer/mobile home  Plan for transportation at discharge:      Financial    Payor: [facility] MEDICARE / Plan: [employer] / Product Type: *No Product type* /     Does insurance require precert for SNF: Yes    Potential assistance Purchasing Medications: (P) No  Meds-to-Beds request: Yes      [facility] [id number] [facility] [address] - P [phone] - F [phone] [address]  [facility] [address]  Phone: [phone] Fax: [phone]      Notes:    Factors facilitating achievement of predicted outcomes: Family support    Barriers to discharge: none    Additional Case Management Notes: Spoke with sister of patient to discuss discharge plan. The patient lives with his sister in a mobile home. The patient is independent with self care. The patient uses a staff to walk - does not use an actual cane. The patient does not use oxygen, cpap or bipap at home.     The patient did fall on Saturday. The patient could need home OT/PT but cannot be determined at this time. Will reassess prior to discharge. Following.       The Plan for Transition of Care is related to the following treatment goals of Hypokalemia [E87.6]  Sepsis (HCC) [A41.9]  Acute metabolic encephalopathy [G93.41]  Sepsis with encephalopathy without septic shock, due to unspecified organism (HCC) [A41.9, R65.20, G93.41]    IF APPLICABLE: The Patient and/or patient representative Era and his family were provided with a choice of provider and agrees with the discharge plan. Freedom of choice list

## 2024-08-05 NOTE — PROGRESS NOTES
Patient bladder scanned it was 423 ml. Dr. Luis mcmillan served for decrease in urine output by patient. Writer awaiting orders.

## 2024-08-05 NOTE — PROGRESS NOTES
Care rounds completed with Dr. Olivas and multidisciplinary team. Reviewed labs, meds, VS, assessment, & plan of care for today. See dictated note and new orders for details. NNO orders received.    ATB's stopped at this time. No infection present. Pt may move out of ICU if/when bed is available.

## 2024-08-05 NOTE — PROGRESS NOTES
Bedside report and transfer of care given to Sally DUMONT. Pt currently resting in bed with the call light within reach. Pt denies any other care needs at this time. Pt stable at this time.

## 2024-08-05 NOTE — CONSULTS
Inpatient Neurology consult        [facility]            Era Carson  1975    Date of Service: 8/5/2024    Referring Physician: Sarah Lagunas MD      Reason for the consult and CC: AMS    HPI:   The patient is a 48 y.o. male with a past medical history of diabetes, DVT, neuropathy, chronic pain, and migraines originally presenting to the hospital for concerns of sepsis of unknown origin. Patient last admitted on 01/25/2024 for severe DKA and metabolic encephalopathy. Patient is a poor historian and majority of HPI was obtained from chart review, family, and RN. Patient was transferred to the ICU following increased agitation and combative behavior. Per sister, patient has been having several episodes of unresponsiveness where the patient stares off into space and becomes agitated with clenched fist, crossed legs and full body shaking. She states he has not been evaluated for these episodes in the past because he normally comes out of it and they thought it was related to his diabetes. She denies any loss of bladder or bowel during episodes and states patient comes back to baseline shortly after. Neurology has been consulted for further evaluation and management of altered mental status.       Constitutional:   Vitals:    08/05/24 0800 08/05/24 0900 08/05/24 1000 08/05/24 1100   BP: (!) 146/57 (!) 169/60 (!) 153/62 (!) 160/61   Pulse: 74 76 75 75   Resp: 15 11 11 11   Temp:       TempSrc:       SpO2: 100% 99% 100% 99%   Weight:             I personally reviewed and updated social history, past medical history, medications, allergy, surgical history, and family history as documented in the patient's electronic health records.       ROS: 10-14 ROS reviewed with the patient/nurse/family which were unremarkable except mentioned in H&P.    General appearance:  chronically-ill appearing.   Mental Status:   Unable to assess due to mental status   Language: Patient only responding with \"no.\"   Cranial

## 2024-08-05 NOTE — PROGRESS NOTES
Shift assessment, completed, see flow sheet. Pt is alert and oriented to self and situation intermittently. Following commands.     VSS. Respirations are easy, even, and unlabored. Bilateral lung sounds are diminished throughout. Remains on RA, baseline, tolerating well.      PIV x2 remain in place and patent with sites and dressings C/D/I. Sanon in place and patent with STAT lock. Pt has not displayed any combative behavior so far this shift. Telesitter remains present at bedside.     Call light within reach. Bed in lowest position. Bed alarm on. Pt denies any further needs at this time.

## 2024-08-05 NOTE — CONSULTS
Psychiatric Consult     Admit Date:  8/3/2024    Consult Date:  8/5/2024     Reason for Consult: Agitation    Summary Present Illness: Per ED provider notes:  \"Era Carson is a 48 y.o. male who presents with concerns for altered mental status, possible hypoglycemia.  Patient was reportedly found on the floor at home covered in emesis and urine.  Family did not check his blood sugar but gave him glucagon gel and when EMS arrived his blood sugar was in the 80s.  It increased to the 170s prior to arrival in the ED.  Patient is moving all extremities but is refusing to speak, either refusing or unable to answer any orientation questions.\"  Pt becoming agitated, required restraints and precedex for sedation and safety.      Pt consulted on ICU, resting in bed.  Restraints and precedex have been discontinued.  Pt wakes easily when his name was called, able to make eye contact, but having difficulty answering any orientation questions.  Pt able to tell me his first name after several seconds, all other questions were answered with \"I don't know\".  Pt was not able to remember what happened or what he last remembered before being in the hospital.  Confused, oriented to self only at this time.  He denies any thoughts of self harm, states he does feel safe.  Pt is a poor historian at this time.  Pt is overall calm, no signs of agitation at this time.  Recommendations for future agitation below.      Psychiatric Hx: Hx of Recurrent major depressive disorder, in partial remission.  No inpatient psychiatric admissions per chart review.       MSE: Mental Status Examination:  Level of consciousness:  awake  Appearance:  ill-appearing, hospital attire, and lying in bed underweight  Behavior/Motor:  no abnormalities noted   Attitude toward examiner:  poor eye contact and withdrawn  Speech:  slow and poverty of speech  Mood:  constricted  Affect:  flat  Hallucinations: Absent  Thought processes:  poverty of thought Attention:       Donna Castañeda, PMHNP-BC

## 2024-08-05 NOTE — PROGRESS NOTES
Patient resting in bed, patient is pink, warm, and dry. Respirations E/E on room air. Iv sites are unremarkable. No S/S of acute distress noted. Head to toe completed at this time. Patient is alert but confused and unable to follow commands correctly. Patient knows he is at the hospital and that is about it. Medication given and with direction patient was able to take okay with a sip of water. Call light in easy reach, patient reminded to use call light for needs and assistance. Bed locked and in lowest position side rails up x2.

## 2024-08-05 NOTE — PROCEDURES
INTERPRETATION:  This 25-minute, computer-assisted video EEG recording is abnormal.  It showed mild to moderate degree of generalized slowing background activity.  No potentially epileptiform activity was present during the recording.       The EEG findings were consistent with mild to moderate degree of generalized non-specific cerebral dysfunction.     CLASSIFICATION:  Dysrhythmia grade 2, generalized.  Sleep - unsuccessful.  EKG channel.     DESCRIPTION:     BACKGROUND:  The awake recording revealed 9 Hz alpha activity over the posterior head region.  There were increased 2 to 7 Hz theta/delta activity into the EEG background.  Given the extensive study, the patient still did not sleep.  The EEG showed normal V waves during drowsiness.  There was no significant change on the EEG background with photic stimulation.  Hyperventilation was omitted due to the patient's condition.     INTERICTAL DISCHARGES: None     CLINICAL EVENTS:  None     The EKG channel was unremarkable.

## 2024-08-05 NOTE — PROGRESS NOTES
Internal Medicine ICU Progress Note      Events of Last 24 hours:     Patient is a 48-year-old white male who is not able to give much history.  He was that she admitted to medical floor.  He has a history of diabetes, DVT, neuropathy, chronic pain, chronic migraine.  He was last admitted for DKA.  He had encephalopathy at that time.      The patient was admitted for sepsis of unknown origin.  He was sent to the ICU for behavior issues as he was spitting and trying to bite the nurses.  He started on Precedex.  He is presently off Precedex.  He is still somewhat confused but he knows he is in the hospital and his mentation is slightly improved.  He lives with his sister.  In the ED did receive Benadryl and Haldol.      He continues to be disoriented   Awake,alert       Invasive Lines: PIV        MV:  n/a    No results for input(s): \"PHART\", \"CAH9AZH\", \"PO2ART\" in the last 72 hours.    MV Settings:     / / /     IV:   sodium chloride 1,000 mL (24 0251)    sodium chloride Stopped (24 1341)    dextrose      dexmedeTOMIDine HCl in NaCl Stopped (24 0820)       Vitals:  Temp  Av.4 °F (36.9 °C)  Min: 96.8 °F (36 °C)  Max: 99 °F (37.2 °C)  Pulse  Av  Min: 45  Max: 103  BP  Min: 112/63  Max: 178/88  SpO2  Av.6 %  Min: 96 %  Max: 100 %  Patient Vitals for the past 4 hrs:   BP Pulse Resp SpO2 Weight   24 0700 (!) 178/88 87 20 100 % --   24 0602 (!) 159/83 75 15 100 % --   24 0516 -- -- -- -- 89.9 kg (198 lb 2 oz)   24 0502 (!) 143/66 74 11 100 % --   24 0402 139/73 74 (!) 9 100 % --         CVP:        Intake/Output Summary (Last 24 hours) at 2024 0749  Last data filed at 2024 0545  Gross per 24 hour   Intake 1394.01 ml   Output 2950 ml   Net -1555.99 ml         EXAM:  General: ill appearing  Eyes: PERRL. No sclera icterus. No conjunctiva injected.  ENT: No discharge. Pharynx clear.  Neck: Trachea midline. Normal thyroid.  Resp: No accessory muscle use.  pain  - on Ultram     CKD stage III  - noted on old records.  - monitor BMP     History of acute right internal jugular DVT  - started on eliquis 10 mg bid -was given Rx for AC for 3 months .  No longer on Eliquis        DVT Prophylaxis: Lovenox  Mentation slowly improving   Full Code     Transfer to [facility]       STEPHEN DIXON MD 7:49 AM 8/5/2024

## 2024-08-05 NOTE — PROGRESS NOTES
Patient was able to tell writer his name and date of birth he also told writer he was hurting and he pointed to his abdomen area. Writer bladder scanned him and was only 105 max. Malewich was checked and changed and so was brief. Medication given for discomfort and patient helped repositioned. Call light in easy reach.

## 2024-08-05 NOTE — ACP (ADVANCE CARE PLANNING)
Advance Care Planning     General Advance Care Planning (ACP) Conversation    Date of Conversation: 8/5/2024  Conducted with: The patient's sister.  Other persons present: None    Healthcare Decision Maker:   Primary Decision Maker: Neeru Carson - Brother/Sister - [phone]     Today we discussed that the sister is the patient's closes living relative.   Content/Action Overview:  Did not have POA conversation with patient. The sister advises she is there patient's only sibling and his legal next of kin.       Reviewed DNR/DNI and patient elects Full Code (Attempt Resuscitation). The patient remains a full code.         Length of Voluntary ACP Conversation in minutes:  <16 minutes (Non-Billable)    Teresa Boeck, RN

## 2024-08-05 NOTE — PROGRESS NOTES
MRI tech called up to ICU to talk to staff to inform that pt could not tolerate laying still long enough for MRI to be fully completed despite multiple attempts. Pt would curl up into a ball, pull monitoring devices off, etc. Keeping pt in ICU overnight per Dr. Olivas for monitoring d/t MRI staff stating that they believe pt might have had seizure like activity at one point during the MRI.

## 2024-08-05 NOTE — PROGRESS NOTES
Pt's FSBS at noon noted to be 63. Pt alert at the time but has not been wanting to eat or drink anything. Per PRN orders, administered 125ml of IV dextrose. Rechecked FSBS, 106. Pt does not want to eat any food for lunch at this time despite encouragement.     Pt's sister Neeru at bedside and answered all MRI questions for pt. She is also taking pt's bracelet, watch, and personal clothing home with her today.

## 2024-08-05 NOTE — PROGRESS NOTES
[facility]                                 Pulmonary Consult /Progress Note :                                                                  CC follow on altered mental    Subjective      PHYSICAL EXAM:    Blood pressure (!) 146/57, pulse 74, temperature 97.8 °F (36.6 °C), temperature source Temporal, resp. rate 15, weight 89.9 kg (198 lb 2 oz), SpO2 100 %.' on RA  Gen: No distress.   Eyes: PERRL. No sclera icterus. No conjunctival injection.   ENT: No discharge. Pharynx clear.   Neck: Trachea midline. No obvious mass.    Resp: No accessory muscle use. No crackles. No wheezes. No rhonchi. No dullness on percussion.  CV: Regular rate. Regular rhythm. No murmur or rub. No edema.   GI: + tender. Mildly distended. No hernia.   Skin: Warm and dry. No nodule on exposed extremities.   Lymph: No cervical LAD. No supraclavicular LAD.   M/S: No cyanosis. No joint deformity. No clubbing.   Neuro: Awake. Alert. Moves all four extremities.   Psych: Oriented x 1. No anxiety.     LABS:  CBC:   Recent Labs     08/03/24 0228 08/04/24 0430 08/05/24  0523   WBC 10.8 7.6 9.2   HGB 12.1* 11.7* 11.2*   HCT 38.0* 35.3* 34.1*   MCV 78.5* 77.4* 78.3*    306 368       BMP:   Recent Labs     08/03/24  0835 08/03/24  1803 08/04/24 0430 08/05/24  0523     --  138 143   K 3.0* 3.6 3.8 3.1*     --  109 114*   CO2 19*  --  17* 19*   PHOS 4.0  --   --   --    BUN 16  --  17 16   CREATININE 1.4*  --  1.1 1.3       LIVER PROFILE:   Recent Labs     08/03/24  0835 08/04/24 0430 08/05/24  0523   AST 15 13* 12*   ALT <5* 10 10   BILITOT <0.2 0.6 0.5   ALKPHOS 73 82 78       PT/INR: No results for input(s): \"PROTIME\", \"INR\" in the last 72 hours.  APTT: No results for input(s): \"APTT\" in the last 72 hours.  UA:  Recent Labs     08/03/24  0455 08/04/24  1816   COLORU Yellow Straw   PHUR 7.0  7.0 6.0   WBCUA 0-2  --    RBCUA 0-2  --    MUCUS  Rare*  --    CLARITYU Clear Clear   LEUKOCYTESUR Negative Negative   UROBILINOGEN 0.2 0.2   BILIRUBINUR Negative Negative   BLOODU TRACE-INTACT* Negative   GLUCOSEU 250* 250*       No results for input(s): \"PHART\", \"RJC5LCE\", \"PO2ART\" in the last 72 hours.    Microbiology:  8/3 BC  8/3 COVID-19 and influenza not detected    Imaging:  Chest x-ray 8/3 imaging was reviewed by me and showed   No acute cardiopulmonary disease    CT head 8/3  No acute intracranial abnormalities    ASSESSMENT:  Acute encephalopathy/metabolic encephalopathy ?  Underlying uncontrolled psych condition ,questionable  Hypoglycemia ?  Sepsis  Sepsis  Electrolyte disorder  Hypoglycemia   Agitation   Abdominal tenderness and pain   DM with history of DKA January 2024  CKD stage III-baseline creatinine 1.4-1.8  Chronic pain syndrome on Tramadol   History of right IJ DVT    PLAN:  Supplemental oxygen to maintain SaO2 >92%; wean as tolerated  Concerns about chronic altered mental status changes  Off Precdex  Neurology did extensive work up and following   Closely monitory airways, clinical status, cardiac rhythm, vital signs, urine output   On Seroquel - QTc 465  Stop IV fluid  Cefepime and Flagyl #1  will adjust antibiotic as per culture,for stop all abx and watch off abx   Replace electrolytes   CT abdomen and Pelvis   BC, UC and sputum GS&C  Procalcitonin level  Lactic acid - normal   Neurology and psych consult   Blood sugar control, with goal 140-180  DVT prophylaxis: Lovenox  MRSA prophylaxis: Bactroban  Code status: Full code

## 2024-08-05 NOTE — PROGRESS NOTES
Upon pt returning back to ICU from MRI, assessment completed for potential change in pt status. RN found no abnormal/new findings in assessment as compared to this mornings assessment. Dr. Olivas updated, NNO at this time. Continuing to stay in ICU overnight. Remains connected to telemetry monitoring at this time.

## 2024-08-06 ENCOUNTER — APPOINTMENT (OUTPATIENT)
Dept: INTERVENTIONAL RADIOLOGY/VASCULAR | Age: 49
End: 2024-08-06
Attending: STUDENT IN AN ORGANIZED HEALTH CARE EDUCATION/TRAINING PROGRAM
Payer: MEDICARE

## 2024-08-06 ENCOUNTER — APPOINTMENT (OUTPATIENT)
Dept: MRI IMAGING | Age: 49
End: 2024-08-06
Payer: MEDICARE

## 2024-08-06 LAB
ALBUMIN SERPL-MCNC: 3.3 G/DL (ref 3.4–5)
ALBUMIN/GLOB SERPL: 1 {RATIO} (ref 1.1–2.2)
ALP SERPL-CCNC: 79 U/L (ref 40–129)
ALT SERPL-CCNC: 10 U/L (ref 10–40)
ANION GAP SERPL CALCULATED.3IONS-SCNC: 13 MMOL/L (ref 3–16)
APPEARANCE CSF: CLEAR
APPEARANCE CSF: CLEAR
AST SERPL-CCNC: 14 U/L (ref 15–37)
BILIRUB SERPL-MCNC: 0.4 MG/DL (ref 0–1)
BUN SERPL-MCNC: 11 MG/DL (ref 7–20)
CALCIUM SERPL-MCNC: 8.1 MG/DL (ref 8.3–10.6)
CHLORIDE SERPL-SCNC: 107 MMOL/L (ref 99–110)
CLOT EVALUATION CSF: ABNORMAL
CLOT EVALUATION CSF: ABNORMAL
CO2 SERPL-SCNC: 19 MMOL/L (ref 21–32)
COLOR CSF: COLORLESS
COLOR CSF: COLORLESS
CREAT SERPL-MCNC: 1.2 MG/DL (ref 0.9–1.3)
DEPRECATED RDW RBC AUTO: 17.6 % (ref 12.4–15.4)
GFR SERPLBLD CREATININE-BSD FMLA CKD-EPI: 75 ML/MIN/{1.73_M2}
GLUCOSE BLD-MCNC: 128 MG/DL (ref 70–99)
GLUCOSE BLD-MCNC: 147 MG/DL (ref 70–99)
GLUCOSE BLD-MCNC: 166 MG/DL (ref 70–99)
GLUCOSE BLD-MCNC: 166 MG/DL (ref 70–99)
GLUCOSE BLD-MCNC: 290 MG/DL (ref 70–99)
GLUCOSE BLD-MCNC: 353 MG/DL (ref 70–99)
GLUCOSE CSF-MCNC: 131 MG/DL (ref 40–80)
GLUCOSE SERPL-MCNC: 170 MG/DL (ref 70–99)
HCT VFR BLD AUTO: 34 % (ref 40.5–52.5)
HGB BLD-MCNC: 11.3 G/DL (ref 13.5–17.5)
MAGNESIUM SERPL-MCNC: 1.9 MG/DL (ref 1.8–2.4)
MANUAL DIF COMMENT CSF-IMP: ABNORMAL
MANUAL DIF COMMENT CSF-IMP: ABNORMAL
MCH RBC QN AUTO: 25.6 PG (ref 26–34)
MCHC RBC AUTO-ENTMCNC: 33.3 G/DL (ref 31–36)
MCV RBC AUTO: 77 FL (ref 80–100)
MENING+ENC PNL CSF NAA+NON-PROBE: NORMAL
NUC CELL # FLD MANUAL: 3 /CUMM (ref 0–5)
NUC CELL # FLD MANUAL: 7 /CUMM (ref 0–5)
PERFORMED ON: ABNORMAL
PLATELET # BLD AUTO: 388 K/UL (ref 135–450)
PMV BLD AUTO: 7 FL (ref 5–10.5)
POTASSIUM SERPL-SCNC: 2.9 MMOL/L (ref 3.5–5.1)
PROT CSF-MCNC: 52 MG/DL (ref 15–45)
PROT SERPL-MCNC: 6.7 G/DL (ref 6.4–8.2)
RBC # BLD AUTO: 4.42 M/UL (ref 4.2–5.9)
RBC # FLD MANUAL: 28 /CUMM
RBC # FLD MANUAL: 74 /CUMM
REPORT: NORMAL
SODIUM SERPL-SCNC: 139 MMOL/L (ref 136–145)
TUBE # CSF: ABNORMAL
TUBE # CSF: ABNORMAL
WBC # BLD AUTO: 7.2 K/UL (ref 4–11)

## 2024-08-06 PROCEDURE — 84157 ASSAY OF PROTEIN OTHER: CPT

## 2024-08-06 PROCEDURE — 70551 MRI BRAIN STEM W/O DYE: CPT

## 2024-08-06 PROCEDURE — 6360000002 HC RX W HCPCS: Performed by: STUDENT IN AN ORGANIZED HEALTH CARE EDUCATION/TRAINING PROGRAM

## 2024-08-06 PROCEDURE — 6370000000 HC RX 637 (ALT 250 FOR IP): Performed by: INTERNAL MEDICINE

## 2024-08-06 PROCEDURE — 99232 SBSQ HOSP IP/OBS MODERATE 35: CPT | Performed by: INTERNAL MEDICINE

## 2024-08-06 PROCEDURE — 85027 COMPLETE CBC AUTOMATED: CPT

## 2024-08-06 PROCEDURE — 86255 FLUORESCENT ANTIBODY SCREEN: CPT

## 2024-08-06 PROCEDURE — 99223 1ST HOSP IP/OBS HIGH 75: CPT | Performed by: STUDENT IN AN ORGANIZED HEALTH CARE EDUCATION/TRAINING PROGRAM

## 2024-08-06 PROCEDURE — 36415 COLL VENOUS BLD VENIPUNCTURE: CPT

## 2024-08-06 PROCEDURE — 009U3ZX DRAINAGE OF SPINAL CANAL, PERCUTANEOUS APPROACH, DIAGNOSTIC: ICD-10-PCS | Performed by: STUDENT IN AN ORGANIZED HEALTH CARE EDUCATION/TRAINING PROGRAM

## 2024-08-06 PROCEDURE — 86341 ISLET CELL ANTIBODY: CPT

## 2024-08-06 PROCEDURE — 2000000000 HC ICU R&B

## 2024-08-06 PROCEDURE — 84182 PROTEIN WESTERN BLOT TEST: CPT

## 2024-08-06 PROCEDURE — 87205 SMEAR GRAM STAIN: CPT

## 2024-08-06 PROCEDURE — 87483 CNS DNA AMP PROBE TYPE 12-25: CPT

## 2024-08-06 PROCEDURE — 82945 GLUCOSE OTHER FLUID: CPT

## 2024-08-06 PROCEDURE — 62328 DX LMBR SPI PNXR W/FLUOR/CT: CPT

## 2024-08-06 PROCEDURE — 86788 WEST NILE VIRUS AB IGM: CPT

## 2024-08-06 PROCEDURE — 2580000003 HC RX 258: Performed by: INTERNAL MEDICINE

## 2024-08-06 PROCEDURE — 83735 ASSAY OF MAGNESIUM: CPT

## 2024-08-06 PROCEDURE — 87070 CULTURE OTHR SPECIMN AEROBIC: CPT

## 2024-08-06 PROCEDURE — 6360000002 HC RX W HCPCS: Performed by: INTERNAL MEDICINE

## 2024-08-06 PROCEDURE — 89050 BODY FLUID CELL COUNT: CPT

## 2024-08-06 PROCEDURE — 99233 SBSQ HOSP IP/OBS HIGH 50: CPT | Performed by: INTERNAL MEDICINE

## 2024-08-06 PROCEDURE — 2709999900 IR LUMBAR PUNCTURE FOR DIAGNOSIS

## 2024-08-06 PROCEDURE — 80053 COMPREHEN METABOLIC PANEL: CPT

## 2024-08-06 PROCEDURE — 86789 WEST NILE VIRUS ANTIBODY: CPT

## 2024-08-06 RX ORDER — INSULIN LISPRO 100 [IU]/ML
0-4 INJECTION, SOLUTION INTRAVENOUS; SUBCUTANEOUS EVERY 4 HOURS
Status: DISCONTINUED | OUTPATIENT
Start: 2024-08-06 | End: 2024-08-08 | Stop reason: HOSPADM

## 2024-08-06 RX ORDER — SODIUM CHLORIDE 0.9 % (FLUSH) 0.9 %
5-40 SYRINGE (ML) INJECTION PRN
Status: DISCONTINUED | OUTPATIENT
Start: 2024-08-06 | End: 2024-08-08 | Stop reason: HOSPADM

## 2024-08-06 RX ORDER — SODIUM CHLORIDE 0.9 % (FLUSH) 0.9 %
5-40 SYRINGE (ML) INJECTION EVERY 12 HOURS SCHEDULED
Status: DISCONTINUED | OUTPATIENT
Start: 2024-08-06 | End: 2024-08-08 | Stop reason: HOSPADM

## 2024-08-06 RX ORDER — LORAZEPAM 2 MG/ML
2 INJECTION INTRAMUSCULAR
Status: COMPLETED | OUTPATIENT
Start: 2024-08-06 | End: 2024-08-06

## 2024-08-06 RX ORDER — SODIUM CHLORIDE 9 MG/ML
INJECTION, SOLUTION INTRAVENOUS PRN
Status: CANCELLED | OUTPATIENT
Start: 2024-08-06

## 2024-08-06 RX ADMIN — POTASSIUM CHLORIDE 10 MEQ: 7.46 INJECTION, SOLUTION INTRAVENOUS at 08:01

## 2024-08-06 RX ADMIN — ENOXAPARIN SODIUM 40 MG: 100 INJECTION SUBCUTANEOUS at 07:34

## 2024-08-06 RX ADMIN — INSULIN GLARGINE 15 UNITS: 100 INJECTION, SOLUTION SUBCUTANEOUS at 20:35

## 2024-08-06 RX ADMIN — Medication 10 ML: at 07:34

## 2024-08-06 RX ADMIN — TOPIRAMATE 100 MG: 100 TABLET, FILM COATED ORAL at 20:35

## 2024-08-06 RX ADMIN — MUPIROCIN: 20 OINTMENT TOPICAL at 07:34

## 2024-08-06 RX ADMIN — INSULIN LISPRO 4 UNITS: 100 INJECTION, SOLUTION INTRAVENOUS; SUBCUTANEOUS at 16:32

## 2024-08-06 RX ADMIN — LORAZEPAM 2 MG: 2 INJECTION INTRAMUSCULAR; INTRAVENOUS at 11:46

## 2024-08-06 RX ADMIN — Medication 10 ML: at 20:39

## 2024-08-06 RX ADMIN — GABAPENTIN 400 MG: 400 CAPSULE ORAL at 20:35

## 2024-08-06 RX ADMIN — POTASSIUM CHLORIDE 10 MEQ: 7.46 INJECTION, SOLUTION INTRAVENOUS at 10:16

## 2024-08-06 RX ADMIN — POTASSIUM CHLORIDE 10 MEQ: 7.46 INJECTION, SOLUTION INTRAVENOUS at 09:13

## 2024-08-06 RX ADMIN — INSULIN LISPRO 2 UNITS: 100 INJECTION, SOLUTION INTRAVENOUS; SUBCUTANEOUS at 10:21

## 2024-08-06 RX ADMIN — POTASSIUM CHLORIDE 10 MEQ: 7.46 INJECTION, SOLUTION INTRAVENOUS at 06:14

## 2024-08-06 RX ADMIN — TOPIRAMATE 100 MG: 100 TABLET, FILM COATED ORAL at 07:33

## 2024-08-06 RX ADMIN — POTASSIUM CHLORIDE 10 MEQ: 7.46 INJECTION, SOLUTION INTRAVENOUS at 11:17

## 2024-08-06 RX ADMIN — DULOXETINE HYDROCHLORIDE 30 MG: 30 CAPSULE, DELAYED RELEASE ORAL at 07:33

## 2024-08-06 RX ADMIN — POTASSIUM CHLORIDE 10 MEQ: 7.46 INJECTION, SOLUTION INTRAVENOUS at 13:30

## 2024-08-06 RX ADMIN — QUETIAPINE FUMARATE 25 MG: 25 TABLET ORAL at 20:35

## 2024-08-06 RX ADMIN — PANTOPRAZOLE SODIUM 40 MG: 40 INJECTION, POWDER, FOR SOLUTION INTRAVENOUS at 11:23

## 2024-08-06 RX ADMIN — MUPIROCIN: 20 OINTMENT TOPICAL at 20:40

## 2024-08-06 RX ADMIN — GABAPENTIN 400 MG: 400 CAPSULE ORAL at 07:34

## 2024-08-06 RX ADMIN — QUETIAPINE FUMARATE 25 MG: 25 TABLET ORAL at 07:33

## 2024-08-06 RX ADMIN — SODIUM CHLORIDE: 9 INJECTION, SOLUTION INTRAVENOUS at 06:13

## 2024-08-06 ASSESSMENT — PAIN SCALES - WONG BAKER
WONGBAKER_NUMERICALRESPONSE: NO HURT

## 2024-08-06 ASSESSMENT — PAIN SCALES - GENERAL
PAINLEVEL_OUTOF10: 0

## 2024-08-06 NOTE — PROGRESS NOTES
Shift assessment complete- see flowsheets.  Pt is A&Ox2. Disoriented to time and situation.   VSS  Respirations are easy, even and unlabored.  PIV WDL. Flushed at this time.   Plan of care and goals reviewed. Call light within reach.  Bed in lowest position with wheels locked. No needs expressed at this time. Will continue to monitor.

## 2024-08-06 NOTE — PROGRESS NOTES
Internal Medicine ICU Progress Note      Events of Last 24 hours:     Patient is a 48-year-old white male who is not able to give much history.  He was that she admitted to medical floor.  He has a history of diabetes, DVT, neuropathy, chronic pain, chronic migraine.  He was last admitted for DKA.  He had encephalopathy at that time.      The patient was admitted for sepsis of unknown origin.  He was sent to the ICU for behavior issues as he was spitting and trying to bite the nurses.  He started on Precedex.  He is presently off Precedex.  He is still somewhat confused but he knows he is in the hospital and his mentation is slightly improved.  He lives with his sister.  In the ED did receive Benadryl and Haldol.      He continues to be disoriented   Awake,alert       Patient got very confused last night.  This morning he says that he feels awful  Mild disorientation present      Invasive Lines: PIV        MV:  n/a    No results for input(s): \"PHART\", \"WKS6YVX\", \"PO2ART\" in the last 72 hours.    MV Settings:     / / /     IV:   sodium chloride 50 mL/hr at 24 0613    dextrose         Vitals:  Temp  Av.8 °F (36.6 °C)  Min: 97.1 °F (36.2 °C)  Max: 98.3 °F (36.8 °C)  Pulse  Av.8  Min: 70  Max: 89  BP  Min: 96/41  Max: 177/82  SpO2  Av.6 %  Min: 95 %  Max: 100 %  Patient Vitals for the past 4 hrs:   BP Temp Temp src Pulse Resp SpO2 Height Weight   24 0700 (!) 146/76 97.1 °F (36.2 °C) Temporal 82 20 99 % -- --   24 0600 (!) 171/70 -- -- 81 14 98 % -- --   24 0540 -- -- -- -- -- -- 1.88 m (6' 2\") 86.1 kg (189 lb 14.4 oz)   24 0500 (!) 142/77 -- -- 74 14 95 % -- --         CVP:        Intake/Output Summary (Last 24 hours) at 2024 0804  Last data filed at 2024 0540  Gross per 24 hour   Intake 240 ml   Output 4250 ml   Net -4010 ml         EXAM:  General: ill appearing  Eyes: PERRL. No sclera icterus. No conjunctiva injected.  ENT: No discharge. Pharynx  Problems:    Type 1 diabetes mellitus with stage 3 chronic kidney disease (HCC)    HTN (hypertension)    Polyneuropathy due to type 1 diabetes mellitus (HCC)    Acute metabolic encephalopathy    Hypokalemia    Electrolyte disorder    Agitation    Abdominal pain  Resolved Problems:    * No resolved hospital problems. *         Plan:    49 yo  male with diabetes mellitus type 2, history of DVT, on neuropathy, chronic pain, chronic migraine, recent admission 1/25/2024 for DKA,  brought to ED for altered mental status.     Sepsis   Blood cultures ordered in ER  Ordered broad-spectrum antibiotics-vancomycin, cefepime, changed to cefepime and Flagyl.  Possible intra-abdominal source.  procalcitonin normal lactic acid normal  Patient meets SIRS  criteria - high fevers, tachycardic and tachypneic. With altered mentation .  No clear source. UA, CXR neg   concern for possible aspiration pneumonia as he was found to have emesis and fallen down. .  No obvious pneumonia seen.  continue broad-spectrum antibiotics vancomycin and cefepime   follow-up on cultures negative so far  Tmax now improved from 102.1 °F last night to 100.3 °F  CK level was normal  Sister also told the nurse that he has had history of osteomyelitis of tibia and was supposed to be on antibiotics was not taking it.  All cultures negative   Not on any abx now      Acute metabolic encephalopathy  -Patient received Benadryl, Haldol, Versed in ED for agitation in the ED. - repeat CT head was negative   -He was  on Precedex briefly.  Mentation is slowly improving but still confused.  Neuro following   EEG reviewed  MRI brain with and without contrast ordered     Diabetes mellitus insulin-dependent  Had DKA during his previous admission  -Will keep on n.p.o.;  low sliding scale now; monitor blood sugars closely  Once more awake we can resume in a diabetic diet and continue sliding scale coverage.   Home medications verified and ordered.  On Lantus and sliding scale

## 2024-08-06 NOTE — BRIEF OP NOTE
Brief Postoperative Note    Patient: Era Carson  YOB: 1975  MRN: [id number]      Pre-operative Diagnosis: AMS    Post-operative Diagnosis: Same    Procedure: Fluoro guided LP    Anesthesia: Local    Surgeons/Assistants: Manpreet Loredo DO    Estimated Blood Loss: less than 50     Complications: None    Infection Present At Time Of Surgery (PATOS) (choose all levels that have infection present):  No infection present    Specimens: Was Obtained: 9.5 mL clear CSF    Findings:   Successful fluoro guided lumbar puncture.      Manpreet Loredo DO  8/6/2024, 2:59 PM  Interventional Radiology  [phone] ([phone])

## 2024-08-06 NOTE — PROGRESS NOTES
Pt's sister Neeru called to check on pt.  Updated with condition.  States this is totally abnormal behavior for him. States he normally carries on a conversation, and is educated.  Not being able to tell us his date of birth is absolutely not normal for him.  States she will follow up tomorrow.

## 2024-08-06 NOTE — PROGRESS NOTES
[facility]                                 Pulmonary Consult /Progress Note :                                                                  CC follow on altered mental    Subjective    Had episodes of confusion  Now awake and alert  For MRI  Questionable seizure  Not eating well    PHYSICAL EXAM:    Blood pressure (!) 146/76, pulse 82, temperature 97.1 °F (36.2 °C), temperature source Temporal, resp. rate 20, height 1.88 m (6' 2\"), weight 86.1 kg (189 lb 14.4 oz), SpO2 99 %.' on RA  Gen: No distress.   Eyes: PERRL. No sclera icterus. No conjunctival injection.   ENT: No discharge. Pharynx clear.   Neck: Trachea midline. No obvious mass.    Resp: No accessory muscle use. No crackles. No wheezes. No rhonchi. No dullness on percussion.  CV: Regular rate. Regular rhythm. No murmur or rub. No edema.   GI: + tender. Mildly distended. No hernia.   Skin: Warm and dry. No nodule on exposed extremities.   Lymph: No cervical LAD. No supraclavicular LAD.   M/S: No cyanosis. No joint deformity. No clubbing.   Neuro: Awake. Alert. Moves all four extremities.   Psych: Oriented x 1. No anxiety.     LABS:  CBC:   Recent Labs     08/04/24 0430 08/05/24 0523 08/06/24 0439   WBC 7.6 9.2 7.2   HGB 11.7* 11.2* 11.3*   HCT 35.3* 34.1* 34.0*   MCV 77.4* 78.3* 77.0*    368 388       BMP:   Recent Labs     08/03/24  0835 08/03/24  1803 08/04/24 0430 08/05/24 0523 08/06/24 0439     --  138 143 139   K 3.0*   < > 3.8 3.1* 2.9*     --  109 114* 107   CO2 19*  --  17* 19* 19*   PHOS 4.0  --   --   --   --    BUN 16  --  17 16 11   CREATININE 1.4*  --  1.1 1.3 1.2    < > = values in this interval not displayed.       LIVER PROFILE:   Recent Labs     08/04/24  0430 08/05/24 0523 08/06/24 0439   AST 13* 12* 14*   ALT 10 10 10   BILITOT 0.6 0.5 0.4   ALKPHOS 82 78 79       PT/INR: No results for input(s): \"PROTIME\", \"INR\" in the  last 72 hours.  APTT: No results for input(s): \"APTT\" in the last 72 hours.  UA:  Recent Labs     08/04/24  1816   COLORU Straw   PHUR 6.0   CLARITYU Clear   LEUKOCYTESUR Negative   UROBILINOGEN 0.2   BILIRUBINUR Negative   BLOODU Negative   GLUCOSEU 250*       No results for input(s): \"PHART\", \"VTV9ZOD\", \"PO2ART\" in the last 72 hours.    Microbiology:  8/3 BC  8/3 COVID-19 and influenza not detected    Imaging:  Chest x-ray 8/3 imaging was reviewed by me and showed   No acute cardiopulmonary disease    CT head 8/3  No acute intracranial abnormalities    ASSESSMENT:  Acute encephalopathy/metabolic encephalopathy ?  Underlying uncontrolled psych condition ,questionable  Hypoglycemia ?  Sepsis  Sepsis  Electrolyte disorder  Hypoglycemia   Agitation   Abdominal tenderness and pain   DM with history of DKA January 2024  CKD stage III-baseline creatinine 1.4-1.8  Chronic pain syndrome on Tramadol   History of right IJ DVT    PLAN:  Supplemental oxygen to maintain SaO2 >92%; wean as tolerated  Concerns about chronic altered mental status changes  Off Precdex  Neurology did extensive work up and following   Closely monitory airways, clinical status, cardiac rhythm, vital signs, urine output   For MRI  On Seroquel - QTc 465  Stop IV fluid  Cefepime and Flagyl #1  will adjust antibiotic as per culture,for stop all abx and watch off abx   Replace electrolytes   CT abdomen and Pelvis   BC, UC and sputum GS&C  Procalcitonin level  Lactic acid - normal   Neurology and psych consult   Blood sugar control, with goal 140-180  DVT prophylaxis: Lovenox  MRSA prophylaxis: Bactroban  Code status: Full code

## 2024-08-06 NOTE — OR NURSING
Image guided LP completed by Dr. Loredo. Pt tolerated procedure without any signs or symptoms of distress. Vital signs stable. Report given  to Nathalia DUMONT. Pt transported back to ICU in stable condition via stretcher.         Vital Signs  Vitals:    08/06/24 1400   BP: 110/83   Pulse: 89   Resp: 18   Temp:    SpO2: 100%    (vital signs in table format)    Post Grey  2 - Able to move 4 extremities voluntarily on command  2 - BP+/- 20mmHg of normal  2 - Fully awake  2 - Able to maintain oxygen saturation >92% on room air  2 - Able to breathe deeply and cough freely

## 2024-08-06 NOTE — OR NURSING
Pt arrived for image guided lumbar puncture. Procedure explained including the risk and benefits of the procedure. All questions answered. Pt verbalizes understanding of the of procedure and states no more questions. Consent signed. Vital signs stable, labs, allergies, medications, and code status reviewed. No contraindications noted.     Vitals:    08/06/24 1400   BP: 110/83   Pulse: 89   Resp: 18   Temp:    SpO2: 100%    (vital signs in table format)    Grey Score  2 - Able to move 4 extremities voluntarily on command  2 - BP+/- 20mmHg of normal  2 - Fully awake  2 - Able to maintain oxygen saturation >92% on room air  2 - Able to breathe deeply and cough freely    Allergies  Tegaderm ag mesh 2\"x2\" [wound dressings], Codeine, Tape [adhesive tape], and Other    Labs  No results found for: \"INR\", \"PROTIME\"    Lab Results   Component Value Date    CREATININE 1.2 08/06/2024    BUN 11 08/06/2024    GFR 59 04/17/2013     08/06/2024    K 2.9 (LL) 08/06/2024     08/06/2024    CO2 19 (L) 08/06/2024       Lab Results   Component Value Date    WBC 7.2 08/06/2024    HGB 11.3 (L) 08/06/2024    HCT 34.0 (L) 08/06/2024    MCV 77.0 (L) 08/06/2024     08/06/2024

## 2024-08-06 NOTE — PROGRESS NOTES
Pt awake, sitting up in bed.  Alert to name, date of birth, thinks he is at [facility] and not aware of why he is here.  Asking questions of how long he's been here and what's wrong with him.   Water given to drink.  Call light in reach.

## 2024-08-07 PROBLEM — G40.909 NONINTRACTABLE EPILEPSY WITHOUT STATUS EPILEPTICUS (HCC): Status: ACTIVE | Noted: 2024-08-07

## 2024-08-07 LAB
ANION GAP SERPL CALCULATED.3IONS-SCNC: 15 MMOL/L (ref 3–16)
ANTI-THYROGLOB ABS: 16 IU/ML
BACTERIA BLD CULT ORG #2: NORMAL
BACTERIA BLD CULT: NORMAL
BACTERIA CSF CULT: NORMAL
BUN SERPL-MCNC: 12 MG/DL (ref 7–20)
CALCIUM SERPL-MCNC: 8.3 MG/DL (ref 8.3–10.6)
CHLORIDE SERPL-SCNC: 100 MMOL/L (ref 99–110)
CO2 SERPL-SCNC: 18 MMOL/L (ref 21–32)
CREAT SERPL-MCNC: 1.3 MG/DL (ref 0.9–1.3)
CRP SERPL-MCNC: 13.3 MG/L (ref 0–5.1)
DEPRECATED RDW RBC AUTO: 17.7 % (ref 12.4–15.4)
ERYTHROCYTE [SEDIMENTATION RATE] IN BLOOD BY WESTERGREN METHOD: 33 MM/HR (ref 0–15)
GFR SERPLBLD CREATININE-BSD FMLA CKD-EPI: 68 ML/MIN/{1.73_M2}
GLUCOSE BLD-MCNC: 188 MG/DL (ref 70–99)
GLUCOSE BLD-MCNC: 238 MG/DL (ref 70–99)
GLUCOSE BLD-MCNC: 270 MG/DL (ref 70–99)
GLUCOSE BLD-MCNC: 318 MG/DL (ref 70–99)
GLUCOSE BLD-MCNC: 324 MG/DL (ref 70–99)
GLUCOSE BLD-MCNC: 338 MG/DL (ref 70–99)
GLUCOSE SERPL-MCNC: 337 MG/DL (ref 70–99)
GRAM STN SPEC: NORMAL
HCT VFR BLD AUTO: 35 % (ref 40.5–52.5)
HGB BLD-MCNC: 11.4 G/DL (ref 13.5–17.5)
MAGNESIUM SERPL-MCNC: 1.8 MG/DL (ref 1.8–2.4)
MCH RBC QN AUTO: 25.3 PG (ref 26–34)
MCHC RBC AUTO-ENTMCNC: 32.6 G/DL (ref 31–36)
MCV RBC AUTO: 77.8 FL (ref 80–100)
PERFORMED ON: ABNORMAL
PLATELET # BLD AUTO: 385 K/UL (ref 135–450)
PMV BLD AUTO: 6.6 FL (ref 5–10.5)
POTASSIUM SERPL-SCNC: 3.3 MMOL/L (ref 3.5–5.1)
RBC # BLD AUTO: 4.49 M/UL (ref 4.2–5.9)
SODIUM SERPL-SCNC: 133 MMOL/L (ref 136–145)
THYROPEROXIDASE AB SERPL IA-ACNC: 12 IU/ML
WBC # BLD AUTO: 6.3 K/UL (ref 4–11)

## 2024-08-07 PROCEDURE — 86140 C-REACTIVE PROTEIN: CPT

## 2024-08-07 PROCEDURE — 2580000003 HC RX 258: Performed by: INTERNAL MEDICINE

## 2024-08-07 PROCEDURE — 83735 ASSAY OF MAGNESIUM: CPT

## 2024-08-07 PROCEDURE — 2580000003 HC RX 258: Performed by: STUDENT IN AN ORGANIZED HEALTH CARE EDUCATION/TRAINING PROGRAM

## 2024-08-07 PROCEDURE — 86376 MICROSOMAL ANTIBODY EACH: CPT

## 2024-08-07 PROCEDURE — 6370000000 HC RX 637 (ALT 250 FOR IP): Performed by: INTERNAL MEDICINE

## 2024-08-07 PROCEDURE — 84182 PROTEIN WESTERN BLOT TEST: CPT

## 2024-08-07 PROCEDURE — 99232 SBSQ HOSP IP/OBS MODERATE 35: CPT | Performed by: INTERNAL MEDICINE

## 2024-08-07 PROCEDURE — 36415 COLL VENOUS BLD VENIPUNCTURE: CPT

## 2024-08-07 PROCEDURE — 86800 THYROGLOBULIN ANTIBODY: CPT

## 2024-08-07 PROCEDURE — 95813 EEG EXTND MNTR 61-119 MIN: CPT

## 2024-08-07 PROCEDURE — 80048 BASIC METABOLIC PNL TOTAL CA: CPT

## 2024-08-07 PROCEDURE — 6360000002 HC RX W HCPCS: Performed by: INTERNAL MEDICINE

## 2024-08-07 PROCEDURE — 85027 COMPLETE CBC AUTOMATED: CPT

## 2024-08-07 PROCEDURE — 1200000000 HC SEMI PRIVATE

## 2024-08-07 PROCEDURE — 85652 RBC SED RATE AUTOMATED: CPT

## 2024-08-07 PROCEDURE — 86341 ISLET CELL ANTIBODY: CPT

## 2024-08-07 PROCEDURE — 86038 ANTINUCLEAR ANTIBODIES: CPT

## 2024-08-07 PROCEDURE — 86255 FLUORESCENT ANTIBODY SCREEN: CPT

## 2024-08-07 PROCEDURE — 99233 SBSQ HOSP IP/OBS HIGH 50: CPT | Performed by: INTERNAL MEDICINE

## 2024-08-07 PROCEDURE — 99233 SBSQ HOSP IP/OBS HIGH 50: CPT | Performed by: STUDENT IN AN ORGANIZED HEALTH CARE EDUCATION/TRAINING PROGRAM

## 2024-08-07 RX ORDER — INSULIN GLARGINE 100 [IU]/ML
10 INJECTION, SOLUTION SUBCUTANEOUS 2 TIMES DAILY
Status: DISCONTINUED | OUTPATIENT
Start: 2024-08-07 | End: 2024-08-08 | Stop reason: HOSPADM

## 2024-08-07 RX ORDER — DIVALPROEX SODIUM 500 MG/1
500 TABLET, EXTENDED RELEASE ORAL DAILY
Status: DISCONTINUED | OUTPATIENT
Start: 2024-08-08 | End: 2024-08-08 | Stop reason: HOSPADM

## 2024-08-07 RX ADMIN — TOPIRAMATE 100 MG: 100 TABLET, FILM COATED ORAL at 08:16

## 2024-08-07 RX ADMIN — QUETIAPINE FUMARATE 25 MG: 25 TABLET ORAL at 08:16

## 2024-08-07 RX ADMIN — TOPIRAMATE 100 MG: 100 TABLET, FILM COATED ORAL at 22:02

## 2024-08-07 RX ADMIN — PANTOPRAZOLE SODIUM 40 MG: 40 INJECTION, POWDER, FOR SOLUTION INTRAVENOUS at 00:08

## 2024-08-07 RX ADMIN — MUPIROCIN: 20 OINTMENT TOPICAL at 22:05

## 2024-08-07 RX ADMIN — GABAPENTIN 400 MG: 400 CAPSULE ORAL at 22:02

## 2024-08-07 RX ADMIN — INSULIN LISPRO 3 UNITS: 100 INJECTION, SOLUTION INTRAVENOUS; SUBCUTANEOUS at 00:15

## 2024-08-07 RX ADMIN — INSULIN LISPRO 2 UNITS: 100 INJECTION, SOLUTION INTRAVENOUS; SUBCUTANEOUS at 22:08

## 2024-08-07 RX ADMIN — DULOXETINE HYDROCHLORIDE 30 MG: 30 CAPSULE, DELAYED RELEASE ORAL at 08:16

## 2024-08-07 RX ADMIN — PANTOPRAZOLE SODIUM 40 MG: 40 INJECTION, POWDER, FOR SOLUTION INTRAVENOUS at 12:24

## 2024-08-07 RX ADMIN — INSULIN GLARGINE 10 UNITS: 100 INJECTION, SOLUTION SUBCUTANEOUS at 22:02

## 2024-08-07 RX ADMIN — INSULIN LISPRO 3 UNITS: 100 INJECTION, SOLUTION INTRAVENOUS; SUBCUTANEOUS at 17:03

## 2024-08-07 RX ADMIN — INSULIN LISPRO 3 UNITS: 100 INJECTION, SOLUTION INTRAVENOUS; SUBCUTANEOUS at 08:21

## 2024-08-07 RX ADMIN — QUETIAPINE FUMARATE 25 MG: 25 TABLET ORAL at 22:02

## 2024-08-07 RX ADMIN — MUPIROCIN: 20 OINTMENT TOPICAL at 08:17

## 2024-08-07 RX ADMIN — ENOXAPARIN SODIUM 40 MG: 100 INJECTION SUBCUTANEOUS at 08:16

## 2024-08-07 RX ADMIN — SODIUM CHLORIDE, PRESERVATIVE FREE 10 ML: 5 INJECTION INTRAVENOUS at 08:16

## 2024-08-07 RX ADMIN — POTASSIUM BICARBONATE 40 MEQ: 782 TABLET, EFFERVESCENT ORAL at 08:39

## 2024-08-07 RX ADMIN — GABAPENTIN 400 MG: 400 CAPSULE ORAL at 08:16

## 2024-08-07 ASSESSMENT — PAIN SCALES - GENERAL
PAINLEVEL_OUTOF10: 0

## 2024-08-07 ASSESSMENT — PAIN SCALES - WONG BAKER
WONGBAKER_NUMERICALRESPONSE: NO HURT
WONGBAKER_NUMERICALRESPONSE: NO HURT

## 2024-08-07 NOTE — PROGRESS NOTES
Shift assessment completed.  See flowsheet.   Pt alert and oriented.  Denies pain or needs at this time.  Call light in reach. Will continue to monitor.

## 2024-08-07 NOTE — PLAN OF CARE
Problem: Discharge Planning  Goal: Discharge to home or other facility with appropriate resources  Outcome: Progressing  Flowsheets (Taken 8/7/2024 0543)  Discharge to home or other facility with appropriate resources: Arrange for needed discharge resources and transportation as appropriate     Problem: Safety - Adult  Goal: Free from fall injury  Outcome: Progressing     Problem: Skin/Tissue Integrity  Goal: Absence of new skin breakdown  Description: 1.  Monitor for areas of redness and/or skin breakdown  2.  Assess vascular access sites hourly  3.  Every 4-6 hours minimum:  Change oxygen saturation probe site  4.  Every 4-6 hours:  If on nasal continuous positive airway pressure, respiratory therapy assess nares and determine need for appliance change or resting period.  Outcome: Progressing     Problem: Chronic Conditions and Co-morbidities  Goal: Patient's chronic conditions and co-morbidity symptoms are monitored and maintained or improved  Outcome: Progressing  Flowsheets (Taken 8/7/2024 0543)  Care Plan - Patient's Chronic Conditions and Co-Morbidity Symptoms are Monitored and Maintained or Improved: Monitor and assess patient's chronic conditions and comorbid symptoms for stability, deterioration, or improvement

## 2024-08-07 NOTE — PROGRESS NOTES
Era Carson  Neurology Follow-up  [facility] Neurology    Date of Service: 8/7/2024    Subjective:   CC: Follow up today regarding:     Events noted. Chart and lab reviewed. He reports left leg pain mild. Otherwise, he has no complaints today.       ROS : A 10-12 system review obtained and updated today and is unremarkable except as mentioned  in my interval history.     Past medical history, social history, medication and family history reviewed.       Objective:  Exam:   Constitutional:   Vitals:    08/07/24 0800 08/07/24 0900 08/07/24 1000 08/07/24 1100   BP: (!) 186/101 (!) 113/56 96/63 (!) 104/55   Pulse: 83 83 80 78   Resp: 17 15 14 14   Temp: 97.6 °F (36.4 °C)      TempSrc: Temporal      SpO2: 100% 100% 97% 97%   Weight:       Height:         General appearance:  Normal development and appear in no acute distress.   Mental Status:   Oriented to person, place, and time (month and year)      Memory: Poor immediate recall. Impaired remote memory  Impaired attention span and concentration. Comprehension impaired to commands but appear to understand and respond appropriately to questions.   Language: intact naming, repeating and fluency   Poor fund of Knowledge.   Cranial Nerves:   II:   Pupils: equal, round, reactive to light  III,IV,VI: Extra Ocular Movements are intact. No nystagmus  V: Facial sensation is intact  VII: Facial strength and movements: intact and symmetric  XII: Tongue movements are normal  Musculoskeletal: 5/5 in all 4 extremities.   Tone: Normal tone.   Reflexes: Symmetric 2+ in both arms and legs. Toes down going bilateral  Coordination: no pronator drift, no dysmetria with FNF  Sensation: normal.    MDM:    A. Problems (any 1)    High:    [x] Acute/Chronic Illness/injury posing threat to life or bodily function: acute encephalopathy   [] Severe exacerbation of chronic illness:      Moderate:    []     1 or more chronic illness with exacerbation, progression or side effect of treatment  or  []     2 or more stable chronic illnesses or  []     1 acute illness with systemic symptoms     ---------------------------------------------------------------------  B. Risk of Treatment (any 1)   [] Drugs/treatments that require intensive monitoring for toxicity include:    [] Change in code status:    [] Decision to escalate care:    [] Major surgery/procedure with associated risk factors:    [x] Prescription drug management  ----------------------------------------------------------------------  [x] High (any 2)  [] Moderate (any 1)    C. Data (any 2 for high and any 1 for moderate)  [] Discussed management of the case with:    [x] Imaging personally reviewed and interpreted  [x] Data Review (any 3)  [x] Collateral history obtained from: patient, nurse, and chart review   [x] All available Consultant notes from yesterday/today were reviewed  [x] All current labs were reviewed and interpreted for clinical significance   [x] Appropriate follow-up labs were ordered      Data  LABS:   Lab Results   Component Value Date/Time     08/07/2024 06:53 AM    K 3.3 08/07/2024 06:53 AM     08/07/2024 06:53 AM    CO2 18 08/07/2024 06:53 AM    BUN 12 08/07/2024 06:53 AM    CREATININE 1.3 08/07/2024 06:53 AM    GFRAA >60 05/14/2022 09:50 PM    GFRAA >60 05/14/2013 11:27 AM    LABGLOM 68 08/07/2024 06:53 AM    LABGLOM 53 02/05/2024 05:51 AM    GLUCOSE 337 08/07/2024 06:53 AM    PHOS 4.0 08/03/2024 08:35 AM    MG 1.80 08/07/2024 06:53 AM    CALCIUM 8.3 08/07/2024 06:53 AM     Lab Results   Component Value Date/Time    WBC 6.3 08/07/2024 06:54 AM    RBC 4.49 08/07/2024 06:54 AM    HGB 11.4 08/07/2024 06:54 AM    HCT 35.0 08/07/2024 06:54 AM    MCV 77.8 08/07/2024 06:54 AM    RDW 17.7 08/07/2024 06:54 AM     08/07/2024 06:54 AM   No results found for: \"INR\", \"PROTIME\"    Neuroimaging was independently reviewed by me and discussed results with the patient  I reviewed blood testing and other test results and

## 2024-08-07 NOTE — PLAN OF CARE
Problem: Discharge Planning  Goal: Discharge to home or other facility with appropriate resources  8/7/2024 1029 by Daphne Peña RN  Outcome: Progressing  8/7/2024 0543 by Nayana Yeh RN  Outcome: Progressing  Flowsheets (Taken 8/7/2024 0543)  Discharge to home or other facility with appropriate resources: Arrange for needed discharge resources and transportation as appropriate     Problem: Pain  Goal: Verbalizes/displays adequate comfort level or baseline comfort level  Outcome: Progressing     Problem: Safety - Adult  Goal: Free from fall injury  8/7/2024 1029 by Daphne Peña RN  Outcome: Progressing  8/7/2024 0543 by Nayana Yeh RN  Outcome: Progressing     Problem: Safety - Medical Restraint  Goal: Remains free of injury from restraints (Restraint for Interference with Medical Device)  Description: INTERVENTIONS:  1. Determine that other, less restrictive measures have been tried or would not be effective before applying the restraint  2. Evaluate the patient's condition at the time of restraint application  3. Inform patient/family regarding the reason for restraint  4. Q2H: Monitor safety, psychosocial status, comfort, nutrition and hydration  Outcome: Progressing     Problem: Skin/Tissue Integrity  Goal: Absence of new skin breakdown  Description: 1.  Monitor for areas of redness and/or skin breakdown  2.  Assess vascular access sites hourly  3.  Every 4-6 hours minimum:  Change oxygen saturation probe site  4.  Every 4-6 hours:  If on nasal continuous positive airway pressure, respiratory therapy assess nares and determine need for appliance change or resting period.  8/7/2024 1029 by Daphne Peña RN  Outcome: Progressing  8/7/2024 0543 by Nayana Yeh RN  Outcome: Progressing     Problem: Chronic Conditions and Co-morbidities  Goal: Patient's chronic conditions and co-morbidity symptoms are monitored and maintained or improved  8/7/2024 1029 by Daphne Peña RN  Outcome:

## 2024-08-07 NOTE — PROGRESS NOTES
Shift assessment complete- see flowsheets.  Pt is A&Ox2. Disoriented to time and situation.   VSS  Respirations are easy, even and unlabored.  PIV WDL. Flushed at this time.   Telesitter in place for patient safety.  Plan of care and goals reviewed. Call light within reach.  Bed in lowest position with wheels locked. No needs expressed at this time. Will continue to monitor.

## 2024-08-07 NOTE — PROGRESS NOTES
P Pulmonary[facility]                                 Pulmonary Consult /Progress Note :                                                                  CC follow on altered mental    Subjective    Had episodes of confusion  Had   Now awake and alert   MRI/LP was ok   Questionable seizure  Not eating well    PHYSICAL EXAM:    Blood pressure (!) 113/56, pulse 83, temperature 97.6 °F (36.4 °C), temperature source Temporal, resp. rate 15, height 1.88 m (6' 2\"), weight 87.4 kg (192 lb 11.2 oz), SpO2 100 %.' on RA  Gen: No distress.   Eyes: PERRL. No sclera icterus. No conjunctival injection.   ENT: No discharge. Pharynx clear.   Neck: Trachea midline. No obvious mass.    Resp: No accessory muscle use. No crackles. No wheezes. No rhonchi. No dullness on percussion.  CV: Regular rate. Regular rhythm. No murmur or rub. No edema.   GI: + tender. Mildly distended. No hernia.   Skin: Warm and dry. No nodule on exposed extremities.   Lymph: No cervical LAD. No supraclavicular LAD.   M/S: No cyanosis. No joint deformity. No clubbing.   Neuro: Awake. Alert. Moves all four extremities.   Psych: Oriented x 1. No anxiety.     LABS:  CBC:   Recent Labs     08/05/24 0523 08/06/24 0439 08/07/24  0654   WBC 9.2 7.2 6.3   HGB 11.2* 11.3* 11.4*   HCT 34.1* 34.0* 35.0*   MCV 78.3* 77.0* 77.8*    388 385       BMP:   Recent Labs     08/05/24 0523 08/06/24 0439 08/07/24  0653    139 133*   K 3.1* 2.9* 3.3*   * 107 100   CO2 19* 19* 18*   BUN 16 11 12   CREATININE 1.3 1.2 1.3       LIVER PROFILE:   Recent Labs     08/05/24 0523 08/06/24 0439   AST 12* 14*   ALT 10 10   BILITOT 0.5 0.4   ALKPHOS 78 79       PT/INR: No results for input(s): \"PROTIME\", \"INR\" in the last 72 hours.  APTT: No results for input(s): \"APTT\" in the last 72 hours.  UA:  Recent Labs     08/04/24  1816   COLORU Straw   PHUR 6.0   CLARITYU Clear   LEUKOCYTESUR Negative

## 2024-08-07 NOTE — CARE COORDINATION
Spoke with sister at bedside. The patient will dc home with sister when medically stable. The patient does not want home care.     They have determined the patient is having seizures.  No CM needs at this time.   Following.

## 2024-08-07 NOTE — PROGRESS NOTES
Internal Medicine ICU Progress Note      Events of Last 24 hours:     Patient is a 48-year-old white male who is not able to give much history.  He was that she admitted to medical floor.  He has a history of diabetes, DVT, neuropathy, chronic pain, chronic migraine.  He was last admitted for DKA.  He had encephalopathy at that time.      The patient was admitted for sepsis of unknown origin.  He was sent to the ICU for behavior issues as he was spitting and trying to bite the nurses.  He started on Precedex.  He is presently off Precedex.  He is still somewhat confused but he knows he is in the hospital and his mentation is slightly improved.  He lives with his sister.  In the ED did receive Benadryl and Haldol.      He continues to be disoriented   Awake,alert       Patient got very confused last night.  This morning he says that he feels awful  Mild disorientation present      Patient continues to be disoriented  More awake and alert today      Invasive Lines: PIV        MV:  n/a    No results for input(s): \"PHART\", \"VDU4LZB\", \"PO2ART\" in the last 72 hours.    MV Settings:     / / /     IV:   sodium chloride 50 mL/hr at 24 0613    dextrose         Vitals:  Temp  Av.8 °F (36.6 °C)  Min: 96.9 °F (36.1 °C)  Max: 98.5 °F (36.9 °C)  Pulse  Av.5  Min: 63  Max: 94  BP  Min: 84/48  Max: 169/120  SpO2  Av.8 %  Min: 93 %  Max: 100 %  Patient Vitals for the past 4 hrs:   BP Temp Temp src Pulse Resp SpO2 Weight   24 0700 -- -- -- 78 16 100 % --   24 0600 129/84 -- -- 71 14 100 % --   24 0500 109/62 -- -- 64 13 100 % --   24 0400 (!) 96/48 98.5 °F (36.9 °C) Oral 63 14 100 % 87.4 kg (192 lb 11.2 oz)         CVP:        Intake/Output Summary (Last 24 hours) at 2024 0742  Last data filed at 2024 0000  Gross per 24 hour   Intake 360 ml   Output 1500 ml   Net -1140 ml         EXAM:  General: ill appearing  Eyes: PERRL. No sclera icterus. No conjunctiva injected.  ENT:

## 2024-08-07 NOTE — PROGRESS NOTES
Pt taken down to 2W via transport. Bedside report given back to Daphne DUMONT. POC transferred. JULIA Burkett

## 2024-08-08 VITALS
DIASTOLIC BLOOD PRESSURE: 81 MMHG | HEART RATE: 74 BPM | TEMPERATURE: 98.2 F | BODY MASS INDEX: 26.58 KG/M2 | OXYGEN SATURATION: 98 % | WEIGHT: 207.1 LBS | SYSTOLIC BLOOD PRESSURE: 136 MMHG | HEIGHT: 74 IN | RESPIRATION RATE: 16 BRPM

## 2024-08-08 LAB
ANA SER QL IA: NEGATIVE
GLUCOSE BLD-MCNC: 184 MG/DL (ref 70–99)
GLUCOSE BLD-MCNC: 211 MG/DL (ref 70–99)
GLUCOSE BLD-MCNC: 351 MG/DL (ref 70–99)
PERFORMED ON: ABNORMAL

## 2024-08-08 PROCEDURE — 6370000000 HC RX 637 (ALT 250 FOR IP): Performed by: INTERNAL MEDICINE

## 2024-08-08 PROCEDURE — 99232 SBSQ HOSP IP/OBS MODERATE 35: CPT | Performed by: INTERNAL MEDICINE

## 2024-08-08 PROCEDURE — 95718 EEG PHYS/QHP 2-12 HR W/VEEG: CPT | Performed by: PSYCHIATRY & NEUROLOGY

## 2024-08-08 PROCEDURE — 2580000003 HC RX 258: Performed by: STUDENT IN AN ORGANIZED HEALTH CARE EDUCATION/TRAINING PROGRAM

## 2024-08-08 PROCEDURE — 2580000003 HC RX 258: Performed by: INTERNAL MEDICINE

## 2024-08-08 PROCEDURE — 6360000002 HC RX W HCPCS: Performed by: INTERNAL MEDICINE

## 2024-08-08 PROCEDURE — 6370000000 HC RX 637 (ALT 250 FOR IP): Performed by: STUDENT IN AN ORGANIZED HEALTH CARE EDUCATION/TRAINING PROGRAM

## 2024-08-08 PROCEDURE — 99238 HOSP IP/OBS DSCHRG MGMT 30/<: CPT | Performed by: INTERNAL MEDICINE

## 2024-08-08 RX ORDER — PANTOPRAZOLE SODIUM 40 MG/1
40 TABLET, DELAYED RELEASE ORAL
Status: DISCONTINUED | OUTPATIENT
Start: 2024-08-08 | End: 2024-08-08 | Stop reason: HOSPADM

## 2024-08-08 RX ORDER — DIVALPROEX SODIUM 500 MG/1
500 TABLET, DELAYED RELEASE ORAL SEE ADMIN INSTRUCTIONS
Qty: 90 TABLET | Refills: 1 | Status: SHIPPED | OUTPATIENT
Start: 2024-08-08 | End: 2025-02-04

## 2024-08-08 RX ADMIN — GABAPENTIN 400 MG: 400 CAPSULE ORAL at 09:18

## 2024-08-08 RX ADMIN — QUETIAPINE FUMARATE 25 MG: 25 TABLET ORAL at 09:18

## 2024-08-08 RX ADMIN — ENOXAPARIN SODIUM 40 MG: 100 INJECTION SUBCUTANEOUS at 09:19

## 2024-08-08 RX ADMIN — MUPIROCIN: 20 OINTMENT TOPICAL at 09:19

## 2024-08-08 RX ADMIN — INSULIN LISPRO 1 UNITS: 100 INJECTION, SOLUTION INTRAVENOUS; SUBCUTANEOUS at 04:15

## 2024-08-08 RX ADMIN — TOPIRAMATE 100 MG: 100 TABLET, FILM COATED ORAL at 09:18

## 2024-08-08 RX ADMIN — INSULIN LISPRO 4 UNITS: 100 INJECTION, SOLUTION INTRAVENOUS; SUBCUTANEOUS at 11:54

## 2024-08-08 RX ADMIN — DIVALPROEX SODIUM 500 MG: 500 TABLET, FILM COATED, EXTENDED RELEASE ORAL at 09:18

## 2024-08-08 RX ADMIN — PANTOPRAZOLE SODIUM 40 MG: 40 INJECTION, POWDER, FOR SOLUTION INTRAVENOUS at 00:28

## 2024-08-08 RX ADMIN — INSULIN GLARGINE 10 UNITS: 100 INJECTION, SOLUTION SUBCUTANEOUS at 09:18

## 2024-08-08 RX ADMIN — DULOXETINE HYDROCHLORIDE 30 MG: 30 CAPSULE, DELAYED RELEASE ORAL at 09:18

## 2024-08-08 RX ADMIN — SODIUM CHLORIDE, PRESERVATIVE FREE 5 ML: 5 INJECTION INTRAVENOUS at 09:19

## 2024-08-08 ASSESSMENT — PAIN SCALES - WONG BAKER
WONGBAKER_NUMERICALRESPONSE: NO HURT

## 2024-08-08 NOTE — PROCEDURES
INTERPRETATION:  This 121-minute, computer-assisted video EEG recording is normal.  No potentially epileptiform activity was present during the recording.      CLASSIFICATION:  Normal (awake and drowsy).  Sleep - unsuccessful.  EKG channel.    DESCRIPTION:    BACKGROUND:  The awake recording revealed 9 Hz alpha activity over the posterior head region.  Given the extensive study, the patient still did not sleep.  The EEG showed normal V waves during drowsiness.  There was no significant change on the EEG background with photic stimulation.  Hyperventilation was omitted due to the patient's condition.      INTERICTAL DISCHARGES: None    CLINICAL EVENTS:  None    The EKG channel was unremarkable.

## 2024-08-08 NOTE — DISCHARGE INSTR - COC
Continuity of Care Form    Patient Name: Era Carson   :  1975  MRN:  [id number]    Admit date:  8/3/2024  Discharge date:  ***    Code Status Order: Prior   Advance Directives:     Admitting Physician:  Sarah Lagunas MD  PCP: Brandon Gan MD    Discharging Nurse: ***  Discharging Hospital Unit/Room#: 0225/0225-02  Discharging Unit Phone Number: ***    Emergency Contact:   Extended Emergency Contact Information  Primary Emergency Contact: [place]  [place] [address]  Home Phone: [phone]  Work Phone: [phone]  Mobile Phone: [phone]  Relation: Brother/Sister    Past Surgical History:  Past Surgical History:   Procedure Laterality Date    ANKLE FRACTURE SURGERY Left 13    Dr. Coe    BONE INCISION AND DRAINAGE  2013    left tibia with external fixation device    EYE SURGERY      retina reattachment left eye x 3 holes repairs    EYE SURGERY Right 13    retal detachment    LEG SURGERY Left 3/31/14    ORIF left fibula and tibia    [place] EGD FLEXIBLE FOREIGN BODY REMOVAL N/A 2018    EGD FOREIGN BODY REMOVAL performed by Grady Vilchis MD at [facility] ENDOSCOPY    TIBIA FRACTURE SURGERY      with external device inplace    UPPER GASTROINTESTINAL ENDOSCOPY N/A 2018    EGD BIOPSY performed by Grady Vilchis MD at [facility] ENDOSCOPY    UPPER GASTROINTESTINAL ENDOSCOPY N/A 2021    EGD BIOPSY performed by Chen Marshall MD at [facility] ENDOSCOPY       Immunization History:   Immunization History   Administered Date(s) Administered    Hepatitis B (Engerix-B) 10/12/2016    Hepatitis B Ped/Adol (Recombivax HB) 2017    INFLUENZA, INTRADERMAL, QUADRIVALENT, PRESERVATIVE FREE 10/12/2016    Influenza Virus Vaccine 2015    Influenza, AFLURIA (age 3 yrs+), FLUZONE, (age 6 mo+), MDV, 0.5mL 2017    Influenza, FLUAD, (age 65 y+), Adjuvanted, 0.5mL 2022    Influenza, FLUCELVAX, (age 6 mo+), MDCK, PF, 0.5mL 2024    Pneumococcal, PPSV23, PNEUMOVAX 23,  DME Yes amt example:662211980}  Last Modified Barium Swallow with Video (Video Swallowing Test): {Done Not Done Date:}    Treatments at the Time of Hospital Discharge:   Respiratory Treatments: ***  Oxygen Therapy:  {Therapy; copd oxygen:64132}  Ventilator:    {[facility] Vent List:987028458}    Rehab Therapies: {THERAPEUTIC INTERVENTION:8750433707}  Weight Bearing Status/Restrictions: {[facility] Weight Bearin}  Other Medical Equipment (for information only, NOT a DME order):  {EQUIPMENT:116627549}  Other Treatments: ***    Patient's personal belongings (please select all that are sent with patient):  {[facility] DME Belongings:066024054}    RN SIGNATURE:  {Esignature:696516931}    CASE MANAGEMENT/SOCIAL WORK SECTION    Inpatient Status Date: ***    Readmission Risk Assessment Score:  Readmission Risk              Risk of Unplanned Readmission:  23           Discharging to Facility/ Agency   Name:   Address:  Phone:  Fax:    Dialysis Facility (if applicable)   Name:  Address:  Dialysis Schedule:  Phone:  Fax:    / signature: {Esignature:125563366}    PHYSICIAN SECTION    Prognosis: {Prognosis:1444575725}    Condition at Discharge: { Patient Condition:562105418}    Rehab Potential (if transferring to Rehab): {Prognosis:7002460469}    Recommended Labs or Other Treatments After Discharge: ***    Physician Certification: I certify the above information and transfer of Era Carson  is necessary for the continuing treatment of the diagnosis listed and that he requires {Admit to Appropriate Level of Care:30795} for {GREATER/LESS:939400978} 30 days.     Update Admission H&P: {[facility] DME Changes in HandP:350442796}    PHYSICIAN SIGNATURE:  {Esignature:831147843}

## 2024-08-08 NOTE — DISCHARGE INSTRUCTIONS
Neurology Instructions:  Continue Depakote ER 500mg daily. Increase to 500mg two times daily on 8/15/24 then increase to 500mg in the morning and 1000mg nightly on 8/22/24 (goal dose).    Follow up with PCP and Neurology   Having a seizure while driving puts that patient at risk for injuring themselves, and/or others. If a patient drives while having uncontrolled seizures, they may be held liable for injuries to others. No driving until you have been spell free for at least 3 months and have had follow-up with a neurologist.  In other states and countries the driving limitation may be longer and you must check with applicable local laws prior to travel. Injury Risk: Please avoid working from tall heights, swimming alone or taking baths in a bathtub, use showers only.       Workplace  Each job presents its own set of challenges if you experience seizures. Office jobs may not require as many precautions as factory jobs. But no matter where you work, these tips may help:    Tell your manager, , and workplace nurse about your condition. Let them know how they can help if you have a seizure.  Inform a trusted colleague of your condition so they can help care for you in the event of a seizure. It may also help to designate an emergency contact, such as your spouse, who they can call for support.  Use caution while completing workplace responsibilities. For example, wear appropriate protective gear at all times and avoid jobs that put you in contact with open flames or heat sources, such as welding torches.    Stay safe during exercise  Physical activity and exercise are very important to your overall health. You can maintain an active lifestyle, even if you have seizures. Just be sure to implement the following precautions:    Wear a medical alert bracelet. If you have a seizure in an unfamiliar place, a medical alert bracelet can help emergency responders identify your condition and treat  you appropriately.  Introduce yourself to the staff at your local gym, recreation center, or pool. Let them know about your condition and explain how they can help in the event of a seizure.  Only swim in bodies of water that are attended by a . If no  is present, swim with a buddy who can help you during a seizure.    Talk to your doctor before beginning any contact sports. They may encourage you to avoid certain sports or wear appropriate safety gear, such as a helmet and protective pads.  Wear a helmet while biking, skiing, horseback riding, hiking, or participating in other activities where you could fall and hit your head.  Be aware of the risks. Consider avoiding activities that could be dangerous to you or another person if you were to have a moment of inattentiveness during a seizure.    If you have epilepsy or another condition that can cause seizures, ask your doctor to help you develop an appropriate treatment plan. They may recommend medications, surgery, dietary changes, or other strategies to reduce the frequency or severity of your seizures.    It’s also important to lower your risk of injury during a seizure. Take steps to minimize hazards, such as glass and sources of heat, in your home, work, exercise, and school environments. Let supervisors, staff, and trusted peers know about your condition and how they can help if you have a seizure. And adjust your habits as needed to keep yourself safe.

## 2024-08-08 NOTE — CARE COORDINATION
Plan continues dc to home today with pt's sister,Neeru, and she will provide transport to home today. Pt declines home care at this time.     CM delivered 2nd IMM delivered within 4 hours for DC, verbal explanation of patient rights at bedside. Pt voiced understanding of discharge MCR rights and is agreeable to discharge.  Discharge timeout done at the bedside with JULIA Stanley. All discharge needs and concerns addressed.

## 2024-08-08 NOTE — PROGRESS NOTES
Occupational Therapy    Pt screened in room with RN and sister present.  Pt able to don LE clothing with supervision and extended time due to decreased vision.  Pt ambulated into bathroom with walking stick and SBA to ensure safety due to decreased vision.  Pt demonstrated mild losses of balance but self-corrected.  Pt and sister report that pt is functioning at [facility] and decline home health services.  No further therapy services recommended in acute setting.    Hannah Zamorano, OTR/L #[id number]

## 2024-08-08 NOTE — PLAN OF CARE
Problem: Discharge Planning  Goal: Discharge to home or other facility with appropriate resources  8/8/2024 1519 by Margo Vicente RN  Outcome: Adequate for Discharge     Problem: Pain  Goal: Verbalizes/displays adequate comfort level or baseline comfort level  8/8/2024 1519 by Margo Vicente RN  Outcome: Adequate for Discharge     Problem: Safety - Adult  Goal: Free from fall injury  8/8/2024 1519 by Margo Vicente RN  Outcome: Adequate for Discharge     Problem: Safety - Medical Restraint  Goal: Remains free of injury from restraints (Restraint for Interference with Medical Device)  Description: INTERVENTIONS:  1. Determine that other, less restrictive measures have been tried or would not be effective before applying the restraint  2. Evaluate the patient's condition at the time of restraint application  3. Inform patient/family regarding the reason for restraint  4. Q2H: Monitor safety, psychosocial status, comfort, nutrition and hydration  8/8/2024 1519 by Margo Vicente RN  Outcome: Adequate for Discharge     Problem: Skin/Tissue Integrity  Goal: Absence of new skin breakdown  Description: 1.  Monitor for areas of redness and/or skin breakdown  2.  Assess vascular access sites hourly  3.  Every 4-6 hours minimum:  Change oxygen saturation probe site  4.  Every 4-6 hours:  If on nasal continuous positive airway pressure, respiratory therapy assess nares and determine need for appliance change or resting period.  8/8/2024 1519 by Margo Vicente RN  Outcome: Adequate for Discharge     Problem: Chronic Conditions and Co-morbidities  Goal: Patient's chronic conditions and co-morbidity symptoms are monitored and maintained or improved  8/8/2024 1519 by Margo Vicente RN  Outcome: Adequate for Discharge

## 2024-08-08 NOTE — PLAN OF CARE
Problem: Discharge Planning  Goal: Discharge to home or other facility with appropriate resources  8/8/2024 1248 by Margo Vicente RN  Outcome: Progressing     Problem: Pain  Goal: Verbalizes/displays adequate comfort level or baseline comfort level  8/8/2024 1248 by Margo Vicente RN  Outcome: Progressing     Problem: Safety - Adult  Goal: Free from fall injury  8/8/2024 1248 by Margo Vicente RN  Outcome: Progressing     Problem: Safety - Medical Restraint  Goal: Remains free of injury from restraints (Restraint for Interference with Medical Device)  Description: INTERVENTIONS:  1. Determine that other, less restrictive measures have been tried or would not be effective before applying the restraint  2. Evaluate the patient's condition at the time of restraint application  3. Inform patient/family regarding the reason for restraint  4. Q2H: Monitor safety, psychosocial status, comfort, nutrition and hydration  Outcome: Progressing     Problem: Skin/Tissue Integrity  Goal: Absence of new skin breakdown  Description: 1.  Monitor for areas of redness and/or skin breakdown  2.  Assess vascular access sites hourly  3.  Every 4-6 hours minimum:  Change oxygen saturation probe site  4.  Every 4-6 hours:  If on nasal continuous positive airway pressure, respiratory therapy assess nares and determine need for appliance change or resting period.  8/8/2024 1248 by Margo Vicente RN  Outcome: Progressing     Problem: Chronic Conditions and Co-morbidities  Goal: Patient's chronic conditions and co-morbidity symptoms are monitored and maintained or improved  8/8/2024 1248 by Margo Vicente RN  Outcome: Progressing

## 2024-08-08 NOTE — PROGRESS NOTES
P Pulmonary, Critical Care and Sleep Specialists                                 Pulmonary Consult /Progress Note :                                                                  CC follow on altered mental    Subjective    Confusion seems a lot better  Now awake and alert  MRI/LP was ok   Not eating well  On   PHYSICAL EXAM:    Blood pressure (!) 155/76, pulse 70, temperature 98 °F (36.7 °C), temperature source Oral, resp. rate 16, height 1.88 m (6' 2\"), weight 93.9 kg (207 lb 1.6 oz), SpO2 100 %.' on RA  Gen: No distress.   Eyes: PERRL. No sclera icterus. No conjunctival injection.   ENT: No discharge. Pharynx clear.   Neck: Trachea midline. No obvious mass.    Resp: No accessory muscle use. No crackles. No wheezes. No rhonchi. No dullness on percussion.  CV: Regular rate. Regular rhythm. No murmur or rub. No edema.   GI: + tender. Mildly distended. No hernia.   Skin: Warm and dry. No nodule on exposed extremities.   Lymph: No cervical LAD. No supraclavicular LAD.   M/S: No cyanosis. No joint deformity. No clubbing.   Neuro: Awake. Alert. Moves all four extremities.   Psych: Oriented x 1. No anxiety.     LABS:  CBC:   Recent Labs     08/06/24  0439 08/07/24  0654   WBC 7.2 6.3   HGB 11.3* 11.4*   HCT 34.0* 35.0*   MCV 77.0* 77.8*    385       BMP:   Recent Labs     08/06/24  0439 08/07/24  0653    133*   K 2.9* 3.3*    100   CO2 19* 18*   BUN 11 12   CREATININE 1.2 1.3       LIVER PROFILE:   Recent Labs     08/06/24  0439   AST 14*   ALT 10   BILITOT 0.4   ALKPHOS 79       PT/INR: No results for input(s): \"PROTIME\", \"INR\" in the last 72 hours.  APTT: No results for input(s): \"APTT\" in the last 72 hours.  UA:  No results for input(s): \"NITRITE\", \"COLORU\", \"PHUR\", \"LABCAST\", \"WBCUA\", \"RBCUA\", \"MUCUS\", \"TRICHOMONAS\", \"YEAST\", \"BACTERIA\", \"CLARITYU\", \"SPECGRAV\", \"LEUKOCYTESUR\", \"UROBILINOGEN\", \"BILIRUBINUR\", \"BLOODU\", \"GLUCOSEU\", \"AMORPHOUS\" in

## 2024-08-08 NOTE — PROGRESS NOTES
Pt alert and oriented slow to respond. Repositioned set up tray powers cath secure device replaced.

## 2024-08-08 NOTE — CONSULTS
Neurology Follow-up  [facility] Neurology  Jae Tran MD    Date of Service: 8/8/2024    Subjective:   CC: Follow up today regarding: seizures, encephalopathy, bizarre behavior    Events noted. Chart and lab reviewed. He has been afebrile off antimicrobials. EEG yesterday was normal (encephalopathy resolved). He reports that he did not get much sleep last night so is very sleepy this morning. He does not recall any spells of loss of awareness leading up to admission. He does not recall events leading up to admission. He recalls seeing me in his room yesterday. He denies taking Topamax prior to admission.     ROS : A 10-12 system review obtained and updated today and is unremarkable except as mentioned  in my interval history.     Medication, past medical history, social history, and family history reviewed.    Physical Examination  CONSTITUTIONAL:   Vitals:    08/07/24 2110 08/08/24 0328 08/08/24 0633 08/08/24 0815   BP: 135/61 (!) 141/68  (!) 155/76   Pulse: 77 65  70   Resp: 17 17  16   Temp: 98.1 °F (36.7 °C) 97.8 °F (36.6 °C)  98 °F (36.7 °C)   TempSrc: Oral Oral  Oral   SpO2: 98% 98%  100%   Weight:   93.9 kg (207 lb 1.6 oz)    Height:          General appearance: The patient is normocephalic and normomorphic.     EYES: funduscopic exam: not tested    CARDIOVASCULAR: not tested    MUSCULOSKELETAL: Normal muscle bulk and tone. Moving BUE and BLE symmetric at least greater than antigravity. No pronator drift. Station and gait not tested.     NEUROLOGICAL EXAMINATION:   Mental Status:   Drowsy. Oriented to time, place, and person.   Recent memory normal. Remote memory normal. Immediate recall 3/3. Delayed recall 2/3 recovers 1/1 with category cue.   Attention span and concentration Impaired.   Language expression, naming, and comprehension normal.   Fund of knowledge normal.     Cranial Nerves:  II: Visual fields were normal to confrontation and visual acuity was good for face to face and TV distance.    III, IV, VI: Ocular motility was full and there was no nystagmus.    V: Jaw opening was symmetric.    VII: Facial strength symmetrically intact and the speech was clear.   VIII: Hearing acuity was normal.    IX, X: Palate and cough were normal.  XI: not tested     Sensation normal to light touch in bilateral upper and lower extremities.     Deep tendon reflexes: 2/4 symmetric biceps, 0/4 symmetric patella.     Coordination: No tremor, myoclonus observed. Finger nose finger testing accurate. No bradykinesia.     Data:  LABS:   Lab Results   Component Value Date/Time     08/07/2024 06:53 AM    K 3.3 08/07/2024 06:53 AM     08/07/2024 06:53 AM    CO2 18 08/07/2024 06:53 AM    BUN 12 08/07/2024 06:53 AM    CREATININE 1.3 08/07/2024 06:53 AM    GFRAA >60 05/14/2022 09:50 PM    GFRAA >60 05/14/2013 11:27 AM    LABGLOM 68 08/07/2024 06:53 AM    LABGLOM 53 02/05/2024 05:51 AM    GLUCOSE 337 08/07/2024 06:53 AM    PHOS 4.0 08/03/2024 08:35 AM    MG 1.80 08/07/2024 06:53 AM    CALCIUM 8.3 08/07/2024 06:53 AM     Lab Results   Component Value Date/Time    WBC 6.3 08/07/2024 06:54 AM    RBC 4.49 08/07/2024 06:54 AM    HGB 11.4 08/07/2024 06:54 AM    HCT 35.0 08/07/2024 06:54 AM    MCV 77.8 08/07/2024 06:54 AM    RDW 17.7 08/07/2024 06:54 AM     08/07/2024 06:54 AM   No results found for: \"INR\", \"PROTIME\"    Medical decision making:    A. Problems (any 1)    High:    [] Acute/Chronic Illness/injury posing threat to life or bodily function:    [] Severe exacerbation of chronic illness:      Moderate:    [x]     1 or more chronic illness with exacerbation, progression or side effect of treatment or  []     2 or more stable chronic illnesses or  []     1 acute illness with systemic symptoms     ---------------------------------------------------------------------  B. Risk of Treatment (any 1)   [] Drugs/treatments that require intensive monitoring for toxicity include:    [] Change in code status:    []

## 2024-08-08 NOTE — DISCHARGE SUMMARY
Hospital Medicine Discharge Summary    Patient: Era Carson     Gender: male  : 1975   Age: 48 y.o.  MRN: [id number]    Admitting Physician: Sarah Lagunas MD  Discharge Physician: Gisella Worthington,     Code Status: Prior     Admit Date: 8/3/2024   Discharge Date: 2024      Discharge Diagnoses:    Active Hospital Problems    Diagnosis Date Noted    Nonintractable epilepsy without status epilepticus (HCC) [G40.909] 2024    Hypokalemia [E87.6] 2024    Electrolyte disorder [E87.8] 2024    Agitation [R45.1] 2024    Abdominal pain [R10.9] 2024    Sepsis (HCC) [A41.9] 2024    Acute metabolic encephalopathy [G93.41] 2024    Polyneuropathy due to type 1 diabetes mellitus (HCC) [E10.42] 2018    HTN (hypertension) [I10] 2013    Type 1 diabetes mellitus with stage 3 chronic kidney disease (HCC) [E10.22, N18.30] 2013       Condition at Discharge: Stable    Hospital Course:     47 yo  male with diabetes mellitus type 2, history of DVT, on neuropathy, chronic pain, chronic migraine, recent admission 2024 for DKA,  brought to ED for altered mental status.     Sepsis   Blood cultures ordered in ER  Ordered broad-spectrum antibiotics-vancomycin, cefepime, changed to cefepime and Flagyl.  Possible intra-abdominal source.  procalcitonin normal lactic acid normal  Patient meets SIRS  criteria - high fevers, tachycardic and tachypneic. With altered mentation .  No clear source. UA, CXR neg   concern for possible aspiration pneumonia as he was found to have emesis and fallen down. .  No obvious pneumonia seen.  continue broad-spectrum antibiotics vancomycin and cefepime   follow-up on cultures negative so far  Fever resolved  CK level was normal  Sister also told the nurse that he has had history of osteomyelitis of tibia and was supposed to be on antibiotics was not taking it.  All cultures negative   Not on any abx now      Acute metabolic

## 2024-08-09 ENCOUNTER — CARE COORDINATION (OUTPATIENT)
Dept: CASE MANAGEMENT | Age: 49
End: 2024-08-09

## 2024-08-09 LAB
HSV1 DNA CSF QL NAA+PROBE: NOT DETECTED
HSV2 DNA CSF QL NAA+PROBE: NOT DETECTED
SPECIMEN SOURCE: NORMAL
WNV IGG CSF-ACNC: 0.08 IV
WNV IGM CSF-ACNC: 0 IV

## 2024-08-09 NOTE — CARE COORDINATION
Care Transitions Note    Initial Call - Call within 2 business days of discharge: Yes    Attempted to reach patient for transitions of care follow up. Unable to reach patient.    Outreach Attempts:   Unable to leave message.   Mailbox is full.    Patient: Era Carson  Patient : 1975   MRN: [id number]    Reason for Admission: 5 days -> sepsis, acute metabolic encephalopathy, IDDM with DKA prior admission, hx migraine, hx diabetic neuropathy, hx chronic pain, CKD3, hx acute right internal jugular DVT, possible urinary retention -> home no services, f/up uro, f/up neuro Dr Tran 6-8 weeks  Discharge Date: 24  RURS: Readmission Risk Score: 14.4    Last Discharge Facility       Date Complaint Diagnosis Description Type Department Provider    8/3/24 Hyperglycemia Acute metabolic encephalopathy ... ED to Hosp-Admission (Discharged) (ADMITTED) [facility] [place] Sarah Lagunas MD; Elisha Pérez...     Was this an external facility discharge? No    Follow Up Appointment:   Patient does not have a follow up appointment scheduled at time of call.     Future Appointments         Provider Specialty Dept Phone    8/15/2024 1:15 PM Kyle Ramires MD Pain Management [phone]    10/1/2024 3:40 PM Bo Mane MD Endocrinology [phone]    2024 3:40 PM Jose Drake MD Infectious Diseases [phone]          Plan for follow-up on next business day.      Daysi Yan RN

## 2024-08-11 LAB
MISCELLANEOUS LAB TEST ORDER: NORMAL
MISCELLANEOUS LAB TEST RESULT: NORMAL

## 2024-08-12 ENCOUNTER — CARE COORDINATION (OUTPATIENT)
Dept: CASE MANAGEMENT | Age: 49
End: 2024-08-12

## 2024-08-12 NOTE — CARE COORDINATION
Care Transitions Note    Initial Call - Call within 2 business days of discharge: Yes    Attempted to reach patient for transitions of care follow up. Unable to reach patient.    Outreach Attempts:   Unable to leave message.   Mailbox is full.     Patient: Era Carson    Patient : 1975   MRN: [id number]    Reason for Admission: 5 days -> sepsis, acute metabolic encephalopathy, IDDM with DKA prior admission, hx migraine, hx diabetic neuropathy, hx chronic pain, CKD3, hx acute right internal jugular DVT, possible urinary retention -> home no services, f/up uro, f/up neuro Dr Tran 6-8 weeks   Discharge Date: 24  RURS: Readmission Risk Score: 14.4    Last Discharge Facility       Date Complaint Diagnosis Description Type Department Provider    8/3/24 Hyperglycemia Acute metabolic encephalopathy ... ED to Hosp-Admission (Discharged) (ADMITTED) [facility] [place] Sarah Lagunas MD; Elisha Pérez...     Was this an external facility discharge? No    Follow Up Appointment:   Patient does not have a follow up appointment scheduled at time of call.  Routing to PCP office pool for assistance.   Future Appointments         Provider Specialty Dept Phone    8/15/2024 1:15 PM Kyle Ramires MD Pain Management [phone]    10/1/2024 3:40 PM Bo Mane MD Endocrinology [phone]    2024 3:40 PM Jose Drake MD Infectious Diseases [phone]          Era Carson closed (unable to reach) from the Care Transition program on 24.     Daysi Yan RN

## 2024-08-13 LAB
BACTERIA CSF CULT: NORMAL
GRAM STN SPEC: NORMAL

## 2024-08-14 ENCOUNTER — TELEPHONE (OUTPATIENT)
Dept: PAIN MANAGEMENT | Age: 49
End: 2024-08-14

## 2024-08-14 ENCOUNTER — TELEPHONE (OUTPATIENT)
Dept: INTERNAL MEDICINE CLINIC | Age: 49
End: 2024-08-14

## 2024-08-14 NOTE — TELEPHONE ENCOUNTER
Pt states being in the hospital, the dr on call stated to not take medication until he comes in to see RSM, which is tomorrow, he wants to know if he can take it.     Please advise

## 2024-08-14 NOTE — TELEPHONE ENCOUNTER
[facility] Transitions Initial Follow Up Call    Outreach made within 2 business days of discharge: Yes    Patient: Era Carson Patient : 1975   MRN: [id number]  Reason for Admission: UNKNOWN  Discharge Date: 24       Spoke with: NO ONE    Discharge department/facility: [facility]    Scheduled appointment with PCP within 7-14 days    Follow Up  Future Appointments   Date Time Provider Department Center   8/15/2024  1:15 PM Kyle Ramires MD [facility]   10/1/2024  3:40 PM Bo Mane MD Kenwo Endo [facility]   2024  3:40 PM Jose Drake MD [facility]       Nelly Lopez MA

## 2024-08-15 ENCOUNTER — HOSPITAL ENCOUNTER (EMERGENCY)
Age: 49
Discharge: HOME | End: 2024-08-15
Attending: EMERGENCY MEDICINE
Payer: MEDICARE

## 2024-08-15 ENCOUNTER — OFFICE VISIT (OUTPATIENT)
Dept: PAIN MANAGEMENT | Age: 49
End: 2024-08-15
Payer: MEDICARE

## 2024-08-15 VITALS
OXYGEN SATURATION: 100 % | BODY MASS INDEX: 25.98 KG/M2 | WEIGHT: 202.4 LBS | SYSTOLIC BLOOD PRESSURE: 176 MMHG | HEART RATE: 92 BPM | HEIGHT: 74 IN | RESPIRATION RATE: 16 BRPM | DIASTOLIC BLOOD PRESSURE: 90 MMHG | TEMPERATURE: 97.6 F

## 2024-08-15 DIAGNOSIS — T40.2X5A CONSTIPATION DUE TO OPIOID THERAPY: ICD-10-CM

## 2024-08-15 DIAGNOSIS — M79.2 NEUROPATHIC PAIN: ICD-10-CM

## 2024-08-15 DIAGNOSIS — M79.18 MYOFASCIAL PAIN: ICD-10-CM

## 2024-08-15 DIAGNOSIS — E16.2 HYPOGLYCEMIA: Primary | ICD-10-CM

## 2024-08-15 DIAGNOSIS — G62.9 PERIPHERAL POLYNEUROPATHY: ICD-10-CM

## 2024-08-15 DIAGNOSIS — E87.6 HYPOKALEMIA: ICD-10-CM

## 2024-08-15 DIAGNOSIS — G43.711 CHRONIC MIGRAINE WITHOUT AURA, WITH INTRACTABLE MIGRAINE, SO STATED, WITH STATUS MIGRAINOSUS: ICD-10-CM

## 2024-08-15 DIAGNOSIS — G89.4 CHRONIC PAIN SYNDROME: ICD-10-CM

## 2024-08-15 DIAGNOSIS — K59.03 CONSTIPATION DUE TO OPIOID THERAPY: ICD-10-CM

## 2024-08-15 DIAGNOSIS — F51.01 PRIMARY INSOMNIA: ICD-10-CM

## 2024-08-15 DIAGNOSIS — U07.1 COVID-19: ICD-10-CM

## 2024-08-15 DIAGNOSIS — F33.41 RECURRENT MAJOR DEPRESSIVE DISORDER, IN PARTIAL REMISSION (HCC): ICD-10-CM

## 2024-08-15 LAB
ALBUMIN SERPL-MCNC: 3.8 G/DL (ref 3.4–5)
ALP SERPL-CCNC: 80 U/L (ref 40–129)
ALT SERPL-CCNC: 10 U/L (ref 10–40)
ANION GAP SERPL CALCULATED.3IONS-SCNC: 15 MMOL/L (ref 3–16)
AST SERPL-CCNC: 19 U/L (ref 15–37)
BASE EXCESS BLDV CALC-SCNC: 0.9 MMOL/L (ref -2–3)
BASOPHILS # BLD: 0 K/UL (ref 0–0.2)
BASOPHILS NFR BLD: 1.3 %
BILIRUB DIRECT SERPL-MCNC: <0.2 MG/DL (ref 0–0.3)
BILIRUB INDIRECT SERPL-MCNC: NORMAL MG/DL (ref 0–1)
BILIRUB SERPL-MCNC: <0.2 MG/DL (ref 0–1)
BUN SERPL-MCNC: 9 MG/DL (ref 7–20)
CALCIUM SERPL-MCNC: 8.6 MG/DL (ref 8.3–10.6)
CHLORIDE SERPL-SCNC: 100 MMOL/L (ref 99–110)
CO2 BLDV-SCNC: 29 MMOL/L
CO2 SERPL-SCNC: 23 MMOL/L (ref 21–32)
COHGB MFR BLDV: 1.2 % (ref 0–1.5)
CREAT SERPL-MCNC: 1.1 MG/DL (ref 0.9–1.3)
DEPRECATED RDW RBC AUTO: 17.9 % (ref 12.4–15.4)
DO-HGB MFR BLDV: 51.8 %
EOSINOPHIL # BLD: 0 K/UL (ref 0–0.6)
EOSINOPHIL NFR BLD: 1 %
FLUAV RNA RESP QL NAA+PROBE: NOT DETECTED
FLUBV RNA RESP QL NAA+PROBE: NOT DETECTED
GFR SERPLBLD CREATININE-BSD FMLA CKD-EPI: 83 ML/MIN/{1.73_M2}
GLUCOSE BLD-MCNC: 128 MG/DL (ref 70–99)
GLUCOSE BLD-MCNC: 129 MG/DL (ref 70–99)
GLUCOSE BLD-MCNC: 78 MG/DL (ref 70–99)
GLUCOSE BLD-MCNC: 83 MG/DL (ref 70–99)
GLUCOSE SERPL-MCNC: 103 MG/DL (ref 70–99)
HCO3 BLDV-SCNC: 27.5 MMOL/L (ref 24–28)
HCT VFR BLD AUTO: 33.6 % (ref 40.5–52.5)
HGB BLD-MCNC: 10.9 G/DL (ref 13.5–17.5)
LIPASE SERPL-CCNC: 10 U/L (ref 13–60)
LYMPHOCYTES # BLD: 0.5 K/UL (ref 1–5.1)
LYMPHOCYTES NFR BLD: 14.9 %
MAGNESIUM SERPL-MCNC: 2.1 MG/DL (ref 1.8–2.4)
MCH RBC QN AUTO: 25.8 PG (ref 26–34)
MCHC RBC AUTO-ENTMCNC: 32.5 G/DL (ref 31–36)
MCV RBC AUTO: 79.5 FL (ref 80–100)
METHGB MFR BLDV: 0.6 % (ref 0–1.5)
MONOCYTES # BLD: 0.4 K/UL (ref 0–1.3)
MONOCYTES NFR BLD: 11.4 %
NEUTROPHILS # BLD: 2.5 K/UL (ref 1.7–7.7)
NEUTROPHILS NFR BLD: 71.4 %
PCO2 BLDV: 51.7 MMHG (ref 41–51)
PERFORMED ON: ABNORMAL
PERFORMED ON: ABNORMAL
PERFORMED ON: NORMAL
PERFORMED ON: NORMAL
PH BLDV: 7.33 [PH] (ref 7.35–7.45)
PLATELET # BLD AUTO: 294 K/UL (ref 135–450)
PMV BLD AUTO: 8 FL (ref 5–10.5)
PO2 BLDV: <30 MMHG (ref 25–40)
POTASSIUM SERPL-SCNC: 2.9 MMOL/L (ref 3.5–5.1)
PROT SERPL-MCNC: 7.4 G/DL (ref 6.4–8.2)
RBC # BLD AUTO: 4.23 M/UL (ref 4.2–5.9)
SAO2 % BLDV: 47 %
SARS-COV-2 RNA RESP QL NAA+PROBE: DETECTED
SODIUM SERPL-SCNC: 138 MMOL/L (ref 136–145)
WBC # BLD AUTO: 3.5 K/UL (ref 4–11)

## 2024-08-15 PROCEDURE — 85025 COMPLETE CBC W/AUTO DIFF WBC: CPT

## 2024-08-15 PROCEDURE — 87636 SARSCOV2 & INF A&B AMP PRB: CPT

## 2024-08-15 PROCEDURE — 83735 ASSAY OF MAGNESIUM: CPT

## 2024-08-15 PROCEDURE — 6360000002 HC RX W HCPCS: Performed by: PHYSICIAN ASSISTANT

## 2024-08-15 PROCEDURE — 6370000000 HC RX 637 (ALT 250 FOR IP): Performed by: PHYSICIAN ASSISTANT

## 2024-08-15 PROCEDURE — 80048 BASIC METABOLIC PNL TOTAL CA: CPT

## 2024-08-15 PROCEDURE — 82803 BLOOD GASES ANY COMBINATION: CPT

## 2024-08-15 PROCEDURE — 80076 HEPATIC FUNCTION PANEL: CPT

## 2024-08-15 PROCEDURE — 99212 OFFICE O/P EST SF 10 MIN: CPT | Performed by: INTERNAL MEDICINE

## 2024-08-15 PROCEDURE — 83690 ASSAY OF LIPASE: CPT

## 2024-08-15 PROCEDURE — 99284 EMERGENCY DEPT VISIT MOD MDM: CPT

## 2024-08-15 RX ORDER — POTASSIUM CHLORIDE 7.45 MG/ML
10 INJECTION INTRAVENOUS
Status: DISCONTINUED | OUTPATIENT
Start: 2024-08-15 | End: 2024-08-15

## 2024-08-15 RX ORDER — POTASSIUM CHLORIDE 20 MEQ/1
40 TABLET, EXTENDED RELEASE ORAL ONCE
Status: COMPLETED | OUTPATIENT
Start: 2024-08-15 | End: 2024-08-15

## 2024-08-15 RX ORDER — DEXTROSE MONOHYDRATE 100 MG/ML
INJECTION, SOLUTION INTRAVENOUS CONTINUOUS PRN
Status: DISCONTINUED | OUTPATIENT
Start: 2024-08-15 | End: 2024-08-15 | Stop reason: HOSPADM

## 2024-08-15 RX ORDER — GLUCAGON 1 MG/ML
1 KIT INJECTION PRN
Status: DISCONTINUED | OUTPATIENT
Start: 2024-08-15 | End: 2024-08-15 | Stop reason: HOSPADM

## 2024-08-15 RX ORDER — ONDANSETRON 2 MG/ML
4 INJECTION INTRAMUSCULAR; INTRAVENOUS ONCE
Status: DISCONTINUED | OUTPATIENT
Start: 2024-08-15 | End: 2024-08-15 | Stop reason: HOSPADM

## 2024-08-15 RX ADMIN — POTASSIUM CHLORIDE 10 MEQ: 7.46 INJECTION, SOLUTION INTRAVENOUS at 16:57

## 2024-08-15 RX ADMIN — POTASSIUM CHLORIDE 40 MEQ: 1500 TABLET, EXTENDED RELEASE ORAL at 16:57

## 2024-08-15 ASSESSMENT — ENCOUNTER SYMPTOMS
COUGH: 0
NAUSEA: 0
SHORTNESS OF BREATH: 0
DIARRHEA: 0
VOMITING: 1
BACK PAIN: 0
ABDOMINAL PAIN: 0

## 2024-08-15 NOTE — ED NOTES
Pt discharged from ED in stable condition. Discharge instruction explain, all question answered. Pt walked to lobby independently.       Iliana Borja, RN  08/15/24 2231

## 2024-08-15 NOTE — ED PROVIDER NOTES
THE [facility]  EMERGENCY DEPARTMENT ENCOUNTER          PHYSICIAN ASSISTANT NOTE       Date of evaluation: 8/15/2024    Chief Complaint     Hypoglycemia (Patient with hypoglycemic episode while at the doctors office. Per EMS, patient received glucose while at the doctor's. Patient's received whole bag of D10 from EMS. Last reading for EMS was 40, currently 129 in the ED)      History of Present Illness     Era Carson is a 48 y.o. male who presents to the ED with hypoglycemia.  Patient presents to the emergency room with a report of hypoglycemic episode while on his pain management appointment this morning.  The patient states that he remembers going to his appointment and telling them he does not feel well, the next and he remembers is being in the emergency room.  He states that he is a very brittle diabetic and takes 15 units of Lantus morning and night along with a sliding scale throughout the day.  He did take his Lantus this morning, however states that he has not had much of anything to eat or drink today.  He does report having 1 episode of emesis this morning.  He states that he was otherwise feeling well when he initially went to his appointment.  He denies any chest pain, shortness of breath, fever, headache, dizziness, or other concerning symptoms.    ASSESSMENT / PLAN  (MEDICAL DECISION MAKING)     INITIAL VITALS: BP: (!) 193/91, Temp: 97.6 °F (36.4 °C), Pulse: 93, Respirations: 12, SpO2: 100 %    Era Carson is a 48 y.o. male who presents to the emerged from with complaints of hypoglycemia.  Patient was hypertensive on presentation, remainder vital signs unremarkable.  Thorough history and physical exam performed.    Patient presents to the emergency room with an episode of hypoglycemia.  He states that he recalls going to his pain management\" this morning and telling them he was not feeling well and the next thing he remembers was being in the hospital.  Per report from nursing staff who  Complexity of Data Reviewed  Labs: ordered.    Risk  OTC drugs.  Prescription drug management.        This patient was also evaluated by the attending physician. All care plans were discussed and agreed upon.    Clinical Impression     1. Hypoglycemia    2. COVID-19        Disposition     PATIENT REFERRED TO:  No follow-up provider specified.    DISCHARGE MEDICATIONS:  New Prescriptions    No medications on file       DISPOSITION  Pending at turnover  Condition at Disposition: Data Unavailable        Diagnostic Results and Other Data     RADIOLOGY:  No orders to display       LABS:   Results for orders placed or performed during the hospital encounter of 08/15/24   COVID-19 & Influenza Combo    Specimen: Nasopharyngeal Swab   Result Value Ref Range    SARS-CoV-2 RNA, RT PCR DETECTED (A) NOT DETECTED    Influenza A NOT DETECTED NOT DETECTED    Influenza B NOT DETECTED NOT DETECTED   CBC with Auto Differential   Result Value Ref Range    WBC 3.5 (L) 4.0 - 11.0 K/uL    RBC 4.23 4.20 - 5.90 M/uL    Hemoglobin 10.9 (L) 13.5 - 17.5 g/dL    Hematocrit 33.6 (L) 40.5 - 52.5 %    MCV 79.5 (L) 80.0 - 100.0 fL    MCH 25.8 (L) 26.0 - 34.0 pg    MCHC 32.5 31.0 - 36.0 g/dL    RDW 17.9 (H) 12.4 - 15.4 %    Platelets 294 135 - 450 K/uL    MPV 8.0 5.0 - 10.5 fL    Neutrophils % 71.4 %    Lymphocytes % 14.9 %    Monocytes % 11.4 %    Eosinophils % 1.0 %    Basophils % 1.3 %    Neutrophils Absolute 2.5 1.7 - 7.7 K/uL    Lymphocytes Absolute 0.5 (L) 1.0 - 5.1 K/uL    Monocytes Absolute 0.4 0.0 - 1.3 K/uL    Eosinophils Absolute 0.0 0.0 - 0.6 K/uL    Basophils Absolute 0.0 0.0 - 0.2 K/uL   BMP w/ Reflex to MG   Result Value Ref Range    Sodium 138 136 - 145 mmol/L    Potassium reflex Magnesium 2.9 (LL) 3.5 - 5.1 mmol/L    Chloride 100 99 - 110 mmol/L    CO2 23 21 - 32 mmol/L    Anion Gap 15 3 - 16    Glucose 103 (H) 70 - 99 mg/dL    BUN 9 7 - 20 mg/dL    Creatinine 1.1 0.9 - 1.3 mg/dL    Est, Glom Filt Rate 83 >60    Calcium 8.6 8.3 -

## 2024-08-15 NOTE — DISCHARGE INSTRUCTIONS
You were seen in the ED for low blood sugar. Your blood sugar stabilized after you ate a meal. Your labs revealed low potassium and you were given a potassium supplement here. Plan to increase your oral intake of potassium rich foods, such as bananas, potatoes, leafy greens. See hand out. Your workup also revealed you tested positive for COVID-19. Treat your symptoms supportively at home. Increase your overall oral intake and check your blood sugars regularly to avoid further hypoglycemic episodes.    Follow up with primary care. Call to schedule a follow up appointment.    Return to the ED with new or worsening symptoms.

## 2024-08-15 NOTE — TELEPHONE ENCOUNTER
Called and LM for patient to call us back, please ask patient what medications is he talking from the Doctor on call so I can relay the message to RSM.

## 2024-08-15 NOTE — ED PROVIDER NOTES
THE [facility]  EMERGENCY DEPARTMENT ENCOUNTER          PHYSICIAN ASSISTANT NOTE     Date of evaluation: 8/15/2024    ADDENDUM:      Care of this patient was assumed from TAYLOR Barnes.  The patient was seen for Hypoglycemia (Patient with hypoglycemic episode while at the doctors office. Per EMS, patient received glucose while at the doctor's. Patient's received whole bag of D10 from EMS. Last reading for EMS was 40, currently 129 in the ED)  .  The patient's initial evaluation and plan have been discussed with the prior provider who initially evaluated the patient.  Nursing Notes, Past Medical Hx, Past Surgical Hx, Social Hx, Allergies, and Family Hx were all reviewed.    ASSESSMENT / PLAN  (MEDICAL DECISION MAKING)     Era Carson is a 48 y.o. male with PMH of diabetes who presented with hypoglycemia. Patient has had a couple days of nausea and decreased PO intake. Has still been taking his Lantus nightly. Today, vomited x 1. Was at pain management office appt today and pale, diaphoretic, BG found to be in the 40's. Patient does not recall. Given D10. Improved by time of ED arrival. BG on arrival 129.  Workup here notable for COVID+, hypokalemia of 2.9.  Patient given 40 mEq of oral potassium.  Initial plan was to give run of IV potassium; however, patient unable to tolerate.  Patient given p.o. intake and had several stable repeat blood sugars. Patient offered Paxlovid for COVID-19 but declined. Given instructions on supportive care. Patient given instructions to increase oral intake, increase intake of potassium rich foods, follow up with PCP. Return precautions given.    Is this patient to be included in the SEP-1 core measure? No Exclusion criteria - the patient is NOT to be included for SEP-1 Core Measure due to: Viral etiology found or highly suspected (including COVID-19) without concomitant bacterial infection    Medical Decision Making  Problems Addressed:  COVID-19: acute illness or  injury  Hypoglycemia: acute illness or injury  Hypokalemia: acute illness or injury    Amount and/or Complexity of Data Reviewed  Labs:  Decision-making details documented in ED Course.    Risk  OTC drugs.  Prescription drug management.      This patient was also evaluated by the attending physician. All care plans were discussed and agreed upon.    Clinical Impression     1. Hypoglycemia    2. COVID-19    3. Hypokalemia        Disposition     PATIENT REFERRED TO:  No follow-up provider specified.    DISCHARGE MEDICATIONS:  Discharge Medication List as of 8/15/2024  7:07 PM          DISPOSITION Decision To Discharge 08/15/2024 06:57:22 PM  Condition at Disposition: Stable        Diagnostic Results and Other Data     RADIOLOGY:  No orders to display       LABS:   Results for orders placed or performed during the hospital encounter of 08/15/24   COVID-19 & Influenza Combo    Specimen: Nasopharyngeal Swab   Result Value Ref Range    SARS-CoV-2 RNA, RT PCR DETECTED (A) NOT DETECTED    Influenza A NOT DETECTED NOT DETECTED    Influenza B NOT DETECTED NOT DETECTED   CBC with Auto Differential   Result Value Ref Range    WBC 3.5 (L) 4.0 - 11.0 K/uL    RBC 4.23 4.20 - 5.90 M/uL    Hemoglobin 10.9 (L) 13.5 - 17.5 g/dL    Hematocrit 33.6 (L) 40.5 - 52.5 %    MCV 79.5 (L) 80.0 - 100.0 fL    MCH 25.8 (L) 26.0 - 34.0 pg    MCHC 32.5 31.0 - 36.0 g/dL    RDW 17.9 (H) 12.4 - 15.4 %    Platelets 294 135 - 450 K/uL    MPV 8.0 5.0 - 10.5 fL    Neutrophils % 71.4 %    Lymphocytes % 14.9 %    Monocytes % 11.4 %    Eosinophils % 1.0 %    Basophils % 1.3 %    Neutrophils Absolute 2.5 1.7 - 7.7 K/uL    Lymphocytes Absolute 0.5 (L) 1.0 - 5.1 K/uL    Monocytes Absolute 0.4 0.0 - 1.3 K/uL    Eosinophils Absolute 0.0 0.0 - 0.6 K/uL    Basophils Absolute 0.0 0.0 - 0.2 K/uL   BMP w/ Reflex to MG   Result Value Ref Range    Sodium 138 136 - 145 mmol/L    Potassium reflex Magnesium 2.9 (LL) 3.5 - 5.1 mmol/L    Chloride 100 99 - 110 mmol/L    CO2

## 2024-08-15 NOTE — ED PROVIDER NOTES
ED Attending Attestation Note     Date of evaluation: 8/15/2024    This patient was seen by the advance practice provider.  I have seen and examined the patient, agree with the workup, evaluation, management and diagnosis. The care plan has been discussed.  I have reviewed the ECG and concur with the SOCORRO's interpretation.  My assessment reveals adult male who presents with altered mental status this morning, resolved after getting D10 administered by EMS for low blood sugar.  He reports he did take his insulin this morning but has not been eating or drinking today because he is felt nauseous, has been nauseous with vomiting for the last several days.  He denies any abdominal pain.  He is currently alert and oriented, says he feels much better than he did before, is not currently feeling nauseated, not having any pain.  Will obtain basic labs, give sugar containing juices by mouth and attempt to orally supplemented him, search for any other cause of hypoglycemia, but at this point decreased p.o. intake due to the nausea seems most likely..       Main Gimenez MD  08/15/24 9507

## 2024-08-19 ENCOUNTER — CARE COORDINATION (OUTPATIENT)
Dept: PRIMARY CARE CLINIC | Age: 49
End: 2024-08-19

## 2024-08-20 ENCOUNTER — TELEPHONE (OUTPATIENT)
Dept: PAIN MANAGEMENT | Age: 49
End: 2024-08-20

## 2024-08-21 NOTE — TELEPHONE ENCOUNTER
Per RSM patient can be seen on Friday 8/23/2024.     Called patient to advise him that he can be seen on 8/23/2024, patient did not answer, LVM to call the office back.     Please schedule patient when he calls back.

## 2024-08-23 ENCOUNTER — OFFICE VISIT (OUTPATIENT)
Dept: PAIN MANAGEMENT | Age: 49
End: 2024-08-23
Payer: MEDICARE

## 2024-08-23 VITALS
OXYGEN SATURATION: 100 % | SYSTOLIC BLOOD PRESSURE: 138 MMHG | WEIGHT: 197.4 LBS | HEART RATE: 87 BPM | BODY MASS INDEX: 25.34 KG/M2 | DIASTOLIC BLOOD PRESSURE: 79 MMHG

## 2024-08-23 DIAGNOSIS — K59.03 CONSTIPATION DUE TO OPIOID THERAPY: ICD-10-CM

## 2024-08-23 DIAGNOSIS — G43.711 CHRONIC MIGRAINE WITHOUT AURA, WITH INTRACTABLE MIGRAINE, SO STATED, WITH STATUS MIGRAINOSUS: ICD-10-CM

## 2024-08-23 DIAGNOSIS — M79.18 MYOFASCIAL PAIN: ICD-10-CM

## 2024-08-23 DIAGNOSIS — G62.9 PERIPHERAL POLYNEUROPATHY: ICD-10-CM

## 2024-08-23 DIAGNOSIS — F51.01 PRIMARY INSOMNIA: ICD-10-CM

## 2024-08-23 DIAGNOSIS — T40.2X5A CONSTIPATION DUE TO OPIOID THERAPY: ICD-10-CM

## 2024-08-23 DIAGNOSIS — G63 POLYNEUROPATHY ASSOCIATED WITH UNDERLYING DISEASE (HCC): ICD-10-CM

## 2024-08-23 DIAGNOSIS — G89.4 CHRONIC PAIN SYNDROME: ICD-10-CM

## 2024-08-23 DIAGNOSIS — Z51.81 ENCOUNTER FOR THERAPEUTIC DRUG MONITORING: ICD-10-CM

## 2024-08-23 DIAGNOSIS — L97.522 ULCER OF LEFT FOOT, WITH FAT LAYER EXPOSED (HCC): ICD-10-CM

## 2024-08-23 DIAGNOSIS — M80.862S PATHOLOGICAL FRACTURE OF LEFT TIBIA DUE TO OTHER OSTEOPOROSIS, SEQUELA: ICD-10-CM

## 2024-08-23 DIAGNOSIS — F33.41 RECURRENT MAJOR DEPRESSIVE DISORDER, IN PARTIAL REMISSION (HCC): ICD-10-CM

## 2024-08-23 DIAGNOSIS — M79.2 NEUROPATHIC PAIN: ICD-10-CM

## 2024-08-23 PROCEDURE — 3075F SYST BP GE 130 - 139MM HG: CPT | Performed by: INTERNAL MEDICINE

## 2024-08-23 PROCEDURE — 3078F DIAST BP <80 MM HG: CPT | Performed by: INTERNAL MEDICINE

## 2024-08-23 PROCEDURE — 99214 OFFICE O/P EST MOD 30 MIN: CPT | Performed by: INTERNAL MEDICINE

## 2024-08-23 RX ORDER — TRAMADOL HYDROCHLORIDE 50 MG/1
50 TABLET ORAL 2 TIMES DAILY PRN
Qty: 56 TABLET | Refills: 0 | Status: SHIPPED | OUTPATIENT
Start: 2024-08-23 | End: 2024-09-20

## 2024-08-23 NOTE — PROGRESS NOTES
0.4 mL 3    promethazine (PHENERGAN) 12.5 MG tablet TAKE ONE TABLET BY MOUTH THREE TIMES A DAY BEFORE A MEAL AS NEEDED FOR NAUSEA 18 tablet 0    LANTUS 100 UNIT/ML injection vial Inject 15 Units into the skin nightly INJECT 15 UNITS UNDER THE SKIN twice daily before breakfast and nightly. Hold nightly dose if PM BG<90 30 mL 3    pantoprazole (PROTONIX) 40 MG tablet Take 1 tablet by mouth 2 times daily 60 tablet 1    Multiple Vitamin (DAILY KITTY) TABS TAKE ONE TABLET BY MOUTH DAILY 30 tablet 5    Cholecalciferol (VITAMIN D3) 5000 units CAPS Take 1 capsule by mouth Daily 30 capsule 3    omega-3 acid ethyl esters (LOVAZA) 1 G capsule TAKE TWO CAPSULES BY MOUTH TWICE DAILY 120 capsule 5    Probiotic Product (ACIDOPHILUS PROBIOTIC) CAPS capsule TAKE ONE CAPSULE BY MOUTH DAILY 28 capsule 4    polyvinyl alcohol (LIQUIFILM TEARS) 1.4 % ophthalmic solution 1 drop as needed      Flaxseed, Linseed, (FLAX PO) Take 14 g by mouth daily as needed       DULoxetine (CYMBALTA) 30 MG extended release capsule Take 1 capsule by mouth daily 30 capsule 1     No facility-administered medications prior to visit.       REVIEW OF SYSTEMS: .   Respiratory: Negative for shortness of breath.    Cardiovascular: Negative for chest pain, palpitations  Gastrointestinal: Negative for blood in stool, abdominal distention, nausea, vomiting, abdominal pain, diarrhea,constipation.  Neurological: Negative for speech difficulty, weakness and light-headedness, dizziness, tremors, sleepiness  Psychiatric/Behavioral: Negative for suicidal ideas, hallucinations, behavioral problems, self-injury, decreased concentration/cognition, agitation, confusion.     PHYSICAL EXAM:   Nursing note and vitals reviewed. /79   Pulse 87   Wt 89.5 kg (197 lb 6.4 oz)   SpO2 100%   BMI 25.34 kg/m²   General Appearance: Patient is well nourished, well developed, well groomed and in no acute distress.  Skin: Skin is warm and dry, good turgor . No rash or lesions noted. He  is not diaphoretic.     Pulmonary/Chest: Effort normal. No respiratory distress or use of accessory muscles. Auscultation revealing normal air entry. He does not have wheezes in the lung fields. He has no rales.   Cardiovascular: Normal rate, regular rhythm, normal heart sounds, and does not have murmur.  Exam reveals no gallop and no friction rub.    Musculoskeletal / Extremities: Range of motion is normal. Gait is normal, assistive devices use: crutches.    He exhibits edema: Trace, and no tenderness.   Neurological/Psychiatric:He is alert and oriented to person, place, and time. Coordination is  normal.   Judgement and Insight is normal  His mood is Appropriate and affect is Neutral/Euthymic(normal) . His behavior is normal.   thought content normal.        IMPRESSION:     1. Chronic pain syndrome    2. Myofascial pain    3. Peripheral polyneuropathy    4. Neuropathic pain    5. Primary insomnia    6. Recurrent major depressive disorder, in partial remission (HCC)    7. Chronic migraine without aura, with intractable migraine, so stated, with status migrainosus    8. Constipation due to opioid therapy    9. Pathological fracture of left tibia due to other osteoporosis, sequela    10. Polyneuropathy associated with underlying disease ([facility])    11. Ulcer of left foot, with fat layer exposed ([facility])    12. Encounter for therapeutic drug monitoring        PLAN:  Informed verbal consent was obtained.  -interim history reviewed   -Most recent labs were reviewed and are within normal limits. Will repeat them within next 9-12 months if there is no status change   -Urine drug screen with GC/MS for opiates and drugs of abuse was ordered and will follow up on results.   -Discontinue Topamax, on Depakote ER   -Discontinue Cymbalta 40 mg   -Continue with Aimovig with Nurtec   -Restart Neurontin 400 mg BID   -[place] record was obtained and reviewed  for the last one year and no indicators of drug misuse  were found. Any other

## 2024-08-23 NOTE — PROGRESS NOTES
was being seen today for a follow up, upon arrival patient was very pale and lethargic. Per sister -had not been feeling well, sugar up and down -yesterday was when he had eaten and had not eaten anything today. She states she's is unsure if he had used is insulin or not. Patients BP was high (190/85), patient blood sugar was (60), patient was given 5 pieces of chocolate to help raise his blood sugar. After 5 minutes patients blood sugar was checked again and it had dropped to 38 and patient started to have a seizure. EMS was called and patient was transferred  to the ER.

## 2024-09-06 ENCOUNTER — TELEPHONE (OUTPATIENT)
Dept: INTERNAL MEDICINE CLINIC | Age: 49
End: 2024-09-06

## 2024-09-26 ENCOUNTER — OFFICE VISIT (OUTPATIENT)
Dept: PAIN MANAGEMENT | Age: 49
End: 2024-09-26
Payer: MEDICARE

## 2024-09-26 VITALS
BODY MASS INDEX: 25.88 KG/M2 | OXYGEN SATURATION: 100 % | DIASTOLIC BLOOD PRESSURE: 68 MMHG | WEIGHT: 201.6 LBS | SYSTOLIC BLOOD PRESSURE: 110 MMHG | HEART RATE: 68 BPM

## 2024-09-26 DIAGNOSIS — Z91.89 AT RISK FOR RESPIRATORY DEPRESSION DUE TO OPIOID: ICD-10-CM

## 2024-09-26 DIAGNOSIS — G63 POLYNEUROPATHY ASSOCIATED WITH UNDERLYING DISEASE (HCC): ICD-10-CM

## 2024-09-26 DIAGNOSIS — M80.862S PATHOLOGICAL FRACTURE OF LEFT TIBIA DUE TO OTHER OSTEOPOROSIS, SEQUELA: ICD-10-CM

## 2024-09-26 DIAGNOSIS — M79.18 MYOFASCIAL PAIN: ICD-10-CM

## 2024-09-26 DIAGNOSIS — B88.8 INFESTATION BY BED BUG: ICD-10-CM

## 2024-09-26 DIAGNOSIS — M79.2 NEUROPATHIC PAIN: ICD-10-CM

## 2024-09-26 DIAGNOSIS — L97.522 ULCER OF LEFT FOOT, WITH FAT LAYER EXPOSED (HCC): ICD-10-CM

## 2024-09-26 DIAGNOSIS — G89.4 CHRONIC PAIN SYNDROME: ICD-10-CM

## 2024-09-26 DIAGNOSIS — G62.9 PERIPHERAL POLYNEUROPATHY: ICD-10-CM

## 2024-09-26 PROCEDURE — 99213 OFFICE O/P EST LOW 20 MIN: CPT | Performed by: INTERNAL MEDICINE

## 2024-09-26 PROCEDURE — 3078F DIAST BP <80 MM HG: CPT | Performed by: INTERNAL MEDICINE

## 2024-09-26 PROCEDURE — 3074F SYST BP LT 130 MM HG: CPT | Performed by: INTERNAL MEDICINE

## 2024-09-26 RX ORDER — ERENUMAB-AOOE 140 MG/ML
140 INJECTION, SOLUTION SUBCUTANEOUS
Qty: 1 ADJUSTABLE DOSE PRE-FILLED PEN SYRINGE | Refills: 1 | Status: SHIPPED | OUTPATIENT
Start: 2024-09-26

## 2024-09-26 RX ORDER — RIMEGEPANT SULFATE 75 MG/75MG
TABLET, ORALLY DISINTEGRATING ORAL
Qty: 16 TABLET | Refills: 1 | Status: SHIPPED | OUTPATIENT
Start: 2024-09-26

## 2024-09-26 RX ORDER — GABAPENTIN 400 MG/1
400 CAPSULE ORAL 2 TIMES DAILY
Qty: 60 CAPSULE | Refills: 1 | Status: SHIPPED | OUTPATIENT
Start: 2024-09-26 | End: 2024-11-25

## 2024-09-26 RX ORDER — TRAMADOL HYDROCHLORIDE 50 MG/1
50 TABLET ORAL 2 TIMES DAILY PRN
Qty: 56 TABLET | Refills: 0 | Status: SHIPPED | OUTPATIENT
Start: 2024-09-26 | End: 2024-10-24

## 2024-10-08 ENCOUNTER — TELEPHONE (OUTPATIENT)
Dept: ENDOCRINOLOGY | Age: 49
End: 2024-10-08

## 2024-10-08 NOTE — TELEPHONE ENCOUNTER
Jodee is calling from [facility] is calling asking for a full review of the patient's medical and script history.  Please call and verify the decision to start statin therapy.  Jodee phone [phone]   Ref number: [id number]

## 2024-10-24 ENCOUNTER — OFFICE VISIT (OUTPATIENT)
Dept: PAIN MANAGEMENT | Age: 49
End: 2024-10-24
Payer: MEDICARE

## 2024-10-24 ENCOUNTER — HOSPITAL ENCOUNTER (EMERGENCY)
Age: 49
Discharge: HOME | End: 2024-10-24
Attending: EMERGENCY MEDICINE
Payer: MEDICARE

## 2024-10-24 VITALS
WEIGHT: 191.2 LBS | DIASTOLIC BLOOD PRESSURE: 77 MMHG | SYSTOLIC BLOOD PRESSURE: 138 MMHG | BODY MASS INDEX: 24.55 KG/M2 | OXYGEN SATURATION: 98 % | HEART RATE: 73 BPM

## 2024-10-24 VITALS
OXYGEN SATURATION: 100 % | DIASTOLIC BLOOD PRESSURE: 70 MMHG | TEMPERATURE: 98.3 F | RESPIRATION RATE: 14 BRPM | SYSTOLIC BLOOD PRESSURE: 139 MMHG | HEART RATE: 73 BPM

## 2024-10-24 DIAGNOSIS — E16.2 HYPOGLYCEMIA: Primary | ICD-10-CM

## 2024-10-24 DIAGNOSIS — G43.711 CHRONIC MIGRAINE WITHOUT AURA, WITH INTRACTABLE MIGRAINE, SO STATED, WITH STATUS MIGRAINOSUS: ICD-10-CM

## 2024-10-24 DIAGNOSIS — T40.2X5A CONSTIPATION DUE TO OPIOID THERAPY: ICD-10-CM

## 2024-10-24 DIAGNOSIS — Z51.81 ENCOUNTER FOR THERAPEUTIC DRUG LEVEL MONITORING: Primary | ICD-10-CM

## 2024-10-24 DIAGNOSIS — M80.862S PATHOLOGICAL FRACTURE OF LEFT TIBIA DUE TO OTHER OSTEOPOROSIS, SEQUELA: ICD-10-CM

## 2024-10-24 DIAGNOSIS — R41.82 ALTERED MENTAL STATUS, UNSPECIFIED ALTERED MENTAL STATUS TYPE: ICD-10-CM

## 2024-10-24 DIAGNOSIS — B88.8 INFESTATION BY BED BUG: ICD-10-CM

## 2024-10-24 DIAGNOSIS — M79.18 MYOFASCIAL PAIN: ICD-10-CM

## 2024-10-24 DIAGNOSIS — L97.522 ULCER OF LEFT FOOT, WITH FAT LAYER EXPOSED (HCC): ICD-10-CM

## 2024-10-24 DIAGNOSIS — G62.9 PERIPHERAL POLYNEUROPATHY: ICD-10-CM

## 2024-10-24 DIAGNOSIS — K59.03 CONSTIPATION DUE TO OPIOID THERAPY: ICD-10-CM

## 2024-10-24 DIAGNOSIS — G63 POLYNEUROPATHY ASSOCIATED WITH UNDERLYING DISEASE (HCC): ICD-10-CM

## 2024-10-24 DIAGNOSIS — F33.41 RECURRENT MAJOR DEPRESSIVE DISORDER, IN PARTIAL REMISSION (HCC): ICD-10-CM

## 2024-10-24 DIAGNOSIS — G89.4 CHRONIC PAIN SYNDROME: ICD-10-CM

## 2024-10-24 DIAGNOSIS — M79.2 NEUROPATHIC PAIN: ICD-10-CM

## 2024-10-24 LAB
ALBUMIN SERPL-MCNC: 3.3 G/DL (ref 3.4–5)
ALBUMIN/GLOB SERPL: 1 {RATIO} (ref 1.1–2.2)
ALP SERPL-CCNC: 67 U/L (ref 40–129)
ALT SERPL-CCNC: 15 U/L (ref 10–40)
ANION GAP SERPL CALCULATED.3IONS-SCNC: 11 MMOL/L (ref 3–16)
AST SERPL-CCNC: 15 U/L (ref 15–37)
BASE EXCESS BLDV CALC-SCNC: -1.5 MMOL/L (ref -2–3)
BASOPHILS # BLD: 0.1 K/UL (ref 0–0.2)
BASOPHILS NFR BLD: 1 %
BILIRUB SERPL-MCNC: <0.2 MG/DL (ref 0–1)
BILIRUB UR QL STRIP.AUTO: NEGATIVE
BUN SERPL-MCNC: 15 MG/DL (ref 7–20)
CALCIUM SERPL-MCNC: 7.9 MG/DL (ref 8.3–10.6)
CHLORIDE SERPL-SCNC: 104 MMOL/L (ref 99–110)
CLARITY UR: CLEAR
CO2 BLDV-SCNC: 26 MMOL/L
CO2 SERPL-SCNC: 21 MMOL/L (ref 21–32)
COHGB MFR BLDV: 0.9 % (ref 0–1.5)
COLOR UR: YELLOW
CREAT SERPL-MCNC: 1.3 MG/DL (ref 0.9–1.3)
DEPRECATED RDW RBC AUTO: 17.4 % (ref 12.4–15.4)
DO-HGB MFR BLDV: 50.1 %
EOSINOPHIL # BLD: 0.6 K/UL (ref 0–0.6)
EOSINOPHIL NFR BLD: 9.4 %
GFR SERPLBLD CREATININE-BSD FMLA CKD-EPI: 68 ML/MIN/{1.73_M2}
GLUCOSE BLD-MCNC: 111 MG/DL (ref 70–99)
GLUCOSE BLD-MCNC: 143 MG/DL (ref 70–99)
GLUCOSE BLD-MCNC: 173 MG/DL (ref 70–99)
GLUCOSE BLD-MCNC: 53 MG/DL (ref 70–99)
GLUCOSE BLD-MCNC: 55 MG/DL (ref 70–99)
GLUCOSE SERPL-MCNC: 99 MG/DL (ref 70–99)
GLUCOSE UR STRIP.AUTO-MCNC: NEGATIVE MG/DL
HCO3 BLDV-SCNC: 24.7 MMOL/L (ref 24–28)
HCT VFR BLD AUTO: 32.5 % (ref 40.5–52.5)
HGB BLD-MCNC: 10.6 G/DL (ref 13.5–17.5)
HGB UR QL STRIP.AUTO: NEGATIVE
KETONES UR STRIP.AUTO-MCNC: NEGATIVE MG/DL
LEUKOCYTE ESTERASE UR QL STRIP.AUTO: NEGATIVE
LYMPHOCYTES # BLD: 2.2 K/UL (ref 1–5.1)
LYMPHOCYTES NFR BLD: 34 %
MAGNESIUM SERPL-MCNC: 1.6 MG/DL (ref 1.8–2.4)
MCH RBC QN AUTO: 26.9 PG (ref 26–34)
MCHC RBC AUTO-ENTMCNC: 32.7 G/DL (ref 31–36)
MCV RBC AUTO: 82.2 FL (ref 80–100)
METHGB MFR BLDV: 0.4 % (ref 0–1.5)
MONOCYTES # BLD: 0.6 K/UL (ref 0–1.3)
MONOCYTES NFR BLD: 9.9 %
NEUTROPHILS # BLD: 2.9 K/UL (ref 1.7–7.7)
NEUTROPHILS NFR BLD: 45.7 %
NITRITE UR QL STRIP.AUTO: NEGATIVE
PCO2 BLDV: 47.5 MMHG (ref 41–51)
PERFORMED ON: ABNORMAL
PH BLDV: 7.33 [PH] (ref 7.35–7.45)
PH UR STRIP.AUTO: 7 [PH] (ref 5–8)
PLATELET # BLD AUTO: 340 K/UL (ref 135–450)
PMV BLD AUTO: 7.3 FL (ref 5–10.5)
PO2 BLDV: <30 MMHG (ref 25–40)
POTASSIUM SERPL-SCNC: 2.9 MMOL/L (ref 3.5–5.1)
PROT SERPL-MCNC: 6.6 G/DL (ref 6.4–8.2)
PROT UR STRIP.AUTO-MCNC: NEGATIVE MG/DL
RBC # BLD AUTO: 3.96 M/UL (ref 4.2–5.9)
RBC #/AREA URNS HPF: NORMAL /HPF (ref 0–4)
SAO2 % BLDV: 49 %
SODIUM SERPL-SCNC: 136 MMOL/L (ref 136–145)
SP GR UR STRIP.AUTO: 1.01 (ref 1–1.03)
UA DIPSTICK W REFLEX MICRO PNL UR: NORMAL
URN SPEC COLLECT METH UR: NORMAL
UROBILINOGEN UR STRIP-ACNC: 0.2 E.U./DL
WBC # BLD AUTO: 6.4 K/UL (ref 4–11)
WBC #/AREA URNS HPF: NORMAL /HPF (ref 0–5)

## 2024-10-24 PROCEDURE — 96366 THER/PROPH/DIAG IV INF ADDON: CPT

## 2024-10-24 PROCEDURE — 3078F DIAST BP <80 MM HG: CPT | Performed by: INTERNAL MEDICINE

## 2024-10-24 PROCEDURE — 80053 COMPREHEN METABOLIC PANEL: CPT

## 2024-10-24 PROCEDURE — 82803 BLOOD GASES ANY COMBINATION: CPT

## 2024-10-24 PROCEDURE — 3075F SYST BP GE 130 - 139MM HG: CPT | Performed by: INTERNAL MEDICINE

## 2024-10-24 PROCEDURE — 36415 COLL VENOUS BLD VENIPUNCTURE: CPT

## 2024-10-24 PROCEDURE — 99212 OFFICE O/P EST SF 10 MIN: CPT | Performed by: INTERNAL MEDICINE

## 2024-10-24 PROCEDURE — 6370000000 HC RX 637 (ALT 250 FOR IP)

## 2024-10-24 PROCEDURE — 85025 COMPLETE CBC W/AUTO DIFF WBC: CPT

## 2024-10-24 PROCEDURE — 83735 ASSAY OF MAGNESIUM: CPT

## 2024-10-24 PROCEDURE — 96365 THER/PROPH/DIAG IV INF INIT: CPT

## 2024-10-24 PROCEDURE — 99284 EMERGENCY DEPT VISIT MOD MDM: CPT

## 2024-10-24 PROCEDURE — 81001 URINALYSIS AUTO W/SCOPE: CPT

## 2024-10-24 PROCEDURE — 6360000002 HC RX W HCPCS

## 2024-10-24 RX ORDER — MAGNESIUM SULFATE IN WATER 40 MG/ML
2000 INJECTION, SOLUTION INTRAVENOUS ONCE
Status: COMPLETED | OUTPATIENT
Start: 2024-10-24 | End: 2024-10-24

## 2024-10-24 RX ADMIN — POTASSIUM BICARBONATE 40 MEQ: 782 TABLET, EFFERVESCENT ORAL at 15:33

## 2024-10-24 RX ADMIN — MAGNESIUM SULFATE HEPTAHYDRATE 2000 MG: 40 INJECTION, SOLUTION INTRAVENOUS at 15:37

## 2024-10-24 ASSESSMENT — PAIN - FUNCTIONAL ASSESSMENT: PAIN_FUNCTIONAL_ASSESSMENT: NONE - DENIES PAIN

## 2024-10-24 ASSESSMENT — ENCOUNTER SYMPTOMS
SHORTNESS OF BREATH: 0
ABDOMINAL PAIN: 0
CHEST TIGHTNESS: 0
NAUSEA: 0
VOMITING: 0

## 2024-10-24 NOTE — ED NOTES
POC glucose 55. Pt responsive, alert and oriented x 4. Pt given orange juice and crackers     Gisella Moreno, RN  10/24/24 4083

## 2024-10-24 NOTE — ED NOTES
POC glucose 53. Pt remains alert and oriented x 4. Pt given boxed lunch, orange juice, and pudding     Gisella Moreno RN  10/24/24 5989

## 2024-10-24 NOTE — PROGRESS NOTES
Patient is confused again today, checked his blood sugar it was 48. He was brought in by his sister, spoke with sister and she had advised she is not coming back to pick him up. Patient was given 15 grams of glucose, Blood sugar went to 51. EMS was called for transfer to ER/Hospital.  had bed bugs all over him again.

## 2024-10-24 NOTE — ED PROVIDER NOTES
ED Attending Attestation Note     Date of evaluation: 10/24/2024    This patient was seen by the resident.  I have seen and examined the patient, agree with the workup, evaluation, management and diagnosis. The care plan has been discussed.  I have reviewed the ECG and concur with the resident's interpretation.  My assessment reveals a well-nourished appearing middle-aged male sitting up in the stretcher comfortable and in no distress.  Patient was found altered at home with low blood sugar, at the time of my interview he is alert and oriented x 4 with nonfocal neurologic exam states he is feeling much better with a normal blood sugar.  Patient denies eating today otherwise states he has been feeling fine..     Faizan Almanza MD  10/24/24 9842

## 2024-10-24 NOTE — DISCHARGE INSTRUCTIONS
You were seen for low blood glucose.    You were treated with electrolyte repletion and food to improve you blood sugar.    Please see your Primary Care Physician (PCP) for your post-hospital follow up.  If you have any questions, do not hesitate to contact us or your PCP   If you feel seriously ill, please go directly to the Emergency Department, or call 911.

## 2024-10-24 NOTE — ED PROVIDER NOTES
THE [facility]  EMERGENCY DEPARTMENT ENCOUNTER          [facility] RESIDENT NOTE       Date of evaluation: 10/24/2024    Chief Complaint     Hypoglycemia and Altered Mental Status (Patient arrived to the Ed for reports of AMS/hypoglycemic episode. EMS gave patient dose of D10, reports pinpoint pupils)      History of Present Illness     Era Carson is a 49 y.o. male with past medical history of DM with DKA, epilepsy who presents with altered mental status. The patient reports that he was in is normal state of health this morning but did not eat or take his insulin. He went to his pain management appointment around noon and began to feel off. He reports wanting to check his glucose monitor to see what his blood sugar was but could not find it. The patient returned home and was later found down and unconscious by his family. In the ED patient is alert and oriented times four and states that he feels back to his baseline, aside from feeling a little cold. He has had prior hypoglycemic episodes and he states that this feels similar to those he had prior.     Review of Systems     Review of Systems   Constitutional:  Negative for chills, diaphoresis, fatigue and fever.   Respiratory:  Negative for chest tightness and shortness of breath.    Cardiovascular:  Negative for chest pain, palpitations and leg swelling.   Gastrointestinal:  Negative for abdominal pain, nausea and vomiting.   Neurological:  Negative for speech difficulty, weakness and light-headedness.       Past Medical, Surgical, Family, and Social History     He has a past medical history of Acute respiratory failure, Asthma, Blind left eye, Blood pressure instability, Chronic pain syndrome, CKD (chronic kidney disease), Detached retina, bilateral, Diabetes mellitus (HCC), DVT (deep venous thrombosis) ([facility]), Insomnia, Meningitis, Neuropathic pain, Osteomyelitis of tibia, Pain of left lower extremity, and Peripheral neuropathy.  He has a past surgical    Platelet Count 340   MPV 7.3   Neutrophils % 45.7   Lymphocyte % 34.0   Monocytes % 9.9   Eosinophils % 9.4   Basophils % 1.0   Neutrophils Absolute 2.9   Lymphocytes Absolute 2.2   Monocytes Absolute 0.6   Eosinophils Absolute 0.6   Basophils Absolute 0.1 [EMBER]      ED Course User Index  [EMBER] Blumberg, Jonathan, MD       The patient was giventhe following medications:  Orders Placed This Encounter   Medications    potassium bicarb-citric acid (EFFER-K) effervescent tablet 40 mEq    magnesium sulfate 2000 mg in 50 mL IVPB premix       CONSULTS:  None    MEDICAL DECISION MAKING / ASSESSMENT / PLAN     Era Carson is a 49 y.o. male who presents after being found down at home.  EMS reportedly gave an episode of D10 in route. On arrival the patient had a POCT of 55. He appeared back at his baseline, was alert and oriented to person, place, time, and situation.     Workup included basic labs including CBC, CMP, and VBG. CBC showed chronic and stable anemia, CMP revealed hypokalemia and hypomagnesemia otherwise unremarkable, VBG 7.325/47.5/<30. Electrolytes were repleted in the ED.    On reevaluation and after monitoring the patient and providing him with food his hypoglycemia improved x2 and the patient reported feeling back at his baseline. The decision was made to discharge the patient home with PCP follow-up. The patient expressed understanding of this plan and was in agreement.    Is this patient to be included in the SEP-1 core measure? No Exclusion criteria - the patient is NOT to be included for SEP-1 Core Measure due to: Infection is not suspected    This patient was also evaluated by the attending physician. All care plans were discussed and agreed upon.    Clinical Impression     1. Hypoglycemia    2. Altered mental status, unspecified altered mental status type        Disposition     PATIENT REFERRED TO:  Brandon Gan MD  [address]  [address]  [facility], [place] [address] [phone]    In 1

## 2024-10-25 ENCOUNTER — CARE COORDINATION (OUTPATIENT)
Dept: PRIMARY CARE CLINIC | Age: 49
End: 2024-10-25

## 2024-10-25 NOTE — CARE COORDINATION
Ambulatory Care Coordination Note     10/25/2024 9:49 AM     ACM outreach attempt by this ACM today to offer care management services. ACM was unable to reach the patient by telephone today; left voice message requesting a return phone call to this ACM.     ACM: Gaby Sheppard RN     Care Summary Note: attempted out reacg call following ED visit. No answer to pt phone. HIPAA compliant message left    PCP/Specialist follow up:   Future Appointments         Provider Specialty Dept Phone    11/20/2024 11:20 AM Bo Mane MD Endocrinology [phone]    11/26/2024 1:20 PM Brandon Gan MD Primary Care [phone]    12/5/2024 3:40 PM Jose Drake MD Infectious Diseases [phone]            Follow Up:   Plan for next ACM outreach in approximately 1-2 days  to complete:  - outreach attempt to offer care management services.

## 2024-11-08 RX ORDER — GLUCAGON INJECTION, SOLUTION 1 MG/.2ML
INJECTION, SOLUTION SUBCUTANEOUS
Qty: 0.4 ML | Refills: 3 | Status: SHIPPED | OUTPATIENT
Start: 2024-11-08

## 2024-11-08 NOTE — TELEPHONE ENCOUNTER
Follow up patient note  Pain Medicine, Interventional spine and sports physiatry, Physical medicine rehabilitation    Date of Service:02/24/2023    Chief complaint:   Joint pain and neck pain      HISTORY    Please see new patient note dated 06/08/2018 by Dr Kerr,  for more details.     Hospitals in [place]  Patient identification: Mary Martinez 52 y.o. female returns for follow-up of her pain related to rheumatoid arthritis.      Interval history:     Mary was last seen in the office 09/28/2022.  Since that time, she has been hospitalized twice.  She is here today with her son, Lobo, who assists with the HPI.    Mary has been having ongoing issues with her ongoing abdominal pain.  The celiac plexus block, 9/9/22 that had seemed very helpful.  She continues to have issues with maintaining her weight.    She last filled her script for pain in November 2022 and had to cancel several visits here including one due to problems with her insurance being canceled, erroneously and reinstated, and hospitalization in January.    Reviewing her last hospitalization 02/17/2023, she was taking oxycodone averaging about 20mg /day.  She was given a three day script for oxycodone 5mg po tid on discharge.  She reports that she took her last pill on Wednesday.    Pain is 10/10 on the NRS and she feels like the pain is worse all over her body.    Dr. Dela Cruz is her Rheumatologist.    Reports that she works with Mckenzie at [facility] and that she will have a new PCP as Dr. Christopher left [facility].    PHQ-9: 6 denies suicidality, continues to work with her psychology and psychiatry team  ORT: 4      Records review:  Hospitalized for severe sepsis due to recurrent urinary tract infection.  Reviewed discharged from Dr. Mann 02/17/2023-02/19/2023    ROS  See HPI    Red Flags :   Fever, Chills, Sweats: Denies  Involuntary Weight Loss: Denies  Bowel/Bladder Incontinence: Denies      PMHx:   Past Medical History:  LOV: 11/7/23    NOV: 11/20/24     Diagnosis Date    Acute renal failure (ARF) (HCC)     Allergy     Anemia     Anxiety     Arthritis     Rheumatoid -follows with rheumatologist. Has several fractures due to RA.    ASTHMA     inhalers prn    Blood transfusion without reported diagnosis     Bowel habit changes     4/24/20-constipation and diarrhea.    Bronchitis last approx 2018    Chronic pain 04/24/2020    Due to RA    Dental disorder     no teeth upper-does not wear her denture. Broken teeth on bottom.    Depression     GERD (gastroesophageal reflux disease)     GIB (gastrointestinal bleeding)     Head ache     Heart burn     taking famotidine    Hiatus hernia syndrome     History of pancreatitis     Hypokalemia     Hyponatremia     Idiopathic chronic pancreatitis (HCC)     Indigestion     taking famotidine    Intractable nausea and vomiting     Kidney disease     Pain     CPS-everywhere    Pneumonia 10/2019    PONV (postoperative nausea and vomiting)     Psychiatric problem     anxiety and depression    Rheumatoid aortitis     Rheumatoid arthritis(714.0) 2003    SMAS (superior mesenteric artery syndrome) ([facility])     Substance abuse ([facility])        PSHx:   Past Surgical History:   Procedure Laterality Date    [place] UPPER GI ENDOSCOPY,DIAGNOSIS N/A 9/9/2022    Procedure: ENDOSCOPIC ULTRASOUND- UPPER;  Surgeon: Jorge Brooke M.D.;  Location: [facility];  Service: EUS    EGD W/ENDOSCOPIC ULTRASOUND N/A 9/9/2022    Procedure: EGD, WITH ENDOSCOPIC US;  Surgeon: Jorge Brooke M.D.;  Location: [facility];  Service: EUS    WV ENDOSCOPIC US EXAM, ESOPH  4/29/2020    Procedure: EGD, WITH ENDOSCOPIC US;  Surgeon: Jorge Brooke M.D.;  Location: [facility];  Service: Gastroenterology    GASTROSCOPY-ENDO  4/29/2020    Procedure: GASTROSCOPY;  Surgeon: Jorge Brooke M.D.;  Location: [facility];  Service: Gastroenterology    EGD WITH ASP/BX  4/29/2020    Procedure: EGD, WITH ASPIRATION  BIOPSY - POSS FNA;  Surgeon: Jorge Brooke M.D.;  Location: SURGERY [facility];  Service: Gastroenterology    GA EXPLORATORY OF ABDOMEN N/A 10/4/2019    Procedure: LAPAROTOMY, EXPLORATORY AND REPAIR OF DUODENAL PERFORATION;  Surgeon: James Dumont M.D.;  Location: [facility];  Service: General    COLONOSCOPY  2018    with upper endoscopy    FINGER ARTHROPLASTY Right 6/5/2017    Procedure: FINGER ARTHROPLASTY - LONG, RING AND SMALL VOLAR PLATE;  Surgeon: Lobo Rosen M.D.;  Location: SURGERY SAME DAY [facility];  Service:     FINGER AMPUTATION  6/5/2017    Procedure: FINGER AMPUTATION - LONG, RING AND SMALL AT THE PROXIMAL INTERPHALANGEAL JOINT;  Surgeon: Lobo Rosen M.D.;  Location: SURGERY SAME DAY [facility];  Service:     FINGER ARTHROPLASTY Right 4/17/2017    Procedure: FINGER ARTHROPLASTY ;  Surgeon: Lobo Rosen M.D.;  Location: SURGERY SAME DAY [facility];  Service:     FINGER AMPUTATION Right 9/14/2016    Procedure: FINGER AMPUTATION INDEX;  Surgeon: Lobo Rosen M.D.;  Location: SURGERY SAME DAY [facility];  Service:     IRRIGATION & DEBRIDEMENT ORTHO Right 9/11/2016    Procedure: IRRIGATION & DEBRIDEMENT ORTHO - right index finger;  Surgeon: Madhu Rosen M.D.;  Location: [facility];  Service:     FUSION, PIP JOINT, TOE Right 8/29/2016    Procedure: RE-DO INDEX FINGER PROXIMAL INTERPHALANGEAL ARTHRODESIS;  Surgeon: Lobo Rosen M.D.;  Location: SURGERY SAME DAY [facility];  Service:     BONE GRAFT Right 8/29/2016    Procedure: BONE GRAFT - DISTAL RADIUS ;  Surgeon: Lobo Rosen M.D.;  Location: SURGERY SAME DAY [facility];  Service:     ARTHRODESIS Right 5/6/2016    Procedure: ARTHRODESIS INDEX FINGER PROXIMAL INTERPHALANGEAL;  Surgeon: Lobo Rosen M.D.;  Location: SURGERY SAME DAY [facility];  Service:     FOOT RECONSTRUCTION RHEUMATIC Left 7/29/2015    Procedure: FOOT  RECONSTRUCTION RHEUMATIC;  Surgeon: Heriberto Alves M.D.;  Location: SURGERY [facility];  Service:     TOE FUSION Right 5/27/2015    Procedure: TOE FUSION 1ST METATARSALPHALANGEAL JOINT;  Surgeon: Heriberto Alves M.D.;  Location: SURGERY [facility];  Service:     BONE SPUR EXCISION  5/27/2015    Procedure: BONE SPUR EXCISION METATARSAL HEAD 2-3;  Surgeon: Heriberto Alves M.D.;  Location: SURGERY [facility];  Service:     HAMMERTOE CORRECTION  5/27/2015    Procedure: HAMMERTOE CORRECTION AND SOFT TISSUE RE-ALINGMENT 2-3 ;  Surgeon: Heriberto Alves M.D.;  Location: SURGERY [facility];  Service:     DENTAL EXTRACTION(S)  2014    all of upper teeth    ABDOMINAL ABSCESS DRAINAGE  9/27/2011    Performed by VERO WRIGHT at SURGERY [facility]    EMANUEL BY LAPAROSCOPY  9/27/2011    Performed by VERO WRIGHT at [facility]    APPENDECTOMY  2011    Found out it had ruptured prior to abcess surgery    REPEAT C SECTION  2008    REPEAT C SECTION  2005    REPEAT C SECTION  1999    PRIMARY C SECTION  1991    TONSILLECTOMY  1982    WRIST EXPLORATION Left 1980's    fractured wrist-no hardware       Family history   Family History   Problem Relation Age of Onset    Cancer Mother     Heart Disease Mother     Hypertension Mother     Heart Disease Father     Hypertension Father          Medications:   Outpatient Medications Marked as Taking for the 2/24/23 encounter (Office Visit) with Jayy Kerr M.D.   Medication Sig Dispense Refill    oxyCODONE immediate-release (ROXICODONE) 5 MG Tab Take 1 Tablet by mouth 3 times a day as needed for Severe Pain for up to 7 days. 21 Tablet 0    capsaicin (ZOSTRIX) 0.025 % cream Apply 1 Application topically 3 times a day as needed (leg pain). 60 g 2    nicotine polacrilex (NICORETTE) 2 MG Gum Take 1 Each by mouth every 1 hour as needed for Smoking Cessation (For nicotine urge). 120 Each 2    DULoxetine (CYMBALTA) 30 MG Cap DR Particles Take 1 Capsule  by mouth every day for 7 days, THEN 2 Capsules every day for 23 days. 53 Capsule 2    multivitamin Tab Take 1 Tablet by mouth every day. 30 Tablet 2    cyanocobalamin (VITAMIN B12) 1000 MCG Tab Take 1 Tablet by mouth every morning with breakfast. 30 Tablet 3    folic acid (FOLVITE) 1 MG Tab Take 1 Tablet by mouth 2 times a day. 30 Tablet 3    midodrine (PROAMATINE) 10 MG tablet Take 1 Tablet by mouth 3 times a day with meals. 60 Tablet 1    QUEtiapine (SEROQUEL) 100 MG Tab Take 1 Tablet by mouth every evening. 60 Tablet 3    magnesium oxide 400 (240 Mg) MG Tab Take 1 Tablet by mouth every day. 30 Tablet 1    omeprazole (PRILOSEC) 20 MG delayed-release capsule Take 1 Capsule by mouth every day. 30 Capsule 1    thiamine (THIAMINE) 100 MG tablet Take 1 Tablet by mouth every day. 30 Tablet 1    sucralfate (CARAFATE) 1 GM Tab Take 1 Tablet by mouth 4 Times a Day,Before Meals and at Bedtime. 120 Tablet 3       Allergies:   Allergies   Allergen Reactions    Penicillins Anaphylaxis and Hives     Tolerates cephalosporins; reports throat swelling with PCN    Abilify Unspecified     Multiple side effects    Aripiprazole Nausea     Spasms, shaking      Nitrous Oxide Vomiting       Social Hx:   Social History     Socioeconomic History    Marital status:      Spouse name: Ousmane    Number of children: 5    Years of education: Not on file    Highest education level: Not on file   Occupational History    Not on file   Tobacco Use    Smoking status: Every Day     Packs/day: 1.00     Years: 30.00     Pack years: 30.00     Types: Cigarettes    Smokeless tobacco: Never   Vaping Use    Vaping Use: Never used   Substance and Sexual Activity    Alcohol use: Never    Drug use: Never    Sexual activity: Not Currently   Other Topics Concern     Service No    Blood Transfusions Yes    Caffeine Concern No    Occupational Exposure No    Hobby Hazards No    Sleep Concern No    Stress Concern Yes    Weight Concern No    Special Diet  "No    Back Care No    Exercise Yes    Bike Helmet Yes    Seat Belt Yes    Self-Exams Yes   Social History Narrative    ** Merged History Encounter **          Social Determinants of Health     Financial Resource Strain: Low Risk     Difficulty of Paying Living Expenses: Not hard at all   Food Insecurity: No Food Insecurity    Worried About Running Out of Food in the Last Year: Never true    Ran Out of Food in the Last Year: Never true   Transportation Needs: No Transportation Needs    Lack of Transportation (Medical): No    Lack of Transportation (Non-Medical): No   Physical Activity: Not on file   Stress: Not on file   Social Connections: Not on file   Intimate Partner Violence: Not on file   Housing Stability: Not on file         EXAMINATION     Physical Exam:   Vitals: /64 (BP Location: Right arm, Patient Position: Sitting, BP Cuff Size: Adult long)   Pulse 95   Temp 36.4 °C (97.5 °F) (Temporal)   Ht 1.6 m (5' 3\")   Wt 47.7 kg (105 lb 3.2 oz)   SpO2 99%     Constitutional:   Body Habitus: Body mass index is 18.64 kg/m².  Cooperation: Fully cooperates with exam  Appearance: Appears pale, thin.  She is wearing a mask  Respiratory-  breathing comfortable on room air, no audible wheezing  Cardiovascular- no lower extremity edema is observed.     Psychiatric- alert and oriented ×3. Normal affect.     Musculoskeletal:    Partial hand amputation on the right at the MCPs; ulnar deviation on the left with MCP subluxation, mild atrophy of the hand intrinsics with wasting of the thenar eminence.  Limited range of motion of the left hand performing .  Left thumb with tenderness to palpation and nodule just distal to the DIP joint.    Gait is steady without assist device.  Mildly antalgic without loss of balance      MEDICAL DECISION MAKING    DATA    Labs: UDS 10/30/2018 consistent with medications.  Oxycodone and metabolites without other substances   UDS 04/19/2019 consistent with medications. Oxycodone and " metabolites without other substances   UDS 07/19/2019 consistent with medications.  Oxycodone and metabolites without other substances   UDS 11/22/2019 consistent with medications.  Oxycodone and metabolites without other substances   UDS 02/20/2020 absent for Oxycodone and metabolites without other substances  UDS 06/10/2020 positive for oxycodone and metabolites without other substances  UDS 08/18/2020 positive for oxycodone and metabolites, positive for EtG without EtS  UDS 09/07/2021 negative for oxycodone and metabolites, patient was out of medication, no other abnormalities  UDS 05/18/2022: positive for oxycodone and metabolites.      Imaging:    Films reviewed.  These are my reads:    Xray right shoulder 08/20/2020  There is note of severe degenerative change of the glenohumeral joint.  No fracture.  Erosive changes, consistent with known history of RA    MRI cervical spine 07/31/2018:    There is is note of motion at the atlantodental level with 5mm atlantodental inverval in flexion that reduces to 1-2 mm in extension.  Mild retrolisthesis of C4 on C5 and anterolisthesis of C5- on C6.  Disc extrusion at C6-7 with mild central canal stenosis    Xray left shoulder 2/5/2018 Humeral head is elevated.  Glenohumeral joint arthritis.    Reports reviewed:    Xray right shoulder 08/20/2020 [facility]  Impression  Extensive erosive changes of the humeral head and degenerative changes of the glenohumeral joint.  Findings are concerning for inflammatory arthropathy such as rheumatoid arthritis.      Xray cervical spine 11/14/2018  1. No acute fracture  2. Persistent motion at the C1-2 joint as before, suggesting atlantoaxial instability  3. Minimal instability noted at C5-6 level as described.    MRI cervical spine 07/31/2018  1. Widening of the atlantodental interal in neutral and flexion positions with a 5mm space which reduces to 1-2 mm in extension  2. 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 Rheumatoid arthritis involving multiple sites with positive rheumatoid factor (HCC)  M05.79   2. Arthritis of glenohumeral joint  M19.019   3. At risk for falls  Z91.81             ASSESSMENT and PLAN:     Mary Martinez 52 y.o. female with rheumatoid arthritis    Mary was seen today for follow-up.    Orders and management for this visit:    Orders Placed This Encounter    Pain Management Screen    Patient identified as fall risk.  Appropriate orders and counseling given.    oxyCODONE immediate-release (ROXICODONE) 5 MG Tab    Consent for Opiate Prescription    Controlled Substance Treatment Agreement       Discussed that she will continue to follow-up with GI regarding management of ongoing abdominal pain and difficulty in maintaining weight.  Reviewed ongoing pain and that she has been on decreased doses and without medication intermittently since the last visit.  Pain is 10/10 on the NRS.  She has had management with chronic opioids.  Check UDS.   reviewed.  Plan to trial oxycodone 5mg three times a day.  Discussed that she has not been able to fill her medications due to problems with insurance and then hospitalizations and that we will work on the lowest tolerated dose that helps manage her pain. Script given for 7 days.  Plan to reassess after labs and to see how she is doing with TID.  Hold tylenol for now, she had some elevated LFTs on most recent admission.  Encouraged her to discuss possible transition from paroxetine to duloxetine.  This was not started in the hospital, but was recommended at discharge.  She has not started this yet as she wants to discuss with her mental health care team.  Discussed that this would be a reasonable choice.  Mood is stable.      In prescribing controlled  substances to this patient, I certify that I have obtained and reviewed the medical history of Mary Martinez. I have also made a good aristides effort to obtain applicable records from other providers who have treated the patient and records did not demonstrate any increased risk of substance abuse that would prevent me from prescribing controlled substances.     I have conducted a physical exam and documented it. I have reviewed Ms. Martinez’s prescription history as maintained by the [place] Prescription Monitoring Program.   ORT 4, indicates moderate risk    I have assessed the patient’s risk for abuse, dependency, and addiction using the validated Opioid Risk Tool available at https://www.mdcalc.com/bvsqdh-epwa-vsas-ort-narcotic-abuse.     Given the above, I believe the benefits of controlled substance therapy outweigh the risks. The reasons for prescribing controlled substances include non-narcotic, oral analgesic alternatives have been inadequate for pain control and in my professional opinion, controlled substances are the only reasonable choice for this patient because her pain was note adequately controlled with alternatives  and she has chronic progressive disease. Accordingly, I have discussed the risk and benefits, treatment plan, and alternative therapies with the patient.       Follow up: 7 days    My total time spent caring for the patient on the day of the encounter was 42 minutes.   This does not include time spent on separately billable procedures/tests.      Please note that this dictation was created using voice recognition software. I have made every reasonable attempt to correct obvious errors but there may be errors of grammar and content that I may have overlooked prior to finalization of this note.      Jayy Kerr MD  Interventional Spine and Sports Physiatry  Physical Medicine and Rehabilitation  [facility]      PCP: [facility]

## 2024-11-20 ENCOUNTER — OFFICE VISIT (OUTPATIENT)
Dept: ENDOCRINOLOGY | Age: 49
End: 2024-11-20
Payer: MEDICARE

## 2024-11-20 VITALS
OXYGEN SATURATION: 98 % | RESPIRATION RATE: 15 BRPM | WEIGHT: 197 LBS | SYSTOLIC BLOOD PRESSURE: 136 MMHG | DIASTOLIC BLOOD PRESSURE: 82 MMHG | BODY MASS INDEX: 25.29 KG/M2

## 2024-11-20 DIAGNOSIS — E78.49 OTHER HYPERLIPIDEMIA: ICD-10-CM

## 2024-11-20 DIAGNOSIS — E10.65 UNCONTROLLED TYPE 1 DIABETES MELLITUS WITH HYPERGLYCEMIA (HCC): Primary | ICD-10-CM

## 2024-11-20 LAB — HBA1C MFR BLD: 8.7 %

## 2024-11-20 PROCEDURE — G2211 COMPLEX E/M VISIT ADD ON: HCPCS | Performed by: INTERNAL MEDICINE

## 2024-11-20 PROCEDURE — 3052F HG A1C>EQUAL 8.0%<EQUAL 9.0%: CPT | Performed by: INTERNAL MEDICINE

## 2024-11-20 PROCEDURE — 99214 OFFICE O/P EST MOD 30 MIN: CPT | Performed by: INTERNAL MEDICINE

## 2024-11-20 PROCEDURE — 3079F DIAST BP 80-89 MM HG: CPT | Performed by: INTERNAL MEDICINE

## 2024-11-20 PROCEDURE — 83036 HEMOGLOBIN GLYCOSYLATED A1C: CPT | Performed by: INTERNAL MEDICINE

## 2024-11-20 PROCEDURE — 3075F SYST BP GE 130 - 139MM HG: CPT | Performed by: INTERNAL MEDICINE

## 2024-11-20 RX ORDER — INSULIN GLARGINE 100 [IU]/ML
15 INJECTION, SOLUTION SUBCUTANEOUS NIGHTLY
Qty: 30 ML | Refills: 3 | Status: SHIPPED | OUTPATIENT
Start: 2024-11-20

## 2024-11-20 NOTE — PROGRESS NOTES
HPI      Era Carson is a 49 y.o. male here for a follow-up for management of DM    Interim:    Seen after 11/23  ER visit 10/24 for hypoglycemia    Fair control  No severe low  Glucose better    Advised to monitor glucose closely as he gave 32 units lantus extra this morning    Has seen Dr. Bullock    He has a PMH of DM, CKD, HTN, hyperlipidemia,  Left tibia/fibula fracture, foot ulcer.     He was diagnosed with Diabetes Mellitus at the age of 8 yrs.     Never had DKA.  Was never on oral meds. Always on insulin therapy.    Microvascular complications: has  Retinopathy and also had retinal detachement (Follows with eye doctor at [facility]),   Has microlabuminruia .No Peripheral neuropathy.     A1c 9.6 %----> 8.4 --->8.9 %--->8.2 % ---> 10.2 % ---> 10.2 % ---> 9.8%---> 9.5%---> 9.1%---> 7.9%---> 8.4%--->9.4%---> 9.3%---> 8.7%    Moderate, uncontrolled  Worsened by diet    Home regimen:  Currently on lantus insulin 15/15    Apidra 7-12 units TID. ( insulin to carb ratio of 1:7)  Uses fixed unit    SSI 2 for 50 > 150    Previous medications: Was on humulin insulin in the past.    Check sugars occasionally    Forgot meter    83-200s    Using Tra    Hypoglycemia . Occasional. No pattern    No polyuria, polydipsia, weight loss.      Diet: Eats 3 meals/day at regular times and 3 snacks.  Had nutrition education.    Exercise: No  planned physical activity    Hyperlipidemia: he has mixed hyperlipidemia    Currently is on Flexseed oil.  He has refused statins  Discussed PCSK-9 inhibitor, not interested   on 2/20    Patient says he feels dizzy and light headed when he stands up for the last 2-3 months. This is most likely due to autonomic neuropathy.  No weight loss. Has gained weight.      He had a fall and fractured his left distal tibia and fibula in 2013.  Had ORIF and has developed osteomyelitis.     Review of Systems   Scanned, reviewed    Past Medical History:   Diagnosis Date    Acute

## 2024-11-26 ENCOUNTER — OFFICE VISIT (OUTPATIENT)
Dept: INTERNAL MEDICINE CLINIC | Age: 49
End: 2024-11-26
Payer: MEDICARE

## 2024-11-26 VITALS
SYSTOLIC BLOOD PRESSURE: 139 MMHG | DIASTOLIC BLOOD PRESSURE: 87 MMHG | HEART RATE: 76 BPM | BODY MASS INDEX: 25.29 KG/M2 | WEIGHT: 197 LBS | OXYGEN SATURATION: 98 %

## 2024-11-26 DIAGNOSIS — Z00.00 ENCOUNTER FOR ANNUAL WELLNESS VISIT (AWV) IN MEDICARE PATIENT: Primary | ICD-10-CM

## 2024-11-26 DIAGNOSIS — E78.2 MIXED HYPERLIPIDEMIA: ICD-10-CM

## 2024-11-26 DIAGNOSIS — E10.8 TYPE 1 DIABETES MELLITUS WITH COMPLICATION (HCC): ICD-10-CM

## 2024-11-26 DIAGNOSIS — E10.22 CKD STAGE 2 DUE TO TYPE 1 DIABETES MELLITUS (HCC): ICD-10-CM

## 2024-11-26 DIAGNOSIS — Z00.00 WELCOME TO MEDICARE PREVENTIVE VISIT: ICD-10-CM

## 2024-11-26 DIAGNOSIS — N18.2 CKD STAGE 2 DUE TO TYPE 1 DIABETES MELLITUS (HCC): ICD-10-CM

## 2024-11-26 DIAGNOSIS — F32.89 OTHER DEPRESSION: ICD-10-CM

## 2024-11-26 LAB
CHP ED QC CHECK: NORMAL
GLUCOSE BLD-MCNC: 205 MG/DL

## 2024-11-26 PROCEDURE — 3075F SYST BP GE 130 - 139MM HG: CPT | Performed by: INTERNAL MEDICINE

## 2024-11-26 PROCEDURE — 3079F DIAST BP 80-89 MM HG: CPT | Performed by: INTERNAL MEDICINE

## 2024-11-26 PROCEDURE — 82962 GLUCOSE BLOOD TEST: CPT | Performed by: INTERNAL MEDICINE

## 2024-11-26 PROCEDURE — G0402 INITIAL PREVENTIVE EXAM: HCPCS | Performed by: INTERNAL MEDICINE

## 2024-11-26 PROCEDURE — 3052F HG A1C>EQUAL 8.0%<EQUAL 9.0%: CPT | Performed by: INTERNAL MEDICINE

## 2024-11-26 ASSESSMENT — PATIENT HEALTH QUESTIONNAIRE - PHQ9
SUM OF ALL RESPONSES TO PHQ QUESTIONS 1-9: 6
SUM OF ALL RESPONSES TO PHQ QUESTIONS 1-9: 6
3. TROUBLE FALLING OR STAYING ASLEEP: MORE THAN HALF THE DAYS
7. TROUBLE CONCENTRATING ON THINGS, SUCH AS READING THE NEWSPAPER OR WATCHING TELEVISION: NOT AT ALL
6. FEELING BAD ABOUT YOURSELF - OR THAT YOU ARE A FAILURE OR HAVE LET YOURSELF OR YOUR FAMILY DOWN: NOT AT ALL
1. LITTLE INTEREST OR PLEASURE IN DOING THINGS: MORE THAN HALF THE DAYS
SUM OF ALL RESPONSES TO PHQ QUESTIONS 1-9: 6
9. THOUGHTS THAT YOU WOULD BE BETTER OFF DEAD, OR OF HURTING YOURSELF: NOT AT ALL
SUM OF ALL RESPONSES TO PHQ9 QUESTIONS 1 & 2: 4
8. MOVING OR SPEAKING SO SLOWLY THAT OTHER PEOPLE COULD HAVE NOTICED. OR THE OPPOSITE, BEING SO FIGETY OR RESTLESS THAT YOU HAVE BEEN MOVING AROUND A LOT MORE THAN USUAL: NOT AT ALL
2. FEELING DOWN, DEPRESSED OR HOPELESS: MORE THAN HALF THE DAYS
5. POOR APPETITE OR OVEREATING: NOT AT ALL
4. FEELING TIRED OR HAVING LITTLE ENERGY: NOT AT ALL
SUM OF ALL RESPONSES TO PHQ QUESTIONS 1-9: 6

## 2024-11-26 ASSESSMENT — LIFESTYLE VARIABLES: HOW OFTEN DO YOU HAVE A DRINK CONTAINING ALCOHOL: NEVER

## 2024-11-26 NOTE — PROGRESS NOTES
Medicare Annual Wellness Visit    Era Carson is here for Medicare AWV and Diabetes    Assessment & Plan    Diagnosis Orders   1. Encounter for annual wellness visit (AWV) in Medicare patient  Health and wellness advice given.       2. 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   Avoidance of nephrotoxins discussed.   Endocrinology and nephrology f/u.       3. Mixed hyperlipidemia  Heart healthy diet discussed.       4. Type 1 diabetes mellitus with complication (HCC)  As above.       5. Other depression  Counseled and discussed grieving, prolonged nature of the process at times and counseling benefit. He will consider counseling.             Recommendations for Preventive Services Due: see orders and patient instructions/AVS.  Recommended screening schedule for the next 5-10 years is provided to the patient in written form: see Patient Instructions/AVS.     No follow-ups on file.     Subjective       Some depression around holidays. With mothers death 1 year ago.   Plans eye exam scheduling for the first week in january.   Blind in left eye. Impaired vision in the right eye.   Google the instructions for cologuard.   Hyperlipidemia. Declines statin therapy. Suggest ezetimibe. He will consider.   No falls recently.   Podiatry exam current. No ulcers now.   Stage 2 CKD, diabetic nephropathy. F/U nephrology. Also has podiatry f/u for diabetic shoes.   Type 1 diabetic. Treated with B-B insulin and followed by endocrinology. A1c 8.7.     Patient's complete Health Risk Assessment and screening values have been reviewed and are found in Flowsheets. The following problems were reviewed today and where indicated follow up appointments were made and/or referrals ordered.  Declines flu covid.   To consider flu.   Positive Risk Factor Screenings with Interventions:    Fall Risk:  Do you feel unsteady or are you worried about falling? : (!) yes  2 or more falls in past year?: (!)  Yes

## 2024-11-26 NOTE — PATIENT INSTRUCTIONS
Ezetimibe for lowering cholesterol.        Preventing Falls: Care Instructions  Injuries and health problems such as trouble walking or poor eyesight can increase your risk of falling. So can some medicines. But there are things you can do to help prevent falls. You can exercise to get stronger. You can also arrange your home to make it safer.    Talk to your doctor about the medicines you take. Ask if any of them increase the risk of falls and whether they can be changed or stopped.   Try to exercise regularly. It can help improve your strength and balance. This can help lower your risk of falling.         Practice fall safety and prevention.   Wear low-heeled shoes that fit well and give your feet good support. Talk to your doctor if you have foot problems that make this hard.  Carry a cellphone or wear a medical alert device that you can use to call for help.  Use stepladders instead of chairs to reach high objects. Don't climb if you're at risk for falls. Ask for help, if needed.  Wear the correct eyeglasses, if you need them.        Make your home safer.   Remove rugs, cords, clutter, and furniture from walkways.  Keep your house well lit. Use night-lights in hallways and bathrooms.  Install and use sturdy handrails on stairways.  Wear nonskid footwear, even inside. Don't walk barefoot or in socks without shoes.        Be safe outside.   Use handrails, curb cuts, and ramps whenever possible.  Keep your hands free by using a shoulder bag or backpack.  Try to walk in well-lit areas. Watch out for uneven ground, changes in pavement, and debris.  Be careful in the winter. Walk on the grass or gravel when sidewalks are slippery. Use de-icer on steps and walkways. Add non-slip devices to shoes.    Put grab bars and nonskid mats in your shower or tub and near the toilet. Try to use a shower chair or bath bench when bathing.   Get into a tub or shower by putting in your weaker leg first. Get out with your strong side

## 2024-11-30 ENCOUNTER — HOSPITAL ENCOUNTER (EMERGENCY)
Age: 49
Discharge: HOME | End: 2024-12-01
Attending: EMERGENCY MEDICINE
Payer: MEDICARE

## 2024-11-30 DIAGNOSIS — E16.2 HYPOGLYCEMIA: Primary | ICD-10-CM

## 2024-11-30 LAB
BASE EXCESS BLDV CALC-SCNC: -8.8 MMOL/L (ref -3–3)
BASOPHILS # BLD: 0 K/UL (ref 0–0.2)
BASOPHILS NFR BLD: 0.4 %
CO2 BLDV-SCNC: 17 MMOL/L
COHGB MFR BLDV: 0.3 % (ref 0–1.5)
DEPRECATED RDW RBC AUTO: 16.1 % (ref 12.4–15.4)
EOSINOPHIL # BLD: 0.1 K/UL (ref 0–0.6)
EOSINOPHIL NFR BLD: 1.3 %
GLUCOSE BLD-MCNC: 220 MG/DL (ref 70–99)
HCO3 BLDV-SCNC: 15.8 MMOL/L (ref 23–29)
HCT VFR BLD AUTO: 33.3 % (ref 40.5–52.5)
HGB BLD-MCNC: 10.9 G/DL (ref 13.5–17.5)
LYMPHOCYTES # BLD: 0.9 K/UL (ref 1–5.1)
LYMPHOCYTES NFR BLD: 7.9 %
MCH RBC QN AUTO: 26.6 PG (ref 26–34)
MCHC RBC AUTO-ENTMCNC: 32.6 G/DL (ref 31–36)
MCV RBC AUTO: 81.4 FL (ref 80–100)
METHGB MFR BLDV: 0.3 %
MONOCYTES # BLD: 0.4 K/UL (ref 0–1.3)
MONOCYTES NFR BLD: 3.8 %
NEUTROPHILS # BLD: 9.4 K/UL (ref 1.7–7.7)
NEUTROPHILS NFR BLD: 86.6 %
O2 THERAPY: ABNORMAL
PCO2 BLDV: 30.4 MMHG (ref 40–50)
PERFORMED ON: ABNORMAL
PH BLDV: 7.33 [PH] (ref 7.35–7.45)
PLATELET # BLD AUTO: 369 K/UL (ref 135–450)
PMV BLD AUTO: 7.1 FL (ref 5–10.5)
PO2 BLDV: 99 MMHG (ref 25–40)
RBC # BLD AUTO: 4.09 M/UL (ref 4.2–5.9)
SAO2 % BLDV: 97 %
WBC # BLD AUTO: 10.9 K/UL (ref 4–11)

## 2024-11-30 PROCEDURE — 85025 COMPLETE CBC W/AUTO DIFF WBC: CPT

## 2024-11-30 PROCEDURE — 99284 EMERGENCY DEPT VISIT MOD MDM: CPT

## 2024-11-30 PROCEDURE — 36415 COLL VENOUS BLD VENIPUNCTURE: CPT

## 2024-11-30 PROCEDURE — 82803 BLOOD GASES ANY COMBINATION: CPT

## 2024-11-30 PROCEDURE — 80053 COMPREHEN METABOLIC PANEL: CPT

## 2024-11-30 PROCEDURE — 96360 HYDRATION IV INFUSION INIT: CPT

## 2024-11-30 ASSESSMENT — LIFESTYLE VARIABLES
HOW OFTEN DO YOU HAVE A DRINK CONTAINING ALCOHOL: NEVER
HOW MANY STANDARD DRINKS CONTAINING ALCOHOL DO YOU HAVE ON A TYPICAL DAY: PATIENT DOES NOT DRINK

## 2024-11-30 ASSESSMENT — PAIN - FUNCTIONAL ASSESSMENT: PAIN_FUNCTIONAL_ASSESSMENT: 0-10

## 2024-11-30 ASSESSMENT — PAIN DESCRIPTION - FREQUENCY: FREQUENCY: INTERMITTENT

## 2024-11-30 ASSESSMENT — PAIN SCALES - GENERAL: PAINLEVEL_OUTOF10: 10

## 2024-11-30 ASSESSMENT — PAIN DESCRIPTION - PAIN TYPE: TYPE: CHRONIC PAIN

## 2024-12-01 VITALS
HEIGHT: 74 IN | WEIGHT: 185 LBS | SYSTOLIC BLOOD PRESSURE: 179 MMHG | RESPIRATION RATE: 18 BRPM | DIASTOLIC BLOOD PRESSURE: 86 MMHG | BODY MASS INDEX: 23.74 KG/M2 | HEART RATE: 98 BPM | OXYGEN SATURATION: 100 % | TEMPERATURE: 97.8 F

## 2024-12-01 LAB
ALBUMIN SERPL-MCNC: 3.5 G/DL (ref 3.4–5)
ALBUMIN/GLOB SERPL: 0.9 {RATIO} (ref 1.1–2.2)
ALP SERPL-CCNC: 88 U/L (ref 40–129)
ALT SERPL-CCNC: 8 U/L (ref 10–40)
ANION GAP SERPL CALCULATED.3IONS-SCNC: 13 MMOL/L (ref 3–16)
AST SERPL-CCNC: 18 U/L (ref 15–37)
BILIRUB SERPL-MCNC: <0.2 MG/DL (ref 0–1)
BUN SERPL-MCNC: 11 MG/DL (ref 7–20)
CALCIUM SERPL-MCNC: 7.6 MG/DL (ref 8.3–10.6)
CHLORIDE SERPL-SCNC: 107 MMOL/L (ref 99–110)
CO2 SERPL-SCNC: 17 MMOL/L (ref 21–32)
CREAT SERPL-MCNC: 1.4 MG/DL (ref 0.9–1.3)
GFR SERPLBLD CREATININE-BSD FMLA CKD-EPI: 62 ML/MIN/{1.73_M2}
GLUCOSE BLD-MCNC: 105 MG/DL (ref 70–99)
GLUCOSE BLD-MCNC: 178 MG/DL (ref 70–99)
GLUCOSE SERPL-MCNC: 234 MG/DL (ref 70–99)
PERFORMED ON: ABNORMAL
PERFORMED ON: ABNORMAL
POTASSIUM SERPL-SCNC: 3.7 MMOL/L (ref 3.5–5.1)
PROT SERPL-MCNC: 7.2 G/DL (ref 6.4–8.2)
SODIUM SERPL-SCNC: 137 MMOL/L (ref 136–145)

## 2024-12-01 PROCEDURE — 96360 HYDRATION IV INFUSION INIT: CPT

## 2024-12-01 PROCEDURE — 2580000003 HC RX 258: Performed by: EMERGENCY MEDICINE

## 2024-12-01 RX ORDER — 0.9 % SODIUM CHLORIDE 0.9 %
1000 INTRAVENOUS SOLUTION INTRAVENOUS ONCE
Status: COMPLETED | OUTPATIENT
Start: 2024-12-01 | End: 2024-12-01

## 2024-12-01 RX ADMIN — SODIUM CHLORIDE 1000 ML: 9 INJECTION, SOLUTION INTRAVENOUS at 00:27

## 2024-12-01 NOTE — ED PROVIDER NOTES
[facility] ED  EMERGENCY DEPARTMENT ENCOUNTER        Pt Name: Era Carson  MRN: [id number]  Birthdate 1975  Date of evaluation: 11/30/2024  Provider: Aziza Franklin MD  PCP: Brandon Gan MD  Note Started: 11:24 PM EST 11/30/24    CHIEF COMPLAINT       Chief Complaint   Patient presents with    Hypoglycemia     Brought in by squad found unconscious at home, Blood sugar checked by squad 65 mg/dl, en route given D10% Blood sugar 244, on arrival Pt conscious oriented, Blood Glucose checked in  mgs/dl. Pt arrived with Bed bugs.       HISTORY OF PRESENT ILLNESS: 1 or more Elements     History from : Patient and EMS    Limitations to history : None    Era Carson is a 49 y.o. male who presents to the emergency department with hypoglycemia.  EMS reported that the patient was unconscious when they got to the home.  Patient thinks that his fiancée called 911.  He does not really remember what happened.  He is a type I diabetic and states that his sugars are \"all over the place.\"  He does have a pump.  He states that he took his insulin this morning and at lunch but he did not take his nighttime medications.  EMS reported that his glucose was 65 when they arrived.  They gave him D10 and his blood sugar was 244 upon arrival to the emergency department.  Patient awake and oriented now.    Nursing Notes were all reviewed and agreed with or any disagreements were addressed in the HPI.    REVIEW OF SYSTEMS :      Review of Systems    10 systems reviewed and negative except as in HPI/MDM    SURGICAL HISTORY     Past Surgical History:   Procedure Laterality Date    ANKLE FRACTURE SURGERY Left 1/28/13    Dr. Coe    BONE INCISION AND DRAINAGE  August 2013    left tibia with external fixation device    EYE SURGERY      retina reattachment left eye x 3 holes repairs    EYE SURGERY Right 9-27-13    retal detachment    LEG SURGERY Left 3/31/14    ORIF left fibula and tibia    [facility] EGD FLEXIBLE

## 2024-12-01 NOTE — ED NOTES
Pt arrived with obvious presence of bed bugs, pt's clothes removed placed in the special pink bag cleansing bath done while in bed, and disposed the items according to protocol - placed in a special plastic bag (pink)

## 2024-12-05 ENCOUNTER — OFFICE VISIT (OUTPATIENT)
Dept: INFECTIOUS DISEASES | Age: 49
End: 2024-12-05
Payer: MEDICARE

## 2024-12-05 VITALS
DIASTOLIC BLOOD PRESSURE: 85 MMHG | HEART RATE: 107 BPM | SYSTOLIC BLOOD PRESSURE: 122 MMHG | WEIGHT: 183 LBS | OXYGEN SATURATION: 99 % | BODY MASS INDEX: 23.49 KG/M2 | HEIGHT: 74 IN | TEMPERATURE: 97.3 F

## 2024-12-05 DIAGNOSIS — M86.662 CHRONIC OSTEOMYELITIS OF LEFT LOWER LEG: Primary | ICD-10-CM

## 2024-12-05 PROCEDURE — 3079F DIAST BP 80-89 MM HG: CPT | Performed by: INTERNAL MEDICINE

## 2024-12-05 PROCEDURE — 99214 OFFICE O/P EST MOD 30 MIN: CPT | Performed by: INTERNAL MEDICINE

## 2024-12-05 PROCEDURE — 3074F SYST BP LT 130 MM HG: CPT | Performed by: INTERNAL MEDICINE

## 2024-12-05 RX ORDER — DICLOXACILLIN SODIUM 500 MG/1
500 CAPSULE ORAL DAILY
Qty: 90 CAPSULE | Refills: 3 | Status: SHIPPED | OUTPATIENT
Start: 2024-12-05 | End: 2025-03-05

## 2024-12-05 NOTE — PROGRESS NOTES
Infectious Diseases Follow-up Note    Reason for Consult:   L ankle / lower leg osteomyelitis  Requesting Physician:   Dr Coe  Primary Care Physician:  Brandon Gan MD  History Obtained From:   Patient, EPIC    CHIEF COMPLAINT:    Chief Complaint   Patient presents with    Follow-up       HISTORY OF PRESENT ILLNESS:      MOST RECENT VISIT:  Today 12/5/24 (previous 12/7/23):   L LE with 'very little' pain, worse with bad weather..    Taking Dicloxacillin bid as prescribed.      Worse GI sx - worse in evening     ##########    Pt sustained fall 1/28/14 and had ORIF.  Pt developed wound dehiscence, drainage.  On 8/28, hardware removed and external fixator placed ([facility]).  Culture + MSSA.  Pt had PICC placed and started on iv ceftriaxone at [facility] which he received until approximately 10/17 (7 weeks).  He has been on po keflex 500 tid since this time.  He reports occasional GI upset.     Ortho visit 1/23, had x-ray, given clearance for full weight bearing.  Last visit 2/17/14, pt reported L leg pain with ambulating.  He attributes pain to muscle weakness.  He has not had any new or worse redness nor drainage.  BS control improved (sees Endocrinologist).      Seen 4/16/14, pt presents with sister.  He had L fibula fracture (proximal to ankle implant) and had operative ORIF 3/31/14 with removal of ankle implant and hardware placed (see report).    Seen 7/21/14, pt presents with sister, in wheelchair (non-ambulatory), has bone stimulator (useds for 20 min once a day).  He has chronic pain that varies, worse at night.  Taking po keflex with occasional GI upset.       Hosp at [facility] 8/17 - 25/14  with CNS infection - CSF , no definitely diagnosis (Enterovirus PCR neg, HSV PCR neg, cult neg).    Seen 11/3, pt presents with sister, feeling well.  He continues to be non-weight bearing, on keflex 500 tid.  Last Ortho 10/7 - cont with bone stimulator.  He had LE CT (tibial fx with \"moderate bridging bone\".  +

## 2024-12-09 DIAGNOSIS — M79.18 MYOFASCIAL PAIN: ICD-10-CM

## 2024-12-09 DIAGNOSIS — G62.9 PERIPHERAL POLYNEUROPATHY: ICD-10-CM

## 2024-12-09 DIAGNOSIS — M79.2 NEUROPATHIC PAIN: ICD-10-CM

## 2024-12-09 DIAGNOSIS — G89.4 CHRONIC PAIN SYNDROME: ICD-10-CM

## 2024-12-09 DIAGNOSIS — L97.522 ULCER OF LEFT FOOT, WITH FAT LAYER EXPOSED (HCC): ICD-10-CM

## 2024-12-09 DIAGNOSIS — M80.862S PATHOLOGICAL FRACTURE OF LEFT TIBIA DUE TO OTHER OSTEOPOROSIS, SEQUELA: ICD-10-CM

## 2024-12-09 DIAGNOSIS — G63 POLYNEUROPATHY ASSOCIATED WITH UNDERLYING DISEASE (HCC): ICD-10-CM

## 2024-12-12 RX ORDER — DIVALPROEX SODIUM 500 MG/1
TABLET, FILM COATED, EXTENDED RELEASE ORAL
Qty: 90 TABLET | OUTPATIENT
Start: 2024-12-12

## 2024-12-18 RX ORDER — TRAMADOL HYDROCHLORIDE 50 MG/1
TABLET ORAL
Qty: 56 TABLET | OUTPATIENT
Start: 2024-12-18

## 2024-12-26 DIAGNOSIS — M80.862S PATHOLOGICAL FRACTURE OF LEFT TIBIA DUE TO OTHER OSTEOPOROSIS, SEQUELA: ICD-10-CM

## 2024-12-26 DIAGNOSIS — G62.9 PERIPHERAL POLYNEUROPATHY: ICD-10-CM

## 2024-12-26 DIAGNOSIS — L97.522 ULCER OF LEFT FOOT, WITH FAT LAYER EXPOSED (HCC): ICD-10-CM

## 2024-12-26 DIAGNOSIS — G89.4 CHRONIC PAIN SYNDROME: ICD-10-CM

## 2024-12-26 DIAGNOSIS — G63 POLYNEUROPATHY ASSOCIATED WITH UNDERLYING DISEASE: ICD-10-CM

## 2024-12-26 DIAGNOSIS — M79.2 NEUROPATHIC PAIN: ICD-10-CM

## 2024-12-26 DIAGNOSIS — M79.18 MYOFASCIAL PAIN: ICD-10-CM

## 2024-12-26 RX ORDER — TRAMADOL HYDROCHLORIDE 50 MG/1
TABLET ORAL
Qty: 56 TABLET | OUTPATIENT
Start: 2024-12-26

## 2024-12-30 DIAGNOSIS — G63 POLYNEUROPATHY ASSOCIATED WITH UNDERLYING DISEASE: ICD-10-CM

## 2024-12-30 DIAGNOSIS — M79.2 NEUROPATHIC PAIN: ICD-10-CM

## 2024-12-30 DIAGNOSIS — M80.862S PATHOLOGICAL FRACTURE OF LEFT TIBIA DUE TO OTHER OSTEOPOROSIS, SEQUELA: ICD-10-CM

## 2024-12-30 DIAGNOSIS — M79.18 MYOFASCIAL PAIN: ICD-10-CM

## 2024-12-30 DIAGNOSIS — L97.522 ULCER OF LEFT FOOT, WITH FAT LAYER EXPOSED (HCC): ICD-10-CM

## 2024-12-30 DIAGNOSIS — G89.4 CHRONIC PAIN SYNDROME: ICD-10-CM

## 2024-12-30 DIAGNOSIS — E10.65 UNCONTROLLED TYPE 1 DIABETES MELLITUS WITH HYPERGLYCEMIA (HCC): Primary | ICD-10-CM

## 2024-12-30 DIAGNOSIS — G62.9 PERIPHERAL POLYNEUROPATHY: ICD-10-CM

## 2024-12-30 RX ORDER — INSULIN GLULISINE 100 [IU]/ML
INJECTION, SOLUTION SUBCUTANEOUS
Qty: 15 ML | Refills: 1 | Status: SHIPPED | OUTPATIENT
Start: 2024-12-30

## 2024-12-30 NOTE — TELEPHONE ENCOUNTER
Patient called wanting to now if he can get in Thursday to see doctor RSM . If he can get in to see him. Please call patient to let him know.

## 2024-12-31 RX ORDER — TRAMADOL HYDROCHLORIDE 50 MG/1
TABLET ORAL
Qty: 56 TABLET | OUTPATIENT
Start: 2024-12-31

## 2025-01-06 DIAGNOSIS — G89.4 CHRONIC PAIN SYNDROME: ICD-10-CM

## 2025-01-07 RX ORDER — TOPIRAMATE 100 MG/1
100 TABLET, FILM COATED ORAL 2 TIMES DAILY
Qty: 60 TABLET | Refills: 1 | OUTPATIENT
Start: 2025-01-07

## 2025-01-16 ENCOUNTER — OFFICE VISIT (OUTPATIENT)
Dept: PAIN MANAGEMENT | Age: 50
End: 2025-01-16
Payer: MEDICARE

## 2025-01-16 VITALS
WEIGHT: 213 LBS | DIASTOLIC BLOOD PRESSURE: 76 MMHG | OXYGEN SATURATION: 99 % | SYSTOLIC BLOOD PRESSURE: 186 MMHG | BODY MASS INDEX: 27.35 KG/M2 | HEART RATE: 80 BPM

## 2025-01-16 DIAGNOSIS — G63 POLYNEUROPATHY ASSOCIATED WITH UNDERLYING DISEASE (HCC): ICD-10-CM

## 2025-01-16 DIAGNOSIS — G89.4 CHRONIC PAIN SYNDROME: ICD-10-CM

## 2025-01-16 DIAGNOSIS — G62.9 PERIPHERAL POLYNEUROPATHY: ICD-10-CM

## 2025-01-16 DIAGNOSIS — Z79.899 ENCOUNTER FOR LONG-TERM (CURRENT) USE OF HIGH-RISK MEDICATION: ICD-10-CM

## 2025-01-16 DIAGNOSIS — M79.18 MYOFASCIAL PAIN: ICD-10-CM

## 2025-01-16 DIAGNOSIS — T40.2X5A CONSTIPATION DUE TO OPIOID THERAPY: ICD-10-CM

## 2025-01-16 DIAGNOSIS — L97.522 ULCER OF LEFT FOOT, WITH FAT LAYER EXPOSED (HCC): ICD-10-CM

## 2025-01-16 DIAGNOSIS — G43.711 CHRONIC MIGRAINE WITHOUT AURA, WITH INTRACTABLE MIGRAINE, SO STATED, WITH STATUS MIGRAINOSUS: ICD-10-CM

## 2025-01-16 DIAGNOSIS — M79.2 NEUROPATHIC PAIN: ICD-10-CM

## 2025-01-16 DIAGNOSIS — F33.41 RECURRENT MAJOR DEPRESSIVE DISORDER, IN PARTIAL REMISSION (HCC): ICD-10-CM

## 2025-01-16 DIAGNOSIS — M80.862S PATHOLOGICAL FRACTURE OF LEFT TIBIA DUE TO OTHER OSTEOPOROSIS, SEQUELA: ICD-10-CM

## 2025-01-16 DIAGNOSIS — F51.01 PRIMARY INSOMNIA: ICD-10-CM

## 2025-01-16 DIAGNOSIS — K59.03 CONSTIPATION DUE TO OPIOID THERAPY: ICD-10-CM

## 2025-01-16 PROCEDURE — 3078F DIAST BP <80 MM HG: CPT | Performed by: INTERNAL MEDICINE

## 2025-01-16 PROCEDURE — 99214 OFFICE O/P EST MOD 30 MIN: CPT | Performed by: INTERNAL MEDICINE

## 2025-01-16 PROCEDURE — 3077F SYST BP >= 140 MM HG: CPT | Performed by: INTERNAL MEDICINE

## 2025-01-16 RX ORDER — DIVALPROEX SODIUM 500 MG/1
500 TABLET, DELAYED RELEASE ORAL 2 TIMES DAILY
Qty: 60 TABLET | Refills: 0 | Status: SHIPPED | OUTPATIENT
Start: 2025-01-16 | End: 2025-02-15

## 2025-01-16 RX ORDER — TRAMADOL HYDROCHLORIDE 50 MG/1
50 TABLET ORAL 2 TIMES DAILY PRN
Qty: 70 TABLET | Refills: 0 | Status: SHIPPED | OUTPATIENT
Start: 2025-01-16 | End: 2025-02-20

## 2025-01-16 RX ORDER — RIMEGEPANT SULFATE 75 MG/75MG
TABLET, ORALLY DISINTEGRATING ORAL
Qty: 16 TABLET | Refills: 1 | Status: SHIPPED | OUTPATIENT
Start: 2025-01-16

## 2025-01-16 RX ORDER — ERENUMAB-AOOE 140 MG/ML
140 INJECTION, SOLUTION SUBCUTANEOUS
Qty: 1 ADJUSTABLE DOSE PRE-FILLED PEN SYRINGE | Refills: 1 | Status: SHIPPED | OUTPATIENT
Start: 2025-01-16

## 2025-01-16 NOTE — PROGRESS NOTES
Era Carson  1975  [id number]    HISTORY OF PRESENT ILLNESS:  Mr. Carson is a 49 y.o. male returns for a follow up visit for multiple medical problems.  His  presenting problems are   1. Chronic pain syndrome    2. Myofascial pain    3. Peripheral polyneuropathy    4. Neuropathic pain    5. Polyneuropathy associated with underlying disease (HCC)    6. Pathological fracture of left tibia due to other osteoporosis, sequela    7. Ulcer of left foot, with fat layer exposed (HCC)    8. Chronic migraine without aura, with intractable migraine, so stated, with status migrainosus    9. Recurrent major depressive disorder, in partial remission (HCC)    10. Constipation due to opioid therapy    11. Primary insomnia    12. Encounter for long-term (current) use of high-risk medication    .    As per information/history obtained from the PADT(patient assessment and documentation tool) -  He complains of pain in the  generalize pain all over   He rates the pain 10/10 and describes it as sharp, aching, burning, numbness, pins and needles.  Pain is made worse by: movement, walking, standing, sitting, bending, lifting.  Current treatment regimen has helped relieve about 10% of the pain.  He denies side effects from the current pain regimen.   Patient reports that since last follow up visit the physical functioning is unchanged, family/social relationships are unchanged, mood is unchanged sleep patterns are better.  Mr. Carson states that since starting the treatment with the current regimen the  overall functioning  in the above aspects is  better,Patient denies neurological bowel or bladder. Patient denies misusing/abusing his narcotic pain medications or using any illegal drugs.  There are No indicators for possible drug abuse, addiction or diversion problems.     Upon obtaining the medical history from Mr. Carson regarding today's office visit for his presenting problems, patient states he is having increase pain.

## 2025-01-28 DIAGNOSIS — E10.65 UNCONTROLLED TYPE 1 DIABETES MELLITUS WITH HYPERGLYCEMIA (HCC): Primary | ICD-10-CM

## 2025-01-28 RX ORDER — GLUCAGON INJECTION, SOLUTION 1 MG/.2ML
INJECTION, SOLUTION SUBCUTANEOUS
Qty: 0.4 ML | Refills: 2 | Status: SHIPPED | OUTPATIENT
Start: 2025-01-28

## 2025-01-30 ENCOUNTER — TELEPHONE (OUTPATIENT)
Dept: PAIN MANAGEMENT | Age: 50
End: 2025-01-30

## 2025-01-30 NOTE — TELEPHONE ENCOUNTER
Submitted PA for [employer] Key: [id number] STATUS: APPROVED 10/31/2024-1/30/2026.    Authorization Expiration Date: 1/29/2026.    [employer] has been notified. Thank you!

## 2025-02-11 DIAGNOSIS — G43.711 CHRONIC MIGRAINE WITHOUT AURA, WITH INTRACTABLE MIGRAINE, SO STATED, WITH STATUS MIGRAINOSUS: ICD-10-CM

## 2025-02-11 DIAGNOSIS — G89.4 CHRONIC PAIN SYNDROME: ICD-10-CM

## 2025-02-11 RX ORDER — DIVALPROEX SODIUM 500 MG/1
500 TABLET, FILM COATED, EXTENDED RELEASE ORAL 2 TIMES DAILY
Qty: 60 TABLET | OUTPATIENT
Start: 2025-02-11

## 2025-02-20 ENCOUNTER — OFFICE VISIT (OUTPATIENT)
Dept: PAIN MANAGEMENT | Age: 50
End: 2025-02-20

## 2025-02-20 VITALS
BODY MASS INDEX: 27.35 KG/M2 | DIASTOLIC BLOOD PRESSURE: 77 MMHG | OXYGEN SATURATION: 99 % | WEIGHT: 213 LBS | SYSTOLIC BLOOD PRESSURE: 142 MMHG | HEART RATE: 78 BPM

## 2025-02-20 DIAGNOSIS — G62.9 PERIPHERAL POLYNEUROPATHY: ICD-10-CM

## 2025-02-20 DIAGNOSIS — M80.862S PATHOLOGICAL FRACTURE OF LEFT TIBIA DUE TO OTHER OSTEOPOROSIS, SEQUELA: ICD-10-CM

## 2025-02-20 DIAGNOSIS — G89.4 CHRONIC PAIN SYNDROME: ICD-10-CM

## 2025-02-20 DIAGNOSIS — G63 POLYNEUROPATHY ASSOCIATED WITH UNDERLYING DISEASE: ICD-10-CM

## 2025-02-20 DIAGNOSIS — Z91.89 AT RISK FOR RESPIRATORY DEPRESSION DUE TO OPIOID: ICD-10-CM

## 2025-02-20 DIAGNOSIS — L97.522 ULCER OF LEFT FOOT, WITH FAT LAYER EXPOSED (HCC): ICD-10-CM

## 2025-02-20 DIAGNOSIS — M79.2 NEUROPATHIC PAIN: ICD-10-CM

## 2025-02-20 DIAGNOSIS — M79.18 MYOFASCIAL PAIN: ICD-10-CM

## 2025-02-20 RX ORDER — RIMEGEPANT SULFATE 75 MG/75MG
TABLET, ORALLY DISINTEGRATING ORAL
Qty: 16 TABLET | Refills: 1 | Status: SHIPPED | OUTPATIENT
Start: 2025-02-20

## 2025-02-20 RX ORDER — ERENUMAB-AOOE 140 MG/ML
140 INJECTION, SOLUTION SUBCUTANEOUS
Qty: 1 ADJUSTABLE DOSE PRE-FILLED PEN SYRINGE | Refills: 1 | Status: SHIPPED | OUTPATIENT
Start: 2025-02-20

## 2025-02-20 RX ORDER — DIVALPROEX SODIUM 500 MG/1
500 TABLET, DELAYED RELEASE ORAL 2 TIMES DAILY
Qty: 60 TABLET | Refills: 0 | Status: SHIPPED | OUTPATIENT
Start: 2025-02-20 | End: 2025-03-22

## 2025-02-20 RX ORDER — TRAMADOL HYDROCHLORIDE 50 MG/1
50 TABLET ORAL 2 TIMES DAILY PRN
Qty: 56 TABLET | Refills: 0 | Status: SHIPPED | OUTPATIENT
Start: 2025-02-20 | End: 2025-03-20

## 2025-02-20 NOTE — PROGRESS NOTES
Era Carson  1975  [id number]      HISTORY OF PRESENT ILLNESS:  Mr. Carson is a 49 y.o. male returns for a follow up visit for pain management  He has a diagnosis of   1. Chronic pain syndrome    2. Peripheral polyneuropathy    3. Pathological fracture of left tibia due to other osteoporosis, sequela    4. Constipation due to opioid therapy    5. Chronic migraine without aura, with intractable migraine, so stated, with status migrainosus    6. Neuropathic pain    7. Ulcer of left foot, with fat layer exposed (HCC)    8. Primary insomnia    9. Myofascial pain    10. Polyneuropathy associated with underlying disease    11. Recurrent major depressive disorder, in partial remission    .      As per information/history obtained from the PADT(patient assessment and documentation tool) -  He complains of pain in the  generalize pain all over  He rates the pain 10/10 and describes it as sharp, aching, burning, numbness, pins and needles.  Pain is made worse by: movement, walking, standing, sitting, bending, lifting. He denies any side effects from the current pain regimen. Patient reports that since last follow up visit the physical functioning is unchanged, family/social relationships are better, mood is better sleep patterns are better. Mr. Carson states that since starting the treatment with the current regimen the  overall functioning  in the above aspects is  better, Patient denies misusing/abusing his narcotic pain medications or using any illegal drugs.  There are No indicators for possible drug abuse, addiction or diversion problems.   Upon obtaining the medical history from Mr. Carson regarding today's office visit for his presenting problems, patient states he has been doing fair. Mr. Carson reports his blood sugar has been doing better. He states he is using Depakote  mg, 2 per day along with Ultram, 2 per day, it is helping with pain.       ALLERGIES: Patients list of allergies were reviewed

## 2025-02-26 ENCOUNTER — OFFICE VISIT (OUTPATIENT)
Dept: INTERNAL MEDICINE CLINIC | Age: 50
End: 2025-02-26
Payer: MEDICARE

## 2025-02-26 ENCOUNTER — TELEPHONE (OUTPATIENT)
Dept: INTERNAL MEDICINE CLINIC | Age: 50
End: 2025-02-26

## 2025-02-26 VITALS — WEIGHT: 213 LBS | DIASTOLIC BLOOD PRESSURE: 86 MMHG | SYSTOLIC BLOOD PRESSURE: 132 MMHG | BODY MASS INDEX: 27.35 KG/M2

## 2025-02-26 DIAGNOSIS — Z12.5 PROSTATE CANCER SCREENING: ICD-10-CM

## 2025-02-26 DIAGNOSIS — E78.2 MIXED HYPERLIPIDEMIA: ICD-10-CM

## 2025-02-26 DIAGNOSIS — E10.3593 PROLIFERATIVE DIABETIC RETINOPATHY OF BOTH EYES ASSOCIATED WITH TYPE 1 DIABETES MELLITUS, UNSPECIFIED PROLIFERATIVE RETINOPATHY TYPE: ICD-10-CM

## 2025-02-26 DIAGNOSIS — E55.9 VITAMIN D DEFICIENCY: ICD-10-CM

## 2025-02-26 DIAGNOSIS — N18.31 CHRONIC KIDNEY DISEASE, STAGE 3A (HCC): ICD-10-CM

## 2025-02-26 DIAGNOSIS — J02.9 PHARYNGITIS, UNSPECIFIED ETIOLOGY: ICD-10-CM

## 2025-02-26 DIAGNOSIS — E10.8 TYPE 1 DIABETES MELLITUS WITH COMPLICATION (HCC): Primary | ICD-10-CM

## 2025-02-26 DIAGNOSIS — I10 PRIMARY HYPERTENSION: ICD-10-CM

## 2025-02-26 DIAGNOSIS — J02.9 SORE THROAT: ICD-10-CM

## 2025-02-26 LAB
25(OH)D3 SERPL-MCNC: 64.3 NG/ML
ALBUMIN SERPL-MCNC: 3.9 G/DL (ref 3.4–5)
ALBUMIN/GLOB SERPL: 1.1 {RATIO} (ref 1.1–2.2)
ALP SERPL-CCNC: 72 U/L (ref 40–129)
ALT SERPL-CCNC: 10 U/L (ref 10–40)
ANION GAP SERPL CALCULATED.3IONS-SCNC: 13 MMOL/L (ref 3–16)
AST SERPL-CCNC: 15 U/L (ref 15–37)
BASOPHILS # BLD: 0.1 K/UL (ref 0–0.2)
BASOPHILS NFR BLD: 1.4 %
BILIRUB SERPL-MCNC: <0.2 MG/DL (ref 0–1)
BUN SERPL-MCNC: 23 MG/DL (ref 7–20)
CALCIUM SERPL-MCNC: 8.8 MG/DL (ref 8.3–10.6)
CHLORIDE SERPL-SCNC: 97 MMOL/L (ref 99–110)
CHOLEST SERPL-MCNC: 183 MG/DL (ref 0–199)
CHP ED QC CHECK: NORMAL
CO2 SERPL-SCNC: 22 MMOL/L (ref 21–32)
CREAT SERPL-MCNC: 1.8 MG/DL (ref 0.9–1.3)
CREAT UR-MCNC: 37 MG/DL (ref 39–259)
DEPRECATED RDW RBC AUTO: 17.3 % (ref 12.4–15.4)
EOSINOPHIL # BLD: 0.4 K/UL (ref 0–0.6)
EOSINOPHIL NFR BLD: 9.3 %
GFR SERPLBLD CREATININE-BSD FMLA CKD-EPI: 46 ML/MIN/{1.73_M2}
GLUCOSE BLD-MCNC: 335 MG/DL
GLUCOSE SERPL-MCNC: 508 MG/DL (ref 70–99)
HCT VFR BLD AUTO: 35.2 % (ref 40.5–52.5)
HDLC SERPL-MCNC: 37 MG/DL (ref 40–60)
HGB BLD-MCNC: 11.4 G/DL (ref 13.5–17.5)
LDLC SERPL CALC-MCNC: 114 MG/DL
LYMPHOCYTES # BLD: 1.3 K/UL (ref 1–5.1)
LYMPHOCYTES NFR BLD: 29.7 %
MCH RBC QN AUTO: 25.3 PG (ref 26–34)
MCHC RBC AUTO-ENTMCNC: 32.4 G/DL (ref 31–36)
MCV RBC AUTO: 78.1 FL (ref 80–100)
MICROALBUMIN UR DL<=1MG/L-MCNC: 4.61 MG/DL
MICROALBUMIN/CREAT UR: 124.6 MG/G (ref 0–30)
MONOCYTES # BLD: 0.4 K/UL (ref 0–1.3)
MONOCYTES NFR BLD: 8.3 %
NEUTROPHILS # BLD: 2.2 K/UL (ref 1.7–7.7)
NEUTROPHILS NFR BLD: 51.3 %
PLATELET # BLD AUTO: 354 K/UL (ref 135–450)
PMV BLD AUTO: 8.1 FL (ref 5–10.5)
POTASSIUM SERPL-SCNC: 3.8 MMOL/L (ref 3.5–5.1)
PROT SERPL-MCNC: 7.5 G/DL (ref 6.4–8.2)
PSA SERPL DL<=0.01 NG/ML-MCNC: 0.27 NG/ML (ref 0–4)
RBC # BLD AUTO: 4.5 M/UL (ref 4.2–5.9)
SODIUM SERPL-SCNC: 132 MMOL/L (ref 136–145)
TRIGL SERPL-MCNC: 161 MG/DL (ref 0–150)
TSH SERPL DL<=0.005 MIU/L-ACNC: 3.26 UIU/ML (ref 0.27–4.2)
VLDLC SERPL CALC-MCNC: 32 MG/DL
WBC # BLD AUTO: 4.4 K/UL (ref 4–11)

## 2025-02-26 PROCEDURE — 82962 GLUCOSE BLOOD TEST: CPT | Performed by: INTERNAL MEDICINE

## 2025-02-26 PROCEDURE — 3079F DIAST BP 80-89 MM HG: CPT | Performed by: INTERNAL MEDICINE

## 2025-02-26 PROCEDURE — G2211 COMPLEX E/M VISIT ADD ON: HCPCS | Performed by: INTERNAL MEDICINE

## 2025-02-26 PROCEDURE — 99214 OFFICE O/P EST MOD 30 MIN: CPT | Performed by: INTERNAL MEDICINE

## 2025-02-26 PROCEDURE — 3075F SYST BP GE 130 - 139MM HG: CPT | Performed by: INTERNAL MEDICINE

## 2025-02-26 SDOH — ECONOMIC STABILITY: FOOD INSECURITY: WITHIN THE PAST 12 MONTHS, YOU WORRIED THAT YOUR FOOD WOULD RUN OUT BEFORE YOU GOT MONEY TO BUY MORE.: NEVER TRUE

## 2025-02-26 SDOH — ECONOMIC STABILITY: FOOD INSECURITY: WITHIN THE PAST 12 MONTHS, THE FOOD YOU BOUGHT JUST DIDN'T LAST AND YOU DIDN'T HAVE MONEY TO GET MORE.: NEVER TRUE

## 2025-02-26 ASSESSMENT — PATIENT HEALTH QUESTIONNAIRE - PHQ9
6. FEELING BAD ABOUT YOURSELF - OR THAT YOU ARE A FAILURE OR HAVE LET YOURSELF OR YOUR FAMILY DOWN: NOT AT ALL
2. FEELING DOWN, DEPRESSED OR HOPELESS: NOT AT ALL
4. FEELING TIRED OR HAVING LITTLE ENERGY: NOT AT ALL
SUM OF ALL RESPONSES TO PHQ QUESTIONS 1-9: 0
5. POOR APPETITE OR OVEREATING: NOT AT ALL
1. LITTLE INTEREST OR PLEASURE IN DOING THINGS: NOT AT ALL
SUM OF ALL RESPONSES TO PHQ QUESTIONS 1-9: 0
3. TROUBLE FALLING OR STAYING ASLEEP: NOT AT ALL
8. MOVING OR SPEAKING SO SLOWLY THAT OTHER PEOPLE COULD HAVE NOTICED. OR THE OPPOSITE, BEING SO FIGETY OR RESTLESS THAT YOU HAVE BEEN MOVING AROUND A LOT MORE THAN USUAL: NOT AT ALL
9. THOUGHTS THAT YOU WOULD BE BETTER OFF DEAD, OR OF HURTING YOURSELF: NOT AT ALL
7. TROUBLE CONCENTRATING ON THINGS, SUCH AS READING THE NEWSPAPER OR WATCHING TELEVISION: NOT AT ALL
SUM OF ALL RESPONSES TO PHQ QUESTIONS 1-9: 0
SUM OF ALL RESPONSES TO PHQ QUESTIONS 1-9: 0

## 2025-02-26 NOTE — PATIENT INSTRUCTIONS
Nature's Plus, a source of life. Yellow bottle. Multiple vitamin and mineral supplement. 1 capful daily.

## 2025-02-26 NOTE — TELEPHONE ENCOUNTER
Patients sister called concerned about patients mental health. Patients sister asked after appointment if he told  about what has been going on since he did not allow her into appointment .Patients sister says she is very scared of what he will do because he states that he does not wish to be here. He has told her he does not care about his health and she has to force him to take his medications. Patients sister wants him to talk with someone and help him through this if possible. She would like a callback as soon as possible.

## 2025-02-27 NOTE — TELEPHONE ENCOUNTER
Called patient after hearing sister's concerns. His VM was full. Will reach out tomorrow.  Call patient tomorrow during the day.

## 2025-03-23 DIAGNOSIS — E10.65 UNCONTROLLED TYPE 1 DIABETES MELLITUS WITH HYPERGLYCEMIA (HCC): ICD-10-CM

## 2025-03-24 ENCOUNTER — PATIENT MESSAGE (OUTPATIENT)
Dept: INTERNAL MEDICINE CLINIC | Age: 50
End: 2025-03-24

## 2025-03-24 DIAGNOSIS — E10.65 UNCONTROLLED TYPE 1 DIABETES MELLITUS WITH HYPERGLYCEMIA (HCC): ICD-10-CM

## 2025-03-24 DIAGNOSIS — G89.4 CHRONIC PAIN SYNDROME: ICD-10-CM

## 2025-03-24 RX ORDER — DIVALPROEX SODIUM 500 MG/1
500 TABLET, DELAYED RELEASE ORAL 2 TIMES DAILY
Qty: 60 TABLET | Refills: 0 | OUTPATIENT
Start: 2025-03-24

## 2025-03-24 RX ORDER — INSULIN GLULISINE 100 [IU]/ML
INJECTION, SOLUTION SUBCUTANEOUS
Qty: 15 ML | Refills: 0 | Status: SHIPPED | OUTPATIENT
Start: 2025-03-24 | End: 2025-03-24 | Stop reason: SDUPTHER

## 2025-03-24 RX ORDER — INSULIN GLULISINE 100 [IU]/ML
INJECTION, SOLUTION SUBCUTANEOUS
Qty: 10 ML | Refills: 0 | Status: SHIPPED | OUTPATIENT
Start: 2025-03-24

## 2025-03-24 NOTE — TELEPHONE ENCOUNTER
Pharmacy called Apidra only comes in 10mL vials and the script was written for 15mL.    Please advise?

## 2025-03-25 RX ORDER — GLUCAGON INJECTION, SOLUTION 1 MG/.2ML
INJECTION, SOLUTION SUBCUTANEOUS
Qty: 0.4 ML | Refills: 2 | Status: SHIPPED | OUTPATIENT
Start: 2025-03-25

## 2025-03-25 NOTE — TELEPHONE ENCOUNTER
Medication:   Requested Prescriptions     Pending Prescriptions Disp Refills    GVOKE HYPOPEN 2-PACK 1 MG/0.2ML SOAJ [Pharmacy Med Name: GVOKE HYPOPEN 2-PK 1 MG/0.2 ML] 0.4 mL 2     Sig: INJECT AS NEEDED FOR LOW BLOOD SUGAR AS DIRECTED       Last Filled:      Patient Phone Number: [phone] (home)     Last appt: 11/20/2024   Next appt: 4/28/2025    Last Labs DM:   Lab Results   Component Value Date/Time    LABA1C 8.7 11/20/2024 11:40 AM

## 2025-03-27 ENCOUNTER — OFFICE VISIT (OUTPATIENT)
Dept: PAIN MANAGEMENT | Age: 50
End: 2025-03-27

## 2025-03-27 VITALS
SYSTOLIC BLOOD PRESSURE: 140 MMHG | HEART RATE: 79 BPM | DIASTOLIC BLOOD PRESSURE: 73 MMHG | BODY MASS INDEX: 27.35 KG/M2 | WEIGHT: 213 LBS | OXYGEN SATURATION: 99 %

## 2025-03-27 DIAGNOSIS — G89.4 CHRONIC PAIN SYNDROME: ICD-10-CM

## 2025-03-27 DIAGNOSIS — Z91.89 AT RISK FOR RESPIRATORY DEPRESSION DUE TO OPIOID: ICD-10-CM

## 2025-03-27 DIAGNOSIS — M79.2 NEUROPATHIC PAIN: ICD-10-CM

## 2025-03-27 DIAGNOSIS — G62.9 PERIPHERAL POLYNEUROPATHY: ICD-10-CM

## 2025-03-27 DIAGNOSIS — G63 POLYNEUROPATHY ASSOCIATED WITH UNDERLYING DISEASE: ICD-10-CM

## 2025-03-27 DIAGNOSIS — L97.522 ULCER OF LEFT FOOT, WITH FAT LAYER EXPOSED (HCC): ICD-10-CM

## 2025-03-27 DIAGNOSIS — M80.862S PATHOLOGICAL FRACTURE OF LEFT TIBIA DUE TO OTHER OSTEOPOROSIS, SEQUELA: ICD-10-CM

## 2025-03-27 DIAGNOSIS — M79.18 MYOFASCIAL PAIN: ICD-10-CM

## 2025-03-27 RX ORDER — ERENUMAB-AOOE 140 MG/ML
140 INJECTION, SOLUTION SUBCUTANEOUS
Qty: 1 ADJUSTABLE DOSE PRE-FILLED PEN SYRINGE | Refills: 1 | Status: SHIPPED | OUTPATIENT
Start: 2025-03-27

## 2025-03-27 RX ORDER — DIVALPROEX SODIUM 500 MG/1
500 TABLET, DELAYED RELEASE ORAL 2 TIMES DAILY
Qty: 60 TABLET | Refills: 0 | Status: SHIPPED | OUTPATIENT
Start: 2025-03-27 | End: 2025-04-26

## 2025-03-27 RX ORDER — RIMEGEPANT SULFATE 75 MG/75MG
TABLET, ORALLY DISINTEGRATING ORAL
Qty: 16 TABLET | Refills: 1 | Status: SHIPPED | OUTPATIENT
Start: 2025-03-27

## 2025-03-27 RX ORDER — TRAMADOL HYDROCHLORIDE 50 MG/1
50 TABLET ORAL 2 TIMES DAILY PRN
Qty: 56 TABLET | Refills: 0 | Status: SHIPPED | OUTPATIENT
Start: 2025-03-27 | End: 2025-04-24

## 2025-03-27 NOTE — PROGRESS NOTES
alcohol (LIQUIFILM TEARS) 1.4 % ophthalmic solution 1 drop as needed      Flaxseed, Linseed, (FLAX PO) Take 14 g by mouth daily as needed       divalproex (DEPAKOTE) 500 MG DR tablet Take 1 tablet by mouth 2 times daily 60 tablet 0     No facility-administered medications prior to visit.        REVIEW OF SYSTEMS:    Respiratory: Negative for apnea, chest tightness and shortness of breath or change in baseline breathing.      PHYSICAL EXAM:   Nursing note and vitals reviewed. BP (!) 140/73   Pulse 79   Wt 96.6 kg (213 lb)   SpO2 99%   BMI 27.35 kg/m²   Constitutional: He appears well-developed and well-nourished. No acute distress.   Cardiovascular: Normal rate, regular rhythm, normal heart sounds, and does not have murmur.     Pulmonary/Chest: Effort normal. No respiratory distress. He does not have wheezes in the lung fields. He has no rales.     Musculo-Skeletal/Extremities:Gait is normal, assistive devices use: crutches.    He exhibits edema: none, and no tenderness.   Neurological/Psychiatric:He is alert and oriented to person, place, and time. Coordination is  normal.  His mood isAppropriate and affect is Neutral/Euthymic(normal) .    IMPRESSION:   1. Chronic pain syndrome    2. Ulcer of left foot, with fat layer exposed (HCC)    3. Myofascial pain    4. Peripheral polyneuropathy    5. Pathological fracture of left tibia due to other osteoporosis, sequela    6. At risk for respiratory depression due to opioid    7. Polyneuropathy associated with underlying disease    8. Neuropathic pain        PLAN:  Informed verbal consent was obtained.  Risks and benefits of the medications and other alternative treatments  including no treatment have been discussed with the patient. Any questions related to these were addressed. The common side effects of these medications were also explained to the patient.    -[place]AR[place] record was obtained and reviewed  for the last one year and no indicators of drug misuse  were found.

## 2025-03-28 ASSESSMENT — ENCOUNTER SYMPTOMS
RESPIRATORY NEGATIVE: 1
GASTROINTESTINAL NEGATIVE: 1
EYES NEGATIVE: 1

## 2025-03-29 NOTE — PROGRESS NOTES
liPatient: Era Carson is a 49 y.o. male who presents today with the following Chief Complaint(s):    Chief Complaint   Patient presents with    Follow-up    Diabetes         HPIhe is here for a check up. He has T1 DM, followed by endo. Notes he cannot use CGM. Allergic to adhesive. Continue to work on meal planning and physical activity. Most recent A1c 8.7. Treated with B-B insulin. Followed and treated by endocrinology.          He has hyperlipidemia but refuses rx therapy. Relies on diet and physical activity. .          Stage 3a CKD. Followed by nephrology. Latest B/Cr 17/1.6. GFR 53.          Sore throat recently. POCT strep test negative today.         Declines routine vaccines.      Current Outpatient Medications   Medication Sig Dispense Refill    naloxone (NARCAN) 4 MG/0.1ML LIQD nasal spray 1 spray by Nasal route as needed for Opioid Reversal 1 each 0    LANTUS 100 UNIT/ML injection vial Inject 15 Units into the skin nightly INJECT 15 UNITS UNDER THE SKIN twice daily before breakfast and nightly. Hold nightly dose if PM BG<90 30 mL 3    Diabetic Shoe MISC by Does not apply route 1 each 0    SUMAtriptan (IMITREX) 50 MG tablet Take 1 tablet by mouth once as needed for Migraine      levocetirizine (XYZAL) 5 MG tablet Take 1 tablet by mouth daily      promethazine (PHENERGAN) 12.5 MG tablet TAKE ONE TABLET BY MOUTH THREE TIMES A DAY BEFORE A MEAL AS NEEDED FOR NAUSEA 18 tablet 0    pantoprazole (PROTONIX) 40 MG tablet Take 1 tablet by mouth 2 times daily 60 tablet 1    Multiple Vitamin (DAILY KITTY) TABS TAKE ONE TABLET BY MOUTH DAILY 30 tablet 5    Cholecalciferol (VITAMIN D3) 5000 units CAPS Take 1 capsule by mouth Daily 30 capsule 3    omega-3 acid ethyl esters (LOVAZA) 1 G capsule TAKE TWO CAPSULES BY MOUTH TWICE DAILY 120 capsule 5    Probiotic Product (ACIDOPHILUS PROBIOTIC) CAPS capsule TAKE ONE CAPSULE BY MOUTH DAILY 28 capsule 4    polyvinyl alcohol (LIQUIFILM TEARS) 1.4 % ophthalmic

## 2025-04-13 ENCOUNTER — HOSPITAL ENCOUNTER (EMERGENCY)
Age: 50
Discharge: HOME | End: 2025-04-13
Attending: STUDENT IN AN ORGANIZED HEALTH CARE EDUCATION/TRAINING PROGRAM
Payer: MEDICARE

## 2025-04-13 ENCOUNTER — APPOINTMENT (OUTPATIENT)
Dept: CT IMAGING | Age: 50
End: 2025-04-13
Payer: MEDICARE

## 2025-04-13 ENCOUNTER — APPOINTMENT (OUTPATIENT)
Dept: GENERAL RADIOLOGY | Age: 50
End: 2025-04-13
Payer: MEDICARE

## 2025-04-13 VITALS
BODY MASS INDEX: 25.03 KG/M2 | HEIGHT: 74 IN | TEMPERATURE: 97.6 F | OXYGEN SATURATION: 100 % | RESPIRATION RATE: 16 BRPM | SYSTOLIC BLOOD PRESSURE: 168 MMHG | DIASTOLIC BLOOD PRESSURE: 92 MMHG | HEART RATE: 90 BPM | WEIGHT: 195 LBS

## 2025-04-13 DIAGNOSIS — E16.2 HYPOGLYCEMIA: Primary | ICD-10-CM

## 2025-04-13 LAB
ALBUMIN SERPL-MCNC: 3.6 G/DL (ref 3.4–5)
ALBUMIN/GLOB SERPL: 0.9 {RATIO} (ref 1.1–2.2)
ALP SERPL-CCNC: 78 U/L (ref 40–129)
ALT SERPL-CCNC: 13 U/L (ref 10–40)
ANION GAP SERPL CALCULATED.3IONS-SCNC: 14 MMOL/L (ref 3–16)
AST SERPL-CCNC: 19 U/L (ref 15–37)
BASOPHILS # BLD: 0 K/UL (ref 0–0.2)
BASOPHILS NFR BLD: 0.5 %
BILIRUB SERPL-MCNC: <0.2 MG/DL (ref 0–1)
BILIRUB UR QL STRIP.AUTO: NEGATIVE
BUN SERPL-MCNC: 13 MG/DL (ref 7–20)
CALCIUM SERPL-MCNC: 8.7 MG/DL (ref 8.3–10.6)
CHLORIDE SERPL-SCNC: 104 MMOL/L (ref 99–110)
CLARITY UR: CLEAR
CO2 SERPL-SCNC: 22 MMOL/L (ref 21–32)
COLOR UR: YELLOW
CREAT SERPL-MCNC: 1.6 MG/DL (ref 0.9–1.3)
DEPRECATED RDW RBC AUTO: 20.3 % (ref 12.4–15.4)
EKG ATRIAL RATE: 92 BPM
EKG DIAGNOSIS: NORMAL
EKG P AXIS: 20 DEGREES
EKG P-R INTERVAL: 128 MS
EKG Q-T INTERVAL: 364 MS
EKG QRS DURATION: 90 MS
EKG QTC CALCULATION (BAZETT): 450 MS
EKG R AXIS: -1 DEGREES
EKG T AXIS: 114 DEGREES
EKG VENTRICULAR RATE: 92 BPM
EOSINOPHIL # BLD: 0.1 K/UL (ref 0–0.6)
EOSINOPHIL NFR BLD: 1.9 %
GFR SERPLBLD CREATININE-BSD FMLA CKD-EPI: 53 ML/MIN/{1.73_M2}
GLUCOSE BLD-MCNC: 126 MG/DL (ref 70–99)
GLUCOSE BLD-MCNC: 152 MG/DL (ref 70–99)
GLUCOSE BLD-MCNC: 182 MG/DL (ref 70–99)
GLUCOSE SERPL-MCNC: 128 MG/DL (ref 70–99)
GLUCOSE UR STRIP.AUTO-MCNC: NEGATIVE MG/DL
HCT VFR BLD AUTO: 38.4 % (ref 40.5–52.5)
HGB BLD-MCNC: 12.4 G/DL (ref 13.5–17.5)
HGB UR QL STRIP.AUTO: NEGATIVE
HYALINE CASTS #/AREA URNS LPF: ABNORMAL /LPF (ref 0–2)
KETONES UR STRIP.AUTO-MCNC: NEGATIVE MG/DL
LEUKOCYTE ESTERASE UR QL STRIP.AUTO: NEGATIVE
LYMPHOCYTES # BLD: 1.1 K/UL (ref 1–5.1)
LYMPHOCYTES NFR BLD: 18.9 %
MAGNESIUM SERPL-MCNC: 1.94 MG/DL (ref 1.8–2.4)
MCH RBC QN AUTO: 25.7 PG (ref 26–34)
MCHC RBC AUTO-ENTMCNC: 32.3 G/DL (ref 31–36)
MCV RBC AUTO: 79.6 FL (ref 80–100)
MONOCYTES # BLD: 0.4 K/UL (ref 0–1.3)
MONOCYTES NFR BLD: 6.1 %
MUCOUS THREADS #/AREA URNS LPF: ABNORMAL /LPF
NEUTROPHILS # BLD: 4.2 K/UL (ref 1.7–7.7)
NEUTROPHILS NFR BLD: 72.6 %
NITRITE UR QL STRIP.AUTO: NEGATIVE
PERFORMED ON: ABNORMAL
PH UR STRIP.AUTO: 6 [PH] (ref 5–8)
PLATELET # BLD AUTO: 387 K/UL (ref 135–450)
PMV BLD AUTO: 7.3 FL (ref 5–10.5)
POTASSIUM SERPL-SCNC: 3.3 MMOL/L (ref 3.5–5.1)
PROT SERPL-MCNC: 7.5 G/DL (ref 6.4–8.2)
PROT UR STRIP.AUTO-MCNC: 30 MG/DL
RBC # BLD AUTO: 4.83 M/UL (ref 4.2–5.9)
RBC #/AREA URNS HPF: ABNORMAL /HPF (ref 0–4)
SODIUM SERPL-SCNC: 140 MMOL/L (ref 136–145)
SP GR UR STRIP.AUTO: 1.02 (ref 1–1.03)
TROPONIN, HIGH SENSITIVITY: 25 NG/L (ref 0–22)
TROPONIN, HIGH SENSITIVITY: 30 NG/L (ref 0–22)
UA COMPLETE W REFLEX CULTURE PNL UR: ABNORMAL
UA DIPSTICK W REFLEX MICRO PNL UR: YES
URN SPEC COLLECT METH UR: ABNORMAL
UROBILINOGEN UR STRIP-ACNC: 0.2 E.U./DL
WBC # BLD AUTO: 5.7 K/UL (ref 4–11)
WBC #/AREA URNS HPF: ABNORMAL /HPF (ref 0–5)

## 2025-04-13 PROCEDURE — 83735 ASSAY OF MAGNESIUM: CPT

## 2025-04-13 PROCEDURE — 84484 ASSAY OF TROPONIN QUANT: CPT

## 2025-04-13 PROCEDURE — 99285 EMERGENCY DEPT VISIT HI MDM: CPT

## 2025-04-13 PROCEDURE — 36415 COLL VENOUS BLD VENIPUNCTURE: CPT

## 2025-04-13 PROCEDURE — 85025 COMPLETE CBC W/AUTO DIFF WBC: CPT

## 2025-04-13 PROCEDURE — 72125 CT NECK SPINE W/O DYE: CPT

## 2025-04-13 PROCEDURE — 93010 ELECTROCARDIOGRAM REPORT: CPT | Performed by: INTERNAL MEDICINE

## 2025-04-13 PROCEDURE — 80053 COMPREHEN METABOLIC PANEL: CPT

## 2025-04-13 PROCEDURE — 93005 ELECTROCARDIOGRAM TRACING: CPT | Performed by: STUDENT IN AN ORGANIZED HEALTH CARE EDUCATION/TRAINING PROGRAM

## 2025-04-13 PROCEDURE — 70450 CT HEAD/BRAIN W/O DYE: CPT

## 2025-04-13 PROCEDURE — 6370000000 HC RX 637 (ALT 250 FOR IP): Performed by: STUDENT IN AN ORGANIZED HEALTH CARE EDUCATION/TRAINING PROGRAM

## 2025-04-13 PROCEDURE — 71045 X-RAY EXAM CHEST 1 VIEW: CPT

## 2025-04-13 PROCEDURE — 81001 URINALYSIS AUTO W/SCOPE: CPT

## 2025-04-13 RX ORDER — POTASSIUM CHLORIDE 1500 MG/1
40 TABLET, EXTENDED RELEASE ORAL ONCE
Status: COMPLETED | OUTPATIENT
Start: 2025-04-13 | End: 2025-04-13

## 2025-04-13 RX ADMIN — POTASSIUM CHLORIDE 40 MEQ: 1500 TABLET, EXTENDED RELEASE ORAL at 07:52

## 2025-04-13 ASSESSMENT — PAIN - FUNCTIONAL ASSESSMENT: PAIN_FUNCTIONAL_ASSESSMENT: 0-10

## 2025-04-13 ASSESSMENT — LIFESTYLE VARIABLES
HOW MANY STANDARD DRINKS CONTAINING ALCOHOL DO YOU HAVE ON A TYPICAL DAY: PATIENT DOES NOT DRINK
HOW OFTEN DO YOU HAVE A DRINK CONTAINING ALCOHOL: NEVER

## 2025-04-13 ASSESSMENT — PAIN SCALES - GENERAL: PAINLEVEL_OUTOF10: 10

## 2025-04-13 ASSESSMENT — PAIN DESCRIPTION - LOCATION: LOCATION: OTHER (COMMENT)

## 2025-04-13 NOTE — ED PROVIDER NOTES
Emergency Department Encounter    Patient: Era Carson  MRN: [id number]  : 1975  Date of Evaluation: 2025  ED Provider:  Johnie Melendez MD    Triage Chief Complaint:   Hypoglycemia (Patient states he is brittle diabetic and his blood sugars have been low tonight)    [place]:  Era Carson is a 49 y.o. male with history seen below presenting with hypoglycemia.  I discussed with patient's sister who states that she noticed patient was less responsive tonight so called EMS.  When EMS arrived patient's glucose patient was hypoglycemic.  Patient was given glucose by EMS with improvement of glucose as well as mental status.  Patient states he feels at baseline currently denies headache, chest pain, shortness of breath, abdominal pain.  States he has chronic pain syndrome but denies any changes of pain from baseline.  He is unsure if he fell or hit his head.  Denies any headache.  States he has mild chronic neck pain at baseline denies any changes from baseline.  Denies fevers, chills, nasal congestion, cough, sore throat.  Denies dysuria, increased frequency, urgency.  Denies nausea vomiting.  Denies changes in dosing of long-acting or short acting insulin.  States at times small changes in lifestyle or eating habits can cause hypoglycemia.    ROS - see HPI, below listed is current ROS at time of my eval:  General:  No fevers, no chills, no weakness  Eyes:  No recent vison changes, no discharge  ENT:  No sore throat, no nasal congestion, no hearing changes  Cardiovascular:  No chest pain, no palpitations  Respiratory:  No shortness of breath, no cough, no wheezing  Gastrointestinal:  No pain, no nausea, no vomiting, no diarrhea  Musculoskeletal:  No muscle pain, no joint pain  Skin:  No rash, no pruritis, no easy bruising  Neurologic:  No speech problems, no headache, no extremity numbness, no extremity tingling, no extremity weakness  Psychiatric:  No anxiety  Genitourinary:  No dysuria, no  interpretation is - normal    General appearance:  No acute distress.   Skin:  Warm. Dry.   Eye:  Extraocular movements intact.     Ears, nose, mouth and throat:  Oral mucosa moist   Neck:  Trachea midline.   Extremity:  No swelling.  Normal ROM     Heart:  Regular rate and rhythm, normal S1 & S2, no extra heart sounds.    Perfusion:  intact  Respiratory:  Lungs clear to auscultation bilaterally.  Respirations nonlabored.     Abdominal:  Normal bowel sounds.  Soft.  Nontender.  Non distended.  Back:  No CVA tenderness to palpation     Neurological:  Alert and oriented times 3.  No focal neuro deficits.             Psychiatric:  Appropriate    I have reviewed and interpreted all of the currently available lab results from this visit (if applicable):  No results found for this visit on 04/13/25.   Radiographs (if obtained):  Radiologist's Report Reviewed:  No results found.    EKG (if obtained): (All EKG's are interpreted by myself in the absence of a cardiologist)      MDM:  ***    Clinical Impression:  No diagnosis found.  Disposition referral (if applicable):  No follow-up provider specified.  Disposition medications (if applicable):  New Prescriptions    No medications on file     ED Provider Disposition Time  DISPOSITION                 Comment: Please note this report has been produced using speech recognition software and may contain errors related to that system including errors in grammar, punctuation, and spelling, as well as words and phrases that may be inappropriate.  Efforts were made to edit the dictations.

## 2025-04-13 NOTE — ED NOTES
Pt awake, alert, oriented. No complaints at this time. Maintaining stable glucose levels currently.

## 2025-04-13 NOTE — ED NOTES
Dc home with sister.  AVS given and reviewed with patient and/or family. Opportunity to ask questions was given, questions were either not asked or answered. Appropriate follow up care listed. Patient and/or family verbalized understanding. Return precautions discussed and understood. No further needs voiced, patient OK for discharge.

## 2025-04-13 NOTE — DISCHARGE INSTRUCTIONS
I want you to check your glucose every 2 hours throughout the day.  Return to emergency department for repeat episodes of low glucose, any chest pain, shortness of breath, lightheadedness, dizziness, abdominal pain, headache, blurred vision, focal deficits, motor or sensory changes, neck pain or shortness of breath, fevers or chills or any new change or worsening symptoms were always here for reevaluation never hesitate to return

## 2025-04-14 ENCOUNTER — CARE COORDINATION (OUTPATIENT)
Dept: CARE COORDINATION | Age: 50
End: 2025-04-14

## 2025-04-14 NOTE — CARE COORDINATION
Ambulatory Care Coordination Note     4/14/2025 8:55 AM     Patient outreach attempt by this ACM today to offer care management services. ACM was unable to reach the patient by telephone today;   voicemail full and unable to leave a message.      ACM: Kaylah Brown RN     Care Summary Note: Re attempt again 1-2 days.    PCP/Specialist follow up:   Future Appointments         Provider Specialty Dept Phone    4/24/2025 9:45 AM Kyle Ramires MD Pain Management [phone]    4/28/2025 1:20 PM Bo Mane MD Endocrinology [phone]    5/29/2025 3:40 PM Brandon Gan MD Primary Care [phone]    6/12/2025 3:20 PM Jose Drake MD Infectious Diseases [phone]            Follow Up:   Plan for next ACM outreach in approximately 1-2 days  to complete:  - hospital follow up.

## 2025-04-16 ENCOUNTER — CARE COORDINATION (OUTPATIENT)
Dept: CARE COORDINATION | Age: 50
End: 2025-04-16

## 2025-04-16 NOTE — CARE COORDINATION
Ambulatory Care Coordination Note     4/16/2025 9:35 AM     patient outreach attempt by this ACM today to offer care management services. ACM was unable to reach the patient by telephone today;   voicemail full and unable to leave a message.      No further Ambulatory Care Manager follow up scheduled.

## 2025-04-24 ENCOUNTER — OFFICE VISIT (OUTPATIENT)
Dept: PAIN MANAGEMENT | Age: 50
End: 2025-04-24
Payer: MEDICARE

## 2025-04-24 ENCOUNTER — TELEPHONE (OUTPATIENT)
Dept: PAIN MANAGEMENT | Age: 50
End: 2025-04-24

## 2025-04-24 VITALS
DIASTOLIC BLOOD PRESSURE: 77 MMHG | WEIGHT: 209 LBS | HEART RATE: 90 BPM | BODY MASS INDEX: 26.83 KG/M2 | OXYGEN SATURATION: 99 % | SYSTOLIC BLOOD PRESSURE: 145 MMHG

## 2025-04-24 DIAGNOSIS — Z91.89 AT RISK FOR RESPIRATORY DEPRESSION DUE TO OPIOID: ICD-10-CM

## 2025-04-24 DIAGNOSIS — F33.41 RECURRENT MAJOR DEPRESSIVE DISORDER, IN PARTIAL REMISSION: ICD-10-CM

## 2025-04-24 DIAGNOSIS — K59.03 CONSTIPATION DUE TO OPIOID THERAPY: ICD-10-CM

## 2025-04-24 DIAGNOSIS — G89.4 CHRONIC PAIN SYNDROME: ICD-10-CM

## 2025-04-24 DIAGNOSIS — M79.18 MYOFASCIAL PAIN: ICD-10-CM

## 2025-04-24 DIAGNOSIS — G43.711 CHRONIC MIGRAINE WITHOUT AURA, WITH INTRACTABLE MIGRAINE, SO STATED, WITH STATUS MIGRAINOSUS: ICD-10-CM

## 2025-04-24 DIAGNOSIS — G62.9 PERIPHERAL POLYNEUROPATHY: ICD-10-CM

## 2025-04-24 DIAGNOSIS — M79.2 NEUROPATHIC PAIN: ICD-10-CM

## 2025-04-24 DIAGNOSIS — E10.65 UNCONTROLLED TYPE 1 DIABETES MELLITUS WITH HYPERGLYCEMIA (HCC): ICD-10-CM

## 2025-04-24 DIAGNOSIS — G63 POLYNEUROPATHY ASSOCIATED WITH UNDERLYING DISEASE: ICD-10-CM

## 2025-04-24 DIAGNOSIS — T40.2X5A CONSTIPATION DUE TO OPIOID THERAPY: ICD-10-CM

## 2025-04-24 DIAGNOSIS — M80.862S PATHOLOGICAL FRACTURE OF LEFT TIBIA DUE TO OTHER OSTEOPOROSIS, SEQUELA: ICD-10-CM

## 2025-04-24 DIAGNOSIS — L97.522 ULCER OF LEFT FOOT, WITH FAT LAYER EXPOSED (HCC): ICD-10-CM

## 2025-04-24 PROCEDURE — 3078F DIAST BP <80 MM HG: CPT | Performed by: INTERNAL MEDICINE

## 2025-04-24 PROCEDURE — 3077F SYST BP >= 140 MM HG: CPT | Performed by: INTERNAL MEDICINE

## 2025-04-24 PROCEDURE — 99214 OFFICE O/P EST MOD 30 MIN: CPT | Performed by: INTERNAL MEDICINE

## 2025-04-24 RX ORDER — TRAMADOL HYDROCHLORIDE 50 MG/1
50 TABLET ORAL 2 TIMES DAILY PRN
Qty: 56 TABLET | Refills: 0 | Status: SHIPPED | OUTPATIENT
Start: 2025-04-24 | End: 2025-05-22

## 2025-04-24 RX ORDER — INSULIN GLULISINE 100 [IU]/ML
INJECTION, SOLUTION SUBCUTANEOUS
Qty: 10 ML | Refills: 0 | Status: SHIPPED | OUTPATIENT
Start: 2025-04-24

## 2025-04-24 RX ORDER — ERENUMAB-AOOE 140 MG/ML
140 INJECTION, SOLUTION SUBCUTANEOUS
Qty: 1 ADJUSTABLE DOSE PRE-FILLED PEN SYRINGE | Refills: 1 | Status: SHIPPED | OUTPATIENT
Start: 2025-04-24

## 2025-04-24 RX ORDER — DIVALPROEX SODIUM 500 MG/1
500 TABLET, DELAYED RELEASE ORAL 2 TIMES DAILY
Qty: 60 TABLET | Refills: 0 | Status: SHIPPED | OUTPATIENT
Start: 2025-04-24 | End: 2025-05-24

## 2025-04-24 RX ORDER — RIMEGEPANT SULFATE 75 MG/75MG
TABLET, ORALLY DISINTEGRATING ORAL
Qty: 16 TABLET | Refills: 1 | Status: SHIPPED | OUTPATIENT
Start: 2025-04-24

## 2025-04-24 NOTE — TELEPHONE ENCOUNTER
Medication:   Requested Prescriptions     Pending Prescriptions Disp Refills    APIDRA 100 UNIT/ML injection [Pharmacy Med Name: APIDRA 100 UNIT/ML VIAL] 10 mL 0     Sig: INJECT 7-12 UNITS BEFORE A MEAL THREE TIMES A DAY        Last Filled:      Patient Phone Number: [phone] (home)     Last appt: 11/20/2024   Next appt: 4/28/2025    Last OARRS:       3/21/2019     4:43 PM   RX Monitoring   Attestation The Prescription Monitoring Report for this patient was reviewed today.

## 2025-04-24 NOTE — TELEPHONE ENCOUNTER
Submitted PA for [facility] Key: [id number] STATUS: APPROVED 1/24/25.    Authorization Expiration Date: 4/24/2026.    [facility] has been notified. Thank you!

## 2025-04-24 NOTE — PROGRESS NOTES
advised  to avoid using too many OTC analgesics to control the headaches, avoid chocolates, increased caffeine, cheeses and MSG nitrite containing foods, cigarette smoking. To avoid bright lights, strong smells and skipping meals.   -Continue with Depakote ER with Aimovig and Nurtec   -He was advised to increase fluids ( 5-7  glasses of fluid daily), limit caffeine, avoid cheese products, increase dietary fiber, increase activity and exercise as tolerated and relax regularly and enjoy meals   -CBT techniques for chronic pain to help with:  -Reducing the negative impact of pain on daily life  -Improving physical and emotional functioning  -Increasing effective coping skills for managing pain  -Reducing pain intensity  Employing techniques of  - Exercise, pacing- relaxation therapies such as biofeedback, mindfulness based stress reduction, imagery, cognitive restructuring, behavioral activation and problem solving   -Monitor blood sugar regularly, diabetic control- adv diabetic diet. Goal for fasting blood sugars around 120. Follow up with Endocrinologist/PCP also for on going management      Medications/orders associated with this visit:  Current Outpatient Medications   Medication Sig Dispense Refill    divalproex (DEPAKOTE) 500 MG DR tablet Take 1 tablet by mouth 2 times daily 60 tablet 0    Erenumab-aooe (AIMOVIG) 140 MG/ML SOAJ Inject 140 mg into the skin every 30 days 1 Adjustable Dose Pre-filled Pen Syringe 1    rimegepant sulfate (NURTEC) 75 MG TBDP Take 1 tablet daily, may repeat in 24 hours PRN 16 tablet 1    traMADol (ULTRAM) 50 MG tablet Take 1 tablet by mouth 2 times daily as needed for Pain for up to 28 days. 56 tablet 0    GVOKE HYPOPEN 2-PACK 1 MG/0.2ML SOAJ INJECT AS NEEDED FOR LOW BLOOD SUGAR AS DIRECTED 0.4 mL 2    insulin glulisine (APIDRA) 100 UNIT/ML injection INJECT 7-12 UNITS BEFORE A MEAL THREE TIMES A DAY 10 mL 0    naloxone (NARCAN) 4 MG/0.1ML LIQD nasal spray 1 spray by Nasal route as

## 2025-05-01 ENCOUNTER — HOSPITAL ENCOUNTER (EMERGENCY)
Age: 50
Discharge: HOME | End: 2025-05-01
Attending: STUDENT IN AN ORGANIZED HEALTH CARE EDUCATION/TRAINING PROGRAM
Payer: MEDICARE

## 2025-05-01 ENCOUNTER — APPOINTMENT (OUTPATIENT)
Dept: GENERAL RADIOLOGY | Age: 50
End: 2025-05-01
Payer: MEDICARE

## 2025-05-01 VITALS
SYSTOLIC BLOOD PRESSURE: 144 MMHG | WEIGHT: 195 LBS | RESPIRATION RATE: 16 BRPM | OXYGEN SATURATION: 98 % | HEIGHT: 74 IN | HEART RATE: 85 BPM | BODY MASS INDEX: 25.03 KG/M2 | DIASTOLIC BLOOD PRESSURE: 73 MMHG | TEMPERATURE: 97.8 F

## 2025-05-01 DIAGNOSIS — E87.6 HYPOKALEMIA: ICD-10-CM

## 2025-05-01 DIAGNOSIS — E83.42 HYPOMAGNESEMIA: ICD-10-CM

## 2025-05-01 DIAGNOSIS — E16.2 HYPOGLYCEMIA: Primary | ICD-10-CM

## 2025-05-01 LAB
ALBUMIN SERPL-MCNC: 3.6 G/DL (ref 3.4–5)
ALBUMIN/GLOB SERPL: 1 {RATIO} (ref 1.1–2.2)
ALP SERPL-CCNC: 81 U/L (ref 40–129)
ALT SERPL-CCNC: 9 U/L (ref 10–40)
ANION GAP SERPL CALCULATED.3IONS-SCNC: 14 MMOL/L (ref 3–16)
AST SERPL-CCNC: 15 U/L (ref 15–37)
BASE EXCESS BLDV CALC-SCNC: 0.2 MMOL/L (ref -3–3)
BASOPHILS # BLD: 0 K/UL (ref 0–0.2)
BASOPHILS NFR BLD: 0.6 %
BILIRUB SERPL-MCNC: <0.2 MG/DL (ref 0–1)
BILIRUB UR QL STRIP.AUTO: NEGATIVE
BUN SERPL-MCNC: 13 MG/DL (ref 7–20)
CALCIUM SERPL-MCNC: 9.4 MG/DL (ref 8.3–10.6)
CHLORIDE SERPL-SCNC: 101 MMOL/L (ref 99–110)
CLARITY UR: CLEAR
CO2 BLDV-SCNC: 28 MMOL/L
CO2 SERPL-SCNC: 27 MMOL/L (ref 21–32)
COHGB MFR BLDV: 1.1 % (ref 0–1.5)
COLOR UR: YELLOW
CREAT SERPL-MCNC: 1.7 MG/DL (ref 0.9–1.3)
DEPRECATED RDW RBC AUTO: 18.9 % (ref 12.4–15.4)
EKG ATRIAL RATE: 84 BPM
EKG DIAGNOSIS: NORMAL
EKG P-R INTERVAL: 148 MS
EKG Q-T INTERVAL: 352 MS
EKG QRS DURATION: 98 MS
EKG QTC CALCULATION (BAZETT): 415 MS
EKG R AXIS: 182 DEGREES
EKG T AXIS: 88 DEGREES
EKG VENTRICULAR RATE: 84 BPM
EOSINOPHIL # BLD: 0.3 K/UL (ref 0–0.6)
EOSINOPHIL NFR BLD: 5.8 %
EPI CELLS #/AREA URNS HPF: ABNORMAL /HPF (ref 0–5)
GFR SERPLBLD CREATININE-BSD FMLA CKD-EPI: 49 ML/MIN/{1.73_M2}
GLUCOSE BLD-MCNC: 268 MG/DL (ref 70–99)
GLUCOSE BLD-MCNC: 72 MG/DL (ref 70–99)
GLUCOSE SERPL-MCNC: 103 MG/DL (ref 70–99)
GLUCOSE UR STRIP.AUTO-MCNC: 250 MG/DL
HCO3 BLDV-SCNC: 26.7 MMOL/L (ref 23–29)
HCT VFR BLD AUTO: 37.4 % (ref 40.5–52.5)
HGB BLD-MCNC: 12.1 G/DL (ref 13.5–17.5)
HGB UR QL STRIP.AUTO: NEGATIVE
KETONES UR STRIP.AUTO-MCNC: NEGATIVE MG/DL
LEUKOCYTE ESTERASE UR QL STRIP.AUTO: NEGATIVE
LYMPHOCYTES # BLD: 1.4 K/UL (ref 1–5.1)
LYMPHOCYTES NFR BLD: 24.5 %
MAGNESIUM SERPL-MCNC: 1.69 MG/DL (ref 1.8–2.4)
MCH RBC QN AUTO: 26 PG (ref 26–34)
MCHC RBC AUTO-ENTMCNC: 32.3 G/DL (ref 31–36)
MCV RBC AUTO: 80.6 FL (ref 80–100)
METHGB MFR BLDV: 0.3 %
MONOCYTES # BLD: 0.4 K/UL (ref 0–1.3)
MONOCYTES NFR BLD: 6.4 %
MUCOUS THREADS #/AREA URNS LPF: ABNORMAL /LPF
NEUTROPHILS # BLD: 3.7 K/UL (ref 1.7–7.7)
NEUTROPHILS NFR BLD: 62.7 %
NITRITE UR QL STRIP.AUTO: NEGATIVE
O2 CT VFR BLDV CALC: 10 VOL %
O2 THERAPY: ABNORMAL
PCO2 BLDV: 51.2 MMHG (ref 40–50)
PERFORMED ON: ABNORMAL
PERFORMED ON: NORMAL
PH BLDV: 7.33 [PH] (ref 7.35–7.45)
PH UR STRIP.AUTO: 6 [PH] (ref 5–8)
PLATELET # BLD AUTO: 341 K/UL (ref 135–450)
PMV BLD AUTO: 7 FL (ref 5–10.5)
PO2 BLDV: 31.5 MMHG (ref 25–40)
POTASSIUM SERPL-SCNC: 3.3 MMOL/L (ref 3.5–5.1)
PROT SERPL-MCNC: 7.3 G/DL (ref 6.4–8.2)
PROT UR STRIP.AUTO-MCNC: ABNORMAL MG/DL
RBC # BLD AUTO: 4.64 M/UL (ref 4.2–5.9)
RBC #/AREA URNS HPF: ABNORMAL /HPF (ref 0–4)
SAO2 % BLDV: 56 %
SODIUM SERPL-SCNC: 142 MMOL/L (ref 136–145)
SP GR UR STRIP.AUTO: 1.01 (ref 1–1.03)
TROPONIN, HIGH SENSITIVITY: 29 NG/L (ref 0–22)
TROPONIN, HIGH SENSITIVITY: 37 NG/L (ref 0–22)
UA COMPLETE W REFLEX CULTURE PNL UR: ABNORMAL
UA DIPSTICK W REFLEX MICRO PNL UR: YES
URN SPEC COLLECT METH UR: ABNORMAL
UROBILINOGEN UR STRIP-ACNC: 0.2 E.U./DL
WBC # BLD AUTO: 5.8 K/UL (ref 4–11)
WBC #/AREA URNS HPF: ABNORMAL /HPF (ref 0–5)

## 2025-05-01 PROCEDURE — 84484 ASSAY OF TROPONIN QUANT: CPT

## 2025-05-01 PROCEDURE — 2580000003 HC RX 258: Performed by: STUDENT IN AN ORGANIZED HEALTH CARE EDUCATION/TRAINING PROGRAM

## 2025-05-01 PROCEDURE — 6370000000 HC RX 637 (ALT 250 FOR IP): Performed by: STUDENT IN AN ORGANIZED HEALTH CARE EDUCATION/TRAINING PROGRAM

## 2025-05-01 PROCEDURE — 93010 ELECTROCARDIOGRAM REPORT: CPT | Performed by: INTERNAL MEDICINE

## 2025-05-01 PROCEDURE — 83735 ASSAY OF MAGNESIUM: CPT

## 2025-05-01 PROCEDURE — 93005 ELECTROCARDIOGRAM TRACING: CPT | Performed by: STUDENT IN AN ORGANIZED HEALTH CARE EDUCATION/TRAINING PROGRAM

## 2025-05-01 PROCEDURE — 99285 EMERGENCY DEPT VISIT HI MDM: CPT

## 2025-05-01 PROCEDURE — 85025 COMPLETE CBC W/AUTO DIFF WBC: CPT

## 2025-05-01 PROCEDURE — 81001 URINALYSIS AUTO W/SCOPE: CPT

## 2025-05-01 PROCEDURE — 80053 COMPREHEN METABOLIC PANEL: CPT

## 2025-05-01 PROCEDURE — 82803 BLOOD GASES ANY COMBINATION: CPT

## 2025-05-01 PROCEDURE — 71045 X-RAY EXAM CHEST 1 VIEW: CPT

## 2025-05-01 RX ORDER — SODIUM CHLORIDE, SODIUM LACTATE, POTASSIUM CHLORIDE, AND CALCIUM CHLORIDE .6; .31; .03; .02 G/100ML; G/100ML; G/100ML; G/100ML
1000 INJECTION, SOLUTION INTRAVENOUS ONCE
Status: COMPLETED | OUTPATIENT
Start: 2025-05-01 | End: 2025-05-01

## 2025-05-01 RX ORDER — LANOLIN ALCOHOL/MO/W.PET/CERES
400 CREAM (GRAM) TOPICAL DAILY
Status: DISCONTINUED | OUTPATIENT
Start: 2025-05-01 | End: 2025-05-01 | Stop reason: HOSPADM

## 2025-05-01 RX ORDER — POTASSIUM CHLORIDE 1500 MG/1
40 TABLET, EXTENDED RELEASE ORAL ONCE
Status: COMPLETED | OUTPATIENT
Start: 2025-05-01 | End: 2025-05-01

## 2025-05-01 RX ADMIN — POTASSIUM CHLORIDE 40 MEQ: 1500 TABLET, EXTENDED RELEASE ORAL at 19:26

## 2025-05-01 RX ADMIN — SODIUM CHLORIDE, SODIUM LACTATE, POTASSIUM CHLORIDE, AND CALCIUM CHLORIDE 1000 ML: .6; .31; .03; .02 INJECTION, SOLUTION INTRAVENOUS at 17:05

## 2025-05-01 RX ADMIN — MAGNESIUM OXIDE 400 MG (241.3 MG MAGNESIUM) TABLET 400 MG: TABLET at 19:25

## 2025-05-01 NOTE — ED PROVIDER NOTES
[facility] EMERGENCY DEPARTMENT     EMERGENCY DEPARTMENT ENCOUNTER         Pt Name: Era Carson   MRN: [id number]   Birthdate 1975   Date of evaluation: 5/1/2025   Provider: Enrico Castellon MD   PCP: Brandon Gan MD   Note Started: 4:56 PM EDT 5/1/25       Chief Complaint     Hypoglycemia (Patient was acting strange at home and FSBS was checked and was 57. Sister called EMS and starting trying to drink Coke. EMS gave 30g oral glucose and blood sugar was 79 prior to arrival. )      History of Present Illness     Era Carson is a 49 y.o. male who presents with episode of hypoglycemia.  The patient reports he is a brittle diabetic on insulin he was helping his sister and fears he overexerted himself.  While he did not eat lunch she also did not take any short acting insulin but when they checked his blood sugar as he was feeling abnormal perhaps with a mild altered mental status he was found hypoglycemic blood sugar 57 therefore EMS was called taking oral glucose and his blood sugar improved arrival here he is alert he has no other acute complaints denies any recent illness or other conditions that may be contributing.  He was recently evaluated in the emerge department under similar circumstances.      Review of Systems     Positives and pertinent negatives as per HPI.    Past Medical, Surgical, Family, and Social History     He has a past medical history of Acute respiratory failure (HCC), Asthma, Blind left eye, Blood pressure instability, Chronic pain syndrome, CKD (chronic kidney disease), Detached retina, bilateral, Diabetes mellitus (HCC), DVT (deep venous thrombosis) ([facility]), Insomnia, Meningitis, Neuropathic pain, Osteomyelitis of tibia (HCC), Pain of left lower extremity, and Peripheral neuropathy.  He has a past surgical history that includes Ankle fracture surgery (Left, 1/28/13); Tibia fracture surgery; bone incision and drainage (August 2013); eye surgery; eye surgery (Right,

## 2025-05-02 ENCOUNTER — CARE COORDINATION (OUTPATIENT)
Dept: CARE COORDINATION | Age: 50
End: 2025-05-02

## 2025-05-02 DIAGNOSIS — G89.4 CHRONIC PAIN SYNDROME: ICD-10-CM

## 2025-05-02 NOTE — CARE COORDINATION
Ambulatory Care Coordination Note     5/2/2025 11:06 AM     Patient outreach attempt by this ACM today to offer care management services. ACM was unable to reach the patient by telephone today;   voicemail full and unable to leave a message.      ACM: Mercy Gomez RN     Care Summary Note:        Pt seen in ED for hypoglycemia 5/1/2025, ACM outreached to offer CC, pt has had 3 Ed trips in the last 6 months for hypoglycemia.     PCP/Specialist follow up:   Future Appointments         Provider Specialty Dept Phone    5/15/2025 1:40 PM Brandon Gan MD Primary Care [phone]    5/22/2025 9:45 AM Kyle Ramires MD Pain Management [phone]    5/29/2025 3:40 PM Brandon Gan MD Primary Care [phone]    6/12/2025 3:20 PM Jose Drake MD Infectious Diseases [phone]            Follow Up:   Plan for next ACM outreach in approximately 1-2 days  to complete:  - outreach attempt to offer care management services.

## 2025-05-02 NOTE — DISCHARGE INSTRUCTIONS
You were evaluated in the emergency department for low blood sugar. Assessments and testing completed during your visit were reassuring and at this time there is no indication for further testing, treatment or admission to the hospital. Given this it is appropriate to discharge you from the emergency department. At the time of discharge we discussed the following:    Please discuss further with your primary doctor and return with any new or worsening symptoms    Please note that sometimes it is difficult to diagnose a medical condition early in the disease process before the disease is fully manifest. Because of this, should you develop any new or worsening symptoms, you may return at any time to the emergency department for another evaluation. If available you are also recommended to review this visit with your primary care physician or other medical provider in the next 7 days. Thank you for allowing us to care for you today.

## 2025-05-05 RX ORDER — TOPIRAMATE 100 MG/1
100 TABLET, FILM COATED ORAL 2 TIMES DAILY
Qty: 60 TABLET | Refills: 1 | OUTPATIENT
Start: 2025-05-05

## 2025-05-06 ENCOUNTER — CARE COORDINATION (OUTPATIENT)
Dept: CARE COORDINATION | Age: 50
End: 2025-05-06

## 2025-05-06 DIAGNOSIS — G89.4 CHRONIC PAIN SYNDROME: ICD-10-CM

## 2025-05-06 NOTE — CARE COORDINATION
Ambulatory Care Coordination Note     5/6/2025 1:36 PM     patient outreach attempt by this ACM today to offer care management services. ACM was unable to reach the patient by telephone today;   voicemail full and unable to leave a message.      Patient closed (unable to reach patient) from the High Risk Care Management program on 5/6/2025. No further Ambulatory Care Manager follow up scheduled.

## 2025-05-07 RX ORDER — DIVALPROEX SODIUM 500 MG/1
500 TABLET, DELAYED RELEASE ORAL 2 TIMES DAILY
Qty: 60 TABLET | Refills: 0 | OUTPATIENT
Start: 2025-05-07

## 2025-05-15 ENCOUNTER — OFFICE VISIT (OUTPATIENT)
Dept: INTERNAL MEDICINE CLINIC | Age: 50
End: 2025-05-15
Payer: MEDICARE

## 2025-05-15 VITALS
BODY MASS INDEX: 27.09 KG/M2 | OXYGEN SATURATION: 98 % | WEIGHT: 211 LBS | HEART RATE: 62 BPM | DIASTOLIC BLOOD PRESSURE: 66 MMHG | SYSTOLIC BLOOD PRESSURE: 128 MMHG

## 2025-05-15 DIAGNOSIS — N18.31 STAGE 3A CHRONIC KIDNEY DISEASE (CKD) (HCC): ICD-10-CM

## 2025-05-15 DIAGNOSIS — E10.3593 PROLIFERATIVE DIABETIC RETINOPATHY OF BOTH EYES ASSOCIATED WITH TYPE 1 DIABETES MELLITUS, UNSPECIFIED PROLIFERATIVE RETINOPATHY TYPE (HCC): ICD-10-CM

## 2025-05-15 DIAGNOSIS — E78.2 MIXED HYPERLIPIDEMIA: ICD-10-CM

## 2025-05-15 DIAGNOSIS — E10.8 TYPE 1 DIABETES MELLITUS WITH COMPLICATION (HCC): Primary | ICD-10-CM

## 2025-05-15 LAB
CHP ED QC CHECK: NORMAL
GLUCOSE BLD-MCNC: 83 MG/DL
HBA1C MFR BLD: 7.5 %

## 2025-05-15 PROCEDURE — 82962 GLUCOSE BLOOD TEST: CPT | Performed by: INTERNAL MEDICINE

## 2025-05-15 PROCEDURE — 3074F SYST BP LT 130 MM HG: CPT | Performed by: INTERNAL MEDICINE

## 2025-05-15 PROCEDURE — G2211 COMPLEX E/M VISIT ADD ON: HCPCS | Performed by: INTERNAL MEDICINE

## 2025-05-15 PROCEDURE — 3078F DIAST BP <80 MM HG: CPT | Performed by: INTERNAL MEDICINE

## 2025-05-15 PROCEDURE — 3051F HG A1C>EQUAL 7.0%<8.0%: CPT | Performed by: INTERNAL MEDICINE

## 2025-05-15 PROCEDURE — 99214 OFFICE O/P EST MOD 30 MIN: CPT | Performed by: INTERNAL MEDICINE

## 2025-05-15 PROCEDURE — 83036 HEMOGLOBIN GLYCOSYLATED A1C: CPT | Performed by: INTERNAL MEDICINE

## 2025-05-15 NOTE — PROGRESS NOTES
Patient: Era Carson is a 49 y.o. male who presents today with the following Chief Complaint(s):    Chief Complaint   Patient presents with    Blood Sugar Problem         HPIsleeping more. Depressed thinking about mother. Suicidal   Pain bothers sleep. Sees specialist.   Drinks mineral water and blood sugar falls, advised decrease insulin, and small snack.   Current Outpatient Medications   Medication Sig Dispense Refill    divalproex (DEPAKOTE) 500 MG DR tablet Take 1 tablet by mouth 2 times daily 60 tablet 0    Erenumab-aooe (AIMOVIG) 140 MG/ML SOAJ Inject 140 mg into the skin every 30 days 1 Adjustable Dose Pre-filled Pen Syringe 1    rimegepant sulfate (NURTEC) 75 MG TBDP Take 1 tablet daily, may repeat in 24 hours PRN 16 tablet 1    traMADol (ULTRAM) 50 MG tablet Take 1 tablet by mouth 2 times daily as needed for Pain for up to 28 days. 56 tablet 0    APIDRA 100 UNIT/ML injection INJECT 7-12 UNITS BEFORE A MEAL THREE TIMES A DAY 10 mL 0    GVOKE HYPOPEN 2-PACK 1 MG/0.2ML SOAJ INJECT AS NEEDED FOR LOW BLOOD SUGAR AS DIRECTED 0.4 mL 2    naloxone (NARCAN) 4 MG/0.1ML LIQD nasal spray 1 spray by Nasal route as needed for Opioid Reversal 1 each 0    LANTUS 100 UNIT/ML injection vial Inject 15 Units into the skin nightly INJECT 15 UNITS UNDER THE SKIN twice daily before breakfast and nightly. Hold nightly dose if PM BG<90 30 mL 3    Diabetic Shoe MISC by Does not apply route 1 each 0    SUMAtriptan (IMITREX) 50 MG tablet Take 1 tablet by mouth once as needed for Migraine      levocetirizine (XYZAL) 5 MG tablet Take 1 tablet by mouth daily      promethazine (PHENERGAN) 12.5 MG tablet TAKE ONE TABLET BY MOUTH THREE TIMES A DAY BEFORE A MEAL AS NEEDED FOR NAUSEA 18 tablet 0    pantoprazole (PROTONIX) 40 MG tablet Take 1 tablet by mouth 2 times daily 60 tablet 1    Multiple Vitamin (DAILY KITTY) TABS TAKE ONE TABLET BY MOUTH DAILY 30 tablet 5    Cholecalciferol (VITAMIN D3) 5000 units CAPS Take 1 capsule by mouth Daily

## 2025-05-18 DIAGNOSIS — E10.65 UNCONTROLLED TYPE 1 DIABETES MELLITUS WITH HYPERGLYCEMIA (HCC): ICD-10-CM

## 2025-05-19 RX ORDER — GLUCAGON INJECTION, SOLUTION 1 MG/.2ML
INJECTION, SOLUTION SUBCUTANEOUS
Qty: 0.4 ML | Refills: 2 | OUTPATIENT
Start: 2025-05-19

## 2025-05-22 ENCOUNTER — OFFICE VISIT (OUTPATIENT)
Dept: PAIN MANAGEMENT | Age: 50
End: 2025-05-22
Payer: MEDICARE

## 2025-05-22 VITALS
WEIGHT: 197 LBS | BODY MASS INDEX: 25.28 KG/M2 | SYSTOLIC BLOOD PRESSURE: 120 MMHG | DIASTOLIC BLOOD PRESSURE: 96 MMHG | HEART RATE: 99 BPM | OXYGEN SATURATION: 99 % | HEIGHT: 74 IN

## 2025-05-22 DIAGNOSIS — G63 POLYNEUROPATHY ASSOCIATED WITH UNDERLYING DISEASE: ICD-10-CM

## 2025-05-22 DIAGNOSIS — G89.4 CHRONIC PAIN SYNDROME: ICD-10-CM

## 2025-05-22 DIAGNOSIS — M79.2 NEUROPATHIC PAIN: ICD-10-CM

## 2025-05-22 DIAGNOSIS — G62.9 PERIPHERAL POLYNEUROPATHY: ICD-10-CM

## 2025-05-22 DIAGNOSIS — M79.18 MYOFASCIAL PAIN: ICD-10-CM

## 2025-05-22 DIAGNOSIS — M80.862S PATHOLOGICAL FRACTURE OF LEFT TIBIA DUE TO OTHER OSTEOPOROSIS, SEQUELA: ICD-10-CM

## 2025-05-22 DIAGNOSIS — Z51.81 ENCOUNTER FOR THERAPEUTIC DRUG MONITORING: ICD-10-CM

## 2025-05-22 PROCEDURE — 3074F SYST BP LT 130 MM HG: CPT | Performed by: INTERNAL MEDICINE

## 2025-05-22 PROCEDURE — 99213 OFFICE O/P EST LOW 20 MIN: CPT | Performed by: INTERNAL MEDICINE

## 2025-05-22 PROCEDURE — 3080F DIAST BP >= 90 MM HG: CPT | Performed by: INTERNAL MEDICINE

## 2025-05-22 RX ORDER — RIMEGEPANT SULFATE 75 MG/75MG
TABLET, ORALLY DISINTEGRATING ORAL
Qty: 16 TABLET | Refills: 1 | Status: SHIPPED | OUTPATIENT
Start: 2025-05-22

## 2025-05-22 RX ORDER — TRAMADOL HYDROCHLORIDE 50 MG/1
50 TABLET ORAL 2 TIMES DAILY PRN
Qty: 56 TABLET | Refills: 0 | Status: SHIPPED | OUTPATIENT
Start: 2025-05-22 | End: 2025-06-19

## 2025-05-22 RX ORDER — DIVALPROEX SODIUM 500 MG/1
500 TABLET, DELAYED RELEASE ORAL 2 TIMES DAILY
Qty: 60 TABLET | Refills: 1 | Status: SHIPPED | OUTPATIENT
Start: 2025-05-22

## 2025-05-22 RX ORDER — ERENUMAB-AOOE 140 MG/ML
140 INJECTION, SOLUTION SUBCUTANEOUS
Qty: 1 ADJUSTABLE DOSE PRE-FILLED PEN SYRINGE | Refills: 1 | Status: SHIPPED | OUTPATIENT
Start: 2025-05-22

## 2025-05-22 NOTE — PROGRESS NOTES
Era Carson  1975  [id number]      HISTORY OF PRESENT ILLNESS:  Mr. Carsno is a 49 y.o. male returns for a follow up visit for pain management  He has a diagnosis of   1. Chronic pain syndrome    2. Peripheral polyneuropathy    3. Recurrent major depressive disorder, in partial remission    4. Myofascial pain    5. Constipation due to opioid therapy    6. Neuropathic pain    7. Chronic migraine without aura, with intractable migraine, so stated, with status migrainosus    8. Pathological fracture of left tibia due to other osteoporosis, sequela    9. Primary insomnia    10. Encounter for therapeutic drug monitoring    .      As per information/history obtained from the PADT(patient assessment and documentation tool) -  He complains of pain in the head, neck, hands Bilateral, and knees Left with radiation to the lower leg Left, ankles Left, and feet Left He rates the pain 10/10 and describes it as sharp, aching, pins and needles.  Pain is made worse by: movement, walking, standing, sitting, bending, lifting. He denies any side effects from the current pain regimen. Patient reports that since last follow up visit the physical functioning is unchanged, family/social relationships are better, mood is better sleep patterns are better. Mr. Carson states that since starting the treatment with the current regimen the  overall functioning  in the above aspects is  better, Patient denies misusing/abusing his narcotic pain medications or using any illegal drugs.  There are No indicators for possible drug abuse, addiction or diversion problems.   Upon obtaining the medical history from Mr. Carson regarding today's office visit for his presenting problems, patient states he has been doing fair. Mr. Carson states his blood sugar has been less than 150 mostly. Patient denies any side effects with the medications. He mentions he is using Ultram as needed, maybe 1-2 a day or so along with the other adjuvants. Patient denies

## 2025-05-27 ASSESSMENT — ENCOUNTER SYMPTOMS
GASTROINTESTINAL NEGATIVE: 1
EYES NEGATIVE: 1
RESPIRATORY NEGATIVE: 1

## 2025-05-27 NOTE — PROGRESS NOTES
liPatient: Era Carson is a 49 y.o. male who presents today with the following Chief Complaint(s):    Chief Complaint   Patient presents with    Blood Sugar Problem         HPIhe is here for a check up. He has T1 DM, followed by endo. Notes he cannot use CGM. Allergic to adhesive. Continue to work on meal planning and physical activity. A1c 7.5. Treated with B-B insulin. Followed and treated by endocrinology.          He has hyperlipidemia but refuses rx therapy especially statins.  Relies on diet and physical activity. .          Stage 3a CKD. Followed by nephrology. Latest B/Cr 17/1.6. GFR 53.          Declines routine vaccines.         Feels down at times when thinking about his mother who os decreased. Otherwise ok. Definitely not suicidal. Sister had expressed this concern in a phone conversation I had with her.    Current Outpatient Medications   Medication Sig Dispense Refill    APIDRA 100 UNIT/ML injection INJECT 7-12 UNITS BEFORE A MEAL THREE TIMES A DAY 10 mL 0    GVOKE HYPOPEN 2-PACK 1 MG/0.2ML SOAJ INJECT AS NEEDED FOR LOW BLOOD SUGAR AS DIRECTED 0.4 mL 2    naloxone (NARCAN) 4 MG/0.1ML LIQD nasal spray 1 spray by Nasal route as needed for Opioid Reversal 1 each 0    LANTUS 100 UNIT/ML injection vial Inject 15 Units into the skin nightly INJECT 15 UNITS UNDER THE SKIN twice daily before breakfast and nightly. Hold nightly dose if PM BG<90 30 mL 3    Diabetic Shoe MISC by Does not apply route 1 each 0    SUMAtriptan (IMITREX) 50 MG tablet Take 1 tablet by mouth once as needed for Migraine      levocetirizine (XYZAL) 5 MG tablet Take 1 tablet by mouth daily      promethazine (PHENERGAN) 12.5 MG tablet TAKE ONE TABLET BY MOUTH THREE TIMES A DAY BEFORE A MEAL AS NEEDED FOR NAUSEA 18 tablet 0    pantoprazole (PROTONIX) 40 MG tablet Take 1 tablet by mouth 2 times daily 60 tablet 1    Multiple Vitamin (DAILY KITTY) TABS TAKE ONE TABLET BY MOUTH DAILY 30 tablet 5    Cholecalciferol (VITAMIN D3) 5000

## 2025-05-28 LAB
1,3 CHLOROPHENYL PIPERAZINE, URINE: NOT DETECTED
6-MONOACETYLMORPHINE: NEGATIVE NG/ML
7-AMINOCLONAZEPAM/CREATININE URINE: NOT DETECTED NG/MG CREAT
8-HYDROXYAMOXAPINE, URINE: NOT DETECTED
8-HYDROXYLOXAPINE, URINE: NOT DETECTED
ACETAMINOPHEN, URINE: NEGATIVE UG/ML
ALPHA HYDROXYALPRAZOLAM/CREATININE URINE: NOT DETECTED NG/MG CREAT
ALPHA HYDROXYTRIAZOLAM/CREAT UR CFM: NOT DETECTED NG/MG CREAT
ALPHA-HYDROXYMIDAZOLAM, URINE: NOT DETECTED NG/MG CREAT
ALPRAZOLAM/CREATININE URINE: NOT DETECTED NG/MG CREAT
AMINO CHLOROPYRIDINE, URINE: NOT DETECTED
AMITRIPTYLINE: NOT DETECTED
AMOXAPINE, URINE: NOT DETECTED
AMPHETAMINE SCREEN URINE: NEGATIVE NG/ML
ANTIDEPRESSANTS SCREEN URINE: NEGATIVE
ANTIPSYCHOTICS SCREEN URINE: NEGATIVE
ARIPIPRAZOLE, URINE: NOT DETECTED
ASENAPINE, URINE: NOT DETECTED
BACLOFEN, URINE: NOT DETECTED
BARBITURATE SCREEN URINE: NEGATIVE NG/ML
BENZODIAZEPINE SCREEN, URINE: NEGATIVE
BUPRENORPHINE URINE: NEGATIVE
BUPRENORPHINE/CREATININE URINE: NOT DETECTED NG/MG CREAT
BUPROPION, URINE: NOT DETECTED
CANNABINOID SCREEN URINE: NEGATIVE NG/ML
CARBAMAZEPINE, URINE: NOT DETECTED
CARISOPRODOL, UR: NOT DETECTED
CHLORPROMAZINE, URINE: NOT DETECTED
CITALOPRAM, URINE: NOT DETECTED
CLOBAZAM, URINE: NOT DETECTED
CLOMIPRAMINE, URINE: NOT DETECTED
CLONAZEPAM/CREATININE URINE: NOT DETECTED NG/MG CREAT
CLOZAPINE, URINE: NOT DETECTED
COCAINE METABOLITE SCREEN URINE: NEGATIVE NG/ML
CREATININE URINE: 42 MG/DL
CYCLOBENZAPRINE, URINE: NOT DETECTED
DESALKYLFLURAZEPAM/CREATININE URINE: NOT DETECTED NG/MG CREAT
DESIPRAMINE: NOT DETECTED
DESMETHYLCITALOPRAM, URINE: NOT DETECTED
DESMETHYLCLOMIPRAMINE, URINE: NOT DETECTED
DESMETHYLCLOZAPINE, URINE: NOT DETECTED
DESMETHYLCYCLOBENZAPRINE, URINE: NOT DETECTED
DESMETHYLDOXEPIN, URINE: NOT DETECTED
DESMETHYLFLUNITRAZEPAM, URINE: NOT DETECTED NG/MG CREAT
DESMETHYLSERTRALINE, URINE: NOT DETECTED
DESMETHYLVENLAFAXINE, URINE: NOT DETECTED
DIAZEPAM/CREATININE URINE: NOT DETECTED NG/MG CREAT
DOXEPIN, URINE: NOT DETECTED
DULOXETINE, URINE: NOT DETECTED
EZOGABINE, URINE: NOT DETECTED
FENTANYL / ANALOGUES SCREEN URINE: NEGATIVE
FENTANYL/CREATININE URINE: NOT DETECTED NG/MG CREAT
FLUNITRAZEPAM, URINE: NOT DETECTED NG/MG CREAT
FLUOXETINE, URINE: NOT DETECTED
FLUPHENAZINE, URINE: NOT DETECTED
FLUVOXAMINE, URINE: NOT DETECTED
GABAPENTIN: NOT DETECTED
HALOPERIDOL, URINE: NOT DETECTED
HYDROXYBUPROPION, URINE: NOT DETECTED
ILOPERIDONE, URINE: NOT DETECTED
IMIPRAMINE, URINE: NOT DETECTED
KETAMINE, URINE: NOT DETECTED
LABORATORY REPORT: NORMAL
LAMOTRIGINE, URINE: NOT DETECTED
LEVETIRACETAM, URINE: NOT DETECTED
LORAZEPAM/CREATININE URINE: NOT DETECTED NG/MG CREAT
LOXAPINE, URINE: NOT DETECTED
LURASIDONE, URINE: NOT DETECTED
MAPROTILINE, URINE: NOT DETECTED
MEPERIDINE: NEGATIVE NG/ML
MEPROBAMATE, URINE: NOT DETECTED
MESORIDAZINE, URINE: NOT DETECTED
METAXALONE, URINE: NOT DETECTED
METHADONE AND METABOLITES, URINE: NEGATIVE NG/ML
METHADONE SCREEN, URINE: NEGATIVE NG/ML
METHOCARBAMOL, URINE: NOT DETECTED
METHYLPHENIDATE, URINE: NOT DETECTED
MIDAZOLAM/CREATININE URINE: NOT DETECTED NG/MG CREAT
MIRTAZAPINE, URINE: NOT DETECTED
MOLINDONE, URINE: NOT DETECTED
MUSCLE RELAXANTS SCREEN URINE: NEGATIVE
N-NORTRAMADOL/CREAT UR CFM: 581 NG/MG CREAT
NEFAZODONE, URINE: NOT DETECTED
NORBUPRENORPHINE/CREATININE URINE: NOT DETECTED NG/MG CREAT
NORDIAZEPAM/CREATININE URINE: NOT DETECTED NG/MG CREAT
NORFENTANYL/CREATININE URINE: NOT DETECTED NG/MG CREAT
NORFLUOXETINE, URINE: NOT DETECTED
NORKETAMINE, URINE: NOT DETECTED
NORTRIPTYLINE: NOT DETECTED
O-NORTRAMADOL, URINE: 5645 NG/MG CREAT
OLANZAPINE, URINE: NOT DETECTED
OPIATE SCREEN URINE: NEGATIVE NG/ML
ORPHENADRINE, URINE: NOT DETECTED
OTHER HALLUCINOGENS SCREEN URINE: NEGATIVE
OXAZEPAM/CREATININE URINE: NOT DETECTED NG/MG CREAT
OXCARBAZEPINE MHD, URINE: NOT DETECTED
OXYCODONE SCREEN URINE: NEGATIVE NG/ML
PAROXETINE, URINE: NOT DETECTED
PERPHENAZINE, URINE: NOT DETECTED
PHENCYCLIDINE SCREEN URINE: NEGATIVE NG/ML
PHENYTOIN, URINE: NOT DETECTED
PIMOZIDE, URINE: NOT DETECTED
PREGABALIN, URINE: NOT DETECTED
PREGABALIN: NEGATIVE
PRESCRIBED MEDICATIONS: NORMAL
PRIMIDONE, URINE: NOT DETECTED
PROCHLORPERAZINE, URINE: NOT DETECTED
PROPOXYPHENE: NEGATIVE NG/ML
PROTRIPTYLINE, URINE: NOT DETECTED
QUETIAPINE, URINE: NOT DETECTED
RISPERIDONE, URINE: NOT DETECTED
RITALINIC ACID, UR: NOT DETECTED
RUFINAMIDE, URINE: NOT DETECTED
SEDATIVES/HYPNOTICS SCREEN URINE: NEGATIVE
SERTRALINE, URINE: NOT DETECTED
SYMPATHOMIMETICS SCREEN URINE: NEGATIVE
TAPENTADOL SCREEN URINE: NEGATIVE NG/ML
TEMAZEPAM/CREATININE URINE: NOT DETECTED NG/MG CREAT
THIORIDAZINE, URINE: NOT DETECTED
THIOTHIXENE, URINE: NOT DETECTED
TIAGABINE, URINE: NOT DETECTED
TIZANIDINE, URINE: NOT DETECTED
TOPIRAMATE, URINE: NOT DETECTED
TRAMADOL SCREEN URINE: NORMAL NG/ML
TRAMADOL, URINE: NORMAL
TRAMADOL: 7881 NG/MG CREAT
TRAZODONE, URINE: NOT DETECTED
TRIFLUOPERAZINE, URINE: NOT DETECTED
TRIMIPRAMINE, URINE: NOT DETECTED
VENLAFAXINE, URINE: NOT DETECTED
VILAZODONE, URINE: NOT DETECTED
ZALEPLON, URINE: NOT DETECTED
ZIPRASIDONE, URINE: NOT DETECTED
ZOLPIDEM ACID, URINE: NOT DETECTED
ZOLPIDEM, URINE: NOT DETECTED
ZONISAMIDE, URINE: NOT DETECTED
ZOPICLONE/ESZOPICLONE, URINE: NOT DETECTED

## 2025-05-31 DIAGNOSIS — G89.4 CHRONIC PAIN SYNDROME: ICD-10-CM

## 2025-06-10 DIAGNOSIS — N18.30 TYPE 1 DIABETES MELLITUS WITH STAGE 3 CHRONIC KIDNEY DISEASE (HCC): ICD-10-CM

## 2025-06-10 DIAGNOSIS — E10.22 TYPE 1 DIABETES MELLITUS WITH STAGE 3 CHRONIC KIDNEY DISEASE (HCC): ICD-10-CM

## 2025-06-10 RX ORDER — BLOOD SUGAR DIAGNOSTIC
STRIP MISCELLANEOUS
Qty: 120 EACH | Refills: 2 | Status: SHIPPED | OUTPATIENT
Start: 2025-06-10

## 2025-06-18 ENCOUNTER — OFFICE VISIT (OUTPATIENT)
Dept: INFECTIOUS DISEASES | Age: 50
End: 2025-06-18
Payer: MEDICARE

## 2025-06-18 VITALS
OXYGEN SATURATION: 98 % | DIASTOLIC BLOOD PRESSURE: 84 MMHG | WEIGHT: 197 LBS | SYSTOLIC BLOOD PRESSURE: 164 MMHG | BODY MASS INDEX: 25.29 KG/M2 | HEART RATE: 93 BPM

## 2025-06-18 DIAGNOSIS — M86.662 CHRONIC OSTEOMYELITIS OF LEFT LOWER LEG (HCC): Primary | ICD-10-CM

## 2025-06-18 PROCEDURE — 3077F SYST BP >= 140 MM HG: CPT | Performed by: INTERNAL MEDICINE

## 2025-06-18 PROCEDURE — 3079F DIAST BP 80-89 MM HG: CPT | Performed by: INTERNAL MEDICINE

## 2025-06-18 PROCEDURE — 99214 OFFICE O/P EST MOD 30 MIN: CPT | Performed by: INTERNAL MEDICINE

## 2025-06-18 RX ORDER — DICLOXACILLIN SODIUM 500 MG/1
500 CAPSULE ORAL DAILY
Qty: 90 CAPSULE | Refills: 3 | Status: SHIPPED | OUTPATIENT
Start: 2025-06-18 | End: 2025-09-16

## 2025-06-18 NOTE — PROGRESS NOTES
Infectious Diseases Follow-up Note    Reason for Consult:   L ankle / lower leg osteomyelitis  Requesting Physician:   Dr Coe  Primary Care Physician:  Brandon Gan MD  History Obtained From:   Patient, EPIC    CHIEF COMPLAINT:    Chief Complaint   Patient presents with    Follow-up     Left leg OM; chronic  Trying to do more walking  Leg is \"troublesome\"  Using infra red 30 minutes a day - alleviates pain         HISTORY OF PRESENT ILLNESS:      MOST RECENT VISIT:    Today 6/18/25 (previous 12/5/24):   L LE with 'very little' pain, worse with bad weather..    Taking Dicloxacillin DAILY (changed from twice a day to once a day on 12/5/24)     Takes med in mid day and often nausea is present prior to taking med  Nausea, not every day    No DM foot ulcer     ##########    Pt sustained fall 1/28/14 and had ORIF.  Pt developed wound dehiscence, drainage.  On 8/28, hardware removed and external fixator placed ([facility]).  Culture + MSSA.  Pt had PICC placed and started on iv ceftriaxone at [facility] which he received until approximately 10/17 (7 weeks).  He has been on po keflex 500 tid since this time.  He reports occasional GI upset.     Ortho visit 1/23, had x-ray, given clearance for full weight bearing.  Last visit 2/17/14, pt reported L leg pain with ambulating.  He attributes pain to muscle weakness.  He has not had any new or worse redness nor drainage.  BS control improved (sees Endocrinologist).      Seen 4/16/14, pt presents with sister.  He had L fibula fracture (proximal to ankle implant) and had operative ORIF 3/31/14 with removal of ankle implant and hardware placed (see report).    Seen 7/21/14, pt presents with sister, in wheelchair (non-ambulatory), has bone stimulator (useds for 20 min once a day).  He has chronic pain that varies, worse at night.  Taking po keflex with occasional GI upset.       Hosp at [facility] 8/17 - 25/14  with CNS infection - CSF , no definitely diagnosis (Enterovirus PCR

## 2025-06-19 ENCOUNTER — OFFICE VISIT (OUTPATIENT)
Dept: PAIN MANAGEMENT | Age: 50
End: 2025-06-19
Payer: MEDICARE

## 2025-06-19 VITALS
WEIGHT: 197 LBS | HEART RATE: 74 BPM | DIASTOLIC BLOOD PRESSURE: 70 MMHG | OXYGEN SATURATION: 98 % | BODY MASS INDEX: 25.29 KG/M2 | SYSTOLIC BLOOD PRESSURE: 130 MMHG

## 2025-06-19 DIAGNOSIS — M80.862S PATHOLOGICAL FRACTURE OF LEFT TIBIA DUE TO OTHER OSTEOPOROSIS, SEQUELA: ICD-10-CM

## 2025-06-19 DIAGNOSIS — M79.2 NEUROPATHIC PAIN: ICD-10-CM

## 2025-06-19 DIAGNOSIS — G63 POLYNEUROPATHY ASSOCIATED WITH UNDERLYING DISEASE: ICD-10-CM

## 2025-06-19 DIAGNOSIS — M79.18 MYOFASCIAL PAIN: ICD-10-CM

## 2025-06-19 DIAGNOSIS — G62.9 PERIPHERAL POLYNEUROPATHY: ICD-10-CM

## 2025-06-19 DIAGNOSIS — G43.711 CHRONIC MIGRAINE WITHOUT AURA, WITH INTRACTABLE MIGRAINE, SO STATED, WITH STATUS MIGRAINOSUS: ICD-10-CM

## 2025-06-19 DIAGNOSIS — G89.4 CHRONIC PAIN SYNDROME: ICD-10-CM

## 2025-06-19 PROCEDURE — 3078F DIAST BP <80 MM HG: CPT | Performed by: INTERNAL MEDICINE

## 2025-06-19 PROCEDURE — 99213 OFFICE O/P EST LOW 20 MIN: CPT | Performed by: INTERNAL MEDICINE

## 2025-06-19 PROCEDURE — 3075F SYST BP GE 130 - 139MM HG: CPT | Performed by: INTERNAL MEDICINE

## 2025-06-19 RX ORDER — DIVALPROEX SODIUM 500 MG/1
500 TABLET, DELAYED RELEASE ORAL 2 TIMES DAILY
Qty: 60 TABLET | Refills: 1 | OUTPATIENT
Start: 2025-06-19

## 2025-06-19 RX ORDER — DIVALPROEX SODIUM 500 MG/1
500 TABLET, DELAYED RELEASE ORAL 2 TIMES DAILY
Qty: 60 TABLET | Refills: 1 | Status: SHIPPED | OUTPATIENT
Start: 2025-06-19

## 2025-06-19 RX ORDER — RIMEGEPANT SULFATE 75 MG/75MG
TABLET, ORALLY DISINTEGRATING ORAL
Qty: 16 TABLET | Refills: 1 | Status: SHIPPED | OUTPATIENT
Start: 2025-06-19

## 2025-06-19 RX ORDER — TRAMADOL HYDROCHLORIDE 50 MG/1
50 TABLET ORAL 2 TIMES DAILY PRN
Qty: 56 TABLET | Refills: 0 | Status: SHIPPED | OUTPATIENT
Start: 2025-06-19 | End: 2025-07-17

## 2025-06-19 RX ORDER — ERENUMAB-AOOE 140 MG/ML
140 INJECTION, SOLUTION SUBCUTANEOUS
Qty: 1 ADJUSTABLE DOSE PRE-FILLED PEN SYRINGE | Refills: 1 | Status: SHIPPED | OUTPATIENT
Start: 2025-06-19

## 2025-06-19 NOTE — PROGRESS NOTES
Era Carson  1975  [id number]      HISTORY OF PRESENT ILLNESS:  Mr. Carson is a 49 y.o. male returns for a follow up visit for pain management  He has a diagnosis of   1. Chronic pain syndrome    2. Peripheral polyneuropathy    3. Neuropathic pain    4. Constipation due to opioid therapy    5. Recurrent major depressive disorder, in partial remission    6. Polyneuropathy associated with underlying disease    7. Chronic migraine without aura, with intractable migraine, so stated, with status migrainosus    8. Primary insomnia    9. Myofascial pain    10. Pathological fracture of left tibia due to other osteoporosis, sequela    .      As per information/history obtained from the PADT(patient assessment and documentation tool) -  He complains of pain in the head, neck, hands Bilateral, and knees Left with radiation to the lower leg Left, ankles Left, and feet Left He rates the pain 10/10 and describes it as sharp, aching.  Pain is made worse by: movement, walking, standing, sitting, bending, lifting. He denies any side effects from the current pain regimen. Patient reports that since last follow up visit the physical functioning is better, family/social relationships are unchanged, mood is unchanged sleep patterns are unchanged. Mr. Carson states that since starting the treatment with the current regimen the  overall functioning  in the above aspects is  better, Patient denies misusing/abusing his narcotic pain medications or using any illegal drugs.  There are No indicators for possible drug abuse, addiction or diversion problems.   Upon obtaining the medical history from Mr. Carson regarding today's office visit for his presenting problems, patient states he has been doing fair, he is managing with his regimen. Mr. Carson complains of pain in the ankles, he has swelling on the legs also, he states he is off Diuretics. He states his headache symptoms are manageable. He mentions he is using Ultram 1-2 per day,

## 2025-07-17 ENCOUNTER — OFFICE VISIT (OUTPATIENT)
Dept: PAIN MANAGEMENT | Age: 50
End: 2025-07-17
Payer: MEDICARE

## 2025-07-17 VITALS
WEIGHT: 200 LBS | DIASTOLIC BLOOD PRESSURE: 78 MMHG | BODY MASS INDEX: 25.68 KG/M2 | HEART RATE: 76 BPM | SYSTOLIC BLOOD PRESSURE: 137 MMHG | OXYGEN SATURATION: 97 %

## 2025-07-17 DIAGNOSIS — M79.18 MYOFASCIAL PAIN: ICD-10-CM

## 2025-07-17 DIAGNOSIS — F33.41 RECURRENT MAJOR DEPRESSIVE DISORDER, IN PARTIAL REMISSION: ICD-10-CM

## 2025-07-17 DIAGNOSIS — Z79.899 ENCOUNTER FOR LONG-TERM (CURRENT) USE OF HIGH-RISK MEDICATION: ICD-10-CM

## 2025-07-17 DIAGNOSIS — K59.03 CONSTIPATION DUE TO OPIOID THERAPY: ICD-10-CM

## 2025-07-17 DIAGNOSIS — G63 POLYNEUROPATHY ASSOCIATED WITH UNDERLYING DISEASE: ICD-10-CM

## 2025-07-17 DIAGNOSIS — Z91.89 AT RISK FOR RESPIRATORY DEPRESSION DUE TO OPIOID: ICD-10-CM

## 2025-07-17 DIAGNOSIS — F51.01 PRIMARY INSOMNIA: ICD-10-CM

## 2025-07-17 DIAGNOSIS — G62.9 PERIPHERAL POLYNEUROPATHY: ICD-10-CM

## 2025-07-17 DIAGNOSIS — T40.2X5A CONSTIPATION DUE TO OPIOID THERAPY: ICD-10-CM

## 2025-07-17 DIAGNOSIS — G43.711 CHRONIC MIGRAINE WITHOUT AURA, WITH INTRACTABLE MIGRAINE, SO STATED, WITH STATUS MIGRAINOSUS: ICD-10-CM

## 2025-07-17 DIAGNOSIS — M79.2 NEUROPATHIC PAIN: ICD-10-CM

## 2025-07-17 DIAGNOSIS — G89.4 CHRONIC PAIN SYNDROME: ICD-10-CM

## 2025-07-17 DIAGNOSIS — M80.862S PATHOLOGICAL FRACTURE OF LEFT TIBIA DUE TO OTHER OSTEOPOROSIS, SEQUELA: ICD-10-CM

## 2025-07-17 PROCEDURE — 3078F DIAST BP <80 MM HG: CPT | Performed by: INTERNAL MEDICINE

## 2025-07-17 PROCEDURE — 99214 OFFICE O/P EST MOD 30 MIN: CPT | Performed by: INTERNAL MEDICINE

## 2025-07-17 PROCEDURE — 3075F SYST BP GE 130 - 139MM HG: CPT | Performed by: INTERNAL MEDICINE

## 2025-07-17 RX ORDER — TRAMADOL HYDROCHLORIDE 50 MG/1
50 TABLET ORAL 2 TIMES DAILY PRN
Qty: 84 TABLET | Refills: 0 | Status: SHIPPED | OUTPATIENT
Start: 2025-07-17 | End: 2025-08-28

## 2025-07-17 RX ORDER — DIVALPROEX SODIUM 500 MG/1
500 TABLET, DELAYED RELEASE ORAL 2 TIMES DAILY
Qty: 60 TABLET | Refills: 1 | Status: SHIPPED | OUTPATIENT
Start: 2025-07-17

## 2025-07-17 RX ORDER — ERENUMAB-AOOE 140 MG/ML
140 INJECTION, SOLUTION SUBCUTANEOUS
Qty: 1 ADJUSTABLE DOSE PRE-FILLED PEN SYRINGE | Refills: 1 | Status: SHIPPED | OUTPATIENT
Start: 2025-07-17

## 2025-07-17 RX ORDER — RIMEGEPANT SULFATE 75 MG/75MG
TABLET, ORALLY DISINTEGRATING ORAL
Qty: 16 TABLET | Refills: 1 | Status: SHIPPED | OUTPATIENT
Start: 2025-07-17

## 2025-07-17 NOTE — PROGRESS NOTES
Era Carson  1975  [id number]    HISTORY OF PRESENT ILLNESS:  Mr. Carson is a 49 y.o. male returns for a follow up visit for multiple medical problems.  His  presenting problems are   1. Chronic pain syndrome    2. Peripheral polyneuropathy    3. Chronic migraine without aura, with intractable migraine, so stated, with status migrainosus    4. Polyneuropathy associated with underlying disease    5. Constipation due to opioid therapy    6. Neuropathic pain    7. Myofascial pain    8. At risk for respiratory depression due to opioid    9. Primary insomnia    10. Recurrent major depressive disorder, in partial remission    11. Encounter for long-term (current) use of high-risk medication    12. Pathological fracture of left tibia due to other osteoporosis, sequela    .    As per information/history obtained from the PADT(patient assessment and documentation tool) -  He complains of pain in the head, hands Bilateral, and knees Bilateral with radiation to the lower leg Left, ankles Left, and feet Left He rates the pain 10/10 and describes it as sharp, aching, pins and needles.  Pain is made worse by: movement, walking, standing, sitting, bending, lifting.  Current treatment regimen has helped relieve about 10% of the pain.  He denies side effects from the current pain regimen.   Patient reports that since last follow up visit the physical functioning is better, family/social relationships are unchanged, mood is better sleep patterns are unchanged.  Mr. Carson states that since starting the treatment with the current regimen the  overall functioning  in the above aspects is  better,Patient denies neurological bowel or bladder. Patient denies misusing/abusing his narcotic pain medications or using any illegal drugs.  There are No indicators for possible drug abuse, addiction or diversion problems.     Upon obtaining the medical history from Mr. Carson regarding today's office visit for his presenting problems,

## 2025-08-13 ENCOUNTER — OFFICE VISIT (OUTPATIENT)
Dept: ENDOCRINOLOGY | Age: 50
End: 2025-08-13
Payer: MEDICARE

## 2025-08-13 VITALS
DIASTOLIC BLOOD PRESSURE: 84 MMHG | BODY MASS INDEX: 27.22 KG/M2 | HEART RATE: 64 BPM | OXYGEN SATURATION: 98 % | SYSTOLIC BLOOD PRESSURE: 137 MMHG | WEIGHT: 212 LBS

## 2025-08-13 DIAGNOSIS — E10.65 UNCONTROLLED TYPE 1 DIABETES MELLITUS WITH HYPERGLYCEMIA (HCC): ICD-10-CM

## 2025-08-13 LAB — HBA1C MFR BLD: 10.7 %

## 2025-08-13 PROCEDURE — 3079F DIAST BP 80-89 MM HG: CPT | Performed by: INTERNAL MEDICINE

## 2025-08-13 PROCEDURE — 99214 OFFICE O/P EST MOD 30 MIN: CPT | Performed by: INTERNAL MEDICINE

## 2025-08-13 PROCEDURE — 3075F SYST BP GE 130 - 139MM HG: CPT | Performed by: INTERNAL MEDICINE

## 2025-08-13 PROCEDURE — 3051F HG A1C>EQUAL 7.0%<8.0%: CPT | Performed by: INTERNAL MEDICINE

## 2025-08-13 PROCEDURE — 83036 HEMOGLOBIN GLYCOSYLATED A1C: CPT | Performed by: INTERNAL MEDICINE

## 2025-08-13 RX ORDER — GLUCAGON INJECTION, SOLUTION 1 MG/.2ML
INJECTION, SOLUTION SUBCUTANEOUS
Qty: 0.4 ML | Refills: 2 | Status: SHIPPED | OUTPATIENT
Start: 2025-08-13

## 2025-08-13 RX ORDER — INSULIN GLARGINE 100 [IU]/ML
15 INJECTION, SOLUTION SUBCUTANEOUS 2 TIMES DAILY
Qty: 30 ML | Refills: 3 | Status: SHIPPED | OUTPATIENT
Start: 2025-08-13

## 2025-08-14 ENCOUNTER — OFFICE VISIT (OUTPATIENT)
Dept: INTERNAL MEDICINE CLINIC | Age: 50
End: 2025-08-14

## 2025-08-14 VITALS
HEIGHT: 74 IN | WEIGHT: 213.6 LBS | BODY MASS INDEX: 27.41 KG/M2 | SYSTOLIC BLOOD PRESSURE: 125 MMHG | OXYGEN SATURATION: 98 % | DIASTOLIC BLOOD PRESSURE: 78 MMHG | HEART RATE: 79 BPM

## 2025-08-14 DIAGNOSIS — E78.2 MIXED HYPERLIPIDEMIA: ICD-10-CM

## 2025-08-14 DIAGNOSIS — N18.30 STAGE 3 CHRONIC KIDNEY DISEASE, UNSPECIFIED WHETHER STAGE 3A OR 3B CKD (HCC): ICD-10-CM

## 2025-08-14 DIAGNOSIS — E10.8 TYPE 1 DIABETES MELLITUS WITH COMPLICATION (HCC): Primary | ICD-10-CM

## 2025-08-14 DIAGNOSIS — E10.3593 PROLIFERATIVE DIABETIC RETINOPATHY OF BOTH EYES ASSOCIATED WITH TYPE 1 DIABETES MELLITUS, UNSPECIFIED PROLIFERATIVE RETINOPATHY TYPE (HCC): ICD-10-CM

## 2025-08-14 LAB
CHP ED QC CHECK: NORMAL
GLUCOSE BLD-MCNC: 86 MG/DL

## 2025-08-22 ASSESSMENT — ENCOUNTER SYMPTOMS
RESPIRATORY NEGATIVE: 1
GASTROINTESTINAL NEGATIVE: 1
EYES NEGATIVE: 1

## 2025-08-28 ENCOUNTER — OFFICE VISIT (OUTPATIENT)
Dept: PAIN MANAGEMENT | Age: 50
End: 2025-08-28
Payer: MEDICARE

## 2025-08-28 VITALS
BODY MASS INDEX: 26.32 KG/M2 | SYSTOLIC BLOOD PRESSURE: 170 MMHG | DIASTOLIC BLOOD PRESSURE: 89 MMHG | HEART RATE: 85 BPM | WEIGHT: 205 LBS

## 2025-08-28 DIAGNOSIS — M79.18 MYOFASCIAL PAIN: ICD-10-CM

## 2025-08-28 DIAGNOSIS — G62.9 PERIPHERAL POLYNEUROPATHY: ICD-10-CM

## 2025-08-28 DIAGNOSIS — G43.711 CHRONIC MIGRAINE WITHOUT AURA, WITH INTRACTABLE MIGRAINE, SO STATED, WITH STATUS MIGRAINOSUS: ICD-10-CM

## 2025-08-28 DIAGNOSIS — G89.4 CHRONIC PAIN SYNDROME: ICD-10-CM

## 2025-08-28 DIAGNOSIS — M79.2 NEUROPATHIC PAIN: ICD-10-CM

## 2025-08-28 DIAGNOSIS — G63 POLYNEUROPATHY ASSOCIATED WITH UNDERLYING DISEASE: ICD-10-CM

## 2025-08-28 PROCEDURE — 3079F DIAST BP 80-89 MM HG: CPT | Performed by: INTERNAL MEDICINE

## 2025-08-28 PROCEDURE — 99213 OFFICE O/P EST LOW 20 MIN: CPT | Performed by: INTERNAL MEDICINE

## 2025-08-28 PROCEDURE — 3077F SYST BP >= 140 MM HG: CPT | Performed by: INTERNAL MEDICINE

## 2025-08-28 RX ORDER — ERENUMAB-AOOE 140 MG/ML
140 INJECTION, SOLUTION SUBCUTANEOUS
Qty: 1 ADJUSTABLE DOSE PRE-FILLED PEN SYRINGE | Refills: 1 | Status: SHIPPED | OUTPATIENT
Start: 2025-08-28

## 2025-08-28 RX ORDER — DIVALPROEX SODIUM 500 MG/1
500 TABLET, DELAYED RELEASE ORAL 2 TIMES DAILY
Qty: 60 TABLET | Refills: 1 | Status: SHIPPED | OUTPATIENT
Start: 2025-08-28

## 2025-08-28 RX ORDER — RIMEGEPANT SULFATE 75 MG/75MG
TABLET, ORALLY DISINTEGRATING ORAL
Qty: 16 TABLET | Refills: 1 | Status: SHIPPED | OUTPATIENT
Start: 2025-08-28

## 2025-08-28 RX ORDER — TRAMADOL HYDROCHLORIDE 50 MG/1
50 TABLET ORAL 2 TIMES DAILY PRN
Qty: 70 TABLET | Refills: 0 | Status: SHIPPED | OUTPATIENT
Start: 2025-08-28 | End: 2025-10-02

## 2025-08-31 DIAGNOSIS — E10.65 UNCONTROLLED TYPE 1 DIABETES MELLITUS WITH HYPERGLYCEMIA (HCC): ICD-10-CM

## 2025-09-01 ENCOUNTER — APPOINTMENT (OUTPATIENT)
Dept: CT IMAGING | Age: 50
End: 2025-09-01
Payer: MEDICARE

## 2025-09-01 ENCOUNTER — HOSPITAL ENCOUNTER (EMERGENCY)
Age: 50
Discharge: HOME | End: 2025-09-01
Payer: MEDICARE

## 2025-09-01 VITALS
DIASTOLIC BLOOD PRESSURE: 88 MMHG | OXYGEN SATURATION: 97 % | BODY MASS INDEX: 25.03 KG/M2 | SYSTOLIC BLOOD PRESSURE: 168 MMHG | WEIGHT: 195 LBS | HEIGHT: 74 IN | RESPIRATION RATE: 16 BRPM | HEART RATE: 93 BPM | TEMPERATURE: 98.1 F

## 2025-09-01 DIAGNOSIS — I63.81 LEFT SIDED LACUNAR INFARCTION (HCC): ICD-10-CM

## 2025-09-01 DIAGNOSIS — E16.2 HYPOGLYCEMIA: ICD-10-CM

## 2025-09-01 DIAGNOSIS — R41.0 CONFUSION: Primary | ICD-10-CM

## 2025-09-01 LAB
ALBUMIN SERPL-MCNC: 3.4 G/DL (ref 3.4–5)
ALBUMIN/GLOB SERPL: 1 {RATIO} (ref 1.1–2.2)
ALP SERPL-CCNC: 67 U/L (ref 40–129)
ALT SERPL-CCNC: 8 U/L (ref 10–40)
AMPHETAMINES UR QL SCN>1000 NG/ML: NORMAL
ANION GAP SERPL CALCULATED.3IONS-SCNC: 13 MMOL/L (ref 3–16)
AST SERPL-CCNC: 18 U/L (ref 15–37)
BACTERIA URNS QL MICRO: ABNORMAL /HPF
BARBITURATES UR QL SCN>200 NG/ML: NORMAL
BASOPHILS # BLD: 0 K/UL (ref 0–0.2)
BASOPHILS NFR BLD: 0.3 %
BENZODIAZ UR QL SCN>200 NG/ML: NORMAL
BILIRUB SERPL-MCNC: <0.2 MG/DL (ref 0–1)
BILIRUB UR QL STRIP.AUTO: NEGATIVE
BUN SERPL-MCNC: 19 MG/DL (ref 7–20)
CALCIUM SERPL-MCNC: 9 MG/DL (ref 8.3–10.6)
CANNABINOIDS UR QL SCN>50 NG/ML: NORMAL
CHLORIDE SERPL-SCNC: 98 MMOL/L (ref 99–110)
CHP ED QC CHECK: NORMAL
CLARITY UR: CLEAR
CO2 SERPL-SCNC: 24 MMOL/L (ref 21–32)
COCAINE UR QL SCN: NORMAL
COLOR UR: YELLOW
CREAT SERPL-MCNC: 1.9 MG/DL (ref 0.9–1.3)
DEPRECATED RDW RBC AUTO: 16.5 % (ref 12.4–15.4)
DRUG SCREEN COMMENT UR-IMP: NORMAL
EOSINOPHIL # BLD: 0.5 K/UL (ref 0–0.6)
EOSINOPHIL NFR BLD: 4.6 %
EPI CELLS #/AREA URNS HPF: ABNORMAL /HPF (ref 0–5)
ETHANOLAMINE SERPL-MCNC: NORMAL MG/DL (ref 0–0.08)
FENTANYL SCREEN, URINE: NORMAL
GFR SERPLBLD CREATININE-BSD FMLA CKD-EPI: 43 ML/MIN/{1.73_M2}
GLUCOSE BLD-MCNC: 175 MG/DL
GLUCOSE BLD-MCNC: 175 MG/DL (ref 70–99)
GLUCOSE BLD-MCNC: 199 MG/DL (ref 70–99)
GLUCOSE SERPL-MCNC: 187 MG/DL (ref 70–99)
GLUCOSE UR STRIP.AUTO-MCNC: 250 MG/DL
HCT VFR BLD AUTO: 34.4 % (ref 40.5–52.5)
HGB BLD-MCNC: 11.6 G/DL (ref 13.5–17.5)
HGB UR QL STRIP.AUTO: ABNORMAL
KETONES UR STRIP.AUTO-MCNC: NEGATIVE MG/DL
LACTATE BLDV-SCNC: 1.7 MMOL/L (ref 0.4–2)
LEUKOCYTE ESTERASE UR QL STRIP.AUTO: ABNORMAL
LYMPHOCYTES # BLD: 1.2 K/UL (ref 1–5.1)
LYMPHOCYTES NFR BLD: 12 %
MAGNESIUM SERPL-MCNC: 1.96 MG/DL (ref 1.8–2.4)
MCH RBC QN AUTO: 27.5 PG (ref 26–34)
MCHC RBC AUTO-ENTMCNC: 33.7 G/DL (ref 31–36)
MCV RBC AUTO: 81.8 FL (ref 80–100)
METHADONE UR QL SCN>300 NG/ML: NORMAL
MONOCYTES # BLD: 0.7 K/UL (ref 0–1.3)
MONOCYTES NFR BLD: 7.2 %
NEUTROPHILS # BLD: 7.6 K/UL (ref 1.7–7.7)
NEUTROPHILS NFR BLD: 75.9 %
NITRITE UR QL STRIP.AUTO: NEGATIVE
OPIATES UR QL SCN>300 NG/ML: NORMAL
OXYCODONE UR QL SCN: NORMAL
PCP UR QL SCN>25 NG/ML: NORMAL
PERFORMED ON: ABNORMAL
PERFORMED ON: ABNORMAL
PH UR STRIP.AUTO: 6 [PH] (ref 5–8)
PH UR STRIP: 6 [PH]
PLATELET # BLD AUTO: 309 K/UL (ref 135–450)
PMV BLD AUTO: 6.9 FL (ref 5–10.5)
POTASSIUM SERPL-SCNC: 3.3 MMOL/L (ref 3.5–5.1)
PROT SERPL-MCNC: 6.7 G/DL (ref 6.4–8.2)
PROT UR STRIP.AUTO-MCNC: 30 MG/DL
RBC # BLD AUTO: 4.2 M/UL (ref 4.2–5.9)
RBC #/AREA URNS HPF: ABNORMAL /HPF (ref 0–4)
SODIUM SERPL-SCNC: 135 MMOL/L (ref 136–145)
SP GR UR STRIP.AUTO: 1.01 (ref 1–1.03)
UA COMPLETE W REFLEX CULTURE PNL UR: ABNORMAL
UA DIPSTICK W REFLEX MICRO PNL UR: YES
URN SPEC COLLECT METH UR: ABNORMAL
UROBILINOGEN UR STRIP-ACNC: 0.2 E.U./DL
WBC # BLD AUTO: 10 K/UL (ref 4–11)
WBC #/AREA URNS HPF: ABNORMAL /HPF (ref 0–5)

## 2025-09-01 PROCEDURE — 83605 ASSAY OF LACTIC ACID: CPT

## 2025-09-01 PROCEDURE — 83735 ASSAY OF MAGNESIUM: CPT

## 2025-09-01 PROCEDURE — 85025 COMPLETE CBC W/AUTO DIFF WBC: CPT

## 2025-09-01 PROCEDURE — 96374 THER/PROPH/DIAG INJ IV PUSH: CPT

## 2025-09-01 PROCEDURE — 36415 COLL VENOUS BLD VENIPUNCTURE: CPT

## 2025-09-01 PROCEDURE — 81001 URINALYSIS AUTO W/SCOPE: CPT

## 2025-09-01 PROCEDURE — 70450 CT HEAD/BRAIN W/O DYE: CPT

## 2025-09-01 PROCEDURE — 6360000002 HC RX W HCPCS: Performed by: PHYSICIAN ASSISTANT

## 2025-09-01 PROCEDURE — 82077 ASSAY SPEC XCP UR&BREATH IA: CPT

## 2025-09-01 PROCEDURE — 6370000000 HC RX 637 (ALT 250 FOR IP): Performed by: PHYSICIAN ASSISTANT

## 2025-09-01 PROCEDURE — 80307 DRUG TEST PRSMV CHEM ANLYZR: CPT

## 2025-09-01 PROCEDURE — 80053 COMPREHEN METABOLIC PANEL: CPT

## 2025-09-01 PROCEDURE — 99284 EMERGENCY DEPT VISIT MOD MDM: CPT

## 2025-09-01 RX ORDER — ONDANSETRON 2 MG/ML
4 INJECTION INTRAMUSCULAR; INTRAVENOUS ONCE
Status: COMPLETED | OUTPATIENT
Start: 2025-09-01 | End: 2025-09-01

## 2025-09-01 RX ORDER — ACETAMINOPHEN 500 MG
1000 TABLET ORAL ONCE
Status: COMPLETED | OUTPATIENT
Start: 2025-09-01 | End: 2025-09-01

## 2025-09-01 RX ADMIN — ACETAMINOPHEN 1000 MG: 500 TABLET ORAL at 10:02

## 2025-09-01 RX ADMIN — ONDANSETRON 4 MG: 2 INJECTION, SOLUTION INTRAMUSCULAR; INTRAVENOUS at 10:02

## 2025-09-01 ASSESSMENT — PAIN SCALES - GENERAL
PAINLEVEL_OUTOF10: 7
PAINLEVEL_OUTOF10: 10

## 2025-09-01 ASSESSMENT — LIFESTYLE VARIABLES
HOW OFTEN DO YOU HAVE A DRINK CONTAINING ALCOHOL: MONTHLY OR LESS
HOW MANY STANDARD DRINKS CONTAINING ALCOHOL DO YOU HAVE ON A TYPICAL DAY: 1 OR 2

## 2025-09-01 ASSESSMENT — PAIN - FUNCTIONAL ASSESSMENT: PAIN_FUNCTIONAL_ASSESSMENT: 0-10

## 2025-09-02 ENCOUNTER — TELEPHONE (OUTPATIENT)
Dept: ENDOCRINOLOGY | Age: 50
End: 2025-09-02

## 2025-09-02 DIAGNOSIS — E10.65 UNCONTROLLED TYPE 1 DIABETES MELLITUS WITH HYPERGLYCEMIA (HCC): ICD-10-CM

## 2025-09-02 RX ORDER — INSULIN GLULISINE 100 [IU]/ML
INJECTION, SOLUTION SUBCUTANEOUS
Qty: 15 ML | Refills: 1 | Status: SHIPPED | OUTPATIENT
Start: 2025-09-02 | End: 2025-09-02 | Stop reason: SDUPTHER

## 2025-09-02 RX ORDER — INSULIN GLULISINE 100 [IU]/ML
INJECTION, SOLUTION SUBCUTANEOUS
Qty: 15 ML | Refills: 1 | Status: SHIPPED | OUTPATIENT
Start: 2025-09-02

## (undated) DEVICE — FORCEPS BX L240CM DIA2.4MM L NDL RAD JAW 4 133340

## (undated) DEVICE — ENDO CARRY-ON PROCEDURE KIT INCLUDES SUCTION TUBING, LUBRICANT, GAUZE, BIOHAZARD STICKER, TRANSPORT PAD AND INTERCEPT BEDSIDE KIT.: Brand: ENDO CARRY-ON PROCEDURE KIT

## (undated) DEVICE — FORCEPS ENDOSCP BX L230CM DIA28MM ALGTR CUP SPEC RETRV GI

## (undated) DEVICE — CONMED SCOPE SAVER BITE BLOCK, 20X27 MM: Brand: SCOPE SAVER